# Patient Record
Sex: FEMALE | Race: OTHER | HISPANIC OR LATINO | Employment: OTHER | URBAN - METROPOLITAN AREA
[De-identification: names, ages, dates, MRNs, and addresses within clinical notes are randomized per-mention and may not be internally consistent; named-entity substitution may affect disease eponyms.]

---

## 2017-12-03 ENCOUNTER — APPOINTMENT (EMERGENCY)
Dept: RADIOLOGY | Facility: HOSPITAL | Age: 42
End: 2017-12-03
Payer: COMMERCIAL

## 2017-12-03 ENCOUNTER — HOSPITAL ENCOUNTER (EMERGENCY)
Facility: HOSPITAL | Age: 42
Discharge: HOME | End: 2017-12-04
Attending: EMERGENCY MEDICINE | Admitting: EMERGENCY MEDICINE
Payer: COMMERCIAL

## 2017-12-03 DIAGNOSIS — F10.929 ALCOHOL INTOXICATION (HCC): ICD-10-CM

## 2017-12-03 DIAGNOSIS — R52 BODY ACHES: Primary | ICD-10-CM

## 2017-12-03 LAB
BASOPHILS # BLD AUTO: 0.06 THOUSANDS/ΜL (ref 0–0.1)
BASOPHILS NFR BLD AUTO: 1 % (ref 0–1)
EOSINOPHIL # BLD AUTO: 0.15 THOUSAND/ΜL (ref 0–0.61)
EOSINOPHIL NFR BLD AUTO: 3 % (ref 0–6)
ERYTHROCYTE [DISTWIDTH] IN BLOOD BY AUTOMATED COUNT: 18.4 % (ref 11.6–15.1)
HCT VFR BLD AUTO: 32.3 % (ref 34.8–46.1)
HGB BLD-MCNC: 10.6 G/DL (ref 11.5–15.4)
LYMPHOCYTES # BLD AUTO: 2.43 THOUSANDS/ΜL (ref 0.6–4.47)
LYMPHOCYTES NFR BLD AUTO: 47 % (ref 14–44)
MCH RBC QN AUTO: 28.6 PG (ref 26.8–34.3)
MCHC RBC AUTO-ENTMCNC: 32.8 G/DL (ref 31.4–37.4)
MCV RBC AUTO: 87 FL (ref 82–98)
MONOCYTES # BLD AUTO: 0.72 THOUSAND/ΜL (ref 0.17–1.22)
MONOCYTES NFR BLD AUTO: 14 % (ref 4–12)
NEUTROPHILS # BLD AUTO: 1.8 THOUSANDS/ΜL (ref 1.85–7.62)
NEUTS SEG NFR BLD AUTO: 35 % (ref 43–75)
NRBC BLD AUTO-RTO: 0 /100 WBCS
PLATELET # BLD AUTO: 213 THOUSANDS/UL (ref 149–390)
PMV BLD AUTO: 11.2 FL (ref 8.9–12.7)
RBC # BLD AUTO: 3.7 MILLION/UL (ref 3.81–5.12)
SPECIMEN SOURCE: NORMAL
TROPONIN I BLD-MCNC: 0 NG/ML (ref 0–0.08)
WBC # BLD AUTO: 5.16 THOUSAND/UL (ref 4.31–10.16)

## 2017-12-03 PROCEDURE — 81025 URINE PREGNANCY TEST: CPT | Performed by: EMERGENCY MEDICINE

## 2017-12-03 PROCEDURE — 84484 ASSAY OF TROPONIN QUANT: CPT

## 2017-12-03 PROCEDURE — 85025 COMPLETE CBC W/AUTO DIFF WBC: CPT | Performed by: EMERGENCY MEDICINE

## 2017-12-03 PROCEDURE — 36415 COLL VENOUS BLD VENIPUNCTURE: CPT | Performed by: EMERGENCY MEDICINE

## 2017-12-03 PROCEDURE — 93005 ELECTROCARDIOGRAM TRACING: CPT | Performed by: EMERGENCY MEDICINE

## 2017-12-03 PROCEDURE — 81002 URINALYSIS NONAUTO W/O SCOPE: CPT | Performed by: EMERGENCY MEDICINE

## 2017-12-03 PROCEDURE — 80053 COMPREHEN METABOLIC PANEL: CPT | Performed by: EMERGENCY MEDICINE

## 2017-12-03 PROCEDURE — 71020 HB CHEST X-RAY 2VW FRONTAL&LATL: CPT

## 2017-12-03 RX ORDER — DIAZEPAM 5 MG/1
5 TABLET ORAL ONCE
Status: COMPLETED | OUTPATIENT
Start: 2017-12-03 | End: 2017-12-03

## 2017-12-03 RX ADMIN — DIAZEPAM 5 MG: 5 TABLET ORAL at 23:30

## 2017-12-04 ENCOUNTER — APPOINTMENT (EMERGENCY)
Dept: CT IMAGING | Facility: HOSPITAL | Age: 42
End: 2017-12-04
Payer: COMMERCIAL

## 2017-12-04 VITALS
RESPIRATION RATE: 18 BRPM | HEART RATE: 101 BPM | SYSTOLIC BLOOD PRESSURE: 121 MMHG | WEIGHT: 172 LBS | DIASTOLIC BLOOD PRESSURE: 80 MMHG | TEMPERATURE: 97.7 F | OXYGEN SATURATION: 100 %

## 2017-12-04 LAB
ALBUMIN SERPL BCP-MCNC: 2.3 G/DL (ref 3.5–5)
ALP SERPL-CCNC: 145 U/L (ref 46–116)
ALT SERPL W P-5'-P-CCNC: 84 U/L (ref 12–78)
ANION GAP SERPL CALCULATED.3IONS-SCNC: 8 MMOL/L (ref 4–13)
APAP SERPL-MCNC: <2 UG/ML (ref 10–30)
AST SERPL W P-5'-P-CCNC: 150 U/L (ref 5–45)
BILIRUB SERPL-MCNC: 0.29 MG/DL (ref 0.2–1)
BILIRUB UR QL STRIP: NEGATIVE
BUN SERPL-MCNC: 5 MG/DL (ref 5–25)
CALCIUM SERPL-MCNC: 8 MG/DL (ref 8.3–10.1)
CHLORIDE SERPL-SCNC: 107 MMOL/L (ref 100–108)
CLARITY UR: CLEAR
CO2 SERPL-SCNC: 25 MMOL/L (ref 21–32)
COLOR UR: YELLOW
COLOR, POC: YELLOW
CREAT SERPL-MCNC: 0.55 MG/DL (ref 0.6–1.3)
ETHANOL SERPL-MCNC: 172 MG/DL (ref 0–3)
EXT PREG TEST URINE: NEGATIVE
GFR SERPL CREATININE-BSD FRML MDRD: 114 ML/MIN/1.73SQ M
GLUCOSE SERPL-MCNC: 93 MG/DL (ref 65–140)
GLUCOSE UR STRIP-MCNC: NEGATIVE MG/DL
HGB UR QL STRIP.AUTO: NEGATIVE
KETONES UR STRIP-MCNC: NEGATIVE MG/DL
LEUKOCYTE ESTERASE UR QL STRIP: NEGATIVE
NITRITE UR QL STRIP: NEGATIVE
PH UR STRIP.AUTO: 6 [PH] (ref 4.5–8)
POTASSIUM SERPL-SCNC: 3.9 MMOL/L (ref 3.5–5.3)
PROT SERPL-MCNC: 6.9 G/DL (ref 6.4–8.2)
PROT UR STRIP-MCNC: NEGATIVE MG/DL
SALICYLATES SERPL-MCNC: <3 MG/DL (ref 3–20)
SODIUM SERPL-SCNC: 140 MMOL/L (ref 136–145)
SP GR UR STRIP.AUTO: 1.01 (ref 1–1.03)
UROBILINOGEN UR QL STRIP.AUTO: 0.2 E.U./DL

## 2017-12-04 PROCEDURE — 99284 EMERGENCY DEPT VISIT MOD MDM: CPT

## 2017-12-04 PROCEDURE — 80329 ANALGESICS NON-OPIOID 1 OR 2: CPT | Performed by: EMERGENCY MEDICINE

## 2017-12-04 PROCEDURE — 81003 URINALYSIS AUTO W/O SCOPE: CPT

## 2017-12-04 PROCEDURE — 80320 DRUG SCREEN QUANTALCOHOLS: CPT | Performed by: EMERGENCY MEDICINE

## 2017-12-04 PROCEDURE — 71275 CT ANGIOGRAPHY CHEST: CPT

## 2017-12-04 PROCEDURE — 74177 CT ABD & PELVIS W/CONTRAST: CPT

## 2017-12-04 PROCEDURE — 36415 COLL VENOUS BLD VENIPUNCTURE: CPT | Performed by: EMERGENCY MEDICINE

## 2017-12-04 RX ADMIN — IOHEXOL 100 ML: 350 INJECTION, SOLUTION INTRAVENOUS at 00:39

## 2017-12-04 NOTE — ED ATTENDING ATTESTATION
Manuel Craig DO, saw and evaluated the patient  I have discussed the patient with the resident/non-physician practitioner and agree with the resident's/non-physician practitioner's findings, Plan of Care, and MDM as documented in the resident's/non-physician practitioner's note, except where noted  All available labs and Radiology studies were reviewed  At this point I agree with the current assessment done in the Emergency Department  I have conducted an independent evaluation of this patient a history and physical is as follows:       Patient is a 52-year-old female that presents for left-sided chest and flank pain  She states that she just arrived here from [facility]   She is seeking a medical evaluation that was advised that she obtained by [employer]  Patient states that she has a history of a pulmonary embolus that was complicated by a GI bleed on Xarelto  This required her to undergo an IVC filter  Patient states that she has been in [facility] for the last year  She used to live here in does have a family doctor here  She states that she will be following up with them  Patient states that last month she had gastric ulcers but there is no intervention that needed to be done during her EGD  She states that further the past month or 2 she has been having intermittent pains from the right to left side of her body  This is similar to the pain that she is presenting with today but it is only on the left side of her body today  Patient is a chronic alcoholic and states that she drank last night  Patient appears in no acute distress on exam   She is tachycardic  Her vital signs are normal except for a mild tachycardia  She does have point tenderness to the left lateral ribs that extends into the abdomen  Her lungs are clear  Heart sounds are otherwise normal     Plan is to obtain labs and a CT scan to evaluate for PE since she is off Xarelto and only has an IVC filter      Labs show an elevated AST and ALT  She does have cirrhosis and this would be compliant with that  Alcohol level is elevated  Patient tells me her last drink was last night which was about 8 hours ago  CT scan is negative for acute pathology  Advised follow up with her family doctor  No other intervention at this time    Critical Care Time  CritCare Time

## 2017-12-04 NOTE — ED PROVIDER NOTES
History  Chief Complaint   Patient presents with    Abdominal Pain     abdominal pain, vomiting, denies diarrhea  chills    Rib Pain     left sided rib pain, known PE, states "I was on xarelto but I just moved here", SOB  HPI  This is a 15-year-old female with history of recent PE diagnosis, the gastric ulcers, alcoholic liver disease, seizures presenting for evaluation for medical evaluation given that she recently moved here from [facility]   Patient was living in [facility] for the past year, was sent here by female a due to recent GI bleed  Patient underwent multiple blood transfusions in the past few weeks because of this  Patient had a scope done last month which showed gastric ulcers, however unknown if there is any intervention done during the EGD  Patient states that over the past month, she has been feeling fatigued with pain alternated to the right side left side of her body since the issue with GI bleed  Patient currently presents with left-sided body pain which includes left shoulder, left chest, left flank, and left leg  This is similar to the way she was presenting over the past month for GI bleeds  Patient does complain of shortness of breath as well  Patient was recently diagnosed with bilateral, peripheral PEs on CT scan  Patient was initially on Xarelto, however episodes GI bleeds, she was taken off the anticoagulation  She currently has an IVC filter that was placed within the past 2 months  Patient states that during her most recent hospitalization, her hemoglobin went down 5, she was transfused blood into her hemoglobin to 10  [place] once her hemoglobin was stable  Patient denies any other recent illnesses, no cough, no urinary symptoms, no diarrhea  Patient denies any abdominal pain, no vomiting, she does admit to having intermittent episodes of nausea  In addition, patient has history of seizures    This is unclear whether not this is associated with her alcohol withdrawal   Patient is on antiepileptics  Patient denies smoking and drug use, however she still does drink alcohol  Patient drinks 4-5 days a week, she says she has a few glasses of wine each time  Patient denies extremity numbness and tingling, no headaches, no visual auditory disturbance  Patient denies any vaginal bleeding or discharge, hematuria, no bloody bowel movements  Surgical history of gastric bypass, cholecystectomy, appendectomy  Patient did have a previous PCP here [facility] prior to her move to [facility]         None       Past Medical History:   Diagnosis Date    Anemia     [facility]monary [facility] [address] [facility]     Seizures ([facility] [address] )        Past Surgical History:   Procedure Laterality Date    APPENDECTOMY      CHOLECYSTECTOMY      IVC FILTER INSERTION      TUBAL LIGATION         History reviewed  No pertinent family history  I have reviewed and agree with the history as documented  Social History   Substance Use Topics    Smoking status: Never Smoker    Smokeless tobacco: Never Used    Alcohol use Yes      Comment: history of alcoholism        Review of Systems     Constitutional: Negative for appetite change, chills and fever  HENT: Negative for congestion, rhinorrhea and sore throat  Eyes: Negative for photophobia, pain and visual disturbance  Respiratory: Negative for cough, chest tightness  Positive for shortness of breath  Cardiovascular:  Positive for chest pain, negative for palpitations and leg swelling  Gastrointestinal:  Positive for abdominal pain, negative for diarrhea, nausea and vomiting  Genitourinary: Negative for dysuria, flank pain and hematuria  Musculoskeletal: Negative for back pain, neck pain and neck stiffness  Skin: Negative for color change, rash and wound  Neurological: Negative for dizziness, numbness and headaches  All other systems reviewed and are negative        Physical Exam  ED Triage Vitals [12/03/17 2230]   Temperature Pulse Respirations Blood Pressure SpO2   97 7 °F (36 5 °C) (!) 110 18 117/69 98 %      Temp Source Heart Rate Source Patient Position - Orthostatic VS BP Location FiO2 (%)   Temporal Monitor Sitting Right arm --      Pain Score       7           Orthostatic Vital Signs  Vitals:    12/03/17 2230 12/04/17 0035   BP: 117/69 121/80   Pulse: (!) 110 101   Patient Position - Orthostatic VS: Sitting        Physical Exam  /80   Pulse 101   Temp 97 7 °F (36 5 °C) (Temporal)   Resp 18   Wt 78 kg (172 lb)   LMP 11/06/2017   SpO2 100%     General Appearance:  Alert, cooperative, no distress, appears stated age   Head:  Normocephalic, without obvious abnormality, atraumatic   Eyes:  PERRL, conjunctiva/corneas clear, EOM's intact       Nose: Nares normal, septum midline,mucosa normal, no drainage or sinus tenderness   Throat: Lips, mucosa, and tongue normal; teeth and gums normal   Neck: Supple, symmetrical, trachea midline, no adenopathy   Back:   Symmetric, no curvature, ROM normal, no CVA tenderness   Lungs:   Clear to auscultation bilaterally, respirations unlabored  Left posterior chest wall with some tenderness, there is no signs of trauma, there is no swelling  Heart:  Regular rate and rhythm, S1 and S2 normal, no murmur, rub, or gallop   Abdomen:   Soft, the mild tenderness of left flank, bowel sounds active all four quadrants   Pelvic: Deferred   Extremities: Extremities normal, atraumatic, no cyanosis or edema  Left thigh is soft, there is no tenderness on palpation  Pulses: 2+ and symmetric   Skin: Skin color, texture, turgor normal, no rashes or lesions   Neurologic:      Psychiatric: Moves all extremities, sensation and strength in tact in all extremities    5/5 strength in all extremities    Normal mood and affect         ED Medications  Medications   diazepam (VALIUM) tablet 5 mg (5 mg Oral Given 12/3/17 2330)   iohexol (OMNIPAQUE) 350 MG/ML injection (MULTI-DOSE) 100 mL (100 mL Intravenous Given 12/4/17 0039)       Diagnostic Studies  Results Reviewed     Procedure Component Value Units Date/Time    Acetaminophen level [[id number]]  (Abnormal) Collected:  12/04/17 0002    Lab Status:  Final result Specimen:  Blood from Arm, Right Updated:  12/04/17 0048     Acetaminophen Level <2 (L) ug/mL     Salicylate level [[id number]]  (Abnormal) Collected:  12/04/17 0002    Lab Status:  Final result Specimen:  Blood from Arm, Right Updated:  02/10/98 8983     Salicylate Lvl <3 (L) mg/dL     Ethanol [[id number]]  (Abnormal) Collected:  12/04/17 0002    Lab Status:  Final result Specimen:  Blood from Arm, Right Updated:  12/04/17 0023     Ethanol Lvl 172 (H) mg/dL     Comprehensive metabolic panel [[id number]]  (Abnormal) Collected:  12/03/17 2329    Lab Status:  Final result Specimen:  Blood from Arm, Right Updated:  12/04/17 0013     Sodium 140 mmol/L      Potassium 3 9 mmol/L      Chloride 107 mmol/L      CO2 25 mmol/L      Anion Gap 8 mmol/L      BUN 5 mg/dL      Creatinine 0 55 (L) mg/dL      Glucose 93 mg/dL      Calcium 8 0 (L) mg/dL       (H) U/L      ALT 84 (H) U/L      Alkaline Phosphatase 145 (H) U/L      Total Protein 6 9 g/dL      Albumin 2 3 (L) g/dL      Total Bilirubin 0 29 mg/dL      eGFR 114 ml/min/1 73sq m     Narrative:         National Kidney Disease Education Program recommendations are as follows:  GFR calculation is accurate only with a steady state creatinine  Chronic Kidney disease less than 60 ml/min/1 73 sq  meters  Kidney failure less than 15 ml/min/1 73 sq  meters      POCT urinalysis dipstick [[id number]]  (Normal) Resulted:  12/04/17 0002    Lab Status:  Final result Specimen:  Urine Updated:  12/04/17 0002     Color, UA yellow    POCT pregnancy, urine [[id number]]  (Normal) Resulted:  12/04/17 0002    Lab Status:  Final result Updated:  12/04/17 0002     EXT PREG TEST UR (Ref: Negative) negative    ED Urine Macroscopic [[id number]] (Normal) Collected:  12/04/17 0018    Lab Status:  Final result Specimen:  Urine Updated:  12/04/17 0002     Color, UA Yellow     Clarity, UA Clear     pH, UA 6 0     Leukocytes, UA Negative     Nitrite, UA Negative     Protein, UA Negative mg/dl      Glucose, UA Negative mg/dl      Ketones, UA Negative mg/dl      Urobilinogen, UA 0 2 E U /dl      Bilirubin, UA Negative     Blood, UA Negative     Specific Gravity, UA 1 010    Narrative:       CLINITEK RESULT    CBC and differential [[id number]]  (Abnormal) Collected:  12/03/17 2329    Lab Status:  Final result Specimen:  Blood from Arm, Right Updated:  12/03/17 2345     WBC 5 16 Thousand/uL      RBC 3 70 (L) Million/uL      Hemoglobin 10 6 (L) g/dL      Hematocrit 32 3 (L) %      MCV 87 fL      MCH 28 6 pg      MCHC 32 8 g/dL      RDW 18 4 (H) %      MPV 11 2 fL      Platelets 661 Thousands/uL      nRBC 0 /100 WBCs      Neutrophils Relative 35 (L) %      Lymphocytes Relative 47 (H) %      Monocytes Relative 14 (H) %      Eosinophils Relative 3 %      Basophils Relative 1 %      Neutrophils Absolute 1 80 (L) Thousands/µL      Lymphocytes Absolute 2 43 Thousands/µL      Monocytes Absolute 0 72 Thousand/µL      Eosinophils Absolute 0 15 Thousand/µL      Basophils Absolute 0 06 Thousands/µL     POCT troponin [[id number]]  (Normal) Collected:  12/03/17 2331    Lab Status:  Final result Updated:  12/03/17 2344     POC Troponin I 0 00 ng/ml      Specimen Type VENOUS    Narrative:         [employer] i-Stat handheld analyzer 99% cutoff is > 0 08ng/mL in [facility] Emergency Departments    o cTnI 99% cutoff is useful only when applied to patients in the clinical setting of myocardial ischemia  o cTnI 99% cutoff should be interpreted in the context of clinical history, ECG findings and possibly cardiac imaging to establish correct diagnosis  o cTnI 99% cutoff may be suggestive but clearly not indicative of a coronary event without the clinical setting of myocardial ischemia  XR chest 2 views    (Results Pending)   CT pe study w abdomen plevis w contrast    (Results Pending)         Procedures  Procedures      Phone Consults  ED Phone Contact    ED Course  ED Course as of Dec 04 0319   Mon Dec 04, 2017   0133 Vrads: CT Chest: no PE  CT Abd/Pelvis: 1  Severe Fatty Liver  2  There appear to be cysts in the ovaries larger on the right and appearing to be partially septated  measuring 7 x 2 6 cm on image 86  Pelvic sonogram as clinically indicated  3  Postoperative changes        MDM   Patient has multiple complaints, however her main concern is her possible bleeding and hemoglobin  We will check CBC, CMP, chest x-ray for the left-sided chest wall pain, troponin, EKG  We will check a urine pregnancy and UA  Patient is tachycardic, however she is 98% on room air  She is not anticoagulated, will check CT CAP with contrast       CritCare Time    Disposition  Final diagnoses: Body aches   Alcohol intoxication ([facility] [address] )     Time reflects when diagnosis was documented in both MDM as applicable and the Disposition within this note     Time User Action Codes Description Comment    12/4/2017  1:33 AM Mame Guardado Add [R52] Body aches     12/4/2017  1:34 AM Mame Guardado Add [F10 929] Alcohol intoxication [facility])       ED Disposition     ED Disposition Condition Comment    Discharge  [address] discharge to home/self care  Condition at discharge: Stable        Follow-up Information     Follow up With Specialties Details Why Contact Info    Your prior PCP  Schedule an appointment as soon as possible for a visit in 1 day          There are no discharge medications for this patient  No discharge procedures on file  ED Provider  Attending physically available and evaluated [address]  I managed the patient along with the ED Attending      Electronically Signed by         Rachelle Springer MD  Resident  12/04/17 4851

## 2017-12-04 NOTE — DISCHARGE INSTRUCTIONS
At-Risk Alcohol Use   WHAT YOU NEED TO KNOW:   What is at-risk alcohol use? At-risk alcohol use occurs when the amount of alcohol you drink increases your risk of health problems  You may be drinking alcohol regularly or all at once (binge drinking)  In men, at-risk alcohol use is drinking more than 14 drinks per week, or more than 4 drinks at one time  For women, it is more than 7 drinks per week, or more than 3 drinks at one time  A drink of alcohol is 12 ounces of beer, 5 ounces of wine, or 1½ ounces of liquor  What increases my risk of at-risk alcohol use? You may be at increased risk if you drink alcohol to relieve stress, anxiety, depression, or loneliness  The following are other conditions that may also increase your risk:  · Age: Your risk increases if you started drinking at an early age  · Family history of alcoholism: You may have a higher risk of at-risk alcohol use if you have a close family member with alcoholism  · Gender:  Men are more likely to become at-risk alcohol users than women  · Mental health problems: You may have an increased risk if you have a mental disorder, such as depression  · Other substance abuse: Your risk is higher if you are a heavy cigarette smoker or you abuse illegal drugs  What are the signs and symptoms of at-risk alcohol use? · Depression or anxiety    · Frequent injuries or accidents    · Pain in the abdomen     · Trouble thinking clearly, understanding, or remembering things  What problems can at-risk alcohol use cause? · Accidents at home or work, or while driving     · Health problems, such as liver or brain damage, cancer, or pancreatitis     · Health problems in newborns whose mothers drank alcohol during pregnancy    · Relationship problems  How is at-risk alcohol use diagnosed? Your healthcare provider will ask you how much and how often you drink   He may also want to know if alcoholism or other substance abuse disorders run in your family  He may ask questions about how you are doing in school or at work  You may need other tests or rating scales to help your healthcare provider learn more about your drinking habits  A rating scale tells your healthcare provider how much you drink in a day or week  Blood, urine, or breath tests may also be done to check the amount of alcohol in your body  How is at-risk alcohol use treated? · Brief intervention therapy:  A healthcare provider meets with you to discuss ways to control your risky behaviors, such as drinking and driving  This therapy also helps you set goals to decrease the amount of alcohol you drink  · Glucose: This medicine may be given to increase the amount of sugar in your blood  · Vitamin supplement:  Alcohol can make it hard for your body to absorb enough vitamin B1  You may be given vitamin B1 if you have low levels  It is also given to prevent alcohol related brain damage  You may also need other vitamin supplements  What are the risks of at-risk alcohol use? At-risk alcohol use may lead to alcohol abuse or dependence if left untreated  You may have high blood pressure or liver and heart disease  You may also have problems with your mood and with relationships  You also increase your risk of injuries from accidents or harming others  Where can I find support and more information? · Alcoholics Anonymous  Web Address: http://www mora info/  When should I contact my healthcare provider? Contact your healthcare provider if:  · You need help to stop drinking alcohol  · You have new symptoms since your last visit  · You have questions or concerns about your condition or care  When should I seek immediate care? Seek care immediately or call 911 if:  · You feel like hurting yourself or someone else  · You have trouble breathing, chest pain, or a fast heartbeat  · You have a seizure  CARE AGREEMENT:   You have the right to help plan your care   Learn about your health condition and how it may be treated  Discuss treatment options with your caregivers to decide what care you want to receive  You always have the right to refuse treatment  The above information is an  only  It is not intended as medical advice for individual conditions or treatments  Talk to your doctor, nurse or pharmacist before following any medical regimen to see if it is safe and effective for you  © 2017 [address] [employer] Information is for End User's use only and may not be sold, redistributed or otherwise used for commercial purposes  All illustrations and images included in CareNotes® are the copyrighted property of [employer]  or Raad Paula  Alcohol Intoxication   WHAT YOU NEED TO KNOW:   What is alcohol intoxication? Alcohol intoxication is a harmful physical condition caused when you drink more alcohol than your body can handle  It is also called ethanol poisoning, or being drunk  What are the physical signs and symptoms of alcohol intoxication? · Breath that smells like alcohol     · Blackouts or seizures    · Enlarged pupils    · Eye movements that are faster than normal for you    · Fast heartbeats and slow breaths     · Loss of balance, or no ability to walk straight or stand still    · Nausea and vomiting     · Slurred or loud speech  What behaviors are common with alcohol intoxication? · Quick mood changes: You feel happy and quickly become angry, or you easily become sad  You may act out violently  · Risky sexual behavior:  You have sex that is not protected, or you have sex with many people  · Work or school trouble: You have many absences or do not finish your work  How is alcohol intoxication diagnosed? Your healthcare provider will examine you  He will ask about your signs and symptoms and use of alcohol  These questions may include how much, how often, and what kind of alcohol you drink   He may ask you questions to test your memory and judgment  He may also send blood or urine samples to a lab  The samples are tested for alcohol and for signs of liver, kidney, or heart damage caused by alcohol  You may need to have these tests more than once  How is alcohol intoxication treated? · Medicines:      ¨ Sedative: This medicine is given to help you stay calm and relaxed  ¨ Antinausea medicine: This medicine may be given to calm your stomach and prevent vomiting  ¨ Glucose: This medicine may be given to increase the amount of sugar in your blood  ¨ Vitamin B1:  This is also called Thiamine  You may be given vitamin B1 if your levels are low from excess alcohol  · Brief intervention therapy:  A healthcare provider meets with you to discuss ways to control your risky behaviors, such as drinking and driving  This therapy also helps you set goals to decrease the amount of alcohol you drink  · Breathing support: You may need the following if you are so intoxicated that you cannot breathe well on your own:     Checo Rodrigues You may need extra oxygen  if your blood oxygen level is lower than it should be  You may get oxygen through a mask placed over your nose and mouth or through small tubes placed in your nostrils  Ask your healthcare provider before you take off the mask or oxygen tubing  ¨ A ventilator  is a machine that gives you oxygen and breathes for you when you cannot breathe well on your own  An endotracheal (ET) tube is put into your mouth or nose and attached to the ventilator  You may need a trach if an ET tube cannot be placed  A trach is a tube put through an incision and into your windpipe  What are the risks of alcohol intoxication? Alcohol intoxication puts you at risk for disease and injury  Alcohol can damage your brain, liver, heart, kidneys, and lungs  You may be more likely to act violently when you are intoxicated  You may break the law, or harm yourself and others   Risky sexual behavior could lead to sexually transmitted diseases (STDs)  Alcohol intoxication and poisoning can put you into a coma (sleep that you cannot wake up from) and may be life-threatening  Where can I find more information? · Alcoholics Anonymous  Web Address: http://www mora info/  When should I contact my healthcare provider? Contact your healthcare provider if:  · You need help to stop drinking alcohol  · You have trouble with work or school because you drink too much alcohol  · You have physical or verbal fights because of alcohol  · You have questions or concerns about your condition or care  When should I seek immediate care? Seek care immediately or call 911 if:  · You have sudden trouble breathing or chest pain  · You have a seizure  · You feel sad enough to harm yourself or others  · You have hallucinations (you see or hear things that are not real)  · You cannot stop vomiting  · You were in an accident because of alcohol  CARE AGREEMENT:   You have the right to help plan your care  Learn about your health condition and how it may be treated  Discuss treatment options with your caregivers to decide what care you want to receive  You always have the right to refuse treatment  The above information is an  only  It is not intended as medical advice for individual conditions or treatments  Talk to your doctor, nurse or pharmacist before following any medical regimen to see if it is safe and effective for you  © 2017 [employer] Information is for End User's use only and may not be sold, redistributed or otherwise used for commercial purposes  All illustrations and images included in CareNotes® are the copyrighted property of [employer]  or Raad Mitchell

## 2017-12-05 LAB
ATRIAL RATE: 78 BPM
P AXIS: 66 DEGREES
PR INTERVAL: 152 MS
QRS AXIS: 39 DEGREES
QRSD INTERVAL: 88 MS
QT INTERVAL: 434 MS
QTC INTERVAL: 494 MS
T WAVE AXIS: 22 DEGREES
VENTRICULAR RATE: 78 BPM

## 2017-12-11 ENCOUNTER — HOSPITAL ENCOUNTER (EMERGENCY)
Facility: HOSPITAL | Age: 42
End: 2017-12-12
Attending: EMERGENCY MEDICINE | Admitting: EMERGENCY MEDICINE
Payer: COMMERCIAL

## 2017-12-11 DIAGNOSIS — T50.902A INTENTIONAL OVERDOSE OF DRUG IN TABLET FORM (HCC): ICD-10-CM

## 2017-12-11 DIAGNOSIS — F10.929 ALCOHOL INTOXICATION (HCC): ICD-10-CM

## 2017-12-11 DIAGNOSIS — R45.851 SUICIDAL THOUGHTS: Primary | ICD-10-CM

## 2017-12-11 LAB
ALBUMIN SERPL BCP-MCNC: 2.7 G/DL (ref 3.5–5)
ALP SERPL-CCNC: 133 U/L (ref 46–116)
ALT SERPL W P-5'-P-CCNC: 99 U/L (ref 12–78)
AMPHETAMINES SERPL QL SCN: NEGATIVE
ANION GAP SERPL CALCULATED.3IONS-SCNC: 9 MMOL/L (ref 4–13)
APAP SERPL-MCNC: <2 UG/ML (ref 10–30)
AST SERPL W P-5'-P-CCNC: 180 U/L (ref 5–45)
BARBITURATES UR QL: POSITIVE
BASOPHILS # BLD AUTO: 0.06 THOUSANDS/ΜL (ref 0–0.1)
BASOPHILS NFR BLD AUTO: 1 % (ref 0–1)
BENZODIAZ UR QL: NEGATIVE
BILIRUB SERPL-MCNC: 0.28 MG/DL (ref 0.2–1)
BUN SERPL-MCNC: 4 MG/DL (ref 5–25)
CALCIUM SERPL-MCNC: 8.1 MG/DL (ref 8.3–10.1)
CHLORIDE SERPL-SCNC: 103 MMOL/L (ref 100–108)
CO2 SERPL-SCNC: 23 MMOL/L (ref 21–32)
COCAINE UR QL: NEGATIVE
CREAT SERPL-MCNC: 0.6 MG/DL (ref 0.6–1.3)
EOSINOPHIL # BLD AUTO: 0.07 THOUSAND/ΜL (ref 0–0.61)
EOSINOPHIL NFR BLD AUTO: 2 % (ref 0–6)
ERYTHROCYTE [DISTWIDTH] IN BLOOD BY AUTOMATED COUNT: 17.4 % (ref 11.6–15.1)
ETHANOL EXG-MCNC: 0.07 MG/DL
ETHANOL EXG-MCNC: 0.21 MG/DL
ETHANOL EXG-MCNC: 128 MG/DL
ETHANOL SERPL-MCNC: 208 MG/DL (ref 0–3)
GFR SERPL CREATININE-BSD FRML MDRD: 111 ML/MIN/1.73SQ M
GLUCOSE SERPL-MCNC: 111 MG/DL (ref 65–140)
HCT VFR BLD AUTO: 34.5 % (ref 34.8–46.1)
HGB BLD-MCNC: 11.2 G/DL (ref 11.5–15.4)
LYMPHOCYTES # BLD AUTO: 1.86 THOUSANDS/ΜL (ref 0.6–4.47)
LYMPHOCYTES NFR BLD AUTO: 42 % (ref 14–44)
MCH RBC QN AUTO: 28.4 PG (ref 26.8–34.3)
MCHC RBC AUTO-ENTMCNC: 32.5 G/DL (ref 31.4–37.4)
MCV RBC AUTO: 88 FL (ref 82–98)
METHADONE UR QL: NEGATIVE
MONOCYTES # BLD AUTO: 0.56 THOUSAND/ΜL (ref 0.17–1.22)
MONOCYTES NFR BLD AUTO: 13 % (ref 4–12)
NEUTROPHILS # BLD AUTO: 1.93 THOUSANDS/ΜL (ref 1.85–7.62)
NEUTS SEG NFR BLD AUTO: 42 % (ref 43–75)
OPIATES UR QL SCN: NEGATIVE
PCP UR QL: NEGATIVE
PLATELET # BLD AUTO: 207 THOUSANDS/UL (ref 149–390)
PMV BLD AUTO: 11.1 FL (ref 8.9–12.7)
POTASSIUM SERPL-SCNC: 4 MMOL/L (ref 3.5–5.3)
PROT SERPL-MCNC: 7.5 G/DL (ref 6.4–8.2)
RBC # BLD AUTO: 3.94 MILLION/UL (ref 3.81–5.12)
SALICYLATES SERPL-MCNC: <3 MG/DL (ref 3–20)
SODIUM SERPL-SCNC: 135 MMOL/L (ref 136–145)
THC UR QL: NEGATIVE
WBC # BLD AUTO: 4.48 THOUSAND/UL (ref 4.31–10.16)

## 2017-12-11 PROCEDURE — 80329 ANALGESICS NON-OPIOID 1 OR 2: CPT | Performed by: EMERGENCY MEDICINE

## 2017-12-11 PROCEDURE — 96375 TX/PRO/DX INJ NEW DRUG ADDON: CPT

## 2017-12-11 PROCEDURE — 82075 ASSAY OF BREATH ETHANOL: CPT | Performed by: EMERGENCY MEDICINE

## 2017-12-11 PROCEDURE — 93005 ELECTROCARDIOGRAM TRACING: CPT | Performed by: EMERGENCY MEDICINE

## 2017-12-11 PROCEDURE — 80307 DRUG TEST PRSMV CHEM ANLYZR: CPT | Performed by: EMERGENCY MEDICINE

## 2017-12-11 PROCEDURE — 80053 COMPREHEN METABOLIC PANEL: CPT | Performed by: EMERGENCY MEDICINE

## 2017-12-11 PROCEDURE — 96374 THER/PROPH/DIAG INJ IV PUSH: CPT

## 2017-12-11 PROCEDURE — 80320 DRUG SCREEN QUANTALCOHOLS: CPT | Performed by: EMERGENCY MEDICINE

## 2017-12-11 PROCEDURE — 36415 COLL VENOUS BLD VENIPUNCTURE: CPT | Performed by: EMERGENCY MEDICINE

## 2017-12-11 PROCEDURE — 85025 COMPLETE CBC W/AUTO DIFF WBC: CPT | Performed by: EMERGENCY MEDICINE

## 2017-12-11 RX ORDER — MULTIVITAMIN WITH IRON
100 TABLET ORAL DAILY
COMMUNITY

## 2017-12-11 RX ORDER — CHLORDIAZEPOXIDE HYDROCHLORIDE 25 MG/1
25 CAPSULE, GELATIN COATED ORAL 3 TIMES DAILY PRN
COMMUNITY
End: 2021-05-01

## 2017-12-11 RX ORDER — FERROUS SULFATE 325(65) MG
325 TABLET ORAL
COMMUNITY

## 2017-12-11 RX ORDER — RANITIDINE 300 MG/1
300 CAPSULE ORAL EVERY EVENING
Status: ON HOLD | COMMUNITY
End: 2020-08-04 | Stop reason: DRUGHIGH

## 2017-12-11 RX ORDER — LORAZEPAM 1 MG/1
1 TABLET ORAL ONCE
Status: COMPLETED | OUTPATIENT
Start: 2017-12-11 | End: 2017-12-11

## 2017-12-11 RX ORDER — CLONAZEPAM 0.5 MG/1
0.5 TABLET ORAL 2 TIMES DAILY
Status: ON HOLD | COMMUNITY
End: 2020-08-04 | Stop reason: DRUGHIGH

## 2017-12-11 RX ORDER — PANTOPRAZOLE SODIUM 40 MG/1
40 TABLET, DELAYED RELEASE ORAL DAILY
Status: ON HOLD | COMMUNITY
End: 2020-08-04 | Stop reason: DRUGHIGH

## 2017-12-11 RX ORDER — TOPIRAMATE 100 MG/1
100 TABLET, FILM COATED ORAL 2 TIMES DAILY
COMMUNITY

## 2017-12-11 RX ORDER — ONDANSETRON 2 MG/ML
4 INJECTION INTRAMUSCULAR; INTRAVENOUS ONCE
Status: COMPLETED | OUTPATIENT
Start: 2017-12-11 | End: 2017-12-11

## 2017-12-11 RX ORDER — QUETIAPINE FUMARATE 200 MG/1
25 TABLET, FILM COATED ORAL
Status: ON HOLD | COMMUNITY
End: 2020-08-04

## 2017-12-11 RX ORDER — ACAMPROSATE CALCIUM 333 MG/1
333 TABLET, DELAYED RELEASE ORAL 3 TIMES DAILY
Status: ON HOLD | COMMUNITY
End: 2020-08-04 | Stop reason: ALTCHOICE

## 2017-12-11 RX ORDER — SUMATRIPTAN 100 MG/1
100 TABLET, FILM COATED ORAL AS NEEDED
COMMUNITY

## 2017-12-11 RX ORDER — BUTALBITAL, ACETAMINOPHEN AND CAFFEINE 50; 325; 40 MG/1; MG/1; MG/1
1 TABLET ORAL EVERY 4 HOURS PRN
Status: ON HOLD | COMMUNITY
End: 2020-08-04 | Stop reason: ALTCHOICE

## 2017-12-11 RX ORDER — TRAMADOL HYDROCHLORIDE 50 MG/1
50 TABLET ORAL EVERY 6 HOURS PRN
Status: ON HOLD | COMMUNITY
End: 2020-08-04 | Stop reason: DRUGHIGH

## 2017-12-11 RX ORDER — HYDROXYZINE 50 MG/1
50 TABLET, FILM COATED ORAL 3 TIMES DAILY PRN
Status: ON HOLD | COMMUNITY
End: 2020-08-04 | Stop reason: DRUGHIGH

## 2017-12-11 RX ORDER — FOLIC ACID 1 MG/1
1 TABLET ORAL DAILY
COMMUNITY

## 2017-12-11 RX ORDER — LORAZEPAM 2 MG/ML
1 INJECTION INTRAMUSCULAR ONCE
Status: COMPLETED | OUTPATIENT
Start: 2017-12-11 | End: 2017-12-11

## 2017-12-11 RX ORDER — FUROSEMIDE 20 MG/1
20 TABLET ORAL 2 TIMES DAILY
COMMUNITY
End: 2020-08-09 | Stop reason: HOSPADM

## 2017-12-11 RX ORDER — GABAPENTIN 400 MG/1
400 CAPSULE ORAL 3 TIMES DAILY
COMMUNITY
End: 2021-05-01

## 2017-12-11 RX ADMIN — LORAZEPAM 1 MG: 1 TABLET ORAL at 14:56

## 2017-12-11 RX ADMIN — ONDANSETRON 4 MG: 2 INJECTION INTRAMUSCULAR; INTRAVENOUS at 19:06

## 2017-12-11 RX ADMIN — LORAZEPAM 1 MG: 2 INJECTION INTRAMUSCULAR; INTRAVENOUS at 19:07

## 2017-12-11 NOTE — ED NOTES
Authorization completed with Hanna from [facility] [phone] for 3 days of inpatient treatment   Facility to call upon arrival

## 2017-12-11 NOTE — LETTER
Section I - General Information    Name of Patient: Rivera Rocha                 : 1975    Medicare #:____________________  Transport Date: 17 (PCS is valid for round trips on this date and for all repetitive trips in the 60-day range as noted below )  Origin: [place] [id number]                                                         Destination:_[facility]  Is the pt's stay covered under Medicare Part A (PPS/DRG)     (_) YES  (X_) NO  Closest appropriate facility?  (_) YES  (X_) NO  If no, why is transport to more distant facility required? [facility]  If hosp-hosp transfer, describe services needed at 2nd facility not available at 1st facility? _________________________________  If hospice pt, is this transport related to pt's terminal illness? (_) YES (_) NO Describe____________________________________    Section II - Medical Necessity Questionnaire  Ambulance transportation is medically necessary only if other means of transport are contraindicated or would be potentially harmful to the patient  To meet this requirement, the patient must either be "bed confined" or suffer from a condition such that transport by means other than ambulance is contraindicated by the patient's condition  The following questions must be answered by the medical professional signing below for this form to be valid:    1)  Describe the MEDICAL CONDITION (physical and/or mental) of this patient AT [address] [place] [address] that requires the patient to be transported in an ambulance and why transport by other means is contraindicated by the patient's condition:Behavioral Health Transfer    2) Is the patient "bed confined" as defined below?     (_) YES  (X_) NO  To be "be confined" the patient must satisfy all three of the following conditions: (1) unable to get up from bed without Assistance; AND (2) unable to ambulate; AND (3) unable to sit in a chair or wheelchair      3) Can this patient safely be transported by car or wheelchair van (i e , seated during transport without a medical attendant or monitoring)?   (_) YES  (X_) NO    4) In addition to completing questions 1-3 above, please check any of the following conditions that apply*:  *Note: supporting documentation for any boxes checked must be maintained in the patient's medical records  (_)Contractures   (_)Non-Healed Fractures  (_)Patient is confused (_)Patient is comatose (_)Moderate/severe pain on movement (X_)Danger to self/others  (_)IV meds/fluids required (_)Patient is combative(_)Need or possible need for restraints (_)DVT requires elevation of lower extremity  (_)Medical attendant required (_)Requires oxygen-unable to self administer (_)Special handling/isolation/infection control precautions required (_)Unable to tolerate seated position for time needed to transport (_)Hemodynamic monitoring required en route (_)Unable to sit in a chair or wheelchair due to decubitus ulcers or other wounds (_)Cardiac monitoring required en route (_)Morbid obesity requires additional personnel/equipment to safely handle patient (_)Orthopedic device (backboard, halo, pins, traction, brace, wedge, etc,) requiring special handling during transport (_)Other(specify)_______________________________________________    Section III - Signature of Physician or Healthcare Professional  I certify that the above information is true and correct based on my evaluation of this patient, and represent that the patient requires transport by ambulance and that other forms of transport are contraindicated  I understand that this information will be used by the Centers for Medicare and Medicaid Services (CMS) to support the determination of medical necessity for ambulance services, and I represent that I have personal knowledge of the patient's condition at time of transport    (_) If this box is checked, I also certify that the patient is physically or mentally incapable of signing the ambulance service's claim and that the institution with which I am affiliated has furnished care, services, or assistance to the patient  My signature below is made on behalf of the patient pursuant to 42 CFR §424 36(b)(4)  In accordance with 42 CFR §424 37, the specific reason(s) that the patient is physically or mentally incapable of signing the claim form is as follows: _________________________________________________________________________________________________________      Signature of Physician* or Healthcare Professional______________________________________________________________  Signature Date 12/11/17 (For scheduled repetitive transports, this form is not valid for transports performed more than 60 days after this date)    Printed Name & Credentials of Physician or Healthcare Professional (MD, DO, RN, etc )________________________________  *Form must be signed by patient's attending physician for scheduled, repetitive transports   For non-repetitive, unscheduled ambulance transports, if unable to obtain the signature of the attending physician, any of the following may sign (choose appropriate option below)  (_) Physician Assistant (_)  Clinical Nurse Specialist (_)  Registered Nurse  (_)  Nurse Practitioner  (_) Discharge Planner

## 2017-12-11 NOTE — ED PROVIDER NOTES
History  Chief Complaint   Patient presents with    Psychiatric Evaluation     Pt states that she is having suicidal ideations  Pt states that she plans to overdose on the medications that she brought with her today  Pt states she is also an alcoholic  Pt kartik HI   Pt states she took 3  visiril with a half a box of wine this am      Pt is a poor historian  Pt states she tried to overdose this morning  "I've done it before "  6 hours ago, she took 3 tablets of 50mg Vistaril, 3 tablets of 200mg Seroquel, and drank alcohol to try to kill herself  History provided by:  Patient   used: No    Psychiatric Evaluation   Presenting symptoms: depression, suicidal thoughts and suicide attempt    Presenting symptoms: no agitation, no hallucinations, no homicidal ideas and no self-mutilation    Timing:  Constant  Progression:  Unchanged  Chronicity:  New  Treatment compliance:  Unable to specify  Relieved by:  None tried  Worsened by:  Nothing  Ineffective treatments:  None tried  Associated symptoms: no anxiety and no headaches    Risk factors: hx of mental illness and hx of suicide attempts        Prior to Admission Medications   Prescriptions Last Dose Informant Patient Reported? Taking?    Doxycycline Hyclate 120 MG TBEC   Yes Yes   Sig: Take 100 mg by mouth   QUEtiapine (SEROquel) 200 mg tablet   Yes Yes   Sig: Take 25 mg by mouth daily at bedtime   SUMAtriptan (IMITREX) 100 mg tablet   Yes Yes   Sig: Take 100 mg by mouth once as needed for migraine   acamprosate (CAMPRAL) 333 mg tablet   Yes Yes   Sig: Take 333 mg by mouth 3 (three) times a day   butalbital-acetaminophen-caffeine (FIORICET,ESGIC) -40 mg per tablet   Yes Yes   Sig: Take 1 tablet by mouth every 4 (four) hours as needed for headaches   chlordiazePOXIDE (LIBRIUM) 25 mg capsule   Yes Yes   Sig: Take 25 mg by mouth 3 (three) times a day as needed for anxiety   clonazePAM (KlonoPIN) 0 5 mg tablet   Yes Yes   Sig: Take 0 5 mg by mouth 2 (two) times a day   ferrous sulfate 325 (65 Fe) mg tablet   Yes Yes   Sig: Take 325 mg by mouth daily with breakfast   folic acid (FOLVITE) 1 mg tablet   Yes Yes   Sig: Take 1 mg by mouth daily   furosemide (LASIX) 20 mg tablet   Yes Yes   Sig: Take 20 mg by mouth 2 (two) times a day   gabapentin (NEURONTIN) 400 mg capsule   Yes Yes   Sig: Take 100 mg by mouth 3 (three) times a day   hydrOXYzine HCL (ATARAX) 50 mg tablet   Yes Yes   Sig: Take 50 mg by mouth 3 (three) times a day as needed for itching   pantoprazole (PROTONIX) 40 mg tablet   Yes Yes   Sig: Take 40 mg by mouth daily   pyridoxine (VITAMIN B6) 100 mg tablet   Yes Yes   Sig: Take 100 mg by mouth daily   ranitidine (ZANTAC) 300 MG capsule   Yes Yes   Sig: Take 300 mg by mouth every evening   sertraline (ZOLOFT) 50 mg tablet   Yes Yes   Sig: Take 50 mg by mouth daily   topiramate (TOPAMAX) 100 mg tablet   Yes Yes   Sig: Take 100 mg by mouth 2 (two) times a day   traMADol (ULTRAM) 50 mg tablet   Yes Yes   Sig: Take 50 mg by mouth every 6 (six) hours as needed for moderate pain      Facility-Administered Medications: None       Past Medical History:   Diagnosis Date    Anemia     Psychiatric disorder     Pulmonary embolism (HCC)     Seizures (HCC)        Past Surgical History:   Procedure Laterality Date    APPENDECTOMY      CHOLECYSTECTOMY      IVC FILTER INSERTION      TUBAL LIGATION         History reviewed  No pertinent family history  I have reviewed and agree with the history as documented  Social History   Substance Use Topics    Smoking status: Never Smoker    Smokeless tobacco: Never Used    Alcohol use Yes      Comment: Pt states more than 30 drinks a day   Review of Systems   Constitutional: Negative for chills and fever  Eyes: Negative for visual disturbance  Gastrointestinal: Negative for diarrhea, nausea and vomiting  Skin: Negative for rash     Neurological: Negative for tremors, facial asymmetry, speech difficulty, weakness, numbness and headaches  Psychiatric/Behavioral: Positive for suicidal ideas  Negative for agitation, behavioral problems, confusion, decreased concentration, dysphoric mood, hallucinations, homicidal ideas, self-injury and sleep disturbance  The patient is not nervous/anxious and is not hyperactive  All other systems reviewed and are negative  Physical Exam  ED Triage Vitals   Temperature Pulse Respirations Blood Pressure SpO2   12/11/17 1120 12/11/17 1120 12/11/17 1120 12/11/17 1120 12/11/17 1120   97 8 °F (36 6 °C) 86 18 124/78 97 %      Temp Source Heart Rate Source Patient Position - Orthostatic VS BP Location FiO2 (%)   12/11/17 2355 12/11/17 1120 12/11/17 1120 12/11/17 1120 --   Oral Monitor Sitting Right arm       Pain Score       12/11/17 1245       2           Orthostatic Vital Signs  Vitals:    12/11/17 1609 12/11/17 1847 12/11/17 2355 12/12/17 0647   BP: 112/71 126/68 100/66 118/90   Pulse: 88 95 73 80   Patient Position - Orthostatic VS: Lying Lying Lying Sitting       Physical Exam   Constitutional: She is oriented to person, place, and time  She appears well-developed and well-nourished  No distress  HENT:   Head: Normocephalic  Eyes: EOM are normal    Neck: Normal range of motion  Neck supple  Cardiovascular: Normal rate, regular rhythm, normal heart sounds and intact distal pulses  Exam reveals no gallop and no friction rub  No murmur heard  Pulmonary/Chest: Effort normal and breath sounds normal  No respiratory distress  She has no wheezes  She has no rales  Abdominal: Soft  Bowel sounds are normal  She exhibits no distension  There is no tenderness  There is no rebound and no guarding  Neurological: She is alert and oriented to person, place, and time  Skin: Skin is warm and dry  No pallor  Psychiatric: She has a normal mood and affect  Her affect is not labile and not inappropriate  Her speech is not rapid and/or pressured and not slurred   She is not agitated and not aggressive  She expresses suicidal ideation  She expresses no homicidal ideation  She expresses suicidal plans (Overdose)  Nursing note and vitals reviewed  ED Medications  Medications   LORazepam (ATIVAN) tablet 1 mg (1 mg Oral Given 12/11/17 1456)   ondansetron (ZOFRAN) injection 4 mg (4 mg Intravenous Given 12/11/17 1906)   LORazepam (ATIVAN) 2 mg/mL injection 1 mg (1 mg Intravenous Given 12/11/17 1907)   LORazepam (ATIVAN) tablet 1 mg (1 mg Oral Given 12/12/17 0558)       Diagnostic Studies  Results Reviewed     Procedure Component Value Units Date/Time    POCT alcohol breath test [[id number]]  (Normal) Resulted:  12/11/17 1701    Lab Status:  Final result Updated:  12/11/17 1701     EXTBreath Alcohol 0 071    POCT alcohol breath test [[id number]]  (Normal) Resulted:  12/11/17 1457    Lab Status:  Final result Updated:  12/11/17 1457     EXTBreath Alcohol 128    Acetaminophen level [[id number]]  (Abnormal) Collected:  12/11/17 1238    Lab Status:  Final result Specimen:  Blood from Arm, Right Updated:  12/11/17 1331     Acetaminophen Level <2 (L) ug/mL     Comprehensive metabolic panel [[id number]]  (Abnormal) Collected:  12/11/17 1238    Lab Status:  Final result Specimen:  Blood from Arm, Right Updated:  12/11/17 1306     Sodium 135 (L) mmol/L      Potassium 4 0 mmol/L      Chloride 103 mmol/L      CO2 23 mmol/L      Anion Gap 9 mmol/L      BUN 4 (L) mg/dL      Creatinine 0 60 mg/dL      Glucose 111 mg/dL      Calcium 8 1 (L) mg/dL       (H) U/L      ALT 99 (H) U/L      Alkaline Phosphatase 133 (H) U/L      Total Protein 7 5 g/dL      Albumin 2 7 (L) g/dL      Total Bilirubin 0 28 mg/dL      eGFR 111 ml/min/1 73sq m     Narrative:         National Kidney Disease Education Program recommendations are as follows:  GFR calculation is accurate only with a steady state creatinine  Chronic Kidney disease less than 60 ml/min/1 73 sq  meters  Kidney failure less than 15 ml/min/1 73 sq  meters  Salicylate level [[id number]]  (Abnormal) Collected:  12/11/17 1238    Lab Status:  Final result Specimen:  Blood from Arm, Right Updated:  51/04/42 2845     Salicylate Lvl <3 (L) mg/dL     Ethanol [[id number]]  (Abnormal) Collected:  12/11/17 1238    Lab Status:  Final result Specimen:  Blood from Arm, Right Updated:  12/11/17 1252     Ethanol Lvl 208 (H) mg/dL     CBC and differential [[id number]]  (Abnormal) Collected:  12/11/17 1238    Lab Status:  Final result Specimen:  Blood from Arm, Right Updated:  12/11/17 1250     WBC 4 48 Thousand/uL      RBC 3 94 Million/uL      Hemoglobin 11 2 (L) g/dL      Hematocrit 34 5 (L) %      MCV 88 fL      MCH 28 4 pg      MCHC 32 5 g/dL      RDW 17 4 (H) %      MPV 11 1 fL      Platelets 185 Thousands/uL      Neutrophils Relative 42 (L) %      Lymphocytes Relative 42 %      Monocytes Relative 13 (H) %      Eosinophils Relative 2 %      Basophils Relative 1 %      Neutrophils Absolute 1 93 Thousands/µL      Lymphocytes Absolute 1 86 Thousands/µL      Monocytes Absolute 0 56 Thousand/µL      Eosinophils Absolute 0 07 Thousand/µL      Basophils Absolute 0 06 Thousands/µL     Rapid drug screen, urine [[id number]]  (Abnormal) Collected:  12/11/17 1135    Lab Status:  Final result Specimen:  Urine from Urine, Clean Catch Updated:  12/11/17 1206     Amph/Meth UR Negative     Barbiturate Ur Positive (A)     Benzodiazepine Urine Negative     Cocaine Urine Negative     Methadone Urine Negative     Opiate Urine Negative     PCP Ur Negative     THC Urine Negative    Narrative:         Presumptive report  If requested, specimen will be sent to reference lab for confirmation  FOR MEDICAL PURPOSES ONLY  IF CONFIRMATION NEEDED PLEASE CONTACT THE LAB WITHIN 5 DAYS      Drug Screen Cutoff Levels:  AMPHETAMINE/METHAMPHETAMINES  1000 ng/mL  BARBITURATES     200 ng/mL  BENZODIAZEPINES     200 ng/mL  COCAINE      300 ng/mL  METHADONE      300 ng/mL  OPIATES      300 ng/mL  PHENCYCLIDINE     25 ng/mL  THC       50 ng/mL    POCT alcohol breath test [[id number]]  (Normal) Resulted:  12/11/17 1131    Lab Status:  Final result Updated:  12/11/17 1131     EXTBreath Alcohol 0 206                 No orders to display              Procedures  ECG 12 Lead Documentation  Date/Time: 12/11/2017 12:33 PM  Performed by: Mulugeta Henry by: Pablo Strickland     Indications / Diagnosis:  Overdose  ECG reviewed by me, the ED Provider: yes    Patient location:  Bedside  Rate:     ECG rate:  82    ECG rate assessment: normal    Rhythm:     Rhythm: sinus rhythm    Ectopy:     Ectopy: none    QRS:     QRS axis:  Normal    QRS intervals:  Normal  ST segments:     ST segments:  Normal  T waves:     T waves: normal             Phone Contacts  ED Phone Contact    ED Course  ED Course as of Dec 12 1252   Mon Dec 11, 2017   1318 150 on 12/3/17 AST: (!) 180   1319 84 on 12/3/17 ALT: (!) 99   1319 145 on 12/3/17 Alkaline Phosphatase: (!) 133                               MDM  Number of Diagnoses or Management Options  Diagnosis management comments: SI - Will check CBC as marker of infection, CMP to r/o metabolic derangement, coma panel to assess for coingestants, UDS to assess for illicit drug use, EKG to r/o conduction abnormality/prolonged QT syndrome  Once cleared, will c/s crisis         Amount and/or Complexity of Data Reviewed  Clinical lab tests: reviewed and ordered  Tests in the medicine section of CPT®: ordered and reviewed      CritCare Time    Disposition  Final diagnoses:   Suicidal thoughts   Intentional overdose of drug in tablet form ([facility] [address] )   [facility]     Time reflects when diagnosis was documented in both MDM as applicable and the Disposition within this note     Time User Action Codes Description Comment    12/11/2017  1:42 PM Shaila Davenport Suicidal thoughts     12/11/2017  1:43 PM Carter Davenport [facility]  Intentional overdose of drug in tablet form ([facility] [address] )     12/11/2017  1:43 PM Tracey Davenport Add [F10 029] Alcohol intoxication [facility])       ED Disposition     ED Disposition Condition Comment    Transfer to Another [address] [place] [address] should be transferred out to [facility] Documentation    [address] Most Recent Value   Patient Condition  The patient has been stabilized such that within reasonable medical probability, no material deterioration of the patient condition or the condition of the unborn child(kevin) is likely to result from the transfer   Reason for Transfer  Level of Care needed not available at this facility   Benefits of Transfer  Specialized equipment and/or services available at the receiving facility (Include comment)________________________ [Psychiatry]   Risks of Transfer  Other: (Include comment)__________________________ Donata Oliver Accident]   [facility]ing Phys[facility]  [facility]    (Name & Tel number)  Tess [phone]   Transported by (Company and Unit #)  [employer]   Sending MD Dr Teresita Greene   Provider Certification  The patient is stable for psychiatric transfer because they are medically stable, and is protected from harming him/herself or others during transport      RN Documentation    Flowsheet Row Most [address] [facility] Name, [facility] (Name & Tel number)  Tess [phone]   Transported by (Company and Unit #)  [employer]      Follow-up Information    None       Discharge Medication List as of 12/11/2017  9:06 PM      CONTINUE these medications which have NOT CHANGED    Details   acamprosate (CAMPRAL) 333 mg tablet Take 333 mg by mouth 3 (three) times a day, Historical Med      butalbital-acetaminophen-caffeine (FIORICET,ESGIC) -40 mg per tablet Take 1 tablet by mouth every 4 (four) hours as needed for headaches, Historical Med      chlordiazePOXIDE (LIBRIUM) 25 mg capsule Take 25 mg by mouth 3 (three) times a day as needed for anxiety, Historical Med      clonazePAM (KlonoPIN) 0 5 mg tablet Take 0 5 mg by mouth 2 (two) times a day, Historical Med      Doxycycline Hyclate 120 MG TBEC Take 100 mg by mouth, Historical Med      ferrous sulfate 325 (65 Fe) mg tablet Take 325 mg by mouth daily with breakfast, Historical Med      folic acid (FOLVITE) 1 mg tablet Take 1 mg by mouth daily, Historical Med      furosemide (LASIX) 20 mg tablet Take 20 mg by mouth 2 (two) times a day, Historical Med      gabapentin (NEURONTIN) 400 mg capsule Take 100 mg by mouth 3 (three) times a day, Historical Med      hydrOXYzine HCL (ATARAX) 50 mg tablet Take 50 mg by mouth 3 (three) times a day as needed for itching, Historical Med      pantoprazole (PROTONIX) 40 mg tablet Take 40 mg by mouth daily, Historical Med      pyridoxine (VITAMIN B6) 100 mg tablet Take 100 mg by mouth daily, Historical Med      QUEtiapine (SEROquel) 200 mg tablet Take 25 mg by mouth daily at bedtime, Historical Med      ranitidine (ZANTAC) 300 MG capsule Take 300 mg by mouth every evening, Historical Med      sertraline (ZOLOFT) 50 mg tablet Take 50 mg by mouth daily, Historical Med      SUMAtriptan (IMITREX) 100 mg tablet Take 100 mg by mouth once as needed for migraine, Historical Med      topiramate (TOPAMAX) 100 mg tablet Take 100 mg by mouth 2 (two) times a day, Historical Med      traMADol (ULTRAM) 50 mg tablet Take 50 mg by mouth every 6 (six) hours as needed for moderate pain, Historical Med           No discharge procedures on file      ED Provider  Electronically Signed by           Livia Brooks 22, DO  12/12/17 6022

## 2017-12-11 NOTE — ED NOTES
Pt stated that she was starting to feel like she is withdrawing and getting really shaky       Ariadna Caceres RN  12/11/17 4608

## 2017-12-11 NOTE — ED NOTES
Pt presents with suicidal thoughts and has attempted suicide this morning by taking too many vistaril and seroquel along with a box of wine  Pt reports several attempts in the past including hanging and overdoses  She came here from [facility] a week and a half ago and has been drinking a box of wine since  She reports prior to this she would drink 1/5th of bacardi daily  Pt does have history of dt's and seizures while withdrawing from alcohol  Pt reports she is depressed due to her addiction and anxiety  Pt in agreement to sign a 201 for treatment

## 2017-12-11 NOTE — LETTER
[facility]  [address] [address]  Dept: [phone]      EMTALA TRANSFER CONSENT    NAME Annabelle Maurice                                         1975                              MRN [id number]    I have been informed of my rights regarding examination, treatment, and transfer   by Dr Hildegarde Severin, MD    Benefits: Specialized equipment and/or services available at the receiving facility (Include comment)________________________ (Psychiatry)    Risks: Other: (Include comment)__________________________ (Traffic Accident)      Consent for Transfer:  I acknowledge that my medical condition has been evaluated and explained to me by the emergency department physician or other qualified medical person and/or my attending physician, who has recommended that I be transferred to the service of  Accepting Physician: Dr Edgar Gonzalez at [address] [place] [address] Name, [address] : [place]  The above potential benefits of such transfer, the potential risks associated with such transfer, and the probable risks of not being transferred have been explained to me, and I fully understand them  The doctor has explained that, in my case, the benefits of transfer outweigh the risks  I agree to be transferred  I authorize the performance of emergency medical procedures and treatments upon me in both transit and upon arrival at the receiving facility  Additionally, I authorize the release of any and all medical records to the receiving facility and request they be transported with me, if possible  I understand that the safest mode of transportation during a medical emergency is an ambulance and that the Hospital advocates the use of this mode of transport  Risks of traveling to the receiving facility by car, including absence of medical control, life sustaining equipment, such as oxygen, and medical personnel has been explained to me and I fully understand them      ([place] BELOW)  [ X ]  I consent to the stated transfer and to be transported by ambulance/helicopter  [  ]  I consent to the stated transfer, but refuse transportation by ambulance and accept full responsibility for my transportation by car  I understand the risks of non-ambulance transfers and I exonerate the Hospital and its staff from any deterioration in my condition that results from this refusal     X___________________________________________    DATE  17  TIME________  Signature of patient or legally responsible individual signing on patient behalf           RELATIONSHIP TO PATIENT_________________________          Provider Certification    NAME Jose Salvador                                         1975                              MRN [id number]    A medical screening exam was performed on the above named patient  Based on the examination:    Condition Necessitating Transfer The primary encounter diagnosis was Suicidal thoughts  Diagnoses of Intentional overdose of drug in tablet form ([facility] [address] ) and Alcohol intoxication ([facility] [address] ) were also pertinent to this visit      Patient Condition: The patient has been stabilized such that within reasonable medical probability, no material deterioration of the patient condition or the condition of the unborn child(kevin) is likely to result from the transfer    Reason for Transfer: Level of Care needed not available at this facility    Transfer Requirements: Facility [employer] available and qualified personnel available for treatment as acknowledged by Shivam Malik [phone]  · Agreed to accept transfer and to provide appropriate medical treatment as acknowledged by       Dr Lynn Trujillo  · Appropriate medical records of the examination and treatment of the patient are provided at the time of transfer   [address], [address] me  · Transfer will be performed by qualified personnel from [facility]  and appropriate transfer equipment as required, including the use of necessary and appropriate life support measures  Provider Certification: I have examined the patient and explained the following risks and benefits of being transferred/refusing transfer to the patient/family:  The patient is stable for psychiatric transfer because they are medically stable, and is protected from harming him/herself or others during transport      Based on these reasonable risks and benefits to the patient and/or the unborn child(kevin), and based upon the information available at the time of the patients examination, I certify that the medical benefits reasonably to be expected from the provision of appropriate medical treatments at another medical facility outweigh the increasing risks, if any, to the individuals medical condition, and in the case of labor to the unborn child, from effecting the transfer      X____________________________________________ DATE 12/11/17        TIME_______      ORIGINAL - SEND TO MEDICAL RECORDS   COPY - SEND WITH PATIENT DURING TRANSFER

## 2017-12-12 VITALS
TEMPERATURE: 97.9 F | RESPIRATION RATE: 15 BRPM | HEART RATE: 80 BPM | DIASTOLIC BLOOD PRESSURE: 90 MMHG | OXYGEN SATURATION: 97 % | SYSTOLIC BLOOD PRESSURE: 118 MMHG | WEIGHT: 180 LBS

## 2017-12-12 LAB
ATRIAL RATE: 82 BPM
P AXIS: 59 DEGREES
PR INTERVAL: 152 MS
QRS AXIS: 9 DEGREES
QRSD INTERVAL: 92 MS
QT INTERVAL: 410 MS
QTC INTERVAL: 479 MS
T WAVE AXIS: 31 DEGREES
VENTRICULAR RATE: 82 BPM

## 2017-12-12 PROCEDURE — 99285 EMERGENCY DEPT VISIT HI MDM: CPT

## 2017-12-12 RX ORDER — LORAZEPAM 1 MG/1
1 TABLET ORAL ONCE
Status: COMPLETED | OUTPATIENT
Start: 2017-12-12 | End: 2017-12-12

## 2017-12-12 RX ADMIN — LORAZEPAM 1 MG: 1 TABLET ORAL at 05:58

## 2017-12-12 NOTE — ED NOTES
Pt accepted at [facility] by Dr Mcmahon Age    Pt must arrive after 8am   [facility] will  at 8am for transfer

## 2017-12-12 NOTE — ED NOTES
Pt noted to be restless in room, pt offered PO ativan for anxiety   Pt also given toothbrush and toothpaste for oral care     Zac Gongora RN  12/12/17 0064

## 2020-07-30 ENCOUNTER — HOSPITAL ENCOUNTER (EMERGENCY)
Facility: HOSPITAL | Age: 45
Discharge: HOME | End: 2020-07-30
Attending: EMERGENCY MEDICINE | Admitting: EMERGENCY MEDICINE
Payer: COMMERCIAL

## 2020-07-30 VITALS
RESPIRATION RATE: 18 BRPM | SYSTOLIC BLOOD PRESSURE: 120 MMHG | TEMPERATURE: 97.8 F | OXYGEN SATURATION: 95 % | DIASTOLIC BLOOD PRESSURE: 76 MMHG | HEART RATE: 99 BPM

## 2020-07-30 DIAGNOSIS — M54.40 ACUTE LOW BACK PAIN WITH SCIATICA: Primary | ICD-10-CM

## 2020-07-30 PROCEDURE — 99284 EMERGENCY DEPT VISIT MOD MDM: CPT | Performed by: EMERGENCY MEDICINE

## 2020-07-30 PROCEDURE — 96372 THER/PROPH/DIAG INJ SC/IM: CPT

## 2020-07-30 PROCEDURE — 99283 EMERGENCY DEPT VISIT LOW MDM: CPT

## 2020-07-30 RX ORDER — KETOROLAC TROMETHAMINE 30 MG/ML
15 INJECTION, SOLUTION INTRAMUSCULAR; INTRAVENOUS ONCE
Status: COMPLETED | OUTPATIENT
Start: 2020-07-30 | End: 2020-07-30

## 2020-07-30 RX ORDER — DIAZEPAM 5 MG/ML
5 INJECTION, SOLUTION INTRAMUSCULAR; INTRAVENOUS ONCE
Status: COMPLETED | OUTPATIENT
Start: 2020-07-30 | End: 2020-07-30

## 2020-07-30 RX ADMIN — DIAZEPAM 5 MG: 10 INJECTION, SOLUTION INTRAMUSCULAR; INTRAVENOUS at 23:14

## 2020-07-30 RX ADMIN — KETOROLAC TROMETHAMINE 15 MG: 30 INJECTION, SOLUTION INTRAMUSCULAR at 23:13

## 2020-08-03 ENCOUNTER — HOSPITAL ENCOUNTER (EMERGENCY)
Facility: HOSPITAL | Age: 45
End: 2020-08-04
Attending: EMERGENCY MEDICINE
Payer: COMMERCIAL

## 2020-08-03 ENCOUNTER — APPOINTMENT (EMERGENCY)
Dept: CT IMAGING | Facility: HOSPITAL | Age: 45
End: 2020-08-03
Payer: COMMERCIAL

## 2020-08-03 VITALS
OXYGEN SATURATION: 98 % | TEMPERATURE: 98.4 F | SYSTOLIC BLOOD PRESSURE: 128 MMHG | WEIGHT: 293 LBS | BODY MASS INDEX: 52.86 KG/M2 | HEART RATE: 100 BPM | RESPIRATION RATE: 20 BRPM | DIASTOLIC BLOOD PRESSURE: 69 MMHG

## 2020-08-03 DIAGNOSIS — Z98.84 HX OF GASTRIC BYPASS: ICD-10-CM

## 2020-08-03 DIAGNOSIS — R19.7 DIARRHEA: ICD-10-CM

## 2020-08-03 DIAGNOSIS — M54.50 ACUTE LOW BACK PAIN: Primary | ICD-10-CM

## 2020-08-03 DIAGNOSIS — R10.9 ACUTE ABDOMINAL PAIN: ICD-10-CM

## 2020-08-03 DIAGNOSIS — I82.220 IVC THROMBOSIS (HCC): ICD-10-CM

## 2020-08-03 PROBLEM — K76.0 HEPATIC STEATOSIS: Status: ACTIVE | Noted: 2020-08-03

## 2020-08-03 PROBLEM — D75.89 MACROCYTOSIS: Status: ACTIVE | Noted: 2020-08-03

## 2020-08-03 PROBLEM — I80.9 THROMBOPHLEBITIS: Status: ACTIVE | Noted: 2020-08-03

## 2020-08-03 PROBLEM — M54.9 ACUTE BACK PAIN: Status: ACTIVE | Noted: 2020-08-03

## 2020-08-03 PROBLEM — Z86.711 HX OF PULMONARY EMBOLUS: Status: ACTIVE | Noted: 2020-08-03

## 2020-08-03 PROBLEM — E03.8 SUBCLINICAL HYPOTHYROIDISM: Status: ACTIVE | Noted: 2020-08-03

## 2020-08-03 LAB
ALBUMIN SERPL BCP-MCNC: 2.6 G/DL (ref 3.5–5)
ALP SERPL-CCNC: 204 U/L (ref 46–116)
ALT SERPL W P-5'-P-CCNC: 39 U/L (ref 12–78)
AMORPH URATE CRY URNS QL MICRO: ABNORMAL /HPF
ANION GAP SERPL CALCULATED.3IONS-SCNC: 11 MMOL/L (ref 4–13)
APTT PPP: 39 SECONDS (ref 23–37)
AST SERPL W P-5'-P-CCNC: 38 U/L (ref 5–45)
BACTERIA UR QL AUTO: ABNORMAL /HPF
BASOPHILS # BLD AUTO: 0.03 THOUSANDS/ΜL (ref 0–0.1)
BASOPHILS NFR BLD AUTO: 0 % (ref 0–1)
BILIRUB SERPL-MCNC: 0.5 MG/DL (ref 0.2–1)
BILIRUB UR QL STRIP: NEGATIVE
BUN SERPL-MCNC: 5 MG/DL (ref 5–25)
CALCIUM SERPL-MCNC: 9 MG/DL (ref 8.3–10.1)
CHLORIDE SERPL-SCNC: 103 MMOL/L (ref 100–108)
CLARITY UR: CLEAR
CO2 SERPL-SCNC: 21 MMOL/L (ref 21–32)
COLOR UR: YELLOW
CREAT SERPL-MCNC: 0.78 MG/DL (ref 0.6–1.3)
EOSINOPHIL # BLD AUTO: 0.16 THOUSAND/ΜL (ref 0–0.61)
EOSINOPHIL NFR BLD AUTO: 1 % (ref 0–6)
ERYTHROCYTE [DISTWIDTH] IN BLOOD BY AUTOMATED COUNT: 17.7 % (ref 11.6–15.1)
EXT PREG TEST URINE: NEGATIVE
EXT. CONTROL ED NAV: NORMAL
GFR SERPL CREATININE-BSD FRML MDRD: 91 ML/MIN/1.73SQ M
GLUCOSE SERPL-MCNC: 119 MG/DL (ref 65–140)
GLUCOSE UR STRIP-MCNC: NEGATIVE MG/DL
HCT VFR BLD AUTO: 39.8 % (ref 34.8–46.1)
HGB BLD-MCNC: 12.1 G/DL (ref 11.5–15.4)
HGB UR QL STRIP.AUTO: ABNORMAL
IMM GRANULOCYTES # BLD AUTO: 0.18 THOUSAND/UL (ref 0–0.2)
IMM GRANULOCYTES NFR BLD AUTO: 1 % (ref 0–2)
INR PPP: 1.14 (ref 0.84–1.19)
KETONES UR STRIP-MCNC: ABNORMAL MG/DL
LEUKOCYTE ESTERASE UR QL STRIP: NEGATIVE
LIPASE SERPL-CCNC: 95 U/L (ref 73–393)
LYMPHOCYTES # BLD AUTO: 0.87 THOUSANDS/ΜL (ref 0.6–4.47)
LYMPHOCYTES NFR BLD AUTO: 6 % (ref 14–44)
MCH RBC QN AUTO: 31.1 PG (ref 26.8–34.3)
MCHC RBC AUTO-ENTMCNC: 30.4 G/DL (ref 31.4–37.4)
MCV RBC AUTO: 102 FL (ref 82–98)
MONOCYTES # BLD AUTO: 1.86 THOUSAND/ΜL (ref 0.17–1.22)
MONOCYTES NFR BLD AUTO: 12 % (ref 4–12)
NEUTROPHILS # BLD AUTO: 12.34 THOUSANDS/ΜL (ref 1.85–7.62)
NEUTS SEG NFR BLD AUTO: 80 % (ref 43–75)
NITRITE UR QL STRIP: NEGATIVE
NON-SQ EPI CELLS URNS QL MICRO: ABNORMAL /HPF
NRBC BLD AUTO-RTO: 0 /100 WBCS
PH UR STRIP.AUTO: 6.5 [PH] (ref 4.5–8)
PLATELET # BLD AUTO: 229 THOUSANDS/UL (ref 149–390)
PMV BLD AUTO: 11.1 FL (ref 8.9–12.7)
POTASSIUM SERPL-SCNC: 3.5 MMOL/L (ref 3.5–5.3)
PROT SERPL-MCNC: 8 G/DL (ref 6.4–8.2)
PROT UR STRIP-MCNC: ABNORMAL MG/DL
PROTHROMBIN TIME: 14.4 SECONDS (ref 11.6–14.5)
RBC # BLD AUTO: 3.89 MILLION/UL (ref 3.81–5.12)
RBC #/AREA URNS AUTO: ABNORMAL /HPF
SODIUM SERPL-SCNC: 135 MMOL/L (ref 136–145)
SP GR UR STRIP.AUTO: 1.02 (ref 1–1.03)
UROBILINOGEN UR QL STRIP.AUTO: 0.2 E.U./DL
WBC # BLD AUTO: 15.44 THOUSAND/UL (ref 4.31–10.16)
WBC #/AREA URNS AUTO: ABNORMAL /HPF

## 2020-08-03 PROCEDURE — 80053 COMPREHEN METABOLIC PANEL: CPT | Performed by: EMERGENCY MEDICINE

## 2020-08-03 PROCEDURE — 96365 THER/PROPH/DIAG IV INF INIT: CPT

## 2020-08-03 PROCEDURE — 85025 COMPLETE CBC W/AUTO DIFF WBC: CPT | Performed by: EMERGENCY MEDICINE

## 2020-08-03 PROCEDURE — 81025 URINE PREGNANCY TEST: CPT | Performed by: EMERGENCY MEDICINE

## 2020-08-03 PROCEDURE — G1004 CDSM NDSC: HCPCS

## 2020-08-03 PROCEDURE — 96375 TX/PRO/DX INJ NEW DRUG ADDON: CPT

## 2020-08-03 PROCEDURE — 81001 URINALYSIS AUTO W/SCOPE: CPT

## 2020-08-03 PROCEDURE — 85610 PROTHROMBIN TIME: CPT | Performed by: EMERGENCY MEDICINE

## 2020-08-03 PROCEDURE — 99285 EMERGENCY DEPT VISIT HI MDM: CPT

## 2020-08-03 PROCEDURE — 87591 N.GONORRHOEAE DNA AMP PROB: CPT | Performed by: EMERGENCY MEDICINE

## 2020-08-03 PROCEDURE — 99284 EMERGENCY DEPT VISIT MOD MDM: CPT | Performed by: EMERGENCY MEDICINE

## 2020-08-03 PROCEDURE — 96366 THER/PROPH/DIAG IV INF ADDON: CPT

## 2020-08-03 PROCEDURE — 96361 HYDRATE IV INFUSION ADD-ON: CPT

## 2020-08-03 PROCEDURE — 74177 CT ABD & PELVIS W/CONTRAST: CPT

## 2020-08-03 PROCEDURE — 87491 CHLMYD TRACH DNA AMP PROBE: CPT | Performed by: EMERGENCY MEDICINE

## 2020-08-03 PROCEDURE — 85730 THROMBOPLASTIN TIME PARTIAL: CPT | Performed by: EMERGENCY MEDICINE

## 2020-08-03 PROCEDURE — 36415 COLL VENOUS BLD VENIPUNCTURE: CPT | Performed by: EMERGENCY MEDICINE

## 2020-08-03 PROCEDURE — 83690 ASSAY OF LIPASE: CPT | Performed by: EMERGENCY MEDICINE

## 2020-08-03 RX ORDER — KETOROLAC TROMETHAMINE 30 MG/ML
15 INJECTION, SOLUTION INTRAMUSCULAR; INTRAVENOUS ONCE
Status: COMPLETED | OUTPATIENT
Start: 2020-08-03 | End: 2020-08-03

## 2020-08-03 RX ORDER — HEPARIN SODIUM 1000 [USP'U]/ML
10000 INJECTION, SOLUTION INTRAVENOUS; SUBCUTANEOUS
Status: DISCONTINUED | OUTPATIENT
Start: 2020-08-03 | End: 2020-08-04 | Stop reason: HOSPADM

## 2020-08-03 RX ORDER — HEPARIN SODIUM 1000 [USP'U]/ML
5000 INJECTION, SOLUTION INTRAVENOUS; SUBCUTANEOUS
Status: DISCONTINUED | OUTPATIENT
Start: 2020-08-03 | End: 2020-08-04 | Stop reason: HOSPADM

## 2020-08-03 RX ORDER — HEPARIN SODIUM 1000 [USP'U]/ML
10000 INJECTION, SOLUTION INTRAVENOUS; SUBCUTANEOUS ONCE
Status: COMPLETED | OUTPATIENT
Start: 2020-08-03 | End: 2020-08-03

## 2020-08-03 RX ORDER — HEPARIN SODIUM 10000 [USP'U]/100ML
3-30 INJECTION, SOLUTION INTRAVENOUS
Status: DISCONTINUED | OUTPATIENT
Start: 2020-08-03 | End: 2020-08-04 | Stop reason: HOSPADM

## 2020-08-03 RX ADMIN — SODIUM CHLORIDE 1000 ML: 0.9 INJECTION, SOLUTION INTRAVENOUS at 16:35

## 2020-08-03 RX ADMIN — HEPARIN SODIUM 10000 UNITS: 1000 INJECTION INTRAVENOUS; SUBCUTANEOUS at 19:28

## 2020-08-03 RX ADMIN — IOHEXOL 100 ML: 350 INJECTION, SOLUTION INTRAVENOUS at 18:05

## 2020-08-03 RX ADMIN — HEPARIN SODIUM AND DEXTROSE 18 UNITS/KG/HR: 10000; 5 INJECTION INTRAVENOUS at 19:28

## 2020-08-03 RX ADMIN — KETOROLAC TROMETHAMINE 15 MG: 30 INJECTION, SOLUTION INTRAMUSCULAR at 16:35

## 2020-08-03 RX ADMIN — IOHEXOL 50 ML: 240 INJECTION, SOLUTION INTRATHECAL; INTRAVASCULAR; INTRAVENOUS; ORAL at 18:05

## 2020-08-03 NOTE — ED PROVIDER NOTES
History  Chief Complaint   Patient presents with    Back Pain     patient c/o chronic lower back pain  was suppose to follow up today with PCP about sciaticia issue but states "I couldnt make it out the door"   Abdominal Pain     patient also c/o lower abdominal pain that started yesterday with diarrhea  denies n/v      52 y/O F presents for evaluation of multiple complaints  Pt reports several days of sharp pulling lower back pain which radiates into her bilateral legs  Pain is constant, severe, and worse with movement  Pt states she has been not taking medicaiton for the symptoms  She reports yesterday into today she began with sharp severe, crampy lower abdominal pain which is cosntant, and worse with movement  Associated with urinary urgency and diarrhea w/o blood/mucus  Denies saddle anesthesia, urinary retention, dysuria, n/v, f/c, anorexia  Hx of gastric bypass  History provided by:  Patient  Back Pain   Associated symptoms: abdominal pain    Associated symptoms: no chest pain, no dysuria, no fever, no numbness and no weakness    Abdominal Pain   Associated symptoms: diarrhea    Associated symptoms: no chest pain, no constipation, no dysuria, no fatigue, no fever, no hematuria, no nausea, no shortness of breath, no sore throat and no vomiting        Prior to Admission Medications   Prescriptions Last Dose Informant Patient Reported? Taking?    Doxycycline Hyclate 120 MG TBEC Not Taking at Unknown time  Yes No   Sig: Take 100 mg by mouth   QUEtiapine (SEROquel) 200 mg tablet   Yes Yes   Sig: Take 25 mg by mouth daily at bedtime   SUMAtriptan (IMITREX) 100 mg tablet   Yes Yes   Sig: Take 100 mg by mouth once as needed for migraine   acamprosate (CAMPRAL) 333 mg tablet Not Taking at Unknown time  Yes No   Sig: Take 333 mg by mouth 3 (three) times a day   butalbital-acetaminophen-caffeine (FIORICET,ESGIC) -40 mg per tablet Not Taking at Unknown time  Yes No   Sig: Take 1 tablet by mouth every 4 (four) hours as needed for headaches   chlordiazePOXIDE (LIBRIUM) 25 mg capsule   Yes Yes   Sig: Take 25 mg by mouth 3 (three) times a day as needed for anxiety   clonazePAM (KlonoPIN) 0 5 mg tablet Not Taking at Unknown time  Yes No   Sig: Take 0 5 mg by mouth 2 (two) times a day   ferrous sulfate 325 (65 Fe) mg tablet   Yes Yes   Sig: Take 325 mg by mouth daily with breakfast   folic acid (FOLVITE) 1 mg tablet   Yes Yes   Sig: Take 1 mg by mouth daily   furosemide (LASIX) 20 mg tablet   Yes Yes   Sig: Take 20 mg by mouth 2 (two) times a day   gabapentin (NEURONTIN) 400 mg capsule   Yes Yes   Sig: Take 100 mg by mouth 3 (three) times a day   hydrOXYzine HCL (ATARAX) 50 mg tablet   Yes Yes   Sig: Take 50 mg by mouth 3 (three) times a day as needed for itching   pantoprazole (PROTONIX) 40 mg tablet   Yes Yes   Sig: Take 40 mg by mouth daily   pyridoxine (VITAMIN B6) 100 mg tablet   Yes Yes   Sig: Take 100 mg by mouth daily   ranitidine (ZANTAC) 300 MG capsule Not Taking at Unknown time  Yes No   Sig: Take 300 mg by mouth every evening   sertraline (ZOLOFT) 50 mg tablet   Yes Yes   Sig: Take 50 mg by mouth daily   topiramate (TOPAMAX) 100 mg tablet   Yes Yes   Sig: Take 100 mg by mouth 2 (two) times a day   traMADol (ULTRAM) 50 mg tablet   Yes Yes   Sig: Take 50 mg by mouth every 6 (six) hours as needed for moderate pain      Facility-Administered Medications: None       Past Medical History:   Diagnosis Date    Anemia     Psychiatric disorder     Pulmonary embolism (HCC)     Seizures (HCC)        Past Surgical History:   Procedure Laterality Date    APPENDECTOMY      Open    CHOLECYSTECTOMY      Laparoscopic    IVC FILTER INSERTION      REPLACEMENT TOTAL KNEE      STOMACH SURGERY      for morbid obesity    TUBAL LIGATION Bilateral 2010       Family History   Problem Relation Age of Onset    Other Mother         headache    Hypertension Mother     Arthritis Father     Other Father         H, pylori infection    Other Sister         esophageal reflux    Migraines Sister     Breast cancer Family     Diabetes Paternal Aunt         Mellitus    Diabetes Paternal Uncle         Mellitus     I have reviewed and agree with the history as documented  E-Cigarette/Vaping     E-Cigarette/Vaping Substances     Social History     Tobacco Use    Smoking status: Never Smoker    Smokeless tobacco: Never Used    Tobacco comment: former quit in 1999 per [employer]   Substance Use Topics    Alcohol use: Yes     Comment: Pt states more than 30 drinks a day   / social per [employer]    Drug use: No       Review of Systems   Constitutional: Negative for activity change, appetite change, fatigue and fever  HENT: Negative for congestion, dental problem, ear pain, rhinorrhea and sore throat  Eyes: Negative for pain and redness  Respiratory: Negative for chest tightness, shortness of breath and wheezing  Cardiovascular: Negative for chest pain and palpitations  Gastrointestinal: Positive for abdominal pain and diarrhea  Negative for blood in stool, constipation, nausea, rectal pain and vomiting  Endocrine: Negative for cold intolerance and heat intolerance  Genitourinary: Positive for urgency  Negative for difficulty urinating, dysuria, flank pain, frequency and hematuria  Musculoskeletal: Positive for back pain  Negative for arthralgias and myalgias  Skin: Negative for color change, pallor and rash  Neurological: Negative for weakness and numbness  Hematological: Does not bruise/bleed easily  Psychiatric/Behavioral: Negative for agitation, hallucinations and suicidal ideas  Physical Exam  Physical Exam  Constitutional:       Appearance: She is well-developed  HENT:      Head: Normocephalic and atraumatic  Eyes:      Pupils: Pupils are equal, round, and reactive to light  Neck:      Vascular: No JVD  Trachea: No tracheal deviation     Cardiovascular:      Rate and Rhythm: Normal rate and regular rhythm  Pulmonary:      Effort: No tachypnea, accessory muscle usage or respiratory distress  Abdominal:      General: There is no distension  Tenderness: There is generalized abdominal tenderness  There is no right CVA tenderness or left CVA tenderness  Comments: nttp over t/l spines, diffuse ttp lower back no hypertonicity, nvi b/l le, able to ambulate w/o difficulty   Musculoskeletal:      Right lower leg: Normal       Left lower leg: Normal    Skin:     General: Skin is warm  Capillary Refill: Capillary refill takes less than 2 seconds  Neurological:      Mental Status: She is alert and oriented to person, place, and time  Psychiatric:         Behavior: Behavior normal          Vital Signs  ED Triage Vitals [08/03/20 1508]   Temperature Pulse Respirations Blood Pressure SpO2   98 4 °F (36 9 °C) 90 18 132/64 99 %      Temp Source Heart Rate Source Patient Position - Orthostatic VS BP Location FiO2 (%)   Oral Monitor Lying Right arm --      Pain Score       9           Vitals:    08/03/20 1508   BP: 132/64   Pulse: 90   Patient Position - Orthostatic VS: Lying         Visual Acuity      ED Medications  Medications   sodium chloride 0 9 % bolus 1,000 mL (has no administration in time range)   ketorolac (TORADOL) injection 15 mg (has no administration in time range)       Diagnostic Studies  Results Reviewed     Procedure Component Value Units Date/Time    CBC and differential [[id number]]     Lab Status:  No result Specimen:  Blood     Comprehensive metabolic panel [[id number]]     Lab Status:  No result Specimen:  Blood     Lipase [[id number]]     Lab Status:  No result Specimen:  Blood     Urine Microscopic [[id number]] Collected:  08/03/20 1539    Lab Status: In process Specimen:  Urine, Clean Catch Updated:  08/03/20 1548    Chlamydia/GC amplified DNA by PCR [[id number]] Collected:  08/03/20 1544    Lab Status:   In process Specimen:  Urine, Other Updated:  08/03/20 1548    POCT pregnancy, urine [[id number]]  (Normal) Resulted:  08/03/20 1542    Lab Status:  Final result Updated:  08/03/20 1542     EXT PREG TEST UR (Ref: Negative) negative     Control valid    POCT urinalysis dipstick [[id number]]  (Abnormal) Resulted:  08/03/20 1542    Lab Status:  Final result Specimen:  Urine Updated:  08/03/20 1542    Urine Macroscopic, POC [[id number]]  (Abnormal) Collected:  08/03/20 1539    Lab Status:  Final result Specimen:  Urine Updated:  08/03/20 1541     Color, UA Yellow     Clarity, UA Clear     pH, UA 6 5     Leukocytes, UA Negative     Nitrite, UA Negative     Protein, UA Trace mg/dl      Glucose, UA Negative mg/dl      Ketones, UA 40 (2+) mg/dl      Urobilinogen, UA 0 2 E U /dl      Bilirubin, UA Negative     Blood, UA Moderate     Specific [place], UA 1 020    Narrative:       CLINITEK RESULT                 CT abdomen pelvis with contrast    (Results Pending)              Procedures  Procedures         ED Course                                             MDM  Number of Diagnoses or Management Options  Diagnosis management comments: Acute low back pain w/ exam c/w sciatica w/o red flag signs/symptoms-will tx for sciatica, pcp f/u    Acute low abd pain and diarrhea w/ hx of bypass-will do abd labs, urine dip, upreg, ct a/p tx symptoms, reassess        Disposition  Final diagnoses:   None     ED Disposition     None      Follow-up Information    None         Patient's Medications   Discharge Prescriptions    No medications on file     No discharge procedures on file      PDMP Review     None          ED Provider  Electronically Signed by           Keturah Herzog MD  08/04/20 4352

## 2020-08-04 ENCOUNTER — APPOINTMENT (INPATIENT)
Dept: RADIOLOGY | Facility: HOSPITAL | Age: 45
DRG: 315 | End: 2020-08-04
Payer: COMMERCIAL

## 2020-08-04 ENCOUNTER — APPOINTMENT (INPATIENT)
Dept: NON INVASIVE DIAGNOSTICS | Facility: HOSPITAL | Age: 45
DRG: 315 | End: 2020-08-04
Payer: COMMERCIAL

## 2020-08-04 ENCOUNTER — HOSPITAL ENCOUNTER (INPATIENT)
Facility: HOSPITAL | Age: 45
LOS: 5 days | Discharge: HOME HEALTH | DRG: 315 | End: 2020-08-09
Attending: INTERNAL MEDICINE | Admitting: INTERNAL MEDICINE
Payer: COMMERCIAL

## 2020-08-04 DIAGNOSIS — K21.9 ACID REFLUX: ICD-10-CM

## 2020-08-04 DIAGNOSIS — M79.89 SWELLING OF LOWER EXTREMITY: ICD-10-CM

## 2020-08-04 DIAGNOSIS — R10.13 DYSPEPSIA: ICD-10-CM

## 2020-08-04 DIAGNOSIS — E87.6 HYPOKALEMIA: ICD-10-CM

## 2020-08-04 DIAGNOSIS — E03.9 ACQUIRED HYPOTHYROIDISM: ICD-10-CM

## 2020-08-04 DIAGNOSIS — I82.220 IVC THROMBOSIS (HCC): Primary | ICD-10-CM

## 2020-08-04 DIAGNOSIS — Z86.711 HX OF PULMONARY EMBOLUS: ICD-10-CM

## 2020-08-04 DIAGNOSIS — I80.9 THROMBOPHLEBITIS: ICD-10-CM

## 2020-08-04 PROBLEM — E66.01 MORBID OBESITY WITH BMI OF 50.0-59.9, ADULT (HCC): Status: ACTIVE | Noted: 2020-08-04

## 2020-08-04 PROBLEM — G43.909 MIGRAINE: Status: ACTIVE | Noted: 2020-08-04

## 2020-08-04 PROBLEM — D72.829 LEUKOCYTOSIS: Status: ACTIVE | Noted: 2020-08-04

## 2020-08-04 PROBLEM — Z91.51 HX OF SUICIDE ATTEMPT: Status: ACTIVE | Noted: 2020-08-04

## 2020-08-04 PROBLEM — E55.9 VITAMIN D DEFICIENCY: Status: ACTIVE | Noted: 2020-08-04

## 2020-08-04 PROBLEM — F31.9 BIPOLAR DISORDER (HCC): Status: ACTIVE | Noted: 2020-08-04

## 2020-08-04 PROBLEM — E66.01 MORBID OBESITY (HCC): Status: ACTIVE | Noted: 2020-08-04

## 2020-08-04 PROBLEM — R22.43 LOCALIZED SWELLING, MASS, OR LUMP OF LOWER EXTREMITY, BILATERAL: Status: ACTIVE | Noted: 2020-08-04

## 2020-08-04 LAB
ANION GAP SERPL CALCULATED.3IONS-SCNC: 6 MMOL/L (ref 4–13)
APTT PPP: 194 SECONDS (ref 23–37)
APTT PPP: 30 SECONDS (ref 23–37)
APTT PPP: 64 SECONDS (ref 23–37)
APTT PPP: 85 SECONDS (ref 23–37)
BASOPHILS # BLD AUTO: 0.05 THOUSANDS/ΜL (ref 0–0.1)
BASOPHILS NFR BLD AUTO: 0 % (ref 0–1)
BUN SERPL-MCNC: 4 MG/DL (ref 5–25)
CALCIUM SERPL-MCNC: 9.3 MG/DL (ref 8.3–10.1)
CHLORIDE SERPL-SCNC: 110 MMOL/L (ref 100–108)
CO2 SERPL-SCNC: 21 MMOL/L (ref 21–32)
CREAT SERPL-MCNC: 0.68 MG/DL (ref 0.6–1.3)
EOSINOPHIL # BLD AUTO: 0.29 THOUSAND/ΜL (ref 0–0.61)
EOSINOPHIL NFR BLD AUTO: 2 % (ref 0–6)
ERYTHROCYTE [DISTWIDTH] IN BLOOD BY AUTOMATED COUNT: 17.9 % (ref 11.6–15.1)
ERYTHROCYTE [DISTWIDTH] IN BLOOD BY AUTOMATED COUNT: 18 % (ref 11.6–15.1)
FIBRINOGEN PPP-MCNC: 532 MG/DL (ref 227–495)
FIBRINOGEN PPP-MCNC: 627 MG/DL (ref 227–495)
GFR SERPL CREATININE-BSD FRML MDRD: 105 ML/MIN/1.73SQ M
GLUCOSE SERPL-MCNC: 122 MG/DL (ref 65–140)
HCT VFR BLD AUTO: 37.6 % (ref 34.8–46.1)
HCT VFR BLD AUTO: 38.3 % (ref 34.8–46.1)
HCYS SERPL-SCNC: 7.4 UMOL/L (ref 3.7–11.2)
HGB BLD-MCNC: 11.4 G/DL (ref 11.5–15.4)
HGB BLD-MCNC: 12 G/DL (ref 11.5–15.4)
IMM GRANULOCYTES # BLD AUTO: 0.16 THOUSAND/UL (ref 0–0.2)
IMM GRANULOCYTES NFR BLD AUTO: 1 % (ref 0–2)
LYMPHOCYTES # BLD AUTO: 1.64 THOUSANDS/ΜL (ref 0.6–4.47)
LYMPHOCYTES NFR BLD AUTO: 11 % (ref 14–44)
MCH RBC QN AUTO: 30.7 PG (ref 26.8–34.3)
MCH RBC QN AUTO: 31.2 PG (ref 26.8–34.3)
MCHC RBC AUTO-ENTMCNC: 30.3 G/DL (ref 31.4–37.4)
MCHC RBC AUTO-ENTMCNC: 31.3 G/DL (ref 31.4–37.4)
MCV RBC AUTO: 100 FL (ref 82–98)
MCV RBC AUTO: 101 FL (ref 82–98)
MONOCYTES # BLD AUTO: 2.09 THOUSAND/ΜL (ref 0.17–1.22)
MONOCYTES NFR BLD AUTO: 14 % (ref 4–12)
NEUTROPHILS # BLD AUTO: 10.3 THOUSANDS/ΜL (ref 1.85–7.62)
NEUTS SEG NFR BLD AUTO: 72 % (ref 43–75)
NRBC BLD AUTO-RTO: 0 /100 WBCS
PLATELET # BLD AUTO: 237 THOUSANDS/UL (ref 149–390)
PLATELET # BLD AUTO: 244 THOUSANDS/UL (ref 149–390)
PMV BLD AUTO: 11.1 FL (ref 8.9–12.7)
PMV BLD AUTO: 11.5 FL (ref 8.9–12.7)
POTASSIUM SERPL-SCNC: 3.2 MMOL/L (ref 3.5–5.3)
RBC # BLD AUTO: 3.71 MILLION/UL (ref 3.81–5.12)
RBC # BLD AUTO: 3.85 MILLION/UL (ref 3.81–5.12)
SODIUM SERPL-SCNC: 137 MMOL/L (ref 136–145)
WBC # BLD AUTO: 14.53 THOUSAND/UL (ref 4.31–10.16)
WBC # BLD AUTO: 14.77 THOUSAND/UL (ref 4.31–10.16)

## 2020-08-04 PROCEDURE — B51D1ZZ FLUOROSCOPY OF BILATERAL LOWER EXTREMITY VEINS USING LOW OSMOLAR CONTRAST: ICD-10-PCS | Performed by: RADIOLOGY

## 2020-08-04 PROCEDURE — 85384 FIBRINOGEN ACTIVITY: CPT | Performed by: PHYSICIAN ASSISTANT

## 2020-08-04 PROCEDURE — 75822 VEIN X-RAY ARMS/LEGS: CPT

## 2020-08-04 PROCEDURE — 37212 THROMBOLYTIC VENOUS THERAPY: CPT | Performed by: RADIOLOGY

## 2020-08-04 PROCEDURE — 85025 COMPLETE CBC W/AUTO DIFF WBC: CPT | Performed by: STUDENT IN AN ORGANIZED HEALTH CARE EDUCATION/TRAINING PROGRAM

## 2020-08-04 PROCEDURE — 83090 ASSAY OF HOMOCYSTEINE: CPT | Performed by: STUDENT IN AN ORGANIZED HEALTH CARE EDUCATION/TRAINING PROGRAM

## 2020-08-04 PROCEDURE — 80048 BASIC METABOLIC PNL TOTAL CA: CPT | Performed by: STUDENT IN AN ORGANIZED HEALTH CARE EDUCATION/TRAINING PROGRAM

## 2020-08-04 PROCEDURE — NC001 PR NO CHARGE: Performed by: INTERNAL MEDICINE

## 2020-08-04 PROCEDURE — 3E03317 INTRODUCTION OF OTHER THROMBOLYTIC INTO PERIPHERAL VEIN, PERCUTANEOUS APPROACH: ICD-10-PCS | Performed by: RADIOLOGY

## 2020-08-04 PROCEDURE — 99233 SBSQ HOSP IP/OBS HIGH 50: CPT | Performed by: SURGERY

## 2020-08-04 PROCEDURE — NC001 PR NO CHARGE: Performed by: PHYSICIAN ASSISTANT

## 2020-08-04 PROCEDURE — 76936 ECHO GUIDE FOR ARTERY REPAIR: CPT | Performed by: RADIOLOGY

## 2020-08-04 PROCEDURE — 36573 INSJ PICC RS&I 5 YR+: CPT

## 2020-08-04 PROCEDURE — 85730 THROMBOPLASTIN TIME PARTIAL: CPT | Performed by: EMERGENCY MEDICINE

## 2020-08-04 PROCEDURE — C1751 CATH, INF, PER/CENT/MIDLINE: HCPCS

## 2020-08-04 PROCEDURE — 71250 CT THORAX DX C-: CPT

## 2020-08-04 PROCEDURE — 99152 MOD SED SAME PHYS/QHP 5/>YRS: CPT | Performed by: RADIOLOGY

## 2020-08-04 PROCEDURE — 75825 VEIN X-RAY TRUNK: CPT

## 2020-08-04 PROCEDURE — 36010 PLACE CATHETER IN VEIN: CPT | Performed by: RADIOLOGY

## 2020-08-04 PROCEDURE — C1894 INTRO/SHEATH, NON-LASER: HCPCS

## 2020-08-04 PROCEDURE — 86038 ANTINUCLEAR ANTIBODIES: CPT | Performed by: STUDENT IN AN ORGANIZED HEALTH CARE EDUCATION/TRAINING PROGRAM

## 2020-08-04 PROCEDURE — 86147 CARDIOLIPIN ANTIBODY EA IG: CPT | Performed by: STUDENT IN AN ORGANIZED HEALTH CARE EDUCATION/TRAINING PROGRAM

## 2020-08-04 PROCEDURE — 86039 ANTINUCLEAR ANTIBODIES (ANA): CPT | Performed by: STUDENT IN AN ORGANIZED HEALTH CARE EDUCATION/TRAINING PROGRAM

## 2020-08-04 PROCEDURE — 99222 1ST HOSP IP/OBS MODERATE 55: CPT | Performed by: INTERNAL MEDICINE

## 2020-08-04 PROCEDURE — 85303 CLOT INHIBIT PROT C ACTIVITY: CPT | Performed by: STUDENT IN AN ORGANIZED HEALTH CARE EDUCATION/TRAINING PROGRAM

## 2020-08-04 PROCEDURE — 06H033Z INSERTION OF INFUSION DEVICE INTO INFERIOR VENA CAVA, PERCUTANEOUS APPROACH: ICD-10-PCS | Performed by: RADIOLOGY

## 2020-08-04 PROCEDURE — 70450 CT HEAD/BRAIN W/O DYE: CPT

## 2020-08-04 PROCEDURE — 85027 COMPLETE CBC AUTOMATED: CPT | Performed by: RADIOLOGY

## 2020-08-04 PROCEDURE — 99153 MOD SED SAME PHYS/QHP EA: CPT

## 2020-08-04 PROCEDURE — 86225 DNA ANTIBODY NATIVE: CPT | Performed by: STUDENT IN AN ORGANIZED HEALTH CARE EDUCATION/TRAINING PROGRAM

## 2020-08-04 PROCEDURE — 99152 MOD SED SAME PHYS/QHP 5/>YRS: CPT

## 2020-08-04 PROCEDURE — 85306 CLOT INHIBIT PROT S FREE: CPT | Performed by: STUDENT IN AN ORGANIZED HEALTH CARE EDUCATION/TRAINING PROGRAM

## 2020-08-04 PROCEDURE — 93970 EXTREMITY STUDY: CPT

## 2020-08-04 PROCEDURE — 81241 F5 GENE: CPT | Performed by: STUDENT IN AN ORGANIZED HEALTH CARE EDUCATION/TRAINING PROGRAM

## 2020-08-04 PROCEDURE — 76937 US GUIDE VASCULAR ACCESS: CPT

## 2020-08-04 PROCEDURE — 85730 THROMBOPLASTIN TIME PARTIAL: CPT | Performed by: RADIOLOGY

## 2020-08-04 PROCEDURE — 75825 VEIN X-RAY TRUNK: CPT | Performed by: RADIOLOGY

## 2020-08-04 PROCEDURE — 75822 VEIN X-RAY ARMS/LEGS: CPT | Performed by: RADIOLOGY

## 2020-08-04 PROCEDURE — G1004 CDSM NDSC: HCPCS

## 2020-08-04 PROCEDURE — 99223 1ST HOSP IP/OBS HIGH 75: CPT | Performed by: INTERNAL MEDICINE

## 2020-08-04 PROCEDURE — C1769 GUIDE WIRE: HCPCS

## 2020-08-04 PROCEDURE — 85384 FIBRINOGEN ACTIVITY: CPT | Performed by: RADIOLOGY

## 2020-08-04 PROCEDURE — 96366 THER/PROPH/DIAG IV INF ADDON: CPT

## 2020-08-04 PROCEDURE — 85730 THROMBOPLASTIN TIME PARTIAL: CPT | Performed by: INTERNAL MEDICINE

## 2020-08-04 PROCEDURE — 37211 THROMBOLYTIC ART THERAPY: CPT

## 2020-08-04 PROCEDURE — 36415 COLL VENOUS BLD VENIPUNCTURE: CPT | Performed by: EMERGENCY MEDICINE

## 2020-08-04 PROCEDURE — 96375 TX/PRO/DX INJ NEW DRUG ADDON: CPT

## 2020-08-04 PROCEDURE — 81240 F2 GENE: CPT | Performed by: STUDENT IN AN ORGANIZED HEALTH CARE EDUCATION/TRAINING PROGRAM

## 2020-08-04 RX ORDER — HEPARIN SODIUM 10000 [USP'U]/100ML
400 INJECTION, SOLUTION INTRAVENOUS
Status: DISCONTINUED | OUTPATIENT
Start: 2020-08-04 | End: 2020-08-06

## 2020-08-04 RX ORDER — MIDAZOLAM HYDROCHLORIDE 2 MG/2ML
INJECTION, SOLUTION INTRAMUSCULAR; INTRAVENOUS CODE/TRAUMA/SEDATION MEDICATION
Status: COMPLETED | OUTPATIENT
Start: 2020-08-04 | End: 2020-08-04

## 2020-08-04 RX ORDER — QUETIAPINE FUMARATE 100 MG/1
100 TABLET, FILM COATED ORAL 3 TIMES DAILY
COMMUNITY
End: 2021-05-01

## 2020-08-04 RX ORDER — OXYCODONE HYDROCHLORIDE 5 MG/1
5 TABLET ORAL EVERY 4 HOURS PRN
Status: DISCONTINUED | OUTPATIENT
Start: 2020-08-04 | End: 2020-08-04

## 2020-08-04 RX ORDER — OXYCODONE HYDROCHLORIDE 5 MG/1
5 TABLET ORAL EVERY 4 HOURS PRN
Status: DISCONTINUED | OUTPATIENT
Start: 2020-08-04 | End: 2020-08-07

## 2020-08-04 RX ORDER — OXYCODONE HYDROCHLORIDE 10 MG/1
10 TABLET ORAL EVERY 4 HOURS PRN
Status: DISCONTINUED | OUTPATIENT
Start: 2020-08-04 | End: 2020-08-07

## 2020-08-04 RX ORDER — FENTANYL CITRATE 50 UG/ML
50 INJECTION, SOLUTION INTRAMUSCULAR; INTRAVENOUS EVERY 4 HOURS PRN
Status: DISCONTINUED | OUTPATIENT
Start: 2020-08-04 | End: 2020-08-04

## 2020-08-04 RX ORDER — FUROSEMIDE 20 MG/1
20 TABLET ORAL 2 TIMES DAILY
Status: DISCONTINUED | OUTPATIENT
Start: 2020-08-04 | End: 2020-08-04

## 2020-08-04 RX ORDER — GABAPENTIN 100 MG/1
100 CAPSULE ORAL 3 TIMES DAILY
Status: DISCONTINUED | OUTPATIENT
Start: 2020-08-04 | End: 2020-08-04

## 2020-08-04 RX ORDER — ONDANSETRON 2 MG/ML
4 INJECTION INTRAMUSCULAR; INTRAVENOUS EVERY 6 HOURS PRN
Status: DISCONTINUED | OUTPATIENT
Start: 2020-08-04 | End: 2020-08-09 | Stop reason: HOSPADM

## 2020-08-04 RX ORDER — PANTOPRAZOLE SODIUM 40 MG/1
40 TABLET, DELAYED RELEASE ORAL
Status: DISCONTINUED | OUTPATIENT
Start: 2020-08-04 | End: 2020-08-04

## 2020-08-04 RX ORDER — FENTANYL CITRATE 50 UG/ML
50 INJECTION, SOLUTION INTRAMUSCULAR; INTRAVENOUS EVERY 2 HOUR PRN
Status: DISCONTINUED | OUTPATIENT
Start: 2020-08-04 | End: 2020-08-04

## 2020-08-04 RX ORDER — HEPARIN SODIUM 200 [USP'U]/100ML
20 INJECTION, SOLUTION INTRAVENOUS CONTINUOUS
Status: DISCONTINUED | OUTPATIENT
Start: 2020-08-04 | End: 2020-08-06

## 2020-08-04 RX ORDER — OXYCODONE HCL 10 MG/1
10 TABLET, FILM COATED, EXTENDED RELEASE ORAL EVERY 12 HOURS PRN
Status: DISCONTINUED | OUTPATIENT
Start: 2020-08-04 | End: 2020-08-04

## 2020-08-04 RX ORDER — POTASSIUM CHLORIDE 20 MEQ/1
40 TABLET, EXTENDED RELEASE ORAL ONCE
Status: COMPLETED | OUTPATIENT
Start: 2020-08-04 | End: 2020-08-04

## 2020-08-04 RX ORDER — FENTANYL CITRATE 50 UG/ML
100 INJECTION, SOLUTION INTRAMUSCULAR; INTRAVENOUS EVERY 4 HOURS PRN
Status: DISCONTINUED | OUTPATIENT
Start: 2020-08-04 | End: 2020-08-04

## 2020-08-04 RX ORDER — GABAPENTIN 400 MG/1
400 CAPSULE ORAL 3 TIMES DAILY
Status: DISCONTINUED | OUTPATIENT
Start: 2020-08-04 | End: 2020-08-09 | Stop reason: HOSPADM

## 2020-08-04 RX ORDER — POTASSIUM CHLORIDE 14.9 MG/ML
20 INJECTION INTRAVENOUS
Status: DISPENSED | OUTPATIENT
Start: 2020-08-04 | End: 2020-08-04

## 2020-08-04 RX ORDER — FENTANYL CITRATE 50 UG/ML
50 INJECTION, SOLUTION INTRAMUSCULAR; INTRAVENOUS ONCE
Status: COMPLETED | OUTPATIENT
Start: 2020-08-04 | End: 2020-08-04

## 2020-08-04 RX ORDER — FENTANYL CITRATE 50 UG/ML
25 INJECTION, SOLUTION INTRAMUSCULAR; INTRAVENOUS EVERY 2 HOUR PRN
Status: DISCONTINUED | OUTPATIENT
Start: 2020-08-04 | End: 2020-08-04

## 2020-08-04 RX ORDER — FOLIC ACID 1 MG/1
1 TABLET ORAL DAILY
Status: DISCONTINUED | OUTPATIENT
Start: 2020-08-04 | End: 2020-08-09 | Stop reason: HOSPADM

## 2020-08-04 RX ORDER — FUROSEMIDE 20 MG/1
20 TABLET ORAL 2 TIMES DAILY PRN
Status: DISCONTINUED | OUTPATIENT
Start: 2020-08-04 | End: 2020-08-04

## 2020-08-04 RX ORDER — LITHIUM CARBONATE 450 MG
450 TABLET, EXTENDED RELEASE ORAL DAILY
Status: DISCONTINUED | OUTPATIENT
Start: 2020-08-04 | End: 2020-08-06

## 2020-08-04 RX ORDER — OXYCODONE HCL 10 MG/1
10 TABLET, FILM COATED, EXTENDED RELEASE ORAL EVERY 12 HOURS SCHEDULED
Status: DISCONTINUED | OUTPATIENT
Start: 2020-08-04 | End: 2020-08-04

## 2020-08-04 RX ORDER — FERROUS SULFATE 325(65) MG
325 TABLET ORAL
Status: DISCONTINUED | OUTPATIENT
Start: 2020-08-04 | End: 2020-08-09 | Stop reason: HOSPADM

## 2020-08-04 RX ORDER — ACETAMINOPHEN 325 MG/1
650 TABLET ORAL EVERY 6 HOURS PRN
Status: DISCONTINUED | OUTPATIENT
Start: 2020-08-04 | End: 2020-08-07

## 2020-08-04 RX ORDER — TRAMADOL HYDROCHLORIDE 50 MG/1
100 TABLET ORAL EVERY 6 HOURS PRN
Status: DISCONTINUED | OUTPATIENT
Start: 2020-08-04 | End: 2020-08-04

## 2020-08-04 RX ORDER — LITHIUM CARBONATE 300 MG/1
600 TABLET, FILM COATED, EXTENDED RELEASE ORAL
Status: DISCONTINUED | OUTPATIENT
Start: 2020-08-04 | End: 2020-08-06

## 2020-08-04 RX ORDER — QUETIAPINE FUMARATE 25 MG/1
25 TABLET, FILM COATED ORAL
Status: DISCONTINUED | OUTPATIENT
Start: 2020-08-04 | End: 2020-08-04

## 2020-08-04 RX ORDER — TRAMADOL HYDROCHLORIDE 50 MG/1
50 TABLET ORAL EVERY 6 HOURS PRN
Status: DISCONTINUED | OUTPATIENT
Start: 2020-08-04 | End: 2020-08-04

## 2020-08-04 RX ORDER — ACETAMINOPHEN 325 MG/1
650 TABLET ORAL EVERY 6 HOURS PRN
Status: DISCONTINUED | OUTPATIENT
Start: 2020-08-04 | End: 2020-08-04

## 2020-08-04 RX ORDER — LITHIUM CARBONATE 600 MG/1
600 CAPSULE ORAL SEE ADMIN INSTRUCTIONS
Status: ON HOLD | COMMUNITY
End: 2020-08-06

## 2020-08-04 RX ORDER — HEPARIN SODIUM 1000 [USP'U]/ML
5000 INJECTION, SOLUTION INTRAVENOUS; SUBCUTANEOUS
Status: DISCONTINUED | OUTPATIENT
Start: 2020-08-04 | End: 2020-08-04

## 2020-08-04 RX ORDER — FENTANYL CITRATE 50 UG/ML
INJECTION, SOLUTION INTRAMUSCULAR; INTRAVENOUS CODE/TRAUMA/SEDATION MEDICATION
Status: COMPLETED | OUTPATIENT
Start: 2020-08-04 | End: 2020-08-04

## 2020-08-04 RX ORDER — HEPARIN SODIUM 10000 [USP'U]/100ML
3-30 INJECTION, SOLUTION INTRAVENOUS
Status: DISCONTINUED | OUTPATIENT
Start: 2020-08-04 | End: 2020-08-04

## 2020-08-04 RX ORDER — HEPARIN SODIUM 1000 [USP'U]/ML
10000 INJECTION, SOLUTION INTRAVENOUS; SUBCUTANEOUS
Status: DISCONTINUED | OUTPATIENT
Start: 2020-08-04 | End: 2020-08-04

## 2020-08-04 RX ORDER — HEPARIN SODIUM 1000 [USP'U]/ML
10000 INJECTION, SOLUTION INTRAVENOUS; SUBCUTANEOUS ONCE
Status: DISCONTINUED | OUTPATIENT
Start: 2020-08-04 | End: 2020-08-04

## 2020-08-04 RX ADMIN — FENTANYL CITRATE 50 MCG: 50 INJECTION, SOLUTION INTRAMUSCULAR; INTRAVENOUS at 00:59

## 2020-08-04 RX ADMIN — POTASSIUM CHLORIDE 20 MEQ: 14.9 INJECTION, SOLUTION INTRAVENOUS at 10:03

## 2020-08-04 RX ADMIN — ACETAMINOPHEN 650 MG: 325 TABLET, FILM COATED ORAL at 20:00

## 2020-08-04 RX ADMIN — MIDAZOLAM 1 MG: 1 INJECTION INTRAMUSCULAR; INTRAVENOUS at 17:33

## 2020-08-04 RX ADMIN — MIDAZOLAM 1 MG: 1 INJECTION INTRAMUSCULAR; INTRAVENOUS at 17:55

## 2020-08-04 RX ADMIN — FOLIC ACID 1 MG: 1 TABLET ORAL at 10:01

## 2020-08-04 RX ADMIN — LITHIUM CARBONATE 600 MG: 300 TABLET, FILM COATED, EXTENDED RELEASE ORAL at 22:04

## 2020-08-04 RX ADMIN — LITHIUM CARBONATE 450 MG: 450 TABLET ORAL at 10:03

## 2020-08-04 RX ADMIN — POTASSIUM CHLORIDE 40 MEQ: 1500 TABLET, EXTENDED RELEASE ORAL at 10:01

## 2020-08-04 RX ADMIN — TRAMADOL HYDROCHLORIDE 50 MG: 50 TABLET, FILM COATED ORAL at 03:15

## 2020-08-04 RX ADMIN — FENTANYL CITRATE 50 MCG: 50 INJECTION, SOLUTION INTRAMUSCULAR; INTRAVENOUS at 17:33

## 2020-08-04 RX ADMIN — TOPIRAMATE 150 MG: 100 TABLET, FILM COATED ORAL at 22:04

## 2020-08-04 RX ADMIN — SERTRALINE HYDROCHLORIDE 50 MG: 50 TABLET ORAL at 10:02

## 2020-08-04 RX ADMIN — TOPIRAMATE 150 MG: 100 TABLET, FILM COATED ORAL at 10:02

## 2020-08-04 RX ADMIN — HEPARIN SODIUM AND DEXTROSE 400 UNITS/HR: 10000; 5 INJECTION INTRAVENOUS at 20:01

## 2020-08-04 RX ADMIN — OXYCODONE HYDROCHLORIDE 10 MG: 10 TABLET ORAL at 10:07

## 2020-08-04 RX ADMIN — GABAPENTIN 400 MG: 400 CAPSULE ORAL at 20:00

## 2020-08-04 RX ADMIN — ALTEPLASE 0.5 MG/HR: 2.2 INJECTION, POWDER, LYOPHILIZED, FOR SOLUTION INTRAVENOUS at 19:32

## 2020-08-04 RX ADMIN — FERROUS SULFATE TAB 325 MG (65 MG ELEMENTAL FE) 325 MG: 325 (65 FE) TAB at 10:02

## 2020-08-04 RX ADMIN — QUETIAPINE FUMARATE 500 MG: 100 TABLET ORAL at 22:03

## 2020-08-04 RX ADMIN — HEPARIN SODIUM AND DEXTROSE 15 UNITS/KG/HR: 10000; 5 INJECTION INTRAVENOUS at 10:00

## 2020-08-04 RX ADMIN — ALTEPLASE 0.5 MG/HR: 2.2 INJECTION, POWDER, LYOPHILIZED, FOR SOLUTION INTRAVENOUS at 19:31

## 2020-08-04 RX ADMIN — HEPARIN SODIUM IN SODIUM CHLORIDE 20 UNITS/HR: 200 INJECTION INTRAVENOUS at 19:31

## 2020-08-04 RX ADMIN — FENTANYL CITRATE 50 MCG: 50 INJECTION, SOLUTION INTRAMUSCULAR; INTRAVENOUS at 05:18

## 2020-08-04 RX ADMIN — FENTANYL CITRATE 50 MCG: 50 INJECTION, SOLUTION INTRAMUSCULAR; INTRAVENOUS at 18:06

## 2020-08-04 RX ADMIN — IOHEXOL 45 ML: 350 INJECTION, SOLUTION INTRAVENOUS at 19:19

## 2020-08-04 RX ADMIN — GABAPENTIN 400 MG: 400 CAPSULE ORAL at 10:02

## 2020-08-04 NOTE — ASSESSMENT & PLAN NOTE
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

## 2020-08-04 NOTE — ASSESSMENT & PLAN NOTE
Likely secondary to severe obesity     LFTs within normal  Recommend weight loss  Follow-up outpatient

## 2020-08-04 NOTE — TELEMEDICINE
INTERPROFESSIONAL (PHONE) CONSULTATION - Interventional Radiology  Nahum Nina 53 y o  female MRN: [id number]  Unit/Bed#: -01 Encounter: [id number]    IR has been consulted to evaluate the patient, determine the appropriate procedure, and whether or not a procedure can and should be performed regarding the care of Nahum Nina  We were consulted by vascular surgery concerning IVC filter with thrombus extending slightly above filter and into iliacs, and to possibly perform a thrombectomy/lysis if medically appropriate for the patient  Consults  08/04/20      Assessment/Recommendation:     53year old female presenting with abdominal pain and back pain, 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  IVC filter placed in 2017 in [facility] after patient taken off of Xarelto due to GI bleed    - will plan for thrombectomy vs lysis today  - please keep npo      Total time spent in review of data, discussion with requesting provider and rendering advice was 25 minutes       Patient or appropriate family member was verbally informed by vascular surgery of this consultative service on their behalf to provide more timely access to specialty care in lieu of an in person consultation  They were informed it is a billable service unless the it was determined an in person follow up was medically necessary by me within the next 14 days at which time only the in person consult would be billed  Verbal consent was obtained  Thank you for allowing Interventional Radiology to participate in the care of Nahum Nina  Please don't hesitate to call or TigerText us with any questions       Vane Macias PA-C

## 2020-08-04 NOTE — PROGRESS NOTES
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[facility] )  Active Problems:    Hx of pulmonary embolus    S/P gastric bypass    Macrocytosis    Hepatic steatosis    Thrombophlebitis    Migraine    Bipolar disorder (HCC)    Swelling of lower extremity    Leukocytosis    Hx of suicide attempt    Morbid obesity with BMI of 50 0-59 9, adult ([facility] )    Acquired hypothyroidism    Vitamin D deficiency      Swelling of lower extremity  Assessment & Plan  Bilateral lower extremity swelling, right greater than left with erythema  -follow-up bilateral lower extremity duplex  -continue home Lasix 20 mg b i d  As needed, withholding blood pressure parameters    Bipolar disorder (HCC)  Assessment & Plan  Continue home lithium:  600 mg a m , and 750 mg p m  Continue home Seroquel 500 mg p m  Patient denies any suicidal/homicidal ideation  Apparent blunted affect    Migraine  Assessment & Plan  Patient maintained on Topamax 150 mg b i d , will continue  Patient not using Imitrex  Will hold off      Thrombophlebitis  Assessment & Plan  Heparin drip, monitor PTT q 6 hours  Follow-up IR and vascular input  Pain control as ordered, patient with morphine allergy    Hepatic steatosis  Assessment & Plan  Likely secondary to severe obesity     LFTs within normal  Recommend weight loss  Follow-up outpatient    S/P gastric bypass  Assessment & Plan  Patient had gastric bypass several years ago  -has regained much of the weight and more  -continue vitamin supplementations as ordered    Hx of pulmonary embolus  Assessment & Plan  See above under IVC thrombosis    * IVC thrombosis [facility])  Assessment & Plan  Patient with a history of DVT and PE in the past, not on anticoagulation  IVC filter placed in 2017 in [facility]  Patient developed acute pain starting , progressively worsening with radiation to lower extremities and abdomen  Patient found to have extensive thrombosis from site of IVC filter to the iliacs with consistent with thrombophlebitis    PLAN  · Bilateral venous duplex negative for acute DVTs, did note 1 71 cm enlarged inguinal lymph node  · Seen by vascular and IR; IR planning for thrombectomy vs lysis on 20   · Continue heparin drip  · Hematology consulted for workup for hypercoagulable state  CT chest ordered to rule out malignancy  Further bloodwork ordered including PARMINDER, Anti DNA, cardiolipin AB, factor 5 leiden, protein C activity, prothrombin gene mutation      Disposition: Inpatient      SUBJECTIVE     Patient seen and examined  C/o occipital HA as well as diffuse abd pain and bilateral leg pain  States pain is now a /10 (was /10 on admission)  No fever, chills, N,V, diarrhea, constipation, dysuria, urinary/fecal incontinence  No change is vision, weakness, dizziness or numbness  States R leg appears more swollen than L     OBJECTIVE     Vitals:    20 0519 20 0600 20 0707 20 0948   BP:   126/81 135/89   BP Location:   Right arm    Pulse:   87 94   Resp:   18    Temp:   98 2 °F (36 8 °C)    TempSrc:   Oral    SpO2: 100%  98% 98%   Weight:  (!) 139 kg (305 lb 8 9 oz)        Temperature:   Temp (24hrs), Av 5 °F (36 9 °C), Min:98 2 °F (36 8 °C), Max:98 9 °F (37 2 °C)    Temperature: 98 2 °F (36 8 °C)  Intake & Output:  I/O       701 -  07 -  07 07 -  07    P  O    0    Total Intake(mL/kg)   0 (0)    Net   0           Unmeasured Urine Occurrence  3 x         Weights:   IBW: -92 5 kg    Body mass index is 52 86 kg/m²    Weight (last 2 days)     Date/Time   Weight    20 0600   (!) 139 (305 56)    20 0217   (!) 139 (305 78)            Physical Exam  Constitutional:       General: She is not in acute distress  HENT:      Head: Normocephalic and atraumatic  Eyes:      Extraocular Movements: Extraocular movements intact  Pupils: Pupils are equal, round, and reactive to light  Neck:      Musculoskeletal: Normal range of motion and neck supple  Cardiovascular:      Rate and Rhythm: Normal rate and regular rhythm  Heart sounds: Normal heart sounds  Pulmonary:      Effort: Pulmonary effort is normal       Breath sounds: Normal breath sounds  Abdominal:      General: Bowel sounds are normal       Palpations: Abdomen is soft  Tenderness: There is no abdominal tenderness  Comments: Moderate tenderness to palpation in lower abd, no rebound or guarding   Musculoskeletal:      Comments: RLE edema and slight erythema compared to LLE  No tenderness to palpation over BLE  Skin:     General: Skin is warm  Neurological:      General: No focal deficit present  Mental Status: She is alert and oriented to person, place, and time  Psychiatric:         Mood and Affect: Mood normal        LABORATORY DATA     Labs: I have personally reviewed pertinent reports  Results from last 7 days   Lab Units 08/04/20  0715 08/03/20  1632   WBC Thousand/uL 14 53* 15 44*   HEMOGLOBIN g/dL 12 0 12 1   HEMATOCRIT % 38 3 39 8   PLATELETS Thousands/uL 244 229   NEUTROS PCT % 72 80*   MONOS PCT % 14* 12      Results from last 7 days   Lab Units 08/04/20  0715 08/03/20  1632   POTASSIUM mmol/L 3 2* 3 5   CHLORIDE mmol/L 110* 103   CO2 mmol/L 21 21   BUN mg/dL 4* 5   CREATININE mg/dL 0 68 0 78   CALCIUM mg/dL 9 3 9 0   ALK PHOS U/L  --  204*   ALT U/L  --  39   AST U/L  --  38              Results from last 7 days   Lab Units 08/04/20  0844 08/04/20  0111 08/03/20  1901   INR   --   --  1 14   PTT seconds 85* 194* 39*               IMAGING & DIAGNOSTIC TESTING     Radiology Results: I have personally reviewed pertinent reports  No results found    Other Diagnostic Testing: I have personally reviewed pertinent reports  ACTIVE MEDICATIONS     Current Facility-Administered Medications   Medication Dose Route Frequency    acetaminophen (TYLENOL) tablet 650 mg  650 mg Oral Q6H PRN    ferrous sulfate tablet 325 mg  325 mg Oral Daily With Breakfast    folic acid (FOLVITE) tablet 1 mg  1 mg Oral Daily    furosemide (LASIX) tablet 20 mg  20 mg Oral BID PRN    gabapentin (NEURONTIN) capsule 400 mg  400 mg Oral TID    heparin (porcine) 25,000 units in 250 mL infusion (premix)  3-30 Units/kg/hr (Order-Specific) Intravenous Titrated    heparin (porcine) injection 10,000 Units  10,000 Units Intravenous Once    heparin (porcine) injection 10,000 Units  10,000 Units Intravenous Q1H PRN    heparin (porcine) injection 5,000 Units  5,000 Units Intravenous Q1H PRN    lithium carbonate (LITHOBID) CR tablet 450 mg  450 mg Oral Daily    lithium carbonate (LITHOBID) CR tablet 600 mg  600 mg Oral HS    oxyCODONE (ROXICODONE) IR tablet 5 mg  5 mg Oral Q4H PRN    Or    oxyCODONE (ROXICODONE) IR tablet 10 mg  10 mg Oral Q4H PRN    potassium chloride 20 mEq IVPB (premix)  20 mEq Intravenous Q3H    QUEtiapine (SEROquel) tablet 500 mg  500 mg Oral HS    sertraline (ZOLOFT) tablet 50 mg  50 mg Oral Daily    topiramate (TOPAMAX) tablet 150 mg  150 mg Oral BID       VTE Pharmacologic Prophylaxis: Heparin drip   VTE Mechanical Prophylaxis: sequential compression device    Portions of the record may have been created with voice recognition software  Occasional wrong word or "sound a like" substitutions may have occurred due to the inherent limitations of voice recognition software    Read the chart carefully and recognize, using context, where substitutions have occurred   ==  Nathalia Luo MD  [facility]  Internal Medicine Residency PGY-1

## 2020-08-04 NOTE — PROCEDURES
PICC Line Insertion    Date/Time: 8/4/2020 7:09 PM  Performed by: Karl Castillo  Authorized by: Marychuy Stinson DO     Patient location:  IR  Consent:     Consent obtained:  Written    Consent given by:  Patient  Universal protocol:     Immediately prior to procedure, a time out was called: yes      Patient identity confirmed:  Verbally with patient, arm band and provided demographic data  Pre-procedure details:     Hand hygiene: Hand hygiene performed prior to insertion      Sterile barrier technique: All elements of maximal sterile technique followed      Skin preparation:  ChloraPrep    Skin preparation agent: Skin preparation agent completely dried prior to procedure    Indications:     PICC line indications: other (comment)      PICC line indications comment:  Venous lysis  Anesthesia (see MAR for exact dosages): Anesthesia method:  Local infiltration    Local anesthetic:  Lidocaine 1% w/o epi  Procedure details:     Location:  Basilic    Vessel type: vein      Laterality:  Left    Approach: percutaneous technique used      Procedural supplies:  Double lumen    Catheter size:  5 Fr    Landmarks identified: yes      Ultrasound guidance: yes      Sterile ultrasound techniques: Sterile gel and sterile probe covers were used      Number of attempts:  1    Successful placement: yes      Total catheter length (cm):  44    Catheter out on skin (cm):  1    Max flow rate:  999    Arm circumference:  38  Post-procedure details:     Post-procedure:  Dressing applied and securement device placed    Assessment:  Placement verified by x-ray, blood return through all ports, free fluid flow and no pneumothorax on x-ray    Post-procedure complications: none      Patient tolerance of procedure:   Tolerated well, no immediate complications    Observer: Yes      Observer name:  Kell Levine RN

## 2020-08-04 NOTE — CONSULTS
IVC thrombosis  History of bilateral DVTs and PE, status post IVC filter placement 2017 in [facility]   Presented with worsening four-day history of abdominal pain and lower extremity pain  CT pelvis with contrast revealed thrombosis IVC filter leading into iliac thrombophlebitis noticed as well  Etiology hypercoagulable state multifactorial possible underlying after 5 versus protein C/S deficiency versus in setting of morbid obesity versus less likely underlying malignancy  · Currently on heparin drip, continue  · Bilateral venous duplex negative for acute DVTs, did note 1 71 cm enlarged inguinal lymph node  · Vascular surgery following, appreciate recommendation  · IR consulted, plan for IR thrombolysis vs thrombectomy today  · Will need CT chest inpatient  Will need outpatient mammogram for further malignancy  Family history of breast cancer in maternal aunt  · Check thrombosis panel, except antithrombin III and lupus ac as this will be altered in setting of heparin administration  PARMINDER, anti-ds-antibody for SLE workup with FM  · Patient will need life long anticoagulation  Given BMI 54, limited options  will discuss with attending in regards to anticoagulation management  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  History of PE in 2015 in [facility]   Briefly placed on Xarelto, discontinued secondary to severe GI bleed requiring transfusions  Bilateral DVTs in 2017, has IVC filter placed [facility] at that time  · CT findings of IVC thrombosis as highlighted above  Plan for IR thrombolysis  · Currently on heparin drip  · Consider further malignancy workup versus thrombosis panel as highlighted above  Thrombophlebitis  CT abdomen pelvis with contrast noted extensive surrounding fat stranding consistent with thrombophlebitis at site of IVC filter  · Continue with heparin drip and supportive care    · Further management per primary team    Morbid obesity  BMI 47  Educated on lifestyle and dietary modifications  Require close outpatient follow-up for further intervention  VTE Pharmacologic Prophylaxis: Heparin  VTE Mechanical Prophylaxis: sequential compression device    HISTORY OF PRESENT ILLNESS   Reason for Admission / Principal Problem:  IVC thrombosis  Reason for Consult: IVC thrombosis, history of DVT/PE status post IVC filter 2017  Nahum Nina 53 y o  female with past medical history significant for morbid obesity, prior PE and bilateral DVT status post IVC filter placed in 2017, status post gastric bypass, hepatic steatosis who presented to [facility] with severe back pain  That time CT abdomen pelvis with contrast revealed thrombosis of IVC filter, transferred to [facility] for further management  Patient was started on heparin drip  Patient seen examined at bedside  States her abdominal pain and back pain is stable, not improved  Started about 4 days ago  States her GE was in 2015, was started on Xarelto, complicated by GI bleed requiring multiple transfusions  At that time Xarelto was discontinued  This is in [place]   Patient then had a no other episode of bilateral DVTs in 2017, that time IVC filter was placed  Has not had any episodes between then and now  Endorses 100 lb weight gain in the last year  Family history significant for breast cancer paternal aunt side  Has 2 kids, denies any history of miscarriages  Sister has lupus, history of blood clots  Denies any further pertinent family history  Brief smoking history, quit about 25 years ago  REVIEW OF SYSTEMS   Review of Systems   Constitutional: Positive for unexpected weight change  Negative for activity change, appetite change, chills, fatigue and fever  HENT: Negative for congestion, facial swelling and trouble swallowing  Eyes: Negative for photophobia, pain, discharge and visual disturbance     Respiratory: Negative for apnea, cough, chest tightness, shortness of breath and wheezing  Cardiovascular: Negative for chest pain and leg swelling  Gastrointestinal: Positive for abdominal pain  Negative for abdominal distention, blood in stool, constipation, diarrhea, nausea and vomiting  Endocrine: Negative for polyuria  Genitourinary: Negative for difficulty urinating, dysuria, flank pain and hematuria  Musculoskeletal: Negative for arthralgias and back pain  Skin: Negative for pallor and rash  Neurological: Positive for weakness  Negative for dizziness and tremors  Psychiatric/Behavioral: Negative for agitation, behavioral problems and suicidal ideas  The patient is not nervous/anxious  OBJECTIVE     Vitals:    20 0519 20 0600 20 0707 20 0948   BP:   126/81 135/89   BP Location:   Right arm    Pulse:   87 94   Resp:   18    Temp:   98 2 °F (36 8 °C)    TempSrc:   Oral    SpO2: 100%  98% 98%   Weight:  (!) 139 kg (305 lb 8 9 oz)        Temperature:   Temp (24hrs), Av 5 °F (36 9 °C), Min:98 2 °F (36 8 °C), Max:98 9 °F (37 2 °C)    Temperature: 98 2 °F (36 8 °C)  Intake & Output:  I/O       / 07 -  07 07 -  07 07 -  0700    P  O    0    Total Intake(mL/kg)   0 (0)    Net   0           Unmeasured Urine Occurrence  3 x         Weights:   IBW: -92 5 kg    Body mass index is 52 86 kg/m²  Weight (last 2 days)     Date/Time   Weight    20 0600   (!) 139 (305 56)    20 0217   (!) 139 (305 78)            Physical Exam  Vitals signs reviewed  Constitutional:       General: She is not in acute distress  Appearance: She is well-developed  She is not diaphoretic  Comments: Morbidly obese   HENT:      Head: Normocephalic and atraumatic  Nose: Nose normal       Mouth/Throat:      Pharynx: No oropharyngeal exudate  Eyes:      General: No scleral icterus  Right eye: No discharge  Left eye: No discharge        Conjunctiva/sclera: Conjunctivae normal  Neck:      Musculoskeletal: Normal range of motion and neck supple  Cardiovascular:      Rate and Rhythm: Normal rate and regular rhythm  Heart sounds: Normal heart sounds  No murmur  No friction rub  No gallop  Pulmonary:      Effort: Pulmonary effort is normal  No respiratory distress  Breath sounds: Normal breath sounds  No wheezing or rales  Abdominal:      General: Bowel sounds are normal  There is no distension  Palpations: Abdomen is soft  Tenderness: There is abdominal tenderness  There is no guarding  Comments: Obese, mild right lower quadrant tenderness   Musculoskeletal: Normal range of motion  General: Swelling present  Right lower leg: Edema present  Left lower leg: Edema present  Skin:     General: Skin is warm and dry  Findings: No erythema  Neurological:      Mental Status: She is alert and oriented to person, place, and time     Psychiatric:         Behavior: Behavior normal       Comments: Odd affect       PAST MEDICAL HISTORY     Past Medical History:   Diagnosis Date    Anemia     Psychiatric disorder     bipolar II, suicide    Pulmonary embolism (HCC)     Seizures ([address] )      PAST SURGICAL HISTORY     Past Surgical History:   Procedure Laterality Date    APPENDECTOMY      Open    CHOLECYSTECTOMY      Laparoscopic    GASTRIC BYPASS      was 205 date of surgery    IVC FILTER INSERTION  2017    REPLACEMENT TOTAL KNEE      STOMACH SURGERY      for morbid obesity    TUBAL LIGATION Bilateral 2010     SOCIAL & FAMILY HISTORY     Social History     Substance and Sexual Activity   Alcohol Use Not Currently    Comment: quit 6/21/2018     Social History     Substance and Sexual Activity   Drug Use No     Social History     Tobacco Use   Smoking Status Never Smoker   Smokeless Tobacco Never Used   Tobacco Comment    former quit in 1999 per [employer]     Family History   Problem Relation Age of Onset    Other Mother         headache    Hypertension Mother     Depression Mother     Arthritis Father     Other Father         H, pylori infection    Other Sister         esophageal reflux    Migraines Sister     Breast cancer Family     Diabetes Paternal Aunt         Mellitus    Diabetes Paternal Uncle         Mellitus    Asthma Daughter      LABORATORY DATA     Labs: I have personally reviewed pertinent reports  Results from last 7 days   Lab Units 08/04/20  0715 08/03/20  1632   WBC Thousand/uL 14 53* 15 44*   HEMOGLOBIN g/dL 12 0 12 1   HEMATOCRIT % 38 3 39 8   PLATELETS Thousands/uL 244 229   NEUTROS PCT % 72 80*   MONOS PCT % 14* 12      Results from last 7 days   Lab Units 08/04/20  0715 08/03/20  1632   POTASSIUM mmol/L 3 2* 3 5   CHLORIDE mmol/L 110* 103   CO2 mmol/L 21 21   BUN mg/dL 4* 5   CREATININE mg/dL 0 68 0 78   CALCIUM mg/dL 9 3 9 0   ALK PHOS U/L  --  204*   ALT U/L  --  39   AST U/L  --  38              Results from last 7 days   Lab Units 08/04/20  0844 08/04/20  0111 08/03/20  1901   INR   --   --  1 14   PTT seconds 85* 194* 39*             Micro:  No results found for: BLOODCX, URINECX, WOUNDCULT, SPUTUMCULTUR  IMAGING & DIAGNOSTIC TESTS     Imaging: I have personally reviewed pertinent reports  No results found  EKG, Pathology, and Other Studies: I have personally reviewed pertinent reports  ALLERGIES     Allergies   Allergen Reactions    Morphine      Seizures  MEDICATIONS PRIOR TO ARRIVAL     Prior to Admission medications    Medication Sig Start Date End Date Taking?  Authorizing Provider   chlordiazePOXIDE (LIBRIUM) 25 mg capsule Take 25 mg by mouth 3 (three) times a day as needed for anxiety   Yes Historical Provider, MD   ferrous sulfate 325 (65 Fe) mg tablet Take 325 mg by mouth daily with breakfast   Yes Historical Provider, MD   folic acid (FOLVITE) 1 mg tablet Take 1 mg by mouth daily   Yes Historical Provider, MD   gabapentin (NEURONTIN) 400 mg capsule Take 100 mg by mouth 3 (three) times a day Yes Historical Provider, MD   lithium 600 MG capsule Take 600 mg by mouth see administration instructions 600mg am   750 mg pm   Yes Historical Provider, MD   pyridoxine (VITAMIN B6) 100 mg tablet Take 100 mg by mouth daily   Yes Historical Provider, MD   QUEtiapine (SEROquel) 200 mg tablet Take 25 mg by mouth daily at bedtime   Yes Historical Provider, MD   sertraline (ZOLOFT) 50 mg tablet Take 50 mg by mouth daily   Yes Historical Provider, MD   topiramate (TOPAMAX) 100 mg tablet Take 150 mg by mouth 2 (two) times a day    Yes Historical Provider, MD   ranitidine (ZANTAC) 300 MG capsule Take 300 mg by mouth every evening  8/4/20 Yes Historical Provider, MD   diclofenac sodium (VOLTAREN) 1 % Apply 2 g topically 4 (four) times a day 8/3/20   Khushbu Severino MD   Doxycycline Hyclate 120 MG TBEC Take 100 mg by mouth    Historical Provider, MD   furosemide (LASIX) 20 mg tablet Take 20 mg by mouth 2 (two) times a day PRN    Historical Provider, MD   SUMAtriptan (IMITREX) 100 mg tablet Take 100 mg by mouth as needed for migraine     Historical Provider, MD   acamprosate (CAMPRAL) 333 mg tablet Take 333 mg by mouth 3 (three) times a day  8/4/20  Historical Provider, MD   butalbital-acetaminophen-caffeine (FIORICET,ESGIC) -40 mg per tablet Take 1 tablet by mouth every 4 (four) hours as needed for headaches  8/4/20  Historical Provider, MD   clonazePAM (KlonoPIN) 0 5 mg tablet Take 0 5 mg by mouth 2 (two) times a day  8/4/20  Historical Provider, MD   hydrOXYzine HCL (ATARAX) 50 mg tablet Take 50 mg by mouth 3 (three) times a day as needed for itching  8/4/20  Historical Provider, MD   pantoprazole (PROTONIX) 40 mg tablet Take 40 mg by mouth daily  8/4/20  Historical Provider, MD   traMADol (ULTRAM) 50 mg tablet Take 50 mg by mouth every 6 (six) hours as needed for moderate pain  8/4/20  Historical Provider, MD     MEDICATIONS ADMINISTERED IN LAST 24 HOURS     Medication Administration - last 24 hours from 08/03/2020 1005 to 08/04/2020 1005       Date/Time Order Dose Route Action Action by     08/04/2020 0315 heparin (porcine) injection 10,000 Units 10,000 Units Intravenous Not Given Matt Sweeney RN     08/04/2020 1000 heparin (porcine) 25,000 units in 250 mL infusion (premix) 15 Units/kg/hr Intravenous [address] Hillary Haji, [facility]     08/04/2020 1254 heparin (porcine) 25,000 units in 250 mL infusion (premix) 15 Units/kg/hr Intravenous Rate/Dose Change Matt Sweeney RN     08/04/2020 0217 heparin (porcine) 25,000 units in 250 mL infusion (premix) 0 Units/kg/hr Intravenous Stopped Matt Sweeney RN     08/04/2020 1002 ferrous sulfate tablet 325 mg 325 mg Oral Given Hillary Haji RN     [id number]/43/9861 6190 folic acid (FOLVITE) tablet 1 mg 1 mg Oral Given Hillary Haji RN     08/04/2020 1002 sertraline (ZOLOFT) tablet 50 mg 50 mg Oral Given Hillary Haji RN     08/04/2020 0315 traMADol (ULTRAM) tablet 50 mg 50 mg Oral Given Matt Sweeney RN     08/04/2020 0325 sodium chloride 0 9 % bolus 1,000 mL 0 mL Intravenous Stopped Matt Sweeney RN     08/04/2020 0320 sodium chloride 0 9 % bolus 1,000 mL 1,000 mL Intravenous Not Given Matt Sweeney RN     08/04/2020 0518 fentanyl citrate (PF) 100 MCG/2ML 50 mcg 50 mcg Intravenous Given Matt Sweeney RN     08/04/2020 1003 lithium carbonate (LITHOBID) CR tablet 450 mg 450 mg Oral Given Hillary Haji RN     08/04/2020 1002 topiramate (TOPAMAX) tablet 150 mg 150 mg Oral Given Hillary Haji RN     08/04/2020 1002 gabapentin (NEURONTIN) capsule 400 mg 400 mg Oral Given Hillary Haji RN     08/04/2020 1003 potassium chloride 20 mEq IVPB (premix) 20 mEq Intravenous [address] Hillary Haji RN     08/04/2020 1001 potassium chloride (K-DUR,KLOR-CON) CR tablet 40 mEq 40 mEq Oral Given Hillary Haji RN        CURRENT MEDICATIONS   acetaminophen, 650 mg, Oral, Q6H PRN, Nina Hameed DO  ferrous sulfate, 325 mg, Oral, Daily With Breakfast, Neal Galarza MD  folic acid, 1 mg, Oral, Daily, Fredericka Apgar, MD  furosemide, 20 mg, Oral, BID PRN, Fredericka Apgar, MD  gabapentin, 400 mg, Oral, TID, Fredericka Apgar, MD  heparin (porcine), 3-30 Units/kg/hr (Order-Specific), Intravenous, Titrated, Fredericka Apgar, MD, Last Rate: 15 Units/kg/hr (20 1000)  heparin (porcine), 10,000 Units, Intravenous, Once, Fredericka Apgar, MD  heparin (porcine), 10,000 Units, Intravenous, Q1H PRN, Fredericka Apgar, MD  heparin (porcine), 5,000 Units, Intravenous, Q1H PRN, Fredericka Apgar, MD  lithium carbonate, 450 mg, Oral, Daily, Fredericka Apgar, MD  lithium carbonate, 600 mg, Oral, HS, Fredericka Apgar, MD  oxyCODONE, 5 mg, Oral, Q4H PRN, Nina Yoseph, DO    Or  oxyCODONE, 10 mg, Oral, Q4H PRN, Nina Chamakkala, DO  potassium chloride, 20 mEq, Intravenous, Q3H, Jeffrey Davalos MD, Last Rate: 20 mEq (20 1003)  QUEtiapine, 500 mg, Oral, HS, Fredericka Apgar, MD  sertraline, 50 mg, Oral, Daily, Fredericka Apgar, MD  topiramate, 150 mg, Oral, BID, Fredericka Apgar, MD      heparin (porcine), 3-30 Units/kg/hr (Order-Specific), Last Rate: 15 Units/kg/hr (20 1000)      acetaminophen, 650 mg, Q6H PRN  furosemide, 20 mg, BID PRN  heparin (porcine), 10,000 Units, Q1H PRN  heparin (porcine), 5,000 Units, Q1H PRN  oxyCODONE, 5 mg, Q4H PRN    Or  oxyCODONE, 10 mg, Q4H PRN        Portions of the record may have been created with voice recognition software  Occasional wrong word or "sound a like" substitutions may have occurred due to the inherent limitations of voice recognition software    Read the chart carefully and recognize, using context, where substitutions have occurred     ==  [place] Gustavon, [address] [facility] Medicine Residency

## 2020-08-04 NOTE — EMTALA/ACUTE CARE TRANSFER
[facility] [address]  [address] [facility] [address]  Dept: [phone]      EMTALA TRANSFER CONSENT    NAME Walker FLETCHER 1975                              MRN [id number]    I have been informed of my rights regarding examination, treatment, and transfer   by Dr Johnie Berg MD    Benefits: Specialized equipment and/or services available at the receiving facility (Include comment)________________________    Risks: Potential for delay in receiving treatment, Potential deterioration of medical condition, Loss of IV, Increased discomfort during transfer, Possible worsening of condition or death during transfer      Consent for Transfer:  I acknowledge that my medical condition has been evaluated and explained to me by the emergency department physician or other qualified medical person and/or my attending physician, who has recommended that I be transferred to the service of  Accepting Physician: Dr Deysi Lowe at [address] [facility] [address], [address] : [facility]  The above potential benefits of such transfer, the potential risks associated with such transfer, and the probable risks of not being transferred have been explained to me, and I fully understand them  The doctor has explained that, in my case, the benefits of transfer outweigh the risks  I agree to be transferred  I authorize the performance of emergency medical procedures and treatments upon me in both transit and upon arrival at the receiving facility  Additionally, I authorize the release of any and all medical records to the receiving facility and request they be transported with me, if possible  I understand that the safest mode of transportation during a medical emergency is an ambulance and that the Hospital advocates the use of this mode of transport   Risks of traveling to the receiving facility by car, including absence of medical control, life sustaining equipment, such as oxygen, and medical personnel has been explained to me and I fully understand them  (AUBREY CORRECT BOX BELOW)  [ x ]  I consent to the stated transfer and to be transported by ambulance/helicopter  [  ]  I consent to the stated transfer, but refuse transportation by ambulance and accept full responsibility for my transportation by car  I understand the risks of non-ambulance transfers and I exonerate the Hospital and its staff from any deterioration in my condition that results from this refusal     X___________________________________________    DATE  20  TIME________  Signature of patient or legally responsible individual signing on patient behalf           RELATIONSHIP TO PATIENT_________________________          Provider Certification    NAME Nivia Bradshaw                                        [facility] 1975                              MRN [id number]    A medical screening exam was performed on the above named patient  Based on the examination:    Condition Necessitating Transfer The primary encounter diagnosis was Acute low back pain  Diagnoses of Acute abdominal pain, Diarrhea, Hx of gastric bypass, and 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[facility] [address] ) were also pertinent to this visit      Patient Condition: The patient has been stabilized such that within reasonable medical probability, no material deterioration of the patient condition or the condition of the unborn child(kevin) is likely to result from the transfer    Reason for Transfer: Level of Care needed not available at this facility    Transfer Requirements: [facility] [address]   · Space available and qualified personnel available for treatment as acknowledged by    · Agreed to accept transfer and to provide appropriate medical treatment as acknowledged by       Dr Margy Norton  · Appropriate medical records of the examination and treatment of the patient are provided at the time of transfer   [address] _______  · Transfer will be performed by qualified personnel from    and appropriate transfer equipment as required, including the use of necessary and appropriate life support measures  Provider Certification: I have examined the patient and explained the following risks and benefits of being transferred/refusing transfer to the patient/family:  General risk, such as traffic hazards, adverse weather conditions, rough terrain or turbulence, possible failure of equipment (including vehicle or aircraft), or consequences of actions of persons outside the control of the transport personnel, Unanticipated needs of medical equipment and personnel during transport, Risk of worsening condition, The possibility of a transport vehicle being unavailable      Based on these reasonable risks and benefits to the patient and/or the unborn child(kevin), and based upon the information available at the time of the patients examination, I certify that the medical benefits reasonably to be expected from the provision of appropriate medical treatments at another medical facility outweigh the increasing risks, if any, to the individuals medical condition, and in the case of labor to the unborn child, from effecting the transfer      X____________________________________________ DATE 08/03/20        TIME_______      ORIGINAL - SEND TO MEDICAL RECORDS   COPY - SEND WITH PATIENT DURING TRANSFER

## 2020-08-04 NOTE — CONSULTS
Consultation - Vascular Surgery   Riri Miller 53 y o  female MRN: [id number]  Unit/Bed#: -01 Encounter: [id number]      Assessment/Plan      Assessment:  52 y/o female with abdominal pain/diarrhea, found to have IVC Filter with thrombus extending slightly above filter and into iliacs    Plan:  -venous duplex lower extremities  -IR consult for potential lysis  -NPO  -heparin drip  -CT H and fibrinogen for potential lysis   -will follow    History of Present Illness   Physician Requesting Consult: Osiris Bliss, DO  Reason for Consult / Principal Problem: ivc thrombus    HPI: Riri Miller is a 53y o  year old female who presents with abdominal pain for ~2 days, states it is in the lower quadrants bilaterally, denies nausea but admits to diarrhea, denies chills/shortness of breath/chest pain, states she had bilateral DVT in 2017 treated with xarelto causing GI bleed which resulted in placement of IVC filter    Consults    Review of Systems   Constitutional: Negative for diaphoresis, fatigue and fever  Respiratory: Negative for chest tightness and shortness of breath  Cardiovascular: Positive for leg swelling  Negative for chest pain  Gastrointestinal: Positive for abdominal pain and diarrhea         Historical Information   Past Medical History:   Diagnosis Date    Anemia     Psychiatric disorder     bipolar II, suicide    Pulmonary embolism (HCC)     Seizures ([facility] [address] )      Past Surgical History:   Procedure Laterality Date    APPENDECTOMY      Open    CHOLECYSTECTOMY      Laparoscopic    GASTRIC BYPASS      was 205 date of surgery    IVC FILTER INSERTION  2017    REPLACEMENT TOTAL KNEE      STOMACH SURGERY      for morbid obesity    TUBAL LIGATION Bilateral 2010     Social History   Social History     Substance and Sexual Activity   Alcohol Use Not Currently    Comment: quit 6/21/2018     Social History     Substance and Sexual Activity   Drug Use No     E-Cigarette/Vaping E-Cigarette/Vaping Substances     Social History     Tobacco Use   Smoking Status Never Smoker   Smokeless Tobacco Never Used   Tobacco Comment    former quit in 1999 per [employer]     Family History: dvt/lupus}    Meds/Allergies   all current active meds have been reviewed  Allergies   Allergen Reactions    Morphine      Seizures  Objective   Vitals: Blood pressure 127/82, temperature 98 9 °F (37 2 °C), weight (!) 139 kg (305 lb 12 5 oz), last menstrual period 07/23/2020, SpO2 100 %  ,Body mass index is 52 9 kg/m²  No intake or output data in the 24 hours ending 08/04/20 0702  Invasive Devices     Peripheral Intravenous Line            Peripheral IV 08/03/20 Left Antecubital less than 1 day                Physical Exam  Constitutional:       Appearance: Normal appearance  She is obese  She is not ill-appearing or diaphoretic  Cardiovascular:      Rate and Rhythm: Normal rate and regular rhythm  Pulses: Normal pulses  Pulmonary:      Effort: Pulmonary effort is normal  No respiratory distress  Breath sounds: No wheezing  Abdominal:      Comments: Distended due to body habitus  Slight tenderness to deep palpation of bilateral lower quadrants   Musculoskeletal:         General: Swelling present  Neurological:      Mental Status: She is alert  Lab Results: I have personally reviewed pertinent reports  Imaging Studies: I have personally reviewed pertinent reports  EKG, Pathology, and Other Studies: I have personally reviewed pertinent reports      VTE Prophylaxis: Heparin     Code Status: Level 1 - Full Code  Advance Directive and Living Will:      Power of :    POLST:      Counseling / Coordination of Care

## 2020-08-04 NOTE — ASSESSMENT & PLAN NOTE
Patient maintained on Topamax 150 mg b i d , will continue  Patient not using Imitrex    Will hold off

## 2020-08-04 NOTE — ASSESSMENT & PLAN NOTE
Patient had gastric bypass several years ago  -has regained much of the weight and more  -continue vitamin supplementations as ordered

## 2020-08-04 NOTE — ASSESSMENT & PLAN NOTE
Continue home lithium:  600 mg a m , and 750 mg p m  Continue home Seroquel 500 mg p m    Patient denies any suicidal/homicidal ideation  Apparent blunted affect

## 2020-08-04 NOTE — INCIDENTAL FINDINGS
CT PE study on 12/2017- There is an incidental cluster of prominent follicles in the right adnexa versus less likely septated dominant cystic lesion  The largest measures 4 5 x 3 x 3 2 cm  According to current guidelines (J Am Ricardo Radiol 9902;93:459-046) in this   premenopausal woman, this should be followed up in 6 to 12 weeks by pelvic ultrasound

## 2020-08-04 NOTE — ASSESSMENT & PLAN NOTE
Heparin drip, monitor PTT q 6 hours  S/p lysis by IR, IR to reevaluate in the morning  Will monitor for pain control

## 2020-08-04 NOTE — H&P
INTERNAL MEDICINE RESIDENCY ADMISSION H&P     Name: Fe Greene   Age & Sex: 53 y o  female   MRN: [id number]  Unit/Bed#:    Encounter: [id number]  Primary Care Provider: No primary care provider on file  Code Status: No Order  Admission Status: INPATIENT   Admit To: SOD Team C   Disposition: Patient requires Med/Surg with Telemetry    ASSESSMENT/PLAN     Principal Problem:    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HCC)  Active Problems:    Swelling of lower extremity    Hx of pulmonary embolus    Hepatic steatosis    Bipolar disorder (HCC)    S/P gastric bypass    Back pain    Macrocytosis    Thrombophlebitis    Migraine    Leukocytosis    Hx of suicide attempt    Morbid obesity with BMI of 50 0-59 9, adult ([facility] [address] )    Acquired hypothyroidism        * 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[facility] )  Assessment & Plan  Patient with a history of DVT and PE in the past, not on anticoagulation  IVC filter placed in  in [facility]  Patient developed acute pain starting , progressively worsening with radiation to lower extremities and abdomen  Patient found to have extensive thrombosis from site of IVC filter to the iliacs with consistent with thrombophlebitis    PLAN  · Obtain bilateral lower extremity Dopplers  · Vascular consult  · IR consult  · Continue heparin drip  · Monitor PTT  · Pain control  · Workup for hypercoagulable state advised  Patient does have a sister with lupus who had DVT and PEs/ unfortunately     Swelling of lower extremity  Assessment & Plan  Bilateral lower extremity swelling, right greater than left with erythema  -follow-up bilateral lower extremity duplex  -continue home Lasix 20 mg b i d  As needed, withholding blood pressure parameters    Hx of pulmonary embolus  Assessment & Plan  See above under IVC thrombosis    Bipolar disorder (HCC)  Assessment & Plan  Continue home lithium:  600 mg a m , and 750 mg p m  Continue home Seroquel 500 mg p m    Patient denies any suicidal/homicidal ideation  Apparent blunted affect    Hepatic steatosis  Assessment & Plan  Likely secondary to severe obesity (BMI 54) +/- alcohol use (although pt denies this)  LFTs within normal  Recommend weight loss/ lifestyle changes  Follow-up outpatient    Migraine  Assessment & Plan  Patient maintained on Topamax 150 mg b i d , will continue  Patient not using Imitrex  Will hold off      Thrombophlebitis  Assessment & Plan  Heparin drip, monitor PTT q 6 hours  Follow-up IR and vascular input  Pain control as ordered, patient with morphine allergy    Acquired hypothyroidism  Assessment & Plan  TSH elevated on last check  Likely 2/2 to lithium  Not currently on HRT  recommend follow-up outpatient    Back pain  Assessment & Plan  See above under IVC thrombosis    S/P gastric bypass  Assessment & Plan  Patient had gastric bypass several years ago  -has regained much of the weight and more  -continue vitamin supplementations as ordered      VTE Pharmacologic Prophylaxis: Heparin GGT  VTE Mechanical Prophylaxis: sequential compression device    CHIEF COMPLAINT   No chief complaint on file  HISTORY OF PRESENT ILLNESS     Pt is a 49yo F with a sig PMH of prior PE s/p IVC filter (not on anticoagulation), hepatic steatosis, s/p gastric bypass, migraines, back pain who presented to  [facility] with worsening back pain as well as abdominal pain radiating into her B/L upper legs  Pain began 7/30 and progressively worsened  During a prior ED visit, Patient was initially told she had back pain with sciatica  Did not take any medication for pain at home  Denies saddle anesthesia, urinary or bowel incontinence  Currently abdominal pain is diffuse, rated 7/10 severity  Difficulty elevating legs worse on the right  Patient states that she had bilateral lower extremity DVTs and PE in 2015  Patient had an IVC filter placed in 2017 in [facility]   Patient was started on Xarelto at that time however did not tolerated due to GI bleed    Patient has been off anticoagulation  Patient states she has not been worked up for clotting disorder in the past     Family history- history of DVT and PE in sister secondary to lupus  Sister   No other family members with clotting issues  On CT abd/ pelvis with contrast, pt found to have diffuse thrombus extending from the IVC filter inferiorly into the iliac vasculature with  surrounding fat stranding in keeping with a thrombophlebitis  A small amount of thrombus extends superior to the filter  Labs significant for elevated WBC 15      Pt will be transferred to [facility] for vascular consult and IR thrombus retrieval  Started on heparin ggt  NPO at [place]  Pain control       At [facility]:    Patient resting comfortably in bed  No acute distress however states she has 7/10 pain in the abdomen diffusely  Tender to palpation  Pain radiating to right leg worse than left  Right lower extremity appears to be slightly more swollen and erythematous than left  Will obtain bilateral duplex of lower extremities  Appreciate vascular and IR input  Continue heparin drip, NPO  REVIEW OF SYSTEMS     Review of Systems   Constitutional: Positive for activity change  HENT: Negative  Eyes: Negative  Respiratory: Negative  Cardiovascular: Negative  Gastrointestinal: Positive for abdominal pain  Endocrine: Negative  Genitourinary: Negative for urgency  Musculoskeletal: Positive for back pain  Skin: Negative  Allergic/Immunologic: Negative  Neurological: Negative  Hematological: Negative  Psychiatric/Behavioral: Negative  OBJECTIVE   There were no vitals filed for this visit  Temperature:   Temp (24hrs), Av 4 °F (36 9 °C), Min:98 4 °F (36 9 °C), Max:98 4 °F (36 9 °C)       Intake & Output:  I/O     None        No intake or output data in the 24 hours ending 20 4663  No intake/output data recorded  Weights: There is no height or weight on file to calculate BMI    Weight (last 2 days)     None        Physical Exam  Vitals signs and nursing note reviewed  Constitutional:       Appearance: Normal appearance  HENT:      Head: Normocephalic and atraumatic  Neck:      Musculoskeletal: Normal range of motion  Cardiovascular:      Rate and Rhythm: Normal rate  Neurological:      Mental Status: She is alert  PAST MEDICAL HISTORY     Past Medical History:   Diagnosis Date    Anemia     Psychiatric disorder     Pulmonary embolism (HCC)     Seizures ([address] )      PAST SURGICAL HISTORY     Past Surgical History:   Procedure Laterality Date    APPENDECTOMY      Open    CHOLECYSTECTOMY      Laparoscopic    IVC FILTER INSERTION      REPLACEMENT TOTAL KNEE      STOMACH SURGERY      for morbid obesity    TUBAL LIGATION Bilateral 2010     SOCIAL & FAMILY HISTORY     Social History     Substance and Sexual Activity   Alcohol Use Yes    Comment: Pt states more than 30 drinks a day   / social per [employer]     Social History     Substance and Sexual Activity   Drug Use No     Social History     Tobacco Use   Smoking Status Never Smoker   Smokeless Tobacco Never Used   Tobacco Comment    former quit in 1999 per [employer]     Family History   Problem Relation Age of Onset    Other Mother         headache    Hypertension Mother     Arthritis Father     Other Father         H, pylori infection    Other Sister         esophageal reflux    Migraines Sister     Breast cancer Family     Diabetes Paternal Aunt         Mellitus    Diabetes Paternal Uncle         Mellitus     LABORATORY DATA     Labs: I have personally reviewed pertinent reports        Results from last 7 days   Lab Units 08/03/20  1632   WBC Thousand/uL 15 44*   HEMOGLOBIN g/dL 12 1   HEMATOCRIT % 39 8   PLATELETS Thousands/uL 229   NEUTROS PCT % 80*   MONOS PCT % 12      Results from last 7 days   Lab Units 08/03/20  1632   POTASSIUM mmol/L 3 5   CHLORIDE mmol/L 103   CO2 mmol/L 21   BUN mg/dL 5   CREATININE mg/dL 0 78 CALCIUM mg/dL 9 0   ALK PHOS U/L 204*   ALT U/L 39   AST U/L 38     Serum creatinine: 0 78 mg/dL 08/03/20 1632  Estimated creatinine clearance: 125 3 mL/min            Results from last 7 days   Lab Units 08/03/20  1901   INR  1 14   PTT seconds 39*             Micro:  No results found for: BLOODCX, URINECX, WOUNDCULT, SPUTUMCULTUR  IMAGING & DIAGNOSTIC TESTS     Imaging: I have personally reviewed pertinent reports  Ct Abdomen Pelvis With Contrast    Result Date: 8/3/2020  Impression: An IVC filter is present  There is diffuse thrombus extending from the IVC filter inferiorly into the iliac vasculature  Extensive surrounding fat stranding in keeping with a thrombophlebitis  A small amount of thrombus extends superior to the filter  I personally discussed this study with Arvella Hodgkins  on 8/3/2020 at 6:45 PM  Workstation performed: [id number]     EKG, Pathology, and Other Studies: I have personally reviewed pertinent reports  ALLERGIES     Allergies   Allergen Reactions    Morphine      Seizures  MEDICATIONS PRIOR TO ARRIVAL     Prior to Admission medications    Medication Sig Start Date End Date Taking?  Authorizing Provider   acamprosate (CAMPRAL) 333 mg tablet Take 333 mg by mouth 3 (three) times a day    Historical Provider, MD   butalbital-acetaminophen-caffeine (FIORICET,ESGIC) -40 mg per tablet Take 1 tablet by mouth every 4 (four) hours as needed for headaches    Historical Provider, MD   chlordiazePOXIDE (LIBRIUM) 25 mg capsule Take 25 mg by mouth 3 (three) times a day as needed for anxiety    Historical Provider, MD   clonazePAM (KlonoPIN) 0 5 mg tablet Take 0 5 mg by mouth 2 (two) times a day    Historical Provider, MD   diclofenac sodium (VOLTAREN) 1 % Apply 2 g topically 4 (four) times a day 8/3/20   Cindi Forbes MD   Doxycycline Hyclate 120 MG TBEC Take 100 mg by mouth    Historical Provider, MD   ferrous sulfate 325 (65 Fe) mg tablet Take 325 mg by mouth daily with breakfast Historical Provider, MD   folic acid (FOLVITE) 1 mg tablet Take 1 mg by mouth daily    Historical Provider, MD   furosemide (LASIX) 20 mg tablet Take 20 mg by mouth 2 (two) times a day    Historical Provider, MD   gabapentin (NEURONTIN) 400 mg capsule Take 100 mg by mouth 3 (three) times a day    Historical Provider, MD   hydrOXYzine HCL (ATARAX) 50 mg tablet Take 50 mg by mouth 3 (three) times a day as needed for itching    Historical Provider, MD   pantoprazole (PROTONIX) 40 mg tablet Take 40 mg by mouth daily    Historical Provider, MD   pyridoxine (VITAMIN B6) 100 mg tablet Take 100 mg by mouth daily    Historical Provider, MD   QUEtiapine (SEROquel) 200 mg tablet Take 25 mg by mouth daily at bedtime    Historical Provider, MD   ranitidine (ZANTAC) 300 MG capsule Take 300 mg by mouth every evening    Historical Provider, MD   sertraline (ZOLOFT) 50 mg tablet Take 50 mg by mouth daily    Historical Provider, MD   SUMAtriptan (IMITREX) 100 mg tablet Take 100 mg by mouth once as needed for migraine    Historical Provider, MD   topiramate (TOPAMAX) 100 mg tablet Take 100 mg by mouth 2 (two) times a day    Historical Provider, MD   traMADol (ULTRAM) 50 mg tablet Take 50 mg by mouth every 6 (six) hours as needed for moderate pain    Historical Provider, MD     MEDICATIONS ADMINISTERED IN LAST 24 HOURS     CURRENT MEDICATIONS   heparin (porcine), 3-30 Units/kg/hr (Order-Specific), Intravenous, Titrated, Ariana Weber MD, Last Rate: 18 Units/kg/hr (08/03/20 1928)  heparin (porcine), 10,000 Units, Intravenous, Q1H PRN, Ariana Weber MD  heparin (porcine), 5,000 Units, Intravenous, Q1H PRN, Ariana Weber MD      No current facility-administered medications for this encounter  Admission Time  I spent 45 minutes admitting the patient    This involved direct patient contact where I performed a full history and physical, reviewing previous records, and reviewing laboratory and other diagnostic studies     ==  Melvina Acosta MD  Resident, PGY-2  [address] Internal Medicine Residency

## 2020-08-04 NOTE — BRIEF OP NOTE (RAD/CATH)
INTERVENTIONAL RADIOLOGY PROCEDURE NOTE    Date: 8/4/2020    Procedure: IR VENOUS LYSIS     Preoperative diagnosis:   1  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[address] )    2  Thrombophlebitis    3  Hx of pulmonary embolus         Postoperative diagnosis: Same  Surgeon: Conor Macario MD     Assistant: None  No qualified resident was available  Blood loss:  4 mL    Specimens:  None    Findings: Thrombosis of the inferior vena cava and common iliac arteries bilaterally  A tongue of clot is noted extending above the filter  Bilateral infusion catheters were placed with 40 cm of sideholes, and lysis was started at 1 mg of t-PA per hour total dose  Patient will return tomorrow for follow-up venogram and possible additional intervention  Complications: None immediate      Anesthesia: concious sedation

## 2020-08-04 NOTE — SOCIAL WORK
Met with patient at bedside to discuss CM role and dcp  Pt lives in 1st floor apartment with daughter, Tere Holloway  No steps to enter  Pervis Bakari is primary contac [phone]  Independent pta  Drives  No prior homecare or inpt rehab  No DME  No POA/LW  Pt has a h/o bipolar disorder and is actively being treated  Denies any needs at this time  Denies h/o drug treatment but has been treated for ETOH  Denies any needs/concerns at this time  Pharmacy is [facility] on [address] in [facility]  Interested in meds to bed  In anticipation of need for anticoagulants post d/c, discussed same with pt  She has prescription plan  Family will transport upon d/c  CM reviewed d/c planning process including the following: identifying help at home, patient preference for d/c planning needs, Discharge Lounge, [facility] Meds to Bed program, availability of treatment team to discuss questions or concerns patient and/or family may have regarding understanding medications and recognizing signs and symptoms once discharged  CM also encouraged patient to follow up with all recommended appointments after discharge  Patient advised of importance for patient and family to participate in managing patients medical well being

## 2020-08-04 NOTE — ASSESSMENT & PLAN NOTE
Bilateral lower extremity swelling, right greater than left with erythema  -follow-up bilateral lower extremity duplex negative for DVT  -continue home Lasix 20 mg b i d   As needed, withholding blood pressure parameters

## 2020-08-05 ENCOUNTER — APPOINTMENT (INPATIENT)
Dept: RADIOLOGY | Facility: HOSPITAL | Age: 45
DRG: 315 | End: 2020-08-05
Attending: RADIOLOGY
Payer: COMMERCIAL

## 2020-08-05 LAB
ANA HOMOGEN SER QL IF: NORMAL
ANA HOMOGEN TITR SER: NORMAL {TITER}
ANION GAP SERPL CALCULATED.3IONS-SCNC: 8 MMOL/L (ref 4–13)
APTT PPP: 42 SECONDS (ref 23–37)
APTT PPP: 57 SECONDS (ref 23–37)
APTT PPP: 79 SECONDS (ref 23–37)
APTT PPP: 91 SECONDS (ref 23–37)
BUN SERPL-MCNC: 4 MG/DL (ref 5–25)
C TRACH DNA SPEC QL NAA+PROBE: NEGATIVE
CALCIUM SERPL-MCNC: 8.3 MG/DL (ref 8.3–10.1)
CHLORIDE SERPL-SCNC: 109 MMOL/L (ref 100–108)
CO2 SERPL-SCNC: 19 MMOL/L (ref 21–32)
CREAT SERPL-MCNC: 0.69 MG/DL (ref 0.6–1.3)
DSDNA AB SER-ACNC: 5 IU/ML (ref 0–9)
ERYTHROCYTE [DISTWIDTH] IN BLOOD BY AUTOMATED COUNT: 17.8 % (ref 11.6–15.1)
ERYTHROCYTE [DISTWIDTH] IN BLOOD BY AUTOMATED COUNT: 17.9 % (ref 11.6–15.1)
FIBRINOGEN PPP-MCNC: 109 MG/DL (ref 227–495)
FIBRINOGEN PPP-MCNC: 65 MG/DL (ref 227–495)
FIBRINOGEN PPP-MCNC: 98 MG/DL (ref 227–495)
FIBRINOGEN PPP-MCNC: <60 MG/DL (ref 227–495)
GFR SERPL CREATININE-BSD FRML MDRD: 105 ML/MIN/1.73SQ M
GLUCOSE SERPL-MCNC: 107 MG/DL (ref 65–140)
HCT VFR BLD AUTO: 32.9 % (ref 34.8–46.1)
HCT VFR BLD AUTO: 36.2 % (ref 34.8–46.1)
HCT VFR BLD AUTO: 37 % (ref 34.8–46.1)
HCT VFR BLD AUTO: 38.1 % (ref 34.8–46.1)
HGB BLD-MCNC: 10.6 G/DL (ref 11.5–15.4)
HGB BLD-MCNC: 11 G/DL (ref 11.5–15.4)
HGB BLD-MCNC: 11.5 G/DL (ref 11.5–15.4)
HGB BLD-MCNC: 11.6 G/DL (ref 11.5–15.4)
INR PPP: 1.33 (ref 0.84–1.19)
MCH RBC QN AUTO: 30.3 PG (ref 26.8–34.3)
MCH RBC QN AUTO: 30.7 PG (ref 26.8–34.3)
MCH RBC QN AUTO: 31.2 PG (ref 26.8–34.3)
MCH RBC QN AUTO: 32.2 PG (ref 26.8–34.3)
MCHC RBC AUTO-ENTMCNC: 30.2 G/DL (ref 31.4–37.4)
MCHC RBC AUTO-ENTMCNC: 30.4 G/DL (ref 31.4–37.4)
MCHC RBC AUTO-ENTMCNC: 31.4 G/DL (ref 31.4–37.4)
MCHC RBC AUTO-ENTMCNC: 32.2 G/DL (ref 31.4–37.4)
MCV RBC AUTO: 100 FL (ref 82–98)
MCV RBC AUTO: 102 FL (ref 82–98)
N GONORRHOEA DNA SPEC QL NAA+PROBE: NEGATIVE
PLATELET # BLD AUTO: 146 THOUSANDS/UL (ref 149–390)
PLATELET # BLD AUTO: 158 THOUSANDS/UL (ref 149–390)
PLATELET # BLD AUTO: 174 THOUSANDS/UL (ref 149–390)
PLATELET # BLD AUTO: 197 THOUSANDS/UL (ref 149–390)
PMV BLD AUTO: 10.5 FL (ref 8.9–12.7)
PMV BLD AUTO: 11 FL (ref 8.9–12.7)
PMV BLD AUTO: 11.2 FL (ref 8.9–12.7)
PMV BLD AUTO: 11.7 FL (ref 8.9–12.7)
POTASSIUM SERPL-SCNC: 3.3 MMOL/L (ref 3.5–5.3)
PROTHROMBIN TIME: 16.5 SECONDS (ref 11.6–14.5)
RBC # BLD AUTO: 3.29 MILLION/UL (ref 3.81–5.12)
RBC # BLD AUTO: 3.63 MILLION/UL (ref 3.81–5.12)
RBC # BLD AUTO: 3.72 MILLION/UL (ref 3.81–5.12)
RBC # BLD AUTO: 3.75 MILLION/UL (ref 3.81–5.12)
RYE IGE QN: POSITIVE
SODIUM SERPL-SCNC: 136 MMOL/L (ref 136–145)
WBC # BLD AUTO: 12.49 THOUSAND/UL (ref 4.31–10.16)
WBC # BLD AUTO: 13.11 THOUSAND/UL (ref 4.31–10.16)
WBC # BLD AUTO: 13.34 THOUSAND/UL (ref 4.31–10.16)
WBC # BLD AUTO: 13.43 THOUSAND/UL (ref 4.31–10.16)

## 2020-08-05 PROCEDURE — C1725 CATH, TRANSLUMIN NON-LASER: HCPCS

## 2020-08-05 PROCEDURE — 99233 SBSQ HOSP IP/OBS HIGH 50: CPT | Performed by: INTERNAL MEDICINE

## 2020-08-05 PROCEDURE — NC001 PR NO CHARGE: Performed by: SURGERY

## 2020-08-05 PROCEDURE — 94760 N-INVAS EAR/PLS OXIMETRY 1: CPT

## 2020-08-05 PROCEDURE — 85027 COMPLETE CBC AUTOMATED: CPT | Performed by: RADIOLOGY

## 2020-08-05 PROCEDURE — 99152 MOD SED SAME PHYS/QHP 5/>YRS: CPT | Performed by: RADIOLOGY

## 2020-08-05 PROCEDURE — 37187 VENOUS MECH THROMBECTOMY: CPT | Performed by: RADIOLOGY

## 2020-08-05 PROCEDURE — C1757 CATH, THROMBECTOMY/EMBOLECT: HCPCS

## 2020-08-05 PROCEDURE — C1894 INTRO/SHEATH, NON-LASER: HCPCS

## 2020-08-05 PROCEDURE — 99291 CRITICAL CARE FIRST HOUR: CPT | Performed by: SURGERY

## 2020-08-05 PROCEDURE — 93970 EXTREMITY STUDY: CPT | Performed by: SURGERY

## 2020-08-05 PROCEDURE — 85730 THROMBOPLASTIN TIME PARTIAL: CPT | Performed by: SURGERY

## 2020-08-05 PROCEDURE — 85730 THROMBOPLASTIN TIME PARTIAL: CPT | Performed by: RADIOLOGY

## 2020-08-05 PROCEDURE — 37214 CESSJ THERAPY CATH REMOVAL: CPT | Performed by: RADIOLOGY

## 2020-08-05 PROCEDURE — 75822 VEIN X-RAY ARMS/LEGS: CPT

## 2020-08-05 PROCEDURE — C1769 GUIDE WIRE: HCPCS

## 2020-08-05 PROCEDURE — 99232 SBSQ HOSP IP/OBS MODERATE 35: CPT | Performed by: SURGERY

## 2020-08-05 PROCEDURE — B5191ZA FLUOROSCOPY OF INFERIOR VENA CAVA USING LOW OSMOLAR CONTRAST, GUIDANCE: ICD-10-PCS | Performed by: RADIOLOGY

## 2020-08-05 PROCEDURE — 37214 CESSJ THERAPY CATH REMOVAL: CPT

## 2020-08-05 PROCEDURE — 85384 FIBRINOGEN ACTIVITY: CPT | Performed by: RADIOLOGY

## 2020-08-05 PROCEDURE — 85610 PROTHROMBIN TIME: CPT | Performed by: PHYSICIAN ASSISTANT

## 2020-08-05 PROCEDURE — 80048 BASIC METABOLIC PNL TOTAL CA: CPT | Performed by: PHYSICIAN ASSISTANT

## 2020-08-05 RX ORDER — DIPHENHYDRAMINE HYDROCHLORIDE 50 MG/ML
INJECTION INTRAMUSCULAR; INTRAVENOUS CODE/TRAUMA/SEDATION MEDICATION
Status: COMPLETED | OUTPATIENT
Start: 2020-08-05 | End: 2020-08-05

## 2020-08-05 RX ORDER — LIDOCAINE WITH 8.4% SOD BICARB 0.9%(10ML)
SYRINGE (ML) INJECTION CODE/TRAUMA/SEDATION MEDICATION
Status: COMPLETED | OUTPATIENT
Start: 2020-08-05 | End: 2020-08-05

## 2020-08-05 RX ORDER — POTASSIUM CHLORIDE 20 MEQ/1
40 TABLET, EXTENDED RELEASE ORAL ONCE
Status: COMPLETED | OUTPATIENT
Start: 2020-08-05 | End: 2020-08-05

## 2020-08-05 RX ORDER — CALCIUM CARBONATE 200(500)MG
500 TABLET,CHEWABLE ORAL DAILY PRN
Status: DISCONTINUED | OUTPATIENT
Start: 2020-08-05 | End: 2020-08-09 | Stop reason: HOSPADM

## 2020-08-05 RX ORDER — HYDROMORPHONE HYDROCHLORIDE 4 MG/ML
INJECTION, SOLUTION INTRAMUSCULAR; INTRAVENOUS; SUBCUTANEOUS CODE/TRAUMA/SEDATION MEDICATION
Status: COMPLETED | OUTPATIENT
Start: 2020-08-05 | End: 2020-08-05

## 2020-08-05 RX ORDER — HEPARIN SODIUM 200 [USP'U]/100ML
20 INJECTION, SOLUTION INTRAVENOUS CONTINUOUS
Status: DISCONTINUED | OUTPATIENT
Start: 2020-08-05 | End: 2020-08-06

## 2020-08-05 RX ORDER — FENTANYL CITRATE 50 UG/ML
INJECTION, SOLUTION INTRAMUSCULAR; INTRAVENOUS CODE/TRAUMA/SEDATION MEDICATION
Status: COMPLETED | OUTPATIENT
Start: 2020-08-05 | End: 2020-08-05

## 2020-08-05 RX ORDER — HEPARIN SODIUM 1000 [USP'U]/ML
INJECTION, SOLUTION INTRAVENOUS; SUBCUTANEOUS CODE/TRAUMA/SEDATION MEDICATION
Status: COMPLETED | OUTPATIENT
Start: 2020-08-05 | End: 2020-08-05

## 2020-08-05 RX ADMIN — LITHIUM CARBONATE 600 MG: 300 TABLET, FILM COATED, EXTENDED RELEASE ORAL at 21:12

## 2020-08-05 RX ADMIN — HYDROMORPHONE HYDROCHLORIDE 0.2 MG: 4 INJECTION, SOLUTION INTRAMUSCULAR; INTRAVENOUS; SUBCUTANEOUS at 15:39

## 2020-08-05 RX ADMIN — HYDROMORPHONE HYDROCHLORIDE 0.4 MG: 4 INJECTION, SOLUTION INTRAMUSCULAR; INTRAVENOUS; SUBCUTANEOUS at 15:01

## 2020-08-05 RX ADMIN — OXYCODONE HYDROCHLORIDE 10 MG: 10 TABLET ORAL at 06:14

## 2020-08-05 RX ADMIN — HEPARIN SODIUM IN SODIUM CHLORIDE 20 UNITS/HR: 200 INJECTION INTRAVENOUS at 10:12

## 2020-08-05 RX ADMIN — FENTANYL CITRATE 25 MCG: 50 INJECTION, SOLUTION INTRAMUSCULAR; INTRAVENOUS at 14:48

## 2020-08-05 RX ADMIN — TOPIRAMATE 150 MG: 100 TABLET, FILM COATED ORAL at 12:33

## 2020-08-05 RX ADMIN — DIPHENHYDRAMINE HYDROCHLORIDE 25 MG: 50 INJECTION INTRAMUSCULAR; INTRAVENOUS at 14:54

## 2020-08-05 RX ADMIN — DIPHENHYDRAMINE HYDROCHLORIDE 25 MG: 50 INJECTION INTRAMUSCULAR; INTRAVENOUS at 14:29

## 2020-08-05 RX ADMIN — LITHIUM CARBONATE 450 MG: 450 TABLET ORAL at 17:03

## 2020-08-05 RX ADMIN — CALCIUM CARBONATE (ANTACID) CHEW TAB 500 MG 500 MG: 500 CHEW TAB at 10:04

## 2020-08-05 RX ADMIN — HEPARIN SODIUM 3000 UNITS: 1000 INJECTION INTRAVENOUS; SUBCUTANEOUS at 14:48

## 2020-08-05 RX ADMIN — FENTANYL CITRATE 25 MCG: 50 INJECTION, SOLUTION INTRAMUSCULAR; INTRAVENOUS at 15:36

## 2020-08-05 RX ADMIN — FERROUS SULFATE TAB 325 MG (65 MG ELEMENTAL FE) 325 MG: 325 (65 FE) TAB at 08:08

## 2020-08-05 RX ADMIN — GABAPENTIN 400 MG: 400 CAPSULE ORAL at 17:06

## 2020-08-05 RX ADMIN — SERTRALINE HYDROCHLORIDE 50 MG: 50 TABLET ORAL at 08:08

## 2020-08-05 RX ADMIN — TOPIRAMATE 150 MG: 100 TABLET, FILM COATED ORAL at 17:06

## 2020-08-05 RX ADMIN — ONDANSETRON 4 MG: 2 INJECTION INTRAMUSCULAR; INTRAVENOUS at 19:27

## 2020-08-05 RX ADMIN — POTASSIUM CHLORIDE 40 MEQ: 1500 TABLET, EXTENDED RELEASE ORAL at 05:56

## 2020-08-05 RX ADMIN — FENTANYL CITRATE 25 MCG: 50 INJECTION, SOLUTION INTRAMUSCULAR; INTRAVENOUS at 14:54

## 2020-08-05 RX ADMIN — FENTANYL CITRATE 50 MCG: 50 INJECTION, SOLUTION INTRAMUSCULAR; INTRAVENOUS at 14:26

## 2020-08-05 RX ADMIN — FENTANYL CITRATE 50 MCG: 50 INJECTION, SOLUTION INTRAMUSCULAR; INTRAVENOUS at 14:34

## 2020-08-05 RX ADMIN — IOHEXOL 80 ML: 350 INJECTION, SOLUTION INTRAVENOUS at 17:18

## 2020-08-05 RX ADMIN — OXYCODONE HYDROCHLORIDE 5 MG: 5 TABLET ORAL at 21:49

## 2020-08-05 RX ADMIN — HEPARIN SODIUM IN SODIUM CHLORIDE 20 UNITS/HR: 200 INJECTION INTRAVENOUS at 10:11

## 2020-08-05 RX ADMIN — HYDROMORPHONE HYDROCHLORIDE 0.2 MG: 4 INJECTION, SOLUTION INTRAMUSCULAR; INTRAVENOUS; SUBCUTANEOUS at 15:45

## 2020-08-05 RX ADMIN — GABAPENTIN 400 MG: 400 CAPSULE ORAL at 21:11

## 2020-08-05 RX ADMIN — GABAPENTIN 400 MG: 400 CAPSULE ORAL at 08:08

## 2020-08-05 RX ADMIN — FENTANYL CITRATE 25 MCG: 50 INJECTION, SOLUTION INTRAMUSCULAR; INTRAVENOUS at 15:26

## 2020-08-05 RX ADMIN — QUETIAPINE FUMARATE 500 MG: 100 TABLET ORAL at 21:11

## 2020-08-05 RX ADMIN — Medication 10 ML: at 14:50

## 2020-08-05 RX ADMIN — FOLIC ACID 1 MG: 1 TABLET ORAL at 08:08

## 2020-08-05 RX ADMIN — ONDANSETRON 4 MG: 2 INJECTION INTRAMUSCULAR; INTRAVENOUS at 09:05

## 2020-08-05 NOTE — PROGRESS NOTES
ASSESSMENT/PLAN     IVC thrombosis  History of bilateral DVTs and PE, status post IVC filter placement 2017 in [facility]   Presented with worsening four-day history of abdominal pain and lower extremity pain  CT pelvis with contrast revealed thrombosis IVC filter leading into iliac thrombophlebitis noticed as well  Etiology hypercoagulable state multifactorial possible underlying after 5 versus protein C/S deficiency versus in setting of morbid obesity versus less likely underlying malignancy  · Bilateral venous duplex negative for acute DVTs  · S/p IR venous lysis on 8/4/2020, on heparin ggt and alteplase  Vascular surgery following-plan for IR venogram today  · CT chest unremarkable, no evidence of lung cancer  · Will need outpatient mammogram for further malignancy  Family history of breast cancer in maternal aunt  · Follow-up on thrombosis panel  · Patient will need life long anticoagulation  Given BMI 54, limited options  will discuss with attending in regards to anticoagulation management       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  History of PE in 2015 in [facility]   Briefly placed on Xarelto, discontinued secondary to severe GI bleed requiring transfusions  Bilateral DVTs in 2017, has IVC filter placed [facility] at that time  · CT findings of IVC thrombosis and management as highlighted above  · Currently on heparin drip  · Will need lifelong anticoagulation  ·  malignancy workup and thrombosis panel pending     Thrombophlebitis  CT abdomen pelvis with contrast noted extensive surrounding fat stranding consistent with thrombophlebitis at site of IVC filter  · Continue with heparin drip and supportive care  · Further management per primary team     Morbid obesity  BMI 54  Educated on lifestyle and dietary modifications    Require close outpatient follow-up for further intervention          VTE Pharmacologic Prophylaxis: Sequential compression device (Venodyne)   VTE Mechanical Prophylaxis: sequential compression device    SUBJECTIVE   Mary Morrison 53 y o  female who states she is feeling well today, mild to moderate abdominal pain and back pain after lysis  Denies any nausea, vomiting, diarrhea, constipation, fevers or chills  All other review of systems negative at this time  REVIEW OF SYSTEMS   Review of Systems   Constitutional: Positive for activity change and fatigue  Negative for appetite change, chills and fever  HENT: Negative for congestion, facial swelling and trouble swallowing  Eyes: Negative for photophobia, pain, discharge and visual disturbance  Respiratory: Negative for apnea, cough, chest tightness, shortness of breath and wheezing  Cardiovascular: Negative for chest pain and leg swelling  Gastrointestinal: Positive for abdominal pain  Negative for abdominal distention, blood in stool, constipation, diarrhea, nausea and vomiting  Endocrine: Negative for polyuria  Genitourinary: Negative for difficulty urinating, dysuria, flank pain and hematuria  Musculoskeletal: Positive for back pain  Negative for arthralgias  Skin: Negative for pallor and rash  Neurological: Positive for weakness  Negative for dizziness and tremors  Psychiatric/Behavioral: Negative for agitation, behavioral problems and suicidal ideas  The patient is not nervous/anxious  OBJECTIVE     Vitals:    20 0500 20 0557 20 0600 20 0700   BP: 124/72  139/80 95/64   BP Location:       Pulse: 100  100 100   Resp: (!) 27  (!) 26 22   Temp:       TempSrc:       SpO2: 99%  98% 97%   Weight:  (!) 138 kg (303 lb 5 7 oz)        Temperature:   Temp (24hrs), Av 3 °F (36 8 °C), Min:98 2 °F (36 8 °C), Max:98 5 °F (36 9 °C)    Temperature: 98 2 °F (36 8 °C)  Intake & Output:  I/O        07 - / 0700  07 -  07 07 -  0700    P  O   0     I V  (mL/kg)  414 1 (3)     IV Piggyback  253 6     Total Intake(mL/kg)  667 7 (4 8)     Urine (mL/kg/hr)  550 (0 2)     Total Output  550     Net  +117 7            Unmeasured Urine Occurrence 3 x          Weights:   IBW: -92 5 kg    Body mass index is 52 48 kg/m²  Weight (last 2 days)     Date/Time   Weight    08/05/20 0557   (!) 138 (303 35)    08/04/20 0600   (!) 139 (305 56)    08/04/20 0217   (!) 139 (305 78)            Physical Exam  Vitals signs reviewed  Constitutional:       General: She is not in acute distress  Appearance: She is well-developed  She is not diaphoretic  Comments: Morbidly obese   HENT:      Head: Normocephalic and atraumatic  Nose: Nose normal       Mouth/Throat:      Pharynx: No oropharyngeal exudate  Eyes:      General: No scleral icterus  Right eye: No discharge  Left eye: No discharge  Conjunctiva/sclera: Conjunctivae normal    Neck:      Musculoskeletal: Normal range of motion and neck supple  Cardiovascular:      Rate and Rhythm: Normal rate and regular rhythm  Heart sounds: Normal heart sounds  No murmur  No friction rub  No gallop  Pulmonary:      Effort: Pulmonary effort is normal  No respiratory distress  Breath sounds: Normal breath sounds  No wheezing or rales  Abdominal:      General: Bowel sounds are normal  There is no distension  Palpations: Abdomen is soft  Tenderness: There is abdominal tenderness  There is no guarding  Musculoskeletal: Normal range of motion  General: Swelling present  Left lower leg: Edema present  Skin:     General: Skin is warm and dry  Findings: No erythema  Neurological:      Mental Status: She is alert and oriented to person, place, and time     Psychiatric:         Mood and Affect: Mood normal          Behavior: Behavior normal        PAST MEDICAL HISTORY     Past Medical History:   Diagnosis Date    Anemia     Psychiatric disorder     bipolar II, suicide    Pulmonary embolism (HCC)     Seizures (HCC)      PAST SURGICAL HISTORY Past Surgical History:   Procedure Laterality Date    APPENDECTOMY      Open    CHOLECYSTECTOMY      Laparoscopic    GASTRIC BYPASS      was 205 date of surgery    IVC FILTER INSERTION  2017    REPLACEMENT TOTAL KNEE      STOMACH SURGERY      for morbid obesity    TUBAL LIGATION Bilateral 2010     SOCIAL & FAMILY HISTORY     Social History     Substance and Sexual Activity   Alcohol Use Not Currently    Comment: quit 6/21/2018     Social History     Substance and Sexual Activity   Drug Use No     Social History     Tobacco Use   Smoking Status Never Smoker   Smokeless Tobacco Never Used   Tobacco Comment    former quit in 1999 per [employer]     Family History   Problem Relation Age of Onset    Other Mother         headache    Hypertension Mother     Depression Mother     Arthritis Father     Other Father         H, pylori infection    Other Sister         esophageal reflux    Migraines Sister     Breast cancer Family     Diabetes Paternal Aunt         Mellitus    Diabetes Paternal Uncle         Mellitus    Asthma Daughter      LABORATORY DATA     Labs: I have personally reviewed pertinent reports      Results from last 7 days   Lab Units 08/05/20  0747 08/05/20  0207 08/04/20 2020 08/04/20  0715 08/03/20  1632   WBC Thousand/uL 13 34* 12 49* 14 77* 14 53* 15 44*   HEMOGLOBIN g/dL 11 0* 11 5 11 4* 12 0 12 1   HEMATOCRIT % 36 2 38 1 37 6 38 3 39 8   PLATELETS Thousands/uL 158 197 237 244 229   NEUTROS PCT %  --   --   --  72 80*   MONOS PCT %  --   --   --  14* 12      Results from last 7 days   Lab Units 08/05/20  0441 08/04/20 0715 08/03/20  1632   POTASSIUM mmol/L 3 3* 3 2* 3 5   CHLORIDE mmol/L 109* 110* 103   CO2 mmol/L 19* 21 21   BUN mg/dL 4* 4* 5   CREATININE mg/dL 0 69 0 68 0 78   CALCIUM mg/dL 8 3 9 3 9 0   ALK PHOS U/L  --   --  204*   ALT U/L  --   --  39   AST U/L  --   --  38              Results from last 7 days   Lab Units 08/05/20  0207 08/04/20 2020 08/04/20  1557 08/03/20  1901   INR   --   --   --   --  1 14   PTT seconds 91* 30 64*   < > 39*    < > = values in this interval not displayed  Micro:  No results found for: YONY, Chente Esparza, 2025 [facility] TESTS     Imaging: I have personally reviewed pertinent reports  Ct Head Wo Contrast    Result Date: 8/4/2020  Impression: No acute intracranial abnormality  Findings are stable Workstation performed: [id number]     Ct Chest Wo Contrast    Result Date: 8/4/2020  Impression: Unremarkable CT of the chest   No evidence of lung cancer  Workstation performed: [id number]     EKG, Pathology, and Other Studies: I have personally reviewed pertinent reports  ALLERGIES     Allergies   Allergen Reactions    Morphine      Seizures  MEDICATIONS PRIOR TO ARRIVAL     Prior to Admission medications    Medication Sig Start Date End Date Taking?  Authorizing Provider   chlordiazePOXIDE (LIBRIUM) 25 mg capsule Take 25 mg by mouth 3 (three) times a day as needed for anxiety   Yes Historical Provider, MD   ferrous sulfate 325 (65 Fe) mg tablet Take 325 mg by mouth daily with breakfast   Yes Historical Provider, MD   folic acid (FOLVITE) 1 mg tablet Take 1 mg by mouth daily   Yes Historical Provider, MD   gabapentin (NEURONTIN) 400 mg capsule Take 100 mg by mouth 3 (three) times a day   Yes Historical Provider, MD   lithium 600 MG capsule Take 600 mg by mouth see administration instructions 600mg am   750 mg pm   Yes Historical Provider, MD   pyridoxine (VITAMIN B6) 100 mg tablet Take 100 mg by mouth daily   Yes Historical Provider, MD   QUEtiapine (SEROquel) 100 mg tablet Take 500 mg by mouth daily at bedtime   Yes Historical Provider, MD   sertraline (ZOLOFT) 50 mg tablet Take 50 mg by mouth daily   Yes Historical Provider, MD   topiramate (TOPAMAX) 100 mg tablet Take 150 mg by mouth 2 (two) times a day    Yes Historical Provider, MD   diclofenac sodium (VOLTAREN) 1 % Apply 2 g topically 4 (four) times a day 8/3/20   Cindi Forbes MD   Doxycycline Hyclate 120 MG TBEC Take 100 mg by mouth    Historical Provider, MD   furosemide (LASIX) 20 mg tablet Take 20 mg by mouth 2 (two) times a day PRN    Historical Provider, MD   SUMAtriptan (IMITREX) 100 mg tablet Take 100 mg by mouth as needed for migraine     Historical Provider, MD     MEDICATIONS ADMINISTERED IN LAST 24 HOURS     Medication Administration - last 24 hours from 08/04/2020 0811 to 08/05/2020 1503       Date/Time Order Dose Route Action Action by     08/05/2020 0738 heparin (porcine) 25,000 units in 250 mL infusion (premix) 0 Units/kg/hr Intravenous Stopped Denisa Wiseman RN     08/04/2020 1738 heparin (porcine) 25,000 units in 250 mL infusion (premix) 0 Units/kg/hr Intravenous Hold Megan Posadas RN     08/04/2020 1639 heparin (porcine) 25,000 units in 250 mL infusion (premix) 15 Units/kg/hr Intravenous Rate/Dose Change Nayana Sanabria RN     08/04/2020 1000 heparin (porcine) 25,000 units in 250 mL infusion (premix) 15 Units/kg/hr Intravenous [address] Nayana Sanabria RN     08/05/2020 4462 ferrous sulfate tablet 325 mg 325 mg Oral Given Joelle West, MEDHAT     08/04/2020 1002 ferrous sulfate tablet 325 mg 325 mg Oral Given Nayana Sanabria RN     16/62/5180 0099 folic acid (FOLVITE) tablet 1 mg 1 mg Oral Given Joelle West RN     39/93/0958 1076 folic acid (FOLVITE) tablet 1 mg 1 mg Oral Given Nayana Sanabria RN     08/05/2020 6918 sertraline (ZOLOFT) tablet 50 mg 50 mg Oral Given Joelle West RN     08/04/2020 1002 sertraline (ZOLOFT) tablet 50 mg 50 mg Oral Given Nayana Sanabria RN     08/04/2020 2203 QUEtiapine (SEROquel) tablet 500 mg 500 mg Oral Given Yue Isidro, RN     08/04/2020 2204 lithium carbonate (LITHOBID) CR tablet 600 mg 600 mg Oral Given Yue Isidro, RN     08/04/2020 1003 lithium carbonate (LITHOBID) CR tablet 450 mg 450 mg Oral Given Nayana Sanabria RN     08/04/2020 2204 topiramate (TOPAMAX) tablet 150 mg 150 mg Oral Given Dede Fam RN     08/04/2020 1002 topiramate (TOPAMAX) tablet 150 mg 150 mg Oral Given Torrey Thomas RN     08/05/2020 3390 gabapentin (NEURONTIN) capsule 400 mg 400 mg Oral Given Denis Mckeon RN     08/04/2020 2000 gabapentin (NEURONTIN) capsule 400 mg 400 mg Oral Given Dede Fam RN     08/04/2020 1600 gabapentin (NEURONTIN) capsule 400 mg 400 mg Oral Not Given Dede Fam RN     08/04/2020 1002 gabapentin (NEURONTIN) capsule 400 mg 400 mg Oral Given Torrey Thomas RN     08/04/2020 1003 potassium chloride 20 mEq IVPB (premix) 20 mEq Intravenous [address] Torrey Thomas RN     08/04/2020 1001 potassium chloride (K-DUR,KLOR-CON) CR tablet 40 mEq 40 mEq Oral Given Torrey Thomas RN     08/04/2020 2000 acetaminophen (TYLENOL) tablet 650 mg 650 mg Oral Given Dede Fam RN     08/05/2020 8486 oxyCODONE (ROXICODONE) IR tablet 5 mg   Oral See Alternative Dede Fam RN     08/04/2020 1007 oxyCODONE (ROXICODONE) IR tablet 5 mg   Oral See Alternative Torrey Thomas RN     08/05/2020 3352 oxyCODONE (ROXICODONE) IR tablet 10 mg 10 mg Oral Given Dede Fam RN     08/04/2020 1007 oxyCODONE (ROXICODONE) IR tablet 10 mg 10 mg Oral Given Torrey Thomas RN     08/05/2020 0520 alteplase (CATHFLO) 10 mg in sodium chloride 0 9 % 250 mL infusion 0 25 mg/hr Intracatheter Rate/Dose Change Dede Fam RN     08/04/2020 1931 alteplase (CATHFLO) 10 mg in sodium chloride 0 9 % 250 mL infusion 0 5 mg/hr Intracatheter [address] Alva Crodanyel, MEDHAT     08/05/2020 2523 heparin (porcine) 25,000 units in 250 mL infusion (premix) 100 Units/hr Intravenous Rate/Dose Change Dede Fam RN     08/05/2020 0248 heparin (porcine) 25,000 units in 250 mL infusion (premix) 200 Units/hr Intravenous Rate/Dose Change Dede Fam RN     08/04/2020 2001 heparin (porcine) 25,000 units in 250 mL infusion (premix) 400 Units/hr Intravenous [address] Dede Fam, [address] [place] [address]     08/05/2020 0520 alteplase (CATHFLO) 10 mg in sodium chloride 0 9 % 250 mL infusion 0 25 mg/hr Intracatheter Rate/Dose Change Aakash Morales RN     08/04/2020 1932 alteplase (CATHFLO) 10 mg in sodium chloride 0 9 % 250 mL infusion 0 5 mg/hr Intracatheter New Bag Clifton Sal RN     08/04/2020 1755 midazolam (VERSED) injection 1 mg Intravenous Given Clifton Sal RN     08/04/2020 1733 midazolam (VERSED) injection 1 mg Intravenous Given Clifton Sal RN     08/04/2020 1806 fentanyl citrate (PF) 100 MCG/2ML 50 mcg Intravenous Given Clifton Sal RN     08/04/2020 1733 fentanyl citrate (PF) 100 MCG/2ML 50 mcg Intravenous Given Clifton Sal RN     08/04/2020 1919 iohexol (OMNIPAQUE) 350 MG/ML injection (SINGLE-DOSE) 200 mL 45 mL Intravenous Given Heriberto Soler     08/04/2020 1931 heparin 1000 units in 500 mL infusion (premix) for sheath patency 20 Units/hr Intravenous [address] Clifton Sal RN     08/04/2020 1931 heparin 1000 units in 500 mL infusion (premix) for sheath patency 20 Units/hr Intravenous MEDHAT Campbell     08/05/2020 6449 potassium chloride (K-DUR,KLOR-CON) CR tablet 40 mEq 40 mEq Oral Given Aakash Morales RN        CURRENT MEDICATIONS   acetaminophen, 650 mg, Oral, Q6H PRN, Nina Hameed DO  alteplase IV (IR), 0 5 mg/hr, Intracatheter, Continuous, Courtney Burgos MD, Last Rate: 0 25 mg/hr (08/05/20 0520)  alteplase IV (IR), 0 5 mg/hr, Intracatheter, Continuous, Courtney Burgos MD, Last Rate: 0 25 mg/hr (08/05/20 0520)  ferrous sulfate, 325 mg, Oral, Daily With Breakfast, Sánchez Grady MD  folic acid, 1 mg, Oral, Daily, Sánchez Grady MD  gabapentin, 400 mg, Oral, TID, Sánchez Grady MD  heparin (porcine), 400 Units/hr, Intravenous, Titrated, Courtney Burgos MD, Last Rate: 100 Units/hr (08/05/20 0521)  heparin, 20 Units/hr, Intravenous, Continuous, Courtney Burgos MD, Last Rate: 20 Units/hr (08/04/20 1931)  heparin, 20 Units/hr, Intravenous, Continuous, Courtney Burgos MD, Last Rate: 20 Units/hr (08/04/20 1931)  lithium carbonate, 450 mg, Oral, Daily, Danielito Robbins MD  lithium carbonate, 600 mg, Oral, HS, Danielito Robbins MD  ondansetron, 4 mg, Intravenous, Q6H PRN, Cristian Upton MD  oxyCODONE, 5 mg, Oral, Q4H PRN, Nina Hameed DO    Or  oxyCODONE, 10 mg, Oral, Q4H PRN, Nina Hameed DO  QUEtiapine, 500 mg, Oral, HS, Danielito Robbins MD  sertraline, 50 mg, Oral, Daily, Danielito Robbins MD  topiramate, 150 mg, Oral, BID, Danielito Robbins MD      alteplase IV (IR), 0 5 mg/hr, Last Rate: 0 25 mg/hr (08/05/20 0520)  alteplase IV (IR), 0 5 mg/hr, Last Rate: 0 25 mg/hr (08/05/20 0520)  heparin (porcine), 400 Units/hr, Last Rate: 100 Units/hr (08/05/20 0521)  heparin, 20 Units/hr, Last Rate: 20 Units/hr (08/04/20 1931)  heparin, 20 Units/hr, Last Rate: 20 Units/hr (08/04/20 1931)      acetaminophen, 650 mg, Q6H PRN  ondansetron, 4 mg, Q6H PRN  oxyCODONE, 5 mg, Q4H PRN    Or  oxyCODONE, 10 mg, Q4H PRN        Portions of the record may have been created with voice recognition software  Occasional wrong word or "sound a like" substitutions may have occurred due to the inherent limitations of voice recognition software    Read the chart carefully and recognize, using context, where substitutions have occurred     ==  Nargis Bender, [address] [facility]  Internal Medicine Residency

## 2020-08-05 NOTE — CONSULTS
Consult - [address] 53 y o  female MRN: [id number]  Unit/Bed#: [facility] 414-01 Encounter: [id number]        -------------------------------------------------------------------------------------------------------------        History of Present Illness     Ivana Valverde is a 53 y o  female with history of obesity s/p gastric bypass, LE DVTs, PE s/p IVC filter placement 2017, seizures, bipolar disorder who presented 8/4 with pain in her lower extremities and abdomen  On evaluation she was found to have extensive thrombosis of her IVC filter extending into her iliacs       History of having a hypercoagulability work up in [facility] and was on Xarelto but could not tolerate secondary to GI bleeding       She underwent lysis with IR today  Patient admits to mild pain in her lower bilateral extremities and abdomen, improved since admission    -------------------------------------------------------------------------------------------------------------  Assessment and Plan:     Neuro:   · Continue home lithium, zoloft, topamax, seroquel, neurontin  · Pain medications PRN: oxy IR, tylenol         CV:   § Hemodynamically stable  § 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 No acute issues         GI:   ? Diet: Clears         :   ? Monitor urine output        F/E/N:   · Follow up labs, replace electrolytes as needed        Heme/Onc:   ? Diagnosis: Hx of DVT, PE s/p IVC filter 2017 presenting with thrombosis of IVC extending into bilateral iliacs s/p lysis by IR  § Continue heparin gtt  § Hypercoagable work up by oncology        Endo:   ? No acute issues        ID:   ? No antibiotics at this time        MSK/Skin:   ? Continue to monitor skin integrity and edema  ?  Bedrest, keep extremities straight     Code Status: Level 1 - Full Code  --------------------------------------------------------------------------------------------------------------  Review of Systems     A 12-point, complete review of systems was reviewed and negative except as stated above      Physical Exam  HENT:      Head: Normocephalic  Cardiovascular:      Rate and Rhythm: Normal rate and regular rhythm  Pulmonary:      Effort: Pulmonary effort is normal    Abdominal:      Palpations: Abdomen is soft  Comments: Mildly tender to lower quadrants bilaterally, no rebound or guarding     Musculoskeletal:      Right lower leg: Edema present  Left lower leg: Edema present  Comments: IR catheter in place to right popliteal fossa, dressing intact     Skin:     General: Skin is warm and dry     Neurological:      Mental Status: She is alert and oriented to person, place, and time        --------------------------------------------------------------------------------------------------------------  Vitals:   Vitals          Vitals:     08/04/20 1842 08/04/20 1857 08/04/20 1902 08/04/20 2000   BP: 139/71     142/85   BP Location:           Pulse: 92 84 83 84   Resp: 18     (!) 24   Temp:           TempSrc:           SpO2: 96% 96% 97% 98%   Weight:                Temp  Min: 98 2 °F (36 8 °C)  Max: 98 9 °F (37 2 °C)  IBW: -92 5 kg     Body mass index is 52 86 kg/m²         Laboratory and Diagnostics:        Results from last 7 days   Lab Units 08/04/20  0715 08/03/20  1632   WBC Thousand/uL 14 53* 15 44*   HEMOGLOBIN g/dL 12 0 12 1   HEMATOCRIT % 38 3 39 8   PLATELETS Thousands/uL 244 229   NEUTROS PCT % 72 80*   MONOS PCT % 14* 12           Results from last 7 days   Lab Units 08/04/20  0715 08/03/20  1632   SODIUM mmol/L 137 135*   POTASSIUM mmol/L 3 2* 3 5   CHLORIDE mmol/L 110* 103   CO2 mmol/L 21 21   ANION GAP mmol/L 6 11   BUN mg/dL 4* 5   CREATININE mg/dL 0 68 0 78   CALCIUM mg/dL 9 3 9 0   GLUCOSE RANDOM mg/dL 122 119   ALT U/L  --  39   AST U/L  --  38   ALK PHOS U/L  --  204*   ALBUMIN g/dL  --  2 6*   TOTAL BILIRUBIN mg/dL  --  0 50          Results from last 7 days   Lab Units 08/04/20  1557 08/04/20  0844 08/04/20  0111 08/03/20  1901   INR    --   --   --  1 14   PTT seconds 64* 85* 194* 39*                Historical Information     Medical History        Past Medical History:   Diagnosis Date    Anemia      Psychiatric disorder       bipolar II, suicide    Pulmonary embolism ([facility] )      Seizures ([facility] )          Surgical History         Past Surgical History:   Procedure Laterality Date    APPENDECTOMY         Open    CHOLECYSTECTOMY         Laparoscopic    GASTRIC BYPASS         was 205 date of surgery    IVC FILTER INSERTION   2017    REPLACEMENT TOTAL KNEE        STOMACH SURGERY         for morbid obesity    TUBAL LIGATION Bilateral 2010           Social History     Social History           Substance and Sexual Activity   Alcohol Use Not Currently     Comment: quit 6/21/2018     Social History          Substance and Sexual Activity   Drug Use No     Social History           Tobacco Use   Smoking Status Never Smoker   Smokeless Tobacco Never Used   Tobacco Comment     former quit in 1999 per [employer]        Family History:         Family History   Problem Relation Age of Onset    Other Mother           headache    Hypertension Mother      Depression Mother      Arthritis Father      Other Father           H, pylori infection    Other Sister           esophageal reflux    Migraines Sister      Breast cancer Family      Diabetes Paternal Aunt           Mellitus    Diabetes Paternal Uncle           Mellitus    Asthma Daughter                Medications:  Current Medications          Current Facility-Administered Medications   Medication Dose Route Frequency    acetaminophen (TYLENOL) tablet 650 mg  650 mg Oral Q6H PRN    alteplase (CATHFLO) 10 mg in sodium chloride 0 9 % 250 mL infusion  0 5 mg/hr Intracatheter Continuous    alteplase (CATHFLO) 10 mg in sodium chloride 0 9 % 250 mL infusion  0 5 mg/hr Intracatheter Continuous    ferrous sulfate tablet 325 mg  325 mg Oral Daily With Breakfast    folic acid (FOLVITE) tablet 1 mg  1 mg Oral Daily    furosemide (LASIX) tablet 20 mg  20 mg Oral BID PRN    gabapentin (NEURONTIN) capsule 400 mg  400 mg Oral TID    heparin (porcine) 25,000 units in 250 mL infusion (premix)  400 Units/hr Intravenous Titrated    heparin 1000 units in 500 mL infusion (premix) for sheath patency  20 Units/hr Intravenous Continuous    heparin 1000 units in 500 mL infusion (premix) for sheath patency  20 Units/hr Intravenous Continuous    lithium carbonate (LITHOBID) CR tablet 450 mg  450 mg Oral Daily    lithium carbonate (LITHOBID) CR tablet 600 mg  600 mg Oral HS    ondansetron (ZOFRAN) injection 4 mg  4 mg Intravenous Q6H PRN    oxyCODONE (ROXICODONE) IR tablet 5 mg  5 mg Oral Q4H PRN     Or    oxyCODONE (ROXICODONE) IR tablet 10 mg  10 mg Oral Q4H PRN    QUEtiapine (SEROquel) tablet 500 mg  500 mg Oral HS    sertraline (ZOLOFT) tablet 50 mg  50 mg Oral Daily    topiramate (TOPAMAX) tablet 150 mg  150 mg Oral BID        Home medications:  Prior to Admission Medications   Prescriptions Last Dose Informant Patient Reported? Taking?    Doxycycline Hyclate 120 MG TBEC Not Taking at Unknown time   Yes No   Sig: Take 100 mg by mouth   QUEtiapine (SEROquel) 100 mg tablet     Yes Yes   Sig: Take 500 mg by mouth daily at bedtime   SUMAtriptan (IMITREX) 100 mg tablet Unknown at Unknown time   Yes No   Sig: Take 100 mg by mouth as needed for migraine    chlordiazePOXIDE (LIBRIUM) 25 mg capsule 8/3/2020 at Unknown time   Yes Yes   Sig: Take 25 mg by mouth 3 (three) times a day as needed for anxiety   diclofenac sodium (VOLTAREN) 1 %     No No   Sig: Apply 2 g topically 4 (four) times a day   ferrous sulfate 325 (65 Fe) mg tablet 8/4/2020 at Unknown time   Yes Yes   Sig: Take 325 mg by mouth daily with breakfast   folic acid (FOLVITE) 1 mg tablet 8/4/2020 at Unknown time   Yes Yes   Sig: Take 1 mg by mouth daily   furosemide (LASIX) 20 mg tablet Unknown at Unknown time   Yes No   Sig: Take 20 mg by mouth 2 (two) times a day PRN   gabapentin (NEURONTIN) 400 mg capsule 8/4/2020 at Unknown time   Yes Yes   Sig: Take 100 mg by mouth 3 (three) times a day   lithium 600 MG capsule 8/4/2020 at Unknown time Self Yes Yes   Sig: Take 600 mg by mouth see administration instructions 600mg am   750 mg pm   pyridoxine (VITAMIN B6) 100 mg tablet 8/3/2020 at Unknown time   Yes Yes   Sig: Take 100 mg by mouth daily   sertraline (ZOLOFT) 50 mg tablet 8/3/2020 at Unknown time   Yes Yes   Sig: Take 50 mg by mouth daily   topiramate (TOPAMAX) 100 mg tablet 8/4/2020 at Unknown time   Yes Yes   Sig: Take 150 mg by mouth 2 (two) times a day       Facility-Administered Medications: None     Allergies: Allergies   Allergen Reactions    Morphine         Seizures  ------------------------------------------------------------------------------------------------------------  Advance Directive and Living Will:      Power of :    POLST:    ------------------------------------------------------------------------------------------------------------  Anticipated Length of Stay is > 2 midnights           Anjelica Nixon DO           Portions of the record may have been created with voice recognition software  Occasional wrong word or "sound a like" substitutions may have occurred due to the inherent limitations of voice recognition software    Read the chart carefully and recognize, using context, where substitutions have occurred

## 2020-08-05 NOTE — PROGRESS NOTES
Daily Progress Note - [address] 53 y o  female MRN: [id number]  Unit/Bed#: [facility] 414-01 Encounter: [id number]        ----------------------------------------------------------------------------------------  HPI/24hr events: Underwent lysis with IR for extensive clot burden  Denies acute pain   Tolerated clears, denies N/V      ---------------------------------------------------------------------------------------  SUBJECTIVE      Review of Systems  Review of systems was reviewed and negative unless stated above in HPI/24-hour events   ---------------------------------------------------------------------------------------  Assessment and Plan:    Neuro:    Acute pain s/p lysis  o Oxy IR PRN  o Home neurontin   Migraines  o Home topamax   Bipolar disorder  o Continue home lithium, zoloft, and seroquel      CV:   o Hemodynamically stable      Pulm:  o No acute issues on room air      GI:    Diet: clear liquids      :   o Monitor urine output      F/E/N:    Replace electrolytes as needed      Heme/Onc:   o Hx of DVT/PE s/p IVC filter presenting with extensive venous clot burden in IVC extending down iliacs  - PPD#1 s/p IR venous lysis, plans for re-evaluation today  - Heparin gtt as ordered      Endo:   o No acute issues      ID:   o No antibiotics at this time, afebrile      MSK/Skin:   o Continue to monitor skin for breakdown  o Frequent repositioning      Disposition: Continue Critical Care   Code Status: Level 1 - Full Code  ---------------------------------------------------------------------------------------  ICU CORE MEASURES    Prophylaxis   VTE Pharmacologic Prophylaxis: Heparin Drip  VTE Mechanical Prophylaxis: reason for no mechanical VTE prophylaxis bilateral lower extremity clot burden  Stress Ulcer Prophylaxis: not indicated      Invasive Devices Review  Invasive Devices     Peripherally Inserted Central Catheter Line            PICC Line 77/29/67 Left Basilic less than 1 day Peripheral Intravenous Line            Peripheral IV 20 Left Antecubital 1 day          Line            Venous Sheath 6 Fr  Left Other (Comment) less than 1 day    Venous Sheath 6 Fr  Right Other (Comment) less than 1 day              Can any invasive devices be discontinued today? Not applicable  ---------------------------------------------------------------------------------------  OBJECTIVE    Vitals   Vitals:    20 2115 20 2200 20 2300 20 0000   BP: 147/69 144/78 100/58    BP Location:       Pulse: 90 92 94    Resp: (!) 26 17 20    Temp:    98 5 °F (36 9 °C)   TempSrc:    Oral   SpO2: 96% 98% 93%    Weight:         Temp (24hrs), Av 5 °F (36 9 °C), Min:98 2 °F (36 8 °C), Max:98 9 °F (37 2 °C)  Current: Temperature: 98 5 °F (36 9 °C)  /68   Pulse 92   Temp 98 5 °F (36 9 °C) (Oral)   Resp (!) 25   Wt (!) 139 kg (305 lb 8 9 oz)   LMP 2020   SpO2 98%   BMI 52 86 kg/m²       Respiratory:  SpO2: SpO2: 98 %       Invasive/non-invasive ventilation settings   Respiratory    Lab Data (Last 4 hours)    None         O2/Vent Data (Last 4 hours)    None                Physical Exam  Constitutional:       Appearance: Normal appearance  HENT:      Head: Normocephalic  Cardiovascular:      Rate and Rhythm: Normal rate and regular rhythm  Pulmonary:      Effort: Pulmonary effort is normal    Abdominal:      Palpations: Abdomen is soft  Musculoskeletal:      Comments: Edema to bilateral lower extremities, IR catheter in place to right popliteal fossa c/d/i     Skin:     General: Skin is warm and dry  Neurological:      General: No focal deficit present  Mental Status: She is alert and oriented to person, place, and time           Laboratory and Diagnostics:  Results from last 7 days   Lab Units 20  0715 20  1632   WBC Thousand/uL 14 77* 14 53* 15 44*   HEMOGLOBIN g/dL 11 4* 12 0 12 1   HEMATOCRIT % 37 6 38 3 39 8   PLATELETS Thousands/uL 237 244 229   NEUTROS PCT %  --  72 80*   MONOS PCT %  --  14* 12     Results from last 7 days   Lab Units 08/04/20  0715 08/03/20  1632   SODIUM mmol/L 137 135*   POTASSIUM mmol/L 3 2* 3 5   CHLORIDE mmol/L 110* 103   CO2 mmol/L 21 21   ANION GAP mmol/L 6 11   BUN mg/dL 4* 5   CREATININE mg/dL 0 68 0 78   CALCIUM mg/dL 9 3 9 0   GLUCOSE RANDOM mg/dL 122 119   ALT U/L  --  39   AST U/L  --  38   ALK PHOS U/L  --  204*   ALBUMIN g/dL  --  2 6*   TOTAL BILIRUBIN mg/dL  --  0 50          Results from last 7 days   Lab Units 08/04/20  2020 08/04/20  1557 08/04/20  0844 08/04/20  0111 08/03/20  1901   INR   --   --   --   --  1 14   PTT seconds 30 64* 85* 194* 39*                Intake and Output  I/O       08/03 0701 - 08/04 0700 08/04 0701 - 08/05 0700    P  O   0    I V  (mL/kg)  281 (2)    IV Piggyback  11 9    Total Intake(mL/kg)  292 9 (2 1)    Urine (mL/kg/hr)  300 (0 1)    Total Output  300    Net  -7 1          Unmeasured Urine Occurrence 3 x             Height and Weights      IBW: -92 5 kg  Body mass index is 52 86 kg/m²  Weight (last 2 days)     Date/Time   Weight    08/04/20 0600   (!) 139 (305 56)    08/04/20 0217   (!) 139 (305 78)                Nutrition       Diet Orders   (From admission, onward)             Start     Ordered    08/04/20 1945  Diet Clear Liquid  Diet effective now     Question Answer Comment   Diet Type Clear Liquid    RD to adjust diet per protocol?  Yes        08/04/20 1944                  Active Medications  Scheduled Meds:acetaminophen, 650 mg, Oral, Q6H PRN, Nina Hameed DO  alteplase IV (IR), 0 5 mg/hr, Intracatheter, Continuous, Nidia Rivers MD, Last Rate: 0 5 mg/hr (08/04/20 1931)  alteplase IV (IR), 0 5 mg/hr, Intracatheter, Continuous, Nidia Rivers MD, Last Rate: 0 5 mg/hr (08/04/20 1932)  ferrous sulfate, 325 mg, Oral, Daily With Breakfast, Claudeen Southerly, MD  folic acid, 1 mg, Oral, Daily, Claudeen Southerly, MD  gabapentin, 400 mg, Oral, TID, Claudeen Southerly, MD  heparin (porcine), 400 Units/hr, Intravenous, Titrated, Iraj Teran MD, Last Rate: 400 Units/hr (08/04/20 2001)  heparin, 20 Units/hr, Intravenous, Continuous, Iraj Teran MD, Last Rate: 20 Units/hr (08/04/20 1931)  heparin, 20 Units/hr, Intravenous, Continuous, Iraj Teran MD, Last Rate: 20 Units/hr (08/04/20 1931)  lithium carbonate, 450 mg, Oral, Daily, Laverne Leyva MD  lithium carbonate, 600 mg, Oral, HS, Laverne Leyva MD  ondansetron, 4 mg, Intravenous, Q6H PRN, Iraj Teran MD  oxyCODONE, 5 mg, Oral, Q4H PRN, Nina Hameed DO    Or  oxyCODONE, 10 mg, Oral, Q4H PRN, Nina Hameed DO  QUEtiapine, 500 mg, Oral, HS, Laverne Leyva MD  sertraline, 50 mg, Oral, Daily, Lavrene Leyva MD  topiramate, 150 mg, Oral, BID, Laverne Leyva MD      Continuous Infusions:  alteplase IV (IR), 0 5 mg/hr, Last Rate: 0 5 mg/hr (08/04/20 1931)  alteplase IV (IR), 0 5 mg/hr, Last Rate: 0 5 mg/hr (08/04/20 1932)  heparin (porcine), 400 Units/hr, Last Rate: 400 Units/hr (08/04/20 2001)  heparin, 20 Units/hr, Last Rate: 20 Units/hr (08/04/20 1931)  heparin, 20 Units/hr, Last Rate: 20 Units/hr (08/04/20 1931)      PRN Meds:   acetaminophen, 650 mg, Q6H PRN  ondansetron, 4 mg, Q6H PRN  oxyCODONE, 5 mg, Q4H PRN    Or  oxyCODONE, 10 mg, Q4H PRN        Allergies   Allergies   Allergen Reactions    Morphine      Seizures  ---------------------------------------------------------------------------------------  Elongation---------------------------------------------------------------------------------------  Care Time Delivered:   Upon my evaluation, this patient had a high probability of imminent or life-threatening deterioration due to extensive venous clot burden s/p IR venous lysis, which required my direct attention, intervention, and personal management    I have personally provided 20 minutes ([phone] of critical care time, exclusive of procedures, teaching, family meetings, and any prior time recorded by providers other than myself  Bharath Gomez DO      Portions of the record may have been created with voice recognition software  Occasional wrong word or "sound a like" substitutions may have occurred due to the inherent limitations of voice recognition software    Read the chart carefully and recognize, using context, where substitutions have occurred

## 2020-08-05 NOTE — SEDATION DOCUMENTATION
Lysis recheck done by Dr Sg Huang  VSS  Report given at bedside in IR and patient taken back to room by critical care RN

## 2020-08-05 NOTE — UTILIZATION REVIEW
Initial Clinical Review    Admission: Date/Time/Statement:   Admission Orders (From admission, onward)     Ordered        08/04/20 0216  Inpatient Admission  Once                   Orders Placed This Encounter   Procedures    Inpatient Admission     Standing Status:   Standing     Number of Occurrences:   1     Order Specific Question:   Admitting Physician     Answer:   Yoav Parra [[id number]]     Order Specific Question:   Level of Care     Answer:   Med Surg [[id number]]     Order Specific Question:   Estimated length of stay     Answer:   More than 2 Midnights     Order Specific Question:   Certification     Answer:   I certify that inpatient services are medically necessary for this patient for a duration of greater than two midnights  See H&P and MD Progress Notes for additional information about the patient's course of treatment  Assessment/Plan: 53year old female, presented to the ED @ [facility], transferred to [address] [place] [address], via EMS  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  Presented to [facility] on 8/3/20 with worsening back pain as well as abdominal pain radiating into her B/L upper legs  Pain began 7/30 and progressively worsened  On CT abd/ pelvis with contrast, pt found to have diffuse thrombus extending from the IVC filter inferiorly into the iliac vasculature with  surrounding fat stranding in keeping with a thrombophlebitis  A small amount of thrombus extends superior to the filter  WBC 15  Transferred to [facility] for vascular consult and IR thrombus retrieval  Started on heparin gtt  NPO at MN  Pain control  08/04/2020  Consult Vascular Surgery:  Abdominal pain/diarrhea, found to have IVC Filter with thrombus extending slightly above filter and into iliacs  Venous duplex lower extremities: pending  Consult IR for potential lysis  NPO  IV heparin gtt  CT head and fibrinogen for potential lysis        08/04/2020 Consult Oncology:  Currently on heparin drip, continue  Bilateral venous duplex negative for acute DVTs, did note 1 71 cm enlarged inguinal lymph node  Vascular surgery following, appreciate recommendation  IR consulted, plan for IR thrombolysis vs thrombectomy today  Will need CT chest inpatient  Will need outpatient mammogram for further malignancy  Family history of breast cancer in maternal aunt  Check thrombosis panel, except antithrombin III and lupus ac as this will be altered in setting of heparin administration  PARMINDER, anti-ds-antibody for SLE workup with FM  Patient will need life long anticoagulation  Given BMI 54, limited options  will discuss with attending in regards to anticoagulation management  Date: 8/4/2020  Procedure: IR VENOUS LYSIS   Anesthesia: concious sedation  Findings: Thrombosis of the inferior vena cava and common iliac arteries bilaterally  A tongue of clot is noted extending above the filter  Bilateral infusion catheters were placed with 40 cm of sideholes, and lysis was started at 1 mg of t-PA per hour total dose  Patient will return tomorrow for follow-up venogram and possible additional intervention         Triage Vitals   Temperature Pulse Respirations Blood Pressure SpO2   08/04/20 0218 08/04/20 0707 08/04/20 0707 08/04/20 0218 08/04/20 0214   98 9 °F (37 2 °C) 87 18 (!) 88/61 100 %      Temp Source Heart Rate Source Patient Position - Orthostatic VS BP Location FiO2 (%)   08/04/20 0707 08/05/20 0000 08/04/20 0707 08/04/20 0707 --   Oral Monitor Lying Right arm       Pain Score       08/04/20 0220       8          Wt Readings from Last 1 Encounters:   08/05/20 (!) 138 kg (303 lb 5 7 oz)     Additional Vital Signs:   Date/Time   Temp   Pulse   Resp   BP   MAP (mmHg)   SpO2   O2 Device   Patient Position - Orthostatic VS    08/05/20 1200      88   20   125/72   97   99 %          08/05/20 1100      86   28Abnormal     124/80   97   96 %          08/05/20 1000      82   29Abnormal     119/72   87   96 %        08/05/20 0900      82   25Abnormal     120/81   93   97 %          08/05/20 0800   98 9 °F (37 2 °C)   94   24Abnormal     103/64   78   96 %   None (Room air)   Lying    08/05/20 0742                     None (Room air)       08/05/20 0700      100   22   95/64   74   97 %          08/05/20 0600      100   26Abnormal     139/80   103   98 %          08/05/20 0500      100   27Abnormal     124/72   85   99 %          08/05/20 0400   98 2 °F (36 8 °C)   96   28Abnormal     97/57   81   97 %   None (Room air)   Lying    08/05/20 0300      92   25Abnormal     123/68   85   98 %          08/05/20 0200      90   24Abnormal     117/70   93   99 %          08/05/20 0100      90   22   103/65   76   98 %          08/05/20 0000   98 5 °F (36 9 °C)   90   25Abnormal     90/56   65   96 %   None (Room air)   Lying    08/04/20 2300      94   20   100/58   69   93 %          08/04/20 2200      92   17   144/78   109   98 %          08/04/20 2115      90   26Abnormal     147/69   95   96 %          08/04/20 2100      88   28Abnormal     145/80   118   96 %          08/04/20 2030      84   27Abnormal     89/56Abnormal     72   96 %          08/04/20 2000      84   24Abnormal     142/85   107   98 %   None (Room air)       08/04/20 1902      83            97 %          08/04/20 1857      84            96 %          08/04/20 1842      92   18   139/71      96 %          08/04/20 1837      92   16   137/75      97 %          08/04/20 1832      91   20   137/72      97 %          08/04/20 1827      92   18   133/75      96 %          08/04/20 1822      91   20   124/68      98 %          08/04/20 1817      93   20   129/71      96 %          08/04/20 1812      94   20   131/77      96 %          08/04/20 1807      90   20   136/89      97 %          08/04/20 1802      89   16   138/86      98 %          08/04/20 1757      90   18 139/94      98 %          08/04/20 1752      90   18   138/92      99 %          08/04/20 1747      92   16   137/88      99 %          08/04/20 1742      92   16   138/91      98 %          08/04/20 1737      94   16   144/92      99 %          08/04/20 1732      89   18   147/96      100 %          08/04/20 1727      87   18   143/91      100 %          08/04/20 1723      86   16   135/92      100 %          08/04/20 14:57:16   98 2 °F (36 8 °C)   79   18   117/72   87   98 %      Lying    08/04/20 09:48:20      94      135/89   104   98 %          08/04/20 07:07:49   98 2 °F (36 8 °C)   87   18   126/81   96   98 %      Lying    08/04/20 0519                  100 %   None (Room air)       08/04/20 02:55:22            127/82   97               Date and Time  Eye Opening  Best Verbal Response  Best Motor Response  Juan M Coma Scale Score    08/05/20 1200  4  5  6  15    08/05/20 0800  4  5  6  15    08/05/20 0400  4  5  6  15    08/05/20 0000  4  5  6  15    08/04/20 2000  4  5  6  15      08/03/2020 @ 1837  CT abd/pel: An IVC filter is present  There is diffuse thrombus extending from the IVC filter inferiorly into the iliac vasculature  Extensive surrounding fat stranding in keeping with a thrombophlebitis  A small amount of thrombus extends superior to the filter  08/04/2020 @ 1522  CT chest:  Unremarkable CT of the chest   No evidence of lung cancer  08/04/2020 @ 1534  CT head:  No acute intracranial abnormality     Findings are stable     Pertinent Labs/Diagnostic Test Results:     Results from last 7 days   Lab Units 08/05/20  0747 08/05/20  0207 08/04/20 2020 08/04/20  0715 08/03/20  1632   WBC Thousand/uL 13 34* 12 49* 14 77* 14 53* 15 44*   HEMOGLOBIN g/dL 11 0* 11 5 11 4* 12 0 12 1   HEMATOCRIT % 36 2 38 1 37 6 38 3 39 8   PLATELETS Thousands/uL 158 197 237 244 229   NEUTROS ABS Thousands/µL  --   --   --  10 30* 12 34*     Results from last 7 days   Lab Units 08/05/20  0441 08/04/20  0715 08/03/20  1632   SODIUM mmol/L 136 137 135*   POTASSIUM mmol/L 3 3* 3 2* 3 5   CHLORIDE mmol/L 109* 110* 103   CO2 mmol/L 19* 21 21   ANION GAP mmol/L 8 6 11   BUN mg/dL 4* 4* 5   CREATININE mg/dL 0 69 0 68 0 78   EGFR ml/min/1 73sq m 105 105 91   CALCIUM mg/dL 8 3 9 3 9 0     Results from last 7 days   Lab Units 08/03/20  1632   AST U/L 38   ALT U/L 39   ALK PHOS U/L 204*   TOTAL PROTEIN g/dL 8 0   ALBUMIN g/dL 2 6*   TOTAL BILIRUBIN mg/dL 0 50     Results from last 7 days   Lab Units 08/05/20  0441 08/04/20  0715 08/03/20  1632   GLUCOSE RANDOM mg/dL 107 122 119     Results from last 7 days   Lab Units 08/05/20  0747 08/05/20  0207 08/04/20 2020 08/03/20  1901   PROTIME seconds  --   --   --   --  14 4   INR   --   --   --   --  1 14   PTT seconds 79* 91* 30   < > 39*    < > = values in this interval not displayed       Results from last 7 days   Lab Units 08/03/20  1632   LIPASE u/L 95     Results from last 7 days   Lab Units 08/03/20  1539   CLARITY UA  Clear   COLOR UA  Yellow   SPEC GRAV UA  1 020   PH UA  6 5   GLUCOSE UA mg/dl Negative   KETONES UA mg/dl 40 (2+)*   BLOOD UA  Moderate*   PROTEIN UA mg/dl Trace*   NITRITE UA  Negative   BILIRUBIN UA  Negative   UROBILINOGEN UA E U /dl 0 2   LEUKOCYTES UA  Negative   WBC UA /hpf None Seen   RBC UA /hpf 4-10*   BACTERIA UA /hpf Occasional   EPITHELIAL CELLS WET PREP /hpf Occasional     Past Medical History:   Diagnosis Date    Anemia     Psychiatric disorder     bipolar II, suicide    Pulmonary embolism (HCC)     Seizures ([facility] )      Present on Admission:   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[facility] )   Hx of pulmonary embolus   Macrocytosis   Hepatic steatosis   Thrombophlebitis   Migraine   Bipolar disorder (HCC)   Swelling of lower extremity   Leukocytosis   Acquired hypothyroidism   Vitamin D deficiency    Admitting Diagnosis: Acute low back pain [M54 5]  Age/Sex: 53 y o  female  Admission Orders:  Scheduled Medications:  ferrous sulfate, 325 mg, Oral, Daily With Breakfast  folic acid, 1 mg, Oral, Daily  gabapentin, 400 mg, Oral, TID  lithium carbonate, 450 mg, Oral, Daily  lithium carbonate, 600 mg, Oral, HS  QUEtiapine, 500 mg, Oral, HS  sertraline, 50 mg, Oral, Daily  topiramate, 150 mg, Oral, BID    Continuous IV Infusions:  heparin (porcine), 400 Units/hr, Intravenous, Titrated  heparin, 20 Units/hr, Intravenous, Continuous  heparin, 20 Units/hr, Intravenous, Continuous  heparin, 20 Units/hr, Intravenous, Continuous  heparin, 20 Units/hr, Intravenous, Continuous    PRN Meds:  acetaminophen, 650 mg, Oral, Q6H PRN  calcium carbonate, 500 mg, Oral, Daily PRN  ondansetron, 4 mg, Intravenous, Q6H PRN  oxyCODONE, 5 mg, Oral, Q4H PRN    Or  oxyCODONE, 10 mg, Oral, Q4H PRN    Clear liq diet  Ambulate TID now post procedure Complete bed rest do not elevate HOB > 30 degree  Neuro check q4h  IR lysis check /  CBC q6h  / PTT q6h  Robby SCDs  IP CONSULT TO VASCULAR SURGERY  IP CONSULT TO CASE MANAGEMENT  IR PATIENT EVAL  IP CONSULT TO HEMATOLOGY    Network Utilization Review Department  [email or web address]  ATTENTION: Please call with any questions or concerns to [phone] and carefully listen to the prompts so that you are directed to the right person  All voicemails are confidential   Mabel Garcia all requests for admission clinical reviews, approved or denied determinations and any other requests to dedicated fax number below belonging to the campus where the patient is receiving treatment   List of dedicated fax numbers for the Facilities:  FACILITY NAME UR FAX NUMBER   ADMISSION DENIALS (Administrative/Medical Necessity) [phone]   PARENT CHILD HEALTH (Maternity/NICU/Pediatrics) [phone]   Tim Garcia [phone]   Marta Chavarria [phone]   Angelo Etna [address] [place] [address] [address] [facility] [phone]   [facility]  [phone]   [address]  [phone] [address] [facility] [address] [facility] [phone]

## 2020-08-05 NOTE — H&P
Consult - [address] 53 y o  female MRN: [id number]  Unit/Bed#: [facility] 414-01 Encounter: [id number]      -------------------------------------------------------------------------------------------------------------    History of Present Illness   Jesse Ta is a 53 y o  female with history of obesity s/p gastric bypass, LE DVTs, PE s/p IVC filter placement 2017, seizures, bipolar disorder who presented 8/4 with pain in her lower extremities and abdomen  On evaluation she was found to have extensive thrombosis of her IVC filter extending into her iliacs  History of having a hypercoagulability work up in [facility] and was on Xarelto but could not tolerate secondary to GI bleeding  She underwent lysis with IR today  Patient admits to mild pain in her lower bilateral extremities and abdomen, improved since admission    -------------------------------------------------------------------------------------------------------------  Assessment and Plan:    Neuro:    Continue home lithium, zoloft, topamax, seroquel, neurontin   Pain medications PRN: oxy IR, tylenol       CV:   - Hemodynamically stable  - Extensive thrombosis of IVC extending into bilateral iliacs s/p lysis   Continue heparin gtt with plans for IR re-evaluation in AM      Pulm:  o No acute issues       GI:   o Diet: Clears       :   o Monitor urine output      F/E/N:    Follow up labs, replace electrolytes as needed      Heme/Onc:   o Diagnosis: Hx of DVT, PE s/p IVC filter 2017 presenting with thrombosis of IVC extending into bilateral iliacs s/p lysis by IR  - Continue heparin gtt  - Hypercoagable work up by oncology      Endo:   o No acute issues      ID:   o No antibiotics at this time      MSK/Skin:   o Continue to monitor skin integrity and edema  o Bedrest, keep extremities straight    Code Status: Level 1 - Full Code  --------------------------------------------------------------------------------------------------------------  Review of Systems    A 12-point, complete review of systems was reviewed and negative except as stated above     Physical Exam  HENT:      Head: Normocephalic  Cardiovascular:      Rate and Rhythm: Normal rate and regular rhythm  Pulmonary:      Effort: Pulmonary effort is normal    Abdominal:      Palpations: Abdomen is soft  Comments: Mildly tender to lower quadrants bilaterally, no rebound or guarding     Musculoskeletal:      Right lower leg: Edema present  Left lower leg: Edema present  Comments: IR catheter in place to right popliteal fossa, dressing intact     Skin:     General: Skin is warm and dry  Neurological:      Mental Status: She is alert and oriented to person, place, and time  --------------------------------------------------------------------------------------------------------------  Vitals:   Vitals:    08/04/20 1842 08/04/20 1857 08/04/20 1902 08/04/20 2000   BP: 139/71   142/85   BP Location:       Pulse: 92 84 83 84   Resp: 18   (!) 24   Temp:       TempSrc:       SpO2: 96% 96% 97% 98%   Weight:         Temp  Min: 98 2 °F (36 8 °C)  Max: 98 9 °F (37 2 °C)  IBW: -92 5 kg     Body mass index is 52 86 kg/m²        Laboratory and Diagnostics:  Results from last 7 days   Lab Units 08/04/20  0715 08/03/20  1632   WBC Thousand/uL 14 53* 15 44*   HEMOGLOBIN g/dL 12 0 12 1   HEMATOCRIT % 38 3 39 8   PLATELETS Thousands/uL 244 229   NEUTROS PCT % 72 80*   MONOS PCT % 14* 12     Results from last 7 days   Lab Units 08/04/20  0715 08/03/20  1632   SODIUM mmol/L 137 135*   POTASSIUM mmol/L 3 2* 3 5   CHLORIDE mmol/L 110* 103   CO2 mmol/L 21 21   ANION GAP mmol/L 6 11   BUN mg/dL 4* 5   CREATININE mg/dL 0 68 0 78   CALCIUM mg/dL 9 3 9 0   GLUCOSE RANDOM mg/dL 122 119   ALT U/L  --  39   AST U/L  --  38   ALK PHOS U/L  --  204*   ALBUMIN g/dL  --  2 6*   TOTAL BILIRUBIN mg/dL  --  0 50          Results from last 7 days   Lab Units 08/04/20  1557 08/04/20  0844 08/04/20  0111 08/03/20  1901   INR   --   --   --  1 14   PTT seconds 64* 85* 194* 39*            Historical Information   Past Medical History:   Diagnosis Date    Anemia     Psychiatric disorder     bipolar II, suicide    [facility]ism [facility] [address] )     [facility]      Past Surgical History:   Procedure Laterality Date    APPENDECTOMY      Open    CHOLECYSTECTOMY      Laparoscopic    GASTRIC BYPASS      was 205 date of surgery    IVC FILTER INSERTION  2017    REPLACEMENT TOTAL KNEE      STOMACH SURGERY      for morbid obesity    TUBAL LIGATION Bilateral 2010     Social History   Social History     Substance and Sexual Activity   Alcohol Use Not Currently    Comment: quit 6/21/2018     Social History     Substance and Sexual Activity   Drug Use No     Social History     Tobacco Use   Smoking Status Never Smoker   Smokeless Tobacco Never Used   Tobacco Comment    former quit in 1999 per [employer]       Family History:   Family History   Problem Relation Age of Onset    Other Mother         headache    Hypertension Mother     Depression Mother     Arthritis Father     Other Father         H, pylori infection    Other Sister         esophageal reflux    Migraines Sister     Breast cancer Family     Diabetes Paternal Aunt         Mellitus    Diabetes Paternal Uncle         Mellitus    Asthma Daughter            Medications:  Current Facility-Administered Medications   Medication Dose Route Frequency    acetaminophen (TYLENOL) tablet 650 mg  650 mg Oral Q6H PRN    alteplase (CATHFLO) 10 mg in sodium chloride 0 9 % 250 mL infusion  0 5 mg/hr Intracatheter Continuous    alteplase (CATHFLO) 10 mg in sodium chloride 0 9 % 250 mL infusion  0 5 mg/hr Intracatheter Continuous    ferrous sulfate tablet 325 mg  325 mg Oral Daily With Breakfast    folic acid (FOLVITE) tablet 1 mg  1 mg Oral Daily    furosemide (LASIX) tablet 20 mg  20 mg Oral BID PRN    gabapentin (NEURONTIN) capsule 400 mg  400 mg Oral TID    heparin (porcine) 25,000 units in 250 mL infusion (premix)  400 Units/hr Intravenous Titrated    heparin 1000 units in 500 mL infusion (premix) for sheath patency  20 Units/hr Intravenous Continuous    heparin 1000 units in 500 mL infusion (premix) for sheath patency  20 Units/hr Intravenous Continuous    lithium carbonate (LITHOBID) CR tablet 450 mg  450 mg Oral Daily    lithium carbonate (LITHOBID) CR tablet 600 mg  600 mg Oral HS    ondansetron (ZOFRAN) injection 4 mg  4 mg Intravenous Q6H PRN    oxyCODONE (ROXICODONE) IR tablet 5 mg  5 mg Oral Q4H PRN    Or    oxyCODONE (ROXICODONE) IR tablet 10 mg  10 mg Oral Q4H PRN    QUEtiapine (SEROquel) tablet 500 mg  500 mg Oral HS    sertraline (ZOLOFT) tablet 50 mg  50 mg Oral Daily    topiramate (TOPAMAX) tablet 150 mg  150 mg Oral BID     Home medications:  Prior to Admission Medications   Prescriptions Last Dose Informant Patient Reported? Taking?    Doxycycline Hyclate 120 MG TBEC Not Taking at Unknown time  Yes No   Sig: Take 100 mg by mouth   QUEtiapine (SEROquel) 100 mg tablet   Yes Yes   Sig: Take 500 mg by mouth daily at bedtime   SUMAtriptan (IMITREX) 100 mg tablet Unknown at Unknown time  Yes No   Sig: Take 100 mg by mouth as needed for migraine    chlordiazePOXIDE (LIBRIUM) 25 mg capsule 8/3/2020 at Unknown time  Yes Yes   Sig: Take 25 mg by mouth 3 (three) times a day as needed for anxiety   diclofenac sodium (VOLTAREN) 1 %   No No   Sig: Apply 2 g topically 4 (four) times a day   ferrous sulfate 325 (65 Fe) mg tablet 8/4/2020 at Unknown time  Yes Yes   Sig: Take 325 mg by mouth daily with breakfast   folic acid (FOLVITE) 1 mg tablet 8/4/2020 at Unknown time  Yes Yes   Sig: Take 1 mg by mouth daily   furosemide (LASIX) 20 mg tablet Unknown at Unknown time  Yes No   Sig: Take 20 mg by mouth 2 (two) times a day PRN   gabapentin (NEURONTIN) 400 mg capsule 8/4/2020 at Unknown time  Yes Yes   Sig: Take 100 mg by mouth 3 (three) times a day   lithium 600 MG capsule 8/4/2020 at Unknown time Self Yes Yes   Sig: Take 600 mg by mouth see administration instructions 600mg am   750 mg pm   pyridoxine (VITAMIN B6) 100 mg tablet 8/3/2020 at Unknown time  Yes Yes   Sig: Take 100 mg by mouth daily   sertraline (ZOLOFT) 50 mg tablet 8/3/2020 at Unknown time  Yes Yes   Sig: Take 50 mg by mouth daily   topiramate (TOPAMAX) 100 mg tablet 8/4/2020 at Unknown time  Yes Yes   Sig: Take 150 mg by mouth 2 (two) times a day       Facility-Administered Medications: None     Allergies: Allergies   Allergen Reactions    Morphine      Seizures  ------------------------------------------------------------------------------------------------------------  Advance Directive and Living Will:      Power of :    POLST:    ------------------------------------------------------------------------------------------------------------  Anticipated Length of Stay is > 2 midnights        Gabbi Chi, DO        Portions of the record may have been created with voice recognition software  Occasional wrong word or "sound a like" substitutions may have occurred due to the inherent limitations of voice recognition software    Read the chart carefully and recognize, using context, where substitutions have occurred

## 2020-08-05 NOTE — PROGRESS NOTES
Louann Rowe met with patient provided anointing and a blessing       08/05/20 1200   Clinical Encounter Type   Visited With Patient   Routine Visit Introduction   Continue Visiting Yes   Sikh Encounters   Sikh Needs Prayer   Sacramental Encounters   Sacrament of Sick-Anointing Anointed

## 2020-08-05 NOTE — PROGRESS NOTES
Dr Lorin Meza with IR contacted about PTT of 91, Fibrinogen 98, and bleeding at PICC line site  Instructed to decrease TPA to 0 25, and NWB heparin to 100  Patient otherwise stable  Will continue to monitor

## 2020-08-05 NOTE — PROGRESS NOTES
Progress Note - Vascular Surgery   Ashanti Babcock 53 y o  female MRN: [id number]  Unit/Bed#: [facility] 414-01 Encounter: [id number]    Assessment:  54 y/o female with abdominal pain/diarrhea, found to have IVC Filter with thrombus extending slightly above filter and into iliacs  IR venous lysis on 8/4/20  On heparin gtt and alteplase  Plan:   IR for venogram today   Continue heparin gtt   Other management per primary    Subjective/Objective     Subjective:   Complains pain behind right knee, no other complaints provided    Objective:    Blood pressure 124/72, pulse 100, temperature 98 2 °F (36 8 °C), temperature source Oral, resp  rate (!) 27, weight (!) 138 kg (303 lb 5 7 oz), last menstrual period 07/23/2020, SpO2 99 %  ,Body mass index is 52 48 kg/m²  Intake/Output Summary (Last 24 hours) at 8/5/2020 0623  Last data filed at 8/5/2020 0404  Gross per 24 hour   Intake 602 58 ml   Output 550 ml   Net 52 58 ml       Invasive Devices     Peripherally Inserted Central Catheter Line            PICC Line 67/95/23 Left Basilic less than 1 day          Peripheral Intravenous Line            Peripheral IV 08/03/20 Left Antecubital 1 day          Line            Venous Sheath 6 Fr  Left Other (Comment) less than 1 day    Venous Sheath 6 Fr   Right Other (Comment) less than 1 day                Physical Exam:   Gen:  NAD  CV:  warm, well-perfused  Lungs: nl effort  Abd:  soft, NT/ND  Ext:  no CCE, erythema BLE, mild tenderness in upper part of right calf  Neuro: A&Ox3     Results from last 7 days   Lab Units 08/05/20  0207 08/04/20 2020 08/04/20  0715   WBC Thousand/uL 12 49* 14 77* 14 53*   HEMOGLOBIN g/dL 11 5 11 4* 12 0   HEMATOCRIT % 38 1 37 6 38 3   PLATELETS Thousands/uL 197 237 244     Results from last 7 days   Lab Units 08/05/20  0441 08/04/20  0715 08/03/20  1632   POTASSIUM mmol/L 3 3* 3 2* 3 5   CHLORIDE mmol/L 109* 110* 103   CO2 mmol/L 19* 21 21   BUN mg/dL 4* 4* 5   CREATININE mg/dL 0 69 0 68 0 78   CALCIUM mg/dL 8 3 9 3 9 0     Results from last 7 days   Lab Units 08/05/20  0207 08/04/20  2020 08/04/20  1557  08/03/20  1901   INR   --   --   --   --  1 14   PTT seconds 91* 30 64*   < > 39*    < > = values in this interval not displayed

## 2020-08-05 NOTE — BRIEF OP NOTE (RAD/CATH)
IR LYSIS RECHECK Procedure Note    PATIENT NAME: Bernabe Green  : 1975  MRN: [id number]    Pre-op Diagnosis:   1  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[facility] [address] )    2  Thrombophlebitis    3  Hx of pulmonary embolus      Post-op Diagnosis:   1  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[facility] [address] )    2  Thrombophlebitis    3  Hx of pulmonary embolus        Surgeon:   Catia Calvillo MD  Assistants:     No qualified resident was available, Resident is only observing    Estimated Blood Loss: Less than 250 ml     Findings:     Lysis recheck with multiple filling defects consistent with persistent thrombus  Angiojet, 10, 12 mm angioplasty and extensive venography  This greatly improved flow through bilateral iliacs and inferior vena cava       Specimens: none    Complications:  none    Anesthesia: Merrie Bloch, MD     Date: 2020  Time: 5:07 PM

## 2020-08-06 PROBLEM — D64.9 ANEMIA: Status: ACTIVE | Noted: 2020-08-03

## 2020-08-06 LAB
ANION GAP SERPL CALCULATED.3IONS-SCNC: 8 MMOL/L (ref 4–13)
APTT PPP: 144 SECONDS (ref 23–37)
APTT PPP: 32 SECONDS (ref 23–37)
APTT PPP: 33 SECONDS (ref 23–37)
BUN SERPL-MCNC: 6 MG/DL (ref 5–25)
CALCIUM SERPL-MCNC: 8.8 MG/DL (ref 8.3–10.1)
CARDIOLIPIN IGA SER IA-ACNC: <9 APL U/ML (ref 0–11)
CARDIOLIPIN IGG SER IA-ACNC: <9 GPL U/ML (ref 0–14)
CARDIOLIPIN IGM SER IA-ACNC: 18 MPL U/ML (ref 0–12)
CHLORIDE SERPL-SCNC: 110 MMOL/L (ref 100–108)
CO2 SERPL-SCNC: 20 MMOL/L (ref 21–32)
CREAT SERPL-MCNC: 0.77 MG/DL (ref 0.6–1.3)
ERYTHROCYTE [DISTWIDTH] IN BLOOD BY AUTOMATED COUNT: 17.7 % (ref 11.6–15.1)
ERYTHROCYTE [DISTWIDTH] IN BLOOD BY AUTOMATED COUNT: 17.7 % (ref 11.6–15.1)
FIBRINOGEN PPP-MCNC: 171 MG/DL (ref 227–495)
FIBRINOGEN PPP-MCNC: 257 MG/DL (ref 227–495)
GFR SERPL CREATININE-BSD FRML MDRD: 93 ML/MIN/1.73SQ M
GLUCOSE SERPL-MCNC: 99 MG/DL (ref 65–140)
HCT VFR BLD AUTO: 32.5 % (ref 34.8–46.1)
HCT VFR BLD AUTO: 34.1 % (ref 34.8–46.1)
HGB BLD-MCNC: 10 G/DL (ref 11.5–15.4)
HGB BLD-MCNC: 10.6 G/DL (ref 11.5–15.4)
INR PPP: 1.18 (ref 0.84–1.19)
MAGNESIUM SERPL-MCNC: 2.5 MG/DL (ref 1.6–2.6)
MCH RBC QN AUTO: 30.6 PG (ref 26.8–34.3)
MCH RBC QN AUTO: 30.7 PG (ref 26.8–34.3)
MCHC RBC AUTO-ENTMCNC: 30.8 G/DL (ref 31.4–37.4)
MCHC RBC AUTO-ENTMCNC: 31.1 G/DL (ref 31.4–37.4)
MCV RBC AUTO: 100 FL (ref 82–98)
MCV RBC AUTO: 99 FL (ref 82–98)
PHOSPHATE SERPL-MCNC: 3.4 MG/DL (ref 2.7–4.5)
PLATELET # BLD AUTO: 161 THOUSANDS/UL (ref 149–390)
PLATELET # BLD AUTO: 175 THOUSANDS/UL (ref 149–390)
PMV BLD AUTO: 12 FL (ref 8.9–12.7)
PMV BLD AUTO: 12.2 FL (ref 8.9–12.7)
POTASSIUM SERPL-SCNC: 3.5 MMOL/L (ref 3.5–5.3)
PROT C AG ACT/NOR PPP IA: 91 % OF NORMAL (ref 60–150)
PROTHROMBIN TIME: 15 SECONDS (ref 11.6–14.5)
RBC # BLD AUTO: 3.26 MILLION/UL (ref 3.81–5.12)
RBC # BLD AUTO: 3.46 MILLION/UL (ref 3.81–5.12)
SODIUM SERPL-SCNC: 138 MMOL/L (ref 136–145)
WBC # BLD AUTO: 12.44 THOUSAND/UL (ref 4.31–10.16)
WBC # BLD AUTO: 12.85 THOUSAND/UL (ref 4.31–10.16)

## 2020-08-06 PROCEDURE — 80048 BASIC METABOLIC PNL TOTAL CA: CPT | Performed by: PHYSICIAN ASSISTANT

## 2020-08-06 PROCEDURE — 99231 SBSQ HOSP IP/OBS SF/LOW 25: CPT | Performed by: SURGERY

## 2020-08-06 PROCEDURE — 85730 THROMBOPLASTIN TIME PARTIAL: CPT | Performed by: RADIOLOGY

## 2020-08-06 PROCEDURE — 99233 SBSQ HOSP IP/OBS HIGH 50: CPT | Performed by: INTERNAL MEDICINE

## 2020-08-06 PROCEDURE — 85730 THROMBOPLASTIN TIME PARTIAL: CPT | Performed by: STUDENT IN AN ORGANIZED HEALTH CARE EDUCATION/TRAINING PROGRAM

## 2020-08-06 PROCEDURE — 84100 ASSAY OF PHOSPHORUS: CPT | Performed by: PHYSICIAN ASSISTANT

## 2020-08-06 PROCEDURE — NC001 PR NO CHARGE: Performed by: INTERNAL MEDICINE

## 2020-08-06 PROCEDURE — 85027 COMPLETE CBC AUTOMATED: CPT | Performed by: RADIOLOGY

## 2020-08-06 PROCEDURE — 85384 FIBRINOGEN ACTIVITY: CPT | Performed by: RADIOLOGY

## 2020-08-06 PROCEDURE — 83735 ASSAY OF MAGNESIUM: CPT | Performed by: PHYSICIAN ASSISTANT

## 2020-08-06 PROCEDURE — 99233 SBSQ HOSP IP/OBS HIGH 50: CPT | Performed by: SURGERY

## 2020-08-06 PROCEDURE — NC001 PR NO CHARGE: Performed by: SURGERY

## 2020-08-06 PROCEDURE — 85610 PROTHROMBIN TIME: CPT | Performed by: STUDENT IN AN ORGANIZED HEALTH CARE EDUCATION/TRAINING PROGRAM

## 2020-08-06 PROCEDURE — NC001 PR NO CHARGE: Performed by: PHYSICIAN ASSISTANT

## 2020-08-06 RX ORDER — LITHIUM CARBONATE 300 MG/1
300 TABLET, FILM COATED, EXTENDED RELEASE ORAL 2 TIMES DAILY
COMMUNITY

## 2020-08-06 RX ORDER — LITHIUM CARBONATE 300 MG/1
600 CAPSULE ORAL SEE ADMIN INSTRUCTIONS
Status: DISCONTINUED | OUTPATIENT
Start: 2020-08-06 | End: 2020-08-06

## 2020-08-06 RX ORDER — AMOXICILLIN 250 MG
1 CAPSULE ORAL
Status: DISCONTINUED | OUTPATIENT
Start: 2020-08-06 | End: 2020-08-09 | Stop reason: HOSPADM

## 2020-08-06 RX ORDER — HEPARIN SODIUM 1000 [USP'U]/ML
5000 INJECTION, SOLUTION INTRAVENOUS; SUBCUTANEOUS
Status: DISCONTINUED | OUTPATIENT
Start: 2020-08-06 | End: 2020-08-07

## 2020-08-06 RX ORDER — LITHIUM CARBONATE 300 MG/1
600 TABLET, FILM COATED, EXTENDED RELEASE ORAL DAILY
Status: CANCELLED | OUTPATIENT
Start: 2020-08-07

## 2020-08-06 RX ORDER — AMOXICILLIN 250 MG
1 CAPSULE ORAL
Status: DISCONTINUED | OUTPATIENT
Start: 2020-08-06 | End: 2020-08-06

## 2020-08-06 RX ORDER — MULTIVITAMIN WITH IRON
100 TABLET ORAL DAILY
Status: DISCONTINUED | OUTPATIENT
Start: 2020-08-07 | End: 2020-08-09 | Stop reason: HOSPADM

## 2020-08-06 RX ORDER — HEPARIN SODIUM 1000 [USP'U]/ML
10000 INJECTION, SOLUTION INTRAVENOUS; SUBCUTANEOUS ONCE
Status: COMPLETED | OUTPATIENT
Start: 2020-08-06 | End: 2020-08-06

## 2020-08-06 RX ORDER — HEPARIN SODIUM 10000 [USP'U]/100ML
3-30 INJECTION, SOLUTION INTRAVENOUS
Status: DISCONTINUED | OUTPATIENT
Start: 2020-08-06 | End: 2020-08-07

## 2020-08-06 RX ORDER — LITHIUM CARBONATE 300 MG/1
600 TABLET, FILM COATED, EXTENDED RELEASE ORAL DAILY
Status: DISCONTINUED | OUTPATIENT
Start: 2020-08-07 | End: 2020-08-09 | Stop reason: HOSPADM

## 2020-08-06 RX ORDER — LITHIUM CARBONATE 300 MG/1
750 TABLET, FILM COATED, EXTENDED RELEASE ORAL EVERY EVENING
COMMUNITY
End: 2021-05-01

## 2020-08-06 RX ORDER — HEPARIN SODIUM 1000 [USP'U]/ML
10000 INJECTION, SOLUTION INTRAVENOUS; SUBCUTANEOUS
Status: DISCONTINUED | OUTPATIENT
Start: 2020-08-06 | End: 2020-08-07

## 2020-08-06 RX ADMIN — TOPIRAMATE 150 MG: 100 TABLET, FILM COATED ORAL at 08:29

## 2020-08-06 RX ADMIN — TOPIRAMATE 150 MG: 100 TABLET, FILM COATED ORAL at 18:53

## 2020-08-06 RX ADMIN — HEPARIN SODIUM 10000 UNITS: 1000 INJECTION INTRAVENOUS; SUBCUTANEOUS at 11:21

## 2020-08-06 RX ADMIN — DOCUSATE SODIUM AND SENNOSIDES 1 TABLET: 8.6; 5 TABLET ORAL at 21:15

## 2020-08-06 RX ADMIN — HEPARIN SODIUM AND DEXTROSE 18 UNITS/KG/HR: 10000; 5 INJECTION INTRAVENOUS at 20:35

## 2020-08-06 RX ADMIN — HEPARIN SODIUM 10000 UNITS: 1000 INJECTION INTRAVENOUS; SUBCUTANEOUS at 11:22

## 2020-08-06 RX ADMIN — LITHIUM CARBONATE 450 MG: 450 TABLET ORAL at 08:28

## 2020-08-06 RX ADMIN — FOLIC ACID 1 MG: 1 TABLET ORAL at 08:29

## 2020-08-06 RX ADMIN — OXYCODONE HYDROCHLORIDE 5 MG: 5 TABLET ORAL at 21:19

## 2020-08-06 RX ADMIN — OXYCODONE HYDROCHLORIDE 10 MG: 10 TABLET ORAL at 17:32

## 2020-08-06 RX ADMIN — GABAPENTIN 400 MG: 400 CAPSULE ORAL at 17:30

## 2020-08-06 RX ADMIN — GABAPENTIN 400 MG: 400 CAPSULE ORAL at 08:29

## 2020-08-06 RX ADMIN — OXYCODONE HYDROCHLORIDE 5 MG: 5 TABLET ORAL at 06:17

## 2020-08-06 RX ADMIN — CALCIUM CARBONATE (ANTACID) CHEW TAB 500 MG 500 MG: 500 CHEW TAB at 17:38

## 2020-08-06 RX ADMIN — SERTRALINE HYDROCHLORIDE 50 MG: 50 TABLET ORAL at 08:29

## 2020-08-06 RX ADMIN — FERROUS SULFATE TAB 325 MG (65 MG ELEMENTAL FE) 325 MG: 325 (65 FE) TAB at 08:29

## 2020-08-06 RX ADMIN — HEPARIN SODIUM AND DEXTROSE 18 UNITS/KG/HR: 10000; 5 INJECTION INTRAVENOUS at 10:48

## 2020-08-06 RX ADMIN — QUETIAPINE FUMARATE 500 MG: 100 TABLET ORAL at 21:16

## 2020-08-06 RX ADMIN — GABAPENTIN 400 MG: 400 CAPSULE ORAL at 21:14

## 2020-08-06 RX ADMIN — LITHIUM CARBONATE 750 MG: 450 TABLET ORAL at 21:16

## 2020-08-06 NOTE — ASSESSMENT & PLAN NOTE
Heparin drip, monitor PTT q 6 hours  S/p lysis by IR  On oxycodone for pain control  Will monitor for pain control

## 2020-08-06 NOTE — ASSESSMENT & PLAN NOTE
Patient had gastric bypass several years ago  -has regained much of the weight and more  -continue vitamin supplementations as ordered  Continue to monitor

## 2020-08-06 NOTE — PROGRESS NOTES
Post- OP Note - Vascular Surgery   Angelica Maldonado 53 y o  female MRN: [id number]  Unit/Bed#: [facility] 414-01 Encounter: [id number]    Assessment:  54 y/o female with abdominal pain/diarrhea, found to have IVC Filter with thrombus extending slightly above filter and into iliacs s/p IR venous lysis 8/4 and venogram 8/5  Patient doing well  Hypertensive Afebrile    Plan:  Transfer to [place] when out of unit  Lifelong anticoagulation    Subjective/Objective     Subjective:   Patient alert and oriented  Complains of nausea  Pain well controlled  No chest pains or shortness of breath  Objective:    Blood pressure 106/64, pulse 96, temperature 98 7 °F (37 1 °C), temperature source Oral, resp  rate 19, weight (!) 138 kg (303 lb 5 7 oz), last menstrual period 07/23/2020, SpO2 94 %  ,Body mass index is 52 48 kg/m²  Intake/Output Summary (Last 24 hours) at 8/6/2020 0331  Last data filed at 8/5/2020 2200  Gross per 24 hour   Intake 1438 54 ml   Output 792 ml   Net 646 54 ml       Invasive Devices     Peripherally Inserted Central Catheter Line            PICC Line 71/58/68 Left Basilic 1 day          Peripheral Intravenous Line            Peripheral IV 08/03/20 Left Antecubital 2 days                Physical Exam:   Gen:  Well-developed, well-nourished female in NAD  HEENT: normocephalic, atraumatic  neck supple, trachea midline  CV: RRR  Lungs: Normal respiratory effort on RA  Abd: soft, nontender, nondistended,   Extremities: mild tenderness in right calf  Skin: warm/ dry  Neuro:  AxO x3      Results from last 7 days   Lab Units 08/06/20  0212 08/05/20  1723 08/05/20  1240   WBC Thousand/uL 12 44* 13 11* 13 43*   HEMOGLOBIN g/dL 10 0* 10 6* 11 6   HEMATOCRIT % 32 5* 32 9* 37 0   PLATELETS Thousands/uL 161 146* 174     Results from last 7 days   Lab Units 08/05/20  0441 08/04/20  0715 08/03/20  1632   POTASSIUM mmol/L 3 3* 3 2* 3 5   CHLORIDE mmol/L 109* 110* 103   CO2 mmol/L 19* 21 21   BUN mg/dL 4* 4* 5   CREATININE mg/dL 0 69 0 68 0 78   CALCIUM mg/dL 8 3 9 3 9 0     Results from last 7 days   Lab Units 08/06/20  0212 08/05/20  2055 08/05/20  1240  08/03/20  1901   INR   --  1 33*  --   --  1 14   PTT seconds 32 42* 57*   < > 39*    < > = values in this interval not displayed

## 2020-08-06 NOTE — PROGRESS NOTES
Progress Note - Vascular Surgery   Geovani Vuong 53 y o  female MRN: [id number]  Unit/Bed#: [facility] 414-01 Encounter: [id number]    Assessment:  52 y/o female with abdominal pain/diarrhea, found to have IVC Filter with thrombus extending slightly above filter and into iliacs  IR venous lysis on 8/4/20  IR angioplasty and venogram on 8/5/20    Plan:    lifelong anticoagulation   Other management per primary    Subjective/Objective     Subjective:   Denies pain, no other complaints provided    Objective:    Blood pressure 165/92, pulse 90, temperature 99 °F (37 2 °C), temperature source Oral, resp  rate 22, weight (!) 138 kg (304 lb 14 3 oz), last menstrual period 07/23/2020, SpO2 99 %  ,Body mass index is 52 74 kg/m²        Intake/Output Summary (Last 24 hours) at 8/6/2020 0624  Last data filed at 8/6/2020 0600  Gross per 24 hour   Intake 1677 85 ml   Output 801 ml   Net 876 85 ml       Invasive Devices     Peripherally Inserted Central Catheter Line            PICC Line 54/80/17 Left Basilic 1 day          Peripheral Intravenous Line            Peripheral IV 08/03/20 Left Antecubital 2 days                Physical Exam:   Gen:  NAD  CV:  warm, well-perfused  Lungs: nl effort  Abd:  soft, NT/ND  Ext:  no CCE, no tenderness in LE  Neuro: A&Ox3     Results from last 7 days   Lab Units 08/06/20  0212 08/05/20  1723 08/05/20  1240   WBC Thousand/uL 12 44* 13 11* 13 43*   HEMOGLOBIN g/dL 10 0* 10 6* 11 6   HEMATOCRIT % 32 5* 32 9* 37 0   PLATELETS Thousands/uL 161 146* 174     Results from last 7 days   Lab Units 08/06/20  0454 08/05/20  0441 08/04/20  0715   POTASSIUM mmol/L 3 5 3 3* 3 2*   CHLORIDE mmol/L 110* 109* 110*   CO2 mmol/L 20* 19* 21   BUN mg/dL 6 4* 4*   CREATININE mg/dL 0 77 0 69 0 68   CALCIUM mg/dL 8 8 8 3 9 3     Results from last 7 days   Lab Units 08/06/20  0212 08/05/20  2055 08/05/20  1240  08/03/20  1901   INR   --  1 33*  --   --  1 14   PTT seconds 32 42* 57*   < > 39*    < > = values in this interval not displayed

## 2020-08-06 NOTE — ASSESSMENT & PLAN NOTE
Stable, patient denies any headaches  Patient maintained on Topamax 150 mg b i d , will continue  Patient not using Imitrex    Will hold off

## 2020-08-06 NOTE — PROGRESS NOTES
ICU Transfer Note - Barry Lyon 1975, 53 y o  female MRN: [id number]    Unit/Bed#: [facility] 414-01 Encounter: [id number]    Primary Care Provider: No primary care provider on file  Date and time admitted to hospital: 8/4/2020  1:55 AM        Swelling of lower extremity  Assessment & Plan  Bilateral lower extremity swelling, right greater than left with erythema  -follow-up bilateral lower extremity duplex negative for DVT  -continue home Lasix 20 mg b i d  As needed, withholding blood pressure parameters    Bipolar disorder (HCC)  Assessment & Plan  Continue home lithium:  600 mg a m , and 750 mg p m  Continue home Seroquel 500 mg p m  Patient denies any suicidal/homicidal ideation  Apparent blunted affect    Migraine  Assessment & Plan  Patient maintained on Topamax 150 mg b i d , will continue  Patient not using Imitrex  Will hold off      Thrombophlebitis  Assessment & Plan  Heparin drip, monitor PTT q 6 hours  S/p lysis by IR, IR to reevaluate in the morning  Will monitor for pain control    Hepatic steatosis  Assessment & Plan  Likely secondary to severe obesity     LFTs within normal  Recommend weight loss  Follow-up outpatient    S/P gastric bypass  Assessment & Plan  Patient had gastric bypass several years ago  -has regained much of the weight and more  -continue vitamin supplementations as ordered    Hx of pulmonary embolus  Assessment & Plan  Diagnosed in 2015  S/p IVC 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    * IVC thrombosis [facility])  Assessment & Plan  Patient with a history of DVT and PE in the past, not on anticoagulation  IVC filter placed in 2017 in [facility]  Patient developed acute pain starting 7/30, progressively worsening with radiation to lower extremities and abdomen  Patient found to have extensive thrombosis from site of IVC filter to the iliacs with consistent with thrombophlebitis    PLAN  IR venous lysis on 8/4/20  IR angioplasty and venogram on 8/5/20    · Continue heparin drip  · Hematology consulted for workup for hypercoagulable state  CT chest ordered was negative for malignancy  Further bloodwork ordered including PARMINDER, Anti DNA, cardiolipin AB, factor 5 leiden, protein C activity, prothrombin gene mutation pending  Code Status: Level 1 - Full Code  POA:    POLST:      Reason for ICU admission:   IVC thrombosis     Active problems:   Principal Problem:    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HCC)  Active Problems:    Hx of pulmonary embolus    S/P gastric bypass    Macrocytosis    Hepatic steatosis    Thrombophlebitis    Migraine    Bipolar disorder (HCC)    Swelling of lower extremity    Leukocytosis    Hx of suicide attempt    Morbid obesity with BMI of 50 0-59 9, adult ([facility] [address] )    Acquired hypothyroidism    Vitamin D deficiency  Resolved Problems:    * No resolved hospital problems  *      Consultants:   IR   Vascular surgery  Heme/onc    History of Present Illness:   44F with a PMH of DVT/PE in 2015 presented with abdominal pain and found to have thrombosed IVC    Summary of clinical course:   She had lysis procedure on 8/4   Sheath removed 8/5  Started on heparin gtt     Recent or scheduled procedures:   As above  No further scheduled procedures    Outstanding/pending diagnostics:   PTT  AM labs    Cultures:   NA       Mobilization Plan:   Ambulate ad german    Nutrition Plan:   Regular diet     Invasive Devices Review  Invasive Devices     Peripherally Inserted Central Catheter Line            PICC Line 81/88/12 Left Basilic 1 day          Peripheral Intravenous Line            Peripheral IV 08/03/20 Left Antecubital 2 days                Rationale for remaining devices: PICC for heparin gtt    VTE Pharmacologic Prophylaxis: Heparin Drip  VTE Mechanical Prophylaxis: sequential compression device    Discharge Plan:   Patient should be ready for discharge pending medical clearance     Initial Physical Therapy Recommendations: NA  Initial Occupational Therapy Recommendations: NA  Initial Case /Plan: BLANCA    Home medications that are not reordered and reason why:   BLANCA      Spoke with [facility]  regarding transfer  Please call [phone] with any questions or concerns  Portions of the record may have been created with voice recognition software  Occasional wrong word or "sound a like" substitutions may have occurred due to the inherent limitations of voice recognition software  Read the chart carefully and recognize, using context, where substitutions have occurred      Adair Sarah PA-C

## 2020-08-06 NOTE — ASSESSMENT & PLAN NOTE
Diagnosed in 2015  S/p IVC in 2015  Presented with acute abdominal pain found to have IVC thrombosis status post IR lysis  Management as described below  Patient will need lifelong anticoagulation

## 2020-08-06 NOTE — PROGRESS NOTES
INTERNAL MEDICINE RESIDENCY TRANSFER ACCEPTANCE NOTE     Name: Kwabena Dietrich   Age & Sex: 53 y o  female   MRN: [id number]  Unit/Bed#: [facility] 414-01   Encounter: [id number]  Hospital Stay Days: 2    Accepting team: SOD Team C   Code Status: Level 1 - Full Code  Disposition: Patient requires Med/Surg    ASSESSMENT/PLAN     Principal Problem:    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[place] [address] )  Active Problems:    Hx of pulmonary embolus    S/P gastric bypass    Anemia    Hepatic steatosis    Thrombophlebitis    Migraine    Bipolar disorder (HCC)    Swelling of lower extremity    Morbid obesity with BMI of 50 0-59 9, adult ([facility] )    Acquired hypothyroidism    Vitamin D deficiency      Acquired hypothyroidism  Assessment & Plan  Most recent TSH elevated at 6 16, 2 free T4 depressed 0 7 in May of 2020  Was not started on supplementation as an outpatient  Check TSH/T4  May be related to patient's lithium use  May need to consider starting and continuing levothyroxine with close follow up    Morbid obesity with BMI of 50 0-59 9, adult [facility])  Assessment & Plan  Status post gastric bypass  Will need diet and lifestyle modification  Follow-up as an outpatient    Swelling of lower extremity  Assessment & Plan  Bilateral lower extremity swelling, right greater than left with erythema  Appears chronic, no tenderness  -Dopplers negative for acute DVT bilaterally  -Management as described above for IVC filter  May need to consider compression stockings    Bipolar disorder (HCC)  Assessment & Plan  Stable, has flat affect at baseline  Home lithium:  600 mg a m , and 750 mg p m , currently on 450 mg am and 600 mg pm, unclear why, can resume home regimen  Continue home Seroquel 500 mg p m  Continue home Zoloft 50 mg daily  Patient uses Librium as needed at home for anxiety, not ordered while she is inpatient    Patient denies any suicidal/homicidal ideation  Continue to monitor    Migraine  Assessment & Plan  Stable, patient denies any headaches  Patient maintained on Topamax 150 mg b i d , will continue  Patient not using Imitrex  Will hold off      Thrombophlebitis  Assessment & Plan  Heparin drip, monitor PTT q 6 hours  S/p lysis by IR  On oxycodone/gabapentin for pain control  Continue to monitor    Hepatic steatosis  Assessment & Plan  Likely secondary to severe obesity  LFTs within normal  Recommend weight loss  Follow-up outpatient    Anemia  Assessment & Plan  Appears to be in a macrocytic in nature  No recent vitamin B12 or folate found on file, has evidence of hypothyroidism from thyroid studies from May of 2020  Likely absorption issues secondary to her gastric bypass history  Continue iron, folic acid  Check vitamin B12, folate, TSH  Continue to monitor hemoglobin      S/P gastric bypass  Assessment & Plan  Patient had gastric bypass several years ago  -has regained much of the weight and more  -continue vitamin supplementations as ordered  Continue to monitor    Hx of pulmonary embolus  Assessment & Plan  Diagnosed in 2015  S/p IVC in 2015  Presented with acute abdominal pain found to have IVC thrombosis status post IR lysis  Management as described below  Patient will need lifelong anticoagulation      * IVC thrombosis [facility])  Assessment & Plan  Patient with a history of DVT and PE in the past, not on anticoagulation  IVC filter placed in 2017 in [facility]  Patient developed acute pain starting 7/30, progressively worsening with radiation to lower extremities and abdomen  Patient found to have extensive thrombosis from site of IVC filter to the iliacs with consistent with thrombophlebitis  Status post IR lysis  Currently hemodynamically stable on room air  · Continue heparin drip  · Hematology consulted for workup for hypercoagulable state  · CT chest ordered was negative for malignancy  Further bloodwork ordered including PARMINDER, Anti DNA, cardiolipin AB, factor 5 leiden, protein C activity, prothrombin gene mutation pending     · With regards to anticoagulation, patient given her BMI, would benefit from warfarin versus Lovenox  Will need to follow-up with case management with regards to what is covered by her insurance  · Patient currently on pain regimen of oxycodone, will add Senokot to her bowel regimen  · Continue to monitor vital signs        VTE Pharmacologic Prophylaxis: Heparin  VTE Mechanical Prophylaxis: sequential compression device    HOSPITAL COURSE     68-year-old female with past medical history of bipolar disorder, migraines, morbid obesity status post gastric bypass, DVT/PE in 2015 status post IVC filter who presented [facility] for worsening abdominal pain for 2 days  She denies any nausea, vomiting, diarrhea, fever associated with abdominal pain  On CT abdomen pelvis she was noted to have a thrombus extending from her IVC filter, for which she underwent IR lysis  She was transferred over the ICU for further monitoring  Her pain had resolved, she remained hemodynamically stable on room air  Hematology was consulted for possible hypercoagulable state, she was started on a heparin drip  She is stable for transfer onto the medical floor  SUBJECTIVE     Patient seen examined lying in bed in no acute distress  She reports improvement in her symptoms, does complain of mild abdominal pain in the right upper and left upper quadrants  She denies any nausea, vomiting, last bowel movement was 2 days ago  Urinating well  Tolerating p o  Diet  She denies any chest pain, shortness of breath, chills  Rest of ROS was negative  OBJECTIVE     Vitals:    08/06/20 1000 08/06/20 1100 08/06/20 1200 08/06/20 1300   BP: 126/89 128/77 116/70 117/71   BP Location:       Pulse: (!) 110 (!) 106 (!) 110 (!) 106   Resp: (!) 28 (!) 26 (!) 26 (!) 30   Temp:       TempSrc:       SpO2: 98% 97% 98% 97%   Weight:         I/O last 24 hours:   In: 2127 9 [P O :2010; I V :81 1; IV Piggyback:36 8]  Out: 1676 [Urine:1676]    Physical Exam  Constitutional:       General: She is not in acute distress  Appearance: Normal appearance  She is obese  She is not ill-appearing  HENT:      Head: Normocephalic and atraumatic  Mouth/Throat:      Mouth: Mucous membranes are moist    Eyes:      Extraocular Movements: Extraocular movements intact  Conjunctiva/sclera: Conjunctivae normal       Pupils: Pupils are equal, round, and reactive to light  Cardiovascular:      Rate and Rhythm: Normal rate and regular rhythm  Pulses: Normal pulses  Heart sounds: Normal heart sounds  Pulmonary:      Effort: Pulmonary effort is normal       Comments: Breath sounds difficult to hear due to body habitus, no wheezing, rales, rhonchi noted  Abdominal:      General: Abdomen is flat  Bowel sounds are normal  There is distension  Palpations: Abdomen is soft  Tenderness: There is abdominal tenderness  Comments: Mild tenderness in the right upper left upper quadrant, nonradiating, negative Herrera sign   Musculoskeletal:      Comments: Trace edema bilaterally, legs appeared symmetric, mild erythema noted on right lower extremity, negative Homans sign   Skin:     General: Skin is warm and dry  Findings: Erythema present  Comments: Mild erythema of the right lower extremity  Neurological:      General: No focal deficit present  Mental Status: She is alert and oriented to person, place, and time  Psychiatric:      Comments: Flat affect        LABORATORY DATA     Labs: I have personally reviewed pertinent reports      Results from last 7 days   Lab Units 08/06/20  0907 08/06/20  0212 08/05/20  1723  08/04/20  0715 08/03/20  1632   WBC Thousand/uL 12 85* 12 44* 13 11*   < > 14 53* 15 44*   HEMOGLOBIN g/dL 10 6* 10 0* 10 6*   < > 12 0 12 1   HEMATOCRIT % 34 1* 32 5* 32 9*   < > 38 3 39 8   PLATELETS Thousands/uL 175 161 146*   < > 244 229   NEUTROS PCT %  --   --   --   --  72 80*   MONOS PCT %  --   --   --   --  14* 12 < > = values in this interval not displayed  Results from last 7 days   Lab Units 08/06/20  0454 08/05/20  0441 08/04/20  0715 08/03/20  1632   POTASSIUM mmol/L 3 5 3 3* 3 2* 3 5   CHLORIDE mmol/L 110* 109* 110* 103   CO2 mmol/L 20* 19* 21 21   BUN mg/dL 6 4* 4* 5   CREATININE mg/dL 0 77 0 69 0 68 0 78   CALCIUM mg/dL 8 8 8 3 9 3 9 0   ALK PHOS U/L  --   --   --  204*   ALT U/L  --   --   --  39   AST U/L  --   --   --  38     Results from last 7 days   Lab Units 08/06/20  0454   MAGNESIUM mg/dL 2 5     Results from last 7 days   Lab Units 08/06/20  0454   PHOSPHORUS mg/dL 3 4      Results from last 7 days   Lab Units 08/06/20  0907 08/06/20  0212 08/05/20 2055 08/03/20  1901   INR   --   --  1 33*  --  1 14   PTT seconds 33 32 42*   < > 39*    < > = values in this interval not displayed  Micro:  No results found for: [facility] TESTING     Imaging: I have personally reviewed pertinent reports  Ct Head Wo Contrast    Result Date: 8/4/2020  Impression: No acute intracranial abnormality  Findings are stable Workstation performed: [id number]     Ct Chest Wo Contrast    Result Date: 8/4/2020  Impression: Unremarkable CT of the chest   No evidence of lung cancer  Workstation performed: [id number]     EKG, Pathology, and Other Studies: I have personally reviewed pertinent reports  ALLERGIES     Allergies   Allergen Reactions    Morphine      Seizures        MEDICATIONS   acetaminophen, 650 mg, Oral, Q6H PRN, Otila Hopper, PA-C  calcium carbonate, 500 mg, Oral, Daily PRN, Otila Hopper, PA-C  ferrous sulfate, 325 mg, Oral, Daily With Breakfast, Otila Hopper, PA-C  folic acid, 1 mg, Oral, Daily, Otila Hopper, PA-C  gabapentin, 400 mg, Oral, TID, Otila Hopper, PA-C  heparin (porcine), 3-30 Units/kg/hr (Order-Specific), Intravenous, Titrated, Otila Hopper, PA-C, Last Rate: 18 Units/kg/hr (08/06/20 9450)  heparin (porcine), 10,000 Units, Intravenous, Q1H PRN, Joan Grime, PA-C  heparin (porcine), 5,000 Units, Intravenous, Q1H PRN, Joan Grime, PA-C  lithium carbonate, 450 mg, Oral, Daily, Joan Grime, PA-C  lithium carbonate, 600 mg, Oral, HS, Tate Zavilla, PA-C  lithium, 600 mg, Oral, See Admin Instructions, Joan Grime, PA-C  ondansetron, 4 mg, Intravenous, Q6H PRN, Joan Grime, PA-C  oxyCODONE, 5 mg, Oral, Q4H PRN, Joan Grime, PA-C    Or  oxyCODONE, 10 mg, Oral, Q4H PRN, Joan Grime, PA-C  QUEtiapine, 500 mg, Oral, HS, Tate Zavilla, PA-C  QUEtiapine, 500 mg, Oral, HS, Tate Zavilla, PA-C  sertraline, 50 mg, Oral, Daily, Tate Zavilla, PA-C  topiramate, 150 mg, Oral, BID, Tate Zavilla, PA-C      heparin (porcine), 3-30 Units/kg/hr (Order-Specific), Last Rate: 18 Units/kg/hr (08/06/20 1048)      acetaminophen, 650 mg, Q6H PRN  calcium carbonate, 500 mg, Daily PRN  heparin (porcine), 10,000 Units, Q1H PRN  heparin (porcine), 5,000 Units, Q1H PRN  ondansetron, 4 mg, Q6H PRN  oxyCODONE, 5 mg, Q4H PRN    Or  oxyCODONE, 10 mg, Q4H PRN        Portions of the record may have been created with voice recognition software  Occasional wrong word or "sound a like" substitutions may have occurred due to the inherent limitations of voice recognition software    Read the chart carefully and recognize, using context, where substitutions have occurred     ==  Taurus Mayer, [facility]  Internal Medicine Residency PGY-2

## 2020-08-06 NOTE — PROGRESS NOTES
Daily Progress Note - [address] Morrison 53 y o  female MRN: [id number]  Unit/Bed#: [facility] 414-01 Encounter: [id number]        ----------------------------------------------------------------------------------------  HPI/24hr events: 44F with abdominal pain/diarrhea, found to have IVC Filter with thrombus extending slightly above filter and into iliacs s/p IR venous lysis 8/4 and venogram 8/5      ---------------------------------------------------------------------------------------  SUBJECTIVE  Patient states that pain is well controlled    Review of Systems  Review of systems was reviewed and negative unless stated above in HPI/24-hour events   ---------------------------------------------------------------------------------------  Assessment and Plan:    Neuro:    Diagnosis: Bipolar   o Plan: Continue home lithium, seroquel, zoloft   Diagnosis: Acute post procedural pain   o Plan: Continue home neurontin   o Continue oxy prn    Diagnosis: Migraines   o Plan: Continue home topamax     CV:    Diagnosis:  Thrombosed IVC filter s/p lysis   o Plan: Vascular surgery following  o Plan for life long AC- Continue heparin gtt with plan to change to PO AC    Diagnosis: Hx of BL DVTs and PE in 2015  o Plan: S/p IVC filter at that time  o Hematology following for hypercoagulable and malignancy work up   o Will need [facility]      Pulm:   Diagnosis: No acute issuea      GI:    Diagnosis: No acute issues      :    Diagnosis: No acute issues      F/E/N:    Plan: Continue regular diet       Heme/Onc:    Diagnosis: Concern for hypercoagulable state  o Plan: Hematology following   o Will need AC    Endo:    Diagnosis: No acute issues      ID:    Diagnosis: No acute issues        MSK/Skin:    Diagnosis: No acute issues      Disposition: Transfer to Med-Surg   Code Status: Level 1 - Full Code  ---------------------------------------------------------------------------------------  ICU CORE MEASURES    Prophylaxis VTE Pharmacologic Prophylaxis: Heparin Drip  VTE Mechanical Prophylaxis: sequential compression device  Stress Ulcer Prophylaxis: Prophylaxis Not Indicated       Invasive Devices Review  Invasive Devices     Peripherally Inserted Central Catheter Line            PICC Line  Left Basilic 1 day          Peripheral Intravenous Line            Peripheral IV 20 Left Antecubital 2 days              Can any invasive devices be discontinued today? No  ---------------------------------------------------------------------------------------  OBJECTIVE    Vitals   Vitals:    20 0900 20 1000 20 1100 20 1200   BP: 102/66 126/89 128/77 116/70   BP Location:       Pulse: 100 (!) 110 (!) 106 (!) 110   Resp: 22 (!) 28 (!) 26 (!) 26   Temp:       TempSrc:       SpO2: 97% 98% 97% 98%   Weight:         Temp (24hrs), Av °F (37 2 °C), Min:98 7 °F (37 1 °C), Max:99 3 °F (37 4 °C)  Current: Temperature: 99 3 °F (37 4 °C)      Respiratory:  SpO2: SpO2: 98 %, SpO2 Activity: SpO2 Activity: At Rest, SpO2 Device: O2 Device: None (Room air)       Invasive/non-invasive ventilation settings   Respiratory    Lab Data (Last 4 hours)    None         O2/Vent Data (Last 4 hours)    None                Physical Exam  Vitals signs and nursing note reviewed  Constitutional:       General: She is not in acute distress  Appearance: She is obese  HENT:      Head: Normocephalic  Neck:      Musculoskeletal: Normal range of motion  Cardiovascular:      Rate and Rhythm: Normal rate  Pulmonary:      Effort: Pulmonary effort is normal    Abdominal:      Palpations: Abdomen is soft  Musculoskeletal: Normal range of motion  Skin:     General: Skin is warm and dry  Capillary Refill: Capillary refill takes less than 2 seconds  Comments: Groin sites clean and dry   Neurological:      General: No focal deficit present     Psychiatric:         Mood and Affect: Mood normal          Laboratory and Diagnostics:  Results from last 7 days   Lab Units 08/06/20  0907 08/06/20  0212 08/05/20  1723 08/05/20  1240 08/05/20  0747 08/05/20 0207 08/04/20 2020 08/04/20 0715 08/03/20  1632   WBC Thousand/uL 12 85* 12 44* 13 11* 13 43* 13 34* 12 49* 14 77* 14 53* 15 44*   HEMOGLOBIN g/dL 10 6* 10 0* 10 6* 11 6 11 0* 11 5 11 4* 12 0 12 1   HEMATOCRIT % 34 1* 32 5* 32 9* 37 0 36 2 38 1 37 6 38 3 39 8   PLATELETS Thousands/uL 175 161 146* 174 158 197 237 244 229   NEUTROS PCT %  --   --   --   --   --   --   --  72 80*   MONOS PCT %  --   --   --   --   --   --   --  14* 12     Results from last 7 days   Lab Units 08/06/20  0454 08/05/20 0441 08/04/20 0715 08/03/20  1632   SODIUM mmol/L 138 136 137 135*   POTASSIUM mmol/L 3 5 3 3* 3 2* 3 5   CHLORIDE mmol/L 110* 109* 110* 103   CO2 mmol/L 20* 19* 21 21   ANION GAP mmol/L 8 8 6 11   BUN mg/dL 6 4* 4* 5   CREATININE mg/dL 0 77 0 69 0 68 0 78   CALCIUM mg/dL 8 8 8 3 9 3 9 0   GLUCOSE RANDOM mg/dL 99 107 122 119   ALT U/L  --   --   --  39   AST U/L  --   --   --  38   ALK PHOS U/L  --   --   --  204*   ALBUMIN g/dL  --   --   --  2 6*   TOTAL BILIRUBIN mg/dL  --   --   --  0 50     Results from last 7 days   Lab Units 08/06/20  0454   MAGNESIUM mg/dL 2 5   PHOSPHORUS mg/dL 3 4      Results from last 7 days   Lab Units 08/06/20  0907 08/06/20 0212 08/05/20  2055 08/05/20  1240 08/05/20  0747 08/05/20  0207 08/04/20 2020 08/03/20  1901   INR   --   --  1 33*  --   --   --   --   --  1 14   PTT seconds 33 32 42* 57* 79* 91* 30   < > 39*    < > = values in this interval not displayed  ABG:    VBG:          Micro        EKG: SR  Imaging:  I have personally reviewed pertinent reports  and I have personally reviewed pertinent films in PACS    Intake and Output  I/O       08/04 0701 - 08/05 0700 08/05 0701 - 08/06 0700 08/06 0701 - 08/07 0700    P  O  0 1560 450    I V  (mL/kg) 414 1 (3) 81 1 (0 6)     IV Piggyback 253 6 36 8     Total Intake(mL/kg) 667 7 (4 8) 1677 9 (12 2) 450 (3 3)    Urine (mL/kg/hr) 550 (0 2) 801 (0 2) 625 (0 8)    Total Output 550 801 625    Net +117 7 +876 9 -175           Unmeasured Urine Occurrence  2 x           Height and Weights      IBW: -92 5 kg  Body mass index is 52 74 kg/m²  Weight (last 2 days)     Date/Time   Weight    08/06/20 0600   (!) 138 (304 9)    08/05/20 0557   (!) 138 (303 35)    08/04/20 0600   (!) 139 (305 56)    08/04/20 0217   (!) 139 (305 78)                Nutrition       Diet Orders   (From admission, onward)             Start     Ordered    08/04/20 1945  Diet Clear Liquid  Diet effective now     Question Answer Comment   Diet Type Clear Liquid    RD to adjust diet per protocol?  Yes        08/04/20 1944              Active Medications  Scheduled Meds:acetaminophen, 650 mg, Oral, Q6H PRN, Nina Hameed DO  calcium carbonate, 500 mg, Oral, Daily PRN, Amy Robert PA-C  ferrous sulfate, 325 mg, Oral, Daily With Breakfast, Katherine Rogel MD  folic acid, 1 mg, Oral, Daily, Katherine Rogel MD  gabapentin, 400 mg, Oral, TID, Katherine Rogel MD  heparin (porcine), 3-30 Units/kg/hr (Order-Specific), Intravenous, Titrated, Anaid Velasquez PA-C, Last Rate: 18 Units/kg/hr (08/06/20 1048)  heparin (porcine), 10,000 Units, Intravenous, Q1H PRN, Anaid Velasquez PA-C  heparin (porcine), 5,000 Units, Intravenous, Q1H PRN, Anaid Velasquez PA-C  lithium carbonate, 450 mg, Oral, Daily, Katherine Rogel MD  lithium carbonate, 600 mg, Oral, HS, Katherine Rogel MD  ondansetron, 4 mg, Intravenous, Q6H PRN, Evelin Peng MD  oxyCODONE, 5 mg, Oral, Q4H PRN, Nina Hameed DO    Or  oxyCODONE, 10 mg, Oral, Q4H PRN, Nina Yoseph, DO  QUEtiapine, 500 mg, Oral, HS, Katherine Rogel MD  sertraline, 50 mg, Oral, Daily, Katherine Rogel MD  topiramate, 150 mg, Oral, BID, Katherine Rogel MD      Continuous Infusions:  heparin (porcine), 3-30 Units/kg/hr (Order-Specific), Last Rate: 18 Units/kg/hr (08/06/20 1048)      PRN Meds:   acetaminophen, 650 mg, Q6H PRN  calcium carbonate, 500 mg, Daily PRN  heparin (porcine), 10,000 Units, Q1H PRN  heparin (porcine), 5,000 Units, Q1H PRN  ondansetron, 4 mg, Q6H PRN  oxyCODONE, 5 mg, Q4H PRN    Or  oxyCODONE, 10 mg, Q4H PRN        Allergies   Allergies   Allergen Reactions    Morphine      Seizures  ---------------------------------------------------------------------------------------  Advance Directive and Living Will:      Power of :    POLST:    ---------------------------------------------------------------------------------------  Care Time Delivered:   No Critical Care time spent     Yancy Duffy PA-C      Portions of the record may have been created with voice recognition software  Occasional wrong word or "sound a like" substitutions may have occurred due to the inherent limitations of voice recognition software    Read the chart carefully and recognize, using context, where substitutions have occurred

## 2020-08-06 NOTE — ASSESSMENT & PLAN NOTE
Stable, has flat affect at baseline  Continue home lithium:  600 mg a m , and 750 mg p m  Continue home Seroquel 500 mg p m    Continue home Zoloft 50 mg daily  Patient denies any suicidal/homicidal ideation  Continue to monitor

## 2020-08-06 NOTE — PROGRESS NOTES
ASSESSMENT/PLAN     IVC thrombosis  History of bilateral DVTs and PE, status post IVC filter placement 2017 in [facility]   Presented with worsening four-day history of abdominal pain and lower extremity pain   CT pelvis with contrast revealed thrombosis IVC filter leading into iliac thrombophlebitis noticed as well  Etiology hypercoagulable state multifactorial possible underlying after 5 versus protein C/S deficiency versus in setting of morbid obesity versus less likely underlying malignancy  · Bilateral venous duplex negative for acute DVTs  · S/p IR venous lysis on 8/4/2020, IR venogram and angioplasty on 08/05/2020  · CT chest unremarkable, no evidence of lung cancer  · Will need outpatient mammogram for further malignancy   Family history of breast cancer in maternal aunt  · PARMINDER positive, titer 1:80, anti-ds antibody equivocal range, will need to repeat  Homocystine negative, further thrombosis panel workup pending  · Currently on heparin drip, no acute signs of bleeding  · Patient will need life long anticoagulation  Given BMI 54, limited options  Consider lovenox vs warfarin  will discuss with attending in regards to anticoagulation management     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  History of PE in 2015 in [facility]  Briefly placed on Xarelto, discontinued secondary to severe GI bleed requiring transfusions  Bilateral DVTs in 2017, has IVC filter placed [facility] at that time  · CT findings of IVC thrombosis and management as highlighted above  · Currently on heparin drip  · Will need lifelong anticoagulation  ·  malignancy workup at thrombosis panel as highlighted above      Thrombophlebitis  CT abdomen pelvis with contrast noted extensive surrounding fat stranding consistent with thrombophlebitis at site of IVC filter  · Continue with heparin drip and supportive care    · Further management per primary team     Morbid obesity  BMI 54  Educated on lifestyle and dietary modifications  Require close outpatient follow-up for further intervention          VTE Pharmacologic Prophylaxis: Heparin  VTE Mechanical Prophylaxis: sequential compression device    SUBJECTIVE   Mary Morrison 53 y o  female who states she is feeling okay overall  Denies any acute signs of bleeding including hematuria, hemoptysis, hematemesis, hematochezia, melena  Still endorses some right lower quadrant abdominal pain  All other review of systems negative at this time  REVIEW OF SYSTEMS   Review of Systems   Constitutional: Positive for fatigue  Negative for activity change, appetite change, chills and fever  HENT: Negative for congestion, facial swelling and trouble swallowing  Eyes: Negative for photophobia, pain, discharge and visual disturbance  Respiratory: Negative for apnea, cough, chest tightness, shortness of breath and wheezing  Cardiovascular: Negative for chest pain and leg swelling  Gastrointestinal: Negative for abdominal distention, abdominal pain, blood in stool, constipation, diarrhea, nausea and vomiting  Endocrine: Negative for polyuria  Genitourinary: Negative for difficulty urinating, dysuria, flank pain and hematuria  Musculoskeletal: Negative for arthralgias and back pain  Skin: Negative for pallor and rash  Neurological: Positive for weakness  Negative for dizziness and tremors  Psychiatric/Behavioral: Negative for agitation, behavioral problems and suicidal ideas  The patient is not nervous/anxious        OBJECTIVE     Vitals:    20 0500 20 0600 20 0700 20 0800   BP: 125/79 165/92 119/72 129/81   BP Location:       Pulse: 94 90 94 102   Resp: (!) 25 22 21 (!) 29   Temp:    99 3 °F (37 4 °C)   TempSrc:    Oral   SpO2: 93% 99% 97% 98%   Weight:  (!) 138 kg (304 lb 14 3 oz)        Temperature:   Temp (24hrs), Av °F (37 2 °C), Min:98 7 °F (37 1 °C), Max:99 3 °F (37 4 °C)    Temperature: 99 3 °F (37 4 °C)  Intake & Output:  I/O 08/04 0701 - 08/05 0700 08/05 0701 - 08/06 0700 08/06 0701 - 08/07 0700    P  O  0 1560     I V  (mL/kg) 414 1 (3) 81 1 (0 6)     IV Piggyback 253 6 36 8     Total Intake(mL/kg) 667 7 (4 8) 1677 9 (12 2)     Urine (mL/kg/hr) 550 (0 2) 801 (0 2) 400 (1 7)    Total Output 550 801 400    Net +117 7 +876 9 -400           Unmeasured Urine Occurrence  2 x         Weights:   IBW: -92 5 kg    Body mass index is 52 74 kg/m²  Weight (last 2 days)     Date/Time   Weight    08/06/20 0600   (!) 138 (304 9)    08/05/20 0557   (!) 138 (303 35)    08/04/20 0600   (!) 139 (305 56)    08/04/20 0217   (!) 139 (305 78)            Physical Exam  Vitals signs reviewed  Constitutional:       General: She is not in acute distress  Appearance: She is well-developed  She is not diaphoretic  Comments: Morbidly obese   HENT:      Head: Normocephalic and atraumatic  Nose: Nose normal       Mouth/Throat:      Pharynx: No oropharyngeal exudate  Eyes:      General: No scleral icterus  Right eye: No discharge  Left eye: No discharge  Conjunctiva/sclera: Conjunctivae normal    Neck:      Musculoskeletal: Normal range of motion and neck supple  Cardiovascular:      Rate and Rhythm: Normal rate and regular rhythm  Heart sounds: Normal heart sounds  No murmur  No friction rub  No gallop  Pulmonary:      Effort: Pulmonary effort is normal  No respiratory distress  Breath sounds: Normal breath sounds  No wheezing or rales  Abdominal:      General: Bowel sounds are normal  There is no distension  Palpations: Abdomen is soft  Tenderness: There is no abdominal tenderness  There is no guarding  Musculoskeletal: Normal range of motion  General: Swelling present  Left lower leg: Edema present  Skin:     General: Skin is warm and dry  Findings: No erythema  Neurological:      Mental Status: She is alert and oriented to person, place, and time     Psychiatric: Behavior: Behavior normal        PAST MEDICAL HISTORY     Past Medical History:   Diagnosis Date    Anemia     Psychiatric disorder     bipolar II, suicide    Pulmonary embolism (HCC)     Seizures ([address] )      PAST SURGICAL HISTORY     Past Surgical History:   Procedure Laterality Date    APPENDECTOMY      Open    CHOLECYSTECTOMY      Laparoscopic    GASTRIC BYPASS      was 205 date of surgery    IVC FILTER INSERTION  2017    REPLACEMENT TOTAL KNEE      STOMACH SURGERY      for morbid obesity    TUBAL LIGATION Bilateral 2010     SOCIAL & FAMILY HISTORY     Social History     Substance and Sexual Activity   Alcohol Use Not Currently    Comment: quit 6/21/2018     Social History     Substance and Sexual Activity   Drug Use No     Social History     Tobacco Use   Smoking Status Never Smoker   Smokeless Tobacco Never Used   Tobacco Comment    former quit in 1999 per [employer]     Family History   Problem Relation Age of Onset    Other Mother         headache    Hypertension Mother     Depression Mother     Arthritis Father     Other Father         H, pylori infection    Other Sister         esophageal reflux    Migraines Sister     Breast cancer Family     Diabetes Paternal Aunt         Mellitus    Diabetes Paternal Uncle         Mellitus    Asthma Daughter      LABORATORY DATA     Labs: I have personally reviewed pertinent reports  Results from last 7 days   Lab Units 08/06/20  0212 08/05/20  1723 08/05/20  1240  08/04/20  0715 08/03/20  1632   WBC Thousand/uL 12 44* 13 11* 13 43*   < > 14 53* 15 44*   HEMOGLOBIN g/dL 10 0* 10 6* 11 6   < > 12 0 12 1   HEMATOCRIT % 32 5* 32 9* 37 0   < > 38 3 39 8   PLATELETS Thousands/uL 161 146* 174   < > 244 229   NEUTROS PCT %  --   --   --   --  72 80*   MONOS PCT %  --   --   --   --  14* 12    < > = values in this interval not displayed        Results from last 7 days   Lab Units 08/06/20  0454 08/05/20  0441 08/04/20  0715 08/03/20  1632   POTASSIUM mmol/L 3 5 3 3* 3 2* 3 5   CHLORIDE mmol/L 110* 109* 110* 103   CO2 mmol/L 20* 19* 21 21   BUN mg/dL 6 4* 4* 5   CREATININE mg/dL 0 77 0 69 0 68 0 78   CALCIUM mg/dL 8 8 8 3 9 3 9 0   ALK PHOS U/L  --   --   --  204*   ALT U/L  --   --   --  39   AST U/L  --   --   --  38     Results from last 7 days   Lab Units 08/06/20  0454   MAGNESIUM mg/dL 2 5     Results from last 7 days   Lab Units 08/06/20  0454   PHOSPHORUS mg/dL 3 4      Results from last 7 days   Lab Units 08/06/20  0212 08/05/20  2055 08/05/20  1240  08/03/20  1901   INR   --  1 33*  --   --  1 14   PTT seconds 32 42* 57*   < > 39*    < > = values in this interval not displayed  Micro:  No results found for: BLOODCX, Delarosa Stephanie, [facility] TESTS     Imaging: I have personally reviewed pertinent reports  Ct Head Wo Contrast    Result Date: 8/4/2020  Impression: No acute intracranial abnormality  Findings are stable Workstation performed: [id number]     Ct Chest Wo Contrast    Result Date: 8/4/2020  Impression: Unremarkable CT of the chest   No evidence of lung cancer  Workstation performed: [id number]     EKG, Pathology, and Other Studies: I have personally reviewed pertinent reports  ALLERGIES     Allergies   Allergen Reactions    Morphine      Seizures  MEDICATIONS PRIOR TO ARRIVAL     Prior to Admission medications    Medication Sig Start Date End Date Taking?  Authorizing Provider   chlordiazePOXIDE (LIBRIUM) 25 mg capsule Take 25 mg by mouth 3 (three) times a day as needed for anxiety   Yes Historical Provider, MD   ferrous sulfate 325 (65 Fe) mg tablet Take 325 mg by mouth daily with breakfast   Yes Historical Provider, MD   folic acid (FOLVITE) 1 mg tablet Take 1 mg by mouth daily   Yes Historical Provider, MD   gabapentin (NEURONTIN) 400 mg capsule Take 100 mg by mouth 3 (three) times a day   Yes Historical Provider, MD   lithium 600 MG capsule Take 600 mg by mouth see administration instructions 600mg am   750 mg pm   Yes Historical Provider, MD   pyridoxine (VITAMIN B6) 100 mg tablet Take 100 mg by mouth daily   Yes Historical Provider, MD   QUEtiapine (SEROquel) 100 mg tablet Take 500 mg by mouth daily at bedtime   Yes Historical Provider, MD   sertraline (ZOLOFT) 50 mg tablet Take 50 mg by mouth daily   Yes Historical Provider, MD   topiramate (TOPAMAX) 100 mg tablet Take 150 mg by mouth 2 (two) times a day    Yes Historical Provider, MD   diclofenac sodium (VOLTAREN) 1 % Apply 2 g topically 4 (four) times a day 8/3/20   Soyla Opitz, MD   Doxycycline Hyclate 120 MG TBEC Take 100 mg by mouth    Historical Provider, MD   furosemide (LASIX) 20 mg tablet Take 20 mg by mouth 2 (two) times a day PRN    Historical Provider, MD   SUMAtriptan (IMITREX) 100 mg tablet Take 100 mg by mouth as needed for migraine     Historical Provider, MD     MEDICATIONS ADMINISTERED IN LAST 24 HOURS     Medication Administration - last 24 hours from 08/05/2020 0844 to 08/06/2020 0844       Date/Time Order Dose Route Action Action by     08/06/2020 0829 ferrous sulfate tablet 325 mg 325 mg Oral Given Bear Ivy, RN     84/32/0047 7110 folic acid (FOLVITE) tablet 1 mg 1 mg Oral Given Peter Haynes, RN     08/06/2020 0829 sertraline (ZOLOFT) tablet 50 mg 50 mg Oral Given Bear Ivy, RN     08/05/2020 2111 QUEtiapine (SEROquel) tablet 500 mg 500 mg Oral Given Anthony Reeves, RN     08/05/2020 2112 lithium carbonate (LITHOBID) CR tablet 600 mg 600 mg Oral Given Anthony Reeves, RN     08/06/2020 9521 lithium carbonate (LITHOBID) CR tablet 450 mg 450 mg Oral Given Bear Ivy, RN     08/05/2020 1703 lithium carbonate (LITHOBID) CR tablet 450 mg 450 mg Oral Given Liz Chase, RN     08/06/2020 0829 topiramate (TOPAMAX) tablet 150 mg 150 mg Oral Given Bear [place], RN     08/05/2020 1706 topiramate (TOPAMAX) tablet 150 mg 150 mg Oral Given Liz Chase, RN     08/05/2020 1233 topiramate (TOPAMAX) tablet 150 mg 150 mg Oral Given Opal Yesenia, RN     08/06/2020 0829 gabapentin (NEURONTIN) capsule 400 mg 400 mg Oral Given Yasmeen Spain, MEDHAT     08/05/2020 2111 gabapentin (NEURONTIN) capsule 400 mg 400 mg Oral Given Saurabh Fowler, RN     08/05/2020 1706 gabapentin (NEURONTIN) capsule 400 mg 400 mg Oral Given Opal Yesenia, RN     08/06/2020 0617 oxyCODONE (ROXICODONE) IR tablet 5 mg 5 mg Oral Given Saurabh Fowler, RN     08/05/2020 2149 oxyCODONE (ROXICODONE) IR tablet 5 mg 5 mg Oral Given Saurabh Fowler, RN     08/06/2020 6851 oxyCODONE (ROXICODONE) IR tablet 10 mg   Oral See Alternative Saurabh Fowler, RN     08/05/2020 2149 oxyCODONE (ROXICODONE) IR tablet 10 mg   Oral See Alternative Saurabh Fowler, RN     08/05/2020 0855 alteplase (CATHFLO) 10 mg in sodium chloride 0 9 % 250 mL infusion 0 mg/hr Intracatheter Stopped Yasmeen Spain RN     08/05/2020 1927 ondansetron (ZOFRAN) injection 4 mg 4 mg Intravenous Given Saurabh Fowler, RN     08/05/2020 0905 ondansetron (ZOFRAN) injection 4 mg 4 mg Intravenous Given Yasmeen Spain RN     08/05/2020 1600 heparin (porcine) 25,000 units in 250 mL infusion (premix) 0 Units/hr Intravenous Stopped Jeny Patterson RN     08/05/2020 0855 heparin (porcine) 25,000 units in 250 mL infusion (premix) 0 Units/hr Intravenous Hold Yasmeen Spain RN     08/05/2020 0855 alteplase (CATHFLO) 10 mg in sodium chloride 0 9 % 250 mL infusion 0 mg/hr Intracatheter Stopped Yasmeen Spain RN     08/05/2020 1600 heparin 1000 units in 500 mL infusion (premix) for sheath patency 0 Units/hr Intravenous Stopped Opal MEDHAT Patterson     08/05/2020 1600 heparin 1000 units in 500 mL infusion (premix) for sheath patency 0 Units/hr Intravenous Stopped Opalesley Patterson, MEDHAT     08/05/2020 1004 calcium carbonate (TUMS) chewable tablet 500 mg 500 mg Oral Given Opal [place]     08/05/2020 1600 heparin 1000 units in 500 mL infusion (premix) for sheath patency 0 Units/hr Intravenous Stopped Jeny Patterson RN 08/05/2020 1012 heparin 1000 units in 500 mL infusion (premix) for sheath patency 20 Units/hr Intravenous [address] Abby Downey RN     08/05/2020 1600 heparin 1000 units in 500 mL infusion (premix) for sheath patency 0 Units/hr Intravenous Stopped Abby Downey RN     08/05/2020 1011 heparin 1000 units in 500 mL infusion (premix) for sheath patency 20 Units/hr Intravenous [address] Abby Downey RN     08/05/2020 1536 fentanyl citrate (PF) 100 MCG/2ML 25 mcg Intravenous Given Karmen Yan RN     08/05/2020 1526 fentanyl citrate (PF) 100 MCG/2ML 25 mcg Intravenous Given Karmen Yan RN     08/05/2020 1454 fentanyl citrate (PF) 100 MCG/2ML 25 mcg Intravenous Given Karmen Yan RN     08/05/2020 1448 fentanyl citrate (PF) 100 MCG/2ML 25 mcg Intravenous Given Karmen Yan RN     08/05/2020 1434 fentanyl citrate (PF) 100 MCG/2ML 50 mcg Intravenous Given Karmen Yan RN     08/05/2020 1426 fentanyl citrate (PF) 100 MCG/2ML 50 mcg Intravenous Given Karmen Yan RN     08/05/2020 1454 diphenhydrAMINE (BENADRYL) injection 25 mg Intravenous Given Karmen Yan RN     08/05/2020 1429 diphenhydrAMINE (BENADRYL) injection 25 mg Intravenous Given Karmen Yan RN     08/05/2020 1448 heparin (porcine) injection 3,000 Units Intravenous Given Karmen Yan RN     08/05/2020 1450 lidocaine 1% buffered 10 mL Infiltration Given Edwar Caba MD     08/05/2020 1545 HYDROmorphone (PF) (DILAUDID) 4 mg/mL injection 0 2 mg Intravenous Given Dalia Dancer, MD     08/05/2020 1539 HYDROmorphone (PF) (DILAUDID) 4 mg/mL injection 0 2 mg Intravenous Given Dalia Dancer, MD     08/05/2020 1501 HYDROmorphone (PF) (DILAUDID) 4 mg/mL injection 0 4 mg Intravenous Given Dalia Dancer, MD     08/05/2020 1718 iohexol (OMNIPAQUE) 350 MG/ML injection (SINGLE-DOSE) 150 mL 80 mL Intravenous Given Kike Rubio        CURRENT MEDICATIONS   acetaminophen, 650 mg, Oral, Q6H PRN, Nina Hameed DO  calcium carbonate, 500 mg, Oral, Daily PRN, Lexi Dennis FEDERICO Robert  ferrous sulfate, 325 mg, Oral, Daily With Breakfast, Sher Romano MD  folic acid, 1 mg, Oral, Daily, Sher Romano MD  gabapentin, 400 mg, Oral, TID, Sher Romano MD  heparin, 20 Units/hr, Intravenous, Continuous, Jovanny Head MD, Last Rate: Stopped (08/05/20 1600)  heparin, 20 Units/hr, Intravenous, Continuous, Jovanny Head MD, Last Rate: Stopped (08/05/20 1600)  heparin, 20 Units/hr, Intravenous, Continuous, Katherine Guzmán PA-C, Last Rate: Stopped (08/05/20 1600)  heparin, 20 Units/hr, Intravenous, Continuous, Katherine Guzmán PA-C, Last Rate: Stopped (08/05/20 1600)  lithium carbonate, 450 mg, Oral, Daily, Sher Romano MD  lithium carbonate, 600 mg, Oral, HS, Sher Romano MD  ondansetron, 4 mg, Intravenous, Q6H PRN, Jovanny Head MD  oxyCODONE, 5 mg, Oral, Q4H PRN, iNna Hameed DO    Or  oxyCODONE, 10 mg, Oral, Q4H PRN, Nina Hameed DO  QUEtiapine, 500 mg, Oral, HS, Sher Romano MD  sertraline, 50 mg, Oral, Daily, Sher Romano MD  topiramate, 150 mg, Oral, BID, Sher Romano MD      heparin, 20 Units/hr, Last Rate: Stopped (08/05/20 1600)  heparin, 20 Units/hr, Last Rate: Stopped (08/05/20 1600)  heparin, 20 Units/hr, Last Rate: Stopped (08/05/20 1600)  heparin, 20 Units/hr, Last Rate: Stopped (08/05/20 1600)      acetaminophen, 650 mg, Q6H PRN  calcium carbonate, 500 mg, Daily PRN  ondansetron, 4 mg, Q6H PRN  oxyCODONE, 5 mg, Q4H PRN    Or  oxyCODONE, 10 mg, Q4H PRN        Portions of the record may have been created with voice recognition software  Occasional wrong word or "sound a like" substitutions may have occurred due to the inherent limitations of voice recognition software    Read the chart carefully and recognize, using context, where substitutions have occurred     ==  Amita Banegas, [address] [facility]  Internal Medicine Residency

## 2020-08-06 NOTE — ASSESSMENT & PLAN NOTE
Stable  Most recent TSH elevated at 6 16, 2 free T4 depressed 0 7  Was not started on supplementation as an outpatient  May need to consider starting and continuing levothyroxine with close follow up

## 2020-08-06 NOTE — ASSESSMENT & PLAN NOTE
Patient with a history of DVT and PE in the past, not on anticoagulation  IVC filter placed in 2017 in [facility]  Patient developed acute pain starting 7/30, progressively worsening with radiation to lower extremities and abdomen  Patient found to have extensive thrombosis from site of IVC filter to the iliacs with consistent with thrombophlebitis  Status post IR lysis  Currently hemodynamically stable on room air  · Continue heparin drip  · Hematology consulted for workup for hypercoagulable state  · CT chest ordered was negative for malignancy  Further bloodwork ordered including PARMINDER, Anti DNA, cardiolipin AB, factor 5 leiden, protein C activity, prothrombin gene mutation pending  · With regards to anticoagulation, patient given her BMI, would benefit from warfarin versus Lovenox  Will need to follow-up with case management with regards to what is covered by her insurance    · Patient currently on pain regimen of oxycodone, will add Senokot to her bowel regimen  · Continue to monitor vital signs

## 2020-08-07 PROBLEM — E87.6 HYPOKALEMIA: Status: ACTIVE | Noted: 2020-08-07

## 2020-08-07 LAB
ALBUMIN SERPL BCP-MCNC: 2.1 G/DL (ref 3.5–5)
ALP SERPL-CCNC: 210 U/L (ref 46–116)
ALT SERPL W P-5'-P-CCNC: 65 U/L (ref 12–78)
ANION GAP SERPL CALCULATED.3IONS-SCNC: 10 MMOL/L (ref 4–13)
APTT PPP: 85 SECONDS (ref 23–37)
APTT PPP: 99 SECONDS (ref 23–37)
AST SERPL W P-5'-P-CCNC: 112 U/L (ref 5–45)
BASOPHILS # BLD AUTO: 0.09 THOUSANDS/ΜL (ref 0–0.1)
BASOPHILS NFR BLD AUTO: 1 % (ref 0–1)
BILIRUB SERPL-MCNC: 0.53 MG/DL (ref 0.2–1)
BUN SERPL-MCNC: 6 MG/DL (ref 5–25)
CALCIUM SERPL-MCNC: 8.9 MG/DL (ref 8.3–10.1)
CHLORIDE SERPL-SCNC: 105 MMOL/L (ref 100–108)
CO2 SERPL-SCNC: 20 MMOL/L (ref 21–32)
CREAT SERPL-MCNC: 0.75 MG/DL (ref 0.6–1.3)
EOSINOPHIL # BLD AUTO: 0.58 THOUSAND/ΜL (ref 0–0.61)
EOSINOPHIL NFR BLD AUTO: 5 % (ref 0–6)
ERYTHROCYTE [DISTWIDTH] IN BLOOD BY AUTOMATED COUNT: 17.7 % (ref 11.6–15.1)
FOLATE SERPL-MCNC: 14.8 NG/ML (ref 3.1–17.5)
GFR SERPL CREATININE-BSD FRML MDRD: 96 ML/MIN/1.73SQ M
GLUCOSE SERPL-MCNC: 114 MG/DL (ref 65–140)
HCT VFR BLD AUTO: 31.6 % (ref 34.8–46.1)
HGB BLD-MCNC: 9.6 G/DL (ref 11.5–15.4)
IMM GRANULOCYTES # BLD AUTO: 0.28 THOUSAND/UL (ref 0–0.2)
IMM GRANULOCYTES NFR BLD AUTO: 2 % (ref 0–2)
LYMPHOCYTES # BLD AUTO: 1.83 THOUSANDS/ΜL (ref 0.6–4.47)
LYMPHOCYTES NFR BLD AUTO: 14 % (ref 14–44)
MCH RBC QN AUTO: 30.4 PG (ref 26.8–34.3)
MCHC RBC AUTO-ENTMCNC: 30.4 G/DL (ref 31.4–37.4)
MCV RBC AUTO: 100 FL (ref 82–98)
MONOCYTES # BLD AUTO: 1.42 THOUSAND/ΜL (ref 0.17–1.22)
MONOCYTES NFR BLD AUTO: 11 % (ref 4–12)
NEUTROPHILS # BLD AUTO: 8.49 THOUSANDS/ΜL (ref 1.85–7.62)
NEUTS SEG NFR BLD AUTO: 67 % (ref 43–75)
NRBC BLD AUTO-RTO: 0 /100 WBCS
PLATELET # BLD AUTO: 213 THOUSANDS/UL (ref 149–390)
PMV BLD AUTO: 11.8 FL (ref 8.9–12.7)
POTASSIUM SERPL-SCNC: 3 MMOL/L (ref 3.5–5.3)
PROT S ACT/NOR PPP: 111 % (ref 63–140)
PROT SERPL-MCNC: 6.8 G/DL (ref 6.4–8.2)
RBC # BLD AUTO: 3.16 MILLION/UL (ref 3.81–5.12)
SODIUM SERPL-SCNC: 135 MMOL/L (ref 136–145)
T4 FREE SERPL-MCNC: 0.78 NG/DL (ref 0.76–1.46)
TSH SERPL DL<=0.05 MIU/L-ACNC: 5.44 UIU/ML (ref 0.36–3.74)
VIT B12 SERPL-MCNC: 346 PG/ML (ref 100–900)
WBC # BLD AUTO: 12.69 THOUSAND/UL (ref 4.31–10.16)

## 2020-08-07 PROCEDURE — 84443 ASSAY THYROID STIM HORMONE: CPT | Performed by: INTERNAL MEDICINE

## 2020-08-07 PROCEDURE — 84439 ASSAY OF FREE THYROXINE: CPT | Performed by: INTERNAL MEDICINE

## 2020-08-07 PROCEDURE — 85025 COMPLETE CBC W/AUTO DIFF WBC: CPT | Performed by: INTERNAL MEDICINE

## 2020-08-07 PROCEDURE — 82607 VITAMIN B-12: CPT | Performed by: INTERNAL MEDICINE

## 2020-08-07 PROCEDURE — 99232 SBSQ HOSP IP/OBS MODERATE 35: CPT | Performed by: INTERNAL MEDICINE

## 2020-08-07 PROCEDURE — 99233 SBSQ HOSP IP/OBS HIGH 50: CPT | Performed by: INTERNAL MEDICINE

## 2020-08-07 PROCEDURE — 80053 COMPREHEN METABOLIC PANEL: CPT | Performed by: INTERNAL MEDICINE

## 2020-08-07 PROCEDURE — 85730 THROMBOPLASTIN TIME PARTIAL: CPT | Performed by: INTERNAL MEDICINE

## 2020-08-07 PROCEDURE — 82746 ASSAY OF FOLIC ACID SERUM: CPT | Performed by: INTERNAL MEDICINE

## 2020-08-07 RX ORDER — POTASSIUM CHLORIDE 14.9 MG/ML
20 INJECTION INTRAVENOUS ONCE
Status: COMPLETED | OUTPATIENT
Start: 2020-08-07 | End: 2020-08-07

## 2020-08-07 RX ORDER — POTASSIUM CHLORIDE 20 MEQ/1
40 TABLET, EXTENDED RELEASE ORAL
Status: COMPLETED | OUTPATIENT
Start: 2020-08-07 | End: 2020-08-07

## 2020-08-07 RX ORDER — PANTOPRAZOLE SODIUM 40 MG/1
40 TABLET, DELAYED RELEASE ORAL
Status: DISCONTINUED | OUTPATIENT
Start: 2020-08-07 | End: 2020-08-09 | Stop reason: HOSPADM

## 2020-08-07 RX ORDER — OXYCODONE HYDROCHLORIDE 5 MG/1
5 TABLET ORAL EVERY 4 HOURS PRN
Status: DISCONTINUED | OUTPATIENT
Start: 2020-08-07 | End: 2020-08-09 | Stop reason: HOSPADM

## 2020-08-07 RX ORDER — POLYETHYLENE GLYCOL 3350 17 G/17G
17 POWDER, FOR SOLUTION ORAL DAILY
Status: DISCONTINUED | OUTPATIENT
Start: 2020-08-07 | End: 2020-08-09 | Stop reason: HOSPADM

## 2020-08-07 RX ORDER — ACETAMINOPHEN 325 MG/1
975 TABLET ORAL EVERY 6 HOURS PRN
Status: DISCONTINUED | OUTPATIENT
Start: 2020-08-07 | End: 2020-08-09 | Stop reason: HOSPADM

## 2020-08-07 RX ORDER — MAGNESIUM HYDROXIDE/ALUMINUM HYDROXICE/SIMETHICONE 120; 1200; 1200 MG/30ML; MG/30ML; MG/30ML
15 SUSPENSION ORAL EVERY 4 HOURS PRN
Status: DISCONTINUED | OUTPATIENT
Start: 2020-08-07 | End: 2020-08-09 | Stop reason: HOSPADM

## 2020-08-07 RX ORDER — ACETAMINOPHEN 325 MG/1
650 TABLET ORAL EVERY 6 HOURS PRN
Status: DISCONTINUED | OUTPATIENT
Start: 2020-08-07 | End: 2020-08-09 | Stop reason: HOSPADM

## 2020-08-07 RX ADMIN — QUETIAPINE FUMARATE 500 MG: 100 TABLET ORAL at 22:02

## 2020-08-07 RX ADMIN — POTASSIUM CHLORIDE 40 MEQ: 1500 TABLET, EXTENDED RELEASE ORAL at 13:08

## 2020-08-07 RX ADMIN — ACETAMINOPHEN 975 MG: 325 TABLET, FILM COATED ORAL at 20:38

## 2020-08-07 RX ADMIN — TOPIRAMATE 150 MG: 100 TABLET, FILM COATED ORAL at 18:17

## 2020-08-07 RX ADMIN — DOCUSATE SODIUM AND SENNOSIDES 1 TABLET: 8.6; 5 TABLET ORAL at 22:06

## 2020-08-07 RX ADMIN — OXYCODONE HYDROCHLORIDE 5 MG: 5 TABLET ORAL at 08:26

## 2020-08-07 RX ADMIN — ENOXAPARIN SODIUM 100 MG: 100 INJECTION SUBCUTANEOUS at 14:25

## 2020-08-07 RX ADMIN — SERTRALINE HYDROCHLORIDE 50 MG: 50 TABLET ORAL at 08:26

## 2020-08-07 RX ADMIN — POTASSIUM CHLORIDE 20 MEQ: 14.9 INJECTION, SOLUTION INTRAVENOUS at 12:21

## 2020-08-07 RX ADMIN — TOPIRAMATE 150 MG: 100 TABLET, FILM COATED ORAL at 08:25

## 2020-08-07 RX ADMIN — ENOXAPARIN SODIUM 100 MG: 100 INJECTION SUBCUTANEOUS at 20:40

## 2020-08-07 RX ADMIN — GABAPENTIN 400 MG: 400 CAPSULE ORAL at 20:38

## 2020-08-07 RX ADMIN — GABAPENTIN 400 MG: 400 CAPSULE ORAL at 18:17

## 2020-08-07 RX ADMIN — HEPARIN SODIUM AND DEXTROSE 15 UNITS/KG/HR: 10000; 5 INJECTION INTRAVENOUS at 08:26

## 2020-08-07 RX ADMIN — POLYETHYLENE GLYCOL 3350 17 G: 17 POWDER, FOR SOLUTION ORAL at 09:59

## 2020-08-07 RX ADMIN — LITHIUM CARBONATE 750 MG: 450 TABLET ORAL at 22:03

## 2020-08-07 RX ADMIN — GABAPENTIN 400 MG: 400 CAPSULE ORAL at 08:26

## 2020-08-07 RX ADMIN — POTASSIUM CHLORIDE 40 MEQ: 1500 TABLET, EXTENDED RELEASE ORAL at 14:27

## 2020-08-07 RX ADMIN — FERROUS SULFATE TAB 325 MG (65 MG ELEMENTAL FE) 325 MG: 325 (65 FE) TAB at 08:26

## 2020-08-07 RX ADMIN — OXYCODONE HYDROCHLORIDE 5 MG: 5 TABLET ORAL at 22:04

## 2020-08-07 RX ADMIN — FOLIC ACID 1 MG: 1 TABLET ORAL at 08:26

## 2020-08-07 RX ADMIN — Medication 100 MG: at 09:59

## 2020-08-07 RX ADMIN — PANTOPRAZOLE SODIUM 40 MG: 40 TABLET, DELAYED RELEASE ORAL at 13:09

## 2020-08-07 RX ADMIN — LITHIUM CARBONATE 600 MG: 300 TABLET, FILM COATED, EXTENDED RELEASE ORAL at 09:59

## 2020-08-07 RX ADMIN — CALCIUM CARBONATE (ANTACID) CHEW TAB 500 MG 500 MG: 500 CHEW TAB at 22:09

## 2020-08-07 NOTE — ASSESSMENT & PLAN NOTE
Diagnosed in 2015  S/p IVC in 2015  Presented with acute abdominal pain found to have IVC thrombosis status post IR lysis  Management as described below  Patient will need lifelong anticoagulation -> Lovenox

## 2020-08-07 NOTE — ASSESSMENT & PLAN NOTE
Bilateral lower extremity swelling, right greater than left with erythema  Appears chronic, no tenderness  -Dopplers negative for acute DVT bilaterally  -Management as described above for IVC filter  May need to consider compression stockings

## 2020-08-07 NOTE — ASSESSMENT & PLAN NOTE
Heparin drip switched to Lovenox   S/p lysis by IR (8/4)  On tylenol oxycodone PRN for pain control  Will monitor for pain control

## 2020-08-07 NOTE — PROGRESS NOTES
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[place] [address] )  Active Problems:    Hx of pulmonary embolus    S/P gastric bypass    Anemia    Hepatic steatosis    Thrombophlebitis    Migraine    Bipolar disorder (HCC)    Swelling of lower extremity    Morbid obesity with BMI of 50 0-59 9, adult ([facility] )    Acquired hypothyroidism    Vitamin D deficiency    Hypokalemia    Hypokalemia  Assessment & Plan  Today K was 3 0  IV Potassium 20 given  PO Potassium 40 x 2 ordered  Follow up BMP in morning    Acquired hypothyroidism  Assessment & Plan  Stable  Most recent TSH elevated at 6 16, 2 free T4 depressed 0 7  Was not started on supplementation as an outpatient  May need to consider starting and continuing levothyroxine with close follow up    Morbid obesity with BMI of 50 0-59 9, adult ([place] [address] )  Assessment & Plan  Status post gastric bypass  Will need diet and lifestyle modification  Follow-up as an outpatient    Swelling of lower extremity  Assessment & Plan  Bilateral lower extremity swelling, right greater than left with erythema  Appears chronic, no tenderness  -Dopplers negative for acute DVT bilaterally  -Management as described above for IVC filter  May need to consider compression stockings    Bipolar disorder (HCC)  Assessment & Plan  Stable, has flat affect at baseline  Continue home lithium:  600 mg a m , and 750 mg p m  Continue home Seroquel 500 mg p m  Continue home Zoloft 50 mg daily  Patient denies any suicidal/homicidal ideation  Continue to monitor    Migraine  Assessment & Plan  Stable, patient denies any headaches  Patient maintained on Topamax 150 mg b i d , will continue  Patient not using Imitrex    Will hold off      Thrombophlebitis  Assessment & Plan  Heparin drip switched to Lovenox today  S/p lysis by IR (8/4)  On tylenol oxycodone PRN for pain control  Will monitor for pain control    Hepatic steatosis  Assessment & Plan  Likely secondary to severe obesity  LFTs within normal limits  Recommend weight loss  Follow-up outpatient    Anemia  Assessment & Plan  Appears to be in a macrocytic in nature  No recent vitamin B12 or folate found on file, has evidence of hypothyroidism from thyroid studies from May of 2020  Likely absorption issues secondary to her gastric bypass history  Continue iron, folic acid  Check vitamin B12 and folate within normal limits  Continue to monitor hemoglobin      S/P gastric bypass  Assessment & Plan  Patient had gastric bypass several years ago  -has regained much of the weight and more  -continue vitamin supplementations as ordered  Continue to monitor    Hx of pulmonary embolus  Assessment & Plan  Diagnosed in 2015  S/p IVC in 2015  Presented with acute abdominal pain found to have IVC thrombosis status post IR lysis  Management as described below  Patient will need lifelong anticoagulation -> Lovenox       * IVC thrombosis [facility])  Assessment & Plan  Patient with a history of DVT and PE in the past, not on anticoagulation  IVC filter placed in 2017 in [facility]  Patient developed acute pain starting 7/30, progressively worsening with radiation to lower extremities and abdomen  Patient found to have extensive thrombosis from site of IVC filter to the iliacs with consistent with thrombophlebitis  Status post IR lysis  Currently hemodynamically stable on room air  · Hematology consulted for workup for hypercoagulable state  · CT chest ordered was negative for malignancy  Further bloodwork ordered including PARMINDER, Anti DNA, cardiolipin AB, factor 5 leiden, protein C activity, prothrombin gene mutation pending     · Patient currently on pain regimen of oxycodone, will add Senokot to her bowel regimen  · Heparin gtt stopped today and switched to Lovenox 0 75 mg/kg   · If stable tomorrow -> possible DC on Lovenox at home    · PT/OT Consulted  · Continue to monitor vital signs      Disposition: Inpatient     SUBJECTIVE     Patient seen and examined  No acute events overnight  Today she complains of upper and lower crampy abdominal pain, which is not new  She also complains of left lower extremity pain behind her knee that radiates down, along with left lower extremity swelling  She also complains of heartburn  She denies any new onset fevers, chills, chest pain or shortness of breath  OBJECTIVE     Vitals:    20 2314 20 0051 20 0600 20 0722   BP: 126/81   132/76   BP Location: Right arm   Right arm   Pulse: (!) 107   (!) 111   Resp: 22   20   Temp: 98 5 °F (36 9 °C)   98 3 °F (36 8 °C)   TempSrc: Oral   Oral   SpO2: 98%   94%   Weight:   (!) 137 kg (302 lb 14 6 oz) (!) 136 kg (300 lb 0 7 oz)   Height:  5' 3" (1 6 m)        Temperature:   Temp (24hrs), Av 4 °F (36 9 °C), Min:98 3 °F (36 8 °C), Max:98 5 °F (36 9 °C)    Temperature: 98 3 °F (36 8 °C)  Intake & Output:  I/O       / 0701 -  0700 08/ 07 -  0700 08/ 0701 -  0700    P  O  1560 450     I V  (mL/kg) 81 1 (0 6)      IV Piggyback 36 8      Total Intake(mL/kg) 1677 9 (12 2) 450 (3 3)     Urine (mL/kg/hr) 801 (0 2) 2375 (0 7)     Total Output 801 2375     Net +876 9 -1925            Unmeasured Urine Occurrence 2 x            Intake/Output Summary (Last 24 hours) at 2020 1443  Last data filed at 2020 1300  Gross per 24 hour   Intake 60 ml   Output 1150 ml   Net -1090 ml     I/O this shift:  In: 60 [P O :60]  Out: -   Weights:   IBW: 52 4 kg    Body mass index is 53 15 kg/m²  Weight (last 2 days)     Date/Time   Weight    20 0722   (!) 136 (300 05)    20 0600   (!) 137 (302 91)    20 0600   (!) 138 (304 9)    20 0557   (!) 138 (303 35)            Physical Exam  Constitutional:       Appearance: She is well-developed  HENT:      Head: Normocephalic and atraumatic  Nose: Nose normal    Eyes:      General:         Right eye: No discharge  Left eye: No discharge  Conjunctiva/sclera: Conjunctivae normal       Pupils: Pupils are equal, round, and reactive to light  Neck:      Vascular: No JVD  Cardiovascular:      Rate and Rhythm: Normal rate and regular rhythm  Heart sounds: Normal heart sounds  No murmur  No friction rub  No gallop  Pulmonary:      Effort: Pulmonary effort is normal  No respiratory distress  Breath sounds: No stridor  Rales present  No wheezing  Chest:      Chest wall: No tenderness  Abdominal:      General: Bowel sounds are normal  There is distension  Palpations: Abdomen is soft  There is no mass  Tenderness: There is abdominal tenderness  There is no guarding or rebound  Hernia: No hernia is present  Musculoskeletal:         General: No deformity  Right lower leg: Edema present  Left lower leg: Edema present  Skin:     General: Skin is warm and dry  Findings: Erythema (Right lower extremity) present  Neurological:      Mental Status: She is alert and oriented to person, place, and time  Psychiatric:         Mood and Affect: Mood normal          Behavior: Behavior normal          Thought Content: Thought content normal          Judgment: Judgment normal         LABORATORY DATA     Labs: I have personally reviewed pertinent reports  Results from last 7 days   Lab Units 08/07/20  0343 08/06/20  0907 08/06/20  0212  08/04/20  0715 08/03/20  1632   WBC Thousand/uL 12 69* 12 85* 12 44*   < > 14 53* 15 44*   HEMOGLOBIN g/dL 9 6* 10 6* 10 0*   < > 12 0 12 1   HEMATOCRIT % 31 6* 34 1* 32 5*   < > 38 3 39 8   PLATELETS Thousands/uL 213 175 161   < > 244 229   NEUTROS PCT % 67  --   --   --  72 80*   MONOS PCT % 11  --   --   --  14* 12    < > = values in this interval not displayed        Results from last 7 days   Lab Units 08/07/20  0343 08/06/20  0454 08/05/20  0441  08/03/20  1632   POTASSIUM mmol/L 3 0* 3 5 3 3*   < > 3 5   CHLORIDE mmol/L 105 110* 109*   < > 103   CO2 mmol/L 20* 20* 19*   < > 21   BUN mg/dL 6 6 4*   < > 5   CREATININE mg/dL 0 75 0 77 0 69   < > 0 78   CALCIUM mg/dL 8 9 8 8 8 3   < > 9 0   ALK PHOS U/L 210*  --   --   --  204*   ALT U/L 65  --   --   --  39   AST U/L 112*  --   --   --  38    < > = values in this interval not displayed  Serum creatinine: 0 75 mg/dL 08/07/20 0343  Estimated creatinine clearance: 127 mL/min   Results from last 7 days   Lab Units 08/06/20  0454   MAGNESIUM mg/dL 2 5     Results from last 7 days   Lab Units 08/06/20  0454   PHOSPHORUS mg/dL 3 4      Results from last 7 days   Lab Units 08/07/20  0953 08/07/20  0343 08/06/20  1724  08/05/20  2055  08/03/20  1901   INR   --   --  1 18  --  1 33*  --  1 14   PTT seconds 99* 85* 144*   < > 42*   < > 39*    < > = values in this interval not displayed  IMAGING & DIAGNOSTIC TESTING     Radiology Results: I have personally reviewed pertinent reports  Ct Head Wo Contrast    Result Date: 8/4/2020  Impression: No acute intracranial abnormality  Findings are stable Workstation performed: [id number]     Ct Chest Wo Contrast    Result Date: 8/4/2020  Impression: Unremarkable CT of the chest   No evidence of lung cancer  Workstation performed: [id number]     Ir Venous Lysis    Result Date: 8/6/2020  Impression: Impression: 1  Central DVT involving the infrarenal inferior vena cava down to the bilateral common iliac veins  There is some clot noted extending above the filter  2  Successful placement of bilateral infusion catheters  Workstation performed: [id number]     Other Diagnostic Testing: I have personally reviewed pertinent reports        ACTIVE MEDICATIONS     Current Facility-Administered Medications   Medication Dose Route Frequency    acetaminophen (TYLENOL) tablet 650 mg  650 mg Oral Q6H PRN    Or    acetaminophen (TYLENOL) tablet 975 mg  975 mg Oral Q6H PRN    aluminum-magnesium hydroxide-simethicone (MYLANTA) 200-200-20 mg/5 mL oral suspension 15 mL  15 mL Oral Q4H PRN    calcium carbonate (TUMS) chewable tablet 500 mg  500 mg Oral Daily PRN    enoxaparin (LOVENOX) subcutaneous injection 100 mg  0 75 mg/kg Subcutaneous Q12H [address]    ferrous sulfate tablet 325 mg  325 mg Oral Daily With Breakfast    folic acid (FOLVITE) tablet 1 mg  1 mg Oral Daily    gabapentin (NEURONTIN) capsule 400 mg  400 mg Oral TID    lithium carbonate (LITHOBID) CR tablet 600 mg  600 mg Oral Daily    lithium carbonate (LITHOBID) CR tablet 750 mg  750 mg Oral HS    ondansetron (ZOFRAN) injection 4 mg  4 mg Intravenous Q6H PRN    oxyCODONE (ROXICODONE) IR tablet 5 mg  5 mg Oral Q4H PRN    pantoprazole (PROTONIX) EC tablet 40 mg  40 mg Oral Early Morning    polyethylene glycol (MIRALAX) packet 17 g  17 g Oral Daily    pyridoxine (VITAMIN B6) tablet 100 mg  100 mg Oral Daily    QUEtiapine (SEROquel) tablet 500 mg  500 mg Oral HS    senna-docusate sodium (SENOKOT S) 8 6-50 mg per tablet 1 tablet  1 tablet Oral HS    sertraline (ZOLOFT) tablet 50 mg  50 mg Oral Daily    topiramate (TOPAMAX) tablet 150 mg  150 mg Oral BID     VTE Pharmacologic Prophylaxis: Heparin  VTE Mechanical Prophylaxis: sequential compression device    ==  Antelmo Bennett MD  IM Resident, PGY-1  [facility] Internal Medicine Residency

## 2020-08-07 NOTE — PROGRESS NOTES
Cristina Hale provided a blessing and Sacrament of the Sick       08/07/20 1200   Clinical Encounter Type   Visited With Patient   Routine Visit Follow-up   Continue Visiting Yes   [facility] Encounters   [facility] Needs Prayer   Sacramental Encounters   Sacrament of Sick-Anointing Anointed

## 2020-08-07 NOTE — PROGRESS NOTES
Oncology Progress Note  Lexi Carr 53 y o  female MRN: [id number]  Unit/Bed#: [facility] 429-01 Encounter: [id number]      /76 (BP Location: Right arm)   Pulse (!) 111   Temp 98 3 °F (36 8 °C) (Oral)   Resp 20   Ht 5' 3" (1 6 m)   Wt (!) 136 kg (300 lb 0 7 oz)   LMP 07/23/2020   SpO2 94%   BMI 53 15 kg/m²     Subjective: The patient has flat affect    Objective:  Status post thrombolysis of the iliac veins and inferior IVC with restoration of circulation  General Appearance:    Alert, oriented        Eyes:    PERRL   Ears:    Normal external ear canals, both ears   Nose:   Nares normal, septum midline   Throat:   Mucosa moist  Pharynx without injection  Neck:   Supple       Lungs:     Clear to auscultation bilaterally   Chest Wall:    No tenderness or deformity    Heart:    Regular rate and rhythm       Abdomen:     Soft, non-tender, bowel sounds +, no organomegaly, very obese           Extremities:          Skin:   no rash or icterus multiple tattoos      Lymph nodes:   Cervical, supraclavicular, and axillary nodes normal   Neurologic:   CNII-XII intact, normal strength, sensation and reflexes     throughout        Recent Results (from the past 48 hour(s))   APTT    Collection Time: 08/05/20 12:40 PM   Result Value Ref Range    PTT 57 (H) 23 - 37 seconds   CBC and Platelet    Collection Time: 08/05/20 12:40 PM   Result Value Ref Range    WBC 13 43 (H) 4 31 - 10 16 Thousand/uL    RBC 3 72 (L) 3 81 - 5 12 Million/uL    Hemoglobin 11 6 11 5 - 15 4 g/dL    Hematocrit 37 0 34 8 - 46 1 %     (H) 82 - 98 fL    MCH 31 2 26 8 - 34 3 pg    MCHC 31 4 31 4 - 37 4 g/dL    RDW 17 8 (H) 11 6 - 15 1 %    Platelets 596 680 - 778 Thousands/uL    MPV 11 2 8 9 - 12 7 fL   Fibrinogen, Quantitative    Collection Time: 08/05/20 12:40 PM   Result Value Ref Range    Fibrinogen 65 (L) 227 - 495 mg/dL   CBC and Platelet    Collection Time: 08/05/20  5:23 PM   Result Value Ref Range    WBC 13 11 (H) 4 31 - 10 16 Thousand/uL    RBC 3 29 (L) 3 81 - 5 12 Million/uL    Hemoglobin 10 6 (L) 11 5 - 15 4 g/dL    Hematocrit 32 9 (L) 34 8 - 46 1 %     (H) 82 - 98 fL    MCH 32 2 26 8 - 34 3 pg    MCHC 32 2 31 4 - 37 4 g/dL    RDW 17 8 (H) 11 6 - 15 1 %    Platelets 337 (L) 459 - 390 Thousands/uL    MPV 11 7 8 9 - 12 7 fL   Fibrinogen, Quantitative    Collection Time: 08/05/20  8:55 PM   Result Value Ref Range    Fibrinogen 109 (L) 227 - 495 mg/dL   APTT    Collection Time: 08/05/20  8:55 PM   Result Value Ref Range    PTT 42 (H) 23 - 37 seconds   Protime-INR    Collection Time: 08/05/20  8:55 PM   Result Value Ref Range    Protime 16 5 (H) 11 6 - 14 5 seconds    INR 1 33 (H) 0 84 - 1 19   CBC and Platelet    Collection Time: 08/06/20  2:12 AM   Result Value Ref Range    WBC 12 44 (H) 4 31 - 10 16 Thousand/uL    RBC 3 26 (L) 3 81 - 5 12 Million/uL    Hemoglobin 10 0 (L) 11 5 - 15 4 g/dL    Hematocrit 32 5 (L) 34 8 - 46 1 %     (H) 82 - 98 fL    MCH 30 7 26 8 - 34 3 pg    MCHC 30 8 (L) 31 4 - 37 4 g/dL    RDW 17 7 (H) 11 6 - 15 1 %    Platelets 371 791 - 130 Thousands/uL    MPV 12 2 8 9 - 12 7 fL   Fibrinogen, Quantitative    Collection Time: 08/06/20  2:12 AM   Result Value Ref Range    Fibrinogen 171 (L) 227 - 495 mg/dL   APTT    Collection Time: 08/06/20  2:12 AM   Result Value Ref Range    PTT 32 23 - 37 seconds   Basic metabolic panel    Collection Time: 08/06/20  4:54 AM   Result Value Ref Range    Sodium 138 136 - 145 mmol/L    Potassium 3 5 3 5 - 5 3 mmol/L    Chloride 110 (H) 100 - 108 mmol/L    CO2 20 (L) 21 - 32 mmol/L    ANION GAP 8 4 - 13 mmol/L    BUN 6 5 - 25 mg/dL    Creatinine 0 77 0 60 - 1 30 mg/dL    Glucose 99 65 - 140 mg/dL    Calcium 8 8 8 3 - 10 1 mg/dL    eGFR 93 ml/min/1 73sq m   Magnesium    Collection Time: 08/06/20  4:54 AM   Result Value Ref Range    Magnesium 2 5 1 6 - 2 6 mg/dL   Phosphorus    Collection Time: 08/06/20  4:54 AM   Result Value Ref Range    Phosphorus 3 4 2 7 - 4 5 mg/dL   CBC and Platelet    Collection Time: 08/06/20  9:07 AM   Result Value Ref Range    WBC 12 85 (H) 4 31 - 10 16 Thousand/uL    RBC 3 46 (L) 3 81 - 5 12 Million/uL    Hemoglobin 10 6 (L) 11 5 - 15 4 g/dL    Hematocrit 34 1 (L) 34 8 - 46 1 %    MCV 99 (H) 82 - 98 fL    MCH 30 6 26 8 - 34 3 pg    MCHC 31 1 (L) 31 4 - 37 4 g/dL    RDW 17 7 (H) 11 6 - 15 1 %    Platelets 746 056 - 011 Thousands/uL    MPV 12 0 8 9 - 12 7 fL   Fibrinogen, Quantitative    Collection Time: 08/06/20  9:07 AM   Result Value Ref Range    Fibrinogen 257 227 - 495 mg/dL   APTT    Collection Time: 08/06/20  9:07 AM   Result Value Ref Range    PTT 33 23 - 37 seconds   Protime-INR    Collection Time: 08/06/20  5:24 PM   Result Value Ref Range    Protime 15 0 (H) 11 6 - 14 5 seconds    INR 1 18 0 84 - 1 19   APTT    Collection Time: 08/06/20  5:24 PM   Result Value Ref Range     (HH) 23 - 37 seconds   APTT    Collection Time: 08/07/20  3:43 AM   Result Value Ref Range    PTT 85 (H) 23 - 37 seconds   CBC and differential    Collection Time: 08/07/20  3:43 AM   Result Value Ref Range    WBC 12 69 (H) 4 31 - 10 16 Thousand/uL    RBC 3 16 (L) 3 81 - 5 12 Million/uL    Hemoglobin 9 6 (L) 11 5 - 15 4 g/dL    Hematocrit 31 6 (L) 34 8 - 46 1 %     (H) 82 - 98 fL    MCH 30 4 26 8 - 34 3 pg    MCHC 30 4 (L) 31 4 - 37 4 g/dL    RDW 17 7 (H) 11 6 - 15 1 %    MPV 11 8 8 9 - 12 7 fL    Platelets 011 715 - 698 Thousands/uL    nRBC 0 /100 WBCs    Neutrophils Relative 67 43 - 75 %    Immat GRANS % 2 0 - 2 %    Lymphocytes Relative 14 14 - 44 %    Monocytes Relative 11 4 - 12 %    Eosinophils Relative 5 0 - 6 %    Basophils Relative 1 0 - 1 %    Neutrophils Absolute 8 49 (H) 1 85 - 7 62 Thousands/µL    Immature Grans Absolute 0 28 (H) 0 00 - 0 20 Thousand/uL    Lymphocytes Absolute 1 83 0 60 - 4 47 Thousands/µL    Monocytes Absolute 1 42 (H) 0 17 - 1 22 Thousand/µL    Eosinophils Absolute 0 58 0 00 - 0 61 Thousand/µL    Basophils Absolute 0 09 0 00 - 0 10 Thousands/µL   Comprehensive metabolic panel    Collection Time: 08/07/20  3:43 AM   Result Value Ref Range    Sodium 135 (L) 136 - 145 mmol/L    Potassium 3 0 (L) 3 5 - 5 3 mmol/L    Chloride 105 100 - 108 mmol/L    CO2 20 (L) 21 - 32 mmol/L    ANION GAP 10 4 - 13 mmol/L    BUN 6 5 - 25 mg/dL    Creatinine 0 75 0 60 - 1 30 mg/dL    Glucose 114 65 - 140 mg/dL    Calcium 8 9 8 3 - 10 1 mg/dL     (H) 5 - 45 U/L    ALT 65 12 - 78 U/L    Alkaline Phosphatase 210 (H) 46 - 116 U/L    Total Protein 6 8 6 4 - 8 2 g/dL    Albumin 2 1 (L) 3 5 - 5 0 g/dL    Total Bilirubin 0 53 0 20 - 1 00 mg/dL    eGFR 96 ml/min/1 73sq m   Folate    Collection Time: 08/07/20  3:43 AM   Result Value Ref Range    Folate 14 8 3 1 - 17 5 ng/mL   TSH, 3rd generation with Free T4 reflex    Collection Time: 08/07/20  3:43 AM   Result Value Ref Range    TSH 3RD GENERATON 5 440 (H) 0 358 - 3 740 uIU/mL   Vitamin B12    Collection Time: 08/07/20  3:43 AM   Result Value Ref Range    Vitamin B-12 346 100 - 900 pg/mL   T4, free    Collection Time: 08/07/20  3:43 AM   Result Value Ref Range    Free T4 0 78 0 76 - 1 46 ng/dL         Ct Head Wo Contrast    Result Date: 8/4/2020  Narrative: CT BRAIN - WITHOUT CONTRAST INDICATION:   pre-lysis  COMPARISON:  4/9/2009 MRI TECHNIQUE:  CT examination of the brain was performed  In addition to axial images, coronal 2D reformatted images were created and submitted for interpretation  Radiation dose length product (DLP) for this visit:  859 28 mGy-cm   This examination, like all CT scans performed in the [facility], was performed utilizing techniques to minimize radiation dose exposure, including the use of iterative  reconstruction and automated exposure control  IMAGE QUALITY:  Diagnostic  FINDINGS: PARENCHYMA:  No intracranial mass, mass effect or midline shift  No CT signs of acute infarction  No acute parenchymal hemorrhage   VENTRICLES AND EXTRA-AXIAL SPACES:  Normal for the patient's age  Hulon Arnold SINUSES:  Unremarkable  CALVARIUM AND EXTRACRANIAL SOFT TISSUES:  Normal      Impression: No acute intracranial abnormality  Findings are stable Workstation performed: [id number]     Ct Chest Wo Contrast    Result Date: 8/4/2020  Narrative: CT CHEST WITHOUT IV CONTRAST INDICATION:   evaluation for malignancy in the setting of hypercoaguable disorder  COMPARISON:  CT of the abdomen and pelvis from August 3, 2020  TECHNIQUE: CT examination of the chest was performed without intravenous contrast   Axial, sagittal, and coronal 2D reformatted images were created from the source data and submitted for interpretation  Radiation dose length product (DLP) for this visit:  774 75 mGy-cm   This examination, like all CT scans performed in the [facility], was performed utilizing techniques to minimize radiation dose exposure, including the use of iterative  reconstruction and automated exposure control  FINDINGS: LUNGS:  Mild subsegmental atelectasis in both lower lobes  Lungs otherwise clear  PLEURA:  Unremarkable  HEART/GREAT VESSELS:  Unremarkable  Thoracic aorta and central pulmonary arteries normal in caliber  MEDIASTINUM AND MICKEY: No lymphadenopathy or mass  Esophagus unremarkable  Trachea and main stem bronchi normal  CHEST WALL AND LOWER NECK:   Unremarkable  VISUALIZED STRUCTURES IN THE UPPER ABDOMEN:  Hepatic steatosis  Status post gastric bypass surgery  Status post cholecystectomy  IVC filter present  OSSEOUS STRUCTURES:  No acute fracture or destructive osseous lesion  Impression: Unremarkable CT of the chest   No evidence of lung cancer  Workstation performed: [id number]     Vas Lower Limb Venous Duplex Study, Complete Bilateral    Result Date: 8/5/2020  Narrative:  [facility] REPORT CLINICAL: Indications: PT admitted to hospital with acute low back and abdominal pain  She C/O IVC thrombus  She is currently receiving IV Heparin   Physician wants to rule out B/L LE DVT prior to lysis procedure  Operative History: Gastric bypass IVC filter in [facility] Risk Factors The patient has history of morbid obesity, PE, psychiatric disorder, and seizures  The patient's current BMI is 54 02, Weight is 305 lb and height is 63 in  She is a non-smoker  FINDINGS:  Segment  Right            Left              Impression       Impression       CFV      Normal (Patent)  Normal (Patent)     CONCLUSION:  Impression:  RIGHT LOWER LIMB: No evidence of acute deep vein thrombosis  No evidence of acute superficial thrombophlebitis noted  Doppler evaluation shows a normal response to augmentation maneuvers  Popliteal, posterior tibial and anterior tibial arterial Doppler waveforms are triphasic  Tech Note: There is an echogenic structure located in the inguinal region  This structure measures approximately 1 71 cm and is consistent with enlarged lymph node and channels  LEFT LOWER LIMB: No evidence of acute deep vein thrombosis  No evidence of acute superficial thrombophlebitis noted  Doppler evaluation shows a normal response to augmentation maneuvers  Popliteal, posterior tibial and anterior tibial arterial Doppler waveforms are triphasic  Technically difficult study secondary to bedside limitations and morbid obesity  Technical findings were faxed to chart and given to Dr Liberty Longo bedside  SIGNATURE: Electronically Signed by: Pascale Goldman MD, RPVI on 2020-08-05 11:04:02 PM    Ct Abdomen Pelvis With Contrast    Result Date: 8/3/2020  Narrative: CT ABDOMEN AND PELVIS WITH IV CONTRAST INDICATION:   lower abd pain, diarrhea, hx of gastric bypass  COMPARISON:  12/4/2017 TECHNIQUE:  CT examination of the abdomen and pelvis was performed  Axial, sagittal, and coronal 2D reformatted images were created from the source data and submitted for interpretation  Radiation dose length product (DLP) for this visit:  1385 mGy-cm     This examination, like all CT scans performed in the [facility], was performed utilizing techniques to minimize radiation dose exposure, including the use of iterative reconstruction and automated exposure control  IV Contrast: 100 mL of iohexol (OMNIPAQUE) Enteric Contrast:  Enteric contrast was administered  FINDINGS: ABDOMEN LOWER CHEST:  No clinically significant abnormality identified in the visualized lower chest  LIVER/BILIARY TREE:  Hepatomegaly and severe fatty infiltration unchanged from the prior study GALLBLADDER:  Gallbladder is surgically absent  SPLEEN:  Unremarkable  PANCREAS:  Unremarkable  ADRENAL GLANDS:  Unremarkable  KIDNEYS/URETERS:  Unremarkable  No hydronephrosis  STOMACH AND BOWEL:  Status post gastric bypass  No apparent complication  APPENDIX:  No findings to suggest appendicitis  ABDOMINOPELVIC CAVITY:  No ascites  No pneumoperitoneum  No lymphadenopathy  VESSELS:  An IVC filter is present  There is diffuse thrombus extending from the IVC filter inferiorly into the iliac vasculature  Extensive surrounding fat stranding in keeping with a thrombophlebitis  A small amount of thrombus extends superior to the filter  PELVIS REPRODUCTIVE ORGANS:  Unremarkable for patient's age  URINARY BLADDER:  Unremarkable  ABDOMINAL WALL/INGUINAL REGIONS:  Unremarkable  OSSEOUS STRUCTURES:  No acute fracture or destructive osseous lesion  Impression: An IVC filter is present  There is diffuse thrombus extending from the IVC filter inferiorly into the iliac vasculature  Extensive surrounding fat stranding in keeping with a thrombophlebitis  A small amount of thrombus extends superior to the filter    I personally discussed this study with Galina Tovar  on 8/3/2020 at 6:45 PM  Workstation performed: [id number]     Ir Venous Lysis    Result Date: 8/6/2020  Narrative:  Bilateral lower extremity venography with placement of infusion catheters for thrombolysis History: Patient with IVC filter in place presenting with central DVT, and bilateral lower extremity swelling   Contrast: 45 mL of iohexol (OMNIPAQUE) Fluoro time: 13 2 minutes Number of Images: 59 Radiation dose: 154 mGy Concious sedation time: 1 hour 30 minutes : MREE Oliveira  Asst : MERE Newton  Technique: The patient was brought to the interventional radiology suite and identified verbally and by wrist band  The patient was placed prone on the table and the bilateral popliteal fossae were prepped and draped in the usual sterile fashion  The right popliteal vein was evaluated with ultrasound  The vessel was found to be thrombosed, is seen by duplex, and noncompressible  Lidocaine was administered to the skin and a small skin incision was made  Under ultrasound guidance, the right popliteal vein was accessed using single wall Seldinger technique  Static images of real time needle entry into the vessel were obtained  A 0 018 wire was advanced into the popliteal vein over which a 5 Italian coaxial dilator was inserted  The inner dilator and wire were removed, and a gentle contrast injection was performed  A Bentson wire was advanced, and the dilator was removed  A A-TEXenstein catheter was then advanced over the Bentson wire, and used to negotiate the lower extremity venous system  As the catheter was advanced towards the inferior vena cava, gentle contrast injections were made at multiple stations, and images were obtained  The left popliteal vein was evaluated with ultrasound  The vessel was found to be thrombosed, is seen by duplex, and noncompressible  Lidocaine was administered to the skin and a small skin incision was made  Under ultrasound guidance, the left popliteal vein was accessed using single wall Seldinger technique  Static images of real time needle entry into the vessel were obtained  A 0 018 wire was advanced into the popliteal vein over which a 5 Italian coaxial dilator was inserted   The inner dilator and wire were removed, and a gentle contrast injection was performed  A Bentson wire was advanced, and the dilator was removed  A Berenstein catheter was then advanced over the Bentson wire, and used to negotiate the lower extremity venous system  As the catheter was advanced towards the inferior vena cava, gentle contrast injections were made at multiple stations, and images were obtained  Findings: The lower extremity veins are patent up to the level of the bilateral common iliac veins, where there is complete obstruction of the common iliac veins and infrarenal IVC  Intervention: Both catheters were advanced up to the level of the filter, and contrast injection demonstrated a small amount of clot extending above the filter is well  The catheters were each removed over an exchange wire, and bilateral 5 [employer] UniFuse  catheters with 40 cm of sideholes were placed  The patient was started on a TPA infusion of 1 mg per hour divided between the 2 catheters  The patient will return tomorrow for follow-up imaging  Impression: Impression: 1  Central DVT involving the infrarenal inferior vena cava down to the bilateral common iliac veins  There is some clot noted extending above the filter  2  Successful placement of bilateral infusion catheters  Workstation performed: [id number]         Assessment and plan, previous history of DVT, strong family history of DVT, the patient presented with thrombosis above and below the vena cava filter and iliac vein thrombosis bilaterally status post thrombolysis yesterday, with restoration of the circulation    She is status post gastric bypass surgery    She is morbidly obese    Regarding the anticoagulation in a patient with gastric bypass surgery the literature is very scant about the oral anticoagulation including warfarin with possible erratic absorption    I believe Lovenox 0 75 milligrams/kilogram subcu b i d   Is the best option at this time for such patient    Await for thrombophilia profile    Follow-up in about 1 month with Hematology office

## 2020-08-07 NOTE — ASSESSMENT & PLAN NOTE
Likely secondary to severe obesity     LFTs within normal limits  Recommend weight loss  Follow-up outpatient

## 2020-08-07 NOTE — ASSESSMENT & PLAN NOTE
Appears to be in a macrocytic in nature  No recent vitamin B12 or folate found on file, has evidence of hypothyroidism from thyroid studies from May of 2020  Likely absorption issues secondary to her gastric bypass history  Continue iron, folic acid  Vitamin B36 and folate within normal limits  Monitor CBCs

## 2020-08-07 NOTE — ASSESSMENT & PLAN NOTE
Patient with a history of DVT and PE in the past, not on anticoagulation  IVC filter placed in 2017 in [facility]  Patient developed acute pain starting 7/30, progressively worsening with radiation to lower extremities and abdomen  Patient found to have extensive thrombosis from site of IVC filter to the iliacs with consistent with thrombophlebitis  Status post IR lysis  Currently hemodynamically stable on room air  · Hematology consulted for workup for hypercoagulable state  · CT chest ordered was negative for malignancy  Further bloodwork ordered including PARMINDER, Anti DNA, cardiolipin AB, factor 5 leiden, protein C activity, prothrombin gene mutation pending     · Patient currently on pain regimen of oxycodone, will add Senokot to her bowel regimen  · Heparin gtt stopped and switched to Lovenox 0 75 mg/kg   · Continue to monitor vital signs  · PT/OT Consulted  · If stable tomorrow following PT/OT eval -> Okay to DC on Lovenox at home

## 2020-08-07 NOTE — PROGRESS NOTES
Attempted to visit pt is not available  Pt was sleeping in the chair       08/07/20 1100   Clinical Encounter Type   Visited With Patient;Patient not available

## 2020-08-08 LAB
ANION GAP SERPL CALCULATED.3IONS-SCNC: 10 MMOL/L (ref 4–13)
BUN SERPL-MCNC: 6 MG/DL (ref 5–25)
CALCIUM SERPL-MCNC: 8.9 MG/DL (ref 8.3–10.1)
CHLORIDE SERPL-SCNC: 104 MMOL/L (ref 100–108)
CO2 SERPL-SCNC: 21 MMOL/L (ref 21–32)
CREAT SERPL-MCNC: 0.81 MG/DL (ref 0.6–1.3)
ERYTHROCYTE [DISTWIDTH] IN BLOOD BY AUTOMATED COUNT: 17.8 % (ref 11.6–15.1)
GFR SERPL CREATININE-BSD FRML MDRD: 87 ML/MIN/1.73SQ M
GLUCOSE SERPL-MCNC: 104 MG/DL (ref 65–140)
HCT VFR BLD AUTO: 32.8 % (ref 34.8–46.1)
HGB BLD-MCNC: 9.9 G/DL (ref 11.5–15.4)
MCH RBC QN AUTO: 30.4 PG (ref 26.8–34.3)
MCHC RBC AUTO-ENTMCNC: 30.2 G/DL (ref 31.4–37.4)
MCV RBC AUTO: 101 FL (ref 82–98)
PLATELET # BLD AUTO: 294 THOUSANDS/UL (ref 149–390)
PMV BLD AUTO: 12.6 FL (ref 8.9–12.7)
POTASSIUM SERPL-SCNC: 3.1 MMOL/L (ref 3.5–5.3)
RBC # BLD AUTO: 3.26 MILLION/UL (ref 3.81–5.12)
SODIUM SERPL-SCNC: 135 MMOL/L (ref 136–145)
WBC # BLD AUTO: 14.04 THOUSAND/UL (ref 4.31–10.16)

## 2020-08-08 PROCEDURE — 99232 SBSQ HOSP IP/OBS MODERATE 35: CPT | Performed by: INTERNAL MEDICINE

## 2020-08-08 PROCEDURE — 80048 BASIC METABOLIC PNL TOTAL CA: CPT | Performed by: STUDENT IN AN ORGANIZED HEALTH CARE EDUCATION/TRAINING PROGRAM

## 2020-08-08 PROCEDURE — 85027 COMPLETE CBC AUTOMATED: CPT | Performed by: STUDENT IN AN ORGANIZED HEALTH CARE EDUCATION/TRAINING PROGRAM

## 2020-08-08 RX ORDER — POTASSIUM CHLORIDE 20 MEQ/1
40 TABLET, EXTENDED RELEASE ORAL
Status: COMPLETED | OUTPATIENT
Start: 2020-08-08 | End: 2020-08-08

## 2020-08-08 RX ORDER — POTASSIUM CHLORIDE 14.9 MG/ML
20 INJECTION INTRAVENOUS ONCE
Status: COMPLETED | OUTPATIENT
Start: 2020-08-08 | End: 2020-08-08

## 2020-08-08 RX ADMIN — GABAPENTIN 400 MG: 400 CAPSULE ORAL at 17:42

## 2020-08-08 RX ADMIN — TOPIRAMATE 150 MG: 100 TABLET, FILM COATED ORAL at 08:39

## 2020-08-08 RX ADMIN — OXYCODONE HYDROCHLORIDE 5 MG: 5 TABLET ORAL at 17:42

## 2020-08-08 RX ADMIN — GABAPENTIN 400 MG: 400 CAPSULE ORAL at 21:44

## 2020-08-08 RX ADMIN — POTASSIUM CHLORIDE 20 MEQ: 14.9 INJECTION, SOLUTION INTRAVENOUS at 08:40

## 2020-08-08 RX ADMIN — LITHIUM CARBONATE 600 MG: 300 TABLET, FILM COATED, EXTENDED RELEASE ORAL at 08:39

## 2020-08-08 RX ADMIN — POTASSIUM CHLORIDE 40 MEQ: 1500 TABLET, EXTENDED RELEASE ORAL at 11:44

## 2020-08-08 RX ADMIN — POLYETHYLENE GLYCOL 3350 17 G: 17 POWDER, FOR SOLUTION ORAL at 08:40

## 2020-08-08 RX ADMIN — Medication 100 MG: at 08:39

## 2020-08-08 RX ADMIN — FERROUS SULFATE TAB 325 MG (65 MG ELEMENTAL FE) 325 MG: 325 (65 FE) TAB at 08:39

## 2020-08-08 RX ADMIN — CALCIUM CARBONATE (ANTACID) CHEW TAB 500 MG 500 MG: 500 CHEW TAB at 13:02

## 2020-08-08 RX ADMIN — POTASSIUM CHLORIDE 40 MEQ: 1500 TABLET, EXTENDED RELEASE ORAL at 09:58

## 2020-08-08 RX ADMIN — DOCUSATE SODIUM AND SENNOSIDES 1 TABLET: 8.6; 5 TABLET ORAL at 21:40

## 2020-08-08 RX ADMIN — ALUMINUM HYDROXIDE, MAGNESIUM HYDROXIDE, AND SIMETHICONE 15 ML: 200; 200; 20 SUSPENSION ORAL at 13:02

## 2020-08-08 RX ADMIN — GABAPENTIN 400 MG: 400 CAPSULE ORAL at 08:39

## 2020-08-08 RX ADMIN — ENOXAPARIN SODIUM 100 MG: 100 INJECTION SUBCUTANEOUS at 21:44

## 2020-08-08 RX ADMIN — OXYCODONE HYDROCHLORIDE 5 MG: 5 TABLET ORAL at 06:21

## 2020-08-08 RX ADMIN — TOPIRAMATE 150 MG: 100 TABLET, FILM COATED ORAL at 17:42

## 2020-08-08 RX ADMIN — QUETIAPINE FUMARATE 500 MG: 100 TABLET ORAL at 21:40

## 2020-08-08 RX ADMIN — PANTOPRAZOLE SODIUM 40 MG: 40 TABLET, DELAYED RELEASE ORAL at 06:21

## 2020-08-08 RX ADMIN — ENOXAPARIN SODIUM 100 MG: 100 INJECTION SUBCUTANEOUS at 08:39

## 2020-08-08 RX ADMIN — ACETAMINOPHEN 975 MG: 325 TABLET, FILM COATED ORAL at 21:47

## 2020-08-08 RX ADMIN — SERTRALINE HYDROCHLORIDE 50 MG: 50 TABLET ORAL at 08:39

## 2020-08-08 RX ADMIN — FOLIC ACID 1 MG: 1 TABLET ORAL at 08:39

## 2020-08-08 RX ADMIN — LITHIUM CARBONATE 750 MG: 450 TABLET ORAL at 21:44

## 2020-08-08 NOTE — PROGRESS NOTES
INTERNAL MEDICINE RESIDENCY PROGRESS NOTE     PATIENT INFORMATION     Name: Arminda Granado   Age & Sex: 53 y o  female   MRN: [id number]  Hospital Stay Days: 4  Unit/Bed#: [facility] 429-01   Encounter: [id number]  Team: SOD Team C     ASSESSMENT/PLAN     Principal Problem:    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[place] [address] )  Active Problems:    Hx of pulmonary embolus    S/P gastric bypass    Anemia    Hepatic steatosis    Thrombophlebitis    Migraine    Bipolar disorder (HCC)    Swelling of lower extremity    Morbid obesity with BMI of 50 0-59 9, adult ([facility] )    Acquired hypothyroidism    Vitamin D deficiency    Hypokalemia    Hypokalemia  Assessment & Plan  Today K was 3 1  IV Potassium 20 given  PO Potassium 40 x 2 ordered  Follow up BMP in morning    Acquired hypothyroidism  Assessment & Plan  Stable  Most recent TSH elevated at 6 16, 2 free T4 depressed 0 7  Was not started on supplementation as an outpatient  May need to consider starting and continuing levothyroxine with close follow up    Morbid obesity with BMI of 50 0-59 9, adult ([place] [address] )  Assessment & Plan  Status post gastric bypass  Will need diet and lifestyle modification  Follow-up as an outpatient    Swelling of lower extremity  Assessment & Plan  Bilateral lower extremity swelling, right greater than left with erythema  Appears chronic, no tenderness  -Dopplers negative for acute DVT bilaterally  -Management as described above for IVC filter  May need to consider compression stockings    Bipolar disorder (HCC)  Assessment & Plan  Stable, has flat affect at baseline  Continue home lithium:  600 mg a m , and 750 mg p m  Continue home Seroquel 500 mg p m  Continue home Zoloft 50 mg daily  Patient denies any suicidal/homicidal ideation  Continue to monitor    Migraine  Assessment & Plan  Stable, patient denies any headaches  Patient maintained on Topamax 150 mg b i d , will continue  Patient not using Imitrex    Will hold off      Thrombophlebitis  Assessment & Plan  Heparin drip switched to Lovenox   S/p lysis by IR (8/4)  On tylenol oxycodone PRN for pain control  Will monitor for pain control    Hepatic steatosis  Assessment & Plan  Likely secondary to severe obesity  LFTs within normal limits  Recommend weight loss  Follow-up outpatient    Anemia  Assessment & Plan  Appears to be in a macrocytic in nature  No recent vitamin B12 or folate found on file, has evidence of hypothyroidism from thyroid studies from May of 2020  Likely absorption issues secondary to her gastric bypass history  Continue iron, folic acid  Vitamin W03 and folate within normal limits  Monitor CBCs        S/P gastric bypass  Assessment & Plan  Patient had gastric bypass several years ago  -has regained much of the weight and more  -continue vitamin supplementations as ordered  Continue to monitor    Hx of pulmonary embolus  Assessment & Plan  Diagnosed in 2015  S/p IVC in 2015  Presented with acute abdominal pain found to have IVC thrombosis status post IR lysis  Management as described below  Patient will need lifelong anticoagulation -> Lovenox       * IVC thrombosis [facility])  Assessment & Plan  Patient with a history of DVT and PE in the past, not on anticoagulation  IVC filter placed in 2017 in [facility]  Patient developed acute pain starting 7/30, progressively worsening with radiation to lower extremities and abdomen  Patient found to have extensive thrombosis from site of IVC filter to the iliacs with consistent with thrombophlebitis  Status post IR lysis  Currently hemodynamically stable on room air  · Hematology consulted for workup for hypercoagulable state  · CT chest ordered was negative for malignancy  Further bloodwork ordered including PARMINDER, Anti DNA, cardiolipin AB, factor 5 leiden, protein C activity, prothrombin gene mutation pending     · Patient currently on pain regimen of oxycodone, will add Senokot to her bowel regimen  · Heparin gtt stopped and switched to Lovenox 0 75 mg/kg   · Continue to monitor vital signs  · PT/OT Consulted  · If stable tomorrow following PT/OT eval -> Okay to DC on Lovenox at home          Disposition: Inpatient     SUBJECTIVE     Patient seen and examined  No acute events overnight  Today she states that she feels well  Her abdominal pain has improved since yesterday  Now she is only experiencing minor cramps  Her left lower extremity pain has improved as well  She denies any recent fevers, chills, shortness of breath or chest pain  OBJECTIVE     Vitals:    20 2341 20 0600 20 0736 20 1529   BP: 118/65  130/80 118/72   BP Location: Right arm  Right arm Left arm   Pulse: 98  101 92   Resp: 18  16 18   Temp: 98 4 °F (36 9 °C)  98 2 °F (36 8 °C) 98 2 °F (36 8 °C)   TempSrc: Oral  Oral Oral   SpO2: 94%  95% 93%   Weight:  (!) 139 kg (306 lb 7 oz)     Height:          Temperature:   Temp (24hrs), Av 3 °F (36 8 °C), Min:98 2 °F (36 8 °C), Max:98 4 °F (36 9 °C)    Temperature: 98 2 °F (36 8 °C)  Intake & Output:  I/O       701 -  07 -  07 -  07    P  O  450 540 480    I V  (mL/kg)       IV Piggyback  100     Total Intake(mL/kg) 450 (3 3) 640 (4 6) 480 (3 5)    Urine (mL/kg/hr) 2375 (0 7) 1239 (0 4)     Stool  1     Total Output 2375 1240     Net -1925 -600 +480           Unmeasured Urine Occurrence  3 x 1 x    Unmeasured Stool Occurrence  1 x           Intake/Output Summary (Last 24 hours) at 2020 1548  Last data filed at 2020 1200  Gross per 24 hour   Intake 480 ml   Output 889 ml   Net -409 ml     I/O this shift: In: 480 [P O :480]  Out: -   Weights:   IBW: 52 4 kg    Body mass index is 54 28 kg/m²  Weight (last 2 days)     Date/Time   Weight    20 0600   (!) 139 (306 44)    20 0722   (!) 136 (300 05)    20 0600   (!) 137 (302 91)    20 0600   (!) 138 (304 9)            Physical Exam  Constitutional:       Appearance: She is well-developed  She is obese     HENT: Head: Normocephalic and atraumatic  Nose: Nose normal    Eyes:      General:         Right eye: No discharge  Left eye: No discharge  Conjunctiva/sclera: Conjunctivae normal       Pupils: Pupils are equal, round, and reactive to light  Neck:      Vascular: No JVD  Cardiovascular:      Rate and Rhythm: Normal rate and regular rhythm  Heart sounds: Normal heart sounds  No murmur  No friction rub  No gallop  Pulmonary:      Effort: Pulmonary effort is normal  No respiratory distress  Breath sounds: No stridor  Rales present  No wheezing  Chest:      Chest wall: No tenderness  Abdominal:      General: Bowel sounds are normal  There is distension  Palpations: Abdomen is soft  There is no mass  Tenderness: There is abdominal tenderness  There is no guarding or rebound  Hernia: No hernia is present  Musculoskeletal:         General: No deformity  Right lower leg: Edema present  Left lower leg: Edema present  Skin:     General: Skin is warm and dry  Findings: Erythema (RLE) present  Neurological:      Mental Status: She is alert and oriented to person, place, and time  Psychiatric:         Behavior: Behavior normal          Thought Content: Thought content normal          Judgment: Judgment normal         LABORATORY DATA     Labs: I have personally reviewed pertinent reports  Results from last 7 days   Lab Units 08/08/20  0612 08/07/20  0343 08/06/20  0907  08/04/20  0715 08/03/20  1632   WBC Thousand/uL 14 04* 12 69* 12 85*   < > 14 53* 15 44*   HEMOGLOBIN g/dL 9 9* 9 6* 10 6*   < > 12 0 12 1   HEMATOCRIT % 32 8* 31 6* 34 1*   < > 38 3 39 8   PLATELETS Thousands/uL 294 213 175   < > 244 229   NEUTROS PCT %  --  67  --   --  72 80*   MONOS PCT %  --  11  --   --  14* 12    < > = values in this interval not displayed        Results from last 7 days   Lab Units 08/08/20  0612 08/07/20  0343 08/06/20  0454  08/03/20  1632   POTASSIUM mmol/L 3 1* 3  0* 3 5   < > 3 5   CHLORIDE mmol/L 104 105 110*   < > 103   CO2 mmol/L 21 20* 20*   < > 21   BUN mg/dL 6 6 6   < > 5   CREATININE mg/dL 0 81 0 75 0 77   < > 0 78   CALCIUM mg/dL 8 9 8 9 8 8   < > 9 0   ALK PHOS U/L  --  210*  --   --  204*   ALT U/L  --  65  --   --  39   AST U/L  --  112*  --   --  38    < > = values in this interval not displayed  Serum creatinine: 0 81 mg/dL 08/08/20 0612  Estimated creatinine clearance: 119 2 mL/min   Results from last 7 days   Lab Units 08/06/20  0454   MAGNESIUM mg/dL 2 5     Results from last 7 days   Lab Units 08/06/20  0454   PHOSPHORUS mg/dL 3 4      Results from last 7 days   Lab Units 08/07/20  0953 08/07/20  0343 08/06/20  1724  08/05/20  2055  08/03/20  1901   INR   --   --  1 18  --  1 33*  --  1 14   PTT seconds 99* 85* 144*   < > 42*   < > 39*    < > = values in this interval not displayed  IMAGING & DIAGNOSTIC TESTING     Radiology Results: I have personally reviewed pertinent reports  Ct Head Wo Contrast    Result Date: 8/4/2020  Impression: No acute intracranial abnormality  Findings are stable Workstation performed: [id number]     Ct Chest Wo Contrast    Result Date: 8/4/2020  Impression: Unremarkable CT of the chest   No evidence of lung cancer  Workstation performed: [id number]     Ir Venous Lysis    Result Date: 8/6/2020  Impression: Impression: 1  Central DVT involving the infrarenal inferior vena cava down to the bilateral common iliac veins  There is some clot noted extending above the filter  2  Successful placement of bilateral infusion catheters  Workstation performed: [id number]     Other Diagnostic Testing: I have personally reviewed pertinent reports        ACTIVE MEDICATIONS     Current Facility-Administered Medications   Medication Dose Route Frequency    acetaminophen (TYLENOL) tablet 650 mg  650 mg Oral Q6H PRN    Or    acetaminophen (TYLENOL) tablet 975 mg  975 mg Oral Q6H PRN    aluminum-magnesium hydroxide-simethicone (MYLANTA) 200-200-20 mg/5 mL oral suspension 15 mL  15 mL Oral Q4H PRN    calcium carbonate (TUMS) chewable tablet 500 mg  500 mg Oral Daily PRN    enoxaparin (LOVENOX) subcutaneous injection 100 mg  0 75 mg/kg Subcutaneous Q12H [address]    ferrous sulfate tablet 325 mg  325 mg Oral Daily With Breakfast    folic acid (FOLVITE) tablet 1 mg  1 mg Oral Daily    gabapentin (NEURONTIN) capsule 400 mg  400 mg Oral TID    lithium carbonate (LITHOBID) CR tablet 600 mg  600 mg Oral Daily    lithium carbonate (LITHOBID) CR tablet 750 mg  750 mg Oral HS    ondansetron (ZOFRAN) injection 4 mg  4 mg Intravenous Q6H PRN    oxyCODONE (ROXICODONE) IR tablet 5 mg  5 mg Oral Q4H PRN    pantoprazole (PROTONIX) EC tablet 40 mg  40 mg Oral Early Morning    polyethylene glycol (MIRALAX) packet 17 g  17 g Oral Daily    pyridoxine (VITAMIN B6) tablet 100 mg  100 mg Oral Daily    QUEtiapine (SEROquel) tablet 500 mg  500 mg Oral HS    senna-docusate sodium (SENOKOT S) 8 6-50 mg per tablet 1 tablet  1 tablet Oral HS    sertraline (ZOLOFT) tablet 50 mg  50 mg Oral Daily    topiramate (TOPAMAX) tablet 150 mg  150 mg Oral BID     VTE Pharmacologic Prophylaxis: Enoxaparin (Lovenox)  VTE Mechanical Prophylaxis: sequential compression device    ==  Cesar Funes MD  IM Resident, PGY-1  [facility]

## 2020-08-08 NOTE — DISCHARGE SUMMARY
INTERNAL MEDICINE RESIDENCY DISCHARGE SUMMARY     Martina Robins   53 y o  female  MRN: [id number]  Room/Bed: [facility] 429/[facility] 429-01     [address] [place] [address] MED SURG/SD   Encounter: [id number]    DISCHARGE INFORMATION       Admitting Provider: Omar Rm DO  Admission Date: 8/4/2020    Discharge Provider: Omar Rm DO  Discharge Date: 8/9/2020    Discharge Disposition: [address]  Discharge Condition: good  Discharge with Lines: no    Discharge Diet: regular diet  Activity Restrictions: none  Test Results Pending at Discharge: Thrombophilia Panel    Discharge Diagnoses:  Principal Problem:    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HCC)  Active Problems:    Hx of pulmonary embolus    S/P gastric bypass    Anemia    Hepatic steatosis    Thrombophlebitis    Migraine    Bipolar disorder (HCC)    Swelling of lower extremity    Morbid obesity with BMI of 50 0-59 9, adult ([facility] )    Acquired hypothyroidism    Vitamin D deficiency    Hypokalemia  Resolved Problems:    Hypokalemia    Hypokalemia  Assessment & Plan  Today K was 3 1  PO Potassium 40  She will continue supplementation as an outpatient until evaluated by her PCP  Follow up BMP in 1 week    Sick Euthyroid Syndrome  Assessment & Plan  Stable  Most recent TSH elevated at 5 44, normal T4  Was not started on supplementation as an outpatient  Recheck TSH in 1 week and follow-up PCP        Morbid obesity with BMI of 50 0-59 9, adult [facility])  Assessment & Plan  Status post gastric bypass  Will need diet and lifestyle modification  Follow-up as an outpatient    Swelling of lower extremity  Assessment & Plan  Bilateral lower extremity swelling, right greater than left with erythema  Appears chronic, no tenderness, likely venous insufficiency  -Dopplers negative for acute DVT bilaterally  -Management as described above for IVC filter  Compression  Stockings ordered     Bipolar disorder ([facility] )  Assessment & Plan  Stable, has flat affect at baseline  Continue home lithium:  600 mg a m , and 750 mg p m  Continue home Seroquel 500 mg p m  Continue home Zoloft 50 mg daily  Patient denies any suicidal/homicidal ideation  Continue home meds on discharge    Migraine  Assessment & Plan  Stable, patient denies any headaches  Patient maintained on Topamax 150 mg b i d , will continue  Patient not using Imitrex  Will continue his outpatient      Thrombophlebitis  Assessment & Plan  Heparin drip switched to Lovenox   S/p lysis by IR (8/4)  Pain currently controlled off pain medication  Use Tylenol as needed as an outpatient    Hepatic steatosis  Assessment & Plan  Likely secondary to severe obesity  LFTs within normal limits  Recommend weight loss  Follow-up outpatient    Anemia  Assessment & Plan  Appears to be in a macrocytic in nature, no obvious signs of bleeding  Likely absorption issues secondary to her gastric bypass history  Continue iron, folic acid  Follow with PCP      S/P gastric bypass  Assessment & Plan  Patient had gastric bypass several years ago  -has regained much of the weight and more  -continue vitamin supplementations as ordered  Continue to monitor    Hx of pulmonary embolus  Assessment & Plan  Diagnosed in 2015  S/p IVC in 2015  Presented with acute abdominal pain found to have IVC thrombosis status post IR lysis  Management as described below  Patient will need lifelong anticoagulation -> Lovenox       * IVC thrombosis [facility])  Assessment & Plan  Patient with a history of DVT and PE in the past, not on anticoagulation  IVC filter placed in 2017 in [facility]  Patient developed acute pain starting 7/30, progressively worsening with radiation to lower extremities and abdomen  Patient found to have extensive thrombosis from site of IVC filter to the iliacs with consistent with thrombophlebitis  Status post IR lysis  Currently hemodynamically stable on room air  · Hematology consulted for workup for hypercoagulable state  Follow-up in 1 month in office  · CT chest ordered was negative for malignancy  Further bloodwork ordered including PARMINDER, Anti DNA, cardiolipin AB, factor 5 leiden, protein C activity, prothrombin gene mutation pending  · Heparin gtt stopped and switched to Lovenox 0 75 mg/kg which she will continue as an outpatient  · Will have home PT services with bariatric walker  · Stable for discharge  Follow-up with PCP in 1-2 weeks      [address] [place] [address] as per Dr Yaritza Trevizo:  "Pt is a 49yo F with a sig PMH of prior PE s/p IVC filter (not on anticoagulation), hepatic steatosis, s/p gastric bypass, migraines, back pain who presented to  [facility] on 8/[facility]/20 with worsening back pain as well as abdominal pain radiating into her B/L upper legs   Pain began  and progressively worsened   During a prior ED visit, Patient was initially told she had back pain with sciatica   Did not take any medication for pain at home  [facility] saddle anesthesia, urinary or bowel incontinence   Currently abdominal pain is diffuse, rated 7/10 severity   Difficulty elevating legs worse on the right    Patient states that she had bilateral lower extremity DVTs and PE in   Patient had an IVC filter placed in [address] was started on Xarelto at that time however did not tolerated due to GI bleed   Patient has been off anticoagulation   Patient states she has not been worked up for clotting disorder in the past   Family history- history of DVT and PE in sister secondary to lupus   Sister    No other family members with clotting issues     On CT abd/ pelvis with contrast, pt found to have diffuse thrombus extending from the IVC filter inferiorly into the iliac vasculature with  surrounding fat stranding in keeping with a thrombophlebitis  A small amount of thrombus extends superior to the filter    Labs significant for elevated WBC 15    Pt will be transferred to [facility] for vascular consult and IR thrombus retrieval  Started on heparin ggt  NPO at [facility]  Pain control "     At \[facility]\"" the patient underwent IR Venous Lysis on 8/4 and an IR Venous Lysis Recheck on 8/5  She was transferred from the ICU to the medical floors on 8/6  A lower extremity duplex was negative for DVT  CT chest was negative for any signs of malignancy  Hematology/Oncology was following and ordered a full thrombophilia panel given patients of PE and family history of PE/DVT  The patient was on Heparin gtt until 8/7 when she was switched to Lovenox  She will require lifelong anticoagulation with Lovenox  She was instructed to follow up with Hematology in 1 month following discharge to review the results of her pending thrombophilia panel  during admission she was hypokalemic, she was supplemented with IV and oral potassium, likely has absorption issues  Was discharged with oral potassium supplementation follow-up BMP in 1 week  She also had elevated TSH with normal T4, suspect sick euthyroid syndrome, will recheck TSH in 6 weeks  She was ordered outpatient compression stockings for her chronic lower extremity edema which is likely secondary to chronic venous insufficiency  Her macrocytic anemia was stable throughout her admission, she will continue her oral iron and folate supplementation  She is a candidate for home PT and, was provided with bariatric walker DME  She will follow-up with her PCP in 1-2 weeks  Diagnostic & Therapeutic Procedures Performed:  Ct Head Wo Contrast    Result Date: 8/4/2020  Impression: No acute intracranial abnormality  Findings are stable Workstation performed: [id number]     Ct Chest Wo Contrast    Result Date: 8/4/2020  Impression: Unremarkable CT of the chest   No evidence of lung cancer  Workstation performed: [id number]     Ir Venous Lysis    Result Date: 8/6/2020  Impression: Impression: 1  Central DVT involving the infrarenal inferior vena cava down to the bilateral common iliac veins   There is some clot noted extending above the filter  2  Successful placement of bilateral infusion catheters  Workstation performed: [id number]       Code Status: Level 1 - Full Code  Advance Directive & Living Will: <no information>  Power of :    POLST:      Medications:   Your Medications     STOP taking these medications     STOP taking these medications    diclofenac sodium 1 %   Commonly known as: VOLTAREN     Doxycycline Hyclate 120 MG Tbec     furosemide 20 mg tablet   Commonly known as: LASIX    START taking these medications     START taking these medications     Morning  Afternoon  Evening  Bedtime  As Needed     enoxaparin 100 mg/mL   Commonly known as: LOVENOX   Inject 1 mL (100 mg total) under the skin every 12 (twelve) hours   Refills: 5   Last time this was given: 100 mg on August 9, 2020  9:02 AM     8/10 9pm      NEEDLE (DISP) 27 G 27G X 1/2" Misc   by Does not apply route every 12 (twelve) hours   Refills: 5          pantoprazole 40 mg tablet   Commonly known as: PROTONIX   Take 1 tablet (40 mg total) by mouth daily in the early morning   Refills: 1   Last time this was given: 40 mg on August 9, 2020  5:13 AM  8/10 6am         potassium chloride 20 mEq tablet   Commonly known as: K-DUR,KLOR-CON   Take 1 tablet (20 mEq total) by mouth 2 (two) times a day   Refills: 1   Last time this was given: 40 mEq on August 9, 2020 12:22 PM         CONTINUE taking these medications     CONTINUE taking these medications     Morning  Afternoon  Evening  Bedtime  As Needed     chlordiazePOXIDE 25 mg capsule   Commonly known as: LIBRIUM   Take 25 mg by mouth 3 (three) times a day as needed for anxiety   Refills: 0      XXX    ferrous sulfate 325 (65 Fe) mg tablet   Take 325 mg by mouth daily with breakfast   Refills: 0   Last time this was given: 325 mg on August 9, 2020  9:01 AM  6/59 9am         folic acid 1 mg tablet   Commonly known as: FOLVITE   Take 1 mg by mouth daily   Refills: 0   Last time this was given: 1 mg on August 9, 2020  9:00 AM  8/10 9am         gabapentin 400 mg capsule   Commonly known as: NEURONTIN   Take 100 mg by mouth 3 (three) times a day   Refills: 0   Last time this was given: 400 mg on August 9, 2020  9:01 AM    8/9 5pm       * lithium carbonate 300 mg CR tablet   Commonly known as: LITHOBID   Take 600 mg by mouth every morning   Refills: 0   Last time this was given: Ask your nurse or doctor  8/10 9am         * lithium carbonate 300 mg CR tablet   Commonly known as: LITHOBID   Take 750 mg by mouth every evening   Refills: 0   Last time this was given: Ask your nurse or doctor    8/9 9pm       pyridoxine 100 mg tablet   Commonly known as: VITAMIN B6   Take 100 mg by mouth daily   Refills: 0   Last time this was given: 100 mg on August 9, 2020  9:02 AM  8/10 9am         QUEtiapine 100 mg tablet   Commonly known as: SEROquel   Take 500 mg by mouth daily at bedtime   Refills: 0   Last time this was given: 500 mg on August 8, 2020  9:40 PM     8/9 9pm      sertraline 50 mg tablet   Commonly known as: ZOLOFT   Take 50 mg by mouth daily   Refills: 0   Last time this was given: 50 mg on August 9, 2020  9:00 AM  8/10 9am         SUMAtriptan 100 mg tablet   Commonly known as: IMITREX   Take 100 mg by mouth as needed for migraine   Refills: 0          topiramate 100 mg tablet   Commonly known as: TOPAMAX   Take 150 mg by mouth 2 (two) times a day   Refills: 0   Last time this was given: 150 mg on August 9, 2020  9:03 AM             Allergies: Allergies   Allergen Reactions    Morphine      Seizures        FOLLOW-UP     Consulting Providers Follow-up:  yes      Physician name: Dr Benny Alva  Specialty: Hematology and Oncology  Office phone number: [phone]  Follow up within next: 1 month     Follow-up with PCP in 1-2 weeks      Active Issues Requiring Follow-up:   Thrombophilia Profile     Vitals:    08/09/20 0803   BP: 122/74   Pulse: 101   Resp: 16   Temp: 98 1 °F (36 7 °C)   SpO2: 98%     Physical Exam  Constitutional:       Appearance: Normal appearance  She is obese  HENT:      Head: Normocephalic and atraumatic  Mouth/Throat:      Mouth: Mucous membranes are moist    Eyes:      Extraocular Movements: Extraocular movements intact  Conjunctiva/sclera: Conjunctivae normal       Pupils: Pupils are equal, round, and reactive to light  Cardiovascular:      Rate and Rhythm: Normal rate and regular rhythm  Pulses: Normal pulses  Heart sounds: Normal heart sounds  No murmur  No friction rub  No gallop  Pulmonary:      Effort: Pulmonary effort is normal  No respiratory distress  Breath sounds: Normal breath sounds  No wheezing  Abdominal:      General: Abdomen is flat  Bowel sounds are normal  There is distension  Palpations: Abdomen is soft  Musculoskeletal:      Comments: Plus two pitting edema bilateral lower extremities   Skin:     General: Skin is warm and dry  Neurological:      General: No focal deficit present  Mental Status: She is alert and oriented to person, place, and time  Psychiatric:      Comments: Flat affect  Discharge Statement:   I spent 30 minutes minutes discharging the patient  This time was spent on the day of discharge  I had direct contact with the patient on the day of discharge   Additional documentation is required if more than 30 minutes were spent on discharge     ==    Marci Pratt DO  [facility] Internal Medicine Residency

## 2020-08-08 NOTE — PLAN OF CARE
Problem: PAIN - ADULT  Goal: Verbalizes/displays adequate comfort level or baseline comfort level  Description: Interventions:  - Encourage patient to monitor pain and request assistance  - Assess pain using appropriate pain scale  - Administer analgesics based on type and severity of pain and evaluate response  - Implement non-pharmacological measures as appropriate and evaluate response  - Consider cultural and social influences on pain and pain management  - Notify physician/advanced practitioner if interventions unsuccessful or patient reports new pain  8/8/2020 1557 by Hernandez Gleason RN  Outcome: Progressing  8/8/2020 1557 by Hernandez Gleason RN  Outcome: Progressing     Problem: INFECTION - ADULT  Goal: Absence or prevention of progression during hospitalization  Description: INTERVENTIONS:  - Assess and monitor for signs and symptoms of infection  - Monitor lab/diagnostic results  - Monitor all insertion sites, i e  indwelling lines, tubes, and drains  - Monitor endotracheal if appropriate and nasal secretions for changes in amount and color  - [place] appropriate cooling/warming therapies per order  - Administer medications as ordered  - Instruct and encourage patient and family to use good hand hygiene technique  - Identify and instruct in appropriate isolation precautions for identified infection/condition  8/8/2020 1557 by Hernandez Gleason RN  Outcome: Progressing  8/8/2020 1557 by Hernandez Gleason RN  Outcome: Progressing  Goal: Absence of fever/infection during neutropenic period  Description: INTERVENTIONS:  - Monitor WBC    8/8/2020 1557 by Hernandez Gleason RN  Outcome: Progressing  8/8/2020 1557 by Hernandez Gleason RN  Outcome: Progressing     Problem: SAFETY ADULT  Goal: Patient will remain free of falls  Description: INTERVENTIONS:  - Assess patient frequently for physical needs  -  Identify cognitive and physical deficits and behaviors that affect risk of falls    -  [place] fall precautions as indicated by assessment   - Educate patient/family on patient safety including physical limitations  - Instruct patient to call for assistance with activity based on assessment  - Modify environment to reduce risk of injury  - Consider OT/PT consult to assist with strengthening/mobility  8/8/2020 1557 by Suze Ahumada RN  Outcome: Progressing  8/8/2020 1557 by Suze Ahumada RN  Outcome: Progressing  Goal: Maintain or return to baseline ADL function  Description: INTERVENTIONS:  -  Assess patient's ability to carry out ADLs; assess patient's baseline for ADL function and identify physical deficits which impact ability to perform ADLs (bathing, care of mouth/teeth, toileting, grooming, dressing, etc )  - Assess/evaluate cause of self-care deficits   - Assess range of motion  - Assess patient's mobility; develop plan if impaired  - Assess patient's need for assistive devices and provide as appropriate  - Encourage maximum independence but intervene and supervise when necessary  - Involve family in performance of ADLs  - Assess for home care needs following discharge   - Consider OT consult to assist with ADL evaluation and planning for discharge  - Provide patient education as appropriate  8/8/2020 1557 by Suze Ahumada RN  Outcome: Progressing  8/8/2020 1557 by Suze Ahumada RN  Outcome: Progressing  Goal: Maintain or return mobility status to optimal level  Description: INTERVENTIONS:  - Assess patient's baseline mobility status (ambulation, transfers, stairs, etc )    - Identify cognitive and physical deficits and behaviors that affect mobility  - Identify mobility aids required to assist with transfers and/or ambulation (gait belt, sit-to-stand, lift, walker, cane, etc )  - [place] fall precautions as indicated by assessment  - Record patient progress and toleration of activity level on Mobility SBAR; progress patient to next Phase/Stage  - Instruct patient to call for assistance with activity based on assessment  - Consider rehabilitation consult to assist with strengthening/weightbearing, etc   8/8/2020 1557 by Daryl Ayon RN  Outcome: Progressing  8/8/2020 1557 by Daryl Ayon RN  Outcome: Progressing     Problem: DISCHARGE PLANNING  Goal: Discharge to home or other facility with appropriate resources  Description: INTERVENTIONS:  - Identify barriers to discharge w/patient and caregiver  - Arrange for needed discharge resources and transportation as appropriate  - Identify discharge learning needs (meds, wound care, etc )  - Arrange for interpretive services to assist at discharge as needed  - Refer to Case Management Department for coordinating discharge planning if the patient needs post-hospital services based on physician/advanced practitioner order or complex needs related to functional status, cognitive ability, or social support system  8/8/2020 1557 by Daryl Ayon RN  Outcome: Progressing  8/8/2020 1557 by Daryl Ayon RN  Outcome: Progressing     Problem: Knowledge Deficit  Goal: Patient/family/caregiver demonstrates understanding of disease process, treatment plan, medications, and discharge instructions  Description: Complete learning assessment and assess knowledge base  Interventions:  - Provide teaching at level of understanding  - Provide teaching via preferred learning methods  8/8/2020 1557 by Daryl Ayon RN  Outcome: Progressing  8/8/2020 1557 by Daryl Ayon RN  Outcome: Progressing     Problem: Potential for Falls  Goal: Patient will remain free of falls  Description: INTERVENTIONS:  - Assess patient frequently for physical needs  -  Identify cognitive and physical deficits and behaviors that affect risk of falls    -  [place] fall precautions as indicated by assessment   - Educate patient/family on patient safety including physical limitations  - Instruct patient to call for assistance with activity based on assessment  - Modify environment to reduce risk of injury  - Consider OT/PT consult to assist with strengthening/mobility  8/8/2020 1557 by Haile Rush RN  Outcome: Progressing  8/8/2020 1557 by Haile Rush RN  Outcome: Progressing     Problem: Prexisting or High Potential for Compromised Skin Integrity  Goal: Skin integrity is maintained or improved  Description: INTERVENTIONS:  - Identify patients at risk for skin breakdown  - Assess and monitor skin integrity  - Assess and monitor nutrition and hydration status  - Monitor labs   - Assess for incontinence   - Turn and reposition patient  - Assist with mobility/ambulation  - Relieve pressure over bony prominences  - Avoid friction and shearing  - Provide appropriate hygiene as needed including keeping skin clean and dry  - Evaluate need for skin moisturizer/barrier cream  - Collaborate with interdisciplinary team   - Patient/family teaching  - Consider wound care consult   8/8/2020 1557 by Haile Rush RN  Outcome: Progressing  8/8/2020 1557 by Haile Rush RN  Outcome: Progressing

## 2020-08-08 NOTE — PLAN OF CARE
Problem: PAIN - ADULT  Goal: Verbalizes/displays adequate comfort level or baseline comfort level  Description: Interventions:  - Encourage patient to monitor pain and request assistance  - Assess pain using appropriate pain scale  - Administer analgesics based on type and severity of pain and evaluate response  - Implement non-pharmacological measures as appropriate and evaluate response  - Consider cultural and social influences on pain and pain management  - Notify physician/advanced practitioner if interventions unsuccessful or patient reports new pain  Outcome: Progressing     Problem: INFECTION - ADULT  Goal: Absence or prevention of progression during hospitalization  Description: INTERVENTIONS:  - Assess and monitor for signs and symptoms of infection  - Monitor lab/diagnostic results  - Monitor all insertion sites, i e  indwelling lines, tubes, and drains  - Monitor endotracheal if appropriate and nasal secretions for changes in amount and color  - [place] appropriate cooling/warming therapies per order  - Administer medications as ordered  - Instruct and encourage patient and family to use good hand hygiene technique  - Identify and instruct in appropriate isolation precautions for identified infection/condition  Outcome: Progressing  Goal: Absence of fever/infection during neutropenic period  Description: INTERVENTIONS:  - Monitor WBC    Outcome: Progressing     Problem: SAFETY ADULT  Goal: Patient will remain free of falls  Description: INTERVENTIONS:  - Assess patient frequently for physical needs  -  Identify cognitive and physical deficits and behaviors that affect risk of falls    -  [place] fall precautions as indicated by assessment   - Educate patient/family on patient safety including physical limitations  - Instruct patient to call for assistance with activity based on assessment  - Modify environment to reduce risk of injury  - Consider OT/PT consult to assist with strengthening/mobility  Outcome: Progressing  Goal: Maintain or return to baseline ADL function  Description: INTERVENTIONS:  -  Assess patient's ability to carry out ADLs; assess patient's baseline for ADL function and identify physical deficits which impact ability to perform ADLs (bathing, care of mouth/teeth, toileting, grooming, dressing, etc )  - Assess/evaluate cause of self-care deficits   - Assess range of motion  - Assess patient's mobility; develop plan if impaired  - Assess patient's need for assistive devices and provide as appropriate  - Encourage maximum independence but intervene and supervise when necessary  - Involve family in performance of ADLs  - Assess for home care needs following discharge   - Consider OT consult to assist with ADL evaluation and planning for discharge  - Provide patient education as appropriate  Outcome: Progressing  Goal: Maintain or return mobility status to optimal level  Description: INTERVENTIONS:  - Assess patient's baseline mobility status (ambulation, transfers, stairs, etc )    - Identify cognitive and physical deficits and behaviors that affect mobility  - Identify mobility aids required to assist with transfers and/or ambulation (gait belt, sit-to-stand, lift, walker, cane, etc )  - [place] fall precautions as indicated by assessment  - Record patient progress and toleration of activity level on Mobility SBAR; progress patient to next Phase/Stage  - Instruct patient to call for assistance with activity based on assessment  - Consider rehabilitation consult to assist with strengthening/weightbearing, etc   Outcome: Progressing     Problem: DISCHARGE PLANNING  Goal: Discharge to home or other facility with appropriate resources  Description: INTERVENTIONS:  - Identify barriers to discharge w/patient and caregiver  - Arrange for needed discharge resources and transportation as appropriate  - Identify discharge learning needs (meds, wound care, etc )  - Arrange for interpretive services to assist at discharge as needed  - Refer to Case Management Department for coordinating discharge planning if the patient needs post-hospital services based on physician/advanced practitioner order or complex needs related to functional status, cognitive ability, or social support system  Outcome: Progressing     Problem: Knowledge Deficit  Goal: Patient/family/caregiver demonstrates understanding of disease process, treatment plan, medications, and discharge instructions  Description: Complete learning assessment and assess knowledge base  Interventions:  - Provide teaching at level of understanding  - Provide teaching via preferred learning methods  Outcome: Progressing     Problem: Potential for Falls  Goal: Patient will remain free of falls  Description: INTERVENTIONS:  - Assess patient frequently for physical needs  -  Identify cognitive and physical deficits and behaviors that affect risk of falls    -  [place] fall precautions as indicated by assessment   - Educate patient/family on patient safety including physical limitations  - Instruct patient to call for assistance with activity based on assessment  - Modify environment to reduce risk of injury  - Consider OT/PT consult to assist with strengthening/mobility  Outcome: Progressing     Problem: Prexisting or High Potential for Compromised Skin Integrity  Goal: Skin integrity is maintained or improved  Description: INTERVENTIONS:  - Identify patients at risk for skin breakdown  - Assess and monitor skin integrity  - Assess and monitor nutrition and hydration status  - Monitor labs   - Assess for incontinence   - Turn and reposition patient  - Assist with mobility/ambulation  - Relieve pressure over bony prominences  - Avoid friction and shearing  - Provide appropriate hygiene as needed including keeping skin clean and dry  - Evaluate need for skin moisturizer/barrier cream  - Collaborate with interdisciplinary team   - Patient/family teaching  - Consider wound care consult   Outcome: Progressing

## 2020-08-09 VITALS
RESPIRATION RATE: 16 BRPM | TEMPERATURE: 98.1 F | HEIGHT: 63 IN | SYSTOLIC BLOOD PRESSURE: 122 MMHG | DIASTOLIC BLOOD PRESSURE: 74 MMHG | WEIGHT: 293 LBS | HEART RATE: 101 BPM | BODY MASS INDEX: 51.91 KG/M2 | OXYGEN SATURATION: 98 %

## 2020-08-09 PROBLEM — E87.6 HYPOKALEMIA: Status: ACTIVE | Noted: 2020-08-09

## 2020-08-09 PROBLEM — E87.6 HYPOKALEMIA: Status: RESOLVED | Noted: 2020-08-07 | Resolved: 2020-08-09

## 2020-08-09 LAB
ANION GAP SERPL CALCULATED.3IONS-SCNC: 10 MMOL/L (ref 4–13)
BUN SERPL-MCNC: 6 MG/DL (ref 5–25)
CALCIUM SERPL-MCNC: 8.8 MG/DL (ref 8.3–10.1)
CHLORIDE SERPL-SCNC: 104 MMOL/L (ref 100–108)
CO2 SERPL-SCNC: 21 MMOL/L (ref 21–32)
CREAT SERPL-MCNC: 0.78 MG/DL (ref 0.6–1.3)
ERYTHROCYTE [DISTWIDTH] IN BLOOD BY AUTOMATED COUNT: 17.7 % (ref 11.6–15.1)
GFR SERPL CREATININE-BSD FRML MDRD: 91 ML/MIN/1.73SQ M
GLUCOSE SERPL-MCNC: 93 MG/DL (ref 65–140)
HCT VFR BLD AUTO: 32.3 % (ref 34.8–46.1)
HGB BLD-MCNC: 9.8 G/DL (ref 11.5–15.4)
MAGNESIUM SERPL-MCNC: 2.6 MG/DL (ref 1.6–2.6)
MCH RBC QN AUTO: 30.3 PG (ref 26.8–34.3)
MCHC RBC AUTO-ENTMCNC: 30.3 G/DL (ref 31.4–37.4)
MCV RBC AUTO: 100 FL (ref 82–98)
PLATELET # BLD AUTO: 309 THOUSANDS/UL (ref 149–390)
PMV BLD AUTO: 11.7 FL (ref 8.9–12.7)
POTASSIUM SERPL-SCNC: 3 MMOL/L (ref 3.5–5.3)
RBC # BLD AUTO: 3.23 MILLION/UL (ref 3.81–5.12)
SODIUM SERPL-SCNC: 135 MMOL/L (ref 136–145)
WBC # BLD AUTO: 15.76 THOUSAND/UL (ref 4.31–10.16)

## 2020-08-09 PROCEDURE — 97166 OT EVAL MOD COMPLEX 45 MIN: CPT

## 2020-08-09 PROCEDURE — 85027 COMPLETE CBC AUTOMATED: CPT | Performed by: STUDENT IN AN ORGANIZED HEALTH CARE EDUCATION/TRAINING PROGRAM

## 2020-08-09 PROCEDURE — 80048 BASIC METABOLIC PNL TOTAL CA: CPT | Performed by: STUDENT IN AN ORGANIZED HEALTH CARE EDUCATION/TRAINING PROGRAM

## 2020-08-09 PROCEDURE — 83735 ASSAY OF MAGNESIUM: CPT | Performed by: INTERNAL MEDICINE

## 2020-08-09 PROCEDURE — 99238 HOSP IP/OBS DSCHRG MGMT 30/<: CPT | Performed by: INTERNAL MEDICINE

## 2020-08-09 PROCEDURE — 97163 PT EVAL HIGH COMPLEX 45 MIN: CPT

## 2020-08-09 RX ORDER — PANTOPRAZOLE SODIUM 40 MG/1
40 TABLET, DELAYED RELEASE ORAL
Qty: 30 TABLET | Refills: 1 | Status: ON HOLD | OUTPATIENT
Start: 2020-08-09 | End: 2021-05-05 | Stop reason: SDUPTHER

## 2020-08-09 RX ORDER — POTASSIUM CHLORIDE 20 MEQ/1
40 TABLET, EXTENDED RELEASE ORAL ONCE
Status: COMPLETED | OUTPATIENT
Start: 2020-08-09 | End: 2020-08-09

## 2020-08-09 RX ORDER — POTASSIUM CHLORIDE 20 MEQ/1
20 TABLET, EXTENDED RELEASE ORAL 2 TIMES DAILY
Qty: 30 TABLET | Refills: 1 | Status: SHIPPED | OUTPATIENT
Start: 2020-08-09 | End: 2021-06-23 | Stop reason: HOSPADM

## 2020-08-09 RX ADMIN — CALCIUM CARBONATE (ANTACID) CHEW TAB 500 MG 500 MG: 500 CHEW TAB at 09:13

## 2020-08-09 RX ADMIN — OXYCODONE HYDROCHLORIDE 5 MG: 5 TABLET ORAL at 05:13

## 2020-08-09 RX ADMIN — TOPIRAMATE 150 MG: 100 TABLET, FILM COATED ORAL at 09:03

## 2020-08-09 RX ADMIN — FOLIC ACID 1 MG: 1 TABLET ORAL at 09:00

## 2020-08-09 RX ADMIN — ENOXAPARIN SODIUM 100 MG: 100 INJECTION SUBCUTANEOUS at 09:02

## 2020-08-09 RX ADMIN — GABAPENTIN 400 MG: 400 CAPSULE ORAL at 09:01

## 2020-08-09 RX ADMIN — POTASSIUM CHLORIDE 40 MEQ: 1500 TABLET, EXTENDED RELEASE ORAL at 12:22

## 2020-08-09 RX ADMIN — Medication 100 MG: at 09:02

## 2020-08-09 RX ADMIN — ALUMINUM HYDROXIDE, MAGNESIUM HYDROXIDE, AND SIMETHICONE 15 ML: 200; 200; 20 SUSPENSION ORAL at 11:44

## 2020-08-09 RX ADMIN — LITHIUM CARBONATE 600 MG: 300 TABLET, FILM COATED, EXTENDED RELEASE ORAL at 09:02

## 2020-08-09 RX ADMIN — ACETAMINOPHEN 975 MG: 325 TABLET, FILM COATED ORAL at 04:02

## 2020-08-09 RX ADMIN — PANTOPRAZOLE SODIUM 40 MG: 40 TABLET, DELAYED RELEASE ORAL at 05:13

## 2020-08-09 RX ADMIN — SERTRALINE HYDROCHLORIDE 50 MG: 50 TABLET ORAL at 09:00

## 2020-08-09 RX ADMIN — FERROUS SULFATE TAB 325 MG (65 MG ELEMENTAL FE) 325 MG: 325 (65 FE) TAB at 09:01

## 2020-08-09 NOTE — PLAN OF CARE
Problem: PHYSICAL THERAPY ADULT  Goal: Performs mobility at highest level of function for planned discharge setting  See evaluation for individualized goals  Description: Treatment/Interventions: LE strengthening/ROM, Therapeutic exercise, Endurance training, Equipment eval/education, Bed mobility, Gait training, Spoke to nursing, OT(communicated w/ MD)  Equipment Recommended: Walker(bariatric)       See flowsheet documentation for full assessment, interventions and recommendations  Note: Prognosis: Good  Problem List: Decreased strength, Decreased endurance, Impaired balance, Decreased mobility, Obesity, Pain  Assessment: Pt is 53 y o  female initially admitted to [facility] with hx of worsening back pain as well as abdominal pain radiating into her B/L upper legs and Dx of IVC thrombosis  Pt was transferred to [facility] and underwent IR lysis on 8/4/2020 and recheck on 8/5/2020  Pt 's comorbidities affecting POC include: PE s/p IVC filter (not on anticoagulation), bipolar d/o, s/p gastric bypass, migraines, back pain, anemia and seizures and personal factors of: use of SPC for amb at home  Pt's clinical presentation is currently unstable/unpredictable which is evident in abn lab values, ongoing LE discomfort and tolerance to only 50 feet of ambulation prior to requiring seated rest period  Pt presents w/ generalized weakness, incl decreased LE strength (? in light of relative immobility), decreased functional endurance, and inconsistent amb balance and gait patterns (likely close to baseline; bariatric rw was introduced which seemed to assure gait stability)  Will cont to follow pt in PT for progressive mobilization to max functional endurance and to progress to less restrictive assistive device [facility])  Otherwise, anticipate pt will return home w/ available family support upon D/C when medically cleared; home PT follow up is recommended (med team and nsg aware); will follow          PT Discharge Recommendation: Return to previous environment with social support, Home with skilled therapy(home PT)          See flowsheet documentation for full assessment

## 2020-08-09 NOTE — OCCUPATIONAL THERAPY NOTE
Occupational Therapy Evaluation     Patient Name: Warren CASEY Date: 8/9/2020  Problem List  Principal Problem:    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[facility] )  Active Problems:    Hx of pulmonary embolus    S/P gastric bypass    Anemia    Hepatic steatosis    Thrombophlebitis    Migraine    Bipolar disorder (HCC)    Swelling of lower extremity    Morbid obesity with BMI of 50 0-59 9, adult ([facility] )    Acquired hypothyroidism    Vitamin D deficiency    Hypokalemia    Past Medical History  Past Medical History:   Diagnosis Date    Anemia     Psychiatric disorder     bipolar II, suicide    Pulmonary embolism ([facility] )     Seizures ([facility] )      Past Surgical History  Past Surgical History:   Procedure Laterality Date    APPENDECTOMY      Open    CHOLECYSTECTOMY      Laparoscopic    GASTRIC BYPASS      was 205 date of surgery    IR VENOUS LYSIS  8/4/2020    IVC FILTER INSERTION  2017    REPLACEMENT TOTAL KNEE      STOMACH SURGERY      for morbid obesity    TUBAL LIGATION Bilateral 2010 08/09/20 0834   Note Type   Note type Eval only   Restrictions/Precautions   Weight Bearing Precautions Per Order No   Braces or Orthoses   (denies)   Other Precautions Multiple lines   Home Living   Type of Home Apartment   Home Layout One level;Elevator   Bathroom Shower/Tub Tub/shower unit   Bathroom Toilet Standard   Bathroom Equipment   (denies)   [address]   Additional Comments Pt reports living in a 1 story apartment with no SUE and elevator access  Prior Function   Level of [place] Independent with ADLs and functional mobility   Lives With Daughter   Receives Help From Family   ADL Assistance Independent   IADLs Independent   Falls in the last 6 months 0   Lifestyle   Autonomy Pt reports being I with ADLS, IADLS and mobility with SPC PTA  (+)     Reciprocal Relationships Pt lives with her daughter who she reports will be home and is able to assist as needed upon d/c  Service to Others Pt reports she does not work   Intrinsic Gratification Sedentary, reports she does not have any hobbies    Subjective   Subjective Pt reports that she has no concerns about returning home  ADL   Where Assessed Chair   Eating Assistance 7  Independent   Grooming Assistance 5  Supervision/Setup   UB Bathing Assistance 5  Supervision/Setup   LB Bathing Assistance 4  Minimal Assistance   UB Dressing Assistance 5  Supervision/Setup   LB [place]  5  Supervision/Setup   Bed Mobility   Additional Comments Unable to assess, pt seated OOB in chair upon OT arrival  After OT session pt in chair with all needs within reach  Transfers   Sit to Stand 6  Modified independent   Additional items Increased time required   Stand to Sit 6  Modified independent   Additional items Increased time required   Functional Mobility   Functional Mobility 5  Supervision   Additional Comments Pt demonstrated household mobility with 1 seated rest break  Additional items Rolling walker   Balance   Static Sitting Good   Dynamic Sitting Fair +   Static Standing Fair +   Dynamic Standing Fair   Ambulatory Fair   Activity Tolerance   Activity Tolerance Patient limited by fatigue   Medical Staff Made Aware PT Ariel   Nurse Made Aware RN confirmed okay to see pt   RUE Assessment   RUE Assessment WFL   LUE Assessment   LUE Assessment WFL   Cognition   Overall Cognitive Status WFL   Arousal/Participation Alert; Cooperative   Attention Within functional limits   Orientation Level Oriented X4   Memory Within functional limits   Following Commands Follows one step commands without difficulty   Comments Pt is pleasant and cooperative to work with therapy  Assessment   Limitation Decreased ADL status; Decreased endurance;Decreased high-level ADLs   Prognosis Good   Assessment Pt is a 53 y o  female admitted to [facility] on 8/4/2020 w/ IVC thrombosis   Comorbidities include a h/o Anemia, Psychiatric disorder, Pulmonary embolism ([facility] ), and Seizures ([facility] )  Pt with active OT orders and ambulate  orders  Pt resides in a 1 story apartment with 0 SUE and elevator access  Pt lives with her daughter who she reports is able to assist upon d/c  Pt was I w/  ADLS and IADLS, (+) drove, & required use of SPC PTA  Currently pt is mod I for functional transfers, supervision for functional mobility, supervision for UB ADLS and min A for LB ADLS  Based on the aforementioned OT evaluation, functional performance deficits, and assessments, pt has been identified as a moderate complexity evaluation  From OT standpoint, anticipate d/c home with family support  Recommend continued participation in [facility] and functional mobility with staff  No further acute OT needs, d/c OT      Goals   Patient Goals To return home   Recommendation   OT Discharge Recommendation Return to previous environment with social support   OT - OK to Discharge Yes  (When medically appropriate)   Modified [place] Scale   Modified [place] Scale 3     Carol Treadwell, MOT, OTR/L

## 2020-08-09 NOTE — PHYSICAL THERAPY NOTE
Physical Therapy Evaluation     Patient's Name: Lindsay Eldridge    Admitting Diagnosis  Acute low back pain [M54 5]    Problem List  Patient Active Problem List   Diagnosis    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HCC)    Hx of pulmonary embolus    S/P gastric bypass    Anemia    Hepatic steatosis    Thrombophlebitis    Migraine    Bipolar disorder (HCC)    Swelling of lower extremity    Leukocytosis    Hx of suicide attempt    Morbid obesity with BMI of 50 0-59 9, adult ([facility] )    Acquired hypothyroidism    Vitamin D deficiency       Past Medical History  Past Medical History:   Diagnosis Date    Anemia     Psychiatric disorder     bipolar II, suicide    Pulmonary embolism ([facility] )     [facility] )        Past Surgical History  Past Surgical History:   Procedure Laterality Date    APPENDECTOMY      Open    CHOLECYSTECTOMY      Laparoscopic    GASTRIC BYPASS      was 205 date of surgery    IR VENOUS LYSIS  8/4/2020    IVC FILTER INSERTION  2017    REPLACEMENT TOTAL KNEE      STOMACH SURGERY      for morbid obesity    TUBAL LIGATION Bilateral 2010 08/09/20 0833   Note Type   Note type Eval only   Pain Assessment   Pain Assessment Tool FLACC   Pain Location/Orientation Location: Leg;Orientation: Bilateral   Pain Onset/Description Onset: Ongoing;Frequency: Intermittent; Descriptor: Aching;Descriptor: Discomfort   Effect of Pain on Daily Activities guarding   Patient's Stated Pain Goal No pain   Hospital Pain Intervention(s) Repositioned; Ambulation/increased activity; Emotional support   Home Living   Type of [address] One level;Elevator  (denies any SUE)   Home Equipment Cane   Prior Function   Level of [place] Independent with ADLs and functional mobility  (amb w/ SPC)   Lives With Daughter   Receives Help From Family   Restrictions/Precautions   Braces or Orthoses   (denies)   Other Precautions Multiple lines;Pain   General   Additional Pertinent History cleared for assessment (spoke to nsg)   Cognition   Overall Cognitive Status [facility]   Arousal/Participation Cooperative   Attention Attends with cues to redirect   Orientation Level Oriented to person;Oriented to place;Oriented to situation   Memory Within functional limits   Following Commands Follows one step commands without difficulty   Comments Pt is in the chair; somewhat flat affect; reports she did not walk much; agreeable to demonsrtate mobility skills;   RUE Assessment   RUE Assessment WFL  (AROM)   LUE Assessment   LUE Assessment WFL  (AROM)   RLE Assessment   RLE Assessment WFL  (AROM)   Strength RLE   RLE Overall Strength   (fair +/ good - (grossly))   LLE Assessment   LLE Assessment WFL  (AROM)   Strength LLE   LLE Overall Strength   (fair +/ good - (grossly))   Transfers   Sit to Stand 6  Modified independent  (2 trials;)   Additional items Verbal cues; Increased time required  (reviewed hands placement)   Stand to Sit 6  Modified independent  (2 trials)   Additional items Verbal cues  (reviewed hands placement)   Ambulation/Elevation   Gait pattern Excessively slow; Short stride; Inconsistent kemal; Wide ILYA  (No overt uncorrected LOB or gross knee buckling,)   Gait Assistance 5  Supervision   Additional items Assist x 1;Verbal cues  (chair follow)   Assistive Device Bariatric Rolling walker  (will need one for home; RN and MD resident informed)   Distance  2 x 50 ft w/ seated rest period in between; O2 sat at 99 % post amb (97 % at rest)   Balance   Static Sitting Fair +   Static Standing [address], & 5 -   Activity Tolerance   Activity Tolerance Patient limited by fatigue   Medical Staff Made Aware communicated w/ Dr Arthur Mackenzie via Juvent[facility] Regulo test   Nurse Made Aware spoke to \[facility]\""   Assessment   Prognosis Good   Problem List Decreased strength;Decreased endurance; Impaired balance;Decreased mobility;Obesity;Pain   Assessment Pt is 53 y o  female initially admitted to [facility] with hx of worsening back pain as well as abdominal pain radiating into her B/L upper legs and Dx of IVC thrombosis  Pt was transferred to [facility] and underwent IR lysis on 8/4/2020 and recheck on 8/5/2020  Pt 's comorbidities affecting POC include: PE s/p IVC filter (not on anticoagulation), bipolar d/o, s/p gastric bypass, migraines, back pain, anemia and seizures and personal factors of: use of SPC for amb at home  Pt's clinical presentation is currently unstable/unpredictable which is evident in abn lab values, ongoing LE discomfort and tolerance to only 50 feet of ambulation prior to requiring seated rest period  Pt presents w/ generalized weakness, incl decreased LE strength (? in light of relative immobility), decreased functional endurance, and inconsistent amb balance and gait patterns (likely close to baseline; bariatric rw was introduced which seemed to assure gait stability)  Will cont to follow pt in PT for progressive mobilization to max functional endurance and to progress to less restrictive assistive device [facility])  Otherwise, anticipate pt will return home w/ available family support upon D/C when medically cleared; home PT follow up is recommended (med team and nsg aware); will follow  Goals   Patient Goals to go home   STG Expiration Date 08/16/20   Short Term Goal #1 5-7 days  Pt will amb 300 ft w/ bariatric rw <--> SPC, mod (I) in order to facilitate safe return to community amb status  Pt will achieve (I) level w/ bed mob in order to facilitate safety with OOB and back to bed transitions in own living environment  Pt will participate in LE therex and balance activities to max progression w/ mobility skills  PT Treatment Day 0   Plan   Treatment/Interventions LE strengthening/ROM; Therapeutic exercise; Endurance training;Equipment eval/education; Bed mobility;Gait training;Spoke to nursing;OT  (communicated w/ MD)   PT Frequency Other (Comment)  (3-5x/wk)   Recommendation   PT Discharge Recommendation Return to previous environment with social support;Home with skilled therapy  (home PT)   Equipment Recommended Walker  (bariatric)   Modified [place] Scale   Modified [place] Scale 3         Ariel Otero, PT

## 2020-08-09 NOTE — DISCHARGE INSTRUCTIONS
Please follow up with your primary care provider in about 1 week to review your hospital course  [facility] info link provided  A message was sent to [facility] as well to establish care  Please follow up with Hematology Office (Dr Darby Ruiz) in 1 month to discuss the results of thrombophilia panel  Continue taking Lovenox injections as described  Continue with all your other medications  Get a follow up TSH in 6 weeks and a BMP in 1 week for your potassium  Take potassium Supplement  Wear compression stockings on both legs for at least 3 hours a day    DISCHARGE INSTRUCTIONS  ARTERIOGRAM/ANGIOPLASTY/STENT  VENOGRAM/ VENOLYSIS    Following discharge from the hospital, you may have some questions about your procedure, your activities or your general condition  These instructions may answer some of your questions and help you adjust during the first few weeks following your operation  ACTIVITY:    *Leg elevation at rest     The evening following the procedure you should be sure someone remains with you until the next morning  Rest as much as possible, sitting, lying or reclining  Use the stairs as little as possible  The following day, limit your activity to walking  Avoid stooping or heavy lifting (no more than 30 lbs) for the first three days  Walking up steps and normal activities may be resumed as you feel ready  You should not drive a car for at least two days following discharge from the hospital  You may ride in a car  If you have any questions regarding a particular activity, please discuss with your doctor or nurse before you are discharged  DIET:  Resume your normal diet  Try to eat low fat and low cholesterol foods  Drink more liquids than usual for the next 24 hours  INCISION: Your doctor may have chosen to use a type of adhesive glue, to close your incision  The glue is used to cover the incision, assist in closure, and prevent contamination   This adhesive will darken and peel away on its own within one to two weeks  You may shower after the procedure, but do not scrub the incision  Sitting in a tub is not recommended for the two days following the procedure or if you have any open wounds  It is normal to have some bruising, swelling or mild discoloration around the incision  IF increasing redness or pain develops, call our office immediately  If present, you may remove the band-aid or steri-strips over your wound after two days  If you notice any active bleeding at the site, apply pressure to the site and call our office ([phone])  FOLLOW UP STUDIES:  Your doctor will discuss whether further treatments or follow-up studies are necessary at your first post procedure visit  Appt w/ Dr Méndez Cos: 8/27/2020 at [facility] office  [address] [place] [address], [facility] THE OFFICE IF YOU HAVE ANY QUESTIONS  [phone] [facility] [phone]  [address] [place] [address] , [address], [facility] [place], [facility]  [address] [facility] 20, [address] [facility], 210 [facility]  [address], [place], [address]  611 [facility], One [facility],[address], [place], [address]    [address], [address]  [address] [facility] 97   1201 [facility], 8614 [facility], 960 [facility] [place], 194 [facility] 6          Angiogram   WHAT YOU NEED TO KNOW:   What do I need to know about an angiogram?  An angiogram is used to examine blood flow through your arteries  Arteries carry blood from your heart to your body  How do I prepare for the procedure? Your healthcare provider will tell you how to prepare for your procedure  He may tell you not to eat or drink anything after midnight on the day of your procedure  He will tell you what medicines to take or not take on the day of your procedure   Contrast liquid will be used during the procedure to help your arteries show up better in the pictures  Tell your healthcare provider if you have ever had an allergic reaction to contrast liquid  Arrange to have someone drive you home after your procedure  What will happen during the procedure? · You may be given medicine to help you relax or make you drowsy  You may get local anesthesia to numb the area where the angiogram catheter will go in  Hair may be removed from the procedure site  · A catheter will be put into an artery in your leg near your groin, or in your arm  The catheter travels through the artery to the area being studied  Contrast liquid is put through the catheter to help your blood vessels and organs show up better in the x-ray pictures  You may feel warm as the liquid is put into the catheter  You may get a headache or feel nauseated  These feelings should go away quickly  · Your healthcare providers will remove the catheter and apply pressure to the wound for several minutes  They may place a pressure bandage or other pressure device over the wound to help stop any bleeding  What will happen after the procedure? You will be on a heart monitor until you are fully awake  A heart monitor continuously records the electrical activity of your heart  Healthcare providers will frequently monitor your vital signs and pulses  They will also frequently check your wound for bleeding  Keep your leg straight  Do not  get out of bed until your healthcare provider says it is okay  After you are monitored for several hours, you may be able to go home  What are the risks of the procedure? · You may bleed heavily after your catheter is removed  The catheter may damage your artery, and you may need surgery to fix the damage  You could have kidney problems from the contrast liquid  You could have an allergic reaction to the contrast liquid or numbing medicine  · You may develop a blood clot that causes pain and swelling, and stops blood from flowing   A blood clot in your leg can break loose and travel to your lungs and become life-threatening  CARE AGREEMENT:   You have the right to help plan your care  Learn about your health condition and how it may be treated  Discuss treatment options with your caregivers to decide what care you want to receive  You always have the right to refuse treatment  The above information is an  only  It is not intended as medical advice for individual conditions or treatments  Talk to your doctor, nurse or pharmacist before following any medical regimen to see if it is safe and effective for you  © 2017 [facility] Information is for End User's use only and may not be sold, redistributed or otherwise used for commercial purposes  All illustrations and images included in CareNotes® are the copyrighted property of [employer]  or Raad Mitchell  Pulmonary Embolism   WHAT YOU NEED TO KNOW:   A pulmonary embolism (PE) is the sudden blockage of a blood vessel in the lungs by an embolus  An embolus is a small piece of blood clot, fat, air, or tumor cells  The embolus cuts off the blood supply to your lungs  A pulmonary embolism can become life-threatening  DISCHARGE INSTRUCTIONS:   Call 911 for any of the following: You feel lightheaded, short of breath, and have chest pain  You cough up blood  You have a seizure  You have slurred speech, increased sleepiness, or problems seeing, talking, or thinking  You have weakness or cannot move your arm or leg on one side of your body  Seek care immediately if:   You feel faint  You have a severe headache  Your heart is beating faster than normal   Contact your healthcare provider if:   The skin on any part of your legs or hips turns purple  Your gums or nose bleed  You see blood in your urine or bowel movements  Your bowel movements are black or darker than normal     You have questions or concerns about your condition or care    Medicines: Blood thinners  help treat the PE and prevent new clots from forming  Examples of blood thinners include heparin, rivaroxaban, apixiban, and warfarin  The following are general safety guidelines to follow while you are taking a blood thinner:     Watch for bleeding and bruising  Watch for bleeding from your gums or nose  Watch for blood in your urine and bowel movements  Use a soft washcloth on your skin, and a soft toothbrush to brush your teeth  This can keep your skin and gums from bleeding  If you shave, use an electric shaver  Do not play contact sports  Tell your dentist and other healthcare providers that you take a blood thinner  Wear a bracelet or necklace that says you take this medicine  Do not start or stop any medicines unless your healthcare provider tells you to  Many medicines cannot be used with blood thinners  Tell your healthcare provider right away if you forget to take the blood thinner , or if you take too much  Warfarin  is a blood thinner that you may need to take  The following are additional things you should be aware of if you take warfarin:    Foods and medicines can affect the amount of warfarin in your blood  Do not make major changes to your diet  Warfarin works best when you eat about the same amount of vitamin K every day  Vitamin K is found in green leafy vegetables and certain other foods  Ask for more information about what to eat or not to eat  You will need to see your healthcare provider for follow-up visits  You will need regular blood tests to decide how much warfarin you need  Take your medicine as directed  Contact your healthcare provider if you think your medicine is not helping or if you have side effects  Tell him or her if you are allergic to any medicine  Keep a list of the medicines, vitamins, and herbs you take  Include the amounts, and when and why you take them  Bring the list or the pill bottles to follow-up visits   Carry your medicine list with you in case of an emergency  Prevent another PE:   Wear pressure stockings  The stockings are tight and put pressure on your legs  This improves blood flow and helps prevent clots  Wear the stockings during the day  Do not wear them when you sleep  Exercise regularly  Ask about the best exercise plan for you  When you travel by car or work at a desk, take breaks to stand up and move around as much as possible  Rotate your feet in circles often if you sit for a long period of time  Maintain a healthy weight  Ask your healthcare provider how much you should weigh  Ask him to help you create a weight loss plan if you are overweight  Do not smoke  Nicotine and other chemicals in cigarettes and cigars can damage blood vessels and increase your risk for another PE  Ask your healthcare provider for information if you currently smoke and need help to quit  E-cigarettes or smokeless tobacco still contain nicotine  Talk to your healthcare provider before you use these products  Follow up with your healthcare provider as directed:  Write down your questions so you remember to ask them during your visits  © 2017 2600 [facility] Information is for End User's use only and may not be sold, redistributed or otherwise used for commercial purposes  All illustrations and images included in CareNotes® are the copyrighted property of A [employer] A M , Inc  or Raad Mitchell  The above information is an  only  It is not intended as medical advice for individual conditions or treatments  Talk to your doctor, nurse or pharmacist before following any medical regimen to see if it is safe and effective for you

## 2020-08-09 NOTE — SOCIAL WORK
Pt recommended for home d/c with VNA and a [place] walker Obie Castleman provided to patient  Accepting VNA is [facility] with a [facility] of 8/12  No other needs at this time

## 2020-08-10 ENCOUNTER — TELEPHONE (OUTPATIENT)
Dept: INTERNAL MEDICINE CLINIC | Facility: CLINIC | Age: 45
End: 2020-08-10

## 2020-08-10 LAB
F2 GENE MUT ANL BLD/T: NORMAL
F5 GENE MUT ANL BLD/T: NORMAL

## 2020-08-10 NOTE — TELEPHONE ENCOUNTER
----- Message from April Vázquez DO sent at 8/9/2020 11:45 AM EDT -----  Please schedule this patient for TCM/Follow up visit

## 2020-08-10 NOTE — UTILIZATION REVIEW
Notification of Discharge  This is a Notification of Discharge from our facility [facility]  Please be advised that this patient has been discharge from our facility  Below you will find the admission and discharge date and time including the patients disposition  PRESENTATION DATE: 8/4/2020  1:55 AM  OBS ADMISSION DATE:   IP ADMISSION DATE: 8/4/20 0155   DISCHARGE DATE: 8/9/2020  1:45 PM  [facility]   Admission Orders listed below:  Admission Orders (From admission, onward)     Ordered        08/04/20 0216  Inpatient Admission  Once                   Please contact the UR Department if additional information is required to close this patient's authorization/case  [address] Utilization Review Department  Main: [phone] x carefully listen to the prompts  All voicemails are confidential   [email or web address]  Send all requests for admission clinical reviews, approved or denied determinations and any other requests to dedicated fax number below belonging to the campus where the patient is receiving treatment   List of dedicated fax numbers:  [address] DENIALS (Administrative/Medical Necessity) [phone]   [address] (Maternity/NICU/Pediatrics) [phone]   [facility] [phone]     [address]  [phone]   Se Zhang [phone]   [address] [phone]   [facility]  [phone]   [address] [facility], [address]  2401 [facility] 1000 [address] [facility] [phone]

## 2020-08-10 NOTE — TELEPHONE ENCOUNTER
I called and spoke to patient  She is follow up with Dr Willie Rocha with [facility]  Patient has an appointment for 8/11/20

## 2020-08-12 NOTE — ED PROVIDER NOTES
History  Chief Complaint   Patient presents with    Back Pain     Lower back pain for over a week and getting worse  Patient states cramping and numbness in upper legs bilaterally  Motrin taken with no relief  Denies urinary symptoms  53year old female PMH PE, gastric bypass presents today complaining of low back pain that started about a week ago  Pt denies any preceding trauma or injury  She admits to radiation down bilateral lower extremities, worse on the right  She describes the pain as a pulling sensation in her low back  She has a history of chronic back pain but her symptoms have been persistent during this episode  She has been taking motrin with little improvement  Denies numbness, weakness or tingling of the legs  Denies saddle anesthesia or bowel/bladder incontinence  Pt denies fevers, chills, headache, URI symptoms, CP, SOB, abd pain, nausea, vomiting, diarrhea, urinary symptoms or flank pain  She has been ambulating without difficulty, symptoms are worse with activity  Prior to Admission Medications   Prescriptions Last Dose Informant Patient Reported? Taking?    SUMAtriptan (IMITREX) 100 mg tablet   Yes No   Sig: Take 100 mg by mouth as needed for migraine    chlordiazePOXIDE (LIBRIUM) 25 mg capsule   Yes No   Sig: Take 25 mg by mouth 3 (three) times a day as needed for anxiety   ferrous sulfate 325 (65 Fe) mg tablet   Yes No   Sig: Take 325 mg by mouth daily with breakfast   folic acid (FOLVITE) 1 mg tablet   Yes No   Sig: Take 1 mg by mouth daily   gabapentin (NEURONTIN) 400 mg capsule   Yes No   Sig: Take 100 mg by mouth 3 (three) times a day   pyridoxine (VITAMIN B6) 100 mg tablet   Yes No   Sig: Take 100 mg by mouth daily   sertraline (ZOLOFT) 50 mg tablet   Yes No   Sig: Take 50 mg by mouth daily   topiramate (TOPAMAX) 100 mg tablet   Yes No   Sig: Take 150 mg by mouth 2 (two) times a day       Facility-Administered Medications: None       Past Medical History:   Diagnosis Date    Anemia     Psychiatric disorder     bipolar II, suicide    [facility]monary embolism ([facility] [address]     Seizures ([facility] [address] )        Past Surgical History:   Procedure Laterality Date    APPENDECTOMY      Open    CHOLECYSTECTOMY      Laparoscopic    GASTRIC BYPASS      was 205 date of surgery    IR VENOUS LYSIS  8/4/2020    IVC FILTER INSERTION  2017    REPLACEMENT TOTAL KNEE      STOMACH SURGERY      for morbid obesity    TUBAL LIGATION Bilateral 2010       Family History   Problem Relation Age of Onset    Other Mother         headache    Hypertension Mother     Depression Mother     Arthritis Father     Other Father         H, pylori infection    Other Sister         esophageal reflux    Migraines Sister     Breast cancer Family     Diabetes Paternal Aunt         Mellitus    Diabetes Paternal Uncle         Mellitus    Asthma Daughter      I have reviewed and agree with the history as documented  E-Cigarette/Vaping     E-Cigarette/Vaping Substances     Social History     Tobacco Use    Smoking status: Never Smoker    Smokeless tobacco: Never Used    Tobacco comment: former quit in 1999 per [employer]   Substance Use Topics    Alcohol use: Not Currently     Comment: quit 6/21/2018    Drug use: No       Review of Systems   Musculoskeletal: Positive for back pain  All other systems reviewed and are negative  Physical Exam  Physical Exam  Constitutional:       General: She is not in acute distress  Appearance: She is not ill-appearing, toxic-appearing or diaphoretic  HENT:      Head: Normocephalic and atraumatic  Mouth/Throat:      Mouth: Mucous membranes are moist    Eyes:      Conjunctiva/sclera: Conjunctivae normal       Pupils: Pupils are equal, round, and reactive to light  Neck:      Musculoskeletal: Normal range of motion and neck supple  Cardiovascular:      Rate and Rhythm: Normal rate and regular rhythm  Pulses: Normal pulses     Pulmonary:      Effort: Pulmonary effort is normal  No respiratory distress  Breath sounds: Normal breath sounds  No stridor  No wheezing, rhonchi or rales  Chest:      Chest wall: No tenderness  Abdominal:      General: Bowel sounds are normal  There is no distension  Palpations: Abdomen is soft  There is no mass  Tenderness: There is no abdominal tenderness  There is no right CVA tenderness, left CVA tenderness, guarding or rebound  Hernia: No hernia is present  Musculoskeletal:      Lumbar back: She exhibits tenderness (mild, paraspinal)  She exhibits normal range of motion, no bony tenderness, no swelling, no laceration, no spasm and normal pulse  Back:    Skin:     General: Skin is warm and dry  Capillary Refill: Capillary refill takes less than 2 seconds  Neurological:      General: No focal deficit present  Mental Status: She is alert  Comments: 5/5 strength bilateral lower extremities     Psychiatric:         Mood and Affect: Mood normal          Behavior: Behavior normal          Vital Signs  ED Triage Vitals   Temperature Pulse Respirations Blood Pressure SpO2   07/30/20 2247 07/30/20 2247 07/30/20 2247 07/30/20 2247 07/30/20 2247   97 8 °F (36 6 °C) 99 18 120/76 95 %      Temp Source Heart Rate Source Patient Position - Orthostatic VS BP Location FiO2 (%)   07/30/20 2247 07/30/20 2247 07/30/20 2247 07/30/20 2247 --   Temporal Monitor Sitting Left arm       Pain Score       07/30/20 2313       7           Vitals:    07/30/20 2247   BP: 120/76   Pulse: 99   Patient Position - Orthostatic VS: Sitting         Visual Acuity      ED Medications  Medications   ketorolac (TORADOL) injection 15 mg (15 mg Intramuscular Given 7/30/20 2313)   diazepam (VALIUM) injection 5 mg (5 mg Intramuscular Given 7/30/20 2314)       Diagnostic Studies  Results Reviewed     None                 No orders to display              Procedures  Procedures         ED Course       US AUDIT      Most Recent Value   Initial Alcohol Screen: US AUDIT-C    1  How often do you have a drink containing alcohol?  0 Filed at: 07/30/2020 2247   2  How many drinks containing alcohol do you have on a typical day you are drinking? 0 Filed at: 07/30/2020 2247   3b  FEMALE Any Age, or MALE 65+: How often do you have 4 or more drinks on one occassion? 0 Filed at: 07/30/2020 2247   Audit-C Score  0 Filed at: 07/30/2020 2247                  JOSIAH/DAST-10      Most Recent Value   How many times in the past year have you    Used an illegal drug or used a prescription medication for non-medical reasons? Never Filed at: 07/30/2020 2247                                [facility]  Number of Diagnoses or Management Options  Acute low back pain with sciatica:   Diagnosis management comments: Discussed with patient symptoms likely related to sciatica  Pt with history of chronic back pain, usually has relief with OTC meds  Will give IM valium and toradol in the ED  Pt has a ride home  Will have her call her PCP in the AM for follow-up  Red flag symptoms that would necessitate return to the ED discussed with patient including but not limited to development of fevers/chills, SOB, CP or abdominal pain, bowel or bladder incontinence or inability to ambulate, or worsening of pain  Pt ambulated out of the department independently with steady gait  Disposition  Final diagnoses:   Acute low back pain with sciatica     Time reflects when diagnosis was documented in both MDM as applicable and the Disposition within this note     Time User Action Codes Description Comment    7/30/2020 11:22 PM Gonzalo Marcano [M54 [id number]] Acute low back pain with sciatica       ED Disposition     ED Disposition Condition Date/Time Comment    Discharge Stable u Jul 30, 2020 11:17 PM Angelica Maldonado discharge to home/self care              Follow-up Information     Follow up With Specialties Details Why Contact Info    Jessica Marcum PA-C Family Medicine, Physician Assistant Schedule an appointment as soon as possible for a visit   [place]  [id number] [phone]            Discharge Medication List as of 7/30/2020 11:23 PM      CONTINUE these medications which have NOT CHANGED    Details   chlordiazePOXIDE (LIBRIUM) 25 mg capsule Take 25 mg by mouth 3 (three) times a day as needed for anxiety, Historical Med      ferrous sulfate 325 (65 Fe) mg tablet Take 325 mg by mouth daily with breakfast, Historical Med      folic acid (FOLVITE) 1 mg tablet Take 1 mg by mouth daily, Historical Med      gabapentin (NEURONTIN) 400 mg capsule Take 100 mg by mouth 3 (three) times a day, Historical Med      pyridoxine (VITAMIN B6) 100 mg tablet Take 100 mg by mouth daily, Historical Med      sertraline (ZOLOFT) 50 mg tablet Take 50 mg by mouth daily, Historical Med      SUMAtriptan (IMITREX) 100 mg tablet Take 100 mg by mouth once as needed for migraine, Historical Med      topiramate (TOPAMAX) 100 mg tablet Take 100 mg by mouth 2 (two) times a day, Historical Med      acamprosate (CAMPRAL) 333 mg tablet Take 333 mg by mouth 3 (three) times a day, Historical Med      butalbital-acetaminophen-caffeine (FIORICET,ESGIC) -40 mg per tablet Take 1 tablet by mouth every 4 (four) hours as needed for headaches, Historical Med      clonazePAM (KlonoPIN) 0 5 mg tablet Take 0 5 mg by mouth 2 (two) times a day, Historical Med      Doxycycline Hyclate 120 MG TBEC Take 100 mg by mouth, Historical Med      furosemide (LASIX) 20 mg tablet Take 20 mg by mouth 2 (two) times a day, Historical Med      hydrOXYzine HCL (ATARAX) 50 mg tablet Take 50 mg by mouth 3 (three) times a day as needed for itching, Historical Med      pantoprazole (PROTONIX) 40 mg tablet Take 40 mg by mouth daily, Historical Med      QUEtiapine (SEROquel) 200 mg tablet Take 25 mg by mouth daily at bedtime, Historical Med      ranitidine (ZANTAC) 300 MG capsule Take 300 mg by mouth every evening, Historical Med      traMADol (ULTRAM) 50 mg tablet Take 50 mg by mouth every 6 (six) hours as needed for moderate pain, Historical Med           No discharge procedures on file      PDMP Review     None          ED Provider  Electronically Signed by           Margarita Okeefe PA-C  08/12/20 0013

## 2020-08-14 ENCOUNTER — APPOINTMENT (EMERGENCY)
Dept: NON INVASIVE DIAGNOSTICS | Facility: HOSPITAL | Age: 45
End: 2020-08-14
Payer: COMMERCIAL

## 2020-08-14 ENCOUNTER — TRANSCRIBE ORDERS (OUTPATIENT)
Dept: VASCULAR SURGERY | Facility: CLINIC | Age: 45
End: 2020-08-14

## 2020-08-14 ENCOUNTER — APPOINTMENT (EMERGENCY)
Dept: RADIOLOGY | Facility: HOSPITAL | Age: 45
End: 2020-08-14
Payer: COMMERCIAL

## 2020-08-14 ENCOUNTER — HOSPITAL ENCOUNTER (OUTPATIENT)
Facility: HOSPITAL | Age: 45
Setting detail: OBSERVATION
Discharge: HOME | End: 2020-08-17
Attending: EMERGENCY MEDICINE | Admitting: INTERNAL MEDICINE
Payer: COMMERCIAL

## 2020-08-14 DIAGNOSIS — R60.9 SWELLING: ICD-10-CM

## 2020-08-14 DIAGNOSIS — L03.90 CELLULITIS: Primary | ICD-10-CM

## 2020-08-14 DIAGNOSIS — E66.01 MORBID OBESITY WITH BMI OF 50.0-59.9, ADULT (HCC): ICD-10-CM

## 2020-08-14 DIAGNOSIS — R10.13 EPIGASTRIC PAIN: ICD-10-CM

## 2020-08-14 DIAGNOSIS — M79.89 SWELLING OF LOWER EXTREMITY: ICD-10-CM

## 2020-08-14 DIAGNOSIS — E03.9 ACQUIRED HYPOTHYROIDISM: ICD-10-CM

## 2020-08-14 DIAGNOSIS — I82.220 IVC THROMBOSIS (HCC): Primary | ICD-10-CM

## 2020-08-14 DIAGNOSIS — N83.201 CYST OF RIGHT OVARY: ICD-10-CM

## 2020-08-14 DIAGNOSIS — R52 PAIN: ICD-10-CM

## 2020-08-14 DIAGNOSIS — R53.1 WEAKNESS: ICD-10-CM

## 2020-08-14 DIAGNOSIS — I82.220 IVC THROMBOSIS (HCC): ICD-10-CM

## 2020-08-14 PROBLEM — R11.0 NAUSEA: Status: ACTIVE | Noted: 2020-08-14

## 2020-08-14 PROBLEM — D72.829 LEUKOCYTOSIS: Status: RESOLVED | Noted: 2020-08-04 | Resolved: 2020-08-14

## 2020-08-14 LAB
ALBUMIN SERPL BCP-MCNC: 2.7 G/DL (ref 3.5–5)
ALP SERPL-CCNC: 178 U/L (ref 46–116)
ALT SERPL W P-5'-P-CCNC: 48 U/L (ref 12–78)
ANION GAP SERPL CALCULATED.3IONS-SCNC: 6 MMOL/L (ref 4–13)
AST SERPL W P-5'-P-CCNC: 49 U/L (ref 5–45)
ATRIAL RATE: 68 BPM
BACTERIA UR QL AUTO: NORMAL /HPF
BASOPHILS # BLD AUTO: 0.06 THOUSANDS/ΜL (ref 0–0.1)
BASOPHILS NFR BLD AUTO: 1 % (ref 0–1)
BILIRUB SERPL-MCNC: 0.45 MG/DL (ref 0.2–1)
BILIRUB UR QL STRIP: NEGATIVE
BUN SERPL-MCNC: 3 MG/DL (ref 5–25)
CALCIUM SERPL-MCNC: 8.3 MG/DL (ref 8.3–10.1)
CHLORIDE SERPL-SCNC: 112 MMOL/L (ref 100–108)
CLARITY UR: CLEAR
CO2 SERPL-SCNC: 22 MMOL/L (ref 21–32)
COLOR UR: YELLOW
CREAT SERPL-MCNC: 0.79 MG/DL (ref 0.6–1.3)
EOSINOPHIL # BLD AUTO: 0.64 THOUSAND/ΜL (ref 0–0.61)
EOSINOPHIL NFR BLD AUTO: 7 % (ref 0–6)
ERYTHROCYTE [DISTWIDTH] IN BLOOD BY AUTOMATED COUNT: 18.5 % (ref 11.6–15.1)
GFR SERPL CREATININE-BSD FRML MDRD: 90 ML/MIN/1.73SQ M
GLUCOSE SERPL-MCNC: 111 MG/DL (ref 65–140)
GLUCOSE UR STRIP-MCNC: NEGATIVE MG/DL
HCG SERPL QL: NEGATIVE
HCT VFR BLD AUTO: 33.7 % (ref 34.8–46.1)
HGB BLD-MCNC: 10.1 G/DL (ref 11.5–15.4)
HGB UR QL STRIP.AUTO: ABNORMAL
IMM GRANULOCYTES # BLD AUTO: 0.12 THOUSAND/UL (ref 0–0.2)
IMM GRANULOCYTES NFR BLD AUTO: 1 % (ref 0–2)
KETONES UR STRIP-MCNC: NEGATIVE MG/DL
LEUKOCYTE ESTERASE UR QL STRIP: NEGATIVE
LIPASE SERPL-CCNC: 247 U/L (ref 73–393)
LITHIUM SERPL-SCNC: 0.6 MMOL/L (ref 0.5–1)
LYMPHOCYTES # BLD AUTO: 1.55 THOUSANDS/ΜL (ref 0.6–4.47)
LYMPHOCYTES NFR BLD AUTO: 18 % (ref 14–44)
MCH RBC QN AUTO: 30.2 PG (ref 26.8–34.3)
MCHC RBC AUTO-ENTMCNC: 30 G/DL (ref 31.4–37.4)
MCV RBC AUTO: 101 FL (ref 82–98)
MONOCYTES # BLD AUTO: 0.96 THOUSAND/ΜL (ref 0.17–1.22)
MONOCYTES NFR BLD AUTO: 11 % (ref 4–12)
NEUTROPHILS # BLD AUTO: 5.49 THOUSANDS/ΜL (ref 1.85–7.62)
NEUTS SEG NFR BLD AUTO: 62 % (ref 43–75)
NITRITE UR QL STRIP: NEGATIVE
NON-SQ EPI CELLS URNS QL MICRO: NORMAL /HPF
NRBC BLD AUTO-RTO: 0 /100 WBCS
P AXIS: 68 DEGREES
PH UR STRIP.AUTO: 6.5 [PH] (ref 4.5–8)
PLATELET # BLD AUTO: 266 THOUSANDS/UL (ref 149–390)
PMV BLD AUTO: 11.1 FL (ref 8.9–12.7)
POTASSIUM SERPL-SCNC: 3.6 MMOL/L (ref 3.5–5.3)
PR INTERVAL: 170 MS
PROT SERPL-MCNC: 7.2 G/DL (ref 6.4–8.2)
PROT UR STRIP-MCNC: NEGATIVE MG/DL
QRS AXIS: -19 DEGREES
QRSD INTERVAL: 100 MS
QT INTERVAL: 482 MS
QTC INTERVAL: 512 MS
RBC # BLD AUTO: 3.34 MILLION/UL (ref 3.81–5.12)
RBC #/AREA URNS AUTO: NORMAL /HPF
SODIUM SERPL-SCNC: 140 MMOL/L (ref 136–145)
SP GR UR STRIP.AUTO: 1.01 (ref 1–1.03)
T WAVE AXIS: -10 DEGREES
UROBILINOGEN UR QL STRIP.AUTO: 0.2 E.U./DL
VENTRICULAR RATE: 68 BPM
WBC # BLD AUTO: 8.82 THOUSAND/UL (ref 4.31–10.16)
WBC #/AREA URNS AUTO: NORMAL /HPF

## 2020-08-14 PROCEDURE — 85025 COMPLETE CBC W/AUTO DIFF WBC: CPT | Performed by: EMERGENCY MEDICINE

## 2020-08-14 PROCEDURE — 81001 URINALYSIS AUTO W/SCOPE: CPT

## 2020-08-14 PROCEDURE — 99285 EMERGENCY DEPT VISIT HI MDM: CPT

## 2020-08-14 PROCEDURE — G1004 CDSM NDSC: HCPCS

## 2020-08-14 PROCEDURE — NC001 PR NO CHARGE: Performed by: INTERNAL MEDICINE

## 2020-08-14 PROCEDURE — 96361 HYDRATE IV INFUSION ADD-ON: CPT

## 2020-08-14 PROCEDURE — 80053 COMPREHEN METABOLIC PANEL: CPT | Performed by: EMERGENCY MEDICINE

## 2020-08-14 PROCEDURE — 80178 ASSAY OF LITHIUM: CPT | Performed by: STUDENT IN AN ORGANIZED HEALTH CARE EDUCATION/TRAINING PROGRAM

## 2020-08-14 PROCEDURE — 93971 EXTREMITY STUDY: CPT | Performed by: SURGERY

## 2020-08-14 PROCEDURE — 93010 ELECTROCARDIOGRAM REPORT: CPT | Performed by: INTERNAL MEDICINE

## 2020-08-14 PROCEDURE — 96367 TX/PROPH/DG ADDL SEQ IV INF: CPT

## 2020-08-14 PROCEDURE — 84703 CHORIONIC GONADOTROPIN ASSAY: CPT | Performed by: EMERGENCY MEDICINE

## 2020-08-14 PROCEDURE — 36415 COLL VENOUS BLD VENIPUNCTURE: CPT

## 2020-08-14 PROCEDURE — 96366 THER/PROPH/DIAG IV INF ADDON: CPT

## 2020-08-14 PROCEDURE — 96365 THER/PROPH/DIAG IV INF INIT: CPT

## 2020-08-14 PROCEDURE — 83690 ASSAY OF LIPASE: CPT | Performed by: EMERGENCY MEDICINE

## 2020-08-14 PROCEDURE — 93005 ELECTROCARDIOGRAM TRACING: CPT

## 2020-08-14 PROCEDURE — 99285 EMERGENCY DEPT VISIT HI MDM: CPT | Performed by: EMERGENCY MEDICINE

## 2020-08-14 PROCEDURE — 93971 EXTREMITY STUDY: CPT

## 2020-08-14 PROCEDURE — 74177 CT ABD & PELVIS W/CONTRAST: CPT

## 2020-08-14 RX ORDER — CHLORDIAZEPOXIDE HYDROCHLORIDE 25 MG/1
25 CAPSULE, GELATIN COATED ORAL 3 TIMES DAILY PRN
Status: CANCELLED | OUTPATIENT
Start: 2020-08-14

## 2020-08-14 RX ORDER — CHLORDIAZEPOXIDE HYDROCHLORIDE 25 MG/1
25 CAPSULE, GELATIN COATED ORAL 3 TIMES DAILY PRN
Status: DISCONTINUED | OUTPATIENT
Start: 2020-08-14 | End: 2020-08-17 | Stop reason: HOSPADM

## 2020-08-14 RX ORDER — SUMATRIPTAN 50 MG/1
100 TABLET, FILM COATED ORAL ONCE AS NEEDED
Status: DISCONTINUED | OUTPATIENT
Start: 2020-08-14 | End: 2020-08-17 | Stop reason: HOSPADM

## 2020-08-14 RX ORDER — KETOROLAC TROMETHAMINE 30 MG/ML
15 INJECTION, SOLUTION INTRAMUSCULAR; INTRAVENOUS EVERY 6 HOURS PRN
Status: DISCONTINUED | OUTPATIENT
Start: 2020-08-14 | End: 2020-08-14

## 2020-08-14 RX ORDER — PANTOPRAZOLE SODIUM 40 MG/1
40 TABLET, DELAYED RELEASE ORAL
Status: DISCONTINUED | OUTPATIENT
Start: 2020-08-15 | End: 2020-08-17 | Stop reason: HOSPADM

## 2020-08-14 RX ORDER — FOLIC ACID 1 MG/1
1 TABLET ORAL DAILY
Status: DISCONTINUED | OUTPATIENT
Start: 2020-08-15 | End: 2020-08-17 | Stop reason: HOSPADM

## 2020-08-14 RX ORDER — CEFAZOLIN SODIUM 1 G/50ML
1000 SOLUTION INTRAVENOUS EVERY 8 HOURS
Status: DISCONTINUED | OUTPATIENT
Start: 2020-08-15 | End: 2020-08-17 | Stop reason: HOSPADM

## 2020-08-14 RX ORDER — MULTIVITAMIN WITH IRON
100 TABLET ORAL DAILY
Status: DISCONTINUED | OUTPATIENT
Start: 2020-08-15 | End: 2020-08-17 | Stop reason: HOSPADM

## 2020-08-14 RX ORDER — GABAPENTIN 100 MG/1
100 CAPSULE ORAL 3 TIMES DAILY
Status: CANCELLED | OUTPATIENT
Start: 2020-08-14

## 2020-08-14 RX ORDER — ONDANSETRON 2 MG/ML
4 INJECTION INTRAMUSCULAR; INTRAVENOUS EVERY 6 HOURS PRN
Status: DISCONTINUED | OUTPATIENT
Start: 2020-08-14 | End: 2020-08-17 | Stop reason: HOSPADM

## 2020-08-14 RX ORDER — FERROUS SULFATE 325(65) MG
325 TABLET ORAL
Status: DISCONTINUED | OUTPATIENT
Start: 2020-08-15 | End: 2020-08-17 | Stop reason: HOSPADM

## 2020-08-14 RX ORDER — FOLIC ACID 1 MG/1
1 TABLET ORAL DAILY
Status: CANCELLED | OUTPATIENT
Start: 2020-08-15

## 2020-08-14 RX ORDER — LITHIUM CARBONATE 300 MG/1
600 TABLET, FILM COATED, EXTENDED RELEASE ORAL EVERY MORNING
Status: DISCONTINUED | OUTPATIENT
Start: 2020-08-15 | End: 2020-08-17 | Stop reason: HOSPADM

## 2020-08-14 RX ORDER — LEVOTHYROXINE SODIUM 0.05 MG/1
100 TABLET ORAL
Status: DISCONTINUED | OUTPATIENT
Start: 2020-08-15 | End: 2020-08-17 | Stop reason: HOSPADM

## 2020-08-14 RX ORDER — POTASSIUM CHLORIDE 20 MEQ/1
20 TABLET, EXTENDED RELEASE ORAL 2 TIMES DAILY
Status: DISCONTINUED | OUTPATIENT
Start: 2020-08-14 | End: 2020-08-17 | Stop reason: HOSPADM

## 2020-08-14 RX ORDER — ACETAMINOPHEN 325 MG/1
650 TABLET ORAL EVERY 6 HOURS PRN
Status: DISCONTINUED | OUTPATIENT
Start: 2020-08-14 | End: 2020-08-17 | Stop reason: HOSPADM

## 2020-08-14 RX ORDER — GABAPENTIN 100 MG/1
100 CAPSULE ORAL 3 TIMES DAILY
Status: DISCONTINUED | OUTPATIENT
Start: 2020-08-14 | End: 2020-08-17 | Stop reason: HOSPADM

## 2020-08-14 RX ORDER — CEFAZOLIN SODIUM 2 G/50ML
2000 SOLUTION INTRAVENOUS ONCE
Status: COMPLETED | OUTPATIENT
Start: 2020-08-14 | End: 2020-08-14

## 2020-08-14 RX ORDER — FERROUS SULFATE 325(65) MG
325 TABLET ORAL
Status: CANCELLED | OUTPATIENT
Start: 2020-08-15

## 2020-08-14 RX ADMIN — SODIUM CHLORIDE 1000 ML: 0.9 INJECTION, SOLUTION INTRAVENOUS at 12:48

## 2020-08-14 RX ADMIN — CEFAZOLIN SODIUM 2000 MG: 2 SOLUTION INTRAVENOUS at 16:05

## 2020-08-14 RX ADMIN — LITHIUM CARBONATE 750 MG: 450 TABLET ORAL at 21:34

## 2020-08-14 RX ADMIN — ENOXAPARIN SODIUM 100 MG: 100 INJECTION SUBCUTANEOUS at 21:35

## 2020-08-14 RX ADMIN — IOHEXOL 100 ML: 350 INJECTION, SOLUTION INTRAVENOUS at 16:33

## 2020-08-14 RX ADMIN — ONDANSETRON 8 MG: 2 SOLUTION INTRAMUSCULAR; INTRAVENOUS at 13:42

## 2020-08-14 RX ADMIN — QUETIAPINE FUMARATE 500 MG: 100 TABLET ORAL at 21:34

## 2020-08-14 RX ADMIN — GABAPENTIN 100 MG: 100 CAPSULE ORAL at 21:35

## 2020-08-14 RX ADMIN — IOHEXOL 50 ML: 240 INJECTION, SOLUTION INTRATHECAL; INTRAVASCULAR; INTRAVENOUS; ORAL at 14:38

## 2020-08-14 RX ADMIN — TOPIRAMATE 150 MG: 100 TABLET, FILM COATED ORAL at 21:35

## 2020-08-14 RX ADMIN — POTASSIUM CHLORIDE 20 MEQ: 1500 TABLET, EXTENDED RELEASE ORAL at 21:35

## 2020-08-14 NOTE — ED PROVIDER NOTES
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 and received IR intervention and was discharged  She states that the vomiting started 2 days ago and states that she had numerous episodes of emesis  No hematemesis  She started to feel weak afterwards and also started to experience epigastric pain  Pain does not radiate  She had one bowel movement yesterday but with very little stool and hasn't had a bowel movement nor has she been passing gas  Additional complaint she have is the right lower extremity pain that is accompanied by swelling and erythema with clearly demarcated borders  Patient denies headache, chest pain, difficulty breathing, vaginal discharge, urinary symptoms, lower extremity weakness, numbness or tingling  Prior to Admission Medications   Prescriptions Last Dose Informant Patient Reported? Taking?    NEEDLE, DISP, 27 G 27G X 1/2" MISC   No No   Sig: by Does not apply route every 12 (twelve) hours   QUEtiapine (SEROquel) 100 mg tablet   Yes No   Sig: Take 500 mg by mouth daily at bedtime   SUMAtriptan (IMITREX) 100 mg tablet   Yes No   Sig: Take 100 mg by mouth as needed for migraine    chlordiazePOXIDE (LIBRIUM) 25 mg capsule   Yes No   Sig: Take 25 mg by mouth 3 (three) times a day as needed for anxiety   enoxaparin (LOVENOX) 100 mg/mL   No No   Sig: Inject 1 mL (100 mg total) under the skin every 12 (twelve) hours   ferrous sulfate 325 (65 Fe) mg tablet   Yes No   Sig: Take 325 mg by mouth daily with breakfast   folic acid (FOLVITE) 1 mg tablet   Yes No   Sig: Take 1 mg by mouth daily   gabapentin (NEURONTIN) 400 mg capsule   Yes No   Sig: Take 100 mg by mouth 3 (three) times a day   lithium carbonate (LITHOBID) 300 mg CR tablet   Yes No   Sig: Take 600 mg by mouth every morning   lithium carbonate (LITHOBID) 300 mg CR tablet   Yes No   Sig: Take 750 mg by mouth every evening   pantoprazole (PROTONIX) 40 mg tablet   No No   Sig: Take 1 tablet (40 mg total) by mouth daily in the early morning   potassium chloride (K-DUR,KLOR-CON) 20 mEq tablet   No No   Sig: Take 1 tablet (20 mEq total) by mouth 2 (two) times a day   pyridoxine (VITAMIN B6) 100 mg tablet   Yes No   Sig: Take 100 mg by mouth daily   sertraline (ZOLOFT) 50 mg tablet   Yes No   Sig: Take 50 mg by mouth daily   topiramate (TOPAMAX) 100 mg tablet   Yes No   Sig: Take 150 mg by mouth 2 (two) times a day       Facility-Administered Medications: None       Past Medical History:   Diagnosis Date    Anemia     Psychiatric disorder     bipolar II, suicide    Pulmonary embolism (HCC)     Seizures (HCC)        Past Surgical History:   Procedure Laterality Date    APPENDECTOMY      Open    CHOLECYSTECTOMY      Laparoscopic    GASTRIC BYPASS      was 205 date of surgery    IR LYSIS RECHECK  8/5/2020    IR VENOUS LYSIS  8/4/2020    IVC FILTER INSERTION  2017    REPLACEMENT TOTAL KNEE      STOMACH SURGERY      for morbid obesity    TUBAL LIGATION Bilateral 2010       Family History   Problem Relation Age of Onset    Other Mother         headache    Hypertension Mother     Depression Mother     Arthritis Father     Other Father         H, pylori infection    Other Sister         esophageal reflux    Migraines Sister     Breast cancer Family     Diabetes Paternal Aunt         Mellitus    Diabetes Paternal Uncle         Mellitus    Asthma Daughter      I have reviewed and agree with the history as documented      E-Cigarette/Vaping     E-Cigarette/Vaping Substances     Social History     Tobacco Use    Smoking status: Never Smoker    Smokeless tobacco: Never Used    Tobacco comment: former quit in 1999 per [employer]   Substance Use Topics    Alcohol use: Not Currently     Comment: quit 6/21/2018    Drug use: No        Review of Systems   Constitutional: Negative for appetite change, chills, diaphoresis, fever and unexpected weight change  HENT: Negative for ear pain, sore throat and trouble swallowing  Eyes: Negative  Respiratory: Negative for cough, chest tightness and shortness of breath  Cardiovascular: Positive for leg swelling (Right)  Negative for chest pain  Gastrointestinal: Positive for abdominal pain (Epigastric), nausea and vomiting  Negative for abdominal distention, constipation and diarrhea  Endocrine: Negative  Genitourinary: Negative for difficulty urinating and dysuria  Musculoskeletal: Negative  Skin: Positive for color change (Redness in right lower leg)  Allergic/Immunologic: Negative  Neurological: Negative for weakness, numbness and headaches  Hematological: Negative  Psychiatric/Behavioral: Negative  Physical Exam  ED Triage Vitals   Temperature Pulse Respirations Blood Pressure SpO2   08/14/20 1139 08/14/20 1139 08/14/20 1139 08/14/20 1139 08/14/20 1139   98 °F (36 7 °C) 69 18 123/59 98 %      Temp Source Heart Rate Source Patient Position - Orthostatic VS BP Location FiO2 (%)   08/14/20 1139 08/14/20 1139 08/14/20 1524 08/14/20 1524 --   Tympanic Monitor Lying Right arm       Pain Score       08/14/20 2034       No Pain             Orthostatic Vital Signs  Vitals:    08/14/20 1858 08/14/20 2037 08/14/20 2305 08/15/20 0638   BP: 135/63 134/85 125/69 123/68   Pulse: 72 78 86 86   Patient Position - Orthostatic VS: Sitting          Physical Exam  Vitals signs reviewed  Constitutional:       General: She is not in acute distress  Appearance: Normal appearance  She is obese  She is not ill-appearing  HENT:      Head: Normocephalic and atraumatic        Right Ear: External ear normal       Left Ear: External ear normal       Nose: Nose normal       Mouth/Throat: Mouth: Mucous membranes are moist       Pharynx: Oropharynx is clear  Eyes:      General: No scleral icterus  Right eye: No discharge  Left eye: No discharge  Extraocular Movements: Extraocular movements intact  Conjunctiva/sclera: Conjunctivae normal       Pupils: Pupils are equal, round, and reactive to light  Neck:      Musculoskeletal: Normal range of motion  No neck rigidity or muscular tenderness  Cardiovascular:      Rate and Rhythm: Normal rate and regular rhythm  Pulses: Normal pulses  Heart sounds: Normal heart sounds  Pulmonary:      Effort: Pulmonary effort is normal       Breath sounds: Normal breath sounds  Abdominal:      General: Abdomen is flat  Bowel sounds are normal  There is no distension  Palpations: Abdomen is soft  There is no mass  Tenderness: There is abdominal tenderness (Epigastric)  There is no guarding or rebound  Musculoskeletal: Normal range of motion  General: Tenderness (Right lower leg pain) present  Right lower leg: Edema present  Skin:     General: Skin is warm  Capillary Refill: Capillary refill takes less than 2 seconds  Findings: Erythema (Right lower leg) present  Neurological:      General: No focal deficit present  Mental Status: She is alert and oriented to person, place, and time  Mental status is at baseline  Cranial Nerves: No cranial nerve deficit  Sensory: No sensory deficit  Motor: No weakness  Coordination: Coordination normal       Gait: Gait normal       Deep Tendon Reflexes: Reflexes normal    Psychiatric:         Mood and Affect: Mood normal          Behavior: Behavior normal          Thought Content:  Thought content normal          Judgment: Judgment normal          ED Medications  Medications   chlordiazePOXIDE (LIBRIUM) capsule 25 mg (has no administration in time range)   enoxaparin (LOVENOX) subcutaneous injection 100 mg (100 mg Subcutaneous Given 8/14/20 2135)   ferrous sulfate tablet 325 mg (has no administration in time range)   folic acid (FOLVITE) tablet 1 mg (has no administration in time range)   gabapentin (NEURONTIN) capsule 100 mg (100 mg Oral Given 8/14/20 2135)   lithium carbonate (LITHOBID) CR tablet 600 mg (has no administration in time range)   lithium carbonate (LITHOBID) CR tablet 750 mg (750 mg Oral Given 8/14/20 2134)   pantoprazole (PROTONIX) EC tablet 40 mg (40 mg Oral Given 8/15/20 0622)   potassium chloride (K-DUR,KLOR-CON) CR tablet 20 mEq (20 mEq Oral Given 8/14/20 2135)   pyridoxine (VITAMIN B6) tablet 100 mg (has no administration in time range)   QUEtiapine (SEROquel) tablet 500 mg (500 mg Oral Given 8/14/20 2134)   sertraline (ZOLOFT) tablet 50 mg (has no administration in time range)   SUMAtriptan (IMITREX) tablet 100 mg (has no administration in time range)   topiramate (TOPAMAX) tablet 150 mg (150 mg Oral Given 8/14/20 2135)   levothyroxine tablet 100 mcg (100 mcg Oral Given 8/15/20 0622)   ondansetron (ZOFRAN) injection 4 mg (has no administration in time range)   ceFAZolin (ANCEF) IVPB (premix) 1,000 mg 50 mL (1,000 mg Intravenous New Bag 8/15/20 0015)   acetaminophen (TYLENOL) tablet 650 mg (has no administration in time range)   sodium chloride 0 9 % bolus 1,000 mL (1,000 mL Intravenous New Bag 8/14/20 1248)   ondansetron (ZOFRAN) 8 mg in sodium chloride 0 9 % 50 mL IVPB (0 mg Intravenous Stopped 8/14/20 1605)   iohexol (OMNIPAQUE) 240 MG/ML solution 50 mL (50 mL Oral Given 8/14/20 1438)   ceFAZolin (ANCEF) IVPB (premix) 2,000 mg 50 mL (2,000 mg Intravenous New Bag 8/14/20 1605)   iohexol (OMNIPAQUE) 350 MG/ML injection (MULTI-DOSE) 100 mL (100 mL Intravenous Given 8/14/20 1633)       Diagnostic Studies  Results Reviewed     Procedure Component Value Units Date/Time    Urine Microscopic [[id number]]  (Normal) Collected:  08/14/20 1258    Lab Status:  Final result Specimen:  Urine, Clean Catch Updated:  08/14/20 1345     RBC, UA None Seen /hpf      WBC, UA None Seen /hpf      Epithelial Cells Occasional /hpf      Bacteria, UA None Seen /hpf     hCG, qualitative pregnancy [[id number]]  (Normal) Collected:  08/14/20 1142    Lab Status:  Final result Specimen:  Blood from Arm, Left Updated:  08/14/20 1329     Preg, Serum Negative    Urine Macroscopic, POC [[id number]]  (Abnormal) Collected:  08/14/20 1258    Lab Status:  Final result Specimen:  Urine Updated:  08/14/20 1256     Color, UA Yellow     Clarity, UA Clear     pH, UA 6 5     Leukocytes, UA Negative     Nitrite, UA Negative     Protein, UA Negative mg/dl      Glucose, UA Negative mg/dl      Ketones, UA Negative mg/dl      Urobilinogen, UA 0 2 E U /dl      Bilirubin, UA Negative     Blood, UA Trace     Specific [place], UA 1 010    Narrative:       CLINITEK RESULT    Comprehensive metabolic panel [[id number]]  (Abnormal) Collected:  08/14/20 1142    Lab Status:  Final result Specimen:  Blood from Arm, Left Updated:  08/14/20 1211     Sodium 140 mmol/L      Potassium 3 6 mmol/L      Chloride 112 mmol/L      CO2 22 mmol/L      ANION GAP 6 mmol/L      BUN 3 mg/dL      Creatinine 0 79 mg/dL      Glucose 111 mg/dL      Calcium 8 3 mg/dL      AST 49 U/L      ALT 48 U/L      Alkaline Phosphatase 178 U/L      Total Protein 7 2 g/dL      Albumin 2 7 g/dL      Total Bilirubin 0 45 mg/dL      eGFR 90 ml/min/1 73sq m     Narrative:       [place] guidelines for Chronic Kidney Disease (CKD):     Stage 1 with normal or high GFR (GFR > 90 mL/min/1 73 square meters)    Stage 2 Mild CKD (GFR = 60-89 mL/min/1 73 square meters)    Stage 3A Moderate CKD (GFR = 45-59 mL/min/1 73 square meters)    Stage 3B Moderate CKD (GFR = 30-44 mL/min/1 73 square meters)    Stage 4 Severe CKD (GFR = 15-29 mL/min/1 73 square meters)    Stage 5 End Stage CKD (GFR <15 mL/min/1 73 square meters)  Note: GFR calculation is accurate only with a steady state creatinine    Lipase [[id number]] (Normal) Collected:  08/14/20 1142    Lab Status:  Final result Specimen:  Blood from Arm, Left Updated:  08/14/20 1208     Lipase 247 u/L     CBC and differential [[id number]]  (Abnormal) Collected:  08/14/20 1142    Lab Status:  Final result Specimen:  Blood from Arm, Left Updated:  08/14/20 1156     WBC 8 82 Thousand/uL      RBC 3 34 Million/uL      Hemoglobin 10 1 g/dL      Hematocrit 33 7 %       fL      MCH 30 2 pg      MCHC 30 0 g/dL      RDW 18 5 %      MPV 11 1 fL      Platelets 511 Thousands/uL      nRBC 0 /100 WBCs      Neutrophils Relative 62 %      Immat GRANS % 1 %      Lymphocytes Relative 18 %      Monocytes Relative 11 %      Eosinophils Relative 7 %      Basophils Relative 1 %      Neutrophils Absolute 5 49 Thousands/µL      Immature Grans Absolute 0 12 Thousand/uL      Lymphocytes Absolute 1 55 Thousands/µL      Monocytes Absolute 0 96 Thousand/µL      Eosinophils Absolute 0 64 Thousand/µL      Basophils Absolute 0 06 Thousands/µL                  CT abdomen pelvis with contrast   Final Result by Shen Cain MD (08/14 3255)      In this patient with IVC filter, and 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 seen on 8/3/2020, there is markedly reduced clot burden status post interventional radiology therapy  Currently, there is still small amounts of    nonocclusive thrombi in the IVC inferior to the filter, and in both common iliac veins (series 601 image 103 through 142 )      There is a 7 2 x 5 4 x 5 2 cm cystic lesion in the right ovary (series 2 image 68 ) This was in retrospect present on 8/3/2020  If there is clinical concern for ovarian torsion, emergent pelvic ultrasound should be performed  Otherwise, this can be    evaluated by pelvic ultrasound on a nonemergent basis  Again seen is fatty liver        Workstation performed: [id number]         VAS lower limb venous duplex study, unilateral/limited   Final Result by Bettina Landeros MD (08/14 8838) Procedures  Procedures      ED Course                       PERC Rule for PE      Most Recent Value   PERC Rule for PE   Age >=50  0 Filed at: 08/14/2020 1208   HR >=100  0 Filed at: 08/14/2020 1208   O2 Sat on room air < 95%  0 Filed at: 08/14/2020 1208   History of PE or DVT  1 Filed at: 08/14/2020 1208   Recent trauma or surgery  1 Filed at: 08/14/2020 1208   Hemoptysis  0 Filed at: 08/14/2020 1208   Exogenous estrogen  0 Filed at: 08/14/2020 1208   Unilateral leg swelling  1 Filed at: 08/14/2020 1208   PERC Rule for PE Results  3 Filed at: 08/14/2020 1208                Mateo Singer Criteria for PE      Most Recent Value   Wells' Criteria for PE   Clinical signs and symptoms of DVT  3 Filed at: 08/14/2020 1208   PE is primary diagnosis or equally likely  0 Filed at: 08/14/2020 1208   HR >100  0 Filed at: 08/14/2020 1208   Immobilization at least 3 days or Surgery in the previous 4 weeks  1 5 Filed at: 08/14/2020 1208   Previous, objectively diagnosed PE or DVT  1 5 Filed at: 08/14/2020 1208   Hemoptysis  0 Filed at: 08/14/2020 1208   Malignancy with treatment within 6 months or palliative  0 Filed at: 08/14/2020 1208   Wells' Criteria Total  6 Filed at: 08/14/2020 1208                MDM  Number of Diagnoses or Management Options  Diagnosis management comments:    Patient is 44F presenting with generalized weakness, abdominal pain, and right lower extremity pain  Hx notable for PE, DVT, IVC filter, and recent admission due to IVC filter thrombosis that required IR thrombolysis     Differential for her symptoms include:  - Repeat thrombosis of the filter, Pancreatitis, Gastritis, Right lower leg cellulitis  Workup:  - Urinalysis, CMP, CBC, hCG, CT abdomen and pelvis, Venous duplex study of right leg, EKG   Above workup unremarkable  Patient was provided with IV fluids along with zofran and showed improvements of her symptoms except for the right lower leg pain around the area where the cellulitic changes were present  Concern for cellulitis  Started her on ancef and spoke with SOD resident Dr Mina Antonio and agreed to admit to SOD under Dr Timo Garcia      Disposition  Final diagnoses:   Weakness   Cellulitis   Epigastric pain     Time reflects when diagnosis was documented in both MDM as applicable and the Disposition within this note     Time User Action Codes Description Comment    8/14/2020 12:53 PM Bruno Corner M Add [R60 9] Swelling     8/14/2020 12:53 PM Bruno Corner M Add [R52] Pain     8/14/2020  5:31 PM Leata Sj Add [R53 1] Weakness     8/14/2020  5:32 PM Evelin Tadeo [P87 83] Cellulitis     8/14/2020  5:32 PM Jose Armando María [R53 1] Weakness     8/14/2020  5:32 PM Kimmie[facility] [U62 51] Cellulitis     8/14/2020  5:32 PM Leata Sj Add [R10 13] Epigastric pain       ED Disposition     ED Disposition Condition Date/Time Comment    Admit Stable Fri Aug 14, 2020  5:31 PM Case was discussed with Dr Mina Antonio and the patient's admission status was agreed to be Admission Status: observation status to the service of Dr Timo Garcia           Follow-up Information    None         Current Discharge Medication List      CONTINUE these medications which have NOT CHANGED    Details   chlordiazePOXIDE (LIBRIUM) 25 mg capsule Take 25 mg by mouth 3 (three) times a day as needed for anxiety      enoxaparin (LOVENOX) 100 mg/mL Inject 1 mL (100 mg total) under the skin every 12 (twelve) hours  Qty: 100 Syringe, Refills: 5    Associated Diagnoses: 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HCC)      ferrous sulfate 325 (65 Fe) mg tablet Take 325 mg by mouth daily with breakfast      folic acid (FOLVITE) 1 mg tablet Take 1 mg by mouth daily      gabapentin (NEURONTIN) 400 mg capsule Take 100 mg by mouth 3 (three) times a day      !! lithium carbonate (LITHOBID) 300 mg CR tablet Take 600 mg by mouth every morning      !! lithium carbonate (LITHOBID) 300 mg CR tablet Take 750 mg by mouth every evening      NEEDLE, DISP, 27 G 27G X 1/2" MISC by Does not apply route every 12 (twelve) hours  Qty: 100 each, Refills: 5    Associated Diagnoses: 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HCC)      pantoprazole (PROTONIX) 40 mg tablet Take 1 tablet (40 mg total) by mouth daily in the early morning  Qty: 30 tablet, Refills: 1    Associated Diagnoses: Acid reflux      potassium chloride (K-DUR,KLOR-CON) 20 mEq tablet Take 1 tablet (20 mEq total) by mouth 2 (two) times a day  Qty: 30 tablet, Refills: 1    Associated Diagnoses: Hypokalemia      pyridoxine (VITAMIN B6) 100 mg tablet Take 100 mg by mouth daily      QUEtiapine (SEROquel) 100 mg tablet Take 500 mg by mouth daily at bedtime      sertraline (ZOLOFT) 50 mg tablet Take 50 mg by mouth daily      SUMAtriptan (IMITREX) 100 mg tablet Take 100 mg by mouth as needed for migraine       topiramate (TOPAMAX) 100 mg tablet Take 150 mg by mouth 2 (two) times a day        !! - Potential duplicate medications found  Please discuss with provider  No discharge procedures on file  PDMP Review     None           ED Provider  Attending physically available and evaluated [facility]  I managed the patient along with the ED Attending      Electronically Signed by         Devonte Weiss MD  08/15/20 5359

## 2020-08-14 NOTE — ASSESSMENT & PLAN NOTE
History of bipolar depression and anxiety  Home medications include: lithium, wellbutrin, atomoxetine, seroquel, and Effexor      Patient has not taken medication in two days due to vomiting    Plan:  -Lithium 25 mg TID  -Lithium level pending  -Sertraline 500 mg QD  -Seroquel   -Librium 25 mg TID

## 2020-08-14 NOTE — ASSESSMENT & PLAN NOTE
S/p IR IVC filter placement in 2017  S/p IR venous lysis on 8/4/2020    Plan:  -Lovenox   -CT abdomen in ER negative for occluding thrombus  Shows residual clots from recent lysis

## 2020-08-14 NOTE — ASSESSMENT & PLAN NOTE
History of migraines treated with Topamax prophylaxis and either Imitrex or Maxalt PRN  Patient denies current headache      Plan:  -Migraine prophylaxis with Topamax QD  -Imitrex PRN migraine

## 2020-08-14 NOTE — H&P
INTERNAL MEDICINE RESIDENCY ADMISSION H&P     Name: Lorne Lopez   Age & Sex: 53 y o  female   MRN: [id number]  Unit/Bed#: CW2 213-01   Encounter: [id number]  Primary Care Provider: Lieutenant Krystyna PA-C    Code Status: Level 1 - Full Code  Admission Status: OBSERVATION  Disposition: Patient requires Med/Surg    Admit to team: SOD Team C     ASSESSMENT/PLAN     Principal Problem:    Cellulitis of right lower extremity   Active Problems:    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HCC)    Hx of pulmonary embolus    S/P gastric bypass    Anemia    Thrombophlebitis    Migraine    Bipolar disorder (HCC)    Swelling of lower extremity    Morbid obesity with BMI of 50 0-59 9, adult ([address] )    Acquired hypothyroidism    Vitamin D deficiency    Hypokalemia    Nausea      Nausea  Assessment & Plan  History of nausea and vomiting for two days  Plan:  -Zofran PRN  -Clear liquid diet for now    Hypokalemia  Assessment & Plan  Likely 2/2 daily Topamax     Plan:  -BMP in am  -K-DUR 20 mEQ BID        Acquired hypothyroidism  Assessment & Plan  Acquired hypothyroidism    Plan:  -home dose (per pharmacy) Synthroid 100 mg in am    Morbid obesity with BMI of 50 0-59 9, adult [facility])  Assessment & Plan  S/p bariatric surgery  Plan:  -continue with vitamin B12 and thiamine, and iron    Hx of suicide attempt  Assessment & Plan  History of bipolar depression and anxiety  Home medications include: lithium, seroquel, effexor, gabapentin, atomoxetine, and wellbutrin  Denies current SI/HI     Plan:   Lithium 25 mg TID  Seroquel 500 mg QHS  Sertarline 500 mg QD  Librium 25 mg TID    Swelling of lower extremity  Assessment & Plan  Chronic venous stasis with dermatitis changes  S/p b/l common iliac vein clot lysis  Current right leg edema and erythema 2/2 cellulitis vs stasis dermatitis  Received ANCEF in ER for suspected cellulitis       Plan:  -LE Duplex US in ED negative for DVT   -Lovenox for DVT prophylaxis  -Continue ANCEF   -CBC in am    Bipolar disorder [facility])  Assessment & Plan  History of bipolar depression and anxiety  Home medications include: lithium, wellbutrin, atomoxetine, seroquel, and Effexor  Patient has not taken medication in two days due to vomiting    Plan:  -Lithium 25 mg TID  -Lithium level pending  -Sertraline 500 mg QD  -Seroquel   -Librium 25 mg TID      Migraine  Assessment & Plan  History of migraines treated with Topamax prophylaxis and either Imitrex or Maxalt PRN  Patient denies current headache  Plan:  -Migraine prophylaxis with Topamax QD  -Imitrex PRN migraine    Thrombophlebitis  Assessment & Plan  History of DVT and PE s/p multiple IR procedures for clot lysis including recent common iliac vein thrombolysis  Venous stasis and dermatitis  Plan:  -Lovenox  -Gabapentin 100 mg TID for pain    Anemia  Assessment & Plan  Hb 10 1 on admission    Plan:  -Ferrous sulfate 325 mg QD  -Thiamine  -Folic acid    S/P gastric bypass  Assessment & Plan  On non-mineral multivitamin and vitamin D replacement at home    Plan:  -continue with thiamine, iron, and B12 replacement   -Protonix 40 mg BID    Hx of pulmonary embolus  Assessment & Plan  Denies current SOB or pleuritic chest pain    Plan:  -Lovenox    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[facility] [address] )  Assessment & Plan  S/p IR IVC filter placement in 2017  S/p IR venous lysis on 8/4/2020    Plan:  -Lovenox   -CT abdomen in ER negative for occluding thrombus  Shows residual clots from recent lysis  * Cellulitis of right lower extremity   Assessment & Plan  Onset 3 days ago in right lower extremity with increased pain, tautness, and area of erythema when compared to the left leg  Patient has baseline LE edema since her vascular intervention and due to her history of PVD       Plan:  -Duplex LE U/S negative for DVT in bilateral LE  -No leukocytosis  -Afebrile  -Lovenox for DVT prophylaxis   -Gabapentin for pain  -continuing ANCEF from ER  -CBC, BMP in am      VTE Pharmacologic Prophylaxis: Enoxaparin (Lovenox)  VTE Mechanical Prophylaxis: foot pump applied to left leg     CHIEF COMPLAINT     Chief Complaint   Patient presents with    Weakness - Generalized     pt c/o weakness and abd pain       HISTORY OF PRESENT ILLNESS     53year old female with extensive past medical history including pulmonary embolism, IR IVC filter placement (2017), IR venous lysis (8/4/2020), multiple abdominal surgeries (appendectomy, cholecystectomy, tubal ligations, gastric bypass), bipolar depression, anxiety, anemia and morbid obesity presented to [facility] ED on 8/14 with abdominal pain  3 days ago she started experiencing right leg pain, followed the next day by nausea and vomiting multiple times a day  She went to her PCP who prescribed her Percocet and Zofran for the pain and nausea, respectively  Today she presents to the ED with unchanged pain in her leg  Both legs have been swollen, erythematous, and painful at baseline, since her vascular procedure  For the swelling she says she sleeps with her legs elevated, and when she wakes up in the morning the swelling is decreased and the color is only slightly red  By the end of the day her legs are more swollen, with increased erythema and pain  At time of examination she is not wearing compression stockings  However three days ago, the area of redness of her right leg increased as did the pain  She also stated that in the morning the redness of the right leg had not subsided  She denies any other symptoms including: fever, chills, shortness of breath, loss of sensation in foot, weakness in leg  She does have a small laceration above her right knee, which she says she got from a dog scratch a few days prior  Up until her nausea started two days ago, she had been taking all of her medications as prescribed  I contacted her pharmacy on file to review her current medication list  She gets her medications in a bubble pack  They include:     Psych:   Wellbutrin  mg QD    Lithium 150 QHS    Lithium ER 30 BID     Atomoxetine 60 mg in am    Seroquel 500 mg QHS    Effexor  QD    Gabapentin 400 mg TID    Migraines:  Topamax 100 mg BID (prophylaxis)    Imitrex 100 mg PRN migraine    Maxalt MLT 10 mg PRN migraine    Potassium-Chloride 20 mEq QD    DVT Prophylaxis: Lovenox 100 mg/mL q12 hr    Other:  Synthroid 100 mg Qd    Protonix 40 mg QD    Thiamine 043 mg QD    Folic Acid 582 mcg QD    Non-mineral multi-vitamin     Cefuroxine (prescribed by dermatologist for acne, x3 months    thus far)      NOTE: The patient was recently discharged from [facility], so this admission we restarted her on the medications she had been discharged on from [facility], NOT the medications she picks up at the pharmacy  There are a number of discrepancies  The only medication we added from her home medication list (per pharmacy) is the Synthroid 100 mg QD  REVIEW OF SYSTEMS     Review of Systems   Constitutional: Positive for appetite change  Negative for activity change, chills, fatigue and fever  Respiratory: Negative for cough, chest tightness and shortness of breath  Cardiovascular: Positive for leg swelling (patient has leg swelling at baseline, but pain and swelling in right leg has increased the last three days)  Negative for chest pain and palpitations  Gastrointestinal: Positive for abdominal pain (epigastric pain after vomiting multiple times the past two days), diarrhea (started today), nausea (x2 days, but resolves with Zofran) and vomiting (x2 days  has had poor oral intake )  Negative for abdominal distention and constipation  Genitourinary: Negative for difficulty urinating, dysuria, frequency and vaginal discharge  Musculoskeletal: Negative for arthralgias  Skin: Positive for color change (right leg with worsening erythema  both legs with stasis dermatitis changes since IR procedure 8/2020) and wound (small abrasion above right knee)     Allergic/Immunologic: Negative for environmental allergies and food allergies  Neurological: Negative for weakness, numbness and headaches  Psychiatric/Behavioral: Negative for agitation, confusion, self-injury and suicidal ideas  The patient is nervous/anxious  OBJECTIVE     Vitals:    20 1524 20 1630 20 1858 20 2037   BP: 142/76 137/63 135/63 134/85   BP Location: Right arm Right arm Right arm    Pulse: 90 86 72 78   Resp: 18 18 18    Temp:    98 4 °F (36 9 °C)   TempSrc:       SpO2: 100% 100% 99% 99%   Weight:          Temperature:   Temp (24hrs), Av 2 °F (36 8 °C), Min:98 °F (36 7 °C), Max:98 4 °F (36 9 °C)    Temperature: 98 4 °F (36 9 °C)  Intake & Output:  I/O        07 -  0700  07 -  0700  07 - 08/15 0700    IV Piggyback   1000    Total Intake(mL/kg)   1000 (7 4)    Net   +1000               Weights:   IBW: -92 5 kg    Body mass index is 53 14 kg/m²  Weight (last 2 days)     Date/Time   Weight    20 1139   136 (300)            Physical Exam  Constitutional:       General: She is not in acute distress  Appearance: Normal appearance  She is obese  She is not ill-appearing, toxic-appearing or diaphoretic  Cardiovascular:      Rate and Rhythm: Normal rate and regular rhythm  Pulses: Normal pulses  Heart sounds: No murmur  Pulmonary:      Effort: Pulmonary effort is normal  No respiratory distress  Breath sounds: Normal breath sounds  No wheezing  Chest:      Chest wall: No tenderness  Abdominal:      General: Bowel sounds are normal  There is no distension  Palpations: Abdomen is soft  There is no mass  Tenderness: There is no abdominal tenderness  There is no guarding or rebound  Musculoskeletal:         General: Swelling (b/l LE edema, Right slightly more taut than left) and tenderness (b/l  R>L) present  Right lower leg: Edema present  Left lower leg: Edema present  Skin:     General: Skin is warm and dry     Neurological:      General: No focal deficit present  Mental Status: She is alert and oriented to person, place, and time  Sensory: No sensory deficit  Motor: No weakness  Psychiatric:         Mood and Affect: Mood normal          Behavior: Behavior normal          Thought Content: Thought content normal          Judgment: Judgment normal        PAST MEDICAL HISTORY     Past Medical History:   Diagnosis Date    Anemia     Psychiatric disorder     bipolar II, suicide    Pulmonary embolism (HCC)     Seizures ([address] )      PAST SURGICAL HISTORY     Past Surgical History:   Procedure Laterality Date    APPENDECTOMY      Open    CHOLECYSTECTOMY      Laparoscopic    GASTRIC BYPASS      was 205 date of surgery    IR LYSIS RECHECK  8/5/2020    IR VENOUS LYSIS  8/4/2020    IVC FILTER INSERTION  2017    REPLACEMENT TOTAL KNEE      STOMACH SURGERY      for morbid obesity    TUBAL LIGATION Bilateral 2010     SOCIAL & FAMILY HISTORY     Social History     Substance and Sexual Activity   Alcohol Use Not Currently    Comment: quit 6/21/2018     Substance and Sexual Activity   Alcohol Use Not Currently    Comment: quit 6/21/2018        Substance and Sexual Activity   Drug Use No     Social History     Tobacco Use   Smoking Status Never Smoker   Smokeless Tobacco Never Used   Tobacco Comment    former quit in 1999 per [employer]     Family History   Problem Relation Age of Onset    Other Mother         headache    Hypertension Mother     Depression Mother     Arthritis Father     Other Father         H, pylori infection    Other Sister         esophageal reflux    Migraines Sister     Breast cancer Family     Diabetes Paternal Aunt         Mellitus    Diabetes Paternal Uncle         Mellitus    Asthma Daughter      LABORATORY DATA     Labs: I have personally reviewed pertinent reports      Results from last 7 days   Lab Units 08/14/20  1142 08/09/20  1025 08/08/20  0612   WBC Thousand/uL 8 82 15 76* 14 04*   HEMOGLOBIN g/dL 10 1* 9  8* 9 9*   HEMATOCRIT % 33 7* 32 3* 32 8*   PLATELETS Thousands/uL 266 309 294   NEUTROS PCT % 62  --   --    MONOS PCT % 11  --   --       Results from last 7 days   Lab Units 08/14/20  1142 08/09/20  0531 08/08/20  0612   POTASSIUM mmol/L 3 6 3 0* 3 1*   CHLORIDE mmol/L 112* 104 104   CO2 mmol/L 22 21 21   BUN mg/dL 3* 6 6   CREATININE mg/dL 0 79 0 78 0 81   CALCIUM mg/dL 8 3 8 8 8 9   ALK PHOS U/L 178*  --   --    ALT U/L 48  --   --    AST U/L 49*  --   --      Results from last 7 days   Lab Units 08/09/20  0531   MAGNESIUM mg/dL 2 6                      Micro:  No results found for: Becka Rein, WOUNDCULT, SPUTUMCULTUR  IMAGING & DIAGNOSTIC TESTS     Imaging: I have personally reviewed pertinent reports  Ct Abdomen Pelvis With Contrast    Result Date: 8/14/2020  Impression: In this patient with IVC filter, and 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 seen on 8/3/2020, there is markedly reduced clot burden status post interventional radiology therapy  Currently, there is still small amounts of nonocclusive thrombi in the IVC inferior to the filter, and in both common iliac veins (series 601 image 103 through 142 ) There is a 7 2 x 5 4 x 5 2 cm cystic lesion in the right ovary (series 2 image 68 ) This was in retrospect present on 8/3/2020  If there is clinical concern for ovarian torsion, emergent pelvic ultrasound should be performed  Otherwise, this can be evaluated by pelvic ultrasound on a nonemergent basis  Again seen is fatty liver  Workstation performed: [id number]     EKG, Pathology, and Other Studies: I have personally reviewed pertinent reports  ALLERGIES     Allergies   Allergen Reactions    Morphine      Seizures  MEDICATIONS PRIOR TO ARRIVAL     Prior to Admission medications    Medication Sig Start Date End Date Taking?  Authorizing Provider   chlordiazePOXIDE (LIBRIUM) 25 mg capsule Take 25 mg by mouth 3 (three) times a day as needed for anxiety Historical Provider, MD   enoxaparin (LOVENOX) 100 mg/mL Inject 1 mL (100 mg total) under the skin every 12 (twelve) hours 8/9/20   Navneet Dean,    ferrous sulfate 325 (65 Fe) mg tablet Take 325 mg by mouth daily with breakfast    Historical Provider, MD   folic acid (FOLVITE) 1 mg tablet Take 1 mg by mouth daily    Historical Provider, MD   gabapentin (NEURONTIN) 400 mg capsule Take 100 mg by mouth 3 (three) times a day    Historical Provider, MD   lithium carbonate (LITHOBID) 300 mg CR tablet Take 600 mg by mouth every morning    Historical Provider, MD   lithium carbonate (LITHOBID) 300 mg CR tablet Take 750 mg by mouth every evening    Historical Provider, MD   NEEDLE, DISP, 27 G 27G X 1/2" MISC by Does not apply route every 12 (twelve) hours 8/9/20   Navneet Dean DO   pantoprazole (PROTONIX) 40 mg tablet Take 1 tablet (40 mg total) by mouth daily in the early morning 8/9/20   Navneet Dean DO   potassium chloride (K-DUR,KLOR-CON) 20 mEq tablet Take 1 tablet (20 mEq total) by mouth 2 (two) times a day 8/9/20   Navneet Dean,    pyridoxine (VITAMIN B6) 100 mg tablet Take 100 mg by mouth daily    Historical Provider, MD   QUEtiapine (SEROquel) 100 mg tablet Take 500 mg by mouth daily at bedtime    Historical Provider, MD   sertraline (ZOLOFT) 50 mg tablet Take 50 mg by mouth daily    Historical Provider, MD   SUMAtriptan (IMITREX) 100 mg tablet Take 100 mg by mouth as needed for migraine     Historical Provider, MD   topiramate (TOPAMAX) 100 mg tablet Take 150 mg by mouth 2 (two) times a day     Historical Provider, MD     MEDICATIONS ADMINISTERED IN LAST 24 HOURS     Medication Administration - last 24 hours from 08/13/2020 2041 to 08/14/2020 2041       Date/Time Order Dose Route Action Action by     08/14/2020 1248 sodium chloride 0 9 % bolus 1,000 mL 1,000 mL Intravenous New Laverne Winter RN     08/14/2020 3911 ondansetron (ZOFRAN) 8 mg in sodium chloride 0 9 % 50 mL IVPB 0 mg Intravenous Stopped Luis Miller RN     08/14/2020 1342 ondansetron (ZOFRAN) 8 mg in sodium chloride 0 9 % 50 mL IVPB 8 mg Intravenous New Bag Luis Miller RN     08/14/2020 1438 iohexol (OMNIPAQUE) 240 MG/ML solution 50 mL 50 mL Oral Given Luis Miller RN     08/14/2020 1605 ceFAZolin (ANCEF) IVPB (premix) 2,000 mg 50 mL 2,000 mg Intravenous New Bag Robin Winter RN     08/14/2020 1633 iohexol (OMNIPAQUE) 350 MG/ML injection (MULTI-DOSE) 100 mL 100 mL Intravenous Given [address]            Admission Time  I spent 40 minutes admitting the patient  This involved direct patient contact where I performed a full history and physical, reviewing previous records, and reviewing laboratory and other diagnostic studies  Portions of the record may have been created with voice recognition software  Occasional wrong word or "sound a like" substitutions may have occurred due to the inherent limitations of voice recognition software    Read the chart carefully and recognize, using context, where substitutions have occurred     ==  Gaby Johnson, [address] [facility]  Internal Medicine Residency PGY-1

## 2020-08-14 NOTE — ED ATTENDING ATTESTATION
8/14/2020  Darin HERNÁNDEZ MD, saw and evaluated the patient  I have discussed the patient with the resident/non-physician practitioner and agree with the resident's/non-physician practitioner's findings, Plan of Care, and MDM as documented in the resident's/non-physician practitioner's note, except where noted  All available labs and Radiology studies were reviewed  I was present for key portions of any procedure(s) performed by the resident/non-physician practitioner and I was immediately available to provide assistance  At this point I agree with the current assessment done in the Emergency Department  I have conducted an independent evaluation of this patient a history and physical is as follows:    ED Course     70-year-old female presenting to the emergency department for evaluation of multiple complaints  Patient had a recent hospitalization for abdominal and back pain, was ultimately found to have a thrombosed IVC filter, and underwent catheter directed thrombolysis  Patient is presenting to the emergency department for evaluation of multiple complaints  Patient has couple days of nausea and nonbloody vomiting  Patient has epigastric abdominal discomfort described as sharp  Patient has increasing right lower extremity edema and erythema  Patient feels generally malaised and weak  Patient denies fever, chest pain, cough  No shortness of breath  No back pain  Ten systems reviewed negative except as noted in the history of present illness  The patient is resting comfortably on a stretcher in no acute respiratory distress  The patient appears nontoxic  HEENT reveals moist mucous membranes  Head is normocephalic and atraumatic  Conjunctiva and sclera are normal  Neck is nontender and supple with full range of motion to flexion, extension, lateral rotation  No meningismus appreciated  No masses are appreciated   Lungs are clear to auscultation bilaterally without any wheezes, rales or rhonchi  Heart is regular rate and rhythm without any murmurs, rubs or gallops  Abdomen is soft and nontender without any rebound or guarding  Patient has bilateral lower extremity pitting edema  The right lower extremity has erythema that originates at the right foot and extends to the right knee  Patient is awake, alert, and oriented x3  The patient has normal interaction  Motor is 5 out of 5  Concern for recurrent DVT  Concern for cellulitis  Concern for the brief thrombosis of the IVC filter  Concern for complications associated with gastric bypass  Labs Reviewed   CBC AND DIFFERENTIAL - Abnormal       Result Value Ref Range Status    WBC 8 82  4 31 - 10 16 Thousand/uL Final    RBC 3 34 (*) 3 81 - 5 12 Million/uL Final    Hemoglobin 10 1 (*) 11 5 - 15 4 g/dL Final    Hematocrit 33 7 (*) 34 8 - 46 1 % Final     (*) 82 - 98 fL Final    MCH 30 2  26 8 - 34 3 pg Final    MCHC 30 0 (*) 31 4 - 37 4 g/dL Final    RDW 18 5 (*) 11 6 - 15 1 % Final    MPV 11 1  8 9 - 12 7 fL Final    Platelets 030  734 - 390 Thousands/uL Final    nRBC 0  /100 WBCs Final    Neutrophils Relative 62  43 - 75 % Final    Immat GRANS % 1  0 - 2 % Final    Lymphocytes Relative 18  14 - 44 % Final    Monocytes Relative 11  4 - 12 % Final    Eosinophils Relative 7 (*) 0 - 6 % Final    Basophils Relative 1  0 - 1 % Final    Neutrophils Absolute 5 49  1 85 - 7 62 Thousands/µL Final    Immature Grans Absolute 0 12  0 00 - 0 20 Thousand/uL Final    Lymphocytes Absolute 1 55  0 60 - 4 47 Thousands/µL Final    Monocytes Absolute 0 96  0 17 - 1 22 Thousand/µL Final    Eosinophils Absolute 0 64 (*) 0 00 - 0 61 Thousand/µL Final    Basophils Absolute 0 06  0 00 - 0 10 Thousands/µL Final   COMPREHENSIVE METABOLIC PANEL - Abnormal    Sodium 140  136 - 145 mmol/L Final    Potassium 3 6  3 5 - 5 3 mmol/L Final    Comment: Slightly Hemolyzed;  Results May be Affected    Chloride 112 (*) 100 - 108 mmol/L Final    CO2 22  21 - 32 mmol/L Final ANION GAP 6  4 - 13 mmol/L Final    BUN 3 (*) 5 - 25 mg/dL Final    Creatinine 0 79  0 60 - 1 30 mg/dL Final    Comment: Standardized to IDMS reference method    Glucose 111  65 - 140 mg/dL Final    Comment: If the patient is fasting, the ADA then defines impaired fasting glucose as > 100 mg/dL and diabetes as > or equal to 123 mg/dL  Specimen collection should occur prior to Sulfasalazine administration due to the potential for falsely depressed results  Specimen collection should occur prior to Sulfapyridine administration due to the potential for falsely elevated results  Calcium 8 3  8 3 - 10 1 mg/dL Final    AST 49 (*) 5 - 45 U/L Final    Comment: Slightly Hemolyzed; Results May be Affected  Specimen collection should occur prior to Sulfasalazine administration due to the potential for falsely depressed results  ALT 48  12 - 78 U/L Final    Comment: Specimen collection should occur prior to Sulfasalazine and/or Sulfapyridine administration due to the potential for falsely depressed results  Alkaline Phosphatase 178 (*) 46 - 116 U/L Final    Total Protein 7 2  6 4 - 8 2 g/dL Final    Albumin 2 7 (*) 3 5 - 5 0 g/dL Final    Total Bilirubin 0 45  0 20 - 1 00 mg/dL Final    Comment: Use of this assay is not recommended for patients undergoing treatment with eltrombopag due to the potential for falsely elevated results      eGFR 90  ml/min/1 73sq m Final    Narrative:     [place] guidelines for Chronic Kidney Disease (CKD):     Stage 1 with normal or high GFR (GFR > 90 mL/min/1 73 square meters)    Stage 2 Mild CKD (GFR = 60-89 mL/min/1 73 square meters)    Stage 3A Moderate CKD (GFR = 45-59 mL/min/1 73 square meters)    Stage 3B Moderate CKD (GFR = 30-44 mL/min/1 73 square meters)    Stage 4 Severe CKD (GFR = 15-29 mL/min/1 73 square meters)    Stage 5 End Stage CKD (GFR <15 mL/min/1 73 square meters)  Note: GFR calculation is accurate only with a steady state creatinine   URINE MACROSCOPIC, POC - Abnormal    Color, UA Yellow   Final    Clarity, UA Clear   Final    pH, UA 6 5  4 5 - 8 0 Final    Leukocytes, UA Negative  Negative Final    Nitrite, UA Negative  Negative Final    Protein, UA Negative  Negative mg/dl Final    Glucose, UA Negative  Negative mg/dl Final    Ketones, UA Negative  Negative mg/dl Final    Urobilinogen, UA 0 2  0 2, 1 0 E U /dl E U /dl Final    Bilirubin, UA Negative  Negative Final    Blood, UA Trace (*) Negative Final    Specific [place], UA 1 010  1 003 - 1 030 Final    Narrative:     CLINITEK RESULT   LIPASE - Normal    Lipase 247  73 - 393 u/L Final   URINE MICROSCOPIC - Normal    RBC, UA None Seen  None Seen, 0-5 /hpf Final    WBC, UA None Seen  None Seen, 0-5, 5-55, 5-65 /hpf Final    Epithelial Cells Occasional  None Seen, Occasional /hpf Final    Bacteria, UA None Seen  None Seen, Occasional /hpf Final   PREGNANCY TEST (HCG QUALITATIVE) - Normal    Preg, Serum Negative  Negative Final       CT abdomen pelvis with contrast   Final Result      In this patient with IVC filter, and 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 seen on 8/3/2020, there is markedly reduced clot burden status post interventional radiology therapy  Currently, there is still small amounts of    nonocclusive thrombi in the IVC inferior to the filter, and in both common iliac veins (series 601 image 103 through 142 )      There is a 7 2 x 5 4 x 5 2 cm cystic lesion in the right ovary (series 2 image 68 ) This was in retrospect present on 8/3/2020  If there is clinical concern for ovarian torsion, emergent pelvic ultrasound should be performed  Otherwise, this can be    evaluated by pelvic ultrasound on a nonemergent basis  Again seen is fatty liver        Workstation performed: [id number]         VAS lower limb venous duplex study, unilateral/limited   Final Result      Vascular study was significant for Baker cyst       Critical Care Time  Procedures

## 2020-08-15 LAB
ANION GAP SERPL CALCULATED.3IONS-SCNC: 7 MMOL/L (ref 4–13)
BASOPHILS # BLD AUTO: 0.09 THOUSANDS/ΜL (ref 0–0.1)
BASOPHILS NFR BLD AUTO: 1 % (ref 0–1)
BUN SERPL-MCNC: 4 MG/DL (ref 5–25)
CALCIUM SERPL-MCNC: 8.4 MG/DL (ref 8.3–10.1)
CHLORIDE SERPL-SCNC: 112 MMOL/L (ref 100–108)
CO2 SERPL-SCNC: 22 MMOL/L (ref 21–32)
CREAT SERPL-MCNC: 0.81 MG/DL (ref 0.6–1.3)
EOSINOPHIL # BLD AUTO: 0.7 THOUSAND/ΜL (ref 0–0.61)
EOSINOPHIL NFR BLD AUTO: 9 % (ref 0–6)
ERYTHROCYTE [DISTWIDTH] IN BLOOD BY AUTOMATED COUNT: 18.6 % (ref 11.6–15.1)
GFR SERPL CREATININE-BSD FRML MDRD: 87 ML/MIN/1.73SQ M
GLUCOSE SERPL-MCNC: 99 MG/DL (ref 65–140)
HCT VFR BLD AUTO: 34.3 % (ref 34.8–46.1)
HGB BLD-MCNC: 10.3 G/DL (ref 11.5–15.4)
IMM GRANULOCYTES # BLD AUTO: 0.07 THOUSAND/UL (ref 0–0.2)
IMM GRANULOCYTES NFR BLD AUTO: 1 % (ref 0–2)
LYMPHOCYTES # BLD AUTO: 1.84 THOUSANDS/ΜL (ref 0.6–4.47)
LYMPHOCYTES NFR BLD AUTO: 23 % (ref 14–44)
MCH RBC QN AUTO: 30.6 PG (ref 26.8–34.3)
MCHC RBC AUTO-ENTMCNC: 30 G/DL (ref 31.4–37.4)
MCV RBC AUTO: 102 FL (ref 82–98)
MONOCYTES # BLD AUTO: 0.96 THOUSAND/ΜL (ref 0.17–1.22)
MONOCYTES NFR BLD AUTO: 12 % (ref 4–12)
NEUTROPHILS # BLD AUTO: 4.31 THOUSANDS/ΜL (ref 1.85–7.62)
NEUTS SEG NFR BLD AUTO: 54 % (ref 43–75)
NRBC BLD AUTO-RTO: 0 /100 WBCS
PLATELET # BLD AUTO: 271 THOUSANDS/UL (ref 149–390)
PMV BLD AUTO: 11.2 FL (ref 8.9–12.7)
POTASSIUM SERPL-SCNC: 3.4 MMOL/L (ref 3.5–5.3)
RBC # BLD AUTO: 3.37 MILLION/UL (ref 3.81–5.12)
SODIUM SERPL-SCNC: 141 MMOL/L (ref 136–145)
WBC # BLD AUTO: 7.97 THOUSAND/UL (ref 4.31–10.16)

## 2020-08-15 PROCEDURE — 85025 COMPLETE CBC W/AUTO DIFF WBC: CPT | Performed by: STUDENT IN AN ORGANIZED HEALTH CARE EDUCATION/TRAINING PROGRAM

## 2020-08-15 PROCEDURE — 99220 PR INITIAL OBSERVATION CARE/DAY 70 MINUTES: CPT | Performed by: INTERNAL MEDICINE

## 2020-08-15 PROCEDURE — NC001 PR NO CHARGE: Performed by: INTERNAL MEDICINE

## 2020-08-15 PROCEDURE — 80048 BASIC METABOLIC PNL TOTAL CA: CPT | Performed by: STUDENT IN AN ORGANIZED HEALTH CARE EDUCATION/TRAINING PROGRAM

## 2020-08-15 RX ORDER — IBUPROFEN 600 MG/1
600 TABLET ORAL EVERY 6 HOURS PRN
Status: DISCONTINUED | OUTPATIENT
Start: 2020-08-15 | End: 2020-08-17 | Stop reason: HOSPADM

## 2020-08-15 RX ORDER — POTASSIUM CHLORIDE 20 MEQ/1
40 TABLET, EXTENDED RELEASE ORAL ONCE
Status: COMPLETED | OUTPATIENT
Start: 2020-08-15 | End: 2020-08-15

## 2020-08-15 RX ADMIN — GABAPENTIN 100 MG: 100 CAPSULE ORAL at 21:32

## 2020-08-15 RX ADMIN — QUETIAPINE FUMARATE 500 MG: 100 TABLET ORAL at 21:34

## 2020-08-15 RX ADMIN — POTASSIUM CHLORIDE 20 MEQ: 1500 TABLET, EXTENDED RELEASE ORAL at 17:26

## 2020-08-15 RX ADMIN — IBUPROFEN 600 MG: 600 TABLET ORAL at 10:55

## 2020-08-15 RX ADMIN — CEFAZOLIN SODIUM 1000 MG: 1 SOLUTION INTRAVENOUS at 17:27

## 2020-08-15 RX ADMIN — LEVOTHYROXINE SODIUM 100 MCG: 100 TABLET ORAL at 06:22

## 2020-08-15 RX ADMIN — TOPIRAMATE 150 MG: 100 TABLET, FILM COATED ORAL at 08:18

## 2020-08-15 RX ADMIN — CEFAZOLIN SODIUM 1000 MG: 1 SOLUTION INTRAVENOUS at 00:15

## 2020-08-15 RX ADMIN — FERROUS SULFATE TAB 325 MG (65 MG ELEMENTAL FE) 325 MG: 325 (65 FE) TAB at 08:16

## 2020-08-15 RX ADMIN — SERTRALINE HYDROCHLORIDE 50 MG: 50 TABLET ORAL at 08:17

## 2020-08-15 RX ADMIN — POTASSIUM CHLORIDE 40 MEQ: 1500 TABLET, EXTENDED RELEASE ORAL at 08:56

## 2020-08-15 RX ADMIN — POTASSIUM CHLORIDE 20 MEQ: 1500 TABLET, EXTENDED RELEASE ORAL at 08:17

## 2020-08-15 RX ADMIN — TOPIRAMATE 150 MG: 100 TABLET, FILM COATED ORAL at 21:35

## 2020-08-15 RX ADMIN — FOLIC ACID 1 MG: 1 TABLET ORAL at 08:16

## 2020-08-15 RX ADMIN — LITHIUM CARBONATE 750 MG: 450 TABLET ORAL at 21:33

## 2020-08-15 RX ADMIN — ACETAMINOPHEN 650 MG: 325 TABLET, FILM COATED ORAL at 08:16

## 2020-08-15 RX ADMIN — LITHIUM CARBONATE 600 MG: 300 TABLET, FILM COATED, EXTENDED RELEASE ORAL at 08:18

## 2020-08-15 RX ADMIN — GABAPENTIN 100 MG: 100 CAPSULE ORAL at 17:26

## 2020-08-15 RX ADMIN — PANTOPRAZOLE SODIUM 40 MG: 40 TABLET, DELAYED RELEASE ORAL at 06:22

## 2020-08-15 RX ADMIN — CEFAZOLIN SODIUM 1000 MG: 1 SOLUTION INTRAVENOUS at 08:10

## 2020-08-15 RX ADMIN — GABAPENTIN 100 MG: 100 CAPSULE ORAL at 08:16

## 2020-08-15 RX ADMIN — ENOXAPARIN SODIUM 100 MG: 100 INJECTION SUBCUTANEOUS at 21:36

## 2020-08-15 RX ADMIN — Medication 100 MG: at 08:17

## 2020-08-15 RX ADMIN — ONDANSETRON 4 MG: 2 INJECTION INTRAMUSCULAR; INTRAVENOUS at 17:21

## 2020-08-15 RX ADMIN — ENOXAPARIN SODIUM 100 MG: 100 INJECTION SUBCUTANEOUS at 08:15

## 2020-08-15 NOTE — ASSESSMENT & PLAN NOTE
History of bipolar depression and anxiety  Home medications include: lithium, seroquel, effexor, gabapentin, atomoxetine, and wellbutrin    Denies current SI/HI     Plan:   Lithium 25 mg TID  Seroquel 500 mg QHS  Sertarline 500 mg QD  Librium 25 mg TID

## 2020-08-15 NOTE — UTILIZATION REVIEW
Initial Clinical Review    Admission: Date/Time/Statement:   Admission Orders (From admission, onward)     Ordered        08/14/20 1733  Place in Observation  Once                   Orders Placed This Encounter   Procedures    Place in Observation     Standing Status:   Standing     Number of Occurrences:   1     Order Specific Question:   Admitting Physician     Answer:   Sin Pascual     Order Specific Question:   Level of Care     Answer:   Med Surg [16]     ED Arrival Information     Expected Arrival Acuity Means of Arrival Escorted By Service Admission Type    - 8/14/2020 11:32 [facility] [address]  Emergency    Arrival Complaint    abdominal pain         Chief Complaint   Patient presents with    Weakness - Generalized     pt c/o weakness and abd pain      Assessment/Plan: this is a 53year old female from home to ED via ems admitted to observation due to cellulitis of RLE/nausea/hypokalemia   Hx of PE, DVT, IVC placement and currently on lovenox, Gastric bypass surgery, Morbid obesity  Recent IR venous lysis 8/4/2020 for IVC thrombosis non compliant with outpatient venous stasis treatment as outpatient  Presented due to uncontrollable vomiting, nausea for last 2 days with weakness and epigastric pain  Has RLE pain with swelling and erythema  On exam obese, epigastric tenderness, tenderness Right lower leg with erythema  Wbc 8 82   H&H 10 1/33 7  Ct abdomen showed reduced clot burden  there is still small amounts of nonocclusive thrombi in the IVC inferior to the filter, and in both common iliac veins  Venous doppler with no DVT  In the ED given IVF and IV Zofran, started on Ancef  Plan includes:  Antiemetics as needed, clears, replete K, follow BMP and continued ancef  Home medication resumed     8/15/2020 redness persists on RLE with pain and tingling; pain extends to inner thigh  Motrin added for pain control   On exam bilateral LE edema, +3 right, + 1 left, erythema on right calf and extends to distal thigh  Tolerating clears and diet increased  Continues on IV ancef  ED Triage Vitals   Temperature Pulse Respirations Blood Pressure SpO2   08/14/20 1139 08/14/20 1139 08/14/20 1139 08/14/20 1139 08/14/20 1139   98 °F (36 7 °C) 69 18 123/59 98 %      Temp Source Heart Rate Source Patient Position - Orthostatic VS BP Location FiO2 (%)   08/14/20 1139 08/14/20 1139 08/14/20 1524 08/14/20 1524 --   Tympanic Monitor Lying Right arm       Pain Score       08/14/20 2034       No Pain          Wt Readings from Last 1 Encounters:   08/14/20 136 kg (300 lb)     Additional Vital Signs:   08/15/20 08:08:23   97 4 °F (36 3 °C)Abnormal     89   --   131/81   98   100 %   --   --    08/15/20 06:38:33   97 8 °F (36 6 °C)   86   18   123/68   86   100 %   --   --    08/14/20 23:05:16   98 °F (36 7 °C)   86   19   125/69   88   96 %   --   --    08/14/20 20:37:25   98 4 °F (36 9 °C)   78   --   134/85   101   99 %   --   --    08/14/20 1858   --   72   18   135/63   --   99 %   None (Room air)   Sitting    08/14/20 1630   --   86   18   137/63   90   100 %   None (Room air)   Lying    08/14/20 1524   --   90   18   142/76   --   100 %   None (Room air        08/13 0701   08/14 0700  08/14 0701   08/15 0700  08/15 0701   08/16 0700    P  O     900    IV Piggyback   1000     Total Intake(mL/kg)   1000 (7 4)  900 (6 6)    Urine (mL/kg/hr)    1800 (1 7)    Total Output    1800    Net   +1000  -900          Pertinent Labs/Diagnostic Test Results:   8/14/2020 CT abdomen this patient with IVC filter, and 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 seen on 8/3/2020, there is markedly reduced clot burden status post interventional radiology therapy   Currently, there is still small amounts of nonocclusive thrombi in the IVC inferior to the filter, and in both common iliac veins (series 601 image 103 through 142 )   There is a 7 2 x 5 4 x 5 2 cm cystic lesion in the right ovary (series 2 image 68 ) This was in retrospect present on 8/3/2020   If there is clinical concern for ovarian torsion, emergent pelvic ultrasound should be performed   Otherwise, this can be evaluated by pelvic ultrasound on a nonemergent basis  Again seen is fatty liver  8/14/2020 Venous duplex RIGHT LOWER LIMB  No evidence of acute deep vein thrombosis   No evidence of acute superficial thrombophlebitis noted  Doppler evaluation shows a normal response to augmentation maneuvers  Popliteal, posterior tibial, and anterior tibial arterial Doppler waveforms are  triphasic/hyperemic  Tech Note: There is an echogenic structure located in the inguinal region  This  structure measures approximately 3 03 cm and is consistent with enlarged lymph  nodes  There is a well defined hypoechoic non-vascularized cystic-type structure  noted in the popliteal fossa  LEFT LOWER LIMB LIMITED  Evaluation shows no evidence of thrombus in the common femoral vein  Doppler evaluation shows a normal response to augmentation maneuvers      8/14/2020 EKG Normal sinus rhythm  Low voltage QRS  Nonspecific T wave abnormality  Prolonged QT  Abnormal ECG  When compared with ECG of 11-DEC-2017 12:29,  Inverted T waves have replaced nonspecific T wave abnormality in Inferior leads  T wave inversion now evident in Anterior leads    Results from last 7 days   Lab Units 08/15/20  0611 08/14/20  1142 08/09/20  1025   WBC Thousand/uL 7 97 8 82 15 76*   HEMOGLOBIN g/dL 10 3* 10 1* 9 8*   HEMATOCRIT % 34 3* 33 7* 32 3*   PLATELETS Thousands/uL 271 266 309   NEUTROS ABS Thousands/µL 4 31 5 49  --      Results from last 7 days   Lab Units 08/15/20  0611 08/14/20  1142 08/09/20  0531   SODIUM mmol/L 141 140 135*   POTASSIUM mmol/L 3 4* 3 6 3 0*   CHLORIDE mmol/L 112* 112* 104   CO2 mmol/L 22 22 21   ANION GAP mmol/L 7 6 10   BUN mg/dL 4* 3* 6   CREATININE mg/dL 0 81 0 79 0 78   EGFR ml/min/1 73sq m 87 90 91   CALCIUM mg/dL 8 4 8 3 8 8 MAGNESIUM mg/dL  --   --  2 6     Results from last 7 days   Lab Units 08/14/20  1142   AST U/L 49*   ALT U/L 48   ALK PHOS U/L 178*   TOTAL PROTEIN g/dL 7 2   ALBUMIN g/dL 2 7*   TOTAL BILIRUBIN mg/dL 0 45     Results from last 7 days   Lab Units 08/15/20  0611 08/14/20  1142 08/09/20  0531   GLUCOSE RANDOM mg/dL 99 111 93     Results from last 7 days   Lab Units 08/14/20  1142   LIPASE u/L 247     Results from last 7 days   Lab Units 08/14/20  1258   CLARITY UA  Clear   COLOR UA  Yellow   SPEC GRAV UA  1 010   PH UA  6 5   GLUCOSE UA mg/dl Negative   KETONES UA mg/dl Negative   BLOOD UA  Trace*   PROTEIN UA mg/dl Negative   NITRITE UA  Negative   BILIRUBIN UA  Negative   UROBILINOGEN UA E U /dl 0 2   LEUKOCYTES UA  Negative   WBC UA /hpf None Seen   RBC UA /hpf None Seen   BACTERIA UA /hpf None Seen   EPITHELIAL CELLS WET PREP /hpf Occasional     ED Treatment:   Medication Administration from 08/14/2020 1132 to 08/14/2020 2023       Date/Time Order Dose Route Action Comments     08/14/2020 1248 sodium chloride 0 9 % bolus 1,000 mL 1,000 mL Intravenous New Bag      08/14/2020 1605 ondansetron (ZOFRAN) 8 mg in sodium chloride 0 9 % 50 mL IVPB 0 mg Intravenous Stopped      08/14/2020 1342 ondansetron (ZOFRAN) 8 mg in sodium chloride 0 9 % 50 mL IVPB 8 mg Intravenous New Bag      08/14/2020 1605 ceFAZolin (ANCEF) IVPB (premix) 2,000 mg 50 mL 2,000 mg Intravenous New Bag         Past Medical History:   Diagnosis Date    Anemia     Psychiatric disorder     bipolar II, suicide    Pulmonary embolism (HCC)     Seizures ([address] )      Present on Admission:   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[facility] [address] )   Hx of pulmonary embolus   Anemia   Thrombophlebitis   Migraine   Bipolar disorder (HCC)   Swelling of lower extremity   Acquired hypothyroidism   Vitamin D deficiency   Hypokalemia      Admitting Diagnosis: Swelling [R60 9]  Cellulitis [L03 90]  Epigastric pain [R10 13]  Weakness [R53 1]  Abdominal pain [R10 9]  Pain [R52]  Age/Sex: 53 y o  female  Admission Orders: 8/14/2020 1733 Observation   Scheduled Medications:  cefazolin, 1,000 mg, Intravenous, Q8H  enoxaparin, 100 mg, Subcutaneous, Q12H STACEY  ferrous sulfate, 325 mg, Oral, Daily With Breakfast  folic acid, 1 mg, Oral, Daily  gabapentin, 100 mg, Oral, TID  levothyroxine, 100 mcg, Oral, Early Morning  lithium carbonate, 600 mg, Oral, QAM  lithium carbonate, 750 mg, Oral, QPM  pantoprazole, 40 mg, Oral, Early Morning  potassium chloride, 20 mEq, Oral, BID  potassium chloride, 40 mEq, Oral, Once- 8/15/2020 at 0845  pyridoxine, 100 mg, Oral, Daily  QUEtiapine, 500 mg, Oral, HS  sertraline, 50 mg, Oral, Daily  topiramate, 150 mg, Oral, BID    Continuous IV Infusions: none     PRN Meds:  acetaminophen, 650 mg, Oral, Q6H PRN- used x 1   chlordiazePOXIDE, 25 mg, Oral, TID PRN  ondansetron, 4 mg, Intravenous, Q6H PRN  SUMAtriptan, 100 mg, Oral, Once PRN    ibuprofen (MOTRIN) tablet 600 mg  - used x 1  Dose: 600 mg  Freq: Every 6 hours PRN Route: PO  PRN Reason: moderate pain  Start: 08/15/20 0912     [facility] Utilization Review Department  [email or web address]  ATTENTION: Please call with any questions or concerns to [phone] and carefully listen to the prompts so that you are directed to the right person  All voicemails are confidential   Sudie Crigler all requests for admission clinical reviews, approved or denied determinations and any other requests to dedicated fax number below belonging to the campus where the patient is receiving treatment   List of dedicated fax numbers for the Facilities:  FACILITY NAME UR FAX NUMBER   ADMISSION DENIALS (Administrative/Medical Necessity) [phone]   [address]  (Maternity/NICU/Pediatrics) [phone]   Kashif Min [phone]   Evelyne Sheets [phone]   Karina Healy[address] [phone] Elisabeth Garcia [phone]   [facility]  [phone] [address] [facility], [facility]  [phone] [address] [facility] [address] [facility] [phone]

## 2020-08-15 NOTE — ASSESSMENT & PLAN NOTE
Onset 3 days ago in right lower extremity with increased pain, tautness, and area of erythema when compared to the left leg  Patient has baseline LE edema since her vascular intervention and due to her history of PVD       Plan:  -Duplex LE U/S negative for DVT in bilateral LE  -No leukocytosis, AVSS  -Lovenox for DVT prophylaxis   -Gabapentin for pain  -IV ancef  -vascular w/u o/p

## 2020-08-15 NOTE — ASSESSMENT & PLAN NOTE
History of DVT and PE s/p multiple IR procedures for clot lysis including recent common iliac vein thrombolysis  Venous stasis and dermatitis      Plan:  -Lovenox  -Gabapentin 100 mg TID for pain

## 2020-08-15 NOTE — ASSESSMENT & PLAN NOTE
On non-mineral multivitamin and vitamin D replacement at home    Plan:  -continue with thiamine, iron, and B12 replacement   -Protonix 40 mg BID

## 2020-08-15 NOTE — PROGRESS NOTES
INTERNAL MEDICINE RESIDENCY PROGRESS NOTE     Name: Pooja Bee   Age & Sex: 53 y o  female   MRN: [id number]  Unit/Bed#: CW2 213-01   Encounter: [id number]  Team: SOD Team C     PATIENT INFORMATION     Name: Pooja Bee   Age & Sex: 53 y o  female   MRN: [id number]  Hospital Stay Days: 0    ASSESSMENT/PLAN     Principal Problem:    Cellulitis of right lower extremity   Active Problems:    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HCC)    Hx of pulmonary embolus    S/P gastric bypass    Anemia    Thrombophlebitis    Migraine    Bipolar disorder (HCC)    Swelling of lower extremity    Morbid obesity with BMI of 50 0-59 9, adult ([facility] [address] )    Acquired hypothyroidism    Vitamin D deficiency    Hypokalemia    Nausea      Nausea  Assessment & Plan  History of nausea and vomiting for two days  Plan:  -Zofran PRN  -Clear liquid diet for now    Hypokalemia  Assessment & Plan  3 4 this AM  Likely 2/2 daily Topamax     Plan:  -repeat BMP in am  -Patient given 1x dose of 40 mgEQ K-DUR; continue on K-DUR 20 mEQ BID        Acquired hypothyroidism  Assessment & Plan  Acquired hypothyroidism    Plan:  -home dose (per pharmacy) Synthroid 100 mg in am    Morbid obesity with BMI of 50 0-59 9, adult [facility])  Assessment & Plan  S/p bariatric surgery  Plan:  -continue with vitamin B12 and thiamine, and iron    Hx of suicide attempt  Assessment & Plan  History of bipolar depression and anxiety  Home medications include: lithium, seroquel, effexor, gabapentin, atomoxetine, and wellbutrin  Denies current SI/HI     Plan:   Lithium 25 mg TID  Seroquel 500 mg QHS  Sertarline 500 mg QD  Librium 25 mg TID    Swelling of lower extremity  Assessment & Plan  Chronic venous stasis with dermatitis changes  S/p b/l common iliac vein clot lysis  Current right leg edema and erythema 2/2 cellulitis vs stasis dermatitis  Received ANCEF in ER for suspected cellulitis       Plan:  -LE Duplex US in ED negative for DVT   -Lovenox for DVT prophylaxis  -Continue ANCEF and revaluate in AM for possible d/c    Bipolar disorder [facility])  Assessment & Plan  History of bipolar depression and anxiety  Home medications include: lithium, wellbutrin, atomoxetine, seroquel, and Effexor  Patient has not taken medication in two days due to vomiting    Plan:  -Lithium 25 mg TID  -Lithium level pending  -Sertraline 500 mg QD  -Seroquel   -Librium 25 mg TID      Migraine  Assessment & Plan  History of migraines treated with Topamax prophylaxis and either Imitrex or Maxalt PRN  Patient denies current headache  Plan:  -Migraine prophylaxis with Topamax QD  -Imitrex PRN migraine    Thrombophlebitis  Assessment & Plan  History of DVT and PE s/p multiple IR procedures for clot lysis including recent common iliac vein thrombolysis  Venous stasis and dermatitis  Plan:  -Lovenox  -Gabapentin 100 mg TID for pain    Anemia  Assessment & Plan  Hb 10 1 on admission    Plan:  -Ferrous sulfate 325 mg QD  -Thiamine  -Folic acid    S/P gastric bypass  Assessment & Plan  On non-mineral multivitamin and vitamin D replacement at home    Plan:  -continue with thiamine, iron, and B12 replacement   -Protonix 40 mg BID    Hx of pulmonary embolus  Assessment & Plan  Denies current SOB or pleuritic chest pain    Plan:  -Lovenox    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[facility] [address] )  Assessment & Plan  S/p IR IVC filter placement in 2017  S/p IR venous lysis on 8/4/2020    Plan:  -Lovenox   -CT abdomen in ER negative for occluding thrombus  Shows residual clots from recent lysis  * Cellulitis of right lower extremity   Assessment & Plan  Onset 3 days ago in right lower extremity with increased pain, tautness, and area of erythema when compared to the left leg  Patient has baseline LE edema since her vascular intervention and due to her history of PVD       Plan:  -Duplex LE U/S negative for DVT in bilateral LE  -No leukocytosis  -Afebrile  -Lovenox for DVT prophylaxis   -Gabapentin for pain  -continuing ANCEF from ER  -CBC, BMP in am      Disposition:     SUBJECTIVE     Patient seen and examined at bedside  No acute events overnight  Patient remains afebrile with no fevers overnight  Patient lying in bed comfortably in no acute distress  Patient continuing to complain of increasing pain in her right left no extending up to the inner portion of her right medial thigh  Patient further denies fever/chills, headaches, lightheadedness, N/V, CP/palpitations, SOB, weakness, or paresthesias  OBJECTIVE     Vitals:    20 2305 08/15/20 0638 08/15/20 0808 08/15/20 1502   BP: 125/69 123/68 131/81 110/67   BP Location:       Pulse: 86 86 89 83   Resp: 19 18 18 18   Temp: 98 °F (36 7 °C) 97 8 °F (36 6 °C) (!) 97 4 °F (36 3 °C) 98 2 °F (36 8 °C)   TempSrc: Oral      SpO2: 96% 100% 100% 100%   Weight:          Temperature:   Temp (24hrs), Av °F (36 7 °C), Min:97 4 °F (36 3 °C), Max:98 4 °F (36 9 °C)    Temperature: 98 2 °F (36 8 °C)  Intake & Output:  I/O        07 -  0700  07 - 08/15 0700 08/15 07 -  0700    IV Piggyback  1000     Total Intake(mL/kg)  1000 (7 4)     Net  +1000                Weights:   IBW: -92 5 kg    Body mass index is 53 14 kg/m²  Weight (last 2 days)     Date/Time   Weight    20 1139   136 (300)            Physical Exam  Constitutional:       General: She is not in acute distress  Appearance: Normal appearance  She is obese  She is not ill-appearing  HENT:      Head: Normocephalic  Nose: Nose normal  No congestion  Mouth/Throat:      Mouth: Mucous membranes are moist       Pharynx: Oropharynx is clear  No oropharyngeal exudate  Eyes:      General: No scleral icterus  Conjunctiva/sclera: Conjunctivae normal    Neck:      Musculoskeletal: Normal range of motion  Cardiovascular:      Rate and Rhythm: Normal rate and regular rhythm  Pulses: Normal pulses  Heart sounds: Normal heart sounds  No murmur     Pulmonary:      Effort: Pulmonary effort is normal  Breath sounds: Normal breath sounds  No wheezing  Abdominal:      General: Abdomen is flat  Bowel sounds are normal  There is no distension  Palpations: Abdomen is soft  Tenderness: There is no abdominal tenderness  Comments: Bruising on abdomen at sites of Lovenox injections   Musculoskeletal: Normal range of motion  General: Swelling (right left slightly more taut in comparison to left) and tenderness (mild tenderness to deep palpation of the b/l LE with R>L) present  Right lower leg: Edema (+3 pitting edema) present  Left lower leg: Edema (+1 pitting edema) present  Skin:     General: Skin is warm  Capillary Refill: Capillary refill takes less than 2 seconds  Findings: Erythema (mild erythema on surrounding the right calf and extending up to distal thigh) present  Bruising: bruising on the posterior aspect of RLE  Comments: Heat radiating from b/l LE   Neurological:      General: No focal deficit present  Mental Status: She is alert and oriented to person, place, and time  Motor: No weakness  Psychiatric:         Mood and Affect: Mood normal          Behavior: Behavior normal        LABORATORY DATA     Labs: I have personally reviewed pertinent reports    Results from last 7 days   Lab Units 08/15/20  0611 08/14/20  1142 08/09/20  1025   WBC Thousand/uL 7 97 8 82 15 76*   HEMOGLOBIN g/dL 10 3* 10 1* 9 8*   HEMATOCRIT % 34 3* 33 7* 32 3*   PLATELETS Thousands/uL 271 266 309   NEUTROS PCT % 54 62  --    MONOS PCT % 12 11  --       Results from last 7 days   Lab Units 08/15/20  0611 08/14/20  1142 08/09/20  0531   POTASSIUM mmol/L 3 4* 3 6 3 0*   CHLORIDE mmol/L 112* 112* 104   CO2 mmol/L 22 22 21   BUN mg/dL 4* 3* 6   CREATININE mg/dL 0 81 0 79 0 78   CALCIUM mg/dL 8 4 8 3 8 8   ALK PHOS U/L  --  178*  --    ALT U/L  --  48  --    AST U/L  --  49*  --      Results from last 7 days   Lab Units 08/09/20  0531   MAGNESIUM mg/dL 2 6 IMAGING & DIAGNOSTIC TESTING     Radiology Results: I have personally reviewed pertinent reports  Ct Abdomen Pelvis With Contrast    Result Date: 8/14/2020  Impression: In this patient with IVC filter, and 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 seen on 8/3/2020, there is markedly reduced clot burden status post interventional radiology therapy  Currently, there is still small amounts of nonocclusive thrombi in the IVC inferior to the filter, and in both common iliac veins (series 601 image 103 through 142 ) There is a 7 2 x 5 4 x 5 2 cm cystic lesion in the right ovary (series 2 image 68 ) This was in retrospect present on 8/3/2020  If there is clinical concern for ovarian torsion, emergent pelvic ultrasound should be performed  Otherwise, this can be evaluated by pelvic ultrasound on a nonemergent basis  Again seen is fatty liver  Workstation performed: [id number]     Other Diagnostic Testing: I have personally reviewed pertinent reports      ACTIVE MEDICATIONS     Current Facility-Administered Medications   Medication Dose Route Frequency    acetaminophen (TYLENOL) tablet 650 mg  650 mg Oral Q6H PRN    ceFAZolin (ANCEF) IVPB (premix) 1,000 mg 50 mL  1,000 mg Intravenous Q8H    chlordiazePOXIDE (LIBRIUM) capsule 25 mg  25 mg Oral TID PRN    enoxaparin (LOVENOX) subcutaneous injection 100 mg  100 mg Subcutaneous Q12H [address]    ferrous sulfate tablet 325 mg  325 mg Oral Daily With Breakfast    folic acid (FOLVITE) tablet 1 mg  1 mg Oral Daily    gabapentin (NEURONTIN) capsule 100 mg  100 mg Oral TID    ibuprofen (MOTRIN) tablet 600 mg  600 mg Oral Q6H PRN    levothyroxine tablet 100 mcg  100 mcg Oral Early Morning    lithium carbonate (LITHOBID) CR tablet 600 mg  600 mg Oral QAM    lithium carbonate (LITHOBID) CR tablet 750 mg  750 mg Oral QPM    ondansetron (ZOFRAN) injection 4 mg  4 mg Intravenous Q6H PRN    pantoprazole (PROTONIX) EC tablet 40 mg  40 mg Oral Early Morning  potassium chloride (K-DUR,KLOR-CON) CR tablet 20 mEq  20 mEq Oral BID    pyridoxine (VITAMIN B6) tablet 100 mg  100 mg Oral Daily    QUEtiapine (SEROquel) tablet 500 mg  500 mg Oral HS    sertraline (ZOLOFT) tablet 50 mg  50 mg Oral Daily    SUMAtriptan (IMITREX) tablet 100 mg  100 mg Oral Once PRN    topiramate (TOPAMAX) tablet 150 mg  150 mg Oral BID       VTE Pharmacologic Prophylaxis: Sequential compression device (Venodyne)  and Enoxaparin (Lovenox)  VTE Mechanical Prophylaxis: sequential compression device    Portions of the record may have been created with voice recognition software  Occasional wrong word or "sound a like" substitutions may have occurred due to the inherent limitations of voice recognition software    Read the chart carefully and recognize, using context, where substitutions have occurred   ==  Phoebe Lovett, [facility]  Internal Medicine Residency PGY-1

## 2020-08-15 NOTE — PROGRESS NOTES
[facility] Senior Admission Note   Unit/Bed # @DBLINK (MYESHA,28353)@ Encounter: [id number]  SOD Team C           Malissa Lesches 53 y o  female [id number]       Patient seen and examined  Reviewed H&P per Dr Damion Dunaway  Agree with the assessment and plan except NA  Assessment/Plan: Principal Problem:    Cellulitis of right lower extremity   Active Problems:    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HCC)    Hx of pulmonary embolus    S/P gastric bypass    Anemia    Thrombophlebitis    Migraine    Bipolar disorder (HCC)    Swelling of lower extremity    Morbid obesity with BMI of 50 0-59 9, adult (HCC)    Acquired hypothyroidism    Vitamin D deficiency    Hypokalemia    Nausea     Nausea  -PRN zofran  -Liquid diet, advance as tolerated  RLE swelling and pain: with known chronic venous stasis and concern for Cellulitis  Patient with erythema on RLE > LLE without warmth and with tenderness to touch bilaterally  Non complaint with prescribed venous stasis treatment outpatient    -Imaging negative for new/worsening clot  -Continue ancef at this time  -Trend WBC/fever curve  -Tylenol and Gabapentin for pain           Disposition:  OBSERVATION    Expected LOS: <2 Demarcus Soto MD

## 2020-08-15 NOTE — PLAN OF CARE
Problem: PAIN - ADULT  Goal: Verbalizes/displays adequate comfort level or baseline comfort level  Description: Interventions:  - Encourage patient to monitor pain and request assistance  - Assess pain using appropriate pain scale  - Administer analgesics based on type and severity of pain and evaluate response  - Implement non-pharmacological measures as appropriate and evaluate response  - Consider cultural and social influences on pain and pain management  - Notify physician/advanced practitioner if interventions unsuccessful or patient reports new pain  Outcome: Progressing     Problem: INFECTION - ADULT  Goal: Absence or prevention of progression during hospitalization  Description: INTERVENTIONS:  - Assess and monitor for signs and symptoms of infection  - Monitor lab/diagnostic results  - Monitor all insertion sites, i e  indwelling lines, tubes, and drains  - Monitor endotracheal if appropriate and nasal secretions for changes in amount and color  - [place] appropriate cooling/warming therapies per order  - Administer medications as ordered  - Instruct and encourage patient and family to use good hand hygiene technique  - Identify and instruct in appropriate isolation precautions for identified infection/condition  Outcome: Progressing  Goal: Absence of fever/infection during neutropenic period  Description: INTERVENTIONS:  - Monitor WBC    Outcome: Progressing     Problem: SAFETY ADULT  Goal: Patient will remain free of falls  Description: INTERVENTIONS:  - Assess patient frequently for physical needs  -  Identify cognitive and physical deficits and behaviors that affect risk of falls    -  [place] fall precautions as indicated by assessment   - Educate patient/family on patient safety including physical limitations  - Instruct patient to call for assistance with activity based on assessment  - Modify environment to reduce risk of injury  - Consider OT/PT consult to assist with strengthening/mobility  Outcome: Progressing  Goal: Maintain or return to baseline ADL function  Description: INTERVENTIONS:  -  Assess patient's ability to carry out ADLs; assess patient's baseline for ADL function and identify physical deficits which impact ability to perform ADLs (bathing, care of mouth/teeth, toileting, grooming, dressing, etc )  - Assess/evaluate cause of self-care deficits   - Assess range of motion  - Assess patient's mobility; develop plan if impaired  - Assess patient's need for assistive devices and provide as appropriate  - Encourage maximum independence but intervene and supervise when necessary  - Involve family in performance of ADLs  - Assess for home care needs following discharge   - Consider OT consult to assist with ADL evaluation and planning for discharge  - Provide patient education as appropriate  Outcome: Progressing  Goal: Maintain or return mobility status to optimal level  Description: INTERVENTIONS:  - Assess patient's baseline mobility status (ambulation, transfers, stairs, etc )    - Identify cognitive and physical deficits and behaviors that affect mobility  - Identify mobility aids required to assist with transfers and/or ambulation (gait belt, sit-to-stand, lift, walker, cane, etc )  - [place] fall precautions as indicated by assessment  - Record patient progress and toleration of activity level on Mobility SBAR; progress patient to next Phase/Stage  - Instruct patient to call for assistance with activity based on assessment  - Consider rehabilitation consult to assist with strengthening/weightbearing, etc   Outcome: Progressing     Problem: Knowledge Deficit  Goal: Patient/family/caregiver demonstrates understanding of disease process, treatment plan, medications, and discharge instructions  Description: Complete learning assessment and assess knowledge base    Interventions:  - Provide teaching at level of understanding  - Provide teaching via preferred learning methods  Outcome: Progressing

## 2020-08-15 NOTE — ASSESSMENT & PLAN NOTE
Chronic venous stasis with dermatitis changes  S/p b/l common iliac vein clot lysis  Current right leg edema and erythema 2/2 cellulitis vs stasis dermatitis  Started on IV ancef in ER for suspected cellulitis       Plan:  -LE Duplex US in ED negative for DVT   -CTAP s/p CD-lysis, residual non-occlusive clot in IVC filter and bilateral common iliac veins, markedly improved status post lysis  -Lovenox for DVT prophylaxis  -possible cellulitis  -f/u echo to r/o cardiac etiology

## 2020-08-16 PROBLEM — I80.9 THROMBOPHLEBITIS: Status: RESOLVED | Noted: 2020-08-03 | Resolved: 2020-08-16

## 2020-08-16 LAB
ANION GAP SERPL CALCULATED.3IONS-SCNC: 7 MMOL/L (ref 4–13)
BASOPHILS # BLD AUTO: 0.06 THOUSANDS/ΜL (ref 0–0.1)
BASOPHILS NFR BLD AUTO: 1 % (ref 0–1)
BUN SERPL-MCNC: 2 MG/DL (ref 5–25)
CALCIUM SERPL-MCNC: 8.3 MG/DL (ref 8.3–10.1)
CHLORIDE SERPL-SCNC: 113 MMOL/L (ref 100–108)
CO2 SERPL-SCNC: 22 MMOL/L (ref 21–32)
CREAT SERPL-MCNC: 0.74 MG/DL (ref 0.6–1.3)
EOSINOPHIL # BLD AUTO: 0.67 THOUSAND/ΜL (ref 0–0.61)
EOSINOPHIL NFR BLD AUTO: 10 % (ref 0–6)
ERYTHROCYTE [DISTWIDTH] IN BLOOD BY AUTOMATED COUNT: 18.3 % (ref 11.6–15.1)
GFR SERPL CREATININE-BSD FRML MDRD: 97 ML/MIN/1.73SQ M
GLUCOSE P FAST SERPL-MCNC: 99 MG/DL (ref 65–99)
GLUCOSE SERPL-MCNC: 99 MG/DL (ref 65–140)
HCT VFR BLD AUTO: 32.9 % (ref 34.8–46.1)
HGB BLD-MCNC: 9.7 G/DL (ref 11.5–15.4)
IMM GRANULOCYTES # BLD AUTO: 0.05 THOUSAND/UL (ref 0–0.2)
IMM GRANULOCYTES NFR BLD AUTO: 1 % (ref 0–2)
LYMPHOCYTES # BLD AUTO: 1.54 THOUSANDS/ΜL (ref 0.6–4.47)
LYMPHOCYTES NFR BLD AUTO: 23 % (ref 14–44)
MCH RBC QN AUTO: 30 PG (ref 26.8–34.3)
MCHC RBC AUTO-ENTMCNC: 29.5 G/DL (ref 31.4–37.4)
MCV RBC AUTO: 102 FL (ref 82–98)
MONOCYTES # BLD AUTO: 0.81 THOUSAND/ΜL (ref 0.17–1.22)
MONOCYTES NFR BLD AUTO: 12 % (ref 4–12)
NEUTROPHILS # BLD AUTO: 3.6 THOUSANDS/ΜL (ref 1.85–7.62)
NEUTS SEG NFR BLD AUTO: 53 % (ref 43–75)
NRBC BLD AUTO-RTO: 0 /100 WBCS
PLATELET # BLD AUTO: 238 THOUSANDS/UL (ref 149–390)
PMV BLD AUTO: 11.6 FL (ref 8.9–12.7)
POTASSIUM SERPL-SCNC: 3.3 MMOL/L (ref 3.5–5.3)
RBC # BLD AUTO: 3.23 MILLION/UL (ref 3.81–5.12)
SODIUM SERPL-SCNC: 142 MMOL/L (ref 136–145)
WBC # BLD AUTO: 6.73 THOUSAND/UL (ref 4.31–10.16)

## 2020-08-16 PROCEDURE — 99226 PR SBSQ OBSERVATION CARE/DAY 35 MINUTES: CPT | Performed by: INTERNAL MEDICINE

## 2020-08-16 PROCEDURE — 80048 BASIC METABOLIC PNL TOTAL CA: CPT | Performed by: STUDENT IN AN ORGANIZED HEALTH CARE EDUCATION/TRAINING PROGRAM

## 2020-08-16 PROCEDURE — 85025 COMPLETE CBC W/AUTO DIFF WBC: CPT | Performed by: STUDENT IN AN ORGANIZED HEALTH CARE EDUCATION/TRAINING PROGRAM

## 2020-08-16 RX ADMIN — POTASSIUM CHLORIDE 20 MEQ: 1500 TABLET, EXTENDED RELEASE ORAL at 17:00

## 2020-08-16 RX ADMIN — SERTRALINE HYDROCHLORIDE 50 MG: 50 TABLET ORAL at 08:08

## 2020-08-16 RX ADMIN — POTASSIUM CHLORIDE 20 MEQ: 1500 TABLET, EXTENDED RELEASE ORAL at 08:08

## 2020-08-16 RX ADMIN — ENOXAPARIN SODIUM 100 MG: 100 INJECTION SUBCUTANEOUS at 21:35

## 2020-08-16 RX ADMIN — TOPIRAMATE 150 MG: 100 TABLET, FILM COATED ORAL at 08:09

## 2020-08-16 RX ADMIN — FERROUS SULFATE TAB 325 MG (65 MG ELEMENTAL FE) 325 MG: 325 (65 FE) TAB at 08:07

## 2020-08-16 RX ADMIN — LITHIUM CARBONATE 600 MG: 300 TABLET, FILM COATED, EXTENDED RELEASE ORAL at 08:08

## 2020-08-16 RX ADMIN — CEFAZOLIN SODIUM 1000 MG: 1 SOLUTION INTRAVENOUS at 01:56

## 2020-08-16 RX ADMIN — LEVOTHYROXINE SODIUM 100 MCG: 100 TABLET ORAL at 05:57

## 2020-08-16 RX ADMIN — GABAPENTIN 100 MG: 100 CAPSULE ORAL at 16:59

## 2020-08-16 RX ADMIN — CEFAZOLIN SODIUM 1000 MG: 1 SOLUTION INTRAVENOUS at 09:45

## 2020-08-16 RX ADMIN — LITHIUM CARBONATE 750 MG: 450 TABLET ORAL at 21:36

## 2020-08-16 RX ADMIN — FOLIC ACID 1 MG: 1 TABLET ORAL at 08:07

## 2020-08-16 RX ADMIN — GABAPENTIN 100 MG: 100 CAPSULE ORAL at 21:34

## 2020-08-16 RX ADMIN — ENOXAPARIN SODIUM 100 MG: 100 INJECTION SUBCUTANEOUS at 08:06

## 2020-08-16 RX ADMIN — IBUPROFEN 600 MG: 600 TABLET ORAL at 21:34

## 2020-08-16 RX ADMIN — CEFAZOLIN SODIUM 1000 MG: 1 SOLUTION INTRAVENOUS at 17:00

## 2020-08-16 RX ADMIN — QUETIAPINE FUMARATE 500 MG: 100 TABLET ORAL at 21:35

## 2020-08-16 RX ADMIN — Medication 100 MG: at 08:08

## 2020-08-16 RX ADMIN — PANTOPRAZOLE SODIUM 40 MG: 40 TABLET, DELAYED RELEASE ORAL at 05:57

## 2020-08-16 RX ADMIN — TOPIRAMATE 150 MG: 100 TABLET, FILM COATED ORAL at 21:36

## 2020-08-16 RX ADMIN — ONDANSETRON 4 MG: 2 INJECTION INTRAMUSCULAR; INTRAVENOUS at 17:43

## 2020-08-16 RX ADMIN — GABAPENTIN 100 MG: 100 CAPSULE ORAL at 08:08

## 2020-08-16 NOTE — PLAN OF CARE
Problem: PAIN - ADULT  Goal: Verbalizes/displays adequate comfort level or baseline comfort level  Description: Interventions:  - Encourage patient to monitor pain and request assistance  - Assess pain using appropriate pain scale  - Administer analgesics based on type and severity of pain and evaluate response  - Implement non-pharmacological measures as appropriate and evaluate response  - Consider cultural and social influences on pain and pain management  - Notify physician/advanced practitioner if interventions unsuccessful or patient reports new pain  Outcome: Progressing     Problem: INFECTION - ADULT  Goal: Absence or prevention of progression during hospitalization  Description: INTERVENTIONS:  - Assess and monitor for signs and symptoms of infection  - Monitor lab/diagnostic results  - Monitor all insertion sites, i e  indwelling lines, tubes, and drains  - Monitor endotracheal if appropriate and nasal secretions for changes in amount and color  - [place] appropriate cooling/warming therapies per order  - Administer medications as ordered  - Instruct and encourage patient and family to use good hand hygiene technique  - Identify and instruct in appropriate isolation precautions for identified infection/condition  Outcome: Progressing  Goal: Absence of fever/infection during neutropenic period  Description: INTERVENTIONS:  - Monitor WBC    Outcome: Progressing     Problem: Knowledge Deficit  Goal: Patient/family/caregiver demonstrates understanding of disease process, treatment plan, medications, and discharge instructions  Description: Complete learning assessment and assess knowledge base    Interventions:  - Provide teaching at level of understanding  - Provide teaching via preferred learning methods  Outcome: Progressing

## 2020-08-16 NOTE — PROGRESS NOTES
INTERNAL MEDICINE RESIDENCY PROGRESS NOTE     Name: Lindsay Eldridge   Age & Sex: 53 y o  female   MRN: [id number]  Unit/Bed#: CW2 213-01   Encounter: [id number]  Team: SOD Team C     PATIENT INFORMATION     Name: Lindsya Eldridge   Age & Sex: 53 y o  female   MRN: [id number]  Hospital Stay Days: 0    ASSESSMENT/PLAN     Principal Problem:    Cellulitis of right lower extremity   Active Problems:    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HCC)    Hx of pulmonary embolus    S/P gastric bypass    Anemia    Migraine    Bipolar disorder (HCC)    Swelling of lower extremity    Morbid obesity with BMI of 50 0-59 9, adult ([facility] [address] )    Acquired hypothyroidism    Vitamin D deficiency    Hypokalemia    Nausea      Nausea  Assessment & Plan  History of nausea and vomiting for two days  Plan:  -Zofran PRN    Hypokalemia  Assessment & Plan  3 4 this AM  Likely 2/2 daily Topamax     Plan:  -repeat BMP in am  -standing K-DUR 20 mEQ BID        Acquired hypothyroidism  Assessment & Plan  Acquired hypothyroidism    Plan:  -home dose (per pharmacy) Synthroid 100 mg in am    Morbid obesity with BMI of 50 0-59 9, adult [facility])  Assessment & Plan  S/p bariatric surgery  Plan:  -continue with vitamin B12 and thiamine, and iron    Hx of suicide attempt  Assessment & Plan  History of bipolar depression and anxiety  Home medications include: lithium, seroquel, effexor, gabapentin, atomoxetine, and wellbutrin  Denies current SI/HI     Plan:   Lithium 25 mg TID  Seroquel 500 mg QHS  Sertarline 500 mg QD  Librium 25 mg TID    Swelling of lower extremity  Assessment & Plan  Chronic venous stasis with dermatitis changes  S/p b/l common iliac vein clot lysis  Current right leg edema and erythema 2/2 cellulitis vs stasis dermatitis  Started on IV ancef in ER for suspected cellulitis       Plan:  -LE Duplex US in ED negative for DVT   -CTAP s/p CD-lysis, residual non-occlusive clot in IVC filter and bilateral common iliac veins, markedly improved status post lysis  -Lovenox for DVT prophylaxis  -possible cellulitis  -f/u echo to r/o cardiac etiology    Bipolar disorder [facility])  Assessment & Plan  History of bipolar depression and anxiety  Home medications include: lithium, wellbutrin, atomoxetine, seroquel, and Effexor  Patient has not taken medication in two days due to vomiting    Plan:  -Lithium 25 mg TID  -Lithium level pending  -Sertraline 500 mg QD  -Seroquel   -Librium 25 mg TID      Migraine  Assessment & Plan  History of migraines treated with Topamax prophylaxis and either Imitrex or Maxalt PRN  Patient denies current headache  Plan:  -Migraine prophylaxis with Topamax QD  -Imitrex PRN migraine    Anemia  Assessment & Plan  Hb 10 1 on admission    Plan:  -Ferrous sulfate 325 mg QD  -Thiamine  -Folic acid    S/P gastric bypass  Assessment & Plan  On non-mineral multivitamin and vitamin D replacement at home    Plan:  -continue with thiamine, iron, and B12 replacement   -Protonix 40 mg BID    Hx of pulmonary embolus  Assessment & Plan  Denies current SOB or pleuritic chest pain    Plan:  -Lovenox    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[facility] [address] )  Assessment & Plan  S/p IR IVC filter placement in 2017  S/p IR venous lysis on 8/4/2020    Plan:  -Lovenox   -CT abdomen in ER negative for occluding thrombus  Shows residual clots from recent lysis  * Cellulitis of right lower extremity   Assessment & Plan  Onset 3 days ago in right lower extremity with increased pain, tautness, and area of erythema when compared to the left leg  Patient has baseline LE edema since her vascular intervention and due to her history of PVD  Plan:  -Duplex LE U/S negative for DVT in bilateral LE  -No leukocytosis, AVSS  -Lovenox for DVT prophylaxis   -Gabapentin for pain  -IV ancef  -vascular w/u o/p    Thrombophlebitis-resolved as of 8/16/2020  Assessment & Plan  History of DVT and PE s/p multiple IR procedures for clot lysis including recent common iliac vein thrombolysis    Venous stasis and dermatitis  Plan:  -Lovenox  -Gabapentin 100 mg TID for pain      Disposition: Pending echo, d/c on PO abx w/ outpatient vascular f/u     SUBJECTIVE     Patient seen and examined  No acute events overnight  Patient reports mild improvement in erythema and edema of right lower extremity  Still complaining of some dull aching pain and nausea/vomiting has resolved  Denies chest pain, shortness of breath, abdominal pain, diarrhea, fever/chills, worsening lower extremity edema, urinary symptoms  OBJECTIVE     Vitals:    08/15/20 2793 08/15/20 0808 08/15/20 1502 20 0729   BP: 123/68 131/81 110/67 120/70   Pulse: 86 89 83 97   Resp: 18 18 18 18   Temp: 97 8 °F (36 6 °C) (!) 97 4 °F (36 3 °C) 98 2 °F (36 8 °C) 98 2 °F (36 8 °C)   TempSrc:       SpO2: 100% 100% 100% 100%   Weight:          Temperature:   Temp (24hrs), Av 2 °F (36 8 °C), Min:98 2 °F (36 8 °C), Max:98 2 °F (36 8 °C)    Temperature: 98 2 °F (36 8 °C)  Intake & Output:  I/O        07 - 08/15 0700 08/15 07 -  0700  07 -  0700    P  O   900     IV Piggyback 1000      Total Intake(mL/kg) 1000 (7 4) 900 (6 6)     Urine (mL/kg/hr)  3500 (1 1) 800 (1 2)    Total Output  3500 800    Net +1000 -2600 -800               Weights:   IBW: -92 5 kg    Body mass index is 53 14 kg/m²  Weight (last 2 days)     Date/Time   Weight    20 1139   136 (300)            Physical Exam  Constitutional:       Appearance: She is obese  She is not ill-appearing, toxic-appearing or diaphoretic  HENT:      Head: Normocephalic and atraumatic  Nose: Nose normal       Mouth/Throat:      Mouth: Mucous membranes are moist       Pharynx: Oropharynx is clear  Eyes:      General: No scleral icterus  Extraocular Movements: Extraocular movements intact  Pupils: Pupils are equal, round, and reactive to light  Neck:      Musculoskeletal: Normal range of motion  Cardiovascular:      Rate and Rhythm: Normal rate and regular rhythm  Pulses: Normal pulses  Heart sounds: Normal heart sounds  Pulmonary:      Effort: Pulmonary effort is normal       Breath sounds: Normal breath sounds  Abdominal:      General: There is no distension  Tenderness: There is no abdominal tenderness  Comments: Obese   Musculoskeletal: Normal range of motion  General: Swelling present  Comments: RLE: erythematous to mid tibia, ecchymosis on posterior aspect of calf, 3+ pitting tibial edema, no fluctuance  Warm  LLE: 1+ pitting tibial edema, no fluctuance  B/l popliteal puncture sites w/o drainage/erythema from recent CD-lysis   Skin:     General: Skin is warm and dry  Neurological:      General: No focal deficit present  Mental Status: She is alert and oriented to person, place, and time  LABORATORY DATA     Labs: I have personally reviewed pertinent reports  Results from last 7 days   Lab Units 08/16/20  0555 08/15/20  0611 08/14/20  1142   WBC Thousand/uL 6 73 7 97 8 82   HEMOGLOBIN g/dL 9 7* 10 3* 10 1*   HEMATOCRIT % 32 9* 34 3* 33 7*   PLATELETS Thousands/uL 238 271 266   NEUTROS PCT % 53 54 62   MONOS PCT % 12 12 11      Results from last 7 days   Lab Units 08/16/20  0555 08/15/20  0611 08/14/20  1142   POTASSIUM mmol/L 3 3* 3 4* 3 6   CHLORIDE mmol/L 113* 112* 112*   CO2 mmol/L 22 22 22   BUN mg/dL 2* 4* 3*   CREATININE mg/dL 0 74 0 81 0 79   CALCIUM mg/dL 8 3 8 4 8 3   ALK PHOS U/L  --   --  178*   ALT U/L  --   --  48   AST U/L  --   --  49*                            IMAGING & DIAGNOSTIC TESTING     Radiology Results: I have personally reviewed pertinent reports  Ct Abdomen Pelvis With Contrast    Result Date: 8/14/2020  Impression: In this patient with IVC filter, and 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 seen on 8/3/2020, there is markedly reduced clot burden status post interventional radiology therapy    Currently, there is still small amounts of nonocclusive thrombi in the IVC inferior to the filter, and in both common iliac veins (series 601 image 103 through 142 ) There is a 7 2 x 5 4 x 5 2 cm cystic lesion in the right ovary (series 2 image 68 ) This was in retrospect present on 8/3/2020  If there is clinical concern for ovarian torsion, emergent pelvic ultrasound should be performed  Otherwise, this can be evaluated by pelvic ultrasound on a nonemergent basis  Again seen is fatty liver  Workstation performed: [id number]     Other Diagnostic Testing: I have personally reviewed pertinent reports      ACTIVE MEDICATIONS     Current Facility-Administered Medications   Medication Dose Route Frequency    acetaminophen (TYLENOL) tablet 650 mg  650 mg Oral Q6H PRN    ceFAZolin (ANCEF) IVPB (premix) 1,000 mg 50 mL  1,000 mg Intravenous Q8H    chlordiazePOXIDE (LIBRIUM) capsule 25 mg  25 mg Oral TID PRN    enoxaparin (LOVENOX) subcutaneous injection 100 mg  100 mg Subcutaneous Q12H [facility]    ferrous sulfate tablet 325 mg  325 mg Oral Daily With Breakfast    folic acid (FOLVITE) tablet 1 mg  1 mg Oral Daily    gabapentin (NEURONTIN) capsule 100 mg  100 mg Oral TID    ibuprofen (MOTRIN) tablet 600 mg  600 mg Oral Q6H PRN    levothyroxine tablet 100 mcg  100 mcg Oral Early Morning    lithium carbonate (LITHOBID) CR tablet 600 mg  600 mg Oral QAM    lithium carbonate (LITHOBID) CR tablet 750 mg  750 mg Oral QPM    ondansetron (ZOFRAN) injection 4 mg  4 mg Intravenous Q6H PRN    pantoprazole (PROTONIX) EC tablet 40 mg  40 mg Oral Early Morning    potassium chloride (K-DUR,KLOR-CON) CR tablet 20 mEq  20 mEq Oral BID    pyridoxine (VITAMIN B6) tablet 100 mg  100 mg Oral Daily    QUEtiapine (SEROquel) tablet 500 mg  500 mg Oral HS    sertraline (ZOLOFT) tablet 50 mg  50 mg Oral Daily    SUMAtriptan (IMITREX) tablet 100 mg  100 mg Oral Once PRN    topiramate (TOPAMAX) tablet 150 mg  150 mg Oral BID       VTE Pharmacologic Prophylaxis: Enoxaparin (Lovenox)  VTE Mechanical Prophylaxis: SCD on left, R deferred 2/2 pain    Portions of the record may have been created with voice recognition software  Occasional wrong word or "sound a like" substitutions may have occurred due to the inherent limitations of voice recognition software    Read the chart carefully and recognize, using context, where substitutions have occurred   ==  David Guajardo MD, PGY-I  [place] [facility]

## 2020-08-16 NOTE — PHYSICIAN ADVISOR
Chief Complaint   Patient presents with   • Surgical Followup     post op cysto bladder biopsies, prostate bx       HISTORY OF PRESENT ILLNESS:  The patient is here for follow-up, very pleasant 64-year-old gentleman who underwent cystoscopy with bladder biopsies and prostate biopsy for recent atypical/suspicious cytology. He of course has a history of noninvasive, high-grade bladder cancer and has undergone induction BCG therapy. He returned for pathology report.    ASSESSMENT AND PLAN:  His B[place] catheter was removed.    We discussed his pathology report which is very encouraging. It demonstrates no evidence of cancer either in the bladder biopsies or prostate.    All of this is very encouraging. We are planning that he will be receiving intravesical BCG maintenance doses ×3 starting in several weeks. I'm planning also for a follow-up cystoscopy in about 3 months.   Current patient class: Observation  The patient is currently on Hospital Day: 2 at [address] [place] [address]        The patient was admitted to the hospital  on N/A at N/A for the following diagnosis:  Swelling [R60 9]  Cellulitis [L03 90]  Epigastric pain [R10 13]  Weakness [R53 1]  Abdominal pain [R10 9]  Pain [R52]     After review of the relevant documentation, labs, vital signs and test results, the patient is most appropriate for OBSERVATION STATUS  Rationale is as follows: The patient is a 53 yrs   Female who presented to the ED at 8/14/2020 11:32 AM with a chief complaint of Weakness - Generalized (pt c/o weakness and abd pain )  Patient was noted to have a cellulitis  The patient is on IV Ancef and is continued on IV Ancef today  There is a note in the chart the patient could possibly be discharged tomorrow  Given that the patient is not septic and came with generalized weakness and found to have a cellulitis on IV antibiotics I would continue with observation status especially since the anticipated discharge is tomorrow as stated above      The patients vitals on arrival were   ED Triage Vitals   Temperature Pulse Respirations Blood Pressure SpO2   08/14/20 1139 08/14/20 1139 08/14/20 1139 08/14/20 1139 08/14/20 1139   98 °F (36 7 °C) 69 18 123/59 98 %      Temp Source Heart Rate Source Patient Position - Orthostatic VS BP Location FiO2 (%)   08/14/20 1139 08/14/20 1139 08/14/20 1524 08/14/20 1524 --   Tympanic Monitor Lying Right arm       Pain Score       08/14/20 2034       No Pain           Past Medical History:   Diagnosis Date    Anemia     Psychiatric disorder     bipolar II, suicide    Pulmonary embolism (HCC)     Seizures ([facility] [address] )      Past Surgical History:   Procedure Laterality Date    APPENDECTOMY      Open    CHOLECYSTECTOMY      Laparoscopic    GASTRIC BYPASS      was 205 date of surgery    IR LYSIS RECHECK  8/5/2020    IR VENOUS LYSIS  8/4/2020    IVC FILTER INSERTION  2017    REPLACEMENT TOTAL KNEE      STOMACH SURGERY      for morbid obesity    TUBAL LIGATION Bilateral 2010           Consults have been placed to:   IP CONSULT TO CASE MANAGEMENT    Vitals:    08/14/20 2305 08/15/20 0638 08/15/20 0808 08/15/20 1502   BP: 125/69 123/68 131/81 110/67   BP Location:       Pulse: 86 86 89 83   Resp: 19 18 18 18   Temp: 98 °F (36 7 °C) 97 8 °F (36 6 °C) (!) 97 4 °F (36 3 °C) 98 2 °F (36 8 °C)   TempSrc: Oral      SpO2: 96% 100% 100% 100%   Weight:           Most recent labs:    Recent Labs     08/14/20  1142 08/15/20  0611   WBC 8 82 7 97   HGB 10 1* 10 3*   HCT 33 7* 34 3*    271   K 3 6 3 4*   CALCIUM 8 3 8 4   BUN 3* 4*   CREATININE 0 79 0 81   LIPASE 247  --    AST 49*  --    ALT 48  --    ALKPHOS 178*  --        Scheduled Meds:  Current Facility-Administered Medications   Medication Dose Route Frequency Provider Last Rate    acetaminophen  650 mg Oral Q6H PRN Yumiko Tijerina MD      cefazolin  1,000 mg Intravenous Q8H Gisela Allen, DO 1,000 mg (08/15/20 1727)    chlordiazePOXIDE  25 mg Oral TID PRN Gaby Johnson, DO      enoxaparin  100 mg Subcutaneous Q12H [address] Caprichalima Allen, DO      ferrous sulfate  325 mg Oral Daily With Breakfast Gisela Brown, DO      folic acid  1 mg Oral Daily Gisela Allen, DO      gabapentin  100 mg Oral TID Gaby Johnson, DO      ibuprofen  600 mg Oral Q6H PRN Stiven Reed, DO      levothyroxine  100 mcg Oral Early Morning Capmargy Allen, DO      lithium carbonate  600 mg Oral QAM Gisela Allen, DO      lithium carbonate  750 mg Oral QPM Gisela Allen, DO      ondansetron  4 mg Intravenous Q6H PRN Gisela Allen, DO      pantoprazole  40 mg Oral Early Morning Gisela Allen, DO      potassium chloride  20 mEq Oral BID Gisela Allen, DO      pyridoxine  100 mg Oral Daily Gisela Allen, DO      QUEtiapine  500 mg Oral HS Gisela Allen, DO      sertraline  50 mg Oral Daily Gisela Dominguez DO      SUMAtriptan  100 mg Oral Once PRN Lv Tapia DO      topiramate  150 mg Oral BID Gisela Allen DO       Continuous Infusions:   PRN Meds:   acetaminophen    chlordiazePOXIDE    ibuprofen    ondansetron    SUMAtriptan

## 2020-08-17 ENCOUNTER — APPOINTMENT (OUTPATIENT)
Dept: NON INVASIVE DIAGNOSTICS | Facility: HOSPITAL | Age: 45
End: 2020-08-17
Payer: COMMERCIAL

## 2020-08-17 VITALS
HEART RATE: 80 BPM | TEMPERATURE: 98.2 F | WEIGHT: 293 LBS | DIASTOLIC BLOOD PRESSURE: 66 MMHG | RESPIRATION RATE: 20 BRPM | OXYGEN SATURATION: 98 % | BODY MASS INDEX: 53.14 KG/M2 | SYSTOLIC BLOOD PRESSURE: 101 MMHG

## 2020-08-17 PROBLEM — R11.0 NAUSEA: Status: RESOLVED | Noted: 2020-08-14 | Resolved: 2020-08-17

## 2020-08-17 PROBLEM — E87.6 HYPOKALEMIA: Status: RESOLVED | Noted: 2020-08-09 | Resolved: 2020-08-17

## 2020-08-17 PROBLEM — N83.209 OVARIAN CYST: Status: ACTIVE | Noted: 2020-08-17

## 2020-08-17 PROBLEM — I82.220 IVC THROMBOSIS (HCC): Status: RESOLVED | Noted: 2020-08-03 | Resolved: 2020-08-17

## 2020-08-17 LAB
ANION GAP SERPL CALCULATED.3IONS-SCNC: 7 MMOL/L (ref 4–13)
BUN SERPL-MCNC: 2 MG/DL (ref 5–25)
CALCIUM SERPL-MCNC: 8.9 MG/DL (ref 8.3–10.1)
CHLORIDE SERPL-SCNC: 110 MMOL/L (ref 100–108)
CO2 SERPL-SCNC: 22 MMOL/L (ref 21–32)
CREAT SERPL-MCNC: 0.87 MG/DL (ref 0.6–1.3)
ERYTHROCYTE [DISTWIDTH] IN BLOOD BY AUTOMATED COUNT: 18.2 % (ref 11.6–15.1)
GFR SERPL CREATININE-BSD FRML MDRD: 80 ML/MIN/1.73SQ M
GLUCOSE SERPL-MCNC: 96 MG/DL (ref 65–140)
HCT VFR BLD AUTO: 38.4 % (ref 34.8–46.1)
HGB BLD-MCNC: 11.1 G/DL (ref 11.5–15.4)
MCH RBC QN AUTO: 30 PG (ref 26.8–34.3)
MCHC RBC AUTO-ENTMCNC: 28.9 G/DL (ref 31.4–37.4)
MCV RBC AUTO: 104 FL (ref 82–98)
PLATELET # BLD AUTO: 181 THOUSANDS/UL (ref 149–390)
PMV BLD AUTO: 11.3 FL (ref 8.9–12.7)
POTASSIUM SERPL-SCNC: 3.7 MMOL/L (ref 3.5–5.3)
PROCALCITONIN SERPL-MCNC: <0.05 NG/ML
RBC # BLD AUTO: 3.7 MILLION/UL (ref 3.81–5.12)
SODIUM SERPL-SCNC: 139 MMOL/L (ref 136–145)
WBC # BLD AUTO: 7.43 THOUSAND/UL (ref 4.31–10.16)

## 2020-08-17 PROCEDURE — 93306 TTE W/DOPPLER COMPLETE: CPT | Performed by: INTERNAL MEDICINE

## 2020-08-17 PROCEDURE — 99217 PR OBSERVATION CARE DISCHARGE MANAGEMENT: CPT | Performed by: INTERNAL MEDICINE

## 2020-08-17 PROCEDURE — 84145 PROCALCITONIN (PCT): CPT | Performed by: INTERNAL MEDICINE

## 2020-08-17 PROCEDURE — C8929 TTE W OR WO FOL WCON,DOPPLER: HCPCS

## 2020-08-17 PROCEDURE — 85027 COMPLETE CBC AUTOMATED: CPT | Performed by: STUDENT IN AN ORGANIZED HEALTH CARE EDUCATION/TRAINING PROGRAM

## 2020-08-17 PROCEDURE — 80048 BASIC METABOLIC PNL TOTAL CA: CPT | Performed by: STUDENT IN AN ORGANIZED HEALTH CARE EDUCATION/TRAINING PROGRAM

## 2020-08-17 RX ORDER — LEVOTHYROXINE SODIUM 0.1 MG/1
100 TABLET ORAL
Qty: 30 TABLET | Refills: 0 | Status: SHIPPED | OUTPATIENT
Start: 2020-08-18

## 2020-08-17 RX ORDER — CEPHALEXIN 500 MG/1
500 CAPSULE ORAL EVERY 6 HOURS SCHEDULED
Qty: 16 CAPSULE | Refills: 0 | Status: CANCELLED | OUTPATIENT
Start: 2020-08-17 | End: 2020-08-21

## 2020-08-17 RX ADMIN — FERROUS SULFATE TAB 325 MG (65 MG ELEMENTAL FE) 325 MG: 325 (65 FE) TAB at 08:29

## 2020-08-17 RX ADMIN — CEFAZOLIN SODIUM 1000 MG: 1 SOLUTION INTRAVENOUS at 10:49

## 2020-08-17 RX ADMIN — GABAPENTIN 100 MG: 100 CAPSULE ORAL at 08:29

## 2020-08-17 RX ADMIN — ONDANSETRON 4 MG: 2 INJECTION INTRAMUSCULAR; INTRAVENOUS at 12:33

## 2020-08-17 RX ADMIN — FOLIC ACID 1 MG: 1 TABLET ORAL at 08:29

## 2020-08-17 RX ADMIN — ENOXAPARIN SODIUM 100 MG: 100 INJECTION SUBCUTANEOUS at 08:32

## 2020-08-17 RX ADMIN — POTASSIUM CHLORIDE 20 MEQ: 1500 TABLET, EXTENDED RELEASE ORAL at 08:29

## 2020-08-17 RX ADMIN — LITHIUM CARBONATE 600 MG: 300 TABLET, FILM COATED, EXTENDED RELEASE ORAL at 08:30

## 2020-08-17 RX ADMIN — PANTOPRAZOLE SODIUM 40 MG: 40 TABLET, DELAYED RELEASE ORAL at 05:22

## 2020-08-17 RX ADMIN — GABAPENTIN 100 MG: 100 CAPSULE ORAL at 15:58

## 2020-08-17 RX ADMIN — SERTRALINE HYDROCHLORIDE 50 MG: 50 TABLET ORAL at 08:29

## 2020-08-17 RX ADMIN — IBUPROFEN 600 MG: 600 TABLET ORAL at 08:43

## 2020-08-17 RX ADMIN — Medication 100 MG: at 08:31

## 2020-08-17 RX ADMIN — CEFAZOLIN SODIUM 1000 MG: 1 SOLUTION INTRAVENOUS at 02:08

## 2020-08-17 RX ADMIN — PERFLUTREN 0.6 ML/MIN: 6.52 INJECTION, SUSPENSION INTRAVENOUS at 08:00

## 2020-08-17 RX ADMIN — LEVOTHYROXINE SODIUM 100 MCG: 100 TABLET ORAL at 05:22

## 2020-08-17 RX ADMIN — TOPIRAMATE 150 MG: 100 TABLET, FILM COATED ORAL at 08:31

## 2020-08-17 NOTE — PLAN OF CARE
Problem: PAIN - ADULT  Goal: Verbalizes/displays adequate comfort level or baseline comfort level  Description: Interventions:  - Encourage patient to monitor pain and request assistance  - Assess pain using appropriate pain scale  - Administer analgesics based on type and severity of pain and evaluate response  - Implement non-pharmacological measures as appropriate and evaluate response  - Consider cultural and social influences on pain and pain management  - Notify physician/advanced practitioner if interventions unsuccessful or patient reports new pain  Outcome: Progressing     Problem: INFECTION - ADULT  Goal: Absence or prevention of progression during hospitalization  Description: INTERVENTIONS:  - Assess and monitor for signs and symptoms of infection  - Monitor lab/diagnostic results  - Monitor all insertion sites, i e  indwelling lines, tubes, and drains  - Monitor endotracheal if appropriate and nasal secretions for changes in amount and color  - [place] appropriate cooling/warming therapies per order  - Administer medications as ordered  - Instruct and encourage patient and family to use good hand hygiene technique  - Identify and instruct in appropriate isolation precautions for identified infection/condition  Outcome: Progressing  Goal: Absence of fever/infection during neutropenic period  Description: INTERVENTIONS:  - Monitor WBC    Outcome: Progressing     Problem: SAFETY ADULT  Goal: Patient will remain free of falls  Description: INTERVENTIONS:  - Assess patient frequently for physical needs  -  Identify cognitive and physical deficits and behaviors that affect risk of falls    -  [place] fall precautions as indicated by assessment   - Educate patient/family on patient safety including physical limitations  - Instruct patient to call for assistance with activity based on assessment  - Modify environment to reduce risk of injury  - Consider OT/PT consult to assist with strengthening/mobility  Outcome: Progressing  Goal: Maintain or return to baseline ADL function  Description: INTERVENTIONS:  -  Assess patient's ability to carry out ADLs; assess patient's baseline for ADL function and identify physical deficits which impact ability to perform ADLs (bathing, care of mouth/teeth, toileting, grooming, dressing, etc )  - Assess/evaluate cause of self-care deficits   - Assess range of motion  - Assess patient's mobility; develop plan if impaired  - Assess patient's need for assistive devices and provide as appropriate  - Encourage maximum independence but intervene and supervise when necessary  - Involve family in performance of ADLs  - Assess for home care needs following discharge   - Consider OT consult to assist with ADL evaluation and planning for discharge  - Provide patient education as appropriate  Outcome: Progressing  Goal: Maintain or return mobility status to optimal level  Description: INTERVENTIONS:  - Assess patient's baseline mobility status (ambulation, transfers, stairs, etc )    - Identify cognitive and physical deficits and behaviors that affect mobility  - Identify mobility aids required to assist with transfers and/or ambulation (gait belt, sit-to-stand, lift, walker, cane, etc )  - [place] fall precautions as indicated by assessment  - Record patient progress and toleration of activity level on Mobility SBAR; progress patient to next Phase/Stage  - Instruct patient to call for assistance with activity based on assessment  - Consider rehabilitation consult to assist with strengthening/weightbearing, etc   Outcome: Progressing     Problem: Knowledge Deficit  Goal: Patient/family/caregiver demonstrates understanding of disease process, treatment plan, medications, and discharge instructions  Description: Complete learning assessment and assess knowledge base    Interventions:  - Provide teaching at level of understanding  - Provide teaching via preferred learning methods  Outcome: Progressing

## 2020-08-17 NOTE — DISCHARGE INSTRUCTIONS
Please follow up with your PCP within 1 week of discharge from the hospital     Please call [phone] to establish care with a vascular surgeon within 1 week of discharge  Please call [phone] to schedule an appointment within 1 week of discharge to discuss your blood thinner medication with a hematologist     Please call [phone] to schedule follow up with an OB/GYN  An ovarian cyst was noted on your imaging while in the hospital  This can be followed on a non-urgent basis

## 2020-08-17 NOTE — QUICK NOTE
Discussed incidental finding on CT scan regarding  7 2 x 5 4 x 5 2 cm cystic lesion in the right ovary  She was currently asymptomatic and was given a referral for outpatient Ob/gyn follow-up on a nonemergent basis  Instructed to go to ED if she experienced severe abdominal/pelvic pain/fever/chills  She was also instructed to follow up with vascular surgery and has an appointment scheduled on 08/27/2020  Instructed on the use of compression stockings  Upon discharge, suspicion for cellulitis of RLE low, afebrile, without leukocytosis, normal procalcitonin  Finished 3 days of IV Ancef without transition to p o  Antibiotics  Ambulatory referral for Sleep Medicine for outpatient polysomnography for suspicion of SABRA given      Jose Hanna MD

## 2020-08-17 NOTE — DISCHARGE SUMMARY
INTERNAL MEDICINE RESIDENCY DISCHARGE SUMMARY     Martina Robins   53 y o  female  MRN: [id number]  Room/Bed: [facility] 213/[facility] 213-01     [address] [place] [address]   Encounter: [id number]    Principal Problem:    Cellulitis of right lower extremity   Active Problems:    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HCC)    Hx of pulmonary embolus    S/P gastric bypass    Anemia    Migraine    Bipolar disorder (HCC)    Swelling of lower extremity    Morbid obesity with BMI of 50 0-59 9, adult (HCC)    Acquired hypothyroidism    Vitamin D deficiency    Hypokalemia    Nausea    Cellulitis of right lower extremity  -VAS venous duplex negative for acute DVT/superficial thrombophlebitis b/l  -AVSS, w/o leukocytosis  -gabapentin 100 mg tid  -s/p IV ancef for 3 days, d/c on PO keflex for 4 days (7 days total)    IVC thrombosis  -s/p CD-lysis 8/4/20  -small residual clot burden on arrival, no acute/obstructive thrombus on CTAP  -continue lovenox 100 mg q12h    Anemia  -s/p bypass  -ferrous sulfate 325 mg qd    Migraine  -Ppx topamax   -imitrex   -to continue f/u with neurology outpatient     Bipolar disorder  -Lithium 25 mg TID, Level 0 6 on arrival  -Sertraline 500 mg qd  -seroqeul 500 mg qhs  -librium 25 mg TID    Swelling of lower extremity  -CTAP w/ no acute thrombus, residual clots s/p CD-lysis, no acute/obstructive thrombus in IVC filter or CIVs  -VAS venous duplex negative for acute DVT/superficial thrombophlebitis b/l  -Echo 8/17/20: EF 70% normal LV diastolic function, mild MR  -continue lovenox 100 mg q12h  -continue w/ compression stockings  -to f/u w/ vascular surgery outpatient  -will hold on lasix for edema 2/2 hypokalemia, instructed to f/u with PCP within 1 week of discharge     Morbid obesity with BMI of 50 0-59 9  -s/p gastric bypass, lifestyle/diet education    Acquired hypothyroidism  -continue home synthroid 100 mcg    Hypokalemia  -K 3 7 on d/c, standing K PO for 1 week, to f/u with PCP      [address] Kelly Johnson is a 53year old female with a past medical history of pulmonary embolism with possible portal hypertensive gastropathy and pulmonary embolism status post IVC filter placement (2017 [facility]) and catheter directed lysis (08/04/2020), gastric bypass (in [place]), bipolar depression, anxiety, anemia, and morbid obesity who presented to [facility] ED on 08/14/2020 with abdominal pain, nausea, vomiting, right lower extremity swelling, redness, and tenderness  Of note she recently was hospitalized and discharged on Lovenox on 08/4/2020 following catheter directed lysis of bilateral iliocaval thrombus  Prior to arrival she experienced 3 days of worsening right leg pain redness, swelling, nausea, vomiting and was instructed to go to the ED by her PCP  On arrival she was AVSS  and started on IV Ancef for RLE cellulitis in setting of chronic venous insufficiency  Venous duplex demonstrated no acute evidence of DVT/superficial thrombophlebitis b/l although was technically difficult 2/2 body habitus  CTAP demonstrated markedly reduced clot burden s/p lysis of IVC filter and b/l CIVs w/ small amounts of nonocclusive thrombi present  During her hospital course she received 3 days of IV ancef and was maintained on lovenox  Echo was performed to r/o cardiac contribution her worsening LE edema/erythema  She was discharged on 8/17/2020 in good condition  She was instructed to follow up with her PCP within 1 week of discharge and was also given referral to vascular surgery for management of her venous disease  We discussed the chronic nature of her venous disease and that swelling would likely not resolve with diuretics alone  She was discharged on PO keflex for RLE cellulitis and also provided an ambulatory referral to sleep medicine for outpatient sleep study for suspicion of underlying SABRA       DISCHARGE INFORMATION     PCP at Discharge: Chandana Vora PA-C    Admitting Provider: Kevin Alvarez MD  Admission Date: 8/14/2020    Discharge Provider: Kevin Alvarez MD  Discharge Date: 8/17/2020    Discharge Disposition: Home with [facility]  Discharge Condition: good  Discharge with Lines: no    Discharge Diet: low fat, low cholesterol diet  Activity Restrictions: none  Test Results Pending at Discharge: none    Discharge Diagnoses:  Principal Problem:    Cellulitis of right lower extremity   Active Problems:    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HCC)    Hx of pulmonary embolus    S/P gastric bypass    Anemia    Migraine    Bipolar disorder (HCC)    Swelling of lower extremity    Morbid obesity with BMI of 50 0-59 9, adult ([facility] [address] )    Acquired hypothyroidism    Vitamin D deficiency    Hypokalemia    Nausea  Resolved Problems: Thrombophlebitis      Consulting Providers:  -none    Diagnostic & Therapeutic Procedures Performed:  Ct Abdomen Pelvis With Contrast    Result Date: 8/14/2020  Impression: In this patient with IVC filter, and 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 seen on 8/3/2020, there is markedly reduced clot burden status post interventional radiology therapy  Currently, there is still small amounts of nonocclusive thrombi in the IVC inferior to the filter, and in both common iliac veins (series 601 image 103 through 142 ) There is a 7 2 x 5 4 x 5 2 cm cystic lesion in the right ovary (series 2 image 68 ) This was in retrospect present on 8/3/2020  If there is clinical concern for ovarian torsion, emergent pelvic ultrasound should be performed  Otherwise, this can be evaluated by pelvic ultrasound on a nonemergent basis  Again seen is fatty liver   Workstation performed: [id number]       Code Status: Level 1 - Full Code  Advance Directive & Living Will: <no information>  Power of :    POLST:      Medications:  OTHER medications on file     OTHER medications on file     Morning  Afternoon  Evening  Bedtime  As Needed     NEEDLE (DISP) 27 G 27G X 1/2" Misc   by Does not apply route every 12 (twelve) hours   Refills: 5         Ask your Primary Care Physician about these medications:     Ask your Primary Care Physician about these medications:     Morning  Afternoon  Evening  Bedtime  As Needed     chlordiazePOXIDE 25 mg capsule   Commonly known as: LIBRIUM   Take 25 mg by mouth 3 (three) times a day as needed for anxiety   Refills: 0          enoxaparin 100 mg/mL   Commonly known as: LOVENOX   Inject 1 mL (100 mg total) under the skin every 12 (twelve) hours   Refills: 5   Last time this was given: 100 mg on August 17, 2020  8:32 AM          ferrous sulfate 325 (65 Fe) mg tablet   Take 325 mg by mouth daily with breakfast   Refills: 0   Last time this was given: 325 mg on August 17, 2020  4:03 AM          folic acid 1 mg tablet   Commonly known as: FOLVITE   Take 1 mg by mouth daily   Refills: 0   Last time this was given: 1 mg on August 17, 2020  8:29 AM          gabapentin 400 mg capsule   Commonly known as: NEURONTIN   Take 100 mg by mouth 3 (three) times a day   Refills: 0   Last time this was given: 100 mg on August 17, 2020  8:29 AM          * lithium carbonate 300 mg CR tablet   Commonly known as: LITHOBID   Take 600 mg by mouth every morning   Refills: 0   Last time this was given: Ask your nurse or doctor          * lithium carbonate 300 mg CR tablet   Commonly known as: LITHOBID   Take 750 mg by mouth every evening   Refills: 0   Last time this was given: Ask your nurse or doctor          pantoprazole 40 mg tablet   Commonly known as: PROTONIX   Take 1 tablet (40 mg total) by mouth daily in the early morning   Refills: 1   Last time this was given: 40 mg on August 17, 2020  5:22 AM          potassium chloride 20 mEq tablet   Commonly known as: K-DUR,KLOR-CON   Take 1 tablet (20 mEq total) by mouth 2 (two) times a day   Refills: 1   Last time this was given: 20 mEq on August 17, 2020  8:29 AM          pyridoxine 100 mg tablet   Commonly known as: VITAMIN B6   Take 100 mg by mouth daily   Refills: 0   Last time this was given: 100 mg on August 17, 2020  8:31 AM          QUEtiapine 100 mg tablet   Commonly known as: SEROquel   Take 500 mg by mouth daily at bedtime   Refills: 0   Last time this was given: 500 mg on August 16, 2020  9:35 PM          sertraline 50 mg tablet   Commonly known as: ZOLOFT   Take 50 mg by mouth daily   Refills: 0   Last time this was given: 50 mg on August 17, 2020  8:29 AM          SUMAtriptan 100 mg tablet   Commonly known as: IMITREX   Take 100 mg by mouth as needed for migraine   Refills: 0          topiramate 100 mg tablet   Commonly known as: TOPAMAX   Take 150 mg by mouth 2 (two) times a day   Refills: 0   Last time this was given: 150 mg on August 17, 2020  8:31 AM             Allergies: Allergies   Allergen Reactions    Morphine      Seizures  Physical Exam  Vitals signs reviewed  Constitutional:       Appearance: She is obese  She is not ill-appearing or diaphoretic  HENT:      Head: Normocephalic and atraumatic  Nose: Nose normal       Mouth/Throat:      Mouth: Mucous membranes are moist       Pharynx: Oropharynx is clear  Eyes:      Extraocular Movements: Extraocular movements intact  Conjunctiva/sclera: Conjunctivae normal       Pupils: Pupils are equal, round, and reactive to light  Neck:      Musculoskeletal: Normal range of motion and neck supple  Cardiovascular:      Rate and Rhythm: Normal rate and regular rhythm  Pulses: Normal pulses  Heart sounds: Normal heart sounds  Pulmonary:      Effort: Pulmonary effort is normal       Breath sounds: Normal breath sounds  Abdominal:      General: Abdomen is flat  There is no distension  Palpations: Abdomen is soft  Tenderness: There is no abdominal tenderness  Musculoskeletal: Normal range of motion  Comments: RLE: 3+ pitting tibial edema, erythema (improved from 8/16/20)  LLE: 2+ pitting tibial edema, non-erythematous   No superficial thrombophlebitis  Negative homans b/l  Posterior vascular puncture sites (popliteal), non-draining, non-erythematous    Ecchymosis on posterior calf (right)   Skin:     Coloration: Skin is not pale  Findings: No rash  Neurological:      General: No focal deficit present  Mental Status: She is alert and oriented to person, place, and time  Sensory: No sensory deficit  FOLLOW-UP     PCP Outpatient Follow-up:  yes    Consulting Providers Follow-up:  yes, vascular surgery     Active Issues Requiring Follow-up:   none    Discharge Statement:   I spent 45 minutes minutes discharging the patient  This time was spent on the day of discharge  I had direct contact with the patient on the day of discharge  Additional documentation is required if more than 30 minutes were spent on discharge  Portions of the record may have been created with voice recognition software  Occasional wrong word or "sound a like" substitutions may have occurred due to the inherent limitations of voice recognition software    Read the chart carefully and recognize, using context, where substitutions have occurred     ==  Edwige Glass MD  [address]  Internal Medicine, PGY-I

## 2020-08-17 NOTE — UTILIZATION REVIEW
Continued Stay Review    Date: 08/17/2020                         Current Patient Class: Observation  Current Level of Care: Med/Surg    HPI:44 y o  female initially admitted on 08/14/2020   Cellulitis or right lower extremity  Assessment/Plan:DC order entered    VTE Pharmacologic Prophylaxis: Sequential compression device (Venodyne)  and Enoxaparin (Lovenox)  VTE Mechanical Prophylaxis: sequential compression device  Pertinent Labs/Diagnostic Results:     Results from last 7 days   Lab Units 08/17/20  0542 08/16/20  0555 08/15/20  0611 08/14/20  1142   WBC Thousand/uL 7 43 6 73 7 97 8 82   HEMOGLOBIN g/dL 11 1* 9 7* 10 3* 10 1*   HEMATOCRIT % 38 4 32 9* 34 3* 33 7*   PLATELETS Thousands/uL 181 238 271 266   NEUTROS ABS Thousands/µL  --  3 60 4 31 5 49     Results from last 7 days   Lab Units 08/17/20  0541 08/16/20  0555 08/15/20  0611 08/14/20  1142   SODIUM mmol/L 139 142 141 140   POTASSIUM mmol/L 3 7 3 3* 3 4* 3 6   CHLORIDE mmol/L 110* 113* 112* 112*   CO2 mmol/L 22 22 22 22   ANION GAP mmol/L 7 7 7 6   BUN mg/dL 2* 2* 4* 3*   CREATININE mg/dL 0 87 0 74 0 81 0 79   EGFR ml/min/1 73sq m 80 97 87 90   CALCIUM mg/dL 8 9 8 3 8 4 8 3     Results from last 7 days   Lab Units 08/14/20  1142   AST U/L 49*   ALT U/L 48   ALK PHOS U/L 178*   TOTAL PROTEIN g/dL 7 2   ALBUMIN g/dL 2 7*   TOTAL BILIRUBIN mg/dL 0 45     Results from last 7 days   Lab Units 08/17/20  0541 08/16/20  0555 08/15/20  0611 08/14/20  1142   GLUCOSE RANDOM mg/dL 96 99 99 111     Results from last 7 days   Lab Units 08/14/20  1142   LIPASE u/L 247     Results from last 7 days   Lab Units 08/14/20  1258   CLARITY UA  Clear   COLOR UA  Yellow   SPEC GRAV UA  1 010   PH UA  6 5   GLUCOSE UA mg/dl Negative   KETONES UA mg/dl Negative   BLOOD UA  Trace*   PROTEIN UA mg/dl Negative   NITRITE UA  Negative   BILIRUBIN UA  Negative   UROBILINOGEN UA E U /dl 0 2   LEUKOCYTES UA  Negative   WBC UA /hpf None Seen   RBC UA /hpf None Seen   BACTERIA UA /hpf None Seen   EPITHELIAL CELLS WET PREP /hpf Occasional     Vital Signs: /59   Pulse 90   Temp (!) 97 3 °F (36 3 °C)   Resp 18   Wt 136 kg (300 lb)   LMP 07/23/2020   SpO2 100%   BMI 53 14 kg/m²     Medications:   Scheduled Medications:  cefazolin, 1,000 mg, Intravenous, Q8H  enoxaparin, 100 mg, Subcutaneous, Q12H STACEY  ferrous sulfate, 325 mg, Oral, Daily With Breakfast  folic acid, 1 mg, Oral, Daily  gabapentin, 100 mg, Oral, TID  levothyroxine, 100 mcg, Oral, Early Morning  lithium carbonate, 600 mg, Oral, QAM  lithium carbonate, 750 mg, Oral, QPM  pantoprazole, 40 mg, Oral, Early Morning  potassium chloride, 20 mEq, Oral, BID  pyridoxine, 100 mg, Oral, Daily  QUEtiapine, 500 mg, Oral, HS  sertraline, 50 mg, Oral, Daily  topiramate, 150 mg, Oral, BID    Continuous IV Infusions:     PRN Meds:  acetaminophen, 650 mg, Oral, Q6H PRN  chlordiazePOXIDE, 25 mg, Oral, TID PRN  ibuprofen, 600 mg, Oral, Q6H PRN  ondansetron, 4 mg, Intravenous, Q6H PRN  SUMAtriptan, 100 mg, Oral, Once PRN    Discharge Plan: [facility]    Network Utilization Review Department  [email or web address]  ATTENTION: Please call with any questions or concerns to [phone] and carefully listen to the prompts so that you are directed to the right person  All voicemails are confidential   Alexandro Oconnor all requests for admission clinical reviews, approved or denied determinations and any other requests to dedicated fax number below belonging to the campus where the patient is receiving treatment   List of dedicated fax numbers for the Facilities:  FACILITY NAME UR FAX NUMBER   ADMISSION DENIALS (Administrative/Medical Necessity) [phone]   [address] (Maternity/NICU/Pediatrics) [phone]   Joaquin Maria [phone]   Cherylene Freud [phone]   Aden Higgins [phone]   [facility] [phone]   Sandra Ford Gustavo Weiss [phone]   Qu[facility] Le[facility] [phone]   [facility] [phone]   [address] [place] [address] [phone]

## 2020-08-20 ENCOUNTER — TELEPHONE (OUTPATIENT)
Dept: ADMINISTRATIVE | Facility: HOSPITAL | Age: 45
End: 2020-08-20

## 2020-08-20 ENCOUNTER — TELEPHONE (OUTPATIENT)
Dept: HEMATOLOGY ONCOLOGY | Facility: CLINIC | Age: 45
End: 2020-08-20

## 2020-08-20 NOTE — TELEPHONE ENCOUNTER
New Patient Encounter    New Patient Intake Form   Patient Details:  Arminda Ramirez  1975  [id number]    Background Information:  [address] starts by opening a telephone encounter and gathering the following information   Who is calling to schedule? If not self, relationship to patient? self   Referring Provider [place]   What is the diagnosis? H/O DVT PE, IVC thrombosis   Is this diagnosis confirmed? Yes   When was the diagnosis? Many years, most recently 8/2020   Is there a confirmed diagnosis from a biopsy/tissue reviewed by pathology? Were outside slides requested? Is patient aware of diagnosis? Yes   Is there a personal history and what kind? Is there a family history and what kind? Reason for visit? History Of   Have you had any imaging or labs done? If so: when, where? yes  [facility]   Are records in [employer]? yes   If patient has a prior history of breast cancer were old records obtained? Was the patient told to bring a disk? no   Does the patient smoke or Vape? no   If yes, how many packs or cartridges per day? Scheduling Information:   Preferred Greig:  Per[place]     Are there any dates/time the patient cannot be seen? no   Miscellaneous: Proleonardo saw as inpt   After completing the above information, please route to Financial Counselor and the appropriate Nurse Navigator for review

## 2020-08-21 ENCOUNTER — OFFICE VISIT (OUTPATIENT)
Dept: VASCULAR SURGERY | Facility: CLINIC | Age: 45
End: 2020-08-21
Payer: COMMERCIAL

## 2020-08-21 VITALS
TEMPERATURE: 99.8 F | HEART RATE: 88 BPM | DIASTOLIC BLOOD PRESSURE: 60 MMHG | WEIGHT: 293 LBS | SYSTOLIC BLOOD PRESSURE: 118 MMHG | HEIGHT: 63 IN | BODY MASS INDEX: 51.91 KG/M2

## 2020-08-21 DIAGNOSIS — L03.115 CELLULITIS OF RIGHT LOWER EXTREMITY: ICD-10-CM

## 2020-08-21 DIAGNOSIS — I82.220 IVC THROMBOSIS (HCC): Primary | ICD-10-CM

## 2020-08-21 DIAGNOSIS — Z98.84 S/P GASTRIC BYPASS: ICD-10-CM

## 2020-08-21 DIAGNOSIS — M79.89 SWELLING OF LOWER EXTREMITY: ICD-10-CM

## 2020-08-21 DIAGNOSIS — E66.01 MORBID OBESITY WITH BMI OF 50.0-59.9, ADULT (HCC): ICD-10-CM

## 2020-08-21 PROBLEM — Z95.828 PRESENCE OF IVC FILTER: Status: ACTIVE | Noted: 2020-08-21

## 2020-08-21 PROCEDURE — 99214 OFFICE O/P EST MOD 30 MIN: CPT | Performed by: SURGERY

## 2020-08-21 NOTE — ASSESSMENT & PLAN NOTE
Right lower extremity cellulitis and residual swelling/pain  Improved since treatment with antibiotics with some residual symptoms  Discussed that she is at risk for developing post-thrombotic syndrome and lymphedema in the future due to proximal deep venous thrombosis  Discussed lifestyle changes and overall management of these symptoms to reduce the overall incidence and impact on her in the future including continuing leg elevation, compression, exercise, and weight loss

## 2020-08-21 NOTE — PROGRESS NOTES
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 s/p thrombolysis and angiojet thrombectomy on 8/5 with restoration of IVC/iliac vein outflow though there was some residual disease  Presented again to the ED on 8/14 with right leg erythema/pain/swelling and treated for cellulitis  Repeat CTAP revealed some residual IVC/iliac vein DVT as expected but significantly improved and no new lower extremity DVT  She completed a course of antibiotics for the right leg swelling with overall significant improvement  She complains of some residual right thigh pain  She is now wearing knee high compression stockings which help with her symptoms     -We discussed the pathophysiology of venous disease, available treatment options and indications for treatment  -Recommend continued conservative measures and medical management  This includes the daily use of gradient compression socks, periodic leg elevation and regular exercise  -Continue therapeutic lovenox therapy  She has a follow-up appointment scheduled with heme/onc   -Return in 3 months with repeat IVC/iliac vein duplex and bilateral lower extremity DVT study  Will discuss possible IVC filter removal at that time  Swelling of lower extremity  Right lower extremity cellulitis and residual swelling/pain  Improved since treatment with antibiotics with some residual symptoms  Discussed that she is at risk for developing post-thrombotic syndrome and lymphedema in the future due to proximal deep venous thrombosis  Discussed lifestyle changes and overall management of these symptoms to reduce the overall incidence and impact on her in the future including continuing leg elevation, compression, exercise, and weight loss         Diagnoses and all orders for this visit:    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[facility] [address] )  -     Ambulatory referral to Vascular Surgery  -     VAS ivc/iliac veins duplex; complete study; Future  -     VAS lower limb venous duplex study, complete bilateral; Future    Swelling of lower extremity  -     Ambulatory referral to Vascular Surgery    S/P gastric bypass    Cellulitis of right lower extremity    Morbid obesity with BMI of 50 0-59 9, adult ([facility] [address] )        I have spent 25 minutes with Patient  today in which greater than 50% of this time was spent in counseling/coordination of care regarding Intructions for management, Importance of tx compliance and Impressions  Subjective:      Patient ID: Francia Tran is a 53 y o  female  Patient is new to us referred by Dr Joana Andrade  Patient went to the ER on 8/3/20 for acute lower back pain  Patient was then found to have Central DVT and BL LE swelling and was lysis on 8/4/20  Patient had original IVC filter placed in  [facility]  Patient developed acute pain starting 7/30 and it progressively worsening with radiation to abdomen and  lower extremities  Patient then was in the ER on 8/14/20 due to Abdominal pain, nausea, vomiting, RLE swelling, redness and tenderness  Today in the office the patient C/o of tightness, aching and burning that now has traveled up to her upper thighs that started on the 14th, which is worst with walking  R>L She continues to have BL LE swelling but has gone down since the patient started wearing compression on Tuesday  Her abdominal pain and nausea and vomiting has resolved since her last visit to the Er on the 14th  HPI  Ms Areli Kevin is a 51yo female with history of DVT/PE, IVC filter placement, morbid obesity s/p gastric bypass, recent IVC filter thrombosis with acute on chronic DVT s/p thrombolysis and mechanical thrombectomy, and right leg cellulitis who presents for follow-up  She is overall doing better though she does still complain of some right thigh pain and swelling   She is now wearing compression stockings and elevating her legs as able  She states that the compression stockings do help her symptoms  She is on therapeutic lovenox and has not experienced any bleeding complications  She had previously had significant bleeding due to xarelto and required IVC filter placement after DVT/PE for that reason  Her previous symptoms of abdominal pain with nausea/vomiting from the IVC filter thrombosis with thrombophlebitis have resolved  She denies any chest pain or shortness of breath  The following portions of the patient's history were reviewed and updated as appropriate: allergies, current medications, past family history, past medical history, past social history, past surgical history and problem list     I have reviewed and made appropriate changes to the review of systems input by the medical assistant      Vitals:    08/21/20 1301   BP: 118/60   BP Location: Right arm   Patient Position: Sitting   Cuff Size: Standard   Pulse: 88   Temp: 99 8 °F (37 7 °C)   TempSrc: Tympanic   Weight: (!) 138 kg (305 lb)   Height: 5' 3" (1 6 m)       Patient Active Problem List   Diagnosis    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HCC)    Hx of pulmonary embolus    S/P gastric bypass    Anemia    Hepatic steatosis    Migraine    Bipolar disorder (HCC)    Swelling of lower extremity    Hx of suicide attempt    Morbid obesity with BMI of 50 0-59 9, adult (HCC)    Acquired hypothyroidism    Vitamin D deficiency    Cellulitis of right lower extremity     Ovarian cyst    Presence of IVC filter       Past Surgical History:   Procedure Laterality Date    APPENDECTOMY      Open    CHOLECYSTECTOMY      Laparoscopic    GASTRIC BYPASS      was 205 date of surgery    IR LYSIS RECHECK  8/5/2020    IR VENOUS LYSIS  8/4/2020    IVC FILTER INSERTION  2017    REPLACEMENT TOTAL KNEE      STOMACH SURGERY      for morbid obesity    TUBAL LIGATION Bilateral 2010       Family History   Problem Relation Age of Onset    Other Mother         headache    Hypertension Mother    Maximino Perera Depression Mother     Arthritis Father     Other Father         H, pylori infection    Other Sister         esophageal reflux    Migraines Sister     Breast cancer Family     Diabetes Paternal Aunt         Mellitus    Diabetes Paternal Uncle         Mellitus    Asthma Daughter        Social History     Socioeconomic History    Marital status: Single     Spouse name: Not on file    Number of children: Not on file    Years of education: Not on file    Highest education level: Not on file   Occupational History    Occupation:      Comment: [employer] resource strain: Not on file    Food insecurity     Worry: Not on file     Inability: Not on file    Transportation needs     Medical: Not on file     Non-medical: Not on file   Tobacco Use    Smoking status: Never Smoker    Smokeless tobacco: Never Used    Tobacco comment: former quit in 1999 per [employer]   Substance and Sexual Activity    Alcohol use: Not Currently     Comment: quit 6/21/2018    Drug use: No    Sexual activity: Yes   Lifestyle    Physical activity     Days per week: Not on file     Minutes per session: Not on file    Stress: Not on file   Relationships    Social connections     Talks on phone: Not on file     Gets together: Not on file     Attends [employer] service: Not on file     Active member of club or organization: Not on file     Attends meetings of clubs or organizations: Not on file     Relationship status: Not on file    Intimate partner violence     Fear of current or ex partner: Not on file     Emotionally abused: Not on file     Physically abused: Not on file     Forced sexual activity: Not on file   Other Topics Concern    Not on file   Social History Narrative    Sleep 6 in 24 hours    Caffeine use; 2 cps coffee per day    Uses seatbelts               Allergies   Allergen Reactions    Morphine      Seizures            Current Outpatient Medications:    chlordiazePOXIDE (LIBRIUM) 25 mg capsule, Take 25 mg by mouth 3 (three) times a day as needed for anxiety, Disp: , Rfl:     enoxaparin (LOVENOX) 100 mg/mL, Inject 1 mL (100 mg total) under the skin every 12 (twelve) hours, Disp: 100 Syringe, Rfl: 5    ferrous sulfate 325 (65 Fe) mg tablet, Take 325 mg by mouth daily with breakfast, Disp: , Rfl:     folic acid (FOLVITE) 1 mg tablet, Take 1 mg by mouth daily, Disp: , Rfl:     gabapentin (NEURONTIN) 400 mg capsule, Take 100 mg by mouth 3 (three) times a day, Disp: , Rfl:     levothyroxine 100 mcg tablet, Take 1 tablet (100 mcg total) by mouth daily in the early morning, Disp: 30 tablet, Rfl: 0    lithium carbonate (LITHOBID) 300 mg CR tablet, Take 600 mg by mouth every morning, Disp: , Rfl:     lithium carbonate (LITHOBID) 300 mg CR tablet, Take 750 mg by mouth every evening, Disp: , Rfl:     NEEDLE, DISP, 27 G 27G X 1/2" MISC, by Does not apply route every 12 (twelve) hours, Disp: 100 each, Rfl: 5    pantoprazole (PROTONIX) 40 mg tablet, Take 1 tablet (40 mg total) by mouth daily in the early morning, Disp: 30 tablet, Rfl: 1    potassium chloride (K-DUR,KLOR-CON) 20 mEq tablet, Take 1 tablet (20 mEq total) by mouth 2 (two) times a day, Disp: 30 tablet, Rfl: 1    pyridoxine (VITAMIN B6) 100 mg tablet, Take 100 mg by mouth daily, Disp: , Rfl:     QUEtiapine (SEROquel) 100 mg tablet, Take 500 mg by mouth daily at bedtime, Disp: , Rfl:     sertraline (ZOLOFT) 50 mg tablet, Take 50 mg by mouth daily, Disp: , Rfl:     SUMAtriptan (IMITREX) 100 mg tablet, Take 100 mg by mouth as needed for migraine , Disp: , Rfl:     topiramate (TOPAMAX) 100 mg tablet, Take 150 mg by mouth 2 (two) times a day , Disp: , Rfl:     Review of Systems   Constitutional: Negative  Negative for chills and fever  HENT: Negative  Eyes: Negative  Respiratory: Negative  Negative for chest tightness and shortness of breath  Cardiovascular: Positive for leg swelling   Negative for chest pain  Gastrointestinal: Negative  Endocrine: Negative  Genitourinary: Negative  Musculoskeletal:        Tightness, aching in the BL thighs R>L   Skin: Negative  Negative for color change and wound  Allergic/Immunologic: Negative  Neurological:        Burning pain in the BL upper thighs R>L   Hematological: Negative  Psychiatric/Behavioral: Negative  I have personally reviewed the ROS entered by MA and agree as documented  Objective:      /60 (BP Location: Right arm, Patient Position: Sitting, Cuff Size: Standard)   Pulse 88   Temp 99 8 °F (37 7 °C) (Tympanic)   Ht 5' 3" (1 6 m)   Wt (!) 138 kg (305 lb)   LMP 07/23/2020   BMI 54 03 kg/m²          Physical Exam  Vitals signs and nursing note reviewed  Constitutional:       Appearance: She is well-developed  HENT:      Head: Normocephalic and atraumatic  Neck:      Musculoskeletal: Normal range of motion and neck supple  Cardiovascular:      Rate and Rhythm: Normal rate and regular rhythm  Pulses:           Radial pulses are 1+ on the right side and 1+ on the left side  Dorsalis pedis pulses are 1+ on the right side and 1+ on the left side  Pulmonary:      Effort: Pulmonary effort is normal    Abdominal:      Palpations: Abdomen is soft  Musculoskeletal: Normal range of motion  Right lower leg: Edema present  Left lower leg: Edema present  Skin:     General: Skin is warm and dry  Capillary Refill: Capillary refill takes less than 2 seconds  Comments: Mild erythema right leg   Neurological:      Mental Status: She is alert and oriented to person, place, and time  Psychiatric:         Behavior: Behavior normal          Thought Content:  Thought content normal          Judgment: Judgment normal

## 2020-08-21 NOTE — PATIENT INSTRUCTIONS
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[facility] [address] )  History of Theresa IVC filter placement in 2017 in [facility] for bilateral DVT with bleeding complication on xarelto  Recently presented with IVC thrombosis and iliac DVT s/p thrombolysis and angiojet thrombectomy on 8/5 with restoration of IVC/iliac vein outflow though there was some residual disease  Presented again to the ED on 8/14 with right leg erythema/pain/swelling and treated for cellulitis  Repeat CTAP revealed some residual IVC/iliac vein DVT as expected but significantly improved and no new lower extremity DVT  She completed a course of antibiotics for the right leg swelling with overall significant improvement  She complains of some residual right thigh pain  She is now wearing knee high compression stockings which help with her symptoms      -We discussed the pathophysiology of venous disease, available treatment options and indications for treatment  -Recommend continued conservative measures and medical management   This includes the daily use of gradient compression socks, periodic leg elevation and regular exercise  -Continue therapeutic lovenox therapy  She has a follow-up appointment scheduled with heme/onc   -Return in 3 months with repeat IVC/iliac vein duplex and bilateral lower extremity DVT study  Will discuss possible IVC filter removal at that time         Swelling of lower extremity  Right lower extremity cellulitis and residual swelling/pain  Improved since treatment with antibiotics with some residual symptoms  Discussed that she is at risk for developing post-thrombotic syndrome and lymphedema in the future due to proximal deep venous thrombosis  Discussed lifestyle changes and overall management of these symptoms to reduce the overall incidence and impact on her in the future including continuing leg elevation, compression, exercise, and weight loss

## 2020-08-21 NOTE — LETTER
August 21, 2020     Angelica Castro PA-C  [id number]  [facility] [address]    Patient: Harry Rodriguez   YOB: 1975   Date of Visit: 8/21/2020       Dear Dr Eric Bishop: Thank you for referring Harry Rodriguez to me for evaluation  Below are the relevant portions of my assessment and plan of care  Diagnoses and all orders for this visit:    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[address] )  History of Larue IVC filter placement in 2017 in [place] for bilateral DVT with bleeding complication on xarelto  Recently presented with IVC thrombosis and iliac DVT s/p thrombolysis and angiojet thrombectomy on 8/5 with restoration of IVC/iliac vein outflow though there was some residual disease  Presented again to the ED on 8/14 with right leg erythema/pain/swelling and treated for cellulitis  Repeat CTAP revealed some residual IVC/iliac vein DVT as expected but significantly improved and no new lower extremity DVT  She completed a course of antibiotics for the right leg swelling with overall significant improvement  She complains of some residual right thigh pain  She is now wearing knee high compression stockings which help with her symptoms      -We discussed the pathophysiology of venous disease, available treatment options and indications for treatment  -Recommend continued conservative measures and medical management   This includes the daily use of gradient compression socks, periodic leg elevation and regular exercise  -Continue therapeutic lovenox therapy  She has a follow-up appointment scheduled with heme/onc   -Return in 3 months with repeat IVC/iliac vein duplex and bilateral lower extremity DVT study  Will discuss possible IVC filter removal at that time         Swelling of lower extremity  Right lower extremity cellulitis and residual swelling/pain  Improved since treatment with antibiotics with some residual symptoms   Discussed that she is at risk for developing post-thrombotic syndrome and lymphedema in the future due to proximal deep venous thrombosis  Discussed lifestyle changes and overall management of these symptoms to reduce the overall incidence and impact on her in the future including continuing leg elevation, compression, exercise, and weight loss  If you have questions, please do not hesitate to call me  I look forward to following [facility] along with you           Sincerely,        Ant El MD        CC: Adolfo Snow, DO

## 2020-09-30 ENCOUNTER — OFFICE VISIT (OUTPATIENT)
Dept: HEMATOLOGY ONCOLOGY | Facility: CLINIC | Age: 45
End: 2020-09-30

## 2020-09-30 ENCOUNTER — TELEPHONE (OUTPATIENT)
Dept: HEMATOLOGY ONCOLOGY | Facility: CLINIC | Age: 45
End: 2020-09-30

## 2020-09-30 ENCOUNTER — TELEPHONE (OUTPATIENT)
Dept: HEMATOLOGY ONCOLOGY | Facility: MEDICAL CENTER | Age: 45
End: 2020-09-30

## 2020-09-30 VITALS
BODY MASS INDEX: 46.14 KG/M2 | SYSTOLIC BLOOD PRESSURE: 118 MMHG | HEIGHT: 63 IN | WEIGHT: 260.4 LBS | DIASTOLIC BLOOD PRESSURE: 82 MMHG | OXYGEN SATURATION: 100 % | HEART RATE: 82 BPM | TEMPERATURE: 96.5 F | RESPIRATION RATE: 16 BRPM

## 2020-09-30 DIAGNOSIS — F50.89 PICA: ICD-10-CM

## 2020-09-30 DIAGNOSIS — I82.220 IVC THROMBOSIS (HCC): ICD-10-CM

## 2020-09-30 DIAGNOSIS — Z12.39 SCREENING FOR BREAST CANCER: Primary | ICD-10-CM

## 2020-09-30 DIAGNOSIS — R63.4 WEIGHT LOSS: ICD-10-CM

## 2020-09-30 PROCEDURE — 99215 OFFICE O/P EST HI 40 MIN: CPT | Performed by: PHYSICIAN ASSISTANT

## 2020-09-30 NOTE — PROGRESS NOTES
Hematology/Oncology Outpatient Follow-up  Malissa Lesches 53 y o  female 1975 [id number]    Date:  9/30/2020      Assessment and Plan:  1  IVC thrombosis [facility])  57-year-old female presents for follow-up regarding history of IVC thrombus as below  She was hospitalized for this  She remains on Lovenox 0 75 milligram/kilogram b i d  She states that she is compliant with this  Refills have been sent  Hypercoagulable workup was completed as below  This has been negative thus far  Additional labs as below  Antithrombin 3 activity 1 not be assessed as this can be falsely low in patients on Lovenox  Reviewed regardless recommendation is lifelong anticoagulation  Patient already has scheduled Doppler study with vascular surgery and follow-up that with vascular surgery  - Beta-2 glycoprotein antibodies; Future  - Lupus anticoagulant; Future    2  Weight loss  Patient has had an approximately 45 lb weight loss over 1 months time  She states that she is not really eating  She is eating ice a significant amount of time  She has early satiety  On average she is eating 1 meal a day and often this is a salad  She states that she is also eating other foods but in small amounts as she gets full easily  She denies any trouble swallowing  She denies any changes in her stool habits or size of the stools  Consultation with Gastroenterology is placed  - Ambulatory referral to Gastroenterology; Future    3  Pica  Patient has PICA  She likely has iron deficiency secondary to malabsorption from previous bariatric surgery  Iron studies and repeat CBC to be performed prior to next visit  - CBC and differential; Future  - Iron Saturation %; Future  - Iron; Future  - Ferritin; Future    4  Screening for breast cancer  Patient is not up-to-date with screening mammogram   She is agreeable  Order placed  - Mammo screening bilateral w 3d & cad; Future    Follow-up in 4-6 weeks      HPI:  57-year-old female presents for hospital follow-up  Past medical history significant for hepatic steatosis, hypothyroidism, pulmonary embolism, IVC thrombosis, migraines, seizures, psychiatric disorder (bipolar disorder, SABINO), history of gastric bypass surgery ()  Patient presented to the emergency department on 2020 due to abdominal pain radiating into bilateral upper legs  She had a history of bilateral lower extremity DVT and PE in 2017 for which she had an IVC filter placed in [facility] in 2017  She was treated with Xarelto but developed a GI bleed in discontinued  Per patient this was unprovoked  Patient admitted to being off of anticoagulation during this admission and denied a previous workup for clotting disorder in the past     Family history significant for DVT and PE and her sister  Her sister  of PE  Of note her sister had lupus  Paternal uncle had DVT as well  A CT of the abdomen pelvis was completed on 2020  This showed known IVC filter, diffuse thrombus extending from the IVC filter inferiorly into the iliac vasculature, extensive surrounding fat stranding with thrombophlebitis, small amount of thrombus extends superior to the filter  A venous Doppler study of bilateral lower extremities were also completed on 2020 which was negative for DVT  She underwent lysis of thrombus with IR on   She returned to IR on 2020 for IR venogram     Patient was on heparin during hospitalization  Patient was seen by Dr Yanely Martinez on 2020 during hospitalization  Due to patient's history of gastric bypass surgery patient was recommended Lovenox 0 75 milligrams/kilogram subcu b i d      She had partial thrombosis workup in the hospital    Homocystine normal, 7 4   Protein S activity normal, 111   Protein C activity normal, 91 0   Factor 5 Leiden mutation negative   Prothrombin gene mutation negative   Anticardiolipin antibody IgA and IgG normal   IgM 18 which is a indeterminate results    Patient was discharged on Lovenox 0 75 milligram/kilogram   Discharge date was 08/09/2020  She presented back to the emergency department on 08/14/2020 due to abdominal pain and right leg pain  Patient was diagnosed with cellulitis of lower extremity, right, and received IV Ancef for 3 days in [place] on p o  Keflex  She had outpatient follow-up with vascular surgery on 08/21/2020  Recommendation for her venous disease included conservative measures and medical management  Patient was scheduled for follow-up in November 2020 with vascular surgery with a repeat IVC/iliac vein duplex and bilateral lower extremity DVT study  It was discussed that IVC filter removal may be discussed at the next office visit  Interval history:  Patient states that periods are regular and not heavy  She had PICA  Increasing swelling again since hospital and some redness of right leg; denies pain with this     ROS: Review of Systems   Constitutional: Positive for appetite change (x 2 months) and fatigue (x 2 months)  Negative for chills, fever and unexpected weight change  Not sleeping well    HENT: Negative for mouth sores and nosebleeds  Respiratory: Negative for cough and shortness of breath  Cardiovascular: Negative for chest pain, palpitations and leg swelling  Gastrointestinal: Negative for abdominal pain, blood in stool, constipation, diarrhea, nausea and vomiting  Genitourinary: Negative for difficulty urinating, dysuria and hematuria  Musculoskeletal: Negative for arthralgias  Skin: Negative  Neurological: Positive for headaches (hx mirgraines, stable on topamax)  Negative for dizziness, weakness, light-headedness and numbness  Chronic restless legs for whole life     Hematological: Negative  Psychiatric/Behavioral: The patient is nervous/anxious          Past Medical History:   Diagnosis Date    Anemia     Psychiatric disorder     bipolar II, suicide    Pulmonary embolism ([facility] )     Seizures ([facility] )        Past Surgical History:   Procedure Laterality Date    APPENDECTOMY      Open    CHOLECYSTECTOMY      Laparoscopic    GASTRIC BYPASS      was 205 date of surgery    IR DVT THROMBOLYSIS ILIAC/IVC WITH VENOGRAM  8/4/2020    IR TPA LYSIS CHECK  8/5/2020    IVC FILTER INSERTION  2017    REPLACEMENT TOTAL KNEE      STOMACH SURGERY      for morbid obesity    TUBAL LIGATION Bilateral 2010       Social History     Socioeconomic History    Marital status: Single     Spouse name: None    Number of children: None    Years of education: None    Highest education level: None   Occupational History    Occupation:      Comment: [employer] resource strain: None    Food insecurity     Worry: None     Inability: None    Transportation needs     Medical: None     Non-medical: None   Tobacco Use    Smoking status: Never Smoker    Smokeless tobacco: Never Used    Tobacco comment: former quit in 1999 per [employer]   Substance and Sexual Activity    Alcohol use: Not Currently     Comment: quit 6/21/2018    Drug use: No    Sexual activity: Yes   Lifestyle    Physical activity     Days per week: None     Minutes per session: None    Stress: None   Relationships    Social connections     Talks on phone: None     Gets together: None     Attends Islam service: None     Active member of club or organization: None     Attends meetings of clubs or organizations: None     Relationship status: None    Intimate partner violence     Fear of current or ex partner: None     Emotionally abused: None     Physically abused: None     Forced sexual activity: None   Other Topics Concern    None   Social History Narrative    Sleep 6 in 24 hours    Caffeine use; 2 cps coffee per day    Uses seatbelts               Family History   Problem Relation Age of Onset    Other Mother         headache    Hypertension Mother     Depression Mother    Conn Arthritis Father     Other Father         H, pylori infection    Other Sister         esophageal reflux    Migraines Sister     Breast cancer Family     Diabetes Paternal Aunt         Mellitus    Diabetes Paternal Uncle         Mellitus    Asthma Daughter        Allergies   Allergen Reactions    Morphine      Seizures            Current Outpatient Medications:     adapalene (DIFFERIN) 0 1 % cream, APPLY NIGHTLY TO FACE AS TOLERATED, Disp: , Rfl:     atoMOXetine (STRATTERA) 60 mg capsule, TAKE 1 CAPSULE (60 MG) BY ORAL ROUTE ONCE DAILY IN THE MORNING, Disp: , Rfl:     buPROPion (WELLBUTRIN SR) 100 mg 12 hr tablet, TAKE 1 TABLET (100 MG) BY ORAL ROUTE ONCE PER DAY, Disp: , Rfl:     cefuroxime (CEFTIN) 250 mg tablet, Take 250 mg by mouth 2 (two) times a day, Disp: , Rfl:     chlordiazePOXIDE (LIBRIUM) 25 mg capsule, Take 25 mg by mouth 3 (three) times a day as needed for anxiety, Disp: , Rfl:     Dapsone 5 % topical gel, APPLY TWICE DAILY TO FACE, Disp: , Rfl:     enoxaparin (LOVENOX) 100 mg/mL, Inject 1 mL (100 mg total) under the skin every 12 (twelve) hours, Disp: 100 Syringe, Rfl: 5    ferrous sulfate 325 (65 Fe) mg tablet, Take 325 mg by mouth daily with breakfast, Disp: , Rfl:     folic acid (FOLVITE) 1 mg tablet, Take 1 mg by mouth daily, Disp: , Rfl:     furosemide (LASIX) 20 mg tablet, Take 20 mg by mouth daily, Disp: , Rfl:     gabapentin (NEURONTIN) 400 mg capsule, Take 100 mg by mouth 3 (three) times a day, Disp: , Rfl:     hydrOXYzine pamoate (Vistaril) 25 mg capsule, 1 capsule Every 6 hours, Disp: , Rfl:     levothyroxine 100 mcg tablet, Take 1 tablet (100 mcg total) by mouth daily in the early morning, Disp: 30 tablet, Rfl: 0    lithium carbonate (LITHOBID) 300 mg CR tablet, Take 600 mg by mouth every morning, Disp: , Rfl:     lithium carbonate (LITHOBID) 300 mg CR tablet, Take 750 mg by mouth every evening, Disp: , Rfl:     Multiple Vitamin (MULTI-VITAMIN DAILY PO), Multi Vitamin DAILY, Disp: , Rfl:     NEEDLE, DISP, 27 G 27G X 1/2" MISC, by Does not apply route every 12 (twelve) hours, Disp: 100 each, Rfl: 5    pantoprazole (PROTONIX) 40 mg tablet, Take 1 tablet (40 mg total) by mouth daily in the early morning, Disp: 30 tablet, Rfl: 1    potassium chloride (K-DUR,KLOR-CON) 20 mEq tablet, Take 1 tablet (20 mEq total) by mouth 2 (two) times a day, Disp: 30 tablet, Rfl: 1    pyridoxine (VITAMIN B6) 100 mg tablet, Take 100 mg by mouth daily, Disp: , Rfl:     QUEtiapine (SEROquel) 100 mg tablet, Take 500 mg by mouth daily at bedtime, Disp: , Rfl:     sertraline (ZOLOFT) 50 mg tablet, Take 50 mg by mouth daily, Disp: , Rfl:     SUMAtriptan (IMITREX) 100 mg tablet, Take 100 mg by mouth as needed for migraine , Disp: , Rfl:     thiamine 100 MG tablet, Daily, Disp: , Rfl:     topiramate (TOPAMAX) 100 mg tablet, Take 150 mg by mouth 2 (two) times a day , Disp: , Rfl:     venlafaxine (EFFEXOR-XR) 150 mg 24 hr capsule, venlafaxine  mg capsule,extended release 24 hr, Disp: , Rfl:       Physical Exam:  /82 (BP Location: Right arm, Patient Position: Sitting, Cuff Size: Adult)   Pulse 82   Temp (!) 96 5 °F (35 8 °C)   Resp 16   Ht 5' 3" (1 6 m)   Wt 118 kg (260 lb 6 4 oz)   SpO2 100%   BMI 46 13 kg/m²     Physical Exam  Vitals signs reviewed  Constitutional:       General: She is not in acute distress  Appearance: She is well-developed  She is obese  HENT:      Head: Normocephalic and atraumatic  Eyes:      General: No scleral icterus  Conjunctiva/sclera: Conjunctivae normal    Neck:      Musculoskeletal: Normal range of motion and neck supple  Cardiovascular:      Rate and Rhythm: Normal rate and regular rhythm  Heart sounds: Normal heart sounds  No murmur  Pulmonary:      Effort: Pulmonary effort is normal  No respiratory distress  Breath sounds: Normal breath sounds  Abdominal:      Palpations: Abdomen is soft  Tenderness:  There is no abdominal tenderness  Musculoskeletal: Normal range of motion  General: No tenderness  Right lower leg: Edema (+1 ) present  Left lower leg: Edema (+1) present  Lymphadenopathy:      Cervical: No cervical adenopathy  Skin:     General: Skin is warm  Comments: Dry skin of lower extremities; no erythema or warmth of lower extremities    Neurological:      Mental Status: She is alert and oriented to person, place, and time  Cranial Nerves: No cranial nerve deficit  Psychiatric:         Mood and Affect: Mood normal          Behavior: Behavior normal        Labs:  Lab Results   Component Value Date    WBC 7 43 08/17/2020    HGB 11 1 (L) 08/17/2020    HCT 38 4 08/17/2020     (H) 08/17/2020     08/17/2020     Lab Results   Component Value Date    K 3 7 08/17/2020     (H) 08/17/2020    CO2 22 08/17/2020    BUN 2 (L) 08/17/2020    CREATININE 0 87 08/17/2020    GLUF 99 08/16/2020    CALCIUM 8 9 08/17/2020    AST 49 (H) 08/14/2020    ALT 48 08/14/2020    ALKPHOS 178 (H) 08/14/2020    EGFR 80 08/17/2020       Patient voiced understanding and agreement in the above discussion  Aware to contact our office with questions/symptoms in the interim  This note has been generated by voice recognition software system  Therefore, there may be spelling, grammar, and or syntax errors  Please contact if questions arise  I have spent 45 minutes with Patient  today in which greater than 50% of this time was spent in counseling/coordination of care regarding Diagnostic results, Intructions for management, Importance of tx compliance and Impressions

## 2020-09-30 NOTE — TELEPHONE ENCOUNTER
I LVM for patient to call back to reschedule her appointtment that she missed today with Dr Konstantin Hernandes

## 2020-09-30 NOTE — TELEPHONE ENCOUNTER
Patient called in as she was running 20 min behind for today appointment I reached oput to Butch Robert and advise

## 2020-10-07 ENCOUNTER — OFFICE VISIT (OUTPATIENT)
Dept: GASTROENTEROLOGY | Facility: MEDICAL CENTER | Age: 45
End: 2020-10-07
Payer: MEDICARE

## 2020-10-07 VITALS
HEIGHT: 63 IN | BODY MASS INDEX: 45.18 KG/M2 | WEIGHT: 255 LBS | DIASTOLIC BLOOD PRESSURE: 76 MMHG | TEMPERATURE: 98.6 F | HEART RATE: 73 BPM | SYSTOLIC BLOOD PRESSURE: 112 MMHG

## 2020-10-07 DIAGNOSIS — R63.4 WEIGHT LOSS: ICD-10-CM

## 2020-10-07 DIAGNOSIS — I82.220 IVC THROMBOSIS (HCC): ICD-10-CM

## 2020-10-07 PROCEDURE — 99204 OFFICE O/P NEW MOD 45 MIN: CPT | Performed by: INTERNAL MEDICINE

## 2020-10-10 LAB
B2 GLYCOPROT1 IGG SER-ACNC: <9 SGU
B2 GLYCOPROT1 IGM SER-ACNC: <9 SMU
BASOPHILS # BLD AUTO: 38 CELLS/UL (ref 0–200)
BASOPHILS NFR BLD AUTO: 0.6 %
CONFIRM APTT STACLOT: NEGATIVE
EOSINOPHIL # BLD AUTO: 302 CELLS/UL (ref 15–500)
EOSINOPHIL NFR BLD AUTO: 4.8 %
ERYTHROCYTE [DISTWIDTH] IN BLOOD BY AUTOMATED COUNT: 13.8 % (ref 11–15)
FERRITIN SERPL-MCNC: 38 NG/ML (ref 16–232)
HCT VFR BLD AUTO: 37.8 % (ref 35–45)
HGB BLD-MCNC: 11.7 G/DL (ref 11.7–15.5)
IRON SATN MFR SERPL: 15 % (CALC) (ref 16–45)
IRON SERPL-MCNC: 42 MCG/DL (ref 40–190)
LA PPP-IMP: ABNORMAL
LYMPHOCYTES # BLD AUTO: 1531 CELLS/UL (ref 850–3900)
LYMPHOCYTES NFR BLD AUTO: 24.3 %
MCH RBC QN AUTO: 27.1 PG (ref 27–33)
MCHC RBC AUTO-ENTMCNC: 31 G/DL (ref 32–36)
MCV RBC AUTO: 87.7 FL (ref 80–100)
MIXING STUDIES PPP-IMP: ABNORMAL
MONOCYTES # BLD AUTO: 504 CELLS/UL (ref 200–950)
MONOCYTES NFR BLD AUTO: 8 %
NEUTROPHILS # BLD AUTO: 3925 CELLS/UL (ref 1500–7800)
NEUTROPHILS NFR BLD AUTO: 62.3 %
PLATELET # BLD AUTO: 317 THOUSAND/UL (ref 140–400)
PMV BLD REES-ECKER: 13.3 FL (ref 7.5–12.5)
RBC # BLD AUTO: 4.31 MILLION/UL (ref 3.8–5.1)
SCREEN APTT: 64 SEC
SCREEN DRVVT: 42 SEC
TIBC SERPL-MCNC: 282 MCG/DL (CALC) (ref 250–450)
WBC # BLD AUTO: 6.3 THOUSAND/UL (ref 3.8–10.8)

## 2020-10-12 ENCOUNTER — TELEPHONE (OUTPATIENT)
Dept: GASTROENTEROLOGY | Facility: MEDICAL CENTER | Age: 45
End: 2020-10-12

## 2020-10-15 ENCOUNTER — TELEPHONE (OUTPATIENT)
Dept: GASTROENTEROLOGY | Facility: MEDICAL CENTER | Age: 45
End: 2020-10-15

## 2020-10-16 ENCOUNTER — DOCUMENTATION (OUTPATIENT)
Dept: OTHER | Facility: HOSPITAL | Age: 45
End: 2020-10-16

## 2020-11-09 ENCOUNTER — TELEPHONE (OUTPATIENT)
Dept: OTHER | Facility: OTHER | Age: 45
End: 2020-11-09

## 2020-11-09 ENCOUNTER — TELEPHONE (OUTPATIENT)
Dept: HEMATOLOGY ONCOLOGY | Facility: CLINIC | Age: 45
End: 2020-11-09

## 2020-11-24 ENCOUNTER — HOSPITAL ENCOUNTER (OUTPATIENT)
Dept: NON INVASIVE DIAGNOSTICS | Facility: CLINIC | Age: 45
Discharge: HOME | End: 2020-11-24
Payer: MEDICARE

## 2020-11-24 DIAGNOSIS — I82.220 IVC THROMBOSIS (HCC): ICD-10-CM

## 2020-11-24 PROCEDURE — 93978 VASCULAR STUDY: CPT

## 2020-11-24 PROCEDURE — 93970 EXTREMITY STUDY: CPT

## 2020-11-25 ENCOUNTER — TELEPHONE (OUTPATIENT)
Dept: OBGYN CLINIC | Facility: CLINIC | Age: 45
End: 2020-11-25

## 2020-11-25 PROCEDURE — 93970 EXTREMITY STUDY: CPT | Performed by: SURGERY

## 2020-11-25 PROCEDURE — 93978 VASCULAR STUDY: CPT | Performed by: SURGERY

## 2020-12-02 ENCOUNTER — ANESTHESIA EVENT (OUTPATIENT)
Dept: GASTROENTEROLOGY | Facility: HOSPITAL | Age: 45
End: 2020-12-02

## 2020-12-03 ENCOUNTER — ANESTHESIA (OUTPATIENT)
Dept: GASTROENTEROLOGY | Facility: HOSPITAL | Age: 45
End: 2020-12-03

## 2020-12-03 ENCOUNTER — HOSPITAL ENCOUNTER (OUTPATIENT)
Dept: GASTROENTEROLOGY | Facility: HOSPITAL | Age: 45
Setting detail: OUTPATIENT SURGERY
Discharge: HOME | End: 2020-12-03
Attending: INTERNAL MEDICINE | Admitting: INTERNAL MEDICINE
Payer: MEDICARE

## 2020-12-03 VITALS
SYSTOLIC BLOOD PRESSURE: 113 MMHG | HEART RATE: 80 BPM | DIASTOLIC BLOOD PRESSURE: 64 MMHG | TEMPERATURE: 97.7 F | HEIGHT: 63 IN | BODY MASS INDEX: 45.18 KG/M2 | WEIGHT: 255 LBS | OXYGEN SATURATION: 100 % | RESPIRATION RATE: 16 BRPM

## 2020-12-03 VITALS — HEART RATE: 86 BPM

## 2020-12-03 DIAGNOSIS — R63.4 WEIGHT LOSS: ICD-10-CM

## 2020-12-03 PROCEDURE — 88305 TISSUE EXAM BY PATHOLOGIST: CPT | Performed by: PATHOLOGY

## 2020-12-03 PROCEDURE — 45385 COLONOSCOPY W/LESION REMOVAL: CPT | Performed by: INTERNAL MEDICINE

## 2020-12-03 PROCEDURE — 43239 EGD BIOPSY SINGLE/MULTIPLE: CPT | Performed by: INTERNAL MEDICINE

## 2020-12-03 RX ORDER — LIDOCAINE HYDROCHLORIDE 20 MG/ML
INJECTION, SOLUTION EPIDURAL; INFILTRATION; INTRACAUDAL; PERINEURAL AS NEEDED
Status: DISCONTINUED | OUTPATIENT
Start: 2020-12-03 | End: 2020-12-03

## 2020-12-03 RX ORDER — SODIUM CHLORIDE 9 MG/ML
125 INJECTION, SOLUTION INTRAVENOUS CONTINUOUS
Status: DISCONTINUED | OUTPATIENT
Start: 2020-12-03 | End: 2020-12-07 | Stop reason: HOSPADM

## 2020-12-03 RX ORDER — PROPOFOL 10 MG/ML
INJECTION, EMULSION INTRAVENOUS AS NEEDED
Status: DISCONTINUED | OUTPATIENT
Start: 2020-12-03 | End: 2020-12-03

## 2020-12-03 RX ADMIN — PROPOFOL 100 MG: 10 INJECTION, EMULSION INTRAVENOUS at 12:06

## 2020-12-03 RX ADMIN — PROPOFOL 140 MG: 10 INJECTION, EMULSION INTRAVENOUS at 11:49

## 2020-12-03 RX ADMIN — LIDOCAINE HYDROCHLORIDE 100 MG: 20 INJECTION, SOLUTION EPIDURAL; INFILTRATION; INTRACAUDAL; PERINEURAL at 11:49

## 2020-12-03 RX ADMIN — PROPOFOL 100 MG: 10 INJECTION, EMULSION INTRAVENOUS at 11:58

## 2020-12-03 RX ADMIN — SODIUM CHLORIDE 125 ML/HR: 0.9 INJECTION, SOLUTION INTRAVENOUS at 11:23

## 2020-12-03 RX ADMIN — PROPOFOL 60 MG: 10 INJECTION, EMULSION INTRAVENOUS at 11:53

## 2020-12-03 RX ADMIN — PROPOFOL 100 MG: 10 INJECTION, EMULSION INTRAVENOUS at 12:13

## 2020-12-03 RX ADMIN — PROPOFOL 100 MG: 10 INJECTION, EMULSION INTRAVENOUS at 12:09

## 2020-12-03 RX ADMIN — PROPOFOL 100 MG: 10 INJECTION, EMULSION INTRAVENOUS at 12:02

## 2021-01-14 ENCOUNTER — TELEPHONE (OUTPATIENT)
Dept: HEMATOLOGY ONCOLOGY | Facility: CLINIC | Age: 46
End: 2021-01-14

## 2021-01-14 NOTE — TELEPHONE ENCOUNTER
Scheduling Appointment     Who Is Calling to Schedule Patient    Doctor Jeffery Nina    Location [facility]    Date and Time 2/10/20 11:30 am          Patient verbalized understanding   Yes

## 2021-01-27 ENCOUNTER — TELEPHONE (OUTPATIENT)
Dept: VASCULAR SURGERY | Facility: CLINIC | Age: 46
End: 2021-01-27

## 2021-01-28 ENCOUNTER — TELEMEDICINE (OUTPATIENT)
Dept: VASCULAR SURGERY | Facility: CLINIC | Age: 46
End: 2021-01-28
Payer: MEDICARE

## 2021-01-28 VITALS — BODY MASS INDEX: 44.3 KG/M2 | HEIGHT: 63 IN | WEIGHT: 250 LBS

## 2021-01-28 DIAGNOSIS — I82.593 CHRONIC DEEP VEIN THROMBOSIS (DVT) OF OTHER VEIN OF BOTH LOWER EXTREMITIES (HCC): Primary | ICD-10-CM

## 2021-01-28 DIAGNOSIS — I87.009 POST-THROMBOTIC SYNDROME: ICD-10-CM

## 2021-01-28 DIAGNOSIS — Z95.828 PRESENCE OF IVC FILTER: ICD-10-CM

## 2021-01-28 DIAGNOSIS — I82.220 IVC THROMBOSIS (HCC): ICD-10-CM

## 2021-01-28 PROCEDURE — 99213 OFFICE O/P EST LOW 20 MIN: CPT | Performed by: SURGERY

## 2021-01-28 NOTE — LETTER
2021     Urszula Aceves, [address]    Patient: Brenden Quinones   YOB: 1975   Date of Visit: 2021       Dear Dr Preeti Mcknight: Thank you for referring Brenden Quinones to me for evaluation  Below are the relevant portions of my assessment and plan of care  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[address] )        History of [place] IVC filter placement in 2017 in [place] for bilateral DVT with bleeding complication on xarelto  Presented with IVC thrombosis and iliac DVT s/p thrombolysis and angiojet thrombectomy on  with restoration of IVC/iliac vein outflow though there was residual disease  She has a family history of DVT/PE  Her sister  from lupus  She continues to have bilateral leg swelling and chronic pain  She has not been wearing compression stockings consistently due to challenges with putting them on  She had seen hematology but canceled her follow-up appointments  She is scheduled again next week      Repeat venous studies at end of Nov - IVC/iliac vein duplex demonstrated patent IVC and iliac veins  Lower extremity venous duplex suggested subacute posterior tibial vein DVT on the left that was not previously reported  Otherwise no other significant DVT     -We discussed the pathophysiology of venous disease, DVT and post-thrombotic syndrome as well as continued treatment/management  -Recommend close follow-up with oncology to review hypercoagulable testing work-up  Noted elevated lupus anticoagulant   -Recommended continuing conservative measures   This includes the daily use of gradient compression socks, periodic leg elevation and regular exercise  Counseled on applying compression stockings first thing in the morning after waking up when legs are not swollen instead of later in the day as she had been trying  Take off stockings in the evening and elevate the legs   -She had been wearing OTC compression socks   Rx 20-30 mmHg compression given               Post-thrombotic syndrome          Other     Presence of IVC filter       [place] IVC filter remains in place  She remains on therapeutic lovenox and has not missed any doses  She has bruising along injection sites as expected but no other bleeding complications  There may be residual thrombus within the filter noted during her lysis procedure  She is eager to discuss IVC filter removal  Will refer to IR to discuss filter removal             If you have questions, please do not hesitate to call me  I look forward to following Jericho Madden along with you           Sincerely,        Pamela Thakur MD        CC: No Recipients

## 2021-01-29 PROBLEM — I87.009 POST-THROMBOTIC SYNDROME: Status: ACTIVE | Noted: 2021-01-29

## 2021-01-29 NOTE — PROGRESS NOTES
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[address] )     History of [place] IVC filter placement in 2017 in [place] for bilateral DVT with bleeding complication on xarelto  Presented with IVC thrombosis and iliac DVT s/p thrombolysis and angiojet thrombectomy on  with restoration of IVC/iliac vein outflow though there was residual disease  She has a family history of DVT/PE  Her sister  from lupus  She continues to have bilateral leg swelling and chronic pain  She has not been wearing compression stockings consistently due to challenges with putting them on  She had seen hematology but canceled her follow-up appointments  She is scheduled again next week  Repeat venous studies at end of Nov - IVC/iliac vein duplex demonstrated patent IVC and iliac veins  Lower extremity venous duplex suggested subacute posterior tibial vein DVT on the left that was not previously reported  Otherwise no other significant DVT  -We discussed the pathophysiology of venous disease, DVT and post-thrombotic syndrome as well as continued treatment/management  -Recommend close follow-up with oncology to review hypercoagulable testing work-up  Noted elevated lupus anticoagulant   -Recommended continuing conservative measures  This includes the daily use of gradient compression socks, periodic leg elevation and regular exercise  Counseled on applying compression stockings first thing in the morning after waking up when legs are not swollen instead of later in the day as she had been trying  Take off stockings in the evening and elevate the legs   -She had been wearing OTC compression socks  Rx 20-30 mmHg compression given  Post-thrombotic syndrome       Other    Presence of IVC filter     [place] IVC filter remains in place  She remains on therapeutic lovenox and has not missed any doses   She has bruising along injection sites as expected but no other bleeding complications  There may be residual thrombus within the filter noted during her lysis procedure  She is eager to discuss IVC filter removal  Will refer to IR to discuss filter removal          Relevant Orders    IR IVC filter removal      Other Visit Diagnoses     Chronic deep vein thrombosis (DVT) of other vein of both lower extremities ([address] )    -  Primary    Relevant Orders    Compression Stocking               Reason for visit is   Chief Complaint   Patient presents with    Virtual Regular Visit        Encounter provider Wallace Rodriguez MD    Provider located at [address] [id number]      Recent Visits  Date Type Provider Dept   01/28/21 Telemedicine Wallace Rodriguez MD [id number] recent visits within past 7 days and meeting all other requirements     Future Appointments  No visits were found meeting these conditions  Showing future appointments within next 150 days and meeting all other requirements        The patient was identified by name and date of birth  Brenden Quinones was informed that this is a telemedicine visit and that the visit is being conducted through [address] [place] [address] and patient was informed that this is not a secure, HIPAA-compliant platform  She agrees to proceed     My office door was closed  No one else was in the room  She acknowledged consent and understanding of privacy and security of the video platform  The patient has agreed to participate and understands they can discontinue the visit at any time  Patient is aware this is a billable service  Subjective  Brenden Quinones is a 50 y o  female with history of DVT/PE, IVC filter placement, morbid obesity s/p gastric bypass, IVC filter thrombosis with acute on chronic DVT s/p thrombolysis and mechanical thrombectomy 8/5 who presents for follow-up  She continues to have bilateral lower extremity swelling and pain   She elevates her legs as much as she can  She has not been effectively wearing her compression stockings  She thinks her legs are too swollen to get the stockings on but she does not really try until later in the day when her legs are more swollen  She saw hematology last year but has not followed up since to review her studies  She had canceled several of her appointments but has an upcoming appointment next week  She reports a small wound which she states she likely sustained when walking into something  She does endorse a significant family history of DVT/PE  She had a sister with lupus who  of PE as well          Past Medical History:   Diagnosis Date    Anemia     DVT (deep venous thrombosis) (HCC)     Psychiatric disorder     bipolar II, suicide    Pulmonary embolism (HCC)     Seizures (HCC)        Past Surgical History:   Procedure Laterality Date    APPENDECTOMY      Open    CHOLECYSTECTOMY      Laparoscopic    GASTRIC BYPASS      was 89 date of surgery    IR DVT THROMBOLYSIS ILIAC/IVC WITH VENOGRAM  2020    IR TPA LYSIS CHECK  2020    IVC FILTER INSERTION      STOMACH SURGERY      for morbid obesity    TUBAL LIGATION Bilateral        Current Outpatient Medications   Medication Sig Dispense Refill    adapalene (DIFFERIN) 0 1 % cream APPLY NIGHTLY TO FACE AS TOLERATED      atoMOXetine (STRATTERA) 60 mg capsule TAKE 1 CAPSULE (60 MG) BY ORAL ROUTE ONCE DAILY IN THE MORNING      cefuroxime (CEFTIN) 250 mg tablet Take 250 mg by mouth 2 (two) times a day      chlordiazePOXIDE (LIBRIUM) 25 mg capsule Take 25 mg by mouth 3 (three) times a day as needed for anxiety      Dapsone 5 % topical gel APPLY TWICE DAILY TO FACE      enoxaparin (LOVENOX) 100 mg/mL Inject 1 mL (100 mg total) under the skin every 12 (twelve) hours 100 Syringe 2    ferrous sulfate 325 (65 Fe) mg tablet Take 325 mg by mouth daily with breakfast      folic acid (FOLVITE) 1 mg tablet Take 1 mg by mouth daily      gabapentin (NEURONTIN) 400 mg capsule Take 100 mg by mouth 3 (three) times a day      levothyroxine 100 mcg tablet Take 1 tablet (100 mcg total) by mouth daily in the early morning 30 tablet 0    lithium carbonate (LITHOBID) 300 mg CR tablet Take 300 mg by mouth daily at bedtime       Multiple Vitamin (MULTI-VITAMIN DAILY PO) Multi Vitamin   DAILY      NEEDLE, DISP, 27 G 27G X 1/2" MISC by Does not apply route every 12 (twelve) hours 100 each 5    pantoprazole (PROTONIX) 40 mg tablet Take 1 tablet (40 mg total) by mouth daily in the early morning 30 tablet 1    potassium chloride (K-DUR,KLOR-CON) 20 mEq tablet Take 1 tablet (20 mEq total) by mouth 2 (two) times a day 30 tablet 1    pyridoxine (VITAMIN B6) 100 mg tablet Take 100 mg by mouth daily      QUEtiapine (SEROquel) 100 mg tablet Take 300 mg by mouth 3 (three) times a day       sertraline (ZOLOFT) 50 mg tablet Take 50 mg by mouth daily      thiamine 100 MG tablet Daily      topiramate (TOPAMAX) 100 mg tablet Take 150 mg by mouth 2 (two) times a day       venlafaxine (EFFEXOR-XR) 150 mg 24 hr capsule venlafaxine  mg capsule,extended release 24 hr      buPROPion (WELLBUTRIN SR) 100 mg 12 hr tablet TAKE 1 TABLET (100 MG) BY ORAL ROUTE ONCE PER DAY      furosemide (LASIX) 20 mg tablet Take 20 mg by mouth daily Pt reports taking 80 mg in morning and 40 mg in afternoon   hydrOXYzine pamoate (Vistaril) 25 mg capsule 1 capsule Every 6 hours      lithium carbonate (LITHOBID) 300 mg CR tablet Take 750 mg by mouth every evening      SUMAtriptan (IMITREX) 100 mg tablet Take 100 mg by mouth as needed for migraine        No current facility-administered medications for this visit  Allergies   Allergen Reactions    Morphine      Seizures  Review of Systems   Constitutional: Positive for chills  HENT: Positive for sore throat  Eyes: Negative  Respiratory: Negative  Cardiovascular: Positive for leg swelling  Gastrointestinal: Positive for abdominal pain  Endocrine: Negative  Genitourinary: Positive for difficulty urinating  Urinary incontinence   Musculoskeletal:        Leg pain   Skin: Negative  Allergic/Immunologic: Negative  Neurological: Positive for headaches  Hematological: Bruises/bleeds easily  Psychiatric/Behavioral: The patient is nervous/anxious  Depression       Video Exam    Vitals:    01/28/21 1317   Weight: 113 kg (250 lb)   Height: 5' 3" (1 6 m)       Physical Exam  Constitutional:       Appearance: Normal appearance  She is obese  HENT:      Head: Normocephalic and atraumatic  Nose: Nose normal       Mouth/Throat:      Mouth: Mucous membranes are moist    Neck:      Musculoskeletal: Normal range of motion and neck supple  Pulmonary:      Effort: Pulmonary effort is normal    Musculoskeletal: Normal range of motion  Right lower leg: Edema present  Left lower leg: Edema present  Skin:     General: Skin is warm and dry  Neurological:      General: No focal deficit present  Mental Status: She is alert and oriented to person, place, and time  Psychiatric:         Mood and Affect: Mood normal          Behavior: Behavior normal          Thought Content: Thought content normal          Judgment: Judgment normal           I spent 15 minutes with patient today in which greater than 50% of the time was spent in counseling/coordination of care regarding DVT, post-thrombotic syndrome, hypercoagulable disorders      VIRTUAL VISIT DISCLAIMER    Giana Jacques acknowledges that she has consented to an online visit or consultation  She understands that the online visit is based solely on information provided by her, and that, in the absence of a face-to-face physical evaluation by the physician, the diagnosis she receives is both limited and provisional in terms of accuracy and completeness   This is not intended to replace a full medical face-to-face evaluation by the physician  Marci Menendez understands and accepts these terms

## 2021-01-29 NOTE — ASSESSMENT & PLAN NOTE
History of [place] IVC filter placement in 2017 in [facility] for bilateral DVT with bleeding complication on xarelto  Presented with IVC thrombosis and iliac DVT s/p thrombolysis and angiojet thrombectomy on  with restoration of IVC/iliac vein outflow though there was residual disease  She has a family history of DVT/PE  Her sister  from lupus  She continues to have bilateral leg swelling and chronic pain  She has not been wearing compression stockings consistently due to challenges with putting them on  She had seen hematology but canceled her follow-up appointments  She is scheduled again next week  Repeat venous studies at end of Nov - IVC/iliac vein duplex demonstrated patent IVC and iliac veins  Lower extremity venous duplex suggested subacute posterior tibial vein DVT on the left that was not previously reported  Otherwise no other significant DVT  -We discussed the pathophysiology of venous disease, DVT and post-thrombotic syndrome as well as continued treatment/management  -Recommend close follow-up with oncology to review hypercoagulable testing work-up  Noted elevated lupus anticoagulant   -Recommended continuing conservative measures  This includes the daily use of gradient compression socks, periodic leg elevation and regular exercise  Counseled on applying compression stockings first thing in the morning after waking up when legs are not swollen instead of later in the day as she had been trying  Take off stockings in the evening and elevate the legs   -She had been wearing OTC compression socks  Rx 20-30 mmHg compression given

## 2021-01-29 NOTE — ASSESSMENT & PLAN NOTE
[facility] IVC filter remains in place  She remains on therapeutic lovenox and has not missed any doses  She has bruising along injection sites as expected but no other bleeding complications  There may be residual thrombus within the filter noted during her lysis procedure   She is eager to discuss IVC filter removal  Will refer to IR to discuss filter removal

## 2021-01-29 NOTE — PATIENT INSTRUCTIONS
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[facility] [address] )        History of [place] IVC filter placement in 2017 in [facility] for bilateral DVT with bleeding complication on xarelto  Presented with IVC thrombosis and iliac DVT s/p thrombolysis and angiojet thrombectomy on  with restoration of IVC/iliac vein outflow though there was residual disease  She has a family history of DVT/PE  Her sister  from lupus  She continues to have bilateral leg swelling and chronic pain  She has not been wearing compression stockings consistently due to challenges with putting them on  She had seen hematology but canceled her follow-up appointments  She is scheduled again next week      Repeat venous studies at end of Nov - IVC/iliac vein duplex demonstrated patent IVC and iliac veins  Lower extremity venous duplex suggested subacute posterior tibial vein DVT on the left that was not previously reported  Otherwise no other significant DVT     -We discussed the pathophysiology of venous disease, DVT and post-thrombotic syndrome as well as continued treatment/management  -Recommend close follow-up with oncology to review hypercoagulable testing work-up  Noted elevated lupus anticoagulant   -Recommended continuing conservative measures   This includes the daily use of gradient compression socks, periodic leg elevation and regular exercise  Counseled on applying compression stockings first thing in the morning after waking up when legs are not swollen instead of later in the day as she had been trying  Take off stockings in the evening and elevate the legs   -She had been wearing OTC compression socks  Rx 20-30 mmHg compression given               Post-thrombotic syndrome          Other     Presence of IVC filter       [place] IVC filter remains in place  She remains on therapeutic lovenox and has not missed any doses  She has bruising along injection sites as expected but no other bleeding complications   There may be residual thrombus within the filter noted during her lysis procedure   She is eager to discuss IVC filter removal  Will refer to IR to discuss filter removal

## 2021-02-09 ENCOUNTER — LAB (OUTPATIENT)
Dept: LAB | Facility: HOSPITAL | Age: 46
End: 2021-02-09
Payer: MEDICARE

## 2021-02-09 ENCOUNTER — TELEPHONE (OUTPATIENT)
Dept: HEMATOLOGY ONCOLOGY | Facility: CLINIC | Age: 46
End: 2021-02-09

## 2021-02-09 DIAGNOSIS — I82.220 IVC THROMBOSIS (HCC): ICD-10-CM

## 2021-02-09 DIAGNOSIS — F50.89 PICA: ICD-10-CM

## 2021-02-09 LAB
BASOPHILS # BLD AUTO: 0.05 THOUSANDS/ΜL (ref 0–0.1)
BASOPHILS NFR BLD AUTO: 1 % (ref 0–1)
EOSINOPHIL # BLD AUTO: 0.26 THOUSAND/ΜL (ref 0–0.61)
EOSINOPHIL NFR BLD AUTO: 5 % (ref 0–6)
ERYTHROCYTE [DISTWIDTH] IN BLOOD BY AUTOMATED COUNT: 25.1 % (ref 11.6–15.1)
FERRITIN SERPL-MCNC: 102 NG/ML (ref 8–388)
HCT VFR BLD AUTO: 35.7 % (ref 34.8–46.1)
HGB BLD-MCNC: 10.5 G/DL (ref 11.5–15.4)
IMM GRANULOCYTES # BLD AUTO: 0.02 THOUSAND/UL (ref 0–0.2)
IMM GRANULOCYTES NFR BLD AUTO: 0 % (ref 0–2)
IRON SATN MFR SERPL: 12 %
IRON SERPL-MCNC: 36 UG/DL (ref 50–170)
LYMPHOCYTES # BLD AUTO: 1.03 THOUSANDS/ΜL (ref 0.6–4.47)
LYMPHOCYTES NFR BLD AUTO: 20 % (ref 14–44)
MCH RBC QN AUTO: 27.5 PG (ref 26.8–34.3)
MCHC RBC AUTO-ENTMCNC: 29.4 G/DL (ref 31.4–37.4)
MCV RBC AUTO: 94 FL (ref 82–98)
MONOCYTES # BLD AUTO: 0.73 THOUSAND/ΜL (ref 0.17–1.22)
MONOCYTES NFR BLD AUTO: 14 % (ref 4–12)
NEUTROPHILS # BLD AUTO: 3.09 THOUSANDS/ΜL (ref 1.85–7.62)
NEUTS SEG NFR BLD AUTO: 60 % (ref 43–75)
NRBC BLD AUTO-RTO: 0 /100 WBCS
PLATELET # BLD AUTO: 360 THOUSANDS/UL (ref 149–390)
PMV BLD AUTO: 11.9 FL (ref 8.9–12.7)
RBC # BLD AUTO: 3.82 MILLION/UL (ref 3.81–5.12)
TIBC SERPL-MCNC: 308 UG/DL (ref 250–450)
WBC # BLD AUTO: 5.18 THOUSAND/UL (ref 4.31–10.16)

## 2021-02-09 PROCEDURE — 85732 THROMBOPLASTIN TIME PARTIAL: CPT

## 2021-02-09 PROCEDURE — 85025 COMPLETE CBC W/AUTO DIFF WBC: CPT

## 2021-02-09 PROCEDURE — 83540 ASSAY OF IRON: CPT

## 2021-02-09 PROCEDURE — 36415 COLL VENOUS BLD VENIPUNCTURE: CPT

## 2021-02-09 PROCEDURE — 85613 RUSSELL VIPER VENOM DILUTED: CPT

## 2021-02-09 PROCEDURE — 82728 ASSAY OF FERRITIN: CPT

## 2021-02-09 PROCEDURE — 85705 THROMBOPLASTIN INHIBITION: CPT

## 2021-02-09 PROCEDURE — 86146 BETA-2 GLYCOPROTEIN ANTIBODY: CPT

## 2021-02-09 PROCEDURE — 83550 IRON BINDING TEST: CPT

## 2021-02-09 PROCEDURE — 85670 THROMBIN TIME PLASMA: CPT

## 2021-02-09 NOTE — TELEPHONE ENCOUNTER
Patient advised to have her labs drawn prior to her upcoming appointment tomorrow 2/10/2021  Patient stated that she will go to a [facility]'s lab today 2/9/2021

## 2021-02-09 NOTE — TELEPHONE ENCOUNTER
Confirmed with patient that all labs drawn at [facility] will be available in her chart for upcoming visit

## 2021-02-10 ENCOUNTER — OFFICE VISIT (OUTPATIENT)
Dept: HEMATOLOGY ONCOLOGY | Facility: CLINIC | Age: 46
End: 2021-02-10
Payer: MEDICARE

## 2021-02-10 VITALS
RESPIRATION RATE: 12 BRPM | DIASTOLIC BLOOD PRESSURE: 60 MMHG | WEIGHT: 246 LBS | BODY MASS INDEX: 43.59 KG/M2 | HEART RATE: 82 BPM | SYSTOLIC BLOOD PRESSURE: 102 MMHG | TEMPERATURE: 97.7 F | HEIGHT: 63 IN

## 2021-02-10 DIAGNOSIS — I82.220 IVC THROMBOSIS (HCC): ICD-10-CM

## 2021-02-10 DIAGNOSIS — Z78.9 NEED FOR FOLLOW-UP BY SOCIAL WORKER: ICD-10-CM

## 2021-02-10 DIAGNOSIS — K95.89 IRON DEFICIENCY ANEMIA FOLLOWING BARIATRIC SURGERY: Primary | ICD-10-CM

## 2021-02-10 DIAGNOSIS — D50.8 OTHER IRON DEFICIENCY ANEMIA: ICD-10-CM

## 2021-02-10 DIAGNOSIS — D50.8 IRON DEFICIENCY ANEMIA FOLLOWING BARIATRIC SURGERY: Primary | ICD-10-CM

## 2021-02-10 PROCEDURE — 99214 OFFICE O/P EST MOD 30 MIN: CPT | Performed by: PHYSICIAN ASSISTANT

## 2021-02-10 RX ORDER — CLONIDINE HYDROCHLORIDE 0.1 MG/1
0.1 TABLET ORAL
COMMUNITY
Start: 2021-02-08 | End: 2021-06-18 | Stop reason: CLARIF

## 2021-02-10 RX ORDER — QUETIAPINE FUMARATE 400 MG/1
400 TABLET, FILM COATED ORAL
COMMUNITY
End: 2021-05-01

## 2021-02-10 RX ORDER — RIZATRIPTAN BENZOATE 10 MG/1
10 TABLET, ORALLY DISINTEGRATING ORAL
Status: ON HOLD | COMMUNITY
Start: 2021-02-08 | End: 2021-05-05 | Stop reason: CLARIF

## 2021-02-10 RX ORDER — SODIUM CHLORIDE 9 MG/ML
20 INJECTION, SOLUTION INTRAVENOUS ONCE
Status: CANCELLED | OUTPATIENT
Start: 2021-02-16

## 2021-02-10 RX ORDER — AMMONIUM LACTATE 12 G/100G
CREAM TOPICAL 2 TIMES DAILY
COMMUNITY
Start: 2021-02-08 | End: 2021-05-01

## 2021-02-10 RX ORDER — ROPINIROLE 1 MG/1
2 TABLET, FILM COATED ORAL
COMMUNITY
Start: 2021-02-03

## 2021-02-10 RX ORDER — CICLOPIROX OLAMINE 7.7 MG/100ML
SUSPENSION TOPICAL
COMMUNITY
Start: 2021-02-05 | End: 2021-05-01

## 2021-02-10 RX ORDER — VENLAFAXINE HYDROCHLORIDE 75 MG/1
75 CAPSULE, EXTENDED RELEASE ORAL
COMMUNITY
End: 2021-05-01

## 2021-02-10 NOTE — PROGRESS NOTES
Hematology/Oncology Outpatient Follow-up  Mina Galvin 50 y o  female 1975 [id number]    Date:  2/10/2021      Assessment and Plan:  1  Iron deficiency anemia following bariatric surgery    Patient has had a history of iron deficiency  She had EGD and colonoscopy completed as below  This was negative for any bleeding source  Likely her iron deficiency is related to malabsorption related to bariatric surgery, Oc-en-Y  Patient had CBC on 02/09/2021  Hemoglobin was 10 5  Previously  In August was 11 1 as well as 9 7  Iron studies were completed secondary to previous bariatric surgery as well as patient complaining of PICA  Saturation is 12%, TIBC 308, iron 36, ferritin 102 this is consistent with iron deficiency  Recommend IV iron therapy  Venofer 200 mg IV weekly x3  She is agreeable to proceed  This has been scheduled today  Follow-up in 8 weeks with repeat labs as below  - CBC and differential; Future  - Iron; Future  - Iron Saturation %; Future  - Ferritin; Future  - D-dimer, quantitative; Future  - Comprehensive metabolic panel; Future    3n eed for follow-up by   - Ambulatory referral to social work care management program; Future    4  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[facility] [address] )    History of extensive thromboses as below  She remains on therapeutic Lovenox at this time  She was asked to have remainder of labs completed  Beta 2 glycoproteins inlet post anticoagulant are still pending as she just had them completed yesterday  No dose adjustment until these are completed and prior to next follow-up visit to discuss  She is on Lovenox secondary to bariatric surgery and the concern of malabsorption  She will remain on lifelong anticoagulation unless contraindicated due to recurrent thromboses  She has questions regarding getting tested for lupus  Reviewed that lupus anticoagulant does not test for they autoimmune disease lupus    This is a blood test to assess increased risk of developing blood clots  Test for lupus are completed by rheumatologist   Reviewed that patient does not have any symptoms concerning for lupus  Reviewed that if she were tested for lupus what which she do that information  She states she would want treatment  Reviewed treatment for lupus is only indicated when patients are symptomatic as it is a fluctuating disease process  This information was important for her to understand and she seemed to understand the connection of lupus and blood clotting     - enoxaparin (LOVENOX) 100 mg/mL; Inject 1 mL (100 mg total) under the skin every 12 (twelve) hours  Dispense: 100 Syringe; Refill: 2    HPI:  78-year-old female presents for follow-up      Past medical history significant for hepatic steatosis, hypothyroidism, pulmonary embolism, IVC thrombosis, migraines, seizures, psychiatric disorder (bipolar disorder, SABINO), history of gastric bypass surgery ()     Patient presented to the emergency department on 2020 due to abdominal pain radiating into bilateral upper legs      She had a history of bilateral lower extremity DVT and PE in 2017 for which she had an IVC filter placed in [facility] in 2017  She was treated with Xarelto but developed a GI bleed in discontinued  Per patient this was unprovoked  Patient admitted to being off of anticoagulation during this admission and denied a previous workup for clotting disorder in the past      Family history significant for DVT and PE and her sister  Her sister  of PE  Of note her sister had lupus  Paternal uncle had DVT as well       A CT of the abdomen pelvis was completed on 2020    This showed known IVC filter, diffuse thrombus extending from the IVC filter inferiorly into the iliac vasculature, extensive surrounding fat stranding with thrombophlebitis, small amount of thrombus extends superior to the filter        A venous Doppler study of bilateral lower extremities were also completed on 2020 which was negative for DVT      She underwent lysis of thrombus with IR on       She returned to IR on 2020 for IR venogram      Patient was on heparin during hospitalization      Patient was seen by Dr Cesar Manley on 2020 during hospitalization  Due to patient's history of gastric bypass surgery patient was recommended Lovenox 0 75 milligrams/kilogram subcu b i d        She had partial thrombosis workup in the hospital               Homocystine normal, 7 4              Protein S activity normal, 111              Protein C activity normal, 91 0              Factor 5 Leiden mutation negative              Prothrombin gene mutation negative              Anticardiolipin antibody IgA and IgG normal   IgM 18 which is a indeterminate results     Patient was discharged on Lovenox 0 75 milligram/kilogram   Discharge date was 2020      She presented back to the emergency department on 2020 due to abdominal pain and right leg pain      Patient was diagnosed with cellulitis of lower extremity, right, and received IV Ancef for 3 days in [place] on p o  Keflex      She had outpatient follow-up with vascular surgery on 2020  Recommendation for her venous disease included conservative measures and medical management  Patient was scheduled for follow-up in 2020 with vascular surgery with a repeat IVC/iliac vein duplex and bilateral lower extremity DVT study  A consultation patient was recommended to have EGD and colonoscopy secondary to weight loss  She had an EGD and colonoscopy on 2020  This showed  anastomotic ulcer on the jejunal side  Colonoscopy showed 1 polyp measuring 5-9 mm in the sigmoid colon  A clip was placed to prevent bleeding secondary to anticoagulation   There is no of inadequate bowel prep and patient was recommended to have repeat in 6 months (2021)    Interval history:  Paternal cousin with Lupus - living   Maternal aunt with Lupus -    Sister with lupus -  2/2 pulmonary embolism     ROS: Review of Systems   Constitutional: Negative for appetite change, chills, fatigue, fever and unexpected weight change  HENT: Negative for trouble swallowing  Respiratory: Negative for cough and shortness of breath  Cardiovascular: Negative for chest pain, palpitations and leg swelling  Gastrointestinal: Negative for abdominal pain, blood in stool, constipation, diarrhea, nausea and vomiting  Genitourinary: Negative for difficulty urinating, dysuria and hematuria  Musculoskeletal: Positive for back pain (low back; goes to PT and aqua therapy; both are helping) and myalgias (both legs)  Negative for arthralgias  Skin: Negative  Neurological: Positive for dizziness (sometimes ) and headaches (migraines; 2 times per month)  Negative for weakness, light-headedness and numbness  Hematological: Negative  Psychiatric/Behavioral: Negative          Past Medical History:   Diagnosis Date    Anemia     DVT (deep venous thrombosis) (HCC)     Psychiatric disorder     bipolar II, suicide    Pulmonary embolism (HCC)     Seizures (HCC)        Past Surgical History:   Procedure Laterality Date    APPENDECTOMY      Open    CHOLECYSTECTOMY      Laparoscopic    GASTRIC BYPASS      was 205 date of surgery    IR DVT THROMBOLYSIS ILIAC/IVC WITH VENOGRAM  2020    IR TPA LYSIS CHECK  2020    IVC FILTER INSERTION  2017    STOMACH SURGERY      for morbid obesity    TUBAL LIGATION Bilateral        Social History     Socioeconomic History    Marital status: Single     Spouse name: None    Number of children: None    Years of education: None    Highest education level: None   Occupational History    Occupation:      Comment: [address] Financial resource strain: None    Food insecurity     Worry: None     Inability: None    Transportation needs     Medical: None     Non-medical: None   Tobacco Use    Smoking status: Never Smoker    Smokeless tobacco: Never Used    Tobacco comment: former quit in 1999 per [employer]   Substance and Sexual Activity    Alcohol use: Not Currently     Comment: quit 6/21/2018    Drug use: No    Sexual activity: Yes   Lifestyle    Physical activity     Days per week: None     Minutes per session: None    Stress: None   Relationships    Social connections     Talks on phone: None     Gets together: None     Attends [employer] service: None     Active member of club or organization: None     Attends meetings of clubs or organizations: None     Relationship status: None    Intimate partner violence     Fear of current or ex partner: None     Emotionally abused: None     Physically abused: None     Forced sexual activity: None   Other Topics Concern    None   Social History Narrative    Sleep 6 in 24 hours    Caffeine use; 2 cps coffee per day    Uses seatbelts               Family History   Problem Relation Age of Onset    Other Mother         headache    Hypertension Mother     Depression Mother     Arthritis Father     Other Father         H, pylori infection    Other Sister         esophageal reflux    Migraines Sister     Breast cancer Family     Diabetes Paternal Aunt         Mellitus    Breast cancer Paternal Aunt     Diabetes Paternal Uncle         Mellitus    Liver cancer Paternal Uncle     Asthma Daughter        Allergies   Allergen Reactions    Morphine Seizures         Current Outpatient Medications:     adapalene (DIFFERIN) 0 1 % cream, APPLY NIGHTLY TO FACE AS TOLERATED, Disp: , Rfl:     atoMOXetine (STRATTERA) 60 mg capsule, TAKE 1 CAPSULE (60 MG) BY ORAL ROUTE ONCE DAILY IN THE MORNING, Disp: , Rfl:     buPROPion (WELLBUTRIN SR) 100 mg 12 hr tablet, TAKE 1 TABLET (100 MG) BY ORAL ROUTE ONCE PER DAY, Disp: , Rfl:     cefuroxime (CEFTIN) 250 mg tablet, Take 250 mg by mouth 2 (two) times a day, Disp: , Rfl:     chlordiazePOXIDE (LIBRIUM) 25 mg capsule, Take 25 mg by mouth 3 (three) times a day as needed for anxiety, Disp: , Rfl:     Dapsone 5 % topical gel, APPLY TWICE DAILY TO FACE, Disp: , Rfl:     enoxaparin (LOVENOX) 100 mg/mL, Inject 1 mL (100 mg total) under the skin every 12 (twelve) hours, Disp: 100 Syringe, Rfl: 2    ferrous sulfate 325 (65 Fe) mg tablet, Take 325 mg by mouth daily with breakfast, Disp: , Rfl:     folic acid (FOLVITE) 1 mg tablet, Take 1 mg by mouth daily, Disp: , Rfl:     furosemide (LASIX) 20 mg tablet, Take 20 mg by mouth daily Pt reports taking 80 mg in morning and 40 mg in afternoon  , Disp: , Rfl:     gabapentin (NEURONTIN) 400 mg capsule, Take 100 mg by mouth 3 (three) times a day, Disp: , Rfl:     hydrOXYzine pamoate (Vistaril) 25 mg capsule, 1 capsule Every 6 hours, Disp: , Rfl:     levothyroxine 100 mcg tablet, Take 1 tablet (100 mcg total) by mouth daily in the early morning, Disp: 30 tablet, Rfl: 0    lithium carbonate (LITHOBID) 300 mg CR tablet, Take 300 mg by mouth daily at bedtime , Disp: , Rfl:     lithium carbonate (LITHOBID) 300 mg CR tablet, Take 750 mg by mouth every evening, Disp: , Rfl:     Multiple Vitamin (MULTI-VITAMIN DAILY PO), Multi Vitamin  DAILY, Disp: , Rfl:     NEEDLE, DISP, 27 G 27G X 1/2" MISC, by Does not apply route every 12 (twelve) hours, Disp: 100 each, Rfl: 5    pantoprazole (PROTONIX) 40 mg tablet, Take 1 tablet (40 mg total) by mouth daily in the early morning, Disp: 30 tablet, Rfl: 1    potassium chloride (K-DUR,KLOR-CON) 20 mEq tablet, Take 1 tablet (20 mEq total) by mouth 2 (two) times a day, Disp: 30 tablet, Rfl: 1    pyridoxine (VITAMIN B6) 100 mg tablet, Take 100 mg by mouth daily, Disp: , Rfl:     QUEtiapine (SEROquel) 100 mg tablet, Take 300 mg by mouth 3 (three) times a day , Disp: , Rfl:     sertraline (ZOLOFT) 50 mg tablet, Take 50 mg by mouth daily, Disp: , Rfl:     SUMAtriptan (IMITREX) 100 mg tablet, Take 100 mg by mouth as needed for migraine , Disp: , Rfl:     thiamine 100 MG tablet, Daily, Disp: , Rfl:     topiramate (TOPAMAX) 100 mg tablet, Take 150 mg by mouth 2 (two) times a day , Disp: , Rfl:     venlafaxine (EFFEXOR-XR) 150 mg 24 hr capsule, venlafaxine  mg capsule,extended release 24 hr, Disp: , Rfl:       Physical Exam:  Ht 5' 3" (1 6 m)   Wt 112 kg (246 lb)   BMI 43 58 kg/m²     Physical Exam  Constitutional:       General: She is not in acute distress  Appearance: She is well-developed  She is obese  She is not ill-appearing  HENT:      Head: Normocephalic and atraumatic  Eyes:      General: No scleral icterus  Conjunctiva/sclera: Conjunctivae normal    Neck:      Musculoskeletal: Normal range of motion and neck supple  Cardiovascular:      Rate and Rhythm: Normal rate and regular rhythm  Heart sounds: Normal heart sounds  No murmur  Pulmonary:      Effort: Pulmonary effort is normal  No respiratory distress  Breath sounds: Normal breath sounds  Abdominal:      Palpations: Abdomen is soft  Tenderness: There is no abdominal tenderness  Musculoskeletal: Normal range of motion  General: No tenderness  Right lower leg: No edema  Left lower leg: No edema  Lymphadenopathy:      Cervical: No cervical adenopathy  Skin:     General: Skin is warm and dry  Neurological:      Mental Status: She is alert and oriented to person, place, and time  Cranial Nerves: No cranial nerve deficit     Psychiatric:         Mood and Affect: Mood normal          Behavior: Behavior normal        Labs:  Lab Results   Component Value Date    WBC 5 18 02/09/2021    HGB 10 5 (L) 02/09/2021    HCT 35 7 02/09/2021    MCV 94 02/09/2021     02/09/2021     Lab Results   Component Value Date    K 3 7 08/17/2020     (H) 08/17/2020    CO2 22 08/17/2020    BUN 2 (L) 08/17/2020    CREATININE 0 87 08/17/2020    GLUF 99 08/16/2020    CALCIUM 8 9 08/17/2020    AST 49 (H) 08/14/2020    ALT 48 08/14/2020    ALKPHOS 178 (H) 08/14/2020    EGFR 80 08/17/2020       Patient voiced understanding and agreement in the above discussion  Aware to contact our office with questions/symptoms in the interim  This note has been generated by voice recognition software system  Therefore, there may be spelling, grammar, and or syntax errors  Please contact if questions arise

## 2021-02-11 ENCOUNTER — TELEPHONE (OUTPATIENT)
Dept: HEMATOLOGY ONCOLOGY | Facility: CLINIC | Age: 46
End: 2021-02-11

## 2021-02-11 ENCOUNTER — PATIENT OUTREACH (OUTPATIENT)
Dept: CASE MANAGEMENT | Facility: HOSPITAL | Age: 46
End: 2021-02-11

## 2021-02-11 LAB
APTT SCREEN TO CONFIRM RATIO: 0.87 RATIO (ref 0–1.4)
CONFIRM APTT/NORMAL: 39.6 SEC (ref 0–55)
LA PPP-IMP: ABNORMAL
SCREEN APTT: 48.2 SEC (ref 0–51.9)
SCREEN DRVVT: 45.4 SEC (ref 0–47)
THROMBIN TIME: 23.2 SEC (ref 0–23)

## 2021-02-11 NOTE — PROGRESS NOTES
Oncology LSW received pt's completed distress thermometer from yesterday in which pt self scored a 7/10 and noted insurance/finances, dealing with children, depression, sadness, loss of interest in usual activities and 3/22 physical stressors  Pt was seen for hematology consult secondary to anemia and a h/o IVC thrombosis  Supportive outreach call was placed today  Introduced self and role  Reviewed DT results and offered support  Pt stated she was doing ok and denied immediate needs  LSW encouraged pt to reach out to her PCP or HemeOnc if she is struggling  Pt agreeable to same

## 2021-02-11 NOTE — TELEPHONE ENCOUNTER
Spoke with patient  Patient's chronic LE edema is managed by vascular surgeon  Patient will call vascular surgeon to discuss edema  Patient verbalizes understanding  Will update PA as she was seen yesterday

## 2021-02-11 NOTE — TELEPHONE ENCOUNTER
Patient states that both her legs are completely swollen with a pain level of a 5/10  Best call back [phone]

## 2021-02-12 LAB
B2 GLYCOPROT1 IGA SER-ACNC: <9 GPI IGA UNITS (ref 0–25)
B2 GLYCOPROT1 IGG SER-ACNC: <9 GPI IGG UNITS (ref 0–20)
B2 GLYCOPROT1 IGM SER-ACNC: <9 GPI IGM UNITS (ref 0–32)

## 2021-02-16 ENCOUNTER — HOSPITAL ENCOUNTER (OUTPATIENT)
Dept: INFUSION CENTER | Facility: CLINIC | Age: 46
Discharge: HOME | End: 2021-02-16
Payer: MEDICARE

## 2021-02-16 VITALS
SYSTOLIC BLOOD PRESSURE: 112 MMHG | HEART RATE: 93 BPM | DIASTOLIC BLOOD PRESSURE: 72 MMHG | RESPIRATION RATE: 18 BRPM | TEMPERATURE: 98.4 F

## 2021-02-16 DIAGNOSIS — D50.8 OTHER IRON DEFICIENCY ANEMIA: Primary | ICD-10-CM

## 2021-02-16 DIAGNOSIS — K95.89 IRON DEFICIENCY ANEMIA FOLLOWING BARIATRIC SURGERY: ICD-10-CM

## 2021-02-16 DIAGNOSIS — D50.8 IRON DEFICIENCY ANEMIA FOLLOWING BARIATRIC SURGERY: ICD-10-CM

## 2021-02-16 PROCEDURE — 96365 THER/PROPH/DIAG IV INF INIT: CPT

## 2021-02-16 RX ORDER — SODIUM CHLORIDE 9 MG/ML
20 INJECTION, SOLUTION INTRAVENOUS ONCE
Status: COMPLETED | OUTPATIENT
Start: 2021-02-16 | End: 2021-02-16

## 2021-02-16 RX ORDER — SODIUM CHLORIDE 9 MG/ML
20 INJECTION, SOLUTION INTRAVENOUS ONCE
Status: CANCELLED | OUTPATIENT
Start: 2021-02-23

## 2021-02-16 RX ADMIN — SODIUM CHLORIDE 200 MG: 9 INJECTION, SOLUTION INTRAVENOUS at 13:40

## 2021-02-16 RX ADMIN — SODIUM CHLORIDE 20 ML/HR: 9 INJECTION, SOLUTION INTRAVENOUS at 13:40

## 2021-02-16 NOTE — PROGRESS NOTES
Patient tolerated Venofer infusion well with no adverse events or complications  Pt is aware to return next week for next infusion  AVS provided

## 2021-02-16 NOTE — PLAN OF CARE
Problem: Potential for Falls  Goal: Patient will remain free of falls  Description: INTERVENTIONS:  - Assess patient frequently for physical needs  -  Identify cognitive and physical deficits and behaviors that affect risk of falls    -  [place] fall precautions as indicated by assessment   - Educate patient/family on patient safety including physical limitations  - Instruct patient to call for assistance with activity based on assessment  - Modify environment to reduce risk of injury  - Consider OT/PT consult to assist with strengthening/mobility  Outcome: Progressing

## 2021-02-18 ENCOUNTER — TELEPHONE (OUTPATIENT)
Dept: RADIOLOGY | Facility: HOSPITAL | Age: 46
End: 2021-02-18

## 2021-02-18 ENCOUNTER — TELEPHONE (OUTPATIENT)
Dept: INTERVENTIONAL RADIOLOGY/VASCULAR | Facility: CLINIC | Age: 46
End: 2021-02-18

## 2021-02-18 NOTE — TELEPHONE ENCOUNTER
Called pt to schedule virtual clinic appt 2/24 at 8am with Dr Eze Rai and had to leave a message asking the pt to return my call

## 2021-02-23 ENCOUNTER — HOSPITAL ENCOUNTER (OUTPATIENT)
Dept: INFUSION CENTER | Facility: CLINIC | Age: 46
Discharge: HOME | End: 2021-02-23
Payer: MEDICARE

## 2021-02-23 VITALS
SYSTOLIC BLOOD PRESSURE: 108 MMHG | HEART RATE: 88 BPM | RESPIRATION RATE: 18 BRPM | DIASTOLIC BLOOD PRESSURE: 75 MMHG | TEMPERATURE: 97.1 F

## 2021-02-23 DIAGNOSIS — K95.89 IRON DEFICIENCY ANEMIA FOLLOWING BARIATRIC SURGERY: ICD-10-CM

## 2021-02-23 DIAGNOSIS — D50.8 IRON DEFICIENCY ANEMIA FOLLOWING BARIATRIC SURGERY: ICD-10-CM

## 2021-02-23 DIAGNOSIS — D50.8 OTHER IRON DEFICIENCY ANEMIA: Primary | ICD-10-CM

## 2021-02-23 PROCEDURE — 96365 THER/PROPH/DIAG IV INF INIT: CPT

## 2021-02-23 RX ORDER — SODIUM CHLORIDE 9 MG/ML
20 INJECTION, SOLUTION INTRAVENOUS ONCE
Status: COMPLETED | OUTPATIENT
Start: 2021-02-23 | End: 2021-02-23

## 2021-02-23 RX ORDER — SODIUM CHLORIDE 9 MG/ML
20 INJECTION, SOLUTION INTRAVENOUS ONCE
Status: CANCELLED | OUTPATIENT
Start: 2021-03-02

## 2021-02-23 RX ADMIN — SODIUM CHLORIDE 200 MG: 9 INJECTION, SOLUTION INTRAVENOUS at 14:17

## 2021-02-23 RX ADMIN — SODIUM CHLORIDE 20 ML/HR: 0.9 INJECTION, SOLUTION INTRAVENOUS at 14:17

## 2021-02-23 NOTE — PLAN OF CARE
Problem: Potential for Falls  Goal: Patient will remain free of falls  Description: INTERVENTIONS:  - Assess patient frequently for physical needs  -  Identify cognitive and physical deficits and behaviors that affect risk of falls    -  [place] fall precautions as indicated by assessment   - Educate patient/family on patient safety including physical limitations  - Instruct patient to call for assistance with activity based on assessment  - Modify environment to reduce risk of injury  - Consider OT/PT consult to assist with strengthening/mobility  Outcome: Progressing

## 2021-02-24 ENCOUNTER — TELEMEDICINE (OUTPATIENT)
Dept: INTERVENTIONAL RADIOLOGY/VASCULAR | Facility: CLINIC | Age: 46
End: 2021-02-24
Payer: MEDICARE

## 2021-02-24 DIAGNOSIS — I26.99 PULMONARY EMBOLISM, UNSPECIFIED CHRONICITY, UNSPECIFIED PULMONARY EMBOLISM TYPE, UNSPECIFIED WHETHER ACUTE COR PULMONALE PRESENT (HCC): Primary | ICD-10-CM

## 2021-02-24 DIAGNOSIS — Z95.828 PRESENCE OF IVC FILTER: ICD-10-CM

## 2021-02-24 DIAGNOSIS — I82.220 IVC THROMBOSIS (HCC): ICD-10-CM

## 2021-02-24 PROCEDURE — 99214 OFFICE O/P EST MOD 30 MIN: CPT | Performed by: RADIOLOGY

## 2021-02-24 NOTE — LETTER
February 26, 2021     Devere Romberg, [address]    Patient: Kd Reyna   YOB: 1975   Date of Visit: 2/24/2021       Dear Dr Arely Chou: Thank you for referring Kd Reyna to me for evaluation  Below are my notes for this consultation  If you have questions, please do not hesitate to call me  I look forward to following your patient along with you  Sincerely,        Pablo Correa MD        CC: MD Kyle Webb MD Nivia Beth, PA-C Izola Solar, MD  2/26/2021  5:02 PM  Sign when Signing Visit  Virtual Regular Visit      Assessment/Plan:    45F referred by Dr Jacky Booth to assess her inferior vena cava filter  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  Management of her filter will be complex and simple retrieval is not possible  Thankfully she has been maintained on anticoagulation without difficulty from a thromboembolism perspective  Therefore filter retrieval if practical and safe is appropriate  She is having difficulty with the injections as below  This is a Theresa filter placed in 2017  The accompanying symptoms of waxing and waning leg swelling make me concerned for additional venous obstruction  There may also be residual clot that will make filter more complicated  Removing the filter itself may be complex and and may require caval reconstruction  For this we will need to obtain preoperative imaging for which I will order CT venogram of the abdomen, pelvis, and proximal lower extremities  Any such procedure would certainly require anesthesia support and will carry risks of bleeding  I do agree with lifelong anticoagulation  Given her gastric bypass she may not be a candidate for oral anticoagulation, this will need to be evaluated  I will reach out to the hematology team     She has a parallel issue of what may be an infection at her injection site of Lovenox    It is red and angry looking, I was able to visualize over video connection but obviously could not examine in person  I recommended she continue to manage this with her primary care physician but it may necessitate a change in plan    Recommendation  - I will order CT venogram abdomen/pelvis and lower extremities  - appreciate input from hematology regarding the status of her coagulopathy workup and if there are any other options for anticoagulation other than injectables    - patient will return to clinic after imaging    Problem List Items Addressed This Visit        Cardiovascular and Mediastinum    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[facility] )    Pulmonary embolism ([facility] ) - Primary       Other    Presence of IVC filter               Reason for visit is to discuss inferior vena cava filter     Encounter provider Presley Hawthorne MD    Provider located at [facility] [address]  [place] [address] [phone]      Recent Visits  Date Type Provider Dept   02/24/21 MD Joey Hassan recent visits within past 7 days and meeting all other requirements     Future Appointments  No visits were found meeting these conditions  Showing future appointments within next 150 days and meeting all other requirements        The patient was identified by name and date of birth  Cristina Allison was informed that this is a telemedicine visit and that the visit is being conducted through [facility] and patient was informed that this is a secure, HIPAA-compliant platform  She agrees to proceed     My office door was closed  No one else was in the room  She acknowledged consent and understanding of privacy and security of the video platform  The patient has agreed to participate and understands they can discontinue the visit at any time  Patient is aware this is a billable service  Subjective  Cristina Allison is a 50 y o  female with history of venous thromboembolism  This all began with unprovoked lower extremity DVT in 2017 and port recall  Subsequently there was a pulmonary embolism and she was placed on Xarelto  Subsequent to this she had episodes of bleeding and Xarelto was stopped  More recently she presented with 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, she underwent thrombolysis and balloon angioplasty  The filter was maintained  At this point she is maintained on Lovenox    Her main complaint is actually lower extremity swelling  She reports that this increases and decreases over time but has recently worsened    Additional medical history notable for gastric bypass in 2010         HPI     Past Medical History:   Diagnosis Date    Anemia     DVT (deep venous thrombosis) (HCC)     Psychiatric disorder     bipolar II, suicide    Pulmonary embolism (HCC)     Seizures (HCC)        Past Surgical History:   Procedure Laterality Date    APPENDECTOMY      Open    CHOLECYSTECTOMY      Laparoscopic    GASTRIC BYPASS      was 205 date of surgery    IR DVT THROMBOLYSIS ILIAC/IVC WITH VENOGRAM  8/4/2020    IR TPA LYSIS CHECK  8/5/2020    IVC FILTER INSERTION  2017    STOMACH SURGERY      for morbid obesity    TUBAL LIGATION Bilateral 2010       Current Outpatient Medications   Medication Sig Dispense Refill    adapalene (DIFFERIN) 0 1 % cream APPLY NIGHTLY TO FACE AS TOLERATED      ammonium lactate (LAC-HYDRIN) 12 % cream Apply topically 2 (two) times a day      atoMOXetine (STRATTERA) 60 mg capsule TAKE 1 CAPSULE (60 MG) BY ORAL ROUTE ONCE DAILY IN THE MORNING      buPROPion (WELLBUTRIN SR) 100 mg 12 hr tablet TAKE 1 TABLET (100 MG) BY ORAL ROUTE ONCE PER DAY      cefuroxime (CEFTIN) 250 mg tablet Take 250 mg by mouth 2 (two) times a day      chlordiazePOXIDE (LIBRIUM) 25 mg capsule Take 25 mg by mouth 3 (three) times a day as needed for anxiety      ciclopirox (LOPROX) 0 77 % SUSP APPLY ONCE A DAY TO RIGHT THUMBNAIL      cloNIDine (CATAPRES) 0 1 mg tablet TAKE 1 TABLET (0 1 MG) BY ORAL ROUTE 2 TIMES PER DAY      Dapsone 5 % topical gel APPLY TWICE DAILY TO FACE      enoxaparin (LOVENOX) 100 mg/mL Inject 1 mL (100 mg total) under the skin every 12 (twelve) hours 100 Syringe 2    ferrous sulfate 325 (65 Fe) mg tablet Take 325 mg by mouth daily with breakfast      folic acid (FOLVITE) 1 mg tablet Take 1 mg by mouth daily      furosemide (LASIX) 20 mg tablet Take 40 mg by mouth as needed Pt reports taking 80 mg in morning and 40 mg in afternoon        gabapentin (NEURONTIN) 400 mg capsule Take 400 mg by mouth 3 (three) times a day       hydrOXYzine pamoate (Vistaril) 25 mg capsule 1 capsule Every 6 hours      levothyroxine 100 mcg tablet Take 1 tablet (100 mcg total) by mouth daily in the early morning 30 tablet 0    lithium carbonate (LITHOBID) 300 mg CR tablet Take 300 mg by mouth 2 (two) times a day       lithium carbonate (LITHOBID) 300 mg CR tablet Take 750 mg by mouth every evening      Multiple Vitamin (MULTI-VITAMIN DAILY PO) Multi Vitamin   DAILY      NEEDLE, DISP, 27 G 27G X 1/2" MISC by Does not apply route every 12 (twelve) hours 100 each 5    pantoprazole (PROTONIX) 40 mg tablet Take 1 tablet (40 mg total) by mouth daily in the early morning 30 tablet 1    potassium chloride (K-DUR,KLOR-CON) 20 mEq tablet Take 1 tablet (20 mEq total) by mouth 2 (two) times a day 30 tablet 1    pyridoxine (VITAMIN B6) 100 mg tablet Take 100 mg by mouth daily      QUEtiapine (SEROquel) 100 mg tablet Take 100 mg by mouth 3 (three) times a day       QUEtiapine (SEROquel) 400 MG tablet Take 400 mg by mouth daily at bedtime      rizatriptan (MAXALT-MLT) 10 MG disintegrating tablet Take 10 mg by mouth      rOPINIRole (REQUIP) 1 mg tablet TAKE 1 TABLET (1 MG) BY ORAL ROUTE 1-3 HOURS BEFORE BEDTIME      sertraline (ZOLOFT) 50 mg tablet Take 50 mg by mouth daily      SUMAtriptan (IMITREX) 100 mg tablet Take 100 mg by mouth as needed for migraine       thiamine 100 MG tablet Daily      topiramate (TOPAMAX) 100 mg tablet Take 150 mg by mouth 2 (two) times a day       venlafaxine (EFFEXOR-XR) 150 mg 24 hr capsule venlafaxine  mg capsule,extended release 24 hr      venlafaxine (EFFEXOR-XR) 75 mg 24 hr capsule Take 75 mg by mouth daily at bedtime       No current facility-administered medications for this visit  Allergies   Allergen Reactions    Morphine Seizures       Review of Systems    Video Exam    There were no vitals filed for this visit  Physical Exam     I spent 35 minutes directly with the patient during this visit      VIRTUAL VISIT DISCLAIMER    Cristina Estefany acknowledges that she has consented to an online visit or consultation  She understands that the online visit is based solely on information provided by her, and that, in the absence of a face-to-face physical evaluation by the physician, the diagnosis she receives is both limited and provisional in terms of accuracy and completeness  This is not intended to replace a full medical face-to-face evaluation by the physician  Cristina Allison understands and accepts these terms

## 2021-02-26 NOTE — PROGRESS NOTES
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  Management of her filter will be complex and simple retrieval is not possible  Thankfully she has been maintained on anticoagulation without difficulty from a thromboembolism perspective  Therefore filter retrieval if practical and safe is appropriate  She is having difficulty with the injections as below  This is a Theresa filter placed in 2017  The accompanying symptoms of waxing and waning leg swelling make me concerned for additional venous obstruction  There may also be residual clot that will make filter more complicated  Removing the filter itself may be complex and and may require caval reconstruction  For this we will need to obtain preoperative imaging for which I will order CT venogram of the abdomen, pelvis, and proximal lower extremities  Any such procedure would certainly require anesthesia support and will carry risks of bleeding  I do agree with lifelong anticoagulation  Given her gastric bypass she may not be a candidate for oral anticoagulation, this will need to be evaluated  I will reach out to the hematology team     She has a parallel issue of what may be an infection at her injection site of Lovenox  It is red and angry looking, I was able to visualize over video connection but obviously could not examine in person    I recommended she continue to manage this with her primary care physician but it may necessitate a change in plan    Recommendation  - I will order CT venogram abdomen/pelvis and lower extremities  - appreciate input from hematology regarding the status of her coagulopathy workup and if there are any other options for anticoagulation other than injectables    - patient will return to clinic after imaging    Problem List Items Addressed This Visit Cardiovascular and Mediastinum    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[facility] [address] )    [facility] - Primary       Other    Presence of IVC filter               Reason for visit is to discuss inferior vena cava filter     Encounter provider Nini Velarde MD    Provider located at [facility] [address]  [place] [address]  [phone]      Recent Visits  Date Type Provider Dept   02/24/21 Sandra Mon MD [facility] recent visits within past 7 days and meeting all other requirements     Future Appointments  No visits were found meeting these conditions  Showing future appointments within next 150 days and meeting all other requirements        The patient was identified by name and date of birth  Lisa Bautista was informed that this is a telemedicine visit and that the visit is being conducted through [facility] and patient was informed that this is a secure, HIPAA-compliant platform  She agrees to proceed     My office door was closed  No one else was in the room  She acknowledged consent and understanding of privacy and security of the video platform  The patient has agreed to participate and understands they can discontinue the visit at any time  Patient is aware this is a billable service  Subjective  Lisa Bautista is a 50 y o  female with history of venous thromboembolism  This all began with unprovoked lower extremity DVT in 2017 and port recall  Subsequently there was a pulmonary embolism and she was placed on Xarelto  Subsequent to this she had episodes of bleeding and Xarelto was stopped  More recently she presented with 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, she underwent thrombolysis and balloon angioplasty  The filter was maintained  At this point she is maintained on Lovenox    Her main complaint is actually lower extremity swelling    She reports that this increases and decreases over time but has recently worsened    Additional medical history notable for gastric bypass in 2010  HPI     Past Medical History:   Diagnosis Date    Anemia     DVT (deep venous thrombosis) (HCC)     Psychiatric disorder     bipolar II, suicide    Pulmonary embolism (HCC)     Seizures (HCC)        Past Surgical History:   Procedure Laterality Date    APPENDECTOMY      Open    CHOLECYSTECTOMY      Laparoscopic    GASTRIC BYPASS      was 89 date of surgery    IR DVT THROMBOLYSIS ILIAC/IVC WITH VENOGRAM  8/4/2020    IR TPA LYSIS CHECK  8/5/2020    IVC FILTER INSERTION  2017    STOMACH SURGERY      for morbid obesity    TUBAL LIGATION Bilateral 2010       Current Outpatient Medications   Medication Sig Dispense Refill    adapalene (DIFFERIN) 0 1 % cream APPLY NIGHTLY TO FACE AS TOLERATED      ammonium lactate (LAC-HYDRIN) 12 % cream Apply topically 2 (two) times a day      atoMOXetine (STRATTERA) 60 mg capsule TAKE 1 CAPSULE (60 MG) BY ORAL ROUTE ONCE DAILY IN THE MORNING      buPROPion (WELLBUTRIN SR) 100 mg 12 hr tablet TAKE 1 TABLET (100 MG) BY ORAL ROUTE ONCE PER DAY      cefuroxime (CEFTIN) 250 mg tablet Take 250 mg by mouth 2 (two) times a day      chlordiazePOXIDE (LIBRIUM) 25 mg capsule Take 25 mg by mouth 3 (three) times a day as needed for anxiety      ciclopirox (LOPROX) 0 77 % SUSP APPLY ONCE A DAY TO RIGHT THUMBNAIL      cloNIDine (CATAPRES) 0 1 mg tablet TAKE 1 TABLET (0 1 MG) BY ORAL ROUTE 2 TIMES PER DAY      Dapsone 5 % topical gel APPLY TWICE DAILY TO FACE      enoxaparin (LOVENOX) 100 mg/mL Inject 1 mL (100 mg total) under the skin every 12 (twelve) hours 100 Syringe 2    ferrous sulfate 325 (65 Fe) mg tablet Take 325 mg by mouth daily with breakfast      folic acid (FOLVITE) 1 mg tablet Take 1 mg by mouth daily      furosemide (LASIX) 20 mg tablet Take 40 mg by mouth as needed Pt reports taking 80 mg in morning and 40 mg in afternoon        gabapentin (NEURONTIN) 400 mg capsule Take 400 mg by mouth 3 (three) times a day       hydrOXYzine pamoate (Vistaril) 25 mg capsule 1 capsule Every 6 hours      levothyroxine 100 mcg tablet Take 1 tablet (100 mcg total) by mouth daily in the early morning 30 tablet 0    lithium carbonate (LITHOBID) 300 mg CR tablet Take 300 mg by mouth 2 (two) times a day       lithium carbonate (LITHOBID) 300 mg CR tablet Take 750 mg by mouth every evening      Multiple Vitamin (MULTI-VITAMIN DAILY PO) Multi Vitamin   DAILY      NEEDLE, DISP, 27 G 27G X 1/2" MISC by Does not apply route every 12 (twelve) hours 100 each 5    pantoprazole (PROTONIX) 40 mg tablet Take 1 tablet (40 mg total) by mouth daily in the early morning 30 tablet 1    potassium chloride (K-DUR,KLOR-CON) 20 mEq tablet Take 1 tablet (20 mEq total) by mouth 2 (two) times a day 30 tablet 1    pyridoxine (VITAMIN B6) 100 mg tablet Take 100 mg by mouth daily      QUEtiapine (SEROquel) 100 mg tablet Take 100 mg by mouth 3 (three) times a day       QUEtiapine (SEROquel) 400 MG tablet Take 400 mg by mouth daily at bedtime      rizatriptan (MAXALT-MLT) 10 MG disintegrating tablet Take 10 mg by mouth      rOPINIRole (REQUIP) 1 mg tablet TAKE 1 TABLET (1 MG) BY ORAL ROUTE 1-3 HOURS BEFORE BEDTIME      sertraline (ZOLOFT) 50 mg tablet Take 50 mg by mouth daily      SUMAtriptan (IMITREX) 100 mg tablet Take 100 mg by mouth as needed for migraine       thiamine 100 MG tablet Daily      topiramate (TOPAMAX) 100 mg tablet Take 150 mg by mouth 2 (two) times a day       venlafaxine (EFFEXOR-XR) 150 mg 24 hr capsule venlafaxine  mg capsule,extended release 24 hr      venlafaxine (EFFEXOR-XR) 75 mg 24 hr capsule Take 75 mg by mouth daily at bedtime       No current facility-administered medications for this visit  Allergies   Allergen Reactions    Morphine Seizures       Review of Systems    Video Exam    There were no vitals filed for this visit      Physical Exam     I spent 35 minutes directly with the patient during this visit      VIRTUAL VISIT DISCLAIMER    Drea Johnson acknowledges that she has consented to an online visit or consultation  She understands that the online visit is based solely on information provided by her, and that, in the absence of a face-to-face physical evaluation by the physician, the diagnosis she receives is both limited and provisional in terms of accuracy and completeness  This is not intended to replace a full medical face-to-face evaluation by the physician  Drea Johnson understands and accepts these terms

## 2021-03-02 ENCOUNTER — HOSPITAL ENCOUNTER (OUTPATIENT)
Dept: INFUSION CENTER | Facility: CLINIC | Age: 46
Discharge: HOME | End: 2021-03-02
Payer: MEDICARE

## 2021-03-02 VITALS
TEMPERATURE: 97.2 F | RESPIRATION RATE: 18 BRPM | SYSTOLIC BLOOD PRESSURE: 106 MMHG | HEART RATE: 86 BPM | DIASTOLIC BLOOD PRESSURE: 70 MMHG

## 2021-03-02 DIAGNOSIS — D50.8 OTHER IRON DEFICIENCY ANEMIA: Primary | ICD-10-CM

## 2021-03-02 DIAGNOSIS — K95.89 IRON DEFICIENCY ANEMIA FOLLOWING BARIATRIC SURGERY: ICD-10-CM

## 2021-03-02 DIAGNOSIS — D50.8 IRON DEFICIENCY ANEMIA FOLLOWING BARIATRIC SURGERY: ICD-10-CM

## 2021-03-02 PROCEDURE — 96365 THER/PROPH/DIAG IV INF INIT: CPT

## 2021-03-02 RX ORDER — SODIUM CHLORIDE 9 MG/ML
20 INJECTION, SOLUTION INTRAVENOUS ONCE
Status: COMPLETED | OUTPATIENT
Start: 2021-03-02 | End: 2021-03-02

## 2021-03-02 RX ORDER — SODIUM CHLORIDE 9 MG/ML
20 INJECTION, SOLUTION INTRAVENOUS ONCE
Status: CANCELLED | OUTPATIENT
Start: 2021-03-02

## 2021-03-02 RX ADMIN — SODIUM CHLORIDE 20 ML/HR: 0.9 INJECTION, SOLUTION INTRAVENOUS at 09:30

## 2021-03-02 RX ADMIN — SODIUM CHLORIDE 200 MG: 9 INJECTION, SOLUTION INTRAVENOUS at 09:30

## 2021-03-02 NOTE — PLAN OF CARE
Problem: Potential for Falls  Goal: Patient will remain free of falls  Description: INTERVENTIONS:  - Assess patient frequently for physical needs  -  Identify cognitive and physical deficits and behaviors that affect risk of falls    -  [place] fall precautions as indicated by assessment   - Educate patient/family on patient safety including physical limitations  - Instruct patient to call for assistance with activity based on assessment  - Modify environment to reduce risk of injury  - Consider OT/PT consult to assist with strengthening/mobility  Outcome: Progressing

## 2021-03-08 ENCOUNTER — DOCUMENTATION (OUTPATIENT)
Dept: HEMATOLOGY ONCOLOGY | Facility: CLINIC | Age: 46
End: 2021-03-08

## 2021-03-08 NOTE — PROGRESS NOTES
3/8/2021  Received notification from cover my [address] [place] [address] started an Tres Magallon for the enoxaparin 100mg/ml subcutaneously q12h    Pt has [employer]  ID # [id number]    Submitted for auth through cover my meds    Received approval letter from [facility]  Per the approval letter Tres Magallon is valid from 3-8-2021 through 12/31/2021       [facility] discount pharmacy @ 2:15 ([phone]) per Dom Settler they received a pd claim and the medication will be shipped to the pt    Notified clinical

## 2021-03-13 ENCOUNTER — HOSPITAL ENCOUNTER (OUTPATIENT)
Dept: CT IMAGING | Facility: HOSPITAL | Age: 46
Discharge: HOME | End: 2021-03-13
Attending: RADIOLOGY

## 2021-03-13 DIAGNOSIS — Z95.828 PRESENCE OF IVC FILTER: ICD-10-CM

## 2021-03-13 DIAGNOSIS — I82.220 IVC THROMBOSIS (HCC): ICD-10-CM

## 2021-03-16 ENCOUNTER — HOSPITAL ENCOUNTER (OUTPATIENT)
Dept: CT IMAGING | Facility: HOSPITAL | Age: 46
Discharge: HOME | End: 2021-03-16
Attending: RADIOLOGY
Payer: MEDICARE

## 2021-03-16 PROCEDURE — 74174 CTA ABD&PLVS W/CONTRAST: CPT

## 2021-03-16 PROCEDURE — G1004 CDSM NDSC: HCPCS

## 2021-03-16 PROCEDURE — 73706 CT ANGIO LWR EXTR W/O&W/DYE: CPT

## 2021-03-16 RX ADMIN — IOHEXOL 120 ML: 350 INJECTION, SOLUTION INTRAVENOUS at 10:40

## 2021-03-18 ENCOUNTER — TELEMEDICINE (OUTPATIENT)
Dept: INTERVENTIONAL RADIOLOGY/VASCULAR | Facility: CLINIC | Age: 46
End: 2021-03-18
Payer: MEDICARE

## 2021-03-18 DIAGNOSIS — I87.009 POST-THROMBOTIC SYNDROME: ICD-10-CM

## 2021-03-18 DIAGNOSIS — Z95.828 PRESENCE OF IVC FILTER: Primary | ICD-10-CM

## 2021-03-18 DIAGNOSIS — Z86.711 HX OF PULMONARY EMBOLUS: ICD-10-CM

## 2021-03-18 DIAGNOSIS — L03.115 CELLULITIS OF RIGHT LOWER EXTREMITY: ICD-10-CM

## 2021-03-18 PROCEDURE — 99443 PR PHYS/QHP TELEPHONE EVALUATION 21-30 MIN: CPT | Performed by: RADIOLOGY

## 2021-03-18 NOTE — PROGRESS NOTES
Virtual Brief Visit    Assessment/Plan:    Return to virtual clinic for Ms Morrison to discuss the results of her CT venography study    Thankfully in the interim she has had some clinical improvement of her right abdominal wall hematoma related to her Lovenox injections  I have since poking with hematology team and her coagulopathy workup is complete with no further testing  She will remain on anticoagulation indefinitely  The CT venogram reveals improvement of her venous thrombosis state with no bulky residual thrombus in the iliac veins or inferior vena cava  She may have some small degree of webs or synechiae that cannot be visualized on CT  Given the imaging findings and her prior cava thrombosis I recommend inferior vena cava filter retrieval   We will perform this with anesthesia support given the dwell time period at the same time we could offer intravascular ultrasound of the left and right lower extremity pelvic veins to assess for any post thrombotic changes that may respond to balloon angioplasty or, if required, stenting, to assist with her lower extremity edema  It may be possible that we find totally patent veins or we may need to intervene  I believe we should only schedule this after her current infectious symptoms are resolved  She agrees with this plan  In the meantime she will stay on Lovenox as this is the only available medication given her gastric bypass  The CT was also notable for revisualization of a ovarian cyst which needs further investigation in this postmenopausal woman  We also identified her right abdominal wall hematoma with many small hematomas  This presumably infected right hematoma is somewhat large  I discussed that if it is coagulated blood it will not drain easily through a catheter but if there is failure to improve on intravenous antibiotics we may be able to offer aspiration      I discussed all the above with her primary care physician Dr Robbie Mason Sebastian Knight, who will be following up with the patient for her antibiotics and managing her ovarian cyst and referring appropriately      Problem List Items Addressed This Visit        Cardiovascular and Mediastinum    Post-thrombotic syndrome       Other    Hx of pulmonary embolus    Cellulitis of right lower extremity     Presence of IVC filter - Primary                Reason for visit is   Chief Complaint   Patient presents with    Virtual Brief Visit        Encounter provider Howard Bermeo MD    Provider located at [address] [address] [phone]    Recent Visits  No visits were found meeting these conditions  Showing recent visits within past 7 days and meeting all other requirements     Today's Visits  Date Type Provider Dept   03/18/21 Telemedicine Howard Bermeo MD [facility] [place]   Showing today's visits and meeting all other requirements     Future Appointments  No visits were found meeting these conditions  Showing future appointments within next 150 days and meeting all other requirements        After connecting through telephone, the patient was identified by name and date of birth  Dharmesh Herndon was informed that this is a telemedicine visit and that the visit is being conducted through telephone  My office door was closed  No one else was in the room  She acknowledged consent and understanding of privacy and security of the platform  The patient has agreed to participate and understands she can discontinue the visit at any time  Patient is aware this is a billable service  Subjective    Dharmesh Herndon is a 50 y o  female with prior venous thromboembolism, inferior vena cava filter placement, and IVC thrombectomy          HPI     Past Medical History:   Diagnosis Date    Anemia     DVT (deep venous thrombosis) (HCC)     Psychiatric disorder     bipolar II, suicide    Pulmonary embolism (HCC)     [facility] Past Surgical History:   Procedure Laterality Date    APPENDECTOMY      Open    CHOLECYSTECTOMY      Laparoscopic    GASTRIC BYPASS      was 89 date of surgery    IR DVT THROMBOLYSIS ILIAC/IVC WITH VENOGRAM  8/4/2020    IR TPA LYSIS CHECK  8/5/2020    IVC FILTER INSERTION  2017    STOMACH SURGERY      for morbid obesity    TUBAL LIGATION Bilateral 2010       Current Outpatient Medications   Medication Sig Dispense Refill    adapalene (DIFFERIN) 0 1 % cream APPLY NIGHTLY TO FACE AS TOLERATED      ammonium lactate (LAC-HYDRIN) 12 % cream Apply topically 2 (two) times a day      atoMOXetine (STRATTERA) 60 mg capsule TAKE 1 CAPSULE (60 MG) BY ORAL ROUTE ONCE DAILY IN THE MORNING      buPROPion (WELLBUTRIN SR) 100 mg 12 hr tablet TAKE 1 TABLET (100 MG) BY ORAL ROUTE ONCE PER DAY      cefuroxime (CEFTIN) 250 mg tablet Take 250 mg by mouth 2 (two) times a day      chlordiazePOXIDE (LIBRIUM) 25 mg capsule Take 25 mg by mouth 3 (three) times a day as needed for anxiety      ciclopirox (LOPROX) 0 77 % SUSP APPLY ONCE A DAY TO RIGHT THUMBNAIL      cloNIDine (CATAPRES) 0 1 mg tablet TAKE 1 TABLET (0 1 MG) BY ORAL ROUTE 2 TIMES PER DAY      Dapsone 5 % topical gel APPLY TWICE DAILY TO FACE      enoxaparin (LOVENOX) 100 mg/mL Inject 1 mL (100 mg total) under the skin every 12 (twelve) hours 100 Syringe 2    ferrous sulfate 325 (65 Fe) mg tablet Take 325 mg by mouth daily with breakfast      folic acid (FOLVITE) 1 mg tablet Take 1 mg by mouth daily      furosemide (LASIX) 20 mg tablet Take 40 mg by mouth as needed Pt reports taking 80 mg in morning and 40 mg in afternoon        gabapentin (NEURONTIN) 400 mg capsule Take 400 mg by mouth 3 (three) times a day       hydrOXYzine pamoate (Vistaril) 25 mg capsule 1 capsule Every 6 hours      levothyroxine 100 mcg tablet Take 1 tablet (100 mcg total) by mouth daily in the early morning 30 tablet 0    lithium carbonate (LITHOBID) 300 mg CR tablet Take 300 mg by mouth 2 (two) times a day       lithium carbonate (LITHOBID) 300 mg CR tablet Take 750 mg by mouth every evening      Multiple Vitamin (MULTI-VITAMIN DAILY PO) Multi Vitamin   DAILY      NEEDLE, DISP, 27 G 27G X 1/2" MISC by Does not apply route every 12 (twelve) hours 100 each 5    pantoprazole (PROTONIX) 40 mg tablet Take 1 tablet (40 mg total) by mouth daily in the early morning 30 tablet 1    potassium chloride (K-DUR,KLOR-CON) 20 mEq tablet Take 1 tablet (20 mEq total) by mouth 2 (two) times a day 30 tablet 1    pyridoxine (VITAMIN B6) 100 mg tablet Take 100 mg by mouth daily      QUEtiapine (SEROquel) 100 mg tablet Take 100 mg by mouth 3 (three) times a day       QUEtiapine (SEROquel) 400 MG tablet Take 400 mg by mouth daily at bedtime      rizatriptan (MAXALT-MLT) 10 MG disintegrating tablet Take 10 mg by mouth      rOPINIRole (REQUIP) 1 mg tablet TAKE 1 TABLET (1 MG) BY ORAL ROUTE 1-3 HOURS BEFORE BEDTIME      sertraline (ZOLOFT) 50 mg tablet Take 50 mg by mouth daily      SUMAtriptan (IMITREX) 100 mg tablet Take 100 mg by mouth as needed for migraine       thiamine 100 MG tablet Daily      topiramate (TOPAMAX) 100 mg tablet Take 150 mg by mouth 2 (two) times a day       venlafaxine (EFFEXOR-XR) 150 mg 24 hr capsule venlafaxine  mg capsule,extended release 24 hr      venlafaxine (EFFEXOR-XR) 75 mg 24 hr capsule Take 75 mg by mouth daily at bedtime       No current facility-administered medications for this visit  Allergies   Allergen Reactions    Morphine Seizures       Review of Systems    There were no vitals filed for this visit  I spent 26 minutes directly with the patient during this visit    VIRTUAL VISIT DISCLAIMER    Garettjackeline Hack acknowledges that she has consented to an online visit or consultation   She understands that the online visit is based solely on information provided by her, and that, in the absence of a face-to-face physical evaluation by the physician, the diagnosis she receives is both limited and provisional in terms of accuracy and completeness  This is not intended to replace a full medical face-to-face evaluation by the physician  Lisa Bautista understands and accepts these terms

## 2021-03-26 ENCOUNTER — HOSPITAL ENCOUNTER (EMERGENCY)
Facility: HOSPITAL | Age: 46
Discharge: HOME | End: 2021-03-26
Attending: EMERGENCY MEDICINE
Payer: MEDICARE

## 2021-03-26 VITALS
RESPIRATION RATE: 16 BRPM | SYSTOLIC BLOOD PRESSURE: 106 MMHG | HEART RATE: 82 BPM | TEMPERATURE: 97.8 F | BODY MASS INDEX: 43.74 KG/M2 | WEIGHT: 246.91 LBS | DIASTOLIC BLOOD PRESSURE: 63 MMHG | OXYGEN SATURATION: 93 %

## 2021-03-26 DIAGNOSIS — R10.84 GENERALIZED ABDOMINAL PAIN: Primary | ICD-10-CM

## 2021-03-26 DIAGNOSIS — F41.9 ANXIETY: ICD-10-CM

## 2021-03-26 PROCEDURE — 99283 EMERGENCY DEPT VISIT LOW MDM: CPT

## 2021-03-26 PROCEDURE — 99282 EMERGENCY DEPT VISIT SF MDM: CPT | Performed by: EMERGENCY MEDICINE

## 2021-03-26 NOTE — ED PROVIDER NOTES
History  Chief Complaint   Patient presents with    Abdominal Pain     Pts family member states "she called me and said she had pain in her stomach and was shaking really bad" generalized abd pain  nausea  diarrhea     Patient presents reporting an episode of severe abdominal pain causing shakiness and feelings of anxiety  Patient reports episode started while she was mopping the floor  Patient reports she sat down at did that pain and shakiness started and symptoms quickly resolved  Patient is currently following with PCP for infective panniculitis and reports she is on oral and IV antibiotics daily and has had hematomas drained  Patient reports abdominal pain is at baseline currently and she was concerned that the episode was caused by a blood clot  History provided by:  Patient   used: No    Abdominal Pain  Pain location:  Generalized  Pain quality: aching    Pain quality: not bloating, not burning, not cramping, not dull, no fullness, not gnawing, not heavy, no pressure, not sharp, not shooting, not squeezing, not stabbing, no stiffness, not tearing, not throbbing and not tugging    Pain radiates to:  Does not radiate  Pain severity:  Mild  Onset quality:  Sudden  Progression:  Resolved  Chronicity:  New  Context: not alcohol use, not awakening from sleep, not diet changes, not eating, not laxative use, not medication withdrawal, not previous surgeries, not recent illness, not recent sexual activity, not recent travel, not retching, not sick contacts, not suspicious food intake and not trauma    Relieved by: Rest   Worsened by:  Nothing  Ineffective treatments:  None tried  Associated symptoms: no chest pain, no chills, no cough, no dysuria, no fever, no hematuria, no shortness of breath, no sore throat and no vomiting        Prior to Admission Medications   Prescriptions Last Dose Informant Patient Reported? Taking?    Dapsone 5 % topical gel  Self Yes Yes   Sig: APPLY TWICE DAILY TO FACE   Multiple Vitamin (MULTI-VITAMIN DAILY PO)  Self Yes Yes   Sig: Multi Vitamin   DAILY   NEEDLE, DISP, 27 G 27G X 1/2" MISC  Self No Yes   Sig: by Does not apply route every 12 (twelve) hours   QUEtiapine (SEROquel) 100 mg tablet  Self Yes Yes   Sig: Take 100 mg by mouth 3 (three) times a day    QUEtiapine (SEROquel) 400 MG tablet   Yes Yes   Sig: Take 400 mg by mouth daily at bedtime   SUMAtriptan (IMITREX) 100 mg tablet  Self Yes Yes   Sig: Take 100 mg by mouth as needed for migraine    adapalene (DIFFERIN) 0 1 % cream  Self Yes Yes   Sig: APPLY NIGHTLY TO FACE AS TOLERATED   ammonium lactate (LAC-HYDRIN) 12 % cream   Yes Yes   Sig: Apply topically 2 (two) times a day   atoMOXetine (STRATTERA) 60 mg capsule  Self Yes Yes   Sig: TAKE 1 CAPSULE (60 MG) BY ORAL ROUTE ONCE DAILY IN THE MORNING   buPROPion (WELLBUTRIN SR) 100 mg 12 hr tablet  Self Yes Yes   Sig: TAKE 1 TABLET (100 MG) BY ORAL ROUTE ONCE PER DAY   cefuroxime (CEFTIN) 250 mg tablet  Self Yes Yes   Sig: Take 250 mg by mouth 2 (two) times a day   chlordiazePOXIDE (LIBRIUM) 25 mg capsule  Self Yes Yes   Sig: Take 25 mg by mouth 3 (three) times a day as needed for anxiety   ciclopirox (LOPROX) 0 77 % SUSP   Yes Yes   Sig: APPLY ONCE A DAY TO RIGHT THUMBNAIL   cloNIDine (CATAPRES) 0 1 mg tablet   Yes Yes   Sig: TAKE 1 TABLET (0 1 MG) BY ORAL ROUTE 2 TIMES PER DAY   enoxaparin (LOVENOX) 100 mg/mL   No Yes   Sig: Inject 1 mL (100 mg total) under the skin every 12 (twelve) hours   ferrous sulfate 325 (65 Fe) mg tablet  Self Yes Yes   Sig: Take 325 mg by mouth daily with breakfast   folic acid (FOLVITE) 1 mg tablet  Self Yes Yes   Sig: Take 1 mg by mouth daily   furosemide (LASIX) 20 mg tablet  Self Yes Yes   Sig: Take 40 mg by mouth as needed Pt reports taking 80 mg in morning and 40 mg in afternoon     gabapentin (NEURONTIN) 400 mg capsule  Self Yes Yes   Sig: Take 400 mg by mouth 3 (three) times a day    hydrOXYzine pamoate (Vistaril) 25 mg capsule  Self Yes Yes   Si capsule Every 6 hours   levothyroxine 100 mcg tablet  Self No Yes   Sig: Take 1 tablet (100 mcg total) by mouth daily in the early morning   lithium carbonate (LITHOBID) 300 mg CR tablet  Self Yes Yes   Sig: Take 300 mg by mouth 2 (two) times a day    lithium carbonate (LITHOBID) 300 mg CR tablet  Self Yes Yes   Sig: Take 750 mg by mouth every evening   pantoprazole (PROTONIX) 40 mg tablet  Self No Yes   Sig: Take 1 tablet (40 mg total) by mouth daily in the early morning   potassium chloride (K-DUR,KLOR-CON) 20 mEq tablet  Self No Yes   Sig: Take 1 tablet (20 mEq total) by mouth 2 (two) times a day   pyridoxine (VITAMIN B6) 100 mg tablet  Self Yes Yes   Sig: Take 100 mg by mouth daily   rOPINIRole (REQUIP) 1 mg tablet   Yes Yes   Sig: TAKE 1 TABLET (1 MG) BY ORAL ROUTE 1-3 HOURS BEFORE BEDTIME   rizatriptan (MAXALT-MLT) 10 MG disintegrating tablet   Yes Yes   Sig: Take 10 mg by mouth   sertraline (ZOLOFT) 50 mg tablet  Self Yes Yes   Sig: Take 50 mg by mouth daily   thiamine 100 MG tablet  Self Yes Yes   Sig: Daily   topiramate (TOPAMAX) 100 mg tablet  Self Yes Yes   Sig: Take 150 mg by mouth 2 (two) times a day    venlafaxine (EFFEXOR-XR) 150 mg 24 hr capsule  Self Yes Yes   Sig: venlafaxine  mg capsule,extended release 24 hr   venlafaxine (EFFEXOR-XR) 75 mg 24 hr capsule   Yes Yes   Sig: Take 75 mg by mouth daily at bedtime      Facility-Administered Medications: None       Past Medical History:   Diagnosis Date    Anemia     DVT (deep venous thrombosis) (HCC)     Psychiatric disorder     bipolar II, suicide    Pulmonary embolism (HCC)     Seizures (HCC)        Past Surgical History:   Procedure Laterality Date    APPENDECTOMY      Open    CHOLECYSTECTOMY      Laparoscopic    GASTRIC BYPASS      was 89 date of surgery    IR DVT THROMBOLYSIS ILIAC/IVC WITH VENOGRAM  2020    IR TPA LYSIS CHECK  2020    IVC FILTER INSERTION      STOMACH SURGERY      for morbid obesity  TUBAL LIGATION Bilateral 2010       Family History   Problem Relation Age of Onset    Other Mother         headache    Hypertension Mother     Depression Mother     Arthritis Father     Other Father         H, pylori infection    Other Sister         esophageal reflux    Migraines Sister     Breast cancer Family     Diabetes Paternal Aunt         Mellitus    Breast cancer Paternal Aunt     Diabetes Paternal Uncle         Mellitus    Liver cancer Paternal Uncle     Asthma Daughter      I have reviewed and agree with the history as documented  E-Cigarette/Vaping     E-Cigarette/Vaping Substances     Social History     Tobacco Use    Smoking status: Never Smoker    Smokeless tobacco: Never Used    Tobacco comment: [facility] quit in [facility]   Substance Use Topics    Alcohol use: Not Currently     Comment: quit 6/21/2018    Drug use: No       Review of Systems   Constitutional: Negative for chills and fever  HENT: Negative for ear pain and sore throat  Eyes: Negative for pain and visual disturbance  Respiratory: Negative for cough and shortness of breath  Cardiovascular: Negative for chest pain and palpitations  Gastrointestinal: Positive for abdominal pain (Diffuse dull superficial pain)  Negative for vomiting  Genitourinary: Negative for dysuria and hematuria  Musculoskeletal: Negative for arthralgias and back pain  Skin: Negative for color change and rash  Allergic/Immunologic: Negative for immunocompromised state  Neurological: Negative for seizures and syncope  Psychiatric/Behavioral: Negative for confusion  The patient is nervous/anxious  All other systems reviewed and are negative  Physical Exam  Physical Exam  Vitals signs and nursing note reviewed  Constitutional:       General: She is not in acute distress  Appearance: She is well-developed  She is obese  She is not ill-appearing, toxic-appearing or diaphoretic     HENT:      Head: Normocephalic and atraumatic  Eyes:      General: No scleral icterus  Conjunctiva/sclera: Conjunctivae normal       Pupils: Pupils are equal, round, and reactive to light  Neck:      Musculoskeletal: Normal range of motion and neck supple  Cardiovascular:      Rate and Rhythm: Normal rate and regular rhythm  Heart sounds: Normal heart sounds  No murmur  No friction rub  No gallop  Pulmonary:      Effort: Pulmonary effort is normal  No respiratory distress  Breath sounds: Normal breath sounds  No stridor  No wheezing, rhonchi or rales  Chest:      Chest wall: No tenderness  Abdominal:      General: Abdomen is protuberant  Bowel sounds are normal  There is no distension  Palpations: Abdomen is soft  Tenderness: There is no abdominal tenderness  There is no guarding or rebound  Musculoskeletal: Normal range of motion  General: No tenderness or deformity  Skin:     General: Skin is warm and dry  Capillary Refill: Capillary refill takes 2 to 3 seconds  Comments: Large area of ecchymosis noted covering left upper, quadrant left lower quadrant, and right lower quadrant  Patient states that this is unchanged  Neurological:      Mental Status: She is alert and oriented to person, place, and time  Motor: No weakness  Psychiatric:         Mood and Affect: Mood is anxious  Mood is not depressed           Behavior: Behavior normal          Vital Signs  ED Triage Vitals   Temperature Pulse Respirations Blood Pressure SpO2   03/26/21 0026 03/26/21 0024 03/26/21 0024 03/26/21 0024 03/26/21 0024   97 8 °F (36 6 °C) 89 18 122/71 96 %      Temp src Heart Rate Source Patient Position - Orthostatic VS BP Location FiO2 (%)   -- 03/26/21 0149 03/26/21 0149 03/26/21 0149 --    Monitor Lying Right arm       Pain Score       03/26/21 0024       8           Vitals:    03/26/21 0024 03/26/21 0149   BP: 122/71 106/63   Pulse: 89 82   Patient Position - Orthostatic VS:  Lying Visual Acuity      ED Medications  Medications - No data to display    Diagnostic Studies  Results Reviewed     None                 No orders to display              Procedures  Procedures         ED Course                                           MDM  Number of Diagnoses or Management Options  Anxiety: new and requires workup  Generalized abdominal pain: new and requires workup  Diagnosis management comments: 47-year-old female presents after an episode of increased abdominal pain and shakiness while she was mopping a floor  Patient reports she needed to stop and sit down and symptoms quickly resolved  Patient reports being worried that episode was caused by a blood clot due to a history of thrombosis  Patient appears anxious on exam   Large area of ecchymosis noted to left upper quadrant, left lower quadrant, and right lower quadrant  No warmth noted to area  Pt states this is not changed from baseline  Patient reports pain has returned to baseline  Patient reports abdomen is not distended  She reports area of ecchymosis is the same and has not expanded  Due to patient being back at baseline, episode likely due to over exertion  Patient to see PCP today, instructed to keep that appointment  Patient instructed to return with worsening of pain, worsening of ecchymotic area, or any other concerning symptoms  Patient understands and agreeable, will discharge to home         Amount and/or Complexity of Data Reviewed  Review and summarize past medical records: yes    Patient Progress  Patient progress: stable      Disposition  Final diagnoses:   Generalized abdominal pain   Anxiety     Time reflects when diagnosis was documented in both MDM as applicable and the Disposition within this note     Time User Action Codes Description Comment    3/26/2021  2:10 AM Joanna Rader Add [R10 84] Generalized abdominal pain     3/26/2021  2:11 AM Kell Wheeler Add [F41 9] Anxiety       ED Disposition ED Disposition Condition Date/Time Comment    Discharge Stable Fri Mar 26, 2021  1:47 AM Pooja Dec discharge to home/self care              Follow-up Information     Follow up With Specialties Details Why Contact Info    Komal Ford PA-C Family Medicine, Physician Assistant Call today To schedule an appointment as soon as you can [address]  [phone]            Discharge Medication List as of 3/26/2021  2:11 AM      CONTINUE these medications which have NOT CHANGED    Details   adapalene (DIFFERIN) 0 1 % cream APPLY NIGHTLY TO FACE AS TOLERATED, Historical Med      ammonium lactate (LAC-HYDRIN) 12 % cream Apply topically 2 (two) times a day, Starting Mon 2/8/2021, Until Tue 2/8/2022, Historical Med      atoMOXetine (STRATTERA) 60 mg capsule TAKE 1 CAPSULE (60 MG) BY ORAL ROUTE ONCE DAILY IN THE MORNING, Historical Med      buPROPion (WELLBUTRIN SR) 100 mg 12 hr tablet TAKE 1 TABLET (100 MG) BY ORAL ROUTE ONCE PER DAY, Historical Med      cefuroxime (CEFTIN) 250 mg tablet Take 250 mg by mouth 2 (two) times a day, Starting Tue 9/29/2020, Historical Med      chlordiazePOXIDE (LIBRIUM) 25 mg capsule Take 25 mg by mouth 3 (three) times a day as needed for anxiety, Historical Med      ciclopirox (LOPROX) 0 77 % SUSP APPLY ONCE A DAY TO RIGHT THUMBNAIL, Historical Med      cloNIDine (CATAPRES) 0 1 mg tablet TAKE 1 TABLET (0 1 MG) BY ORAL ROUTE 2 TIMES PER DAY, Historical Med      Dapsone 5 % topical gel APPLY TWICE DAILY TO FACE, Historical Med      enoxaparin (LOVENOX) 100 mg/mL Inject 1 mL (100 mg total) under the skin every 12 (twelve) hours, Starting Wed 2/10/2021, Normal      ferrous sulfate 325 (65 Fe) mg tablet Take 325 mg by mouth daily with breakfast, Historical Med      folic acid (FOLVITE) 1 mg tablet Take 1 mg by mouth daily, Historical Med      furosemide (LASIX) 20 mg tablet Take 40 mg by mouth as needed Pt reports taking 80 mg in morning and 40 mg in afternoon , Starting Fri 8/21/2020, Historical Med      gabapentin (NEURONTIN) 400 mg capsule Take 400 mg by mouth 3 (three) times a day , Historical Med      hydrOXYzine pamoate (Vistaril) 25 mg capsule 1 capsule Every 6 hours, Historical Med      levothyroxine 100 mcg tablet Take 1 tablet (100 mcg total) by mouth daily in the early morning, Starting Tue 8/18/2020, Normal      !! lithium carbonate (LITHOBID) 300 mg CR tablet Take 300 mg by mouth 2 (two) times a day , Historical Med      !! lithium carbonate (LITHOBID) 300 mg CR tablet Take 750 mg by mouth every evening, Historical Med      Multiple Vitamin (MULTI-VITAMIN DAILY PO) Multi Vitamin   DAILY, Historical Med      NEEDLE, DISP, 27 G 27G X 1/2" MISC by Does not apply route every 12 (twelve) hours, Starting Sun 8/9/2020, Normal      pantoprazole (PROTONIX) 40 mg tablet Take 1 tablet (40 mg total) by mouth daily in the early morning, Starting Sun 8/9/2020, Normal      potassium chloride (K-DUR,KLOR-CON) 20 mEq tablet Take 1 tablet (20 mEq total) by mouth 2 (two) times a day, Starting Sun 8/9/2020, Normal      pyridoxine (VITAMIN B6) 100 mg tablet Take 100 mg by mouth daily, Historical Med      !! QUEtiapine (SEROquel) 100 mg tablet Take 100 mg by mouth 3 (three) times a day , Historical Med      !! QUEtiapine (SEROquel) 400 MG tablet Take 400 mg by mouth daily at bedtime, Historical Med      rizatriptan (MAXALT-MLT) 10 MG disintegrating tablet Take 10 mg by mouth, Starting Mon 2/8/2021, Until Tue 2/8/2022, Historical Med      rOPINIRole (REQUIP) 1 mg tablet TAKE 1 TABLET (1 MG) BY ORAL ROUTE 1-3 HOURS BEFORE BEDTIME, Historical Med      sertraline (ZOLOFT) 50 mg tablet Take 50 mg by mouth daily, Historical Med      SUMAtriptan (IMITREX) 100 mg tablet Take 100 mg by mouth as needed for migraine , Historical Med      thiamine 100 MG tablet Daily, Historical Med      topiramate (TOPAMAX) 100 mg tablet Take 150 mg by mouth 2 (two) times a day , Historical Med      !! venlafaxine (EFFEXOR-XR) 150 mg 24 hr capsule venlafaxine  mg capsule,extended release 24 hr, Historical Med      !! venlafaxine (EFFEXOR-XR) 75 mg 24 hr capsule Take 75 mg by mouth daily at bedtime, Historical Med       !! - Potential duplicate medications found  Please discuss with provider  No discharge procedures on file      PDMP Review     None          ED Provider  Electronically Signed by           Caro Landin DO  03/26/21 6113

## 2021-03-29 ENCOUNTER — PREP FOR PROCEDURE (OUTPATIENT)
Dept: INTERVENTIONAL RADIOLOGY/VASCULAR | Facility: CLINIC | Age: 46
End: 2021-03-29

## 2021-03-29 DIAGNOSIS — Z95.828 PRESENCE OF IVC FILTER: Primary | ICD-10-CM

## 2021-03-30 ENCOUNTER — TELEPHONE (OUTPATIENT)
Dept: HEMATOLOGY ONCOLOGY | Facility: CLINIC | Age: 46
End: 2021-03-30

## 2021-03-30 ENCOUNTER — HOSPITAL ENCOUNTER (OUTPATIENT)
Dept: MAMMOGRAPHY | Facility: MEDICAL CENTER | Age: 46
Discharge: HOME | End: 2021-03-30
Payer: MEDICARE

## 2021-03-30 VITALS — HEIGHT: 63 IN | WEIGHT: 246 LBS | BODY MASS INDEX: 43.59 KG/M2

## 2021-03-30 DIAGNOSIS — Z12.31 ENCOUNTER FOR SCREENING MAMMOGRAM FOR BREAST CANCER: ICD-10-CM

## 2021-03-30 PROCEDURE — 77067 SCR MAMMO BI INCL CAD: CPT

## 2021-03-30 PROCEDURE — 77063 BREAST TOMOSYNTHESIS BI: CPT

## 2021-03-30 NOTE — TELEPHONE ENCOUNTER
LVM for patient stating we had to move her appointment time up on 4/14/21 from 11:20 to 8:20 AM with Dr Amrit Osorio  Instructed patient to call our office if this does not work for her

## 2021-04-01 ENCOUNTER — TELEPHONE (OUTPATIENT)
Dept: INTERVENTIONAL RADIOLOGY/VASCULAR | Facility: CLINIC | Age: 46
End: 2021-04-01

## 2021-04-01 NOTE — QUICK NOTE
Telephone note    Ms kohli called to inform me that her primary care doctor has cleared her from infection  Thankfully I have already received a call from the primary care office  She is okay to schedule for procedure  She had a number of concerns, I confirmed with her that she only has 1 inferior vena cava filter in her body    I also reviewed why we recommend removal given that she is otherwise young and healthy      We will removed the filter and assess her veins to see if any procedures need to be done to reduce her leg swelling

## 2021-05-01 ENCOUNTER — HOSPITAL ENCOUNTER (INPATIENT)
Facility: HOSPITAL | Age: 46
LOS: 4 days | Discharge: HOME | DRG: 378 | End: 2021-05-05
Attending: EMERGENCY MEDICINE | Admitting: INTERNAL MEDICINE
Payer: MEDICARE

## 2021-05-01 DIAGNOSIS — K92.0 VOMITING BLOOD: ICD-10-CM

## 2021-05-01 DIAGNOSIS — I95.9 HYPOTENSION: ICD-10-CM

## 2021-05-01 DIAGNOSIS — K21.9 ACID REFLUX: ICD-10-CM

## 2021-05-01 DIAGNOSIS — R79.89 LFT ELEVATION: ICD-10-CM

## 2021-05-01 DIAGNOSIS — D64.9 ANEMIA: ICD-10-CM

## 2021-05-01 DIAGNOSIS — R79.89 ELEVATED LACTIC ACID LEVEL: ICD-10-CM

## 2021-05-01 DIAGNOSIS — K92.2 ACUTE GI BLEEDING: ICD-10-CM

## 2021-05-01 DIAGNOSIS — E87.6 HYPOKALEMIA: ICD-10-CM

## 2021-05-01 DIAGNOSIS — K92.2 GI BLEED: Primary | ICD-10-CM

## 2021-05-01 PROBLEM — R74.01 TRANSAMINITIS: Status: ACTIVE | Noted: 2020-08-03

## 2021-05-01 PROBLEM — E87.20 LACTIC ACID ACIDOSIS: Status: ACTIVE | Noted: 2021-05-01

## 2021-05-01 PROBLEM — D62 ACUTE ON CHRONIC BLOOD LOSS ANEMIA: Status: ACTIVE | Noted: 2021-05-01

## 2021-05-01 PROBLEM — E87.2 LACTIC ACID ACIDOSIS: Status: ACTIVE | Noted: 2021-05-01

## 2021-05-01 LAB
ABO GROUP BLD: NORMAL
ABO GROUP BLD: NORMAL
ALBUMIN SERPL BCP-MCNC: 1.6 G/DL (ref 3.5–5)
ALP SERPL-CCNC: 199 U/L (ref 46–116)
ALT SERPL W P-5'-P-CCNC: 98 U/L (ref 12–78)
ANION GAP SERPL CALCULATED.3IONS-SCNC: 14 MMOL/L (ref 4–13)
APTT PPP: 35 SECONDS (ref 23–37)
AST SERPL W P-5'-P-CCNC: 331 U/L (ref 5–45)
ATRIAL RATE: 96 BPM
BASOPHILS # BLD AUTO: 0.04 THOUSANDS/ΜL (ref 0–0.1)
BASOPHILS NFR BLD AUTO: 1 % (ref 0–1)
BILIRUB SERPL-MCNC: 1.8 MG/DL (ref 0.2–1)
BLD GP AB SCN SERPL QL: NEGATIVE
BUN SERPL-MCNC: 5 MG/DL (ref 5–25)
CALCIUM ALBUM COR SERPL-MCNC: 9 MG/DL (ref 8.3–10.1)
CALCIUM SERPL-MCNC: 7.1 MG/DL (ref 8.3–10.1)
CHLORIDE SERPL-SCNC: 101 MMOL/L (ref 100–108)
CO2 SERPL-SCNC: 26 MMOL/L (ref 21–32)
CREAT SERPL-MCNC: 0.65 MG/DL (ref 0.6–1.3)
EOSINOPHIL # BLD AUTO: 0.02 THOUSAND/ΜL (ref 0–0.61)
EOSINOPHIL NFR BLD AUTO: 0 % (ref 0–6)
ERYTHROCYTE [DISTWIDTH] IN BLOOD BY AUTOMATED COUNT: 21.3 % (ref 11.6–15.1)
GFR SERPL CREATININE-BSD FRML MDRD: 106 ML/MIN/1.73SQ M
GLUCOSE SERPL-MCNC: 159 MG/DL (ref 65–140)
HCT VFR BLD AUTO: 20.6 % (ref 34.8–46.1)
HCT VFR BLD AUTO: 22.4 % (ref 34.8–46.1)
HGB BLD-MCNC: 7.2 G/DL (ref 11.5–15.4)
HGB BLD-MCNC: 7.7 G/DL (ref 11.5–15.4)
IMM GRANULOCYTES # BLD AUTO: 0.02 THOUSAND/UL (ref 0–0.2)
IMM GRANULOCYTES NFR BLD AUTO: 0 % (ref 0–2)
INR PPP: 1.25 (ref 0.84–1.19)
LACTATE SERPL-SCNC: 10 MMOL/L (ref 0.5–2)
LIPASE SERPL-CCNC: 95 U/L (ref 73–393)
LYMPHOCYTES # BLD AUTO: 1.64 THOUSANDS/ΜL (ref 0.6–4.47)
LYMPHOCYTES NFR BLD AUTO: 27 % (ref 14–44)
MCH RBC QN AUTO: 31.4 PG (ref 26.8–34.3)
MCHC RBC AUTO-ENTMCNC: 34.4 G/DL (ref 31.4–37.4)
MCV RBC AUTO: 91 FL (ref 82–98)
MONOCYTES # BLD AUTO: 0.58 THOUSAND/ΜL (ref 0.17–1.22)
MONOCYTES NFR BLD AUTO: 9 % (ref 4–12)
NEUTROPHILS # BLD AUTO: 3.87 THOUSANDS/ΜL (ref 1.85–7.62)
NEUTS SEG NFR BLD AUTO: 63 % (ref 43–75)
NRBC BLD AUTO-RTO: 0 /100 WBCS
NT-PROBNP SERPL-MCNC: 148 PG/ML
P AXIS: 44 DEGREES
PLATELET # BLD AUTO: 140 THOUSANDS/UL (ref 149–390)
PMV BLD AUTO: 12.9 FL (ref 8.9–12.7)
POTASSIUM SERPL-SCNC: 2.7 MMOL/L (ref 3.5–5.3)
PR INTERVAL: 140 MS
PROT SERPL-MCNC: 5 G/DL (ref 6.4–8.2)
PROTHROMBIN TIME: 15.4 SECONDS (ref 11.6–14.5)
QRS AXIS: -15 DEGREES
QRSD INTERVAL: 86 MS
QT INTERVAL: 450 MS
QTC INTERVAL: 568 MS
RBC # BLD AUTO: 2.45 MILLION/UL (ref 3.81–5.12)
RH BLD: POSITIVE
RH BLD: POSITIVE
SARS-COV-2 RNA RESP QL NAA+PROBE: NEGATIVE
SODIUM SERPL-SCNC: 141 MMOL/L (ref 136–145)
SPECIMEN EXPIRATION DATE: NORMAL
T WAVE AXIS: 3 DEGREES
TROPONIN I SERPL-MCNC: <0.02 NG/ML
VENTRICULAR RATE: 96 BPM
WBC # BLD AUTO: 6.17 THOUSAND/UL (ref 4.31–10.16)

## 2021-05-01 PROCEDURE — U0003 INFECTIOUS AGENT DETECTION BY NUCLEIC ACID (DNA OR RNA); SEVERE ACUTE RESPIRATORY SYNDROME CORONAVIRUS 2 (SARS-COV-2) (CORONAVIRUS DISEASE [COVID-19]), AMPLIFIED PROBE TECHNIQUE, MAKING USE OF HIGH THROUGHPUT TECHNOLOGIES AS DESCRIBED BY CMS-2020-01-R: HCPCS | Performed by: EMERGENCY MEDICINE

## 2021-05-01 PROCEDURE — 36430 TRANSFUSION BLD/BLD COMPNT: CPT

## 2021-05-01 PROCEDURE — 83605 ASSAY OF LACTIC ACID: CPT | Performed by: EMERGENCY MEDICINE

## 2021-05-01 PROCEDURE — 85730 THROMBOPLASTIN TIME PARTIAL: CPT | Performed by: EMERGENCY MEDICINE

## 2021-05-01 PROCEDURE — 82272 OCCULT BLD FECES 1-3 TESTS: CPT

## 2021-05-01 PROCEDURE — 86850 RBC ANTIBODY SCREEN: CPT | Performed by: EMERGENCY MEDICINE

## 2021-05-01 PROCEDURE — C9113 INJ PANTOPRAZOLE SODIUM, VIA: HCPCS | Performed by: EMERGENCY MEDICINE

## 2021-05-01 PROCEDURE — 85018 HEMOGLOBIN: CPT | Performed by: EMERGENCY MEDICINE

## 2021-05-01 PROCEDURE — 86920 COMPATIBILITY TEST SPIN: CPT

## 2021-05-01 PROCEDURE — 96361 HYDRATE IV INFUSION ADD-ON: CPT

## 2021-05-01 PROCEDURE — 86901 BLOOD TYPING SEROLOGIC RH(D): CPT | Performed by: EMERGENCY MEDICINE

## 2021-05-01 PROCEDURE — 85610 PROTHROMBIN TIME: CPT | Performed by: EMERGENCY MEDICINE

## 2021-05-01 PROCEDURE — 99291 CRITICAL CARE FIRST HOUR: CPT

## 2021-05-01 PROCEDURE — 84484 ASSAY OF TROPONIN QUANT: CPT | Performed by: EMERGENCY MEDICINE

## 2021-05-01 PROCEDURE — 85014 HEMATOCRIT: CPT | Performed by: EMERGENCY MEDICINE

## 2021-05-01 PROCEDURE — U0005 INFEC AGEN DETEC AMPLI PROBE: HCPCS | Performed by: EMERGENCY MEDICINE

## 2021-05-01 PROCEDURE — 99292 CRITICAL CARE ADDL 30 MIN: CPT | Performed by: EMERGENCY MEDICINE

## 2021-05-01 PROCEDURE — 99291 CRITICAL CARE FIRST HOUR: CPT | Performed by: EMERGENCY MEDICINE

## 2021-05-01 PROCEDURE — 93005 ELECTROCARDIOGRAM TRACING: CPT

## 2021-05-01 PROCEDURE — 93010 ELECTROCARDIOGRAM REPORT: CPT | Performed by: INTERNAL MEDICINE

## 2021-05-01 PROCEDURE — 83690 ASSAY OF LIPASE: CPT | Performed by: EMERGENCY MEDICINE

## 2021-05-01 PROCEDURE — 85025 COMPLETE CBC W/AUTO DIFF WBC: CPT | Performed by: EMERGENCY MEDICINE

## 2021-05-01 PROCEDURE — 36415 COLL VENOUS BLD VENIPUNCTURE: CPT | Performed by: EMERGENCY MEDICINE

## 2021-05-01 PROCEDURE — 80053 COMPREHEN METABOLIC PANEL: CPT | Performed by: EMERGENCY MEDICINE

## 2021-05-01 PROCEDURE — 86900 BLOOD TYPING SEROLOGIC ABO: CPT | Performed by: EMERGENCY MEDICINE

## 2021-05-01 PROCEDURE — P9016 RBC LEUKOCYTES REDUCED: HCPCS

## 2021-05-01 PROCEDURE — 83880 ASSAY OF NATRIURETIC PEPTIDE: CPT | Performed by: EMERGENCY MEDICINE

## 2021-05-01 PROCEDURE — 96375 TX/PRO/DX INJ NEW DRUG ADDON: CPT

## 2021-05-01 PROCEDURE — 96365 THER/PROPH/DIAG IV INF INIT: CPT

## 2021-05-01 PROCEDURE — NC001 PR NO CHARGE: Performed by: INTERNAL MEDICINE

## 2021-05-01 RX ORDER — ALBUTEROL SULFATE 2.5 MG/3ML
1 SOLUTION RESPIRATORY (INHALATION) ONCE
Status: COMPLETED | OUTPATIENT
Start: 2021-05-01 | End: 2021-05-01

## 2021-05-01 RX ORDER — POTASSIUM CHLORIDE 14.9 MG/ML
20 INJECTION INTRAVENOUS ONCE
Status: DISCONTINUED | OUTPATIENT
Start: 2021-05-02 | End: 2021-05-02

## 2021-05-01 RX ORDER — OCTREOTIDE ACETATE 50 UG/ML
50 INJECTION, SOLUTION INTRAVENOUS; SUBCUTANEOUS ONCE
Status: COMPLETED | OUTPATIENT
Start: 2021-05-01 | End: 2021-05-01

## 2021-05-01 RX ORDER — ONDANSETRON 2 MG/ML
4 INJECTION INTRAMUSCULAR; INTRAVENOUS ONCE
Status: COMPLETED | OUTPATIENT
Start: 2021-05-01 | End: 2021-05-01

## 2021-05-01 RX ORDER — POTASSIUM CHLORIDE 20 MEQ/1
40 TABLET, EXTENDED RELEASE ORAL ONCE
Status: COMPLETED | OUTPATIENT
Start: 2021-05-02 | End: 2021-05-02

## 2021-05-01 RX ORDER — POTASSIUM CHLORIDE 14.9 MG/ML
20 INJECTION INTRAVENOUS ONCE
Status: COMPLETED | OUTPATIENT
Start: 2021-05-02 | End: 2021-05-02

## 2021-05-01 RX ORDER — ONDANSETRON 2 MG/ML
4 INJECTION INTRAMUSCULAR; INTRAVENOUS EVERY 6 HOURS PRN
Status: DISCONTINUED | OUTPATIENT
Start: 2021-05-01 | End: 2021-05-05 | Stop reason: HOSPADM

## 2021-05-01 RX ADMIN — SODIUM CHLORIDE 1000 ML: 0.9 INJECTION, SOLUTION INTRAVENOUS at 22:18

## 2021-05-01 RX ADMIN — OCTREOTIDE ACETATE 50 MCG: 50 INJECTION, SOLUTION INTRAVENOUS; SUBCUTANEOUS at 23:59

## 2021-05-01 RX ADMIN — SODIUM CHLORIDE 80 MG: 9 INJECTION, SOLUTION INTRAVENOUS at 22:54

## 2021-05-01 RX ADMIN — SODIUM CHLORIDE 1000 ML: 0.9 INJECTION, SOLUTION INTRAVENOUS at 22:19

## 2021-05-01 RX ADMIN — ONDANSETRON 4 MG: 2 INJECTION INTRAMUSCULAR; INTRAVENOUS at 22:20

## 2021-05-02 ENCOUNTER — ANESTHESIA (INPATIENT)
Dept: GASTROENTEROLOGY | Facility: HOSPITAL | Age: 46
DRG: 378 | End: 2021-05-02
Payer: MEDICARE

## 2021-05-02 ENCOUNTER — APPOINTMENT (INPATIENT)
Dept: GASTROENTEROLOGY | Facility: HOSPITAL | Age: 46
DRG: 378 | End: 2021-05-02
Payer: MEDICARE

## 2021-05-02 ENCOUNTER — ANESTHESIA EVENT (INPATIENT)
Dept: GASTROENTEROLOGY | Facility: HOSPITAL | Age: 46
DRG: 378 | End: 2021-05-02
Payer: MEDICARE

## 2021-05-02 PROBLEM — D68.59 HYPERCOAGULATION SYNDROME (HCC): Status: ACTIVE | Noted: 2021-05-02

## 2021-05-02 PROBLEM — E66.01 MORBIDLY OBESE (HCC): Status: ACTIVE | Noted: 2021-05-02

## 2021-05-02 PROBLEM — E87.2 HIGH ANION GAP METABOLIC ACIDOSIS: Status: ACTIVE | Noted: 2021-05-02

## 2021-05-02 PROBLEM — I95.9 HYPOTENSION: Status: ACTIVE | Noted: 2021-05-02

## 2021-05-02 PROBLEM — E87.29 HIGH ANION GAP METABOLIC ACIDOSIS: Status: ACTIVE | Noted: 2021-05-02

## 2021-05-02 LAB
ABO GROUP BLD BPU: NORMAL
ABO GROUP BLD BPU: NORMAL
ALBUMIN SERPL BCP-MCNC: 1.8 G/DL (ref 3.5–5)
ALP SERPL-CCNC: 173 U/L (ref 46–116)
ALT SERPL W P-5'-P-CCNC: 84 U/L (ref 12–78)
AMMONIA PLAS-SCNC: 30 UMOL/L (ref 11–35)
ANION GAP SERPL CALCULATED.3IONS-SCNC: 14 MMOL/L (ref 4–13)
APTT PPP: 34 SECONDS (ref 23–37)
AST SERPL W P-5'-P-CCNC: 262 U/L (ref 5–45)
ATRIAL RATE: 91 BPM
BASOPHILS # BLD AUTO: 0.04 THOUSANDS/ΜL (ref 0–0.1)
BASOPHILS NFR BLD AUTO: 1 % (ref 0–1)
BILIRUB DIRECT SERPL-MCNC: 1.53 MG/DL (ref 0–0.2)
BILIRUB SERPL-MCNC: 2.07 MG/DL (ref 0.2–1)
BPU ID: NORMAL
BPU ID: NORMAL
BUN SERPL-MCNC: 5 MG/DL (ref 5–25)
CALCIUM ALBUM COR SERPL-MCNC: 8.5 MG/DL (ref 8.3–10.1)
CALCIUM SERPL-MCNC: 6.7 MG/DL (ref 8.3–10.1)
CHLORIDE SERPL-SCNC: 102 MMOL/L (ref 100–108)
CO2 SERPL-SCNC: 26 MMOL/L (ref 21–32)
CREAT SERPL-MCNC: 0.86 MG/DL (ref 0.6–1.3)
EOSINOPHIL # BLD AUTO: 0 THOUSAND/ΜL (ref 0–0.61)
EOSINOPHIL NFR BLD AUTO: 0 % (ref 0–6)
ERYTHROCYTE [DISTWIDTH] IN BLOOD BY AUTOMATED COUNT: 19.2 % (ref 11.6–15.1)
FERRITIN SERPL-MCNC: 175 NG/ML (ref 8–388)
GFR SERPL CREATININE-BSD FRML MDRD: 81 ML/MIN/1.73SQ M
GLUCOSE SERPL-MCNC: 198 MG/DL (ref 65–140)
HCT VFR BLD AUTO: 24.5 % (ref 34.8–46.1)
HGB BLD-MCNC: 7.5 G/DL (ref 11.5–15.4)
HGB BLD-MCNC: 8.5 G/DL (ref 11.5–15.4)
HGB BLD-MCNC: 8.7 G/DL (ref 11.5–15.4)
IMM GRANULOCYTES # BLD AUTO: 0.02 THOUSAND/UL (ref 0–0.2)
IMM GRANULOCYTES NFR BLD AUTO: 0 % (ref 0–2)
INR PPP: 1.18 (ref 0.84–1.19)
IRON SATN MFR SERPL: 35 %
IRON SERPL-MCNC: 47 UG/DL (ref 50–170)
LACTATE SERPL-SCNC: 4.8 MMOL/L (ref 0.5–2)
LACTATE SERPL-SCNC: 6 MMOL/L (ref 0.5–2)
LACTATE SERPL-SCNC: 6.2 MMOL/L (ref 0.5–2)
LACTATE SERPL-SCNC: 8.1 MMOL/L (ref 0.5–2)
LYMPHOCYTES # BLD AUTO: 0.81 THOUSANDS/ΜL (ref 0.6–4.47)
LYMPHOCYTES NFR BLD AUTO: 14 % (ref 14–44)
MAGNESIUM SERPL-MCNC: 1.1 MG/DL (ref 1.6–2.6)
MCH RBC QN AUTO: 31.5 PG (ref 26.8–34.3)
MCHC RBC AUTO-ENTMCNC: 34.7 G/DL (ref 31.4–37.4)
MCV RBC AUTO: 91 FL (ref 82–98)
MONOCYTES # BLD AUTO: 0.5 THOUSAND/ΜL (ref 0.17–1.22)
MONOCYTES NFR BLD AUTO: 9 % (ref 4–12)
NEUTROPHILS # BLD AUTO: 4.39 THOUSANDS/ΜL (ref 1.85–7.62)
NEUTS SEG NFR BLD AUTO: 76 % (ref 43–75)
NRBC BLD AUTO-RTO: 0 /100 WBCS
P AXIS: 84 DEGREES
PHOSPHATE SERPL-MCNC: 3.2 MG/DL (ref 2.7–4.5)
PLATELET # BLD AUTO: 108 THOUSANDS/UL (ref 149–390)
PMV BLD AUTO: 13 FL (ref 8.9–12.7)
POTASSIUM SERPL-SCNC: 3 MMOL/L (ref 3.5–5.3)
PR INTERVAL: 183 MS
PROT SERPL-MCNC: 5.1 G/DL (ref 6.4–8.2)
PROTHROMBIN TIME: 14.8 SECONDS (ref 11.6–14.5)
QRS AXIS: 17 DEGREES
QRSD INTERVAL: 96 MS
QT INTERVAL: 463 MS
QTC INTERVAL: 570 MS
RBC # BLD AUTO: 2.7 MILLION/UL (ref 3.81–5.12)
SODIUM SERPL-SCNC: 142 MMOL/L (ref 136–145)
T WAVE AXIS: 21 DEGREES
TIBC SERPL-MCNC: 134 UG/DL (ref 250–450)
UNIT DISPENSE STATUS: NORMAL
UNIT DISPENSE STATUS: NORMAL
UNIT PRODUCT CODE: NORMAL
UNIT PRODUCT CODE: NORMAL
UNIT RH: NORMAL
UNIT RH: NORMAL
VENTRICULAR RATE: 91 BPM
WBC # BLD AUTO: 5.76 THOUSAND/UL (ref 4.31–10.16)

## 2021-05-02 PROCEDURE — 83605 ASSAY OF LACTIC ACID: CPT | Performed by: PHYSICIAN ASSISTANT

## 2021-05-02 PROCEDURE — 85730 THROMBOPLASTIN TIME PARTIAL: CPT | Performed by: PHYSICIAN ASSISTANT

## 2021-05-02 PROCEDURE — 84100 ASSAY OF PHOSPHORUS: CPT | Performed by: PHYSICIAN ASSISTANT

## 2021-05-02 PROCEDURE — 82140 ASSAY OF AMMONIA: CPT | Performed by: PHYSICIAN ASSISTANT

## 2021-05-02 PROCEDURE — 85018 HEMOGLOBIN: CPT | Performed by: PHYSICIAN ASSISTANT

## 2021-05-02 PROCEDURE — 99222 1ST HOSP IP/OBS MODERATE 55: CPT | Performed by: INTERNAL MEDICINE

## 2021-05-02 PROCEDURE — 93005 ELECTROCARDIOGRAM TRACING: CPT

## 2021-05-02 PROCEDURE — 43255 EGD CONTROL BLEEDING ANY: CPT | Performed by: INTERNAL MEDICINE

## 2021-05-02 PROCEDURE — 82728 ASSAY OF FERRITIN: CPT | Performed by: PHYSICIAN ASSISTANT

## 2021-05-02 PROCEDURE — 82248 BILIRUBIN DIRECT: CPT | Performed by: NURSE PRACTITIONER

## 2021-05-02 PROCEDURE — 83540 ASSAY OF IRON: CPT | Performed by: PHYSICIAN ASSISTANT

## 2021-05-02 PROCEDURE — 99291 CRITICAL CARE FIRST HOUR: CPT | Performed by: PHYSICIAN ASSISTANT

## 2021-05-02 PROCEDURE — 93010 ELECTROCARDIOGRAM REPORT: CPT | Performed by: INTERNAL MEDICINE

## 2021-05-02 PROCEDURE — 85025 COMPLETE CBC W/AUTO DIFF WBC: CPT | Performed by: PHYSICIAN ASSISTANT

## 2021-05-02 PROCEDURE — P9016 RBC LEUKOCYTES REDUCED: HCPCS

## 2021-05-02 PROCEDURE — 83550 IRON BINDING TEST: CPT | Performed by: PHYSICIAN ASSISTANT

## 2021-05-02 PROCEDURE — 85610 PROTHROMBIN TIME: CPT | Performed by: PHYSICIAN ASSISTANT

## 2021-05-02 PROCEDURE — 85018 HEMOGLOBIN: CPT | Performed by: NURSE PRACTITIONER

## 2021-05-02 PROCEDURE — 80053 COMPREHEN METABOLIC PANEL: CPT | Performed by: PHYSICIAN ASSISTANT

## 2021-05-02 PROCEDURE — 83735 ASSAY OF MAGNESIUM: CPT | Performed by: PHYSICIAN ASSISTANT

## 2021-05-02 PROCEDURE — P9017 PLASMA 1 DONOR FRZ W/IN 8 HR: HCPCS

## 2021-05-02 PROCEDURE — C9113 INJ PANTOPRAZOLE SODIUM, VIA: HCPCS | Performed by: PHYSICIAN ASSISTANT

## 2021-05-02 PROCEDURE — 30233K1 TRANSFUSION OF NONAUTOLOGOUS FROZEN PLASMA INTO PERIPHERAL VEIN, PERCUTANEOUS APPROACH: ICD-10-PCS | Performed by: INTERNAL MEDICINE

## 2021-05-02 PROCEDURE — C9113 INJ PANTOPRAZOLE SODIUM, VIA: HCPCS | Performed by: EMERGENCY MEDICINE

## 2021-05-02 RX ORDER — LITHIUM CARBONATE 300 MG/1
300 TABLET, FILM COATED, EXTENDED RELEASE ORAL 2 TIMES DAILY
Status: DISCONTINUED | OUTPATIENT
Start: 2021-05-02 | End: 2021-05-05 | Stop reason: HOSPADM

## 2021-05-02 RX ORDER — POTASSIUM CHLORIDE 20 MEQ/1
20 TABLET, EXTENDED RELEASE ORAL 2 TIMES DAILY
Status: DISCONTINUED | OUTPATIENT
Start: 2021-05-02 | End: 2021-05-05 | Stop reason: HOSPADM

## 2021-05-02 RX ORDER — LANOLIN ALCOHOL/MO/W.PET/CERES
100 CREAM (GRAM) TOPICAL DAILY
Status: DISCONTINUED | OUTPATIENT
Start: 2021-05-02 | End: 2021-05-05 | Stop reason: HOSPADM

## 2021-05-02 RX ORDER — LORAZEPAM 2 MG/ML
0.5 INJECTION INTRAMUSCULAR EVERY 4 HOURS PRN
Status: DISCONTINUED | OUTPATIENT
Start: 2021-05-02 | End: 2021-05-05 | Stop reason: HOSPADM

## 2021-05-02 RX ORDER — POTASSIUM CHLORIDE 20 MEQ/1
40 TABLET, EXTENDED RELEASE ORAL ONCE
Status: COMPLETED | OUTPATIENT
Start: 2021-05-02 | End: 2021-05-02

## 2021-05-02 RX ORDER — SODIUM CHLORIDE 9 MG/ML
INJECTION, SOLUTION INTRAVENOUS CONTINUOUS PRN
Status: DISCONTINUED | OUTPATIENT
Start: 2021-05-02 | End: 2021-05-02

## 2021-05-02 RX ORDER — ALPRAZOLAM 0.5 MG/1
0.5 TABLET ORAL ONCE
Status: COMPLETED | OUTPATIENT
Start: 2021-05-02 | End: 2021-05-02

## 2021-05-02 RX ORDER — LEVOTHYROXINE SODIUM 0.1 MG/1
100 TABLET ORAL
Status: DISCONTINUED | OUTPATIENT
Start: 2021-05-03 | End: 2021-05-05 | Stop reason: HOSPADM

## 2021-05-02 RX ORDER — VENLAFAXINE HYDROCHLORIDE 150 MG/1
150 CAPSULE, EXTENDED RELEASE ORAL DAILY
Status: DISCONTINUED | OUTPATIENT
Start: 2021-05-02 | End: 2021-05-05 | Stop reason: HOSPADM

## 2021-05-02 RX ORDER — EPINEPHRINE 1 MG/ML
INJECTION, SOLUTION, CONCENTRATE INTRAVENOUS
Status: COMPLETED
Start: 2021-05-02 | End: 2021-05-02

## 2021-05-02 RX ORDER — POTASSIUM CHLORIDE 14.9 MG/ML
20 INJECTION INTRAVENOUS ONCE
Status: COMPLETED | OUTPATIENT
Start: 2021-05-02 | End: 2021-05-02

## 2021-05-02 RX ORDER — ATOMOXETINE 60 MG/1
60 CAPSULE ORAL DAILY
Status: DISCONTINUED | OUTPATIENT
Start: 2021-05-02 | End: 2021-05-05 | Stop reason: HOSPADM

## 2021-05-02 RX ORDER — PROPOFOL 10 MG/ML
INJECTION, EMULSION INTRAVENOUS AS NEEDED
Status: DISCONTINUED | OUTPATIENT
Start: 2021-05-02 | End: 2021-05-02

## 2021-05-02 RX ORDER — CLONIDINE HYDROCHLORIDE 0.1 MG/1
0.1 TABLET ORAL EVERY 8 HOURS PRN
Status: DISCONTINUED | OUTPATIENT
Start: 2021-05-02 | End: 2021-05-05 | Stop reason: HOSPADM

## 2021-05-02 RX ORDER — LORAZEPAM 2 MG/ML
0.5 INJECTION INTRAMUSCULAR ONCE
Status: COMPLETED | OUTPATIENT
Start: 2021-05-02 | End: 2021-05-02

## 2021-05-02 RX ORDER — MAGNESIUM SULFATE HEPTAHYDRATE 40 MG/ML
2 INJECTION, SOLUTION INTRAVENOUS ONCE
Status: COMPLETED | OUTPATIENT
Start: 2021-05-02 | End: 2021-05-02

## 2021-05-02 RX ORDER — ROPINIROLE 1 MG/1
1 TABLET, FILM COATED ORAL
Status: DISCONTINUED | OUTPATIENT
Start: 2021-05-02 | End: 2021-05-05 | Stop reason: HOSPADM

## 2021-05-02 RX ORDER — PYRIDOXINE HCL (VITAMIN B6) 50 MG
100 TABLET ORAL DAILY
Status: DISCONTINUED | OUTPATIENT
Start: 2021-05-02 | End: 2021-05-05 | Stop reason: HOSPADM

## 2021-05-02 RX ADMIN — LORAZEPAM 0.5 MG: 2 INJECTION INTRAMUSCULAR; INTRAVENOUS at 23:36

## 2021-05-02 RX ADMIN — THIAMINE HCL TAB 100 MG 100 MG: 100 TAB at 14:38

## 2021-05-02 RX ADMIN — EPINEPHRINE 0.5 MG: 1 INJECTION, SOLUTION, CONCENTRATE INTRAVENOUS at 13:23

## 2021-05-02 RX ADMIN — ROPINIROLE 1 MG: 1 TABLET, FILM COATED ORAL at 21:06

## 2021-05-02 RX ADMIN — POTASSIUM CHLORIDE 20 MEQ: 14.9 INJECTION, SOLUTION INTRAVENOUS at 00:47

## 2021-05-02 RX ADMIN — ALPRAZOLAM 0.5 MG: 0.5 TABLET ORAL at 05:53

## 2021-05-02 RX ADMIN — PROPOFOL 50 MG: 10 INJECTION, EMULSION INTRAVENOUS at 13:16

## 2021-05-02 RX ADMIN — LORAZEPAM 0.5 MG: 2 INJECTION INTRAMUSCULAR; INTRAVENOUS at 19:22

## 2021-05-02 RX ADMIN — SODIUM CHLORIDE 8 MG/HR: 9 INJECTION, SOLUTION INTRAVENOUS at 09:13

## 2021-05-02 RX ADMIN — PROPOFOL 50 MG: 10 INJECTION, EMULSION INTRAVENOUS at 13:19

## 2021-05-02 RX ADMIN — LORAZEPAM 0.5 MG: 2 INJECTION INTRAMUSCULAR; INTRAVENOUS at 10:01

## 2021-05-02 RX ADMIN — PROPOFOL 50 MG: 10 INJECTION, EMULSION INTRAVENOUS at 13:17

## 2021-05-02 RX ADMIN — SODIUM CHLORIDE 8 MG/HR: 9 INJECTION, SOLUTION INTRAVENOUS at 20:24

## 2021-05-02 RX ADMIN — OCTREOTIDE ACETATE 25 MCG/HR: 500 INJECTION, SOLUTION INTRAVENOUS; SUBCUTANEOUS at 00:22

## 2021-05-02 RX ADMIN — SODIUM CHLORIDE: 0.9 INJECTION, SOLUTION INTRAVENOUS at 12:55

## 2021-05-02 RX ADMIN — PROPOFOL 100 MG: 10 INJECTION, EMULSION INTRAVENOUS at 13:10

## 2021-05-02 RX ADMIN — SODIUM CHLORIDE 8 MG/HR: 9 INJECTION, SOLUTION INTRAVENOUS at 00:21

## 2021-05-02 RX ADMIN — PROPOFOL 100 MG: 10 INJECTION, EMULSION INTRAVENOUS at 13:15

## 2021-05-02 RX ADMIN — POTASSIUM CHLORIDE 20 MEQ: 1500 TABLET, EXTENDED RELEASE ORAL at 18:13

## 2021-05-02 RX ADMIN — POTASSIUM CHLORIDE 40 MEQ: 1500 TABLET, EXTENDED RELEASE ORAL at 06:18

## 2021-05-02 RX ADMIN — PROPOFOL 150 MG: 10 INJECTION, EMULSION INTRAVENOUS at 13:07

## 2021-05-02 RX ADMIN — PROPOFOL 50 MG: 10 INJECTION, EMULSION INTRAVENOUS at 13:22

## 2021-05-02 RX ADMIN — PROPOFOL 100 MG: 10 INJECTION, EMULSION INTRAVENOUS at 13:09

## 2021-05-02 RX ADMIN — PROPOFOL 50 MG: 10 INJECTION, EMULSION INTRAVENOUS at 13:14

## 2021-05-02 RX ADMIN — TOPIRAMATE 150 MG: 100 TABLET, FILM COATED ORAL at 18:13

## 2021-05-02 RX ADMIN — MAGNESIUM SULFATE HEPTAHYDRATE 2 G: 40 INJECTION, SOLUTION INTRAVENOUS at 09:11

## 2021-05-02 RX ADMIN — LITHIUM CARBONATE 300 MG: 300 TABLET, FILM COATED, EXTENDED RELEASE ORAL at 18:14

## 2021-05-02 RX ADMIN — POTASSIUM CHLORIDE 40 MEQ: 1500 TABLET, EXTENDED RELEASE ORAL at 00:46

## 2021-05-02 RX ADMIN — VENLAFAXINE HYDROCHLORIDE 150 MG: 150 CAPSULE, EXTENDED RELEASE ORAL at 14:38

## 2021-05-02 RX ADMIN — PROPOFOL 100 MG: 10 INJECTION, EMULSION INTRAVENOUS at 13:12

## 2021-05-02 RX ADMIN — POTASSIUM CHLORIDE 20 MEQ: 14.9 INJECTION, SOLUTION INTRAVENOUS at 06:18

## 2021-05-02 NOTE — PLAN OF CARE
Problem: Potential for Falls  Goal: Patient will remain free of falls  Description: INTERVENTIONS:  - Assess patient frequently for physical needs  -  Identify cognitive and physical deficits and behaviors that affect risk of falls    -  [place] fall precautions as indicated by assessment   - Educate patient/family on patient safety including physical limitations  - Instruct patient to call for assistance with activity based on assessment  - Modify environment to reduce risk of injury  - Consider OT/PT consult to assist with strengthening/mobility  Outcome: Progressing

## 2021-05-02 NOTE — PROGRESS NOTES
GI QUICK NOTE    I was paged by ED physician  Briefly, 51 yo with RYGB, anastomotic ulcer on EGD in 12/2020, DVT/PE currently on lovenox presented with coffee ground emesis and melena  Patient denies any alcohol intake  No history alcohol intake in the charts in past either  Initially SBP 83 which responded to fluid boluses and now 113/58  Hb 7 7 (baseline 10-11) at presentation which is stable on repeating after initial phase of resuscitation  BUN normal and at baseline  Patient to be admitted to ICU, continue resuscitation  Keep Hb > 7  On PPI gtt  Told ED physician to add octreotide just in case since patient has acutely deranged LFTs and slightly low plt  GI team to see patient in the morning and complete consult to follow that  Potentially plan for EGD tomorrow unless patient becomes hemodynamically unstable despite adequate resuscitation in which case EGD will be done emergently       Ayleen Gonzalez MD  Gastroenterology Fellow  [address]  Date: May 1, 2021

## 2021-05-02 NOTE — CONSULTS
Consultation - [facility]  Kervin Jac 50 y o  female MRN: [id number]  Unit/Bed#: ICU 14 Encounter: [id number]        Inpatient consult to gastroenterology  Consult performed by: Za Mota PA-C  Consult ordered by: Alexandra Harper PA-C        Reason for Consult / Principal Problem:     1  GI blood loss     ASSESSMENT AND PLAN:      78-year-old female with a past medical history of DVT/PE status post IVC filter on Lovenox, iron deficiency anemia, history Oc-en-Y, and anastomotic ulcer 12/2020 presenting with hematemesis and melena  1  Hematemesis  2  Melena   3  Epigastric pain  The patient presents with hematemesis and melena  She was found to be hypotensive with a BP of 68/41 and with a HGB of 7 2 which is down from 10 5 (2/89/21)  She received 2 units of PRBCs and 2 units FFP with a HGB now of 8 5  She remains hypotensive at 87/54  Also admits to epigastric pain  Has a known history of anastomotic ulcer seen on EGD 12/3/20  Colonoscopy at that time non concerning  - continue resuscitation   - monitor H&H and vitals  - continue PPI drip  - continue octreotide for now  - will proceed with urgent EGD    ______________________________________________________________________    HPI:  78-year-old female with a past medical history of DVT/PE status post IVC filter on Lovenox, iron deficiency anemia, history Oc-en-Y, and anastomotic ulcer 12/2020  She presented with hematemesis and melena associated with epigastric pain  She was found to be hypotensive with a BP of 68/41 and with a HGB of 7 2 which is down from 10 5 (2/89/21)  She received 2 units of PRBCs and 2 units FFP with a HGB now of 8 5  She remains hypotensive at 87/54  Her liver enzymes are also elevated  -262, ALT 98-84, alk phos 199-173, total bilirubin 1 80-2 07  Albumin 1 8, , INR 1  18  Last EGD and colonoscopy 12/3/20 performed secondary to weight loss   EGD with a stricture visualized at gastrojejunal anastomosis that was easily traversed and an anastomotic ulcer located on the jejunal side  Colonoscopy with one polyp measuring 5-9 mm in the sigmoid colon; completely removed by hot snare and 1 clip placed at polypectomy site  Prep was poor and repeat was recommended in 6 months  She denies an ibuprofen use  REVIEW OF SYSTEMS:    CONSTITUTIONAL: Denies any fever, chills, rigors, and weight loss  HEENT: No earache or tinnitus  Denies hearing loss or visual disturbances  CARDIOVASCULAR: No chest pain or palpitations  RESPIRATORY: Denies any cough, hemoptysis, shortness of breath or dyspnea on exertion  GASTROINTESTINAL: As noted in the History of Present Illness  GENITOURINARY: No problems with urination  Denies any hematuria or dysuria  NEUROLOGIC: No dizziness or vertigo, denies headaches  MUSCULOSKELETAL: Denies any muscle or joint pain  SKIN: Denies skin rashes or itching  ENDOCRINE: Denies excessive thirst  Denies intolerance to heat or cold  PSYCHOSOCIAL: Denies depression or anxiety  Denies any recent memory loss         Historical Information   Past Medical History:   Diagnosis Date    Anemia     DVT (deep venous thrombosis) (HCC)     Psychiatric disorder     bipolar II, suicide    Pulmonary embolism (HCC)     Seizures ([facility] [address] )      Past Surgical History:   Procedure Laterality Date    APPENDECTOMY      Open    CHOLECYSTECTOMY      Laparoscopic    GASTRIC BYPASS      was 205 date of surgery    IR DVT THROMBOLYSIS/THROMBECTOMY ILIAC/IVC WITH VENOGRAM  8/4/2020    IR TPA LYSIS CHECK  8/5/2020    IVC FILTER INSERTION  2017    STOMACH SURGERY      for morbid obesity    TUBAL LIGATION Bilateral 2010     Social History   Social History     Substance and Sexual Activity   Alcohol Use Not Currently    Comment: quit 6/21/2018     Social History     Substance and Sexual Activity   Drug Use No     Social History     Tobacco Use   Smoking Status Never Smoker   Smokeless Tobacco Never Used Tobacco Comment    former quit in 1999 per [employer]     Family History   Problem Relation Age of Onset    Other Mother         headache    Hypertension Mother     Depression Mother     Arthritis Father     Other Father         H, pylori infection    Other Sister         esophageal reflux    Migraines Sister     No Known Problems Paternal Grandfather     Diabetes Paternal Aunt         Mellitus    Breast cancer Paternal Aunt     Diabetes Paternal Uncle         Mellitus    Liver cancer Paternal Uncle     Asthma Daughter     No Known Problems Maternal Grandmother     No Known Problems Maternal Grandfather     No Known Problems Paternal Grandmother     No Known Problems Brother     No Known Problems Sister     No Known Problems Sister     Asthma Daughter     No Known Problems Maternal Aunt        Meds/Allergies     Medications Prior to Admission   Medication    atoMOXetine (STRATTERA) 60 mg capsule    cloNIDine (CATAPRES) 0 1 mg tablet    enoxaparin (LOVENOX) 100 mg/mL    ferrous sulfate 325 (65 Fe) mg tablet    folic acid (FOLVITE) 1 mg tablet    levothyroxine 100 mcg tablet    lithium carbonate (LITHOBID) 300 mg CR tablet    Multiple Vitamin (MULTI-VITAMIN DAILY PO)    pantoprazole (PROTONIX) 40 mg tablet    potassium chloride (K-DUR,KLOR-CON) 20 mEq tablet    pyridoxine (VITAMIN B6) 100 mg tablet    rizatriptan (MAXALT-MLT) 10 MG disintegrating tablet    rOPINIRole (REQUIP) 1 mg tablet    SUMAtriptan (IMITREX) 100 mg tablet    thiamine 100 MG tablet    topiramate (TOPAMAX) 100 mg tablet    venlafaxine (EFFEXOR-XR) 150 mg 24 hr capsule    NEEDLE, DISP, 27 G 27G X 1/2" MISC     Current Facility-Administered Medications   Medication Dose Route Frequency    magnesium sulfate 2 g/50 mL IVPB (premix) 2 g  2 g Intravenous Once    octreotide (SandoSTATIN) 500 mcg in sodium chloride 0 9 % 250 mL infusion  25 mcg/hr Intravenous Continuous    ondansetron (ZOFRAN) injection 4 mg  4 mg Intravenous Q6H PRN    pantoprazole (PROTONIX) 80 mg in sodium chloride 0 9 % 100 mL infusion  8 mg/hr Intravenous Continuous    potassium chloride 20 mEq IVPB (premix)  20 mEq Intravenous Once       Allergies   Allergen Reactions    Morphine Seizures           Objective     Blood pressure 105/57, pulse 88, temperature 98 7 °F (37 1 °C), temperature source Temporal, resp  rate (!) 31, height 5' 3" (1 6 m), weight 107 kg (236 lb 8 9 oz), last menstrual period 10/04/2020, SpO2 99 %  Body mass index is 41 9 kg/m²  Intake/Output Summary (Last 24 hours) at 5/2/2021 0815  Last data filed at 5/2/2021 0601  Gross per 24 hour   Intake 2577 29 ml   Output --   Net 2577 29 ml         PHYSICAL EXAM:      General Appearance:   Alert, cooperative, no distress   HEENT:   Normocephalic, atraumatic, anicteric      Neck:  Supple, symmetrical, trachea midline   Lungs:   Clear to auscultation bilaterally; no rales, rhonchi or wheezing; respirations unlabored    Heart[de-identified]   Regular rate and rhythm; no murmur, rub, or gallop     Abdomen:   Soft, non-tender, non-distended; normal bowel sounds; no masses, no organomegaly    Genitalia:   Deferred    Rectal:   Deferred    Extremities:  No cyanosis, clubbing or edema    Pulses:  2+ and symmetric all extremities    Skin:  No jaundice, rashes, or lesions    Lymph nodes:  No palpable cervical lymphadenopathy        Lab Results:   Admission on 05/01/2021   Component Date Value    WBC 05/01/2021 6 17     RBC 05/01/2021 2 45*    Hemoglobin 05/01/2021 7 7*    Hematocrit 05/01/2021 22 4*    MCV 05/01/2021 91     MCH 05/01/2021 31 4     MCHC 05/01/2021 34 4     RDW 05/01/2021 21 3*    MPV 05/01/2021 12 9*    Platelets 07/79/3157 140*    nRBC 05/01/2021 0     Neutrophils Relative 05/01/2021 63     Immat GRANS % 05/01/2021 0     Lymphocytes Relative 05/01/2021 27     Monocytes Relative 05/01/2021 9     Eosinophils Relative 05/01/2021 0     Basophils Relative 05/01/2021 1     Neutrophils Absolute 05/01/2021 3 87     Immature Grans Absolute 05/01/2021 0 02     Lymphocytes Absolute 05/01/2021 1 64     Monocytes Absolute 05/01/2021 0 58     Eosinophils Absolute 05/01/2021 0 02     Basophils Absolute 05/01/2021 0 04     Sodium 05/01/2021 141     Potassium 05/01/2021 2 7*    Chloride 05/01/2021 101     CO2 05/01/2021 26     ANION GAP 05/01/2021 14*    BUN 05/01/2021 5     Creatinine 05/01/2021 0 65     Glucose 05/01/2021 159*    Calcium 05/01/2021 7 1*    Corrected Calcium 05/01/2021 9 0     AST 05/01/2021 331*    ALT 05/01/2021 98*    Alkaline Phosphatase 05/01/2021 199*    Total Protein 05/01/2021 5 0*    Albumin 05/01/2021 1 6*    Total Bilirubin 05/01/2021 1 80*    eGFR 05/01/2021 106     Protime 05/01/2021 15 4*    INR 05/01/2021 1 25*    PTT 05/01/2021 35     ABO Grouping 05/01/2021 A     Rh Factor 05/01/2021 Positive     Antibody Screen 05/01/2021 Negative     Specimen Expiration Date 05/01/2021 [id number]     Lipase 05/01/2021 95     LACTIC ACID 05/01/2021 10 0*    NT-proBNP 05/01/2021 148*    Troponin I 05/01/2021 <0 02     SARS-CoV-2 05/01/2021 Negative     ABO Grouping 05/01/2021 A     Rh Factor 05/01/2021 Positive     Unit Product Code 05/02/2021 [id number]     Unit Number 05/02/2021 [id number]     Unit ABO 05/02/2021 A     Unit [facility] 05/02/2021 POS     Crossmatch 05/02/2021 Compatible     Unit Dispense Status 05/02/2021 Presumed Trans     Unit Product Code 05/02/2021 [id number]     Unit Number 05/02/2021 [id number]     Unit ABO 05/02/2021 A     Unit [facility] 05/02/2021 POS     Crossmatch 05/02/2021 Compatible     Unit Dispense Status 05/02/2021 Crossmatched     Unit Product Code 05/02/2021 [id number]     Unit Number 05/02/2021 [id number]     Unit ABO 05/02/2021 A     Unit [facility] 05/02/2021 POS     Crossmatch 05/02/2021 Compatible     Unit Dispense Status 05/02/2021 Crossmatched     Unit Product Code 05/02/2021 [id number]     Unit Number 05/02/2021 [id number]     Unit ABO 05/02/2021 A     Unit RH 05/02/2021 POS     Crossmatch 05/02/2021 Compatible     Unit Dispense Status 05/02/2021 Presumed Trans     Ventricular Rate 05/01/2021 96     Atrial Rate 05/01/2021 96     AR Interval 05/01/2021 140     QRSD Interval 05/01/2021 86     QT Interval 05/01/2021 450     QTC Interval 05/01/2021 568     P Axis 05/01/2021 44     QRS Axis 05/01/2021 -15     T Wave Axis 05/01/2021 3     Hemoglobin 05/01/2021 7 2*    Hematocrit 05/01/2021 20 6*    Hemoglobin 05/02/2021 8 7*    Iron Saturation 05/02/2021 35     TIBC 05/02/2021 134*    Iron 05/02/2021 47*    Ferritin 05/02/2021 175     LACTIC ACID 05/02/2021 8 1*    Unit Product Code 05/02/2021 [id number]     Unit Number 05/02/2021 [id number]     Unit ABO 05/02/2021 A     Unit [facility] 05/02/2021 POS     Unit Dispense Status 05/02/2021 Presumed Trans     Unit Product Code 05/02/2021 [id number]     Unit Number 05/02/2021 [id number]     Unit ABO 05/02/2021 A     Unit RH 05/02/2021 POS     Unit Dispense Status 05/02/2021 Presumed Trans     LACTIC ACID 05/02/2021 6 0*    Protime 05/02/2021 14 8*    INR 05/02/2021 1 18     PTT 05/02/2021 34     Sodium 05/02/2021 142     Potassium 05/02/2021 3 0*    Chloride 05/02/2021 102     CO2 05/02/2021 26     ANION GAP 05/02/2021 14*    BUN 05/02/2021 5     Creatinine 05/02/2021 0 86     Glucose 05/02/2021 198*    Calcium 05/02/2021 6 7*    Corrected Calcium 05/02/2021 8 5     AST 05/02/2021 262*    ALT 05/02/2021 84*    Alkaline Phosphatase 05/02/2021 173*    Total Protein 05/02/2021 5 1*    Albumin 05/02/2021 1 8*    Total Bilirubin 05/02/2021 2 07*    eGFR 05/02/2021 81     WBC 05/02/2021 5 76     RBC 05/02/2021 2 70*    Hemoglobin 05/02/2021 8 5*    Hematocrit 05/02/2021 24 5*    MCV 05/02/2021 91     MCH 05/02/2021 31 5     MCHC 05/02/2021 34 7     RDW 05/02/2021 19 2*    MPV 05/02/2021 13 0*    Platelets 05/02/2021 108*    nRBC 05/02/2021 0     Neutrophils Relative 05/02/2021 76*    Immat GRANS % 05/02/2021 0     Lymphocytes Relative 05/02/2021 14     Monocytes Relative 05/02/2021 9     Eosinophils Relative 05/02/2021 0     Basophils Relative 05/02/2021 1     Neutrophils Absolute 05/02/2021 4 39     Immature Grans Absolute 05/02/2021 0 02     Lymphocytes Absolute 05/02/2021 0 81     Monocytes Absolute 05/02/2021 0 50     Eosinophils Absolute 05/02/2021 0 00     Basophils Absolute 05/02/2021 0 04     Ammonia 05/02/2021 30     Ventricular Rate 05/02/2021 91     Atrial Rate 05/02/2021 91     AL Interval 05/02/2021 183     QRSD Interval 05/02/2021 96     QT Interval 05/02/2021 463     QTC Interval 05/02/2021 570     P Axis 05/02/2021 84     QRS Axis 05/02/2021 17     T Wave Axis 05/02/2021 21     Magnesium 05/02/2021 1 1*    Phosphorus 05/02/2021 3 2        Imaging Studies: I have personally reviewed pertinent imaging studies

## 2021-05-02 NOTE — H&P
[address] 1975, 50 y o  female MRN: [id number]  Unit/Bed#: ICU 14 Encounter: [id number]  Primary Care Provider: Hai Godoy PA-C   Date and time admitted to hospital: 5/1/2021 10:08 PM    * Acute GI bleeding  Assessment & Plan  · Patient presents with a 2 day history of melena and hematemesis  She has a PMH of PE/DVT/IVC thrombosis s/p IVC filter placement and anticoagulation with Lovenox (last dose 5/1 6am)  · Admit to critical care service  · Case discussed with GI-no emergent intervention at this time  Plan for EGD in AM if patient remains hemodynamically stable overnight  · 6 Units of PRBC ordered in the ED  Transfuse 2 units PRBC and 2 units FFP now  Goal SBP >100, HR<100  H/H q 6h  Goal Hgb >8  Consider Lovenox reversal with protamine if patient's condition declines  She currently has two 18G peripheral IV's-if status declines place central venous catheter/Cordis  · Start Octreotide and protonix gtt with bolus  · NPO  · In the setting of acute GI bleed hold pharmaceutical DVT prophylaxis  Place bilateral venodynes  Patient has previously placed IVC filter  Acute on chronic blood loss anemia  Assessment & Plan  · In the setting of acute GI bleed  Transfuse for Hgb<8/hypotension/tachycardia  H/H q6h  · Chronic anemia following Oc-en-Y procedure 2010  Patient follows with Hematology  Hypotension  Assessment & Plan  · Secondary to acute blood loss anemia  Patient received 2L IVF while awaiting blood products  Transfuse blood products as discussed above  If hypotension persists following adequate resuscitation patient may require urgent EGD  Lactic acid acidosis  Assessment & Plan  · Secondary to acute GI bleed  Trend lactic acid q 2h  Resuscitation as discussed above  High anion gap metabolic acidosis  Assessment & Plan  · Secondary to acute GI bleed  Monitor  Hypokalemia  Assessment & Plan  · Potassium replacement   Closely monitor BMP and telemetry  Acquired hypothyroidism  Assessment & Plan  · Continue home Synthroid  Morbid obesity with BMI of 50 0-59 9, adult ([facility] )  Assessment & Plan  · Currently NPO  Patient education/nutrition consult in the future  Bipolar disorder ([facility] )  Assessment & Plan  · Continue home medications  Hold tonight in the setting of acute GI bleed  Transaminitis  Assessment & Plan  · Patient has a history of transaminitis with hepatic steatosis  Monitor LFTs  · Avoid hepatotoxic medications  I discussed the plan with my attending, Dr Helena Gaming  -------------------------------------------------------------------------------------------------------------  Chief Complaint:  GI bleed    History of Present Illness     Malissa Lesches is a 50 y o  female who presents with melena and hematemesis X 2 days  She has a PMH of bilateral DVT/PE in 2017, 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, history of GI bleed in 2017 on Xarelto, iron deficiency anemia, hepatic steatosis, hypothyroidism, history of seizure, bipolar disorder, anxiety disorder  She has a past surgical history of IVC filter placement in 2017 and Oc-en-Y in 2010  She presents to the emergency department after 2 days of dark bloody stools and coffee-ground vomitus  As stated above she does have a history of multiple thromboembolic events  She developed an acute GI bleed in 2017 after being placed on anticoagulation  She was placed Lovenox in 2020 after she developed IVC thrombosis  She follows with Hematology as an outpatient and receives scheduled Venofer treatments for chronic anemia  The patient states that her last dose of Lovenox was this morning at 06:00am  She did have an EGD/colonoscopy performed on 12/3/2020 for her chronic anemia workup which revealed a jejunal anastomatic ulcer as well as a solitary polyp of the sigmoid colon (biopsy and clip placed)    She denies excessive alcohol use and states that she only participates in social alcohol use on occasional weekends  She denies lower extremity swelling or pain  Generalized abdominal pain  Negative urinary symptoms  Negative fever, positive chills currently  In the ED the patient was hypotensive SBP 60s which was responsive to IV fluids  Hemoglobin 7 7 prior to transfusion  K 2 7, lactic acid 10  History obtained from the patient and chart   -------------------------------------------------------------------------------------------------------------  Dispo: Admit to Critical Care     Code Status: Level 1 - Full Code  --------------------------------------------------------------------------------------------------------------  Review of Systems   Constitutional: Positive for activity change, appetite change, chills and fatigue  Negative for diaphoresis, fever and unexpected weight change  HENT: Negative  Negative for sore throat and trouble swallowing  Eyes: Negative  Negative for visual disturbance  Respiratory: Negative for apnea, cough, choking, chest tightness, shortness of breath, wheezing and stridor  Cardiovascular: Negative for chest pain, palpitations and leg swelling  Gastrointestinal: Positive for abdominal distention, abdominal pain, anal bleeding, blood in stool, nausea and vomiting  Negative for constipation, diarrhea and rectal pain  Genitourinary: Negative for difficulty urinating, dysuria and hematuria  Skin: Positive for pallor  Negative for color change, rash and wound  Neurological: Positive for dizziness and light-headedness  Negative for tremors, seizures, syncope, facial asymmetry, speech difficulty, weakness, numbness and headaches  Hematological: Negative for adenopathy  Bruises/bleeds easily  Psychiatric/Behavioral: Negative for agitation, behavioral problems, confusion, decreased concentration and hallucinations  The patient is nervous/anxious  The patient is not hyperactive      All other systems reviewed and are negative  Physical Exam  Vitals signs and nursing note reviewed  Constitutional:       General: She is not in acute distress  Appearance: Normal appearance  She is obese  She is ill-appearing  She is not toxic-appearing or diaphoretic  HENT:      Head: Normocephalic and atraumatic  Nose: Nose normal  No congestion or rhinorrhea  Mouth/Throat:      Mouth: Mucous membranes are moist    Eyes:      General: No scleral icterus  Right eye: No discharge  Left eye: No discharge  Extraocular Movements: Extraocular movements intact  Conjunctiva/sclera: Conjunctivae normal       Pupils: Pupils are equal, round, and reactive to light  Cardiovascular:      Rate and Rhythm: Normal rate and regular rhythm  Pulses: Normal pulses  Heart sounds: Normal heart sounds  No murmur  No friction rub  No gallop  Pulmonary:      Effort: Pulmonary effort is normal  No respiratory distress  Breath sounds: Normal breath sounds  No stridor  No wheezing, rhonchi or rales  Chest:      Chest wall: No tenderness  Abdominal:      General: Abdomen is flat  There is distension  Palpations: Abdomen is soft  Tenderness: There is abdominal tenderness (generalized tenderness)  There is no right CVA tenderness, left CVA tenderness, guarding or rebound  Hernia: No hernia is present  Comments: Obese  Multiple areas of ecchymosis of abdominal wall with small hematoma right lower quadrant  Musculoskeletal: Normal range of motion  General: No swelling, tenderness, deformity or signs of injury  Right lower leg: No edema  Left lower leg: No edema  Skin:     General: Skin is warm and dry  Capillary Refill: Capillary refill takes less than 2 seconds  Coloration: Skin is pale  Skin is not jaundiced  Findings: No bruising, erythema, lesion or rash  Comments: Numerous tattoos over entire body     Neurological:      General: No focal deficit present  Mental Status: She is alert  GCS: GCS eye subscore is 4  GCS verbal subscore is 5  GCS motor subscore is 6  Psychiatric:         Attention and Perception: Attention normal          Mood and Affect: Mood is anxious  Behavior: Behavior normal  Behavior is cooperative  Thought Content: Thought content normal          Cognition and Memory: Cognition and memory normal          Judgment: Judgment normal        --------------------------------------------------------------------------------------------------------------  Vitals:   Vitals:    05/02/21 0315 05/02/21 0330 05/02/21 0346 05/02/21 0400   BP: (!) 77/39 (!) 78/41 (!) 81/47 (!) 80/41   Pulse: 96 96 88 90   Resp: (!) 36 18 21 (!) 26   Temp:  99 °F (37 2 °C)     TempSrc:       SpO2: 99% 98% 98% 96%   Weight:    107 kg (236 lb 8 9 oz)   Height:         Temp  Min: 98 7 °F (37 1 °C)  Max: 99 4 °F (37 4 °C)  IBW (Ideal Body Weight): 52 4 kg  Height: 5' 3" (160 cm)  Body mass index is 41 9 kg/m²      Laboratory and Diagnostics:  Results from last 7 days   Lab Units 05/02/21  0432 05/02/21  0251 05/01/21  2326 05/01/21  2216   WBC Thousand/uL 5 76  --   --  6 17   HEMOGLOBIN g/dL 8 5* 8 7* 7 2* 7 7*   HEMATOCRIT % 24 5*  --  20 6* 22 4*   PLATELETS Thousands/uL 108*  --   --  140*   NEUTROS PCT % 76*  --   --  63   MONOS PCT % 9  --   --  9     Results from last 7 days   Lab Units 05/02/21  0432 05/01/21  2216   SODIUM mmol/L 142 141   POTASSIUM mmol/L 3 0* 2 7*   CHLORIDE mmol/L 102 101   CO2 mmol/L 26 26   ANION GAP mmol/L 14* 14*   BUN mg/dL 5 5   CREATININE mg/dL 0 86 0 65   CALCIUM mg/dL 6 7* 7 1*   GLUCOSE RANDOM mg/dL 198* 159*   ALT U/L 84* 98*   AST U/L 262* 331*   ALK PHOS U/L 173* 199*   ALBUMIN g/dL 1 8* 1 6*   TOTAL BILIRUBIN mg/dL 2 07* 1 80*          Results from last 7 days   Lab Units 05/02/21  0432 05/01/21 2216   INR  1 18 1 25*   PTT seconds 34 35      Results from last 7 days   Lab Units 05/01/21 2216 TROPONIN I ng/mL <0 02     Results from last 7 days   Lab Units 05/02/21  0432 05/02/21  0123 05/01/21  2216   LACTIC ACID mmol/L 6 0* 8 1* 10 0*     ABG:    VBG:          Micro:        EKG: reviewed  Imaging: I have personally reviewed pertinent reports     and I have personally reviewed pertinent films in PACS      Historical Information   Past Medical History:   Diagnosis Date    Anemia     DVT (deep venous thrombosis) [facility]     Psychiatric disorder     bipolar II, suicide    Pulmonary embolism (HCC)     [facility]      Past Surgical History:   Procedure Laterality Date    APPENDECTOMY      Open    CHOLECYSTECTOMY      Laparoscopic    GASTRIC BYPASS      was 205 date of surgery    IR DVT THROMBOLYSIS/THROMBECTOMY ILIAC/IVC WITH VENOGRAM  8/4/2020    IR TPA LYSIS CHECK  8/5/2020    IVC FILTER INSERTION  2017    STOMACH SURGERY      for morbid obesity    TUBAL LIGATION Bilateral 2010     Social History   Social History     Substance and Sexual Activity   Alcohol Use Not Currently    Comment: quit 6/21/2018     Social History     Substance and Sexual Activity   Drug Use No     Social History     Tobacco Use   Smoking Status Never Smoker   Smokeless Tobacco Never Used   Tobacco Comment    former quit in 1999 per [employer]     Family History:   Family History   Problem Relation Age of Onset    Other Mother         headache    Hypertension Mother     Depression Mother     Arthritis Father     Other Father         H, pylori infection    Other Sister         esophageal reflux    Migraines Sister     No Known Problems Paternal Grandfather     Diabetes Paternal Aunt         Mellitus    Breast cancer Paternal Aunt     Diabetes Paternal Uncle         Mellitus    Liver cancer Paternal Uncle     Asthma Daughter     No Known Problems Maternal Grandmother     No Known Problems Maternal Grandfather     No Known Problems Paternal Grandmother     No Known Problems Brother     No Known Problems Sister     No Known Problems Sister     Asthma Daughter     No Known Problems Maternal Aunt        Medications:  Current Facility-Administered Medications   Medication Dose Route Frequency    octreotide (SandoSTATIN) 500 mcg in sodium chloride 0 9 % 250 mL infusion  25 mcg/hr Intravenous Continuous    ondansetron (ZOFRAN) injection 4 mg  4 mg Intravenous Q6H PRN    pantoprazole (PROTONIX) 80 mg in sodium chloride 0 9 % 100 mL infusion  8 mg/hr Intravenous Continuous     Home medications:  Prior to Admission Medications   Prescriptions Last Dose Informant Patient Reported? Taking?    Multiple Vitamin (MULTI-VITAMIN DAILY PO)  Self Yes Yes   Sig: Multi Vitamin   DAILY   NEEDLE, DISP, 27 G 27G X 1/2" MISC  Self No No   Sig: by Does not apply route every 12 (twelve) hours   SUMAtriptan (IMITREX) 100 mg tablet  Self Yes Yes   Sig: Take 100 mg by mouth as needed for migraine    atoMOXetine (STRATTERA) 60 mg capsule  Self Yes Yes   Sig: TAKE 1 CAPSULE (60 MG) BY ORAL ROUTE ONCE DAILY IN THE MORNING   cloNIDine (CATAPRES) 0 1 mg tablet   Yes Yes   Si 1 mg PRN for anxiety   enoxaparin (LOVENOX) 100 mg/mL   No Yes   Sig: Inject 1 mL (100 mg total) under the skin every 12 (twelve) hours   ferrous sulfate 325 (65 Fe) mg tablet  Self Yes Yes   Sig: Take 325 mg by mouth daily with breakfast   folic acid (FOLVITE) 1 mg tablet  Self Yes Yes   Sig: Take 1 mg by mouth daily   levothyroxine 100 mcg tablet  Self No Yes   Sig: Take 1 tablet (100 mcg total) by mouth daily in the early morning   lithium carbonate (LITHOBID) 300 mg CR tablet  Self Yes Yes   Sig: Take 300 mg by mouth 2 (two) times a day    pantoprazole (PROTONIX) 40 mg tablet  Self No Yes   Sig: Take 1 tablet (40 mg total) by mouth daily in the early morning   potassium chloride (K-DUR,KLOR-CON) 20 mEq tablet  Self No Yes   Sig: Take 1 tablet (20 mEq total) by mouth 2 (two) times a day   pyridoxine (VITAMIN B6) 100 mg tablet  Self Yes Yes   Sig: Take 100 mg by mouth daily   rOPINIRole (REQUIP) 1 mg tablet   Yes Yes   Sig: TAKE 1 TABLET (1 MG) BY ORAL ROUTE 1-3 HOURS BEFORE BEDTIME   rizatriptan (MAXALT-MLT) 10 MG disintegrating tablet   Yes Yes   Sig: Take 10 mg by mouth   thiamine 100 MG tablet  Self Yes Yes   Sig: Daily   topiramate (TOPAMAX) 100 mg tablet  Self Yes Yes   Sig: Take 150 mg by mouth 2 (two) times a day    venlafaxine (EFFEXOR-XR) 150 mg 24 hr capsule  Self Yes Yes   Sig: venlafaxine  mg capsule,extended release 24 hr      Facility-Administered Medications: None     Allergies: Allergies   Allergen Reactions    Morphine Seizures       ------------------------------------------------------------------------------------------------------------  Advance Directive and Living Will:      Power of :    POLST:    ------------------------------------------------------------------------------------------------------------  Anticipated Length of Stay is > 2 midnights    Care Time Delivered:   Upon my evaluation, this patient had a high probability of imminent or life-threatening deterioration due to acute GI bleed/hypotension, which required my direct attention, intervention, and personal management  I have personally provided 60 minutes of critical care time, exclusive of procedures, teaching, family meetings, and any prior time recorded by providers other than myself  Chip Carcamo PA-C        Portions of the record may have been created with voice recognition software  Occasional wrong word or "sound a like" substitutions may have occurred due to the inherent limitations of voice recognition software    Read the chart carefully and recognize, using context, where substitutions have occurred

## 2021-05-02 NOTE — ANESTHESIA PREPROCEDURE EVALUATION
Procedure:  EGD    Relevant Problems   ENDO   (+) Acquired hypothyroidism      GI/HEPATIC   (+) Acute GI bleeding      HEMATOLOGY   (+) Acute on chronic blood loss anemia   (+) Anemia   (+) Hypercoagulation syndrome (HCC)   (+) Iron deficiency anemia following bariatric surgery      NEURO/PSYCH   (+) Hx of pulmonary embolus   (+) Hx of suicide attempt      Other   (+) Morbidly obese (HCC)        Physical Exam    Airway    Mallampati score: III  TM Distance: >3 FB  Neck ROM: full     Dental       Cardiovascular  Rhythm: regular, Rate: normal, Cardiovascular exam normal    Pulmonary  Pulmonary exam normal Breath sounds clear to auscultation,     Other Findings        Anesthesia Plan  ASA Score- 3 Emergent    Anesthesia Type- general with ASA Monitors  Additional Monitors:   Airway Plan:           Plan Factors-Exercise tolerance (METS): <4 METS  Chart reviewed  Existing labs reviewed  Patient is not a current smoker  Obstructive sleep apnea risk education given perioperatively  Induction- intravenous  Postoperative Plan-     Informed Consent- Anesthetic plan and risks discussed with patient

## 2021-05-02 NOTE — ASSESSMENT & PLAN NOTE
· Patient has a history of transaminitis with hepatic steatosis  Monitor LFTs  · Avoid hepatotoxic medications

## 2021-05-02 NOTE — ASSESSMENT & PLAN NOTE
· Patient presents with a 2 day history of melena and hematemesis  She has a PMH of PE/DVT/IVC thrombosis s/p IVC filter placement and anticoagulation with Lovenox (last dose 5/1 6am)  · Admit to critical care service  · Case discussed with GI-no emergent intervention at this time  Plan for EGD in AM if patient remains hemodynamically stable overnight  · 6 Units of PRBC ordered in the ED  Transfuse 2 units PRBC and 2 units FFP now  Goal SBP >100, HR<100  H/H q 6h  Goal Hgb >8  Consider Lovenox reversal with protamine if patient's condition declines  She currently has two 18G peripheral IV's-if status declines place central venous catheter/Cordis  · Start Octreotide and protonix gtt with bolus  · NPO  · In the setting of acute GI bleed hold pharmaceutical DVT prophylaxis  Place bilateral venodynes  Patient has previously placed IVC filter

## 2021-05-02 NOTE — ASSESSMENT & PLAN NOTE
· Secondary to acute blood loss anemia  Patient received 2L IVF while awaiting blood products  Transfuse blood products as discussed above  If hypotension persists following adequate resuscitation patient may require urgent EGD

## 2021-05-02 NOTE — PLAN OF CARE
Problem: Potential for Falls  Goal: Patient will remain free of falls  Description: INTERVENTIONS:  - Assess patient frequently for physical needs  -  Identify cognitive and physical deficits and behaviors that affect risk of falls    -  [place] fall precautions as indicated by assessment   - Educate patient/family on patient safety including physical limitations  - Instruct patient to call for assistance with activity based on assessment  - Modify environment to reduce risk of injury  - Consider OT/PT consult to assist with strengthening/mobility  Outcome: Progressing     Problem: Prexisting or High Potential for Compromised Skin Integrity  Goal: Skin integrity is maintained or improved  Description: INTERVENTIONS:  - Identify patients at risk for skin breakdown  - Assess and monitor skin integrity  - Assess and monitor nutrition and hydration status  - Monitor labs   - Assess for incontinence   - Turn and reposition patient  - Assist with mobility/ambulation  - Relieve pressure over bony prominences  - Avoid friction and shearing  - Provide appropriate hygiene as needed including keeping skin clean and dry  - Evaluate need for skin moisturizer/barrier cream  - Collaborate with interdisciplinary team   - Patient/family teaching  - Consider wound care consult   Outcome: Progressing     Problem: GASTROINTESTINAL - ADULT  Goal: Minimal or absence of nausea and/or vomiting  Description: INTERVENTIONS:  - Administer IV fluids if ordered to ensure adequate hydration  - Maintain NPO status until nausea and vomiting are resolved  - Nasogastric tube if ordered  - Administer ordered antiemetic medications as needed  - Provide nonpharmacologic comfort measures as appropriate  - Advance diet as tolerated, if ordered  - Consider nutrition services referral to assist patient with adequate nutrition and appropriate food choices  Outcome: Progressing  Goal: Maintains or returns to baseline bowel function  Description: INTERVENTIONS:  - Assess bowel function  - Encourage oral fluids to ensure adequate hydration  - Administer IV fluids if ordered to ensure adequate hydration  - Administer ordered medications as needed  - Encourage mobilization and activity  - Consider nutritional services referral to assist patient with adequate nutrition and appropriate food choices  Outcome: Progressing     Problem: METABOLIC, FLUID AND ELECTROLYTES - ADULT  Goal: Electrolytes maintained within normal limits  Description: INTERVENTIONS:  - Monitor labs and assess patient for signs and symptoms of electrolyte imbalances  - Administer electrolyte replacement as ordered  - Monitor response to electrolyte replacements, including repeat lab results as appropriate  - Instruct patient on fluid and nutrition as appropriate  Outcome: Progressing     Problem: HEMATOLOGIC - ADULT  Goal: Maintains hematologic stability  Description: INTERVENTIONS  - Assess for signs and symptoms of bleeding or hemorrhage  - Monitor labs  - Administer supportive blood products/factors as ordered and appropriate  Outcome: Progressing

## 2021-05-02 NOTE — PROGRESS NOTES
Pt has had several complaints of cramping in hands, feet and legs  Cramping only last one min then goes away

## 2021-05-02 NOTE — ED PROVIDER NOTES
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/PE  Currently takes lovenox 100mg BID for PE  Hx of iron deficiency is related to malabsorption related to bariatric surgery, Oc-en-Y  Has chronic anemia and she had EGD and colonoscopy completed in feb 2021 which was negative for any bleeding source  Pt has been having black stools since she woke up yesterday am, have persisted  Began vomiting initially brick red color emesis this am and it is now coffee ground colored  Pt very pale and tachycardic 100 - 110 during my eval        History provided by:  Patient  History limited by:  Acuity of condition   used: No    Vomiting  Severity:  Moderate  Duration:  1 day  Timing:  Constant  Quality:  Coffee grounds  Progression:  Worsening  Relieved by:  Nothing  Worsened by:  Nothing  Ineffective treatments:  None tried  Associated symptoms: abdominal pain and diarrhea    Associated symptoms: no chills, no fever, no headaches and no sore throat    GI Problem  Location:  Black stools for past 2 days, brick red BM noted in ER  Severity:  Severe  Onset quality:  Gradual  Duration:  2 days  Timing:  Constant  Progression:  Worsening  Associated symptoms: abdominal pain, diarrhea, fatigue, nausea and vomiting    Associated symptoms: no chest pain, no congestion, no fever, no headaches, no rash, no shortness of breath and no sore throat        Prior to Admission Medications   Prescriptions Last Dose Informant Patient Reported? Taking?    Multiple Vitamin (MULTI-VITAMIN DAILY PO)  Self Yes Yes   Sig: Multi Vitamin   DAILY   NEEDLE, DISP, 27 G 27G X 1/2" MISC  Self No No   Sig: by Does not apply route every 12 (twelve) hours   SUMAtriptan (IMITREX) 100 mg tablet  Self Yes Yes   Sig: Take 100 mg by mouth as needed for migraine    atoMOXetine (STRATTERA) 60 mg capsule  Self Yes Yes   Sig: TAKE 1 CAPSULE (60 MG) BY ORAL ROUTE ONCE DAILY IN THE MORNING   cloNIDine (CATAPRES) 0 1 mg tablet   Yes Yes   Si 1 mg PRN for anxiety   enoxaparin (LOVENOX) 100 mg/mL   No Yes   Sig: Inject 1 mL (100 mg total) under the skin every 12 (twelve) hours   ferrous sulfate 325 (65 Fe) mg tablet  Self Yes Yes   Sig: Take 325 mg by mouth daily with breakfast   folic acid (FOLVITE) 1 mg tablet  Self Yes Yes   Sig: Take 1 mg by mouth daily   levothyroxine 100 mcg tablet  Self No Yes   Sig: Take 1 tablet (100 mcg total) by mouth daily in the early morning   lithium carbonate (LITHOBID) 300 mg CR tablet  Self Yes Yes   Sig: Take 300 mg by mouth 2 (two) times a day    pantoprazole (PROTONIX) 40 mg tablet  Self No Yes   Sig: Take 1 tablet (40 mg total) by mouth daily in the early morning   potassium chloride (K-DUR,KLOR-CON) 20 mEq tablet  Self No Yes   Sig: Take 1 tablet (20 mEq total) by mouth 2 (two) times a day   pyridoxine (VITAMIN B6) 100 mg tablet  Self Yes Yes   Sig: Take 100 mg by mouth daily   rOPINIRole (REQUIP) 1 mg tablet   Yes Yes   Sig: TAKE 1 TABLET (1 MG) BY ORAL ROUTE 1-3 HOURS BEFORE BEDTIME   rizatriptan (MAXALT-MLT) 10 MG disintegrating tablet   Yes Yes   Sig: Take 10 mg by mouth   thiamine 100 MG tablet  Self Yes Yes   Sig: Daily   topiramate (TOPAMAX) 100 mg tablet  Self Yes Yes   Sig: Take 150 mg by mouth 2 (two) times a day    venlafaxine (EFFEXOR-XR) 150 mg 24 hr capsule  Self Yes Yes   Sig: venlafaxine  mg capsule,extended release 24 hr      Facility-Administered Medications: None       Past Medical History:   Diagnosis Date    Anemia     DVT (deep venous thrombosis) (HCC)     Psychiatric disorder     bipolar II, suicide    Pulmonary embolism (HCC)     Seizures (HCC)        Past Surgical History:   Procedure Laterality Date    APPENDECTOMY      Open    CHOLECYSTECTOMY      Laparoscopic  GASTRIC BYPASS      was 205 date of surgery    IR DVT THROMBOLYSIS/THROMBECTOMY ILIAC/IVC WITH VENOGRAM  8/4/2020    IR TPA LYSIS CHECK  8/5/2020    IVC FILTER INSERTION  2017    STOMACH SURGERY      for morbid obesity    TUBAL LIGATION Bilateral 2010       Family History   Problem Relation Age of Onset    Other Mother         headache    Hypertension Mother     Depression Mother     Arthritis Father     Other Father         H, pylori infection    Other Sister         esophageal reflux    Migraines Sister     No Known Problems Paternal Grandfather     Diabetes Paternal Aunt         Mellitus    Breast cancer Paternal Aunt     Diabetes Paternal Uncle         Mellitus    Liver cancer Paternal Uncle     Asthma Daughter     No Known Problems Maternal Grandmother     No Known Problems Maternal Grandfather     No Known Problems Paternal Grandmother     No Known Problems Brother     No Known Problems Sister     No Known Problems Sister     Asthma Daughter     No Known Problems Maternal Aunt      I have reviewed and agree with the history as documented  E-Cigarette/Vaping     E-Cigarette/Vaping Substances     Social History     Tobacco Use    Smoking status: Never Smoker    Smokeless tobacco: Never Used    Tobacco comment: former quit in 1999 per [facility]   Substance Use Topics    Alcohol use: Not Currently     Comment: quit 6/21/2018    Drug use: No       Review of Systems   Constitutional: Positive for fatigue  Negative for appetite change, chills and fever  HENT: Negative for congestion and sore throat  Eyes: Negative for visual disturbance  Respiratory: Negative for shortness of breath  Cardiovascular: Negative for chest pain  Gastrointestinal: Positive for abdominal pain, blood in stool, diarrhea, nausea and vomiting  Genitourinary: Negative for dysuria, frequency, vaginal bleeding and vaginal discharge     Musculoskeletal: Negative for back pain, neck pain and neck stiffness  Skin: Positive for pallor  Negative for rash  Allergic/Immunologic: Negative for immunocompromised state  Neurological: Negative for light-headedness and headaches  Psychiatric/Behavioral: Negative for confusion  All other systems reviewed and are negative  Physical Exam  Physical Exam  Vitals signs and nursing note reviewed  Constitutional:       General: She is in acute distress  Appearance: She is well-developed  She is ill-appearing  HENT:      Head: Normocephalic and atraumatic  Right Ear: External ear normal       Left Ear: External ear normal    Eyes:      Comments: Pale conjunctiva b/l   Neck:      Musculoskeletal: Neck supple  Cardiovascular:      Rate and Rhythm: Regular rhythm  Tachycardia present  Heart sounds: No murmur  Pulmonary:      Effort: Pulmonary effort is normal  No respiratory distress  Breath sounds: Normal breath sounds  Abdominal:      General: Bowel sounds are normal       Palpations: Abdomen is soft  Tenderness: There is abdominal tenderness (mild diffuse upper)  Genitourinary:     Comments: Large brick red BM heme +  Musculoskeletal: Normal range of motion  Skin:     General: Skin is warm  Coloration: Skin is pale  Findings: No rash  Neurological:      Mental Status: She is alert and oriented to person, place, and time     Psychiatric:         Behavior: Behavior normal          Vital Signs  ED Triage Vitals   Temperature Pulse Respirations Blood Pressure SpO2   05/01/21 2300 05/01/21 2209 05/01/21 2209 05/01/21 2209 05/01/21 2209   99 1 °F (37 3 °C) 94 20 (!) 68/41 91 %      Temp Source Heart Rate Source Patient Position - Orthostatic VS BP Location FiO2 (%)   05/01/21 2300 05/01/21 2209 05/01/21 2209 05/01/21 2209 --   Oral Monitor Sitting Right arm       Pain Score       05/02/21 0024       8           Vitals:    05/01/21 2340 05/01/21 2351 05/02/21 0006 05/02/21 0045   BP: 113/58 101/54 98/51 98/52   Pulse: 94 96 76 89   Patient Position - Orthostatic VS:             Visual Acuity      ED Medications  Medications   pantoprazole (PROTONIX) 80 mg in sodium chloride 0 9 % 100 mL infusion (8 mg/hr Intravenous New Bag 5/2/21 0021)   octreotide (SandoSTATIN) injection 50 mcg (50 mcg Intravenous Given 5/1/21 2359)     Followed by   octreotide (SandoSTATIN) 500 mcg in sodium chloride 0 9 % 250 mL infusion (25 mcg/hr Intravenous New Bag 5/2/21 0022)   ondansetron (ZOFRAN) injection 4 mg (has no administration in time range)   potassium chloride 20 mEq IVPB (premix) (20 mEq Intravenous Not Given 5/2/21 0047)   potassium chloride 20 mEq IVPB (premix) (20 mEq Intravenous New Bag 5/2/21 0047)   albuterol (FOR EMS ONLY) (2 5 mg/3 mL) 0 083 % inhalation solution 2 5 mg (0 mg Does not apply Given to EMS 5/1/21 2211)   sodium chloride 0 9 % bolus 1,000 mL (0 mL Intravenous Stopped 5/1/21 2350)   sodium chloride 0 9 % bolus 1,000 mL (0 mL Intravenous Stopped 5/1/21 2350)   ondansetron (ZOFRAN) injection 4 mg (4 mg Intravenous Given 5/1/21 2220)   pantoprazole (PROTONIX) 80 mg in sodium chloride 0 9 % 100 mL IVPB (0 mg Intravenous Stopped 5/1/21 2353)   potassium chloride (K-DUR,KLOR-CON) CR tablet 40 mEq (40 mEq Oral Given 5/2/21 0046)       Diagnostic Studies  Results Reviewed     Procedure Component Value Units Date/Time    Lactic acid 2 Hours [[id number]]     Lab Status: No result Specimen: Blood     Lactic acid 2 Hours [[id number]]     Lab Status: No result Specimen: Blood     Lactic acid 2 Hours [[id number]]     Lab Status: No result Specimen: Blood     Serial Hemoglobin Q6hrs [[id number]]     Lab Status: No result Specimen: Blood     Iron Saturation % [[id number]]     Lab Status: No result Specimen: Blood     Ferritin [[id number]]     Lab Status: No result Specimen: Blood     Hemoglobin and hematocrit, blood [[id number]]  (Abnormal) Collected: 05/01/21 2326    Lab Status: Final result Specimen: Blood from Line Updated: 05/01/21 0877 Hemoglobin 7 2 g/dL      Hematocrit 20 6 %     Novel Coronavirus (Covid-19),PCR [facility] - 2 Hour Stat [[id number]]  (Normal) Collected: 05/01/21 2216    Lab Status: Final result Specimen: Nares from Nasopharyngeal Swab Updated: 05/01/21 2318     SARS-CoV-2 Negative    Narrative: The specimen collection materials, transport medium, and/or testing methodology utilized in the production of these test results have been proven to be reliable in a limited validation with an abbreviated program under the Emergency Utilization Authorization provided by the FDA  Testing reported as "Presumptive positive" will be confirmed with secondary testing to ensure result accuracy  Clinical caution and judgement should be used with the interpretation of these results with consideration of the clinical impression and other laboratory testing  Testing reported as "Positive" or "Negative" has been proven to be accurate according to standard laboratory validation requirements  All testing is performed with control materials showing appropriate reactivity at standard intervals  Lactic acid [[id number]]  (Abnormal) Collected: 05/01/21 2216    Lab Status: Final result Specimen: Blood from Arm, Left Updated: 05/01/21 2250     LACTIC ACID 10 0 mmol/L     Narrative:      Result may be elevated if tourniquet was used during collection      Comprehensive metabolic panel [[id number]]  (Abnormal) Collected: 05/01/21 2216    Lab Status: Final result Specimen: Blood from Arm, Left Updated: 05/01/21 2249     Sodium 141 mmol/L      Potassium 2 7 mmol/L      Chloride 101 mmol/L      CO2 26 mmol/L      ANION GAP 14 mmol/L      BUN 5 mg/dL      Creatinine 0 65 mg/dL      Glucose 159 mg/dL      Calcium 7 1 mg/dL      Corrected Calcium 9 0 mg/dL       U/L      ALT 98 U/L      Alkaline Phosphatase 199 U/L      Total Protein 5 0 g/dL      Albumin 1 6 g/dL      Total Bilirubin 1 80 mg/dL      eGFR 106 ml/min/1 73sq m     Narrative:      [employer] guidelines for Chronic Kidney Disease (CKD):     Stage 1 with normal or high GFR (GFR > 90 mL/min/1 73 square meters)    Stage 2 Mild CKD (GFR = 60-89 mL/min/1 73 square meters)    Stage 3A Moderate CKD (GFR = 45-59 mL/min/1 73 square meters)    Stage 3B Moderate CKD (GFR = 30-44 mL/min/1 73 square meters)    Stage 4 Severe CKD (GFR = 15-29 mL/min/1 73 square meters)    Stage 5 End Stage CKD (GFR <15 mL/min/1 73 square meters)  Note: GFR calculation is accurate only with a steady state creatinine    Protime-INR [[id number]]  (Abnormal) Collected: 05/01/21 2216    Lab Status: Final result Specimen: Blood from Arm, Left Updated: 05/01/21 2249     Protime 15 4 seconds      INR 1 25    APTT [[id number]]  (Normal) Collected: 05/01/21 2216    Lab Status: Final result Specimen: Blood from Arm, Left Updated: 05/01/21 2249     PTT 35 seconds     Lipase [[id number]]  (Normal) Collected: 05/01/21 2216    Lab Status: Final result Specimen: Blood from Arm, Left Updated: 05/01/21 2247     Lipase 95 u/L     NT-BNP PRO [[id number]]  (Abnormal) Collected: 05/01/21 2216    Lab Status: Final result Specimen: Blood from Arm, Left Updated: 05/01/21 2247     NT-proBNP 148 pg/mL     Troponin I [[id number]]  (Normal) Collected: 05/01/21 2216    Lab Status: Final result Specimen: Blood from Arm, Left Updated: 05/01/21 2242     Troponin I <0 02 ng/mL     CBC and differential [[id number]]  (Abnormal) Collected: 05/01/21 2216    Lab Status: Final result Specimen: Blood from Arm, Left Updated: 05/01/21 2224     WBC 6 17 Thousand/uL      RBC 2 45 Million/uL      Hemoglobin 7 7 g/dL      Hematocrit 22 4 %      MCV 91 fL      MCH 31 4 pg      MCHC 34 4 g/dL      RDW 21 3 %      MPV 12 9 fL      Platelets 393 Thousands/uL      nRBC 0 /100 WBCs      Neutrophils Relative 63 %      Immat GRANS % 0 %      Lymphocytes Relative 27 %      Monocytes Relative 9 %      Eosinophils Relative 0 %      Basophils Relative 1 % Neutrophils Absolute 3 87 Thousands/µL      Immature Grans Absolute 0 02 Thousand/uL      Lymphocytes Absolute 1 64 Thousands/µL      Monocytes Absolute 0 58 Thousand/µL      Eosinophils Absolute 0 02 Thousand/µL      Basophils Absolute 0 04 Thousands/µL     POCT occult blood stool [[id number]]     Lab Status: No result Specimen: Stool                  No orders to display              Procedures  ECG 12 Lead Documentation Only    Date/Time: 5/1/2021 10:22 PM  Performed by: She Webster DO  Authorized by: She Webster DO     Indications / Diagnosis:  SOB  Patient location:  ED  Interpretation:     Interpretation: non-specific    Rate:     ECG rate:  96    ECG rate assessment: normal    Rhythm:     Rhythm: sinus rhythm    Ectopy:     Ectopy: none    QRS:     QRS axis:  Normal    QRS intervals:  Normal  Conduction:     Conduction: normal    ST segments:     ST segments:  Non-specific  T waves:     T waves: non-specific    CriticalCare Time  Performed by: She Webster DO  Authorized by:  She Webster DO     Critical care provider statement:     Critical care time (minutes):  90    Critical care time was exclusive of:  Separately billable procedures and treating other patients and teaching time    Critical care was necessary to treat or prevent imminent or life-threatening deterioration of the following conditions:  Shock    Critical care was time spent personally by me on the following activities:  Blood draw for specimens, obtaining history from patient or surrogate, development of treatment plan with patient or surrogate, discussions with consultants, evaluation of patient's response to treatment, examination of patient, interpretation of cardiac output measurements, ordering and performing treatments and interventions, ordering and review of laboratory studies, ordering and review of radiographic studies, re-evaluation of patient's condition and review of old charts    I assumed direction of critical care for this patient from another provider in my specialty: no               ED Course  ED Course as of May 02 0119   Sat May 01, 2021   2212 Pt seen ad examined  51 yo female with IVC filter after DVT/PE  Currently takes lovenox 100mg BID for PE  Hx of iron deficiency is related to malabsorption related to bariatric surgery, Oc-en-Y  Has chronic anemia and she had EGD and colonoscopy completed in feb 2021 which was negative for any bleeding source  Pt has been having black stools since she woke up yesterday am, have persisted  Began vomiting initially brick red color emesis this am and it is now coffee ground colored  Pt very pale and tachycardic 100 - 110 during my eval  Will place 2 large bore IV's and start 2 L IVF, check labs including lactate and T&S  Protonix bolus and gtt, zofran ordered  2230 Pt had large BM of brick red blood, heme +  Hgb 7 7 (baseline around 10-11)  SBP 83 with IVF  PRBC's ordered  2249 Discussed case with GI - aware of case, will continue to follow and if does not respond to resuscitation will come in to scope  Discussing reversal for lovenox BID - will contact hemeonc    K 2 7 - will replete, lactate 10        2257 Spoke with Dr Yossi Peng (heme onc) - would NOT reverse at this time as last noted clot above IVC filter - feels PRBC's most important and scope as needed  [address] with [facility] who accepts pt for Dr Ez Amaya to ICU  She will place orders for hypokalemia  GI aware pt responding nicely to IVF  Awaiting PRBC's        2322 78/41, stat Hgb ordered  GI aware, if blood products come before Hgb returns will start transfusion now that again hypotensive (original plan with GI was to keep her 7-8 Hgb since BP had normalized                                                MDM    Disposition  Final diagnoses:   GI bleed   Vomiting blood   Hypotension   Anemia   Elevated lactic acid level   Hypokalemia   LFT elevation     Time reflects when diagnosis was documented in both MDM as applicable and the Disposition within this note     Time User Action Codes Description Comment    5/1/2021 11:10 PM Krysten SEVERINO Add [K92 2] GI bleed     5/1/2021 11:10 PM Gume Del Rosario Add [K92 0] Vomiting blood     5/1/2021 11:10 PM Krysten SEVERINO Add [I95 9] Hypotension     5/1/2021 11:10 PM Gume Del Rosario Add [D64 9] Anemia     5/1/2021 11:11 PM Krysten SEVERINO Add [R79 89] Elevated lactic acid level     5/1/2021 11:11 PM Krysten SEVERINO Add [E87 6] Hypokalemia     5/1/2021 11:11 PM Gume Del Rosario Add [R79 89] LFT elevation       ED Disposition     ED Disposition Condition Date/Time Comment    Admit Stable Sat May 1, 2021 11:10 PM Case was discussed with Maikel and the patient's admission status was agreed to be Admission Status: inpatient status to the service of Dr Mena Failing           Follow-up Information    None         Current Discharge Medication List      CONTINUE these medications which have NOT CHANGED    Details   atoMOXetine (STRATTERA) 60 mg capsule TAKE 1 CAPSULE (60 MG) BY ORAL ROUTE ONCE DAILY IN THE MORNING      cloNIDine (CATAPRES) 0 1 mg tablet 0 1 mg PRN for anxiety      enoxaparin (LOVENOX) 100 mg/mL Inject 1 mL (100 mg total) under the skin every 12 (twelve) hours  Qty: 100 Syringe, Refills: 2    Associated Diagnoses: 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HCC)      ferrous sulfate 325 (65 Fe) mg tablet Take 325 mg by mouth daily with breakfast      folic acid (FOLVITE) 1 mg tablet Take 1 mg by mouth daily      levothyroxine 100 mcg tablet Take 1 tablet (100 mcg total) by mouth daily in the early morning  Qty: 30 tablet, Refills: 0    Associated Diagnoses: Acquired hypothyroidism      lithium carbonate (LITHOBID) 300 mg CR tablet Take 300 mg by mouth 2 (two) times a day       Multiple Vitamin (MULTI-VITAMIN DAILY PO) Multi Vitamin   DAILY      pantoprazole (PROTONIX) 40 mg tablet Take 1 tablet (40 mg total) by mouth daily in the early morning  Qty: 30 tablet, Refills: 1    Associated Diagnoses: Acid reflux      potassium chloride (K-DUR,KLOR-CON) 20 mEq tablet Take 1 tablet (20 mEq total) by mouth 2 (two) times a day  Qty: 30 tablet, Refills: 1    Associated Diagnoses: Hypokalemia      pyridoxine (VITAMIN B6) 100 mg tablet Take 100 mg by mouth daily      rizatriptan (MAXALT-MLT) 10 MG disintegrating tablet Take 10 mg by mouth      rOPINIRole (REQUIP) 1 mg tablet TAKE 1 TABLET (1 MG) BY ORAL ROUTE 1-3 HOURS BEFORE BEDTIME      SUMAtriptan (IMITREX) 100 mg tablet Take 100 mg by mouth as needed for migraine       thiamine 100 MG tablet Daily      topiramate (TOPAMAX) 100 mg tablet Take 150 mg by mouth 2 (two) times a day       venlafaxine (EFFEXOR-XR) 150 mg 24 hr capsule venlafaxine  mg capsule,extended release 24 hr      NEEDLE, DISP, 27 G 27G X 1/2" MISC by Does not apply route every 12 (twelve) hours  Qty: 100 each, Refills: 5    Associated Diagnoses: 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HCC)           No discharge procedures on file      PDMP Review     None          ED Provider  Electronically Signed by           Janel Mitchell DO  05/02/21 0119

## 2021-05-02 NOTE — ASSESSMENT & PLAN NOTE
· In the setting of acute GI bleed  Transfuse for Hgb<8/hypotension/tachycardia  H/H q6h  · Chronic anemia following Oc-en-Y procedure 2010  Patient follows with Hematology

## 2021-05-02 NOTE — PLAN OF CARE
Problem: Potential for Falls  Goal: Patient will remain free of falls  Description: INTERVENTIONS:  - Assess patient frequently for physical needs  -  Identify cognitive and physical deficits and behaviors that affect risk of falls    -  [place] fall precautions as indicated by assessment   - Educate patient/family on patient safety including physical limitations  - Instruct patient to call for assistance with activity based on assessment  - Modify environment to reduce risk of injury  - Consider OT/PT consult to assist with strengthening/mobility  Outcome: Progressing

## 2021-05-02 NOTE — ANESTHESIA POSTPROCEDURE EVALUATION
Post-Op Assessment Note    CV Status:  Stable  Pain Score: 0    Pain management: adequate     Mental Status:  Alert and awake   Hydration Status:  Euvolemic and stable   PONV Controlled:  Controlled   Airway Patency:  Patent      Post Op Vitals Reviewed: Yes      Staff: Anesthesiologist         No complications documented      BP      Temp      Pulse     Resp     SpO2

## 2021-05-03 PROBLEM — E87.29 HIGH ANION GAP METABOLIC ACIDOSIS: Status: RESOLVED | Noted: 2021-05-02 | Resolved: 2021-05-03

## 2021-05-03 PROBLEM — E87.2 HIGH ANION GAP METABOLIC ACIDOSIS: Status: RESOLVED | Noted: 2021-05-02 | Resolved: 2021-05-03

## 2021-05-03 LAB
ALBUMIN SERPL BCP-MCNC: 1.8 G/DL (ref 3.5–5)
ALP SERPL-CCNC: 184 U/L (ref 46–116)
ALT SERPL W P-5'-P-CCNC: 83 U/L (ref 12–78)
ANION GAP SERPL CALCULATED.3IONS-SCNC: 7 MMOL/L (ref 4–13)
AST SERPL W P-5'-P-CCNC: 245 U/L (ref 5–45)
BILIRUB SERPL-MCNC: 2.72 MG/DL (ref 0.2–1)
BUN SERPL-MCNC: 5 MG/DL (ref 5–25)
CALCIUM ALBUM COR SERPL-MCNC: 8.7 MG/DL (ref 8.3–10.1)
CALCIUM SERPL-MCNC: 6.9 MG/DL (ref 8.3–10.1)
CHLORIDE SERPL-SCNC: 103 MMOL/L (ref 100–108)
CO2 SERPL-SCNC: 27 MMOL/L (ref 21–32)
CREAT SERPL-MCNC: 0.56 MG/DL (ref 0.6–1.3)
ERYTHROCYTE [DISTWIDTH] IN BLOOD BY AUTOMATED COUNT: 20.6 % (ref 11.6–15.1)
GFR SERPL CREATININE-BSD FRML MDRD: 111 ML/MIN/1.73SQ M
GLUCOSE SERPL-MCNC: 137 MG/DL (ref 65–140)
HCT VFR BLD AUTO: 27.4 % (ref 34.8–46.1)
HGB BLD-MCNC: 9.1 G/DL (ref 11.5–15.4)
HGB BLD-MCNC: 9.9 G/DL (ref 11.5–15.4)
MCH RBC QN AUTO: 31.4 PG (ref 26.8–34.3)
MCHC RBC AUTO-ENTMCNC: 33.2 G/DL (ref 31.4–37.4)
MCV RBC AUTO: 95 FL (ref 82–98)
PLATELET # BLD AUTO: 123 THOUSANDS/UL (ref 149–390)
PMV BLD AUTO: 13.7 FL (ref 8.9–12.7)
POTASSIUM SERPL-SCNC: 3.9 MMOL/L (ref 3.5–5.3)
PROT SERPL-MCNC: 5.3 G/DL (ref 6.4–8.2)
RBC # BLD AUTO: 2.9 MILLION/UL (ref 3.81–5.12)
SODIUM SERPL-SCNC: 137 MMOL/L (ref 136–145)
WBC # BLD AUTO: 6.49 THOUSAND/UL (ref 4.31–10.16)

## 2021-05-03 PROCEDURE — 85027 COMPLETE CBC AUTOMATED: CPT | Performed by: INTERNAL MEDICINE

## 2021-05-03 PROCEDURE — C9113 INJ PANTOPRAZOLE SODIUM, VIA: HCPCS | Performed by: PHYSICIAN ASSISTANT

## 2021-05-03 PROCEDURE — 85018 HEMOGLOBIN: CPT | Performed by: NURSE PRACTITIONER

## 2021-05-03 PROCEDURE — 99231 SBSQ HOSP IP/OBS SF/LOW 25: CPT | Performed by: PHYSICIAN ASSISTANT

## 2021-05-03 PROCEDURE — 99232 SBSQ HOSP IP/OBS MODERATE 35: CPT | Performed by: INTERNAL MEDICINE

## 2021-05-03 PROCEDURE — 80053 COMPREHEN METABOLIC PANEL: CPT | Performed by: INTERNAL MEDICINE

## 2021-05-03 RX ORDER — SODIUM CHLORIDE, SODIUM GLUCONATE, SODIUM ACETATE, POTASSIUM CHLORIDE, MAGNESIUM CHLORIDE, SODIUM PHOSPHATE, DIBASIC, AND POTASSIUM PHOSPHATE .53; .5; .37; .037; .03; .012; .00082 G/100ML; G/100ML; G/100ML; G/100ML; G/100ML; G/100ML; G/100ML
1000 INJECTION, SOLUTION INTRAVENOUS ONCE
Status: COMPLETED | OUTPATIENT
Start: 2021-05-03 | End: 2021-05-03

## 2021-05-03 RX ADMIN — SODIUM CHLORIDE 8 MG/HR: 9 INJECTION, SOLUTION INTRAVENOUS at 08:47

## 2021-05-03 RX ADMIN — Medication 100 MG: at 09:00

## 2021-05-03 RX ADMIN — THIAMINE HCL TAB 100 MG 100 MG: 100 TAB at 09:00

## 2021-05-03 RX ADMIN — TOPIRAMATE 150 MG: 100 TABLET, FILM COATED ORAL at 17:18

## 2021-05-03 RX ADMIN — LITHIUM CARBONATE 300 MG: 300 TABLET, FILM COATED, EXTENDED RELEASE ORAL at 09:01

## 2021-05-03 RX ADMIN — ROPINIROLE 1 MG: 1 TABLET, FILM COATED ORAL at 21:11

## 2021-05-03 RX ADMIN — TOPIRAMATE 150 MG: 100 TABLET, FILM COATED ORAL at 09:01

## 2021-05-03 RX ADMIN — VENLAFAXINE HYDROCHLORIDE 150 MG: 150 CAPSULE, EXTENDED RELEASE ORAL at 09:00

## 2021-05-03 RX ADMIN — POTASSIUM CHLORIDE 20 MEQ: 1500 TABLET, EXTENDED RELEASE ORAL at 17:17

## 2021-05-03 RX ADMIN — ATOMOXETINE 60 MG: 60 CAPSULE ORAL at 09:00

## 2021-05-03 RX ADMIN — LEVOTHYROXINE SODIUM 100 MCG: 100 TABLET ORAL at 05:40

## 2021-05-03 RX ADMIN — LITHIUM CARBONATE 300 MG: 300 TABLET, FILM COATED, EXTENDED RELEASE ORAL at 17:18

## 2021-05-03 RX ADMIN — SODIUM CHLORIDE, SODIUM GLUCONATE, SODIUM ACETATE, POTASSIUM CHLORIDE, MAGNESIUM CHLORIDE, SODIUM PHOSPHATE, DIBASIC, AND POTASSIUM PHOSPHATE 1000 ML: .53; .5; .37; .037; .03; .012; .00082 INJECTION, SOLUTION INTRAVENOUS at 10:57

## 2021-05-03 RX ADMIN — SODIUM CHLORIDE 8 MG/HR: 9 INJECTION, SOLUTION INTRAVENOUS at 18:33

## 2021-05-03 RX ADMIN — POTASSIUM CHLORIDE 20 MEQ: 1500 TABLET, EXTENDED RELEASE ORAL at 09:00

## 2021-05-03 NOTE — ASSESSMENT & PLAN NOTE
· Patient has a history of transaminitis with hepatic steatosis  · Monitor LFTs  · Avoid hepatotoxic medications

## 2021-05-03 NOTE — PROGRESS NOTES
Progress Note - Geovani Vuong 50 y o  female MRN: [id number]    Unit/Bed#: ICU 14 Encounter: [id number]         Assessment/ Plan:  GI bleed    Pt has a history of Oc-en-Y who presented with hematemesis, melena and hemoglobin of 7 2, down from 10 5  She received 2 units  She underwent EGD yesterday showing a 5 mm crater to ulcer with adherent clot which was treated with APC and epi  She has not had a bowel movement yet today  She continues with mild abdominal discomfort  She is tolerating clear liquids  Hemoglobin currently 9 1  Would recommend continuing with clear liquids today  If no episodes of melena and hemoglobin remains stable can increase diet as tolerated  -follow H&H  -continue Protonix drip  -continue clear liquids      Subjective:   Pt with mild epigastric pain  No BM today  Tolerating clears  Objective:     Vitals: Blood pressure 93/66, pulse 86, temperature 98 1 °F (36 7 °C), temperature source Temporal, resp  rate (!) 26, height 5' 3" (1 6 m), weight 106 kg (233 lb 11 oz), last menstrual period 10/04/2020, SpO2 95 %  ,Body mass index is 41 4 kg/m²  Physical Exam: General appearance: alert and oriented, in no acute distress  Lungs: clear to auscultation bilaterally  Heart: regular rate and rhythm  Abdomen: + epigastric pain  Skin: Skin color, texture, turgor normal  No rashes or lesions     Invasive Devices     Peripheral Intravenous Line            Peripheral IV 05/01/21 Left Antecubital 1 day    Peripheral IV 05/01/21 Right Antecubital 1 day    Peripheral IV 05/02/21 Right;Ventral (anterior) Forearm 1 day                  Lab, Imaging and other studies: I have personally reviewed pertinent reports       CBC:   Lab Results   Component Value Date    WBC 6 49 05/03/2021    HGB 9 1 (L) 05/03/2021    HCT 27 4 (L) 05/03/2021    MCV 95 05/03/2021     (L) 05/03/2021    MCH 31 4 05/03/2021    MCHC 33 2 05/03/2021    RDW 20 6 (H) 05/03/2021    MPV 13 7 (H) 05/03/2021   ,   CMP:   Lab Results   Component Value Date    K 3 9 05/03/2021     05/03/2021    CO2 27 05/03/2021    BUN 5 05/03/2021    CREATININE 0 56 (L) 05/03/2021    CALCIUM 6 9 (L) 05/03/2021     (H) 05/03/2021    ALT 83 (H) 05/03/2021    ALKPHOS 184 (H) 05/03/2021    EGFR 111 05/03/2021   ,

## 2021-05-03 NOTE — PROGRESS NOTES
[address] [facility]  Progress Note - [address] 1975, 50 y o  female MRN: [id number]  Unit/Bed#: ICU 14 Encounter: [id number]  Primary Care Provider: Aman Yap PA-C   Date and time admitted to hospital: 5/1/2021 10:08 PM    Acute on chronic blood loss anemia  Assessment & Plan  · In the setting of acute GI bleed  · S/P EGD with injection of anastomotic ulcer  · Transfuse for Hgb<8/hypotension/tachycardia  ·  H/H q6h  · Chronic anemia following Oc-en-Y procedure 2010  · Patient follows with Hematology  Hypotension  Assessment & Plan  · Secondary to acute blood loss anemia  · Improving    Lactic acid acidosis  Assessment & Plan  · Secondary to acute GI bleed  · Resuscitation as discussed above  High anion gap metabolic acidosis  Assessment & Plan  · Secondary to acute GI bleed-Monitor  Hypokalemia  Assessment & Plan  · Potassium replacement  · Closely monitor BMP and telemetry  Acquired hypothyroidism  Assessment & Plan  · Continue home Synthroid  Morbid obesity with BMI of 50 0-59 9, adult ([facility] )  Assessment & Plan  · Currently NPO  · Patient education/nutrition consult in the future  Bipolar disorder ([facility] )  Assessment & Plan  · Continue home medications when able to take PO's    Transaminitis  Assessment & Plan  · Patient has a history of transaminitis with hepatic steatosis  · Monitor LFTs     · Avoid hepatotoxic medications      ----------------------------------------------------------------------------------------  HPI/24hr events: No events overnight, passing some maroon stool    Disposition: Continue Critical Care   Code Status: Level 1 - Full Code  ---------------------------------------------------------------------------------------  SUBJECTIVE  Denies pain    Review of Systems  Review of systems was reviewed and negative unless stated above in HPI/24-hour events ---------------------------------------------------------------------------------------  OBJECTIVE    Vitals   Vitals:    21 0400 21 0500 21 0538 21 0600   BP: 94/63   (!) 85/62   BP Location: Right arm   Right arm   Pulse: 88 98  82   Resp: (!) 31 (!) 36  (!) 26   Temp: 98 5 °F (36 9 °C)      TempSrc: Temporal      SpO2: 98%   91%   Weight:   106 kg (233 lb 11 oz)    Height:         Temp (24hrs), Av 6 °F (37 °C), Min:98 2 °F (36 8 °C), Max:99 1 °F (37 3 °C)  Current: Temperature: 98 5 °F (36 9 °C)          Respiratory:  SpO2 Activity: SpO2 Activity: At Rest, SpO2 Device: O2 Device: None (Room air)  Nasal Cannula O2 Flow Rate (L/min): 2 L/min    Invasive/non-invasive ventilation settings   Respiratory    Lab Data (Last 4 hours)    None         O2/Vent Data (Last 4 hours)    None                Physical Exam  Constitutional:       Appearance: She is obese  HENT:      Head: Normocephalic  Nose: Nose normal       Mouth/Throat:      Mouth: Mucous membranes are moist    Eyes:      Pupils: Pupils are equal, round, and reactive to light  Neck:      Musculoskeletal: Neck supple  Cardiovascular:      Rate and Rhythm: Normal rate and regular rhythm  Pulses: Normal pulses  Pulmonary:      Effort: Pulmonary effort is normal    Abdominal:      General: There is distension  Tenderness: There is no abdominal tenderness  Musculoskeletal:         General: No swelling  Lymphadenopathy:      Cervical: No cervical adenopathy  Skin:     General: Skin is warm and dry  Neurological:      General: No focal deficit present  Mental Status: She is alert and oriented to person, place, and time  Mental status is at baseline     Psychiatric:         Mood and Affect: Mood normal          Laboratory and Diagnostics:  Results from last 7 days   Lab Units 21  0236 21  1352 21  0432 21  0251 21  2326 21  2216   WBC Thousand/uL 6 49  --  5 76  --   -- 6  17   HEMOGLOBIN g/dL 9 1* 7 5* 8 5* 8 7* 7 2* 7 7*   HEMATOCRIT % 27 4*  --  24 5*  --  20 6* 22 4*   PLATELETS Thousands/uL 123*  --  108*  --   --  140*   NEUTROS PCT %  --   --  76*  --   --  63   MONOS PCT %  --   --  9  --   --  9     Results from last 7 days   Lab Units 05/03/21  0236 05/02/21  0432 05/01/21  2216   SODIUM mmol/L 137 142 141   POTASSIUM mmol/L 3 9 3 0* 2 7*   CHLORIDE mmol/L 103 102 101   CO2 mmol/L 27 26 26   ANION GAP mmol/L 7 14* 14*   BUN mg/dL 5 5 5   CREATININE mg/dL 0 56* 0 86 0 65   CALCIUM mg/dL 6 9* 6 7* 7 1*   GLUCOSE RANDOM mg/dL 137 198* 159*   ALT U/L 83* 84* 98*   AST U/L 245* 262* 331*   ALK PHOS U/L 184* 173* 199*   ALBUMIN g/dL 1 8* 1 8* 1 6*   TOTAL BILIRUBIN mg/dL 2 72* 2 07* 1 80*     Results from last 7 days   Lab Units 05/02/21  0432   MAGNESIUM mg/dL 1 1*   PHOSPHORUS mg/dL 3 2      Results from last 7 days   Lab Units 05/02/21  0432 05/01/21  2216   INR  1 18 1 25*   PTT seconds 34 35      Results from last 7 days   Lab Units 05/01/21  2216   TROPONIN I ng/mL <0 02     Results from last 7 days   Lab Units 05/02/21  1018 05/02/21  0815 05/02/21  0432 05/02/21  0123 05/01/21  2216   LACTIC ACID mmol/L 4 8* 6 2* 6 0* 8 1* 10 0*     ABG:    VBG:          Micro        EKG:   Imaging: I have personally reviewed pertinent reports  and I have personally reviewed pertinent films in PACS    Intake and Output  I/O       05/01 0701 - 05/02 0700 05/02 0701 - 05/03 0700    I V  (mL/kg) 127 3 (1 2) 959 2 (9)    Blood 350     IV Piggyback 2100 150    Total Intake(mL/kg) 2577 3 (24 1) 1109 2 (10 4)    Net +2577 3 +1109 2          Unmeasured Urine Occurrence 1 x 1 x    Unmeasured Stool Occurrence 3 x 4 x          Height and Weights   Height: 5' 3" (160 cm)  IBW (Ideal Body Weight): 52 4 kg  Body mass index is 41 4 kg/m²    Weight (last 2 days)     Date/Time   Weight    05/03/21 0538   106 (233 69)    05/02/21 0400   107 (236 55)    05/02/21 0024   107 (236 55)    05/01/21 2219   111 (596 72)                Nutrition       Diet Orders   (From admission, onward)             Start     Ordered    05/02/21 1341  Diet Clear Liquid  Diet effective now     Question Answer Comment   Diet Type Clear Liquid    RD to adjust diet per protocol?  No        05/02/21 1340                  Active Medications  Scheduled Meds:  Current Facility-Administered Medications   Medication Dose Route Frequency Provider Last Rate    atoMOXetine  60 mg Oral Daily Carmelo Macho, CRNP      cloNIDine  0 1 mg Oral Q8H PRN Carmelo Macho, CRNP      levothyroxine  100 mcg Oral Early Morning Carmelo Macho, CRNP      lithium carbonate  300 mg Oral BID Carmelo Macho, CRNP      LORazepam  0 5 mg Intravenous Q4H PRN Kamilla Furlong, CRNP      ondansetron  4 mg Intravenous Q6H PRN Adebayo Penn PA-C      pantoprozole (PROTONIX) infusion (Continuous)  8 mg/hr Intravenous Continuous Adebayo Penn PA-C 8 mg/hr (05/02/21 2024)    potassium chloride  20 mEq Oral BID Carmelo Macho, CRNP      pyridoxine  100 mg Oral Daily Carmelo Macho, CRNP      rOPINIRole  1 mg Oral HS Carmelo Macho, CRNP      thiamine  100 mg Oral Daily Carmelo Macho, CRNP      topiramate  150 mg Oral BID Carmelo Macho, CRNP      venlafaxine  150 mg Oral Daily Carmelo Macho, CRNP       Continuous Infusions:  pantoprozole (PROTONIX) infusion (Continuous), 8 mg/hr, Last Rate: 8 mg/hr (05/02/21 2024)      PRN Meds:   cloNIDine, 0 1 mg, Q8H PRN  LORazepam, 0 5 mg, Q4H PRN  ondansetron, 4 mg, Q6H PRN        Invasive Devices Review  Invasive Devices     Peripheral Intravenous Line            Peripheral IV 05/01/21 Left Antecubital 1 day    Peripheral IV 05/01/21 Right Antecubital 1 day    Peripheral IV 05/02/21 Right;Ventral (anterior) Forearm 1 day                Rationale for remaining devices:   ---------------------------------------------------------------------------------------  Advance Directive and Living Will:      Power of :    POLST: ---------------------------------------------------------------------------------------  Care Time Delivered:         MARGUERITE Keys      Portions of the record may have been created with voice recognition software  Occasional wrong word or "sound a like" substitutions may have occurred due to the inherent limitations of voice recognition software    Read the chart carefully and recognize, using context, where substitutions have occurred

## 2021-05-03 NOTE — ASSESSMENT & PLAN NOTE
· In the setting of acute GI bleed  · S/P EGD with injection of anastomotic ulcer  · Transfuse for Hgb<8/hypotension/tachycardia  ·  H/H q6h  · Chronic anemia following Oc-en-Y procedure 2010  · Patient follows with Hematology

## 2021-05-03 NOTE — PROGRESS NOTES
Pastoral Care Progress Note    5/3/2021  Patient: Pooja Bee : 1975  Admission Date & Time: 2021  MRN: [id number] [facility]: [id number]                     Chaplaincy Interventions Utilized:   Empowerment: Clarified, confirmed, or reviewed information from treatment team     Exploration: Explored emotional needs & resources and Explored spiritual needs & resources    Collaboration: Encouraged adherence to treatment plan    Relationship Building: Cultivated a relationship of care and support and Listened empathically    Chaplaincy Outcomes Achieved:  Expressed gratitude

## 2021-05-04 LAB
ALBUMIN SERPL BCP-MCNC: 1.8 G/DL (ref 3.5–5)
ALP SERPL-CCNC: 193 U/L (ref 46–116)
ALT SERPL W P-5'-P-CCNC: 79 U/L (ref 12–78)
ANION GAP SERPL CALCULATED.3IONS-SCNC: 7 MMOL/L (ref 4–13)
AST SERPL W P-5'-P-CCNC: 296 U/L (ref 5–45)
BILIRUB SERPL-MCNC: 2.55 MG/DL (ref 0.2–1)
BUN SERPL-MCNC: 4 MG/DL (ref 5–25)
CALCIUM ALBUM COR SERPL-MCNC: 9.5 MG/DL (ref 8.3–10.1)
CALCIUM SERPL-MCNC: 7.7 MG/DL (ref 8.3–10.1)
CHLORIDE SERPL-SCNC: 104 MMOL/L (ref 100–108)
CO2 SERPL-SCNC: 25 MMOL/L (ref 21–32)
CREAT SERPL-MCNC: 0.59 MG/DL (ref 0.6–1.3)
ERYTHROCYTE [DISTWIDTH] IN BLOOD BY AUTOMATED COUNT: 21.1 % (ref 11.6–15.1)
GFR SERPL CREATININE-BSD FRML MDRD: 109 ML/MIN/1.73SQ M
GLUCOSE SERPL-MCNC: 106 MG/DL (ref 65–140)
HCT VFR BLD AUTO: 27.8 % (ref 34.8–46.1)
HGB BLD-MCNC: 8.9 G/DL (ref 11.5–15.4)
MCH RBC QN AUTO: 31.4 PG (ref 26.8–34.3)
MCHC RBC AUTO-ENTMCNC: 32 G/DL (ref 31.4–37.4)
MCV RBC AUTO: 98 FL (ref 82–98)
PLATELET # BLD AUTO: 164 THOUSANDS/UL (ref 149–390)
PMV BLD AUTO: 13.2 FL (ref 8.9–12.7)
POTASSIUM SERPL-SCNC: 3.3 MMOL/L (ref 3.5–5.3)
PROT SERPL-MCNC: 5.3 G/DL (ref 6.4–8.2)
RBC # BLD AUTO: 2.83 MILLION/UL (ref 3.81–5.12)
SODIUM SERPL-SCNC: 136 MMOL/L (ref 136–145)
WBC # BLD AUTO: 5.48 THOUSAND/UL (ref 4.31–10.16)

## 2021-05-04 PROCEDURE — 99232 SBSQ HOSP IP/OBS MODERATE 35: CPT | Performed by: INTERNAL MEDICINE

## 2021-05-04 PROCEDURE — 99231 SBSQ HOSP IP/OBS SF/LOW 25: CPT | Performed by: PHYSICIAN ASSISTANT

## 2021-05-04 PROCEDURE — 80053 COMPREHEN METABOLIC PANEL: CPT | Performed by: NURSE PRACTITIONER

## 2021-05-04 PROCEDURE — 85027 COMPLETE CBC AUTOMATED: CPT | Performed by: NURSE PRACTITIONER

## 2021-05-04 PROCEDURE — C9113 INJ PANTOPRAZOLE SODIUM, VIA: HCPCS | Performed by: PHYSICIAN ASSISTANT

## 2021-05-04 RX ORDER — PANTOPRAZOLE SODIUM 40 MG/1
40 INJECTION, POWDER, FOR SOLUTION INTRAVENOUS EVERY 12 HOURS SCHEDULED
Status: DISCONTINUED | OUTPATIENT
Start: 2021-05-04 | End: 2021-05-05 | Stop reason: HOSPADM

## 2021-05-04 RX ADMIN — VENLAFAXINE HYDROCHLORIDE 150 MG: 150 CAPSULE, EXTENDED RELEASE ORAL at 08:38

## 2021-05-04 RX ADMIN — LEVOTHYROXINE SODIUM 100 MCG: 100 TABLET ORAL at 05:24

## 2021-05-04 RX ADMIN — ROPINIROLE 1 MG: 1 TABLET, FILM COATED ORAL at 21:05

## 2021-05-04 RX ADMIN — TOPIRAMATE 150 MG: 100 TABLET, FILM COATED ORAL at 08:39

## 2021-05-04 RX ADMIN — Medication 100 MG: at 08:38

## 2021-05-04 RX ADMIN — THIAMINE HCL TAB 100 MG 100 MG: 100 TAB at 08:38

## 2021-05-04 RX ADMIN — POTASSIUM CHLORIDE 20 MEQ: 1500 TABLET, EXTENDED RELEASE ORAL at 17:07

## 2021-05-04 RX ADMIN — ONDANSETRON 4 MG: 2 INJECTION INTRAMUSCULAR; INTRAVENOUS at 14:50

## 2021-05-04 RX ADMIN — POTASSIUM CHLORIDE 20 MEQ: 1500 TABLET, EXTENDED RELEASE ORAL at 08:38

## 2021-05-04 RX ADMIN — ENOXAPARIN SODIUM 100 MG: 100 INJECTION SUBCUTANEOUS at 21:05

## 2021-05-04 RX ADMIN — ATOMOXETINE 60 MG: 60 CAPSULE ORAL at 08:38

## 2021-05-04 RX ADMIN — LITHIUM CARBONATE 300 MG: 300 TABLET, FILM COATED, EXTENDED RELEASE ORAL at 08:39

## 2021-05-04 RX ADMIN — PANTOPRAZOLE SODIUM 40 MG: 40 INJECTION, POWDER, FOR SOLUTION INTRAVENOUS at 21:05

## 2021-05-04 RX ADMIN — SODIUM CHLORIDE 8 MG/HR: 9 INJECTION, SOLUTION INTRAVENOUS at 17:17

## 2021-05-04 RX ADMIN — TOPIRAMATE 150 MG: 100 TABLET, FILM COATED ORAL at 17:07

## 2021-05-04 RX ADMIN — LITHIUM CARBONATE 300 MG: 300 TABLET, FILM COATED, EXTENDED RELEASE ORAL at 17:07

## 2021-05-04 RX ADMIN — SODIUM CHLORIDE 8 MG/HR: 9 INJECTION, SOLUTION INTRAVENOUS at 05:02

## 2021-05-04 NOTE — ASSESSMENT & PLAN NOTE
· In the setting of acute GI bleed  · S/P EGD with injection of anastomotic ulcer  · Transfuse for Hgb<8/hypotension/tachycardia  · Chronic anemia following Oc-en-Y procedure 2010  · Patient follows with Hematology    · Further management per GI recommendations

## 2021-05-04 NOTE — SOCIAL WORK
CM met with the patient at bedside to do a general SW assessment    Pt is LOS day 2  She is not a bundle  She is not a re-admission  Her HRR score is 22 in the GREEN zone  The patient lives in a `1BR apartment, 2ste, it is all one floor  She lives with her brother  She has a cane and a RW at home, she mostly uses the RW  She is an assist with ADLs, she has HHA services for 30hrs a week, M-F 9-3  No VNA  No hx of STR  She was going to OPP[facility] at [facility] for PT and aqua therapy  She has her own vehicle, her brother or her HHA drive it to transport her places  Her PCP is ELIZA Kelley  She has no formal POA, she would like her daughter, Jose León [phone] to be her healthcare representative  She reports occasional ETOH use  She has a medical marijuana card  MH dx of BiPolar, PTSD, OCD, and Manic Depressive disorder - she follows with [facility] for services, she has a psychiatrist, a counselor and an ICM worker  She denies any IOP, partial hospitalization or BHU stays within the past 2yrs  CM explained role, will continue to follow for discharge needs

## 2021-05-04 NOTE — PLAN OF CARE
Problem: Potential for Falls  Goal: Patient will remain free of falls  Description: INTERVENTIONS:  - Assess patient frequently for physical needs  -  Identify cognitive and physical deficits and behaviors that affect risk of falls    -  [place] fall precautions as indicated by assessment   - Educate patient/family on patient safety including physical limitations  - Instruct patient to call for assistance with activity based on assessment  - Modify environment to reduce risk of injury  - Consider OT/PT consult to assist with strengthening/mobility  Outcome: Progressing     Problem: Prexisting or High Potential for Compromised Skin Integrity  Goal: Skin integrity is maintained or improved  Description: INTERVENTIONS:  - Identify patients at risk for skin breakdown  - Assess and monitor skin integrity  - Assess and monitor nutrition and hydration status  - Monitor labs   - Assess for incontinence   - Turn and reposition patient  - Assist with mobility/ambulation  - Relieve pressure over bony prominences  - Avoid friction and shearing  - Provide appropriate hygiene as needed including keeping skin clean and dry  - Evaluate need for skin moisturizer/barrier cream  - Collaborate with interdisciplinary team   - Patient/family teaching  - Consider wound care consult   Outcome: Progressing     Problem: GASTROINTESTINAL - ADULT  Goal: Minimal or absence of nausea and/or vomiting  Description: INTERVENTIONS:  - Administer IV fluids if ordered to ensure adequate hydration  - Maintain NPO status until nausea and vomiting are resolved  - Nasogastric tube if ordered  - Administer ordered antiemetic medications as needed  - Provide nonpharmacologic comfort measures as appropriate  - Advance diet as tolerated, if ordered  - Consider nutrition services referral to assist patient with adequate nutrition and appropriate food choices  Outcome: Progressing  Goal: Maintains or returns to baseline bowel function  Description: INTERVENTIONS:  - Assess bowel function  - Encourage oral fluids to ensure adequate hydration  - Administer IV fluids if ordered to ensure adequate hydration  - Administer ordered medications as needed  - Encourage mobilization and activity  - Consider nutritional services referral to assist patient with adequate nutrition and appropriate food choices  Outcome: Progressing     Problem: METABOLIC, FLUID AND ELECTROLYTES - ADULT  Goal: Electrolytes maintained within normal limits  Description: INTERVENTIONS:  - Monitor labs and assess patient for signs and symptoms of electrolyte imbalances  - Administer electrolyte replacement as ordered  - Monitor response to electrolyte replacements, including repeat lab results as appropriate  - Instruct patient on fluid and nutrition as appropriate  Outcome: Progressing     Problem: HEMATOLOGIC - ADULT  Goal: Maintains hematologic stability  Description: INTERVENTIONS  - Assess for signs and symptoms of bleeding or hemorrhage  - Monitor labs  - Administer supportive blood products/factors as ordered and appropriate  Outcome: Progressing

## 2021-05-04 NOTE — PROGRESS NOTES
[address] [facility]  Progress Note - [address] 1975, 50 y o  female MRN: [id number]  Unit/Bed#: ICU 14 Encounter: [id number]  Primary Care Provider: Laure Marion PA-C   Date and time admitted to hospital: 5/1/2021 10:08 PM    * Acute GI bleeding  Assessment & Plan  · Patient presents with a 2 day history of melena and hematemesis  She has a PMH of PE/DVT/IVC thrombosis s/p IVC filter placement and anticoagulation with Lovenox (last dose 5/1 6am)  · Admit to critical care service  · 6 Units of PRBC ordered in the ED  Transfused 2 units PRBC and 2 units FFP   ·  Goal SBP >100, HR<100  H/H q 6h  Goal Hgb >8     · S/P EGD with injection of anastomosis ulceration  · Will defer to GI for decision regarding repeat EGD  · NPO for now  · Patient has previously placed IVC filter  Acute on chronic blood loss anemia  Assessment & Plan  · In the setting of acute GI bleed  · S/P EGD with injection of anastomotic ulcer  · Transfuse for Hgb<8/hypotension/tachycardia  · Chronic anemia following Oc-en-Y procedure 2010  · Patient follows with Hematology  · Further management per GI recommendations    Hypotension  Assessment & Plan  · Secondary to acute blood loss anemia  · Improving    Lactic acid acidosis  Assessment & Plan  · Secondary to acute GI bleed  · Resuscitation as discussed above  Hypokalemia  Assessment & Plan  · Potassium replacement  · Closely monitor BMP and telemetry  Acquired hypothyroidism  Assessment & Plan  · Continue home Synthroid  Morbid obesity with BMI of 50 0-59 9, adult ([facility] [place] [address] )  Assessment & Plan  · Currently NPO  · Patient education/nutrition consult in the future  Bipolar disorder ([facility] [place] [address] )  Assessment & Plan  · Continue home medications when able to take PO's    Transaminitis  Assessment & Plan  · Patient has a history of transaminitis with hepatic steatosis  · Monitor LFTs  · Avoid hepatotoxic medications  ----------------------------------------------------------------------------------------  HPI/24hr events: No events overnight, no bloody stool    Disposition: Continue Stepdown Level 1 level of care   Code Status: Level 1 - Full Code  ---------------------------------------------------------------------------------------  SUBJECTIVE  Denies pain    Review of Systems  Review of systems was reviewed and negative unless stated above in HPI/24-hour events   ---------------------------------------------------------------------------------------  OBJECTIVE    Vitals   Vitals:    21 0400 21 0445 21 0500 21 0600   BP: 92/68 92/57  (!) 87/68   Pulse: 80 74 72 76   Resp: (!) 24 (!) 28 (!) 26 (!) 23   Temp:   98 1 °F (36 7 °C)    TempSrc:   Tympanic    SpO2:       Weight:   110 kg (241 lb 10 oz)    Height:         Temp (24hrs), Av 1 °F (36 7 °C), Min:97 9 °F (36 6 °C), Max:98 3 °F (36 8 °C)  Current: Temperature: 98 1 °F (36 7 °C)          Respiratory:  SpO2 Activity: SpO2 Activity: At Rest, SpO2 Device: O2 Device: None (Room air)  Nasal Cannula O2 Flow Rate (L/min): 2 L/min    Invasive/non-invasive ventilation settings   Respiratory    Lab Data (Last 4 hours)    None         O2/Vent Data (Last 4 hours)    None                Physical Exam  Constitutional:       Appearance: She is obese  HENT:      Head: Normocephalic  Mouth/Throat:      Mouth: Mucous membranes are moist    Eyes:      Pupils: Pupils are equal, round, and reactive to light  Neck:      Musculoskeletal: Neck supple  Cardiovascular:      Rate and Rhythm: Normal rate  Pulmonary:      Effort: Pulmonary effort is normal       Breath sounds: Normal breath sounds  Abdominal:      General: There is distension  Tenderness: There is no abdominal tenderness  Musculoskeletal:         General: No swelling  Lymphadenopathy:      Cervical: No cervical adenopathy  Skin:     General: Skin is warm and dry     Neurological: General: No focal deficit present  Mental Status: She is alert and oriented to person, place, and time  Mental status is at baseline  Laboratory and Diagnostics:  Results from last 7 days   Lab Units 05/04/21  0500 05/03/21  1546 05/03/21  0236 05/02/21  1352 05/02/21  0432 05/02/21  0251 05/01/21  2326 05/01/21  2216   WBC Thousand/uL 5 48  --  6 49  --  5 76  --   --  6 17   HEMOGLOBIN g/dL 8 9* 9 9* 9 1* 7 5* 8 5* 8 7* 7 2* 7 7*   HEMATOCRIT % 27 8*  --  27 4*  --  24 5*  --  20 6* 22 4*   PLATELETS Thousands/uL 164  --  123*  --  108*  --   --  140*   NEUTROS PCT %  --   --   --   --  76*  --   --  63   MONOS PCT %  --   --   --   --  9  --   --  9     Results from last 7 days   Lab Units 05/04/21  0500 05/03/21  0236 05/02/21  0432 05/01/21  2216   SODIUM mmol/L 136 137 142 141   POTASSIUM mmol/L 3 3* 3 9 3 0* 2 7*   CHLORIDE mmol/L 104 103 102 101   CO2 mmol/L 25 27 26 26   ANION GAP mmol/L 7 7 14* 14*   BUN mg/dL 4* 5 5 5   CREATININE mg/dL 0 59* 0 56* 0 86 0 65   CALCIUM mg/dL 7 7* 6 9* 6 7* 7 1*   GLUCOSE RANDOM mg/dL 106 137 198* 159*   ALT U/L 79* 83* 84* 98*   AST U/L 296* 245* 262* 331*   ALK PHOS U/L 193* 184* 173* 199*   ALBUMIN g/dL 1 8* 1 8* 1 8* 1 6*   TOTAL BILIRUBIN mg/dL 2 55* 2 72* 2 07* 1 80*     Results from last 7 days   Lab Units 05/02/21  0432   MAGNESIUM mg/dL 1 1*   PHOSPHORUS mg/dL 3 2      Results from last 7 days   Lab Units 05/02/21  0432 05/01/21  2216   INR  1 18 1 25*   PTT seconds 34 35      Results from last 7 days   Lab Units 05/01/21  2216   TROPONIN I ng/mL <0 02     Results from last 7 days   Lab Units 05/02/21  1018 05/02/21  0815 05/02/21  0432 05/02/21  0123 05/01/21  2216   LACTIC ACID mmol/L 4 8* 6 2* 6 0* 8 1* 10 0*     ABG:    VBG:          Micro        EKG:   Imaging: I have personally reviewed pertinent reports     and I have personally reviewed pertinent films in PACS    Intake and Output  I/O       05/02 0701 - 05/03 0700 05/03 0701 - 05/04 0700 I V  (mL/kg) 1019 2 (9 6) 1140 (10 8)    Blood      IV Piggyback 150     Total Intake(mL/kg) 1169 2 (11) 1140 (10 8)    Urine (mL/kg/hr) 350 (0 1) 556 (0 3)    Stool 0 0    Total Output 350 556    Net +819 2 +584          Unmeasured Urine Occurrence 2 x 2 x    Unmeasured Stool Occurrence 6 x 1 x          Height and Weights   Height: 5' 3" (160 cm)  IBW (Ideal Body Weight): 52 4 kg  Body mass index is 42 8 kg/m²  Weight (last 2 days)     Date/Time   Weight    05/04/21 0500   110 (241 62)    05/03/21 0538   106 (233 69)    05/02/21 0400   107 (236 55)    05/02/21 0024   107 (236 55)                Nutrition       Diet Orders   (From admission, onward)             Start     Ordered    05/02/21 1341  Diet Clear Liquid  Diet effective now     Question Answer Comment   Diet Type Clear Liquid    RD to adjust diet per protocol?  No        05/02/21 1340                  Active Medications  Scheduled Meds:  Current Facility-Administered Medications   Medication Dose Route Frequency Provider Last Rate    atoMOXetine  60 mg Oral Daily Carmelo Macho, CRNP      cloNIDine  0 1 mg Oral Q8H PRN Carmelo Macho, CRNP      levothyroxine  100 mcg Oral Early Morning Carmelo Macho, CRNP      lithium carbonate  300 mg Oral BID Carmelo Macho, CRNP      LORazepam  0 5 mg Intravenous Q4H PRN Kamilla Furlong, CRNP      ondansetron  4 mg Intravenous Q6H PRN Adebayo Penn PA-C      pantoprozole (PROTONIX) infusion (Continuous)  8 mg/hr Intravenous Continuous Adebayo Penn PA-C 8 mg/hr (05/04/21 0502)    potassium chloride  20 mEq Oral BID Carmelo Macho, CRNP      pyridoxine  100 mg Oral Daily Carmelo Macho, CRNP      rOPINIRole  1 mg Oral HS Carmelo Macho, CRNP      thiamine  100 mg Oral Daily Carmelo Macho, CRNP      topiramate  150 mg Oral BID Carmelo Macho, CRNP      venlafaxine  150 mg Oral Daily Carmelo Macho, CRNP       Continuous Infusions:  pantoprozole (PROTONIX) infusion (Continuous), 8 mg/hr, Last Rate: 8 mg/hr (05/04/21 8082)      PRN Meds:   cloNIDine, 0 1 mg, Q8H PRN  LORazepam, 0 5 mg, Q4H PRN  ondansetron, 4 mg, Q6H PRN        Invasive Devices Review  Invasive Devices     Peripheral Intravenous Line            Peripheral IV 05/02/21 Right;Ventral (anterior) Forearm 2 days                Rationale for remaining devices:   ---------------------------------------------------------------------------------------  Advance Directive and Living Will:      Power of :    POLST:    ---------------------------------------------------------------------------------------  Care Time Delivered:         MARGUERITE Nicholson      Portions of the record may have been created with voice recognition software  Occasional wrong word or "sound a like" substitutions may have occurred due to the inherent limitations of voice recognition software    Read the chart carefully and recognize, using context, where substitutions have occurred

## 2021-05-04 NOTE — PLAN OF CARE
Problem: Potential for Falls  Goal: Patient will remain free of falls  Description: INTERVENTIONS:  - Assess patient frequently for physical needs  -  Identify cognitive and physical deficits and behaviors that affect risk of falls    -  [place] fall precautions as indicated by assessment   - Educate patient/family on patient safety including physical limitations  - Instruct patient to call for assistance with activity based on assessment  - Modify environment to reduce risk of injury  - Consider OT/PT consult to assist with strengthening/mobility  Outcome: Progressing     Problem: Prexisting or High Potential for Compromised Skin Integrity  Goal: Skin integrity is maintained or improved  Description: INTERVENTIONS:  - Identify patients at risk for skin breakdown  - Assess and monitor skin integrity  - Assess and monitor nutrition and hydration status  - Monitor labs   - Assess for incontinence   - Turn and reposition patient  - Assist with mobility/ambulation  - Relieve pressure over bony prominences  - Avoid friction and shearing  - Provide appropriate hygiene as needed including keeping skin clean and dry  - Evaluate need for skin moisturizer/barrier cream  - Collaborate with interdisciplinary team   - Patient/family teaching  - Consider wound care consult   Outcome: Progressing     Problem: GASTROINTESTINAL - ADULT  Goal: Minimal or absence of nausea and/or vomiting  Description: INTERVENTIONS:  - Administer IV fluids if ordered to ensure adequate hydration  - Maintain NPO status until nausea and vomiting are resolved  - Nasogastric tube if ordered  - Administer ordered antiemetic medications as needed  - Provide nonpharmacologic comfort measures as appropriate  - Advance diet as tolerated, if ordered  - Consider nutrition services referral to assist patient with adequate nutrition and appropriate food choices  Outcome: Progressing  Goal: Maintains or returns to baseline bowel function  Description: INTERVENTIONS:  - Assess bowel function  - Encourage oral fluids to ensure adequate hydration  - Administer IV fluids if ordered to ensure adequate hydration  - Administer ordered medications as needed  - Encourage mobilization and activity  - Consider nutritional services referral to assist patient with adequate nutrition and appropriate food choices  Outcome: Progressing     Problem: METABOLIC, FLUID AND ELECTROLYTES - ADULT  Goal: Electrolytes maintained within normal limits  Description: INTERVENTIONS:  - Monitor labs and assess patient for signs and symptoms of electrolyte imbalances  - Administer electrolyte replacement as ordered  - Monitor response to electrolyte replacements, including repeat lab results as appropriate  - Instruct patient on fluid and nutrition as appropriate  Outcome: Progressing     Problem: HEMATOLOGIC - ADULT  Goal: Maintains hematologic stability  Description: INTERVENTIONS  - Assess for signs and symptoms of bleeding or hemorrhage  - Monitor labs  - Administer supportive blood products/factors as ordered and appropriate  Outcome: Progressing

## 2021-05-04 NOTE — PROGRESS NOTES
Progress Note - Lexi Carr 50 y o  female MRN: [id number]    Unit/Bed#: ICU 14 Encounter: [id number]         Assessment/ Plan:  GI bleed     Pt has a history of Oc-en-Y who presented with hematemesis, melena and hemoglobin of 7 2, down from 10 5  She received 2 units  She underwent EGD 5/2 showing a 5 mm crater to ulcer with adherent clot which was treated with APC and epi  She has not had a bowel movement  Abdominal pain has improved  She is tolerating clear liquids  Hemoglobin currently 8 9     -follow H&H  -switch Protonix drip to BID  -Advance diet  -ok to restart anticoagulation      Subjective:   Pt denies abdominal pain  No melena  Tolerating clears  Objective:     Vitals: Blood pressure 101/56, pulse 80, temperature 98 1 °F (36 7 °C), temperature source Temporal, resp  rate (!) 28, height 5' 3" (1 6 m), weight 110 kg (241 lb 10 oz), last menstrual period 10/04/2020, SpO2 95 %  ,Body mass index is 42 8 kg/m²  Physical Exam: General appearance: alert and oriented, in no acute distress  Lungs: clear to auscultation bilaterally  Heart: regular rate and rhythm  Abdomen: soft, non-tender; bowel sounds normal; no masses,  no organomegaly  Skin: Skin color, texture, turgor normal  No rashes or lesions     Invasive Devices     Peripheral Intravenous Line            Peripheral IV 05/02/21 Right;Ventral (anterior) Forearm 2 days                  Lab, Imaging and other studies: I have personally reviewed pertinent reports       CBC:   Lab Results   Component Value Date    WBC 5 48 05/04/2021    HGB 8 9 (L) 05/04/2021    HCT 27 8 (L) 05/04/2021    MCV 98 05/04/2021     05/04/2021    MCH 31 4 05/04/2021    MCHC 32 0 05/04/2021    RDW 21 1 (H) 05/04/2021    MPV 13 2 (H) 05/04/2021   ,   CMP:   Lab Results   Component Value Date    K 3 3 (L) 05/04/2021     05/04/2021    CO2 25 05/04/2021    BUN 4 (L) 05/04/2021    CREATININE 0 59 (L) 05/04/2021    CALCIUM 7 7 (L) 05/04/2021     (H) 05/04/2021    ALT 79 (H) 05/04/2021    ALKPHOS 193 (H) 05/04/2021    EGFR 109 05/04/2021   ,

## 2021-05-04 NOTE — ASSESSMENT & PLAN NOTE
· Patient presents with a 2 day history of melena and hematemesis  She has a PMH of PE/DVT/IVC thrombosis s/p IVC filter placement and anticoagulation with Lovenox (last dose 5/1 6am)  · Admit to critical care service  · 6 Units of PRBC ordered in the ED  Transfused 2 units PRBC and 2 units FFP   ·  Goal SBP >100, HR<100  H/H q 6h  Goal Hgb >8     · S/P EGD with injection of anastomosis ulceration  · Will defer to GI for decision regarding repeat EGD  · NPO for now  · Patient has previously placed IVC filter

## 2021-05-05 VITALS
BODY MASS INDEX: 42.89 KG/M2 | HEIGHT: 63 IN | WEIGHT: 242.06 LBS | TEMPERATURE: 97.9 F | OXYGEN SATURATION: 98 % | SYSTOLIC BLOOD PRESSURE: 111 MMHG | RESPIRATION RATE: 18 BRPM | DIASTOLIC BLOOD PRESSURE: 75 MMHG | HEART RATE: 82 BPM

## 2021-05-05 LAB
ABO GROUP BLD BPU: NORMAL
BPU ID: NORMAL
CROSSMATCH: NORMAL
HCT VFR BLD AUTO: 32 % (ref 34.8–46.1)
HGB BLD-MCNC: 10.1 G/DL (ref 11.5–15.4)
UNIT DISPENSE STATUS: NORMAL
UNIT PRODUCT CODE: NORMAL
UNIT RH: NORMAL

## 2021-05-05 PROCEDURE — 99231 SBSQ HOSP IP/OBS SF/LOW 25: CPT | Performed by: PHYSICIAN ASSISTANT

## 2021-05-05 PROCEDURE — 85014 HEMATOCRIT: CPT | Performed by: INTERNAL MEDICINE

## 2021-05-05 PROCEDURE — C9113 INJ PANTOPRAZOLE SODIUM, VIA: HCPCS | Performed by: PHYSICIAN ASSISTANT

## 2021-05-05 PROCEDURE — 99239 HOSP IP/OBS DSCHRG MGMT >30: CPT | Performed by: INTERNAL MEDICINE

## 2021-05-05 PROCEDURE — 30233N1 TRANSFUSION OF NONAUTOLOGOUS RED BLOOD CELLS INTO PERIPHERAL VEIN, PERCUTANEOUS APPROACH: ICD-10-PCS | Performed by: EMERGENCY MEDICINE

## 2021-05-05 PROCEDURE — 85018 HEMOGLOBIN: CPT | Performed by: INTERNAL MEDICINE

## 2021-05-05 PROCEDURE — 0W3P8ZZ CONTROL BLEEDING IN GASTROINTESTINAL TRACT, VIA NATURAL OR ARTIFICIAL OPENING ENDOSCOPIC: ICD-10-PCS | Performed by: INTERNAL MEDICINE

## 2021-05-05 RX ORDER — PANTOPRAZOLE SODIUM 40 MG/1
40 TABLET, DELAYED RELEASE ORAL
Qty: 60 TABLET | Refills: 2 | Status: SHIPPED | OUTPATIENT
Start: 2021-05-05

## 2021-05-05 RX ADMIN — Medication 100 MG: at 09:42

## 2021-05-05 RX ADMIN — LEVOTHYROXINE SODIUM 100 MCG: 100 TABLET ORAL at 05:29

## 2021-05-05 RX ADMIN — THIAMINE HCL TAB 100 MG 100 MG: 100 TAB at 09:42

## 2021-05-05 RX ADMIN — POTASSIUM CHLORIDE 20 MEQ: 1500 TABLET, EXTENDED RELEASE ORAL at 09:42

## 2021-05-05 RX ADMIN — LITHIUM CARBONATE 300 MG: 300 TABLET, FILM COATED, EXTENDED RELEASE ORAL at 09:43

## 2021-05-05 RX ADMIN — VENLAFAXINE HYDROCHLORIDE 150 MG: 150 CAPSULE, EXTENDED RELEASE ORAL at 09:42

## 2021-05-05 RX ADMIN — ATOMOXETINE 60 MG: 60 CAPSULE ORAL at 09:43

## 2021-05-05 RX ADMIN — TOPIRAMATE 150 MG: 100 TABLET, FILM COATED ORAL at 09:42

## 2021-05-05 RX ADMIN — PANTOPRAZOLE SODIUM 40 MG: 40 INJECTION, POWDER, FOR SOLUTION INTRAVENOUS at 09:42

## 2021-05-05 RX ADMIN — ENOXAPARIN SODIUM 100 MG: 100 INJECTION SUBCUTANEOUS at 09:41

## 2021-05-05 NOTE — CASE MANAGEMENT
IMM reviewed with patient including medicare rights - pt agreeable to DC  IMM signed and placed in scan bin  Pt states she has a ride home  No further questions or concerns

## 2021-05-05 NOTE — PROGRESS NOTES
Progress Note - Ivana Valverde 50 y o  female MRN: [id number]    Unit/Bed#: [place] [address] -01 Encounter: [id number]         Assessment/ Plan:  GI bleed     Pt has a history of Oc-en-Y who presented with hematemesis, melena and hemoglobin of 7 2, down from 10 5   She received 2 units   She underwent EGD 5/2 showing a 5 mm cratered ulcer with adherent clot which was treated with APC and epi   Abdominal pain has improved   She is tolerating a regular diet   Hemoglobin currently 10  1     -continue Protonix BID  -repeat EGD in 8 wks    Pt stable for discharge  Subjective:   Pt feels well  No abd pain  Tolerating diet  Objective:     Vitals: Blood pressure 111/75, pulse 82, temperature 97 9 °F (36 6 °C), resp  rate 18, height 5' 3" (1 6 m), weight 110 kg (242 lb 1 oz), last menstrual period 10/04/2020, SpO2 98 %  ,Body mass index is 42 88 kg/m²  Physical Exam: General appearance: alert and oriented, in no acute distress  Lungs: clear to auscultation bilaterally  Heart: regular rate and rhythm  Abdomen: soft, non-tender; bowel sounds normal; no masses,  no organomegaly  Skin: Skin color, texture, turgor normal  No rashes or lesions     Invasive Devices     Peripheral Intravenous Line            Peripheral IV 05/04/21 Left;Proximal;Ventral (anterior) Forearm less than 1 day                Lab, Imaging and other studies: I have personally reviewed pertinent reports       CBC:   Lab Results   Component Value Date    HGB 10 1 (L) 05/05/2021    HCT 32 0 (L) 05/05/2021   ,

## 2021-05-05 NOTE — DISCHARGE SUMMARY
[address] [place]  Discharge- [address] 1975, 50 y o  female MRN: [id number]  Unit/Bed#: [place] [facility] 2 [facility] 208-01 Encounter: [id number]  Primary Care Provider: Komal Ford PA-C   Date and time admitted to hospital: 5/1/2021 10:08 PM    Admitting Provider:  Birdie Khan DO  Discharge Provider:  Jordan Narayanan DO  Admission Date: 5/1/2021       Discharge Date: 05/05/21   LOS: 4  Primary Care Physician at Discharge: Komal Ford, [address] [place] [address] Dr:  [address] is a 50 y o  female with a history of anxiety/depression Oc-en-Y gastric bypass status and hypothyroidism who presented bloody stools and vomitus of blood  The patient was noted to be hypotensive and unstable requiring admission to critical care service  She was transfused 2 unit PRBC and 2 unit FFP  She underwent EGD with 5 mm crated ulcer  Her diet has been advanced and her anticoagulation has been restarted with stable hemoglobin  She is advised to abstain from ulcerogenic foods and to take pantoprazole b i d  Please see problem list listed below  DISCHARGE DIAGNOSES  * Acute GI bleeding  Assessment & Plan  · Acute GI bleeding with findings of cratered ulcer on EGD  · Acute blood loss anemia:  Has been transfused 2 unit PRBC and 2 unit FFP  · Will be discharged with pantoprazole b i d  Results from last 7 days   Lab Units 05/05/21  0928 05/04/21  0500 05/03/21  1546 05/03/21  0236 05/02/21  1352 05/02/21  0432 05/02/21  0251 05/01/21  2326 05/01/21  2216   HEMOGLOBIN g/dL 10 1* 8 9* 9 9* 9 1* 7 5* 8 5* 8 7* 7 2* 7 7*       Hypercoagulation syndrome (HCC)  Assessment & Plan  · Lovenox has been restarted after cleared by GI with stable hemoglobin    Morbid obesity with BMI of 50 0-59 9, adult (HCC)  Assessment & Plan  · Body mass index is 42 88 kg/m²      Bipolar disorder ([facility] [address] )  Assessment & Plan  · Mood stable continue venlafaxine atomoxetine and lithium    Transaminitis  Assessment & Plan  · Chronic in the setting of hepatic steatosis    CONSULTING PROVIDERS   IP CONSULT TO GASTROENTEROLOGY    PROCEDURES PERFORMED  EGD  Date:  5/2/2021  IMPRESSION:  Bleeding marginal ulcer at the gastrojejunostomy  Injected with 3 ml epinephrine (1:10,000), clot removed, and exposed vessel treated with bipolar cautery  RADIOLOGY RESULTS  No results found      LABS  Results from last 7 days   Lab Units 05/05/21  0928 05/04/21  0500 05/03/21  1546 05/03/21  0236 05/02/21  1352 05/02/21  0432 05/02/21  0251 05/01/21  2326 05/01/21  2216   WBC Thousand/uL  --  5 48  --  6 49  --  5 76  --   --  6 17   HEMOGLOBIN g/dL 10 1* 8 9* 9 9* 9 1* 7 5* 8 5* 8 7* 7 2* 7 7*   HEMATOCRIT % 32 0* 27 8*  --  27 4*  --  24 5*  --  20 6* 22 4*   MCV fL  --  98  --  95  --  91  --   --  91   PLATELETS Thousands/uL  --  164  --  123*  --  108*  --   --  140*   INR   --   --   --   --   --  1 18  --   --  1 25*     Results from last 7 days   Lab Units 05/04/21  0500 05/03/21  0236 05/02/21  0432 05/01/21  2216   SODIUM mmol/L 136 137 142 141   POTASSIUM mmol/L 3 3* 3 9 3 0* 2 7*   CHLORIDE mmol/L 104 103 102 101   CO2 mmol/L 25 27 26 26   BUN mg/dL 4* 5 5 5   CREATININE mg/dL 0 59* 0 56* 0 86 0 65   CALCIUM mg/dL 7 7* 6 9* 6 7* 7 1*   ALBUMIN g/dL 1 8* 1 8* 1 8* 1 6*   TOTAL BILIRUBIN mg/dL 2 55* 2 72* 2 07* 1 80*   ALK PHOS U/L 193* 184* 173* 199*   ALT U/L 79* 83* 84* 98*   AST U/L 296* 245* 262* 331*   EGFR ml/min/1 73sq m 109 111 81 106   GLUCOSE RANDOM mg/dL 106 137 198* 159*     Results from last 7 days   Lab Units 05/01/21  2216   TROPONIN I ng/mL <0 02     Results from last 7 days   Lab Units 05/01/21  2216   NT-PRO BNP pg/mL 148*              Results from last 7 days   Lab Units 05/02/21  1018 05/02/21  0815 05/02/21  0432 05/02/21  0123 05/01/21  2216   LACTIC ACID mmol/L 4 8* 6 2* 6 0* 8 1* 10 0*           Results from last 7 days   Lab Units 05/01/21  2216   SARS-COV-2  Negative             DISCHARGE DAY VISIT AND PHYSICAL EXAM:  Subjective:  Patient seen examined on morning rounds  No new complaints  No further vomitus any blood  Vitals:   Blood Pressure: 111/75 (05/04/21 2104)  Pulse: 82 (05/04/21 2104)  Temperature: 97 9 °F (36 6 °C) (05/04/21 2104)  Temp Source: Oral (05/04/21 1724)  Respirations: 18 (05/04/21 1724)  Height: 5' 3" (160 cm) (05/02/21 0024)  Weight - Scale: 110 kg (242 lb 1 oz) (05/05/21 0530)  SpO2: 98 % (05/04/21 2104)    Physical Exam  Vitals signs reviewed  Constitutional:       General: She is not in acute distress  Appearance: Normal appearance  Eyes:      General: No scleral icterus  Cardiovascular:      Rate and Rhythm: Regular rhythm  Heart sounds: Normal heart sounds  Pulmonary:      Breath sounds: Decreased breath sounds present  No wheezing  Abdominal:      General: Bowel sounds are normal       Palpations: Abdomen is soft  Tenderness: There is no guarding or rebound  Musculoskeletal:         General: No swelling  Skin:     General: Skin is warm  Neurological:      Mental Status: She is alert and oriented to person, place, and time  Mental status is at baseline  Psychiatric:         Mood and Affect: Mood normal        Planned Re-admission:  No  Discharge Disposition: Home/Self Care      Test Results Pending at Discharge:   Incidental findings:     Medications   · Discharge Medication List: See after visit summary for reconciled discharge medications  Diet restrictions:  Bariatric maintenance diet     Activity restrictions: No strenuous activity  Discharge Condition: stable    Outpatient Follow-Up and Discharge Instructions  See after visit summary section titled Discharge Instructions for information provided to patient and family  Code Status: Level 1 - Full Code  Discharge Statement   I spent 35 minutes discharging the patient  This time was spent on the day of discharge   Greater than 50% of total time was spent with the patient and / or family counseling and / or coordination of care  ** Please Note: This note has been constructed using a voice recognition system   **

## 2021-05-05 NOTE — PLAN OF CARE
Problem: Potential for Falls  Goal: Patient will remain free of falls  Description: INTERVENTIONS:  - Assess patient frequently for physical needs  -  Identify cognitive and physical deficits and behaviors that affect risk of falls    -  [place] fall precautions as indicated by assessment   - Educate patient/family on patient safety including physical limitations  - Instruct patient to call for assistance with activity based on assessment  - Modify environment to reduce risk of injury  - Consider OT/PT consult to assist with strengthening/mobility  Outcome: Progressing     Problem: Prexisting or High Potential for Compromised Skin Integrity  Goal: Skin integrity is maintained or improved  Description: INTERVENTIONS:  - Identify patients at risk for skin breakdown  - Assess and monitor skin integrity  - Assess and monitor nutrition and hydration status  - Monitor labs   - Assess for incontinence   - Turn and reposition patient  - Assist with mobility/ambulation  - Relieve pressure over bony prominences  - Avoid friction and shearing  - Provide appropriate hygiene as needed including keeping skin clean and dry  - Evaluate need for skin moisturizer/barrier cream  - Collaborate with interdisciplinary team   - Patient/family teaching  - Consider wound care consult   Outcome: Progressing     Problem: GASTROINTESTINAL - ADULT  Goal: Minimal or absence of nausea and/or vomiting  Description: INTERVENTIONS:  - Administer IV fluids if ordered to ensure adequate hydration  - Maintain NPO status until nausea and vomiting are resolved  - Nasogastric tube if ordered  - Administer ordered antiemetic medications as needed  - Provide nonpharmacologic comfort measures as appropriate  - Advance diet as tolerated, if ordered  - Consider nutrition services referral to assist patient with adequate nutrition and appropriate food choices  Outcome: Progressing  Goal: Maintains or returns to baseline bowel function  Description: INTERVENTIONS:  - Assess bowel function  - Encourage oral fluids to ensure adequate hydration  - Administer IV fluids if ordered to ensure adequate hydration  - Administer ordered medications as needed  - Encourage mobilization and activity  - Consider nutritional services referral to assist patient with adequate nutrition and appropriate food choices  Outcome: Progressing     Problem: METABOLIC, FLUID AND ELECTROLYTES - ADULT  Goal: Electrolytes maintained within normal limits  Description: INTERVENTIONS:  - Monitor labs and assess patient for signs and symptoms of electrolyte imbalances  - Administer electrolyte replacement as ordered  - Monitor response to electrolyte replacements, including repeat lab results as appropriate  - Instruct patient on fluid and nutrition as appropriate  Outcome: Progressing     Problem: HEMATOLOGIC - ADULT  Goal: Maintains hematologic stability  Description: INTERVENTIONS  - Assess for signs and symptoms of bleeding or hemorrhage  - Monitor labs  - Administer supportive blood products/factors as ordered and appropriate  Outcome: Progressing

## 2021-05-05 NOTE — ASSESSMENT & PLAN NOTE
· Acute GI bleeding with findings of cratered ulcer on EGD  · Acute blood loss anemia:  Has been transfused 2 unit PRBC and 2 unit FFP  · Will be discharged with pantoprazole b i d      Results from last 7 days   Lab Units 05/05/21  0928 05/04/21  0500 05/03/21  1546 05/03/21  0236 05/02/21  1352 05/02/21  0432 05/02/21  0251 05/01/21  2326 05/01/21  2216   HEMOGLOBIN g/dL 10 1* 8 9* 9 9* 9 1* 7 5* 8 5* 8 7* 7 2* 7 7*

## 2021-05-05 NOTE — PLAN OF CARE
Problem: Potential for Falls  Goal: Patient will remain free of falls  Description: INTERVENTIONS:  - Assess patient frequently for physical needs  -  Identify cognitive and physical deficits and behaviors that affect risk of falls    -  [place] fall precautions as indicated by assessment   - Educate patient/family on patient safety including physical limitations  - Instruct patient to call for assistance with activity based on assessment  - Modify environment to reduce risk of injury  - Consider OT/PT consult to assist with strengthening/mobility  Outcome: Progressing     Problem: Prexisting or High Potential for Compromised Skin Integrity  Goal: Skin integrity is maintained or improved  Description: INTERVENTIONS:  - Identify patients at risk for skin breakdown  - Assess and monitor skin integrity  - Assess and monitor nutrition and hydration status  - Monitor labs   - Assess for incontinence   - Turn and reposition patient  - Assist with mobility/ambulation  - Relieve pressure over bony prominences  - Avoid friction and shearing  - Provide appropriate hygiene as needed including keeping skin clean and dry  - Evaluate need for skin moisturizer/barrier cream  - Collaborate with interdisciplinary team   - Patient/family teaching  - Consider wound care consult   Outcome: Progressing     Problem: GASTROINTESTINAL - ADULT  Goal: Minimal or absence of nausea and/or vomiting  Description: INTERVENTIONS:  - Administer IV fluids if ordered to ensure adequate hydration  - Maintain NPO status until nausea and vomiting are resolved  - Nasogastric tube if ordered  - Administer ordered antiemetic medications as needed  - Provide nonpharmacologic comfort measures as appropriate  - Advance diet as tolerated, if ordered  - Consider nutrition services referral to assist patient with adequate nutrition and appropriate food choices  Outcome: Progressing  Goal: Maintains or returns to baseline bowel function  Description: INTERVENTIONS:  - Assess bowel function  - Encourage oral fluids to ensure adequate hydration  - Administer IV fluids if ordered to ensure adequate hydration  - Administer ordered medications as needed  - Encourage mobilization and activity  - Consider nutritional services referral to assist patient with adequate nutrition and appropriate food choices  Outcome: Progressing     Problem: METABOLIC, FLUID AND ELECTROLYTES - ADULT  Goal: Electrolytes maintained within normal limits  Description: INTERVENTIONS:  - Monitor labs and assess patient for signs and symptoms of electrolyte imbalances  - Administer electrolyte replacement as ordered  - Monitor response to electrolyte replacements, including repeat lab results as appropriate  - Instruct patient on fluid and nutrition as appropriate  Outcome: Progressing     Problem: HEMATOLOGIC - ADULT  Goal: Maintains hematologic stability  Description: INTERVENTIONS  - Assess for signs and symptoms of bleeding or hemorrhage  - Monitor labs  - Administer supportive blood products/factors as ordered and appropriate  Outcome: Progressing

## 2021-05-05 NOTE — NURSING NOTE
Patient's IV was removed  Patient verbalized understanding of discharge instructions and new medications  Patient verbalized understanding of follow up appointments  Patient left via wheelchair accompanied by her brother and an aide

## 2021-05-11 ENCOUNTER — TELEPHONE (OUTPATIENT)
Dept: GASTROENTEROLOGY | Facility: CLINIC | Age: 46
End: 2021-05-11

## 2021-05-11 ENCOUNTER — TELEPHONE (OUTPATIENT)
Dept: HEMATOLOGY ONCOLOGY | Facility: CLINIC | Age: 46
End: 2021-05-11

## 2021-05-11 NOTE — TELEPHONE ENCOUNTER
Reschedule Appointment     Who is calling in Patient    Doctor Appointment Scheduled with Angelica Rizzo date and time 05/11 at 8:20am   New date and time 05/12 at 10:30am   Location Mobile   Patient verbalized understanding    yes

## 2021-05-11 NOTE — TELEPHONE ENCOUNTER
----- Message from Miguel Siemens, PA-C sent at 5/5/2021 11:25 AM EDT -----  Pt needs repeat EGD in 8 weeks    6 [facility]

## 2021-05-12 ENCOUNTER — OFFICE VISIT (OUTPATIENT)
Dept: HEMATOLOGY ONCOLOGY | Facility: CLINIC | Age: 46
End: 2021-05-12
Payer: MEDICARE

## 2021-05-12 VITALS
SYSTOLIC BLOOD PRESSURE: 122 MMHG | TEMPERATURE: 97.8 F | BODY MASS INDEX: 40.47 KG/M2 | DIASTOLIC BLOOD PRESSURE: 80 MMHG | WEIGHT: 228.4 LBS | HEIGHT: 63 IN | HEART RATE: 87 BPM | OXYGEN SATURATION: 98 % | RESPIRATION RATE: 18 BRPM

## 2021-05-12 DIAGNOSIS — K95.89 IRON DEFICIENCY ANEMIA FOLLOWING BARIATRIC SURGERY: ICD-10-CM

## 2021-05-12 DIAGNOSIS — D50.8 IRON DEFICIENCY ANEMIA FOLLOWING BARIATRIC SURGERY: ICD-10-CM

## 2021-05-12 DIAGNOSIS — D50.8 OTHER IRON DEFICIENCY ANEMIA: ICD-10-CM

## 2021-05-12 DIAGNOSIS — D62 ACUTE BLOOD LOSS ANEMIA: Primary | ICD-10-CM

## 2021-05-12 PROCEDURE — 99214 OFFICE O/P EST MOD 30 MIN: CPT | Performed by: PHYSICIAN ASSISTANT

## 2021-05-12 RX ORDER — SODIUM CHLORIDE 9 MG/ML
20 INJECTION, SOLUTION INTRAVENOUS ONCE
Status: CANCELLED | OUTPATIENT
Start: 2021-05-14

## 2021-05-12 NOTE — PROGRESS NOTES
Hematology/Oncology Outpatient Follow-up  Fe Greene 50 y o  female [id number]    Date:  5/12/2021      Assessment and Plan:  1  Acute blood loss anemia, 2  Iron deficiency anemia following bariatric surgery   68-year-old female presents for follow-up regarding history of anemia, thrombosis  Patient has anemia secondary to malabsorption from bariatric surgery however she was recently admitted due to acute GI bleed  She required multiple blood transfusion and was in the ICU  EGD showed an ulcer  She states that she was taking NSAIDs as she knew she was not supposed to  She is aware that she should not continue to take these at time  She is advised to follow-up with Neurology due to better control for migraines as this is why she was taking NSAIDs  Recommend IV iron again due to her anemia  She has no bleeding symptoms at this time  She is agreeable  Repeat EGD on 7/21/21 due to ulcer     - CBC and differential; Future  - Iron Saturation %; Future  - Ferritin; Future  - Iron; Future    Follow up in 2 months  2  Recurrent thromboses   Patient also has history of IVC thrombus, pulmonary embolism  She presented to the hospital in August 2020 due to thrombosis  She required IR intervention and filter placement  She has been on anticoagulation, Lovenox, due to bariatric surgery  She had however per coagulable workup which was unrevealing  Regardless she will remain on lifelong anticoagulation due to recurrent thromboses and family history of thromboses  She is agreeable to this  She still is on therapeutic Lovenox  She had repeat imaging as she is post to have her filter removed and this was negative for any present of thrombus in the IVC  At this time no change in blood thinner  D-dimer had been requested and is still pending at time of visit  Would consider changing to preventative dose of anticoagulation this is normal due to her recent bleeding episodes   She does not need refills; have 50 doses at home at this time  HPI:  61-year-old female presents for follow-up      Past medical history significant for hepatic steatosis, hypothyroidism, pulmonary embolism, IVC thrombosis, migraines, seizures, psychiatric disorder (bipolar disorder, SABINO), history of gastric bypass surgery ()     Patient presented to the emergency department on 2020 due to abdominal pain radiating into bilateral upper legs      She had a history of bilateral lower extremity DVT and PE in 2017 for which she had an IVC filter placed in [facility] in 2017  She was treated with Xarelto but developed a GI bleed in discontinued   Per patient this was unprovoked  Patient admitted to being off of anticoagulation during this admission and denied a previous workup for clotting disorder in the past      Family history significant for DVT and PE and her sister  Her sister  of PE  Of note her sister had lupus  Paternal uncle had DVT as well       A CT of the abdomen pelvis was completed on 2020   This showed known IVC filter, diffuse thrombus extending from the IVC filter inferiorly into the iliac vasculature, extensive surrounding fat stranding with thrombophlebitis, small amount of thrombus extends superior to the filter        A venous Doppler study of bilateral lower extremities were also completed on 2020 which was negative for DVT      She underwent lysis of thrombus with IR on       She returned to IR on 2020 for IR venogram      Patient was on heparin during hospitalization      Patient was seen by Dr Jordan on 2020 during hospitalization   Due to patient's history of gastric bypass surgery patient was recommended Lovenox 0 75 milligrams/kilogram subcu b i d        She had partial thrombosis workup in the hospital               Homocystine normal, 7 4              Protein S activity normal, 111              Protein C activity normal, 91 0              Factor 5 Leiden mutation negative              Prothrombin gene mutation negative              Anticardiolipin antibody IgA and IgG normal   IgM 18 which is a indeterminate results   Lupus anticoagulant negative    Beta-2 glycoproteins negative      Patient was discharged on Lovenox 0 75 milligram/kilogram  [place] date was 08/09/2020      She presented back to the emergency department on 08/14/2020 due to abdominal pain and right leg pain      Patient was diagnosed with cellulitis of lower extremity, right, and received IV Ancef for 3 days in DC on p o  Keflex      She had outpatient follow-up with vascular surgery on 08/21/2020   Recommendation for her venous disease included conservative measures and medical management   Patient was scheduled for follow-up in November 2020 with vascular surgery with a repeat IVC/iliac vein duplex and bilateral lower extremity DVT study       At consultation patient was recommended to have EGD and colonoscopy secondary to weight loss  She had an EGD and colonoscopy on 12/03/2020  This showed  anastomotic ulcer on the jejunal side  Colonoscopy showed 1 polyp measuring 5-9 mm in the sigmoid colon  A clip was placed to prevent bleeding secondary to anticoagulation  There is no of inadequate bowel prep and patient was recommended to have repeat in 6 months (June 2021)  Interval history:     Since last visit patient was admitted to the hospital to the ICU due to acute GI bleed  She states that she was taking NSAIDs due to migraines even though she knew she was not supposed to do this  She had EGD on 05/02/2021 due to bleeding while inpatient and this showed single 5 mm crater drowned benign-appearing ulcer in the stomach  This was treated  Patient's Lovenox was held during parts of hospitalization then restarted on discharge  Patient is planned to have her IVC filter removed on 05/18/2021 however I note that this appears to have been canceled    She really had CT venogram in March 2021 which showed no evidence of thrombus  There was a incident right ovarian cyst which patient's PCP at [facility] ordered an ultrasound and patient was referred to a gyn at [facility] as well  ROS: Review of Systems   Constitutional: Positive for fatigue  Negative for appetite change, chills, fever and unexpected weight change  HENT: Negative for mouth sores and nosebleeds  Respiratory: Negative for cough and shortness of breath  Cardiovascular: Negative for chest pain, palpitations and leg swelling  Gastrointestinal: Positive for abdominal pain  Negative for blood in stool, constipation, diarrhea, nausea and vomiting  Genitourinary: Negative for difficulty urinating, dysuria and hematuria  Musculoskeletal: Negative for arthralgias  Skin: Negative  Neurological: Positive for headaches (has chronic migraines )  Negative for dizziness, weakness, light-headedness and numbness  Hematological: Negative  Psychiatric/Behavioral: Negative          Past Medical History:   Diagnosis Date    Anemia     DVT (deep venous thrombosis) (HCC)     Psychiatric disorder     bipolar II, suicide    Pulmonary embolism (HCC)     Seizures (HCC)        Past Surgical History:   Procedure Laterality Date    APPENDECTOMY      Open    CHOLECYSTECTOMY      Laparoscopic    GASTRIC BYPASS      was 89 date of surgery    IR DVT THROMBOLYSIS/THROMBECTOMY ILIAC/IVC WITH VENOGRAM  8/4/2020    IR TPA LYSIS CHECK  8/5/2020    IVC FILTER INSERTION  2017    STOMACH SURGERY      for morbid obesity    TUBAL LIGATION Bilateral 2010       Social History     Socioeconomic History    Marital status: Single     Spouse name: None    Number of children: None    Years of education: None    Highest education level: None   Occupational History    Occupation:      Comment: [employer] resource strain: None    Food insecurity     Worry: None Inability: None    Transportation needs     Medical: None     Non-medical: None   Tobacco Use    Smoking status: Never Smoker    Smokeless tobacco: Never Used    Tobacco comment: former quit in 1999 per [facility]   Substance and Sexual Activity    Alcohol use: Not Currently     Comment: quit 6/21/2018    Drug use: No    Sexual activity: Yes   Lifestyle    Physical activity     Days per week: None     Minutes per session: None    Stress: None   Relationships    Social connections     Talks on phone: None     Gets together: None     Attends [employer] service: None     Active member of club or organization: None     Attends meetings of clubs or organizations: None     Relationship status: None    Intimate partner violence     Fear of current or ex partner: None     Emotionally abused: None     Physically abused: None     Forced sexual activity: None   Other Topics Concern    None   Social History Narrative    Sleep 6 in 24 hours    Caffeine use; 2 cps coffee per day    Uses seatbelts               Family History   Problem Relation Age of Onset    Other Mother         headache    Hypertension Mother     Depression Mother     Arthritis Father     Other Father         H, pylori infection    Other Sister         esophageal reflux    Migraines Sister     No Known Problems Paternal Grandfather     Diabetes Paternal Aunt         Mellitus    Breast cancer Paternal Aunt     Diabetes Paternal Uncle         Mellitus    Liver cancer Paternal Uncle     Asthma Daughter     No Known Problems Maternal Grandmother     No Known Problems Maternal Grandfather     No Known Problems Paternal Grandmother     No Known Problems Brother     No Known Problems Sister     No Known Problems Sister     Asthma Daughter     No Known Problems Maternal Aunt        Allergies   Allergen Reactions    Morphine Seizures         Current Outpatient Medications:     atoMOXetine (STRATTERA) 60 mg capsule, TAKE 1 CAPSULE (60 MG) BY ORAL ROUTE ONCE DAILY IN THE MORNING, Disp: , Rfl:     cloNIDine (CATAPRES) 0 1 mg tablet, 0 1 mg PRN for anxiety, Disp: , Rfl:     enoxaparin (LOVENOX) 100 mg/mL, Inject 1 mL (100 mg total) under the skin every 12 (twelve) hours, Disp: 100 Syringe, Rfl: 2    ferrous sulfate 325 (65 Fe) mg tablet, Take 325 mg by mouth daily with breakfast, Disp: , Rfl:     folic acid (FOLVITE) 1 mg tablet, Take 1 mg by mouth daily, Disp: , Rfl:     levothyroxine 100 mcg tablet, Take 1 tablet (100 mcg total) by mouth daily in the early morning, Disp: 30 tablet, Rfl: 0    lithium carbonate (LITHOBID) 300 mg CR tablet, Take 300 mg by mouth 2 (two) times a day , Disp: , Rfl:     Multiple Vitamin (MULTI-VITAMIN DAILY PO), Multi Vitamin  DAILY, Disp: , Rfl:     NEEDLE, DISP, 27 G 27G X 1/2" MISC, by Does not apply route every 12 (twelve) hours, Disp: 100 each, Rfl: 5    pantoprazole (PROTONIX) 40 mg tablet, Take 1 tablet (40 mg total) by mouth 2 (two) times a day before meals, Disp: 60 tablet, Rfl: 2    potassium chloride (K-DUR,KLOR-CON) 20 mEq tablet, Take 1 tablet (20 mEq total) by mouth 2 (two) times a day, Disp: 30 tablet, Rfl: 1    pyridoxine (VITAMIN B6) 100 mg tablet, Take 100 mg by mouth daily, Disp: , Rfl:     rOPINIRole (REQUIP) 1 mg tablet, TAKE 1 TABLET (1 MG) BY ORAL ROUTE 1-3 HOURS BEFORE BEDTIME, Disp: , Rfl:     SUMAtriptan (IMITREX) 100 mg tablet, Take 100 mg by mouth as needed for migraine , Disp: , Rfl:     thiamine 100 MG tablet, Daily, Disp: , Rfl:     topiramate (TOPAMAX) 100 mg tablet, Take 150 mg by mouth 2 (two) times a day , Disp: , Rfl:     venlafaxine (EFFEXOR-XR) 150 mg 24 hr capsule, venlafaxine  mg capsule,extended release 24 hr, Disp: , Rfl:       Physical Exam:  /80 (BP Location: Left arm, Patient Position: Sitting, Cuff Size: Adult)   Pulse 87   Temp 97 8 °F (36 6 °C) (Tympanic)   Resp 18   Ht 5' 3" (1 6 m)   Wt 104 kg (228 lb 6 4 oz)   LMP 10/04/2020   SpO2 98% BMI 40 46 kg/m²     Physical Exam  Vitals signs reviewed  Constitutional:       General: She is not in acute distress  Appearance: She is well-developed  HENT:      Head: Normocephalic and atraumatic  Eyes:      General: No scleral icterus  Conjunctiva/sclera: Conjunctivae normal    Neck:      Musculoskeletal: Normal range of motion and neck supple  Cardiovascular:      Rate and Rhythm: Normal rate and regular rhythm  Heart sounds: Normal heart sounds  No murmur  Pulmonary:      Effort: Pulmonary effort is normal  No respiratory distress  Breath sounds: Normal breath sounds  Abdominal:      Palpations: Abdomen is soft  Tenderness: There is no abdominal tenderness  Musculoskeletal: Normal range of motion  General: No tenderness  Right lower leg: No edema  Left lower leg: No edema  Lymphadenopathy:      Cervical: No cervical adenopathy  Skin:     General: Skin is warm and dry  Neurological:      Mental Status: She is alert and oriented to person, place, and time  Cranial Nerves: No cranial nerve deficit  Psychiatric:         Mood and Affect: Mood normal          Behavior: Behavior normal        Labs:  Lab Results   Component Value Date    WBC 5 48 05/04/2021    HGB 10 1 (L) 05/05/2021    HCT 32 0 (L) 05/05/2021    MCV 98 05/04/2021     05/04/2021     Patient voiced understanding and agreement in the above discussion  Aware to contact our office with questions/symptoms in the interim  This note has been generated by voice recognition software system  Therefore, there may be spelling, grammar, and or syntax errors  Please contact if questions arise

## 2021-05-13 ENCOUNTER — TELEMEDICINE (OUTPATIENT)
Dept: VASCULAR SURGERY | Facility: CLINIC | Age: 46
End: 2021-05-13
Payer: MEDICARE

## 2021-05-13 DIAGNOSIS — Z95.828 PRESENCE OF IVC FILTER: ICD-10-CM

## 2021-05-13 DIAGNOSIS — K92.2 ACUTE GI BLEEDING: ICD-10-CM

## 2021-05-13 DIAGNOSIS — Z98.84 S/P GASTRIC BYPASS: ICD-10-CM

## 2021-05-13 DIAGNOSIS — I87.009 POST-THROMBOTIC SYNDROME: Primary | ICD-10-CM

## 2021-05-13 PROBLEM — K25.0 ACUTE GASTRIC ULCER WITH HEMORRHAGE: Status: ACTIVE | Noted: 2021-05-13

## 2021-05-13 PROBLEM — K25.4 CHRONIC GASTRIC ULCER WITH HEMORRHAGE: Status: ACTIVE | Noted: 2021-05-13

## 2021-05-13 LAB
ALBUMIN SERPL-MCNC: 2.7 G/DL (ref 3.6–5.1)
ALBUMIN/GLOB SERPL: 0.8 (CALC) (ref 1–2.5)
ALP SERPL-CCNC: 193 U/L (ref 31–125)
ALT SERPL-CCNC: 64 U/L (ref 6–29)
AST SERPL-CCNC: 138 U/L (ref 10–35)
BASOPHILS # BLD AUTO: 58 CELLS/UL (ref 0–200)
BASOPHILS NFR BLD AUTO: 0.8 %
BILIRUB SERPL-MCNC: 2.5 MG/DL (ref 0.2–1.2)
BUN SERPL-MCNC: 5 MG/DL (ref 7–25)
BUN/CREAT SERPL: 8 (CALC) (ref 6–22)
CALCIUM SERPL-MCNC: 8.5 MG/DL (ref 8.6–10.2)
CHLORIDE SERPL-SCNC: 106 MMOL/L (ref 98–110)
CO2 SERPL-SCNC: 23 MMOL/L (ref 20–32)
CREAT SERPL-MCNC: 0.63 MG/DL (ref 0.5–1.1)
D DIMER PPP FEU-MCNC: 0.54 MCG/ML FEU
EOSINOPHIL # BLD AUTO: 110 CELLS/UL (ref 15–500)
EOSINOPHIL NFR BLD AUTO: 1.5 %
ERYTHROCYTE [DISTWIDTH] IN BLOOD BY AUTOMATED COUNT: 16.8 % (ref 11–15)
FERRITIN SERPL-MCNC: 94 NG/ML (ref 16–232)
GLOBULIN SER CALC-MCNC: 3.4 G/DL (CALC) (ref 1.9–3.7)
GLUCOSE SERPL-MCNC: 115 MG/DL (ref 65–99)
HCT VFR BLD AUTO: 27.8 % (ref 35–45)
HGB BLD-MCNC: 8.8 G/DL (ref 11.7–15.5)
IRON SATN MFR SERPL: 9 % (CALC) (ref 16–45)
IRON SERPL-MCNC: 19 MCG/DL (ref 40–190)
LYMPHOCYTES # BLD AUTO: 1285 CELLS/UL (ref 850–3900)
LYMPHOCYTES NFR BLD AUTO: 17.6 %
MCH RBC QN AUTO: 30.2 PG (ref 27–33)
MCHC RBC AUTO-ENTMCNC: 31.7 G/DL (ref 32–36)
MCV RBC AUTO: 95.5 FL (ref 80–100)
MONOCYTES # BLD AUTO: 927 CELLS/UL (ref 200–950)
MONOCYTES NFR BLD AUTO: 12.7 %
NEUTROPHILS # BLD AUTO: 4920 CELLS/UL (ref 1500–7800)
NEUTROPHILS NFR BLD AUTO: 67.4 %
PLATELET # BLD AUTO: 450 THOUSAND/UL (ref 140–400)
PMV BLD REES-ECKER: 12.2 FL (ref 7.5–12.5)
POTASSIUM SERPL-SCNC: 3.4 MMOL/L (ref 3.5–5.3)
PROT SERPL-MCNC: 6.1 G/DL (ref 6.1–8.1)
RBC # BLD AUTO: 2.91 MILLION/UL (ref 3.8–5.1)
SL AMB EGFR AFRICAN AMERICAN: 124 ML/MIN/1.73M2
SL AMB EGFR NON AFRICAN AMERICAN: 107 ML/MIN/1.73M2
SODIUM SERPL-SCNC: 137 MMOL/L (ref 135–146)
TIBC SERPL-MCNC: 214 MCG/DL (CALC) (ref 250–450)
WBC # BLD AUTO: 7.3 THOUSAND/UL (ref 3.8–10.8)

## 2021-05-13 PROCEDURE — 99213 OFFICE O/P EST LOW 20 MIN: CPT | Performed by: SURGERY

## 2021-05-13 NOTE — H&P (VIEW-ONLY)
Virtual Regular Visit      Assessment/Plan:    Problem List Items Addressed This Visit        Digestive    RESOLVED: Acute GI bleeding       Cardiovascular and Mediastinum    Post-thrombotic syndrome - Primary     History of Theresa IVC filter placement in  in [place] for bilateral DVT with bleeding complication on xarelto  Presented with IVC thrombosis and iliac DVT s/p thrombolysis and angiojet thrombectomy on  with restoration of IVC/iliac vein outflow though there was residual disease  She has a family history of DVT/PE  Her sister  from lupus  She reports some improvement in her bilateral leg swelling and chronic pain but she has restless leg symptoms and burning/aching pain at night  She is on gabapentin and requip prescribed by her psychiatrist  She continues to wear compression socks and elevate her legs     -Discussed the pathophysiology of post-thrombotic syndrome in relation to extensive history of DVT/IVC thrombosis  Unfortunately there is no cure for her symptoms and treatment is medical management/supportive care  Advised follow-up with her psychiatrist to discuss increasing her gabapentin/requip dosing    -She is scheduled for IVC filter removal in   Currently tolerating therapeutic anticoagulation without further bleeding episodes  Continue monitoring blood counts per PCP/GI  -Continue supportive care/conservative measures with compression stockings, leg elevation and walking/exercise  Other    S/P gastric bypass    Presence of IVC filter     IVC filter present with history of IVC filter thrombosis  Currently tolerating anticoagulation though had recent gastric ulcer bleeding likely secondary to NSAIDS  Planned IVC filter removal in                       Reason for visit is   Chief Complaint   Patient presents with    Virtual Regular Visit        Encounter provider Honey Quinonez MD    Provider located at [place] [address]  [address] [place] [address] [facility] [id number]      Recent Visits  No visits were found meeting these conditions  Showing recent visits within past 7 days and meeting all other requirements     Today's Visits  Date Type Provider Dept   05/13/21 Telemedicine Carolyn Conroy MD [facility] today's visits and meeting all other requirements     Future Appointments  No visits were found meeting these conditions  Showing future appointments within next 150 days and meeting all other requirements        The patient was identified by name and date of birth  Warren Jaeger was informed that this is a telemedicine visit and that the visit is being conducted through [address] [place] [address] and patient was informed that this is not a secure, HIPAA-compliant platform  She agrees to proceed     My office door was closed  No one else was in the room  She acknowledged consent and understanding of privacy and security of the video platform  The patient has agreed to participate and understands they can discontinue the visit at any time  Patient is aware this is a billable service  Subjective  Warren Jaeger is a 50 y o  female with history of DVT/PE, IVC filter placement, morbid obesity s/p gastric bypass, IVC filter thrombosis with acute on chronic DVT s/p thrombolysis and mechanical thrombectomy 8/5, recent GI bleed with gastric ulcer on NSAIDS who presents for follow-up  She is overall doing better  Her abdominal pain is improving but she still has pain with eating and is mostly drinking ensure  She has had no further bleeding episodes since hospital discharge  She reports that her lower extremity pain/heaviness is improved but she still has bothersome burning and restless leg symptoms at night  She is wearing her compression stockings and elevating her legs  She continues to use therapeutic lovenox for anticoagulation  She is scheduled for IVC filter removal in June        Past Medical History:   Diagnosis Date    Anemia     [facility]     Psychiatric disorder     bipolar II, suicide    [facility]     Seizures ([facility] )        Past Surgical History:   Procedure Laterality Date    APPENDECTOMY      Open    CHOLECYSTECTOMY      Laparoscopic    GASTRIC BYPASS      was 205 date of surgery    IR DVT THROMBOLYSIS/THROMBECTOMY ILIAC/IVC WITH VENOGRAM  8/4/2020    IR TPA LYSIS CHECK  8/5/2020    IVC FILTER INSERTION  2017    STOMACH SURGERY      for morbid obesity    TUBAL LIGATION Bilateral 2010       Current Outpatient Medications   Medication Sig Dispense Refill    atoMOXetine (STRATTERA) 60 mg capsule TAKE 1 CAPSULE (60 MG) BY ORAL ROUTE ONCE DAILY IN THE MORNING      cloNIDine (CATAPRES) 0 1 mg tablet 0 1 mg PRN for anxiety      enoxaparin (LOVENOX) 100 mg/mL Inject 1 mL (100 mg total) under the skin every 12 (twelve) hours 100 Syringe 2    ferrous sulfate 325 (65 Fe) mg tablet Take 325 mg by mouth daily with breakfast      folic acid (FOLVITE) 1 mg tablet Take 1 mg by mouth daily      levothyroxine 100 mcg tablet Take 1 tablet (100 mcg total) by mouth daily in the early morning 30 tablet 0    lithium carbonate (LITHOBID) 300 mg CR tablet Take 300 mg by mouth 2 (two) times a day       Multiple Vitamin (MULTI-VITAMIN DAILY PO) Multi Vitamin   DAILY      NEEDLE, DISP, 27 G 27G X 1/2" MISC by Does not apply route every 12 (twelve) hours 100 each 5    pantoprazole (PROTONIX) 40 mg tablet Take 1 tablet (40 mg total) by mouth 2 (two) times a day before meals 60 tablet 2    potassium chloride (K-DUR,KLOR-CON) 20 mEq tablet Take 1 tablet (20 mEq total) by mouth 2 (two) times a day 30 tablet 1    pyridoxine (VITAMIN B6) 100 mg tablet Take 100 mg by mouth daily      rOPINIRole (REQUIP) 1 mg tablet TAKE 1 TABLET (1 MG) BY ORAL ROUTE 1-3 HOURS BEFORE BEDTIME      SUMAtriptan (IMITREX) 100 mg tablet Take 100 mg by mouth as needed for migraine       thiamine 100 MG tablet Daily      topiramate (TOPAMAX) 100 mg tablet Take 150 mg by mouth 2 (two) times a day       venlafaxine (EFFEXOR-XR) 150 mg 24 hr capsule venlafaxine  mg capsule,extended release 24 hr       No current facility-administered medications for this visit  Allergies   Allergen Reactions    Morphine Seizures       Review of Systems   Constitutional: Negative  HENT: Negative  Eyes: Negative  Respiratory: Negative  Cardiovascular: Positive for leg swelling  Gastrointestinal: Positive for abdominal pain  Gastric ulcer   Endocrine: Negative  Genitourinary: Negative  Musculoskeletal: Negative  Skin:        Reddish discoloration of the lower extremities   Allergic/Immunologic: Negative  Neurological: Positive for dizziness  Hematological: Bruises/bleeds easily  Psychiatric/Behavioral: The patient is nervous/anxious  Video Exam    There were no vitals filed for this visit  Physical Exam  Constitutional:       Appearance: Normal appearance  HENT:      Head: Normocephalic and atraumatic  Neck:      Musculoskeletal: Normal range of motion and neck supple  Pulmonary:      Effort: Pulmonary effort is normal    Abdominal:      General: There is no distension  Palpations: Abdomen is soft  Tenderness: There is abdominal tenderness  Musculoskeletal: Normal range of motion  Skin:     General: Skin is warm and dry  Capillary Refill: Capillary refill takes less than 2 seconds  Neurological:      General: No focal deficit present  Mental Status: She is alert and oriented to person, place, and time  Psychiatric:         Mood and Affect: Mood normal          Behavior: Behavior normal          Thought Content:  Thought content normal          Judgment: Judgment normal           I spent 15 minutes with patient today in which greater than 50% of the time was spent in counseling/coordination of care regarding IVC filter and anticoagulation, supportive care for postthrombotic syndrome      VIRTUAL VISIT DISCLAIMER    Laura Royal acknowledges that she has consented to an online visit or consultation  She understands that the online visit is based solely on information provided by her, and that, in the absence of a face-to-face physical evaluation by the physician, the diagnosis she receives is both limited and provisional in terms of accuracy and completeness  This is not intended to replace a full medical face-to-face evaluation by the physician  Laura Royal understands and accepts these terms

## 2021-05-13 NOTE — PATIENT INSTRUCTIONS
Cardiovascular and Mediastinum     Post-thrombotic syndrome - Primary       History of Theresa IVC filter placement in 2017 in [facility] for bilateral DVT with bleeding complication on xarelto  Presented with IVC thrombosis and iliac DVT s/p thrombolysis and angiojet thrombectomy on  with restoration of IVC/iliac vein outflow though there was residual disease  She has a family history of DVT/PE  Her sister  from lupus  She reports some improvement in her bilateral leg swelling and chronic pain but she has restless leg symptoms and burning/aching pain at night  She is on gabapentin and requip prescribed by her psychiatrist  She continues to wear compression socks and elevate her legs      -Discussed the pathophysiology of post-thrombotic syndrome in relation to extensive history of DVT/IVC thrombosis  Unfortunately there is no cure for her symptoms and treatment is medical management/supportive care  Advised follow-up with her psychiatrist to discuss increasing her gabapentin/requip dosing    -She is scheduled for IVC filter removal in   Currently tolerating therapeutic anticoagulation without further bleeding episodes  Continue monitoring blood counts per PCP/GI  -Continue supportive care/conservative measures with compression stockings, leg elevation and walking/exercise                 Other     S/P gastric bypass     Presence of IVC filter       IVC filter present with history of IVC filter thrombosis  Currently tolerating anticoagulation though had recent gastric ulcer bleeding likely secondary to NSAIDS   Planned IVC filter removal in

## 2021-05-13 NOTE — ASSESSMENT & PLAN NOTE
IVC filter present with history of IVC filter thrombosis  Currently tolerating anticoagulation though had recent gastric ulcer bleeding likely secondary to NSAIDS  Planned IVC filter removal in June

## 2021-05-13 NOTE — ASSESSMENT & PLAN NOTE
History of Theresa IVC filter placement in  in [facility] for bilateral DVT with bleeding complication on xarelto  Presented with IVC thrombosis and iliac DVT s/p thrombolysis and angiojet thrombectomy on  with restoration of IVC/iliac vein outflow though there was residual disease  She has a family history of DVT/PE  Her sister  from lupus  She reports some improvement in her bilateral leg swelling and chronic pain but she has restless leg symptoms and burning/aching pain at night  She is on gabapentin and requip prescribed by her psychiatrist  She continues to wear compression socks and elevate her legs     -Discussed the pathophysiology of post-thrombotic syndrome in relation to extensive history of DVT/IVC thrombosis  Unfortunately there is no cure for her symptoms and treatment is medical management/supportive care  Advised follow-up with her psychiatrist to discuss increasing her gabapentin/requip dosing    -She is scheduled for IVC filter removal in   Currently tolerating therapeutic anticoagulation without further bleeding episodes  Continue monitoring blood counts per PCP/GI  -Continue supportive care/conservative measures with compression stockings, leg elevation and walking/exercise

## 2021-05-13 NOTE — PROGRESS NOTES
Virtual Regular Visit      Assessment/Plan:    Problem List Items Addressed This Visit        Digestive    RESOLVED: Acute GI bleeding       Cardiovascular and Mediastinum    Post-thrombotic syndrome - Primary     History of Theresa IVC filter placement in  in [place] for bilateral DVT with bleeding complication on xarelto  Presented with IVC thrombosis and iliac DVT s/p thrombolysis and angiojet thrombectomy on  with restoration of IVC/iliac vein outflow though there was residual disease  She has a family history of DVT/PE  Her sister  from lupus  She reports some improvement in her bilateral leg swelling and chronic pain but she has restless leg symptoms and burning/aching pain at night  She is on gabapentin and requip prescribed by her psychiatrist  She continues to wear compression socks and elevate her legs     -Discussed the pathophysiology of post-thrombotic syndrome in relation to extensive history of DVT/IVC thrombosis  Unfortunately there is no cure for her symptoms and treatment is medical management/supportive care  Advised follow-up with her psychiatrist to discuss increasing her gabapentin/requip dosing    -She is scheduled for IVC filter removal in   Currently tolerating therapeutic anticoagulation without further bleeding episodes  Continue monitoring blood counts per PCP/GI  -Continue supportive care/conservative measures with compression stockings, leg elevation and walking/exercise  Other    S/P gastric bypass    Presence of IVC filter     IVC filter present with history of IVC filter thrombosis  Currently tolerating anticoagulation though had recent gastric ulcer bleeding likely secondary to NSAIDS  Planned IVC filter removal in                       Reason for visit is   Chief Complaint   Patient presents with    Virtual Regular Visit        Encounter provider Jake Tarango MD    Provider located at [place] [address]  [address] [facility] [address]      Recent Visits  No visits were found meeting these conditions  Showing recent visits within past 7 days and meeting all other requirements     Today's Visits  Date Type Provider Dept   05/13/21 Telemedicine Alexy Mcginnis MD [facility] today's visits and meeting all other requirements     Future Appointments  No visits were found meeting these conditions  Showing future appointments within next 150 days and meeting all other requirements        The patient was identified by name and date of birth  Kwabena Dietrich was informed that this is a telemedicine visit and that the visit is being conducted through [address] [place] [address] and patient was informed that this is not a secure, HIPAA-compliant platform  She agrees to proceed     My office door was closed  No one else was in the room  She acknowledged consent and understanding of privacy and security of the video platform  The patient has agreed to participate and understands they can discontinue the visit at any time  Patient is aware this is a billable service  Subjective  Kwabena Dietrich is a 50 y o  female with history of DVT/PE, IVC filter placement, morbid obesity s/p gastric bypass, IVC filter thrombosis with acute on chronic DVT s/p thrombolysis and mechanical thrombectomy 8/5, recent GI bleed with gastric ulcer on NSAIDS who presents for follow-up  She is overall doing better  Her abdominal pain is improving but she still has pain with eating and is mostly drinking ensure  She has had no further bleeding episodes since hospital discharge  She reports that her lower extremity pain/heaviness is improved but she still has bothersome burning and restless leg symptoms at night  She is wearing her compression stockings and elevating her legs  She continues to use therapeutic lovenox for anticoagulation  She is scheduled for IVC filter removal in June        Past Medical History:   Diagnosis Date    Anemia     [facility]     Psychiatric disorder     bipolar II, suicide    [facility]     Seizures ([facility] )        Past Surgical History:   Procedure Laterality Date    APPENDECTOMY      Open    CHOLECYSTECTOMY      Laparoscopic    GASTRIC BYPASS      was 89 date of surgery    IR DVT THROMBOLYSIS/THROMBECTOMY ILIAC/IVC WITH VENOGRAM  8/4/2020    IR TPA LYSIS CHECK  8/5/2020    IVC FILTER INSERTION  2017    STOMACH SURGERY      for morbid obesity    TUBAL LIGATION Bilateral 2010       Current Outpatient Medications   Medication Sig Dispense Refill    atoMOXetine (STRATTERA) 60 mg capsule TAKE 1 CAPSULE (60 MG) BY ORAL ROUTE ONCE DAILY IN THE MORNING      cloNIDine (CATAPRES) 0 1 mg tablet 0 1 mg PRN for anxiety      enoxaparin (LOVENOX) 100 mg/mL Inject 1 mL (100 mg total) under the skin every 12 (twelve) hours 100 Syringe 2    ferrous sulfate 325 (65 Fe) mg tablet Take 325 mg by mouth daily with breakfast      folic acid (FOLVITE) 1 mg tablet Take 1 mg by mouth daily      levothyroxine 100 mcg tablet Take 1 tablet (100 mcg total) by mouth daily in the early morning 30 tablet 0    lithium carbonate (LITHOBID) 300 mg CR tablet Take 300 mg by mouth 2 (two) times a day       Multiple Vitamin (MULTI-VITAMIN DAILY PO) Multi Vitamin   DAILY      NEEDLE, DISP, 27 G 27G X 1/2" MISC by Does not apply route every 12 (twelve) hours 100 each 5    pantoprazole (PROTONIX) 40 mg tablet Take 1 tablet (40 mg total) by mouth 2 (two) times a day before meals 60 tablet 2    potassium chloride (K-DUR,KLOR-CON) 20 mEq tablet Take 1 tablet (20 mEq total) by mouth 2 (two) times a day 30 tablet 1    pyridoxine (VITAMIN B6) 100 mg tablet Take 100 mg by mouth daily      rOPINIRole (REQUIP) 1 mg tablet TAKE 1 TABLET (1 MG) BY ORAL ROUTE 1-3 HOURS BEFORE BEDTIME      SUMAtriptan (IMITREX) 100 mg tablet Take 100 mg by mouth as needed for migraine       thiamine 100 MG tablet Daily      topiramate (TOPAMAX) 100 mg tablet Take 150 mg by mouth 2 (two) times a day       venlafaxine (EFFEXOR-XR) 150 mg 24 hr capsule venlafaxine  mg capsule,extended release 24 hr       No current facility-administered medications for this visit  Allergies   Allergen Reactions    Morphine Seizures       Review of Systems   Constitutional: Negative  HENT: Negative  Eyes: Negative  Respiratory: Negative  Cardiovascular: Positive for leg swelling  Gastrointestinal: Positive for abdominal pain  Gastric ulcer   Endocrine: Negative  Genitourinary: Negative  Musculoskeletal: Negative  Skin:        Reddish discoloration of the lower extremities   Allergic/Immunologic: Negative  Neurological: Positive for dizziness  Hematological: Bruises/bleeds easily  Psychiatric/Behavioral: The patient is nervous/anxious  Video Exam    There were no vitals filed for this visit  Physical Exam  Constitutional:       Appearance: Normal appearance  HENT:      Head: Normocephalic and atraumatic  Neck:      Musculoskeletal: Normal range of motion and neck supple  Pulmonary:      Effort: Pulmonary effort is normal    Abdominal:      General: There is no distension  Palpations: Abdomen is soft  Tenderness: There is abdominal tenderness  Musculoskeletal: Normal range of motion  Skin:     General: Skin is warm and dry  Capillary Refill: Capillary refill takes less than 2 seconds  Neurological:      General: No focal deficit present  Mental Status: She is alert and oriented to person, place, and time  Psychiatric:         Mood and Affect: Mood normal          Behavior: Behavior normal          Thought Content:  Thought content normal          Judgment: Judgment normal           I spent 15 minutes with patient today in which greater than 50% of the time was spent in counseling/coordination of care regarding IVC filter and anticoagulation, supportive care for postthrombotic syndrome      VIRTUAL VISIT DISCLAIMER    Sulema Moran acknowledges that she has consented to an online visit or consultation  She understands that the online visit is based solely on information provided by her, and that, in the absence of a face-to-face physical evaluation by the physician, the diagnosis she receives is both limited and provisional in terms of accuracy and completeness  This is not intended to replace a full medical face-to-face evaluation by the physician  Sulema Moran understands and accepts these terms

## 2021-05-13 NOTE — ASSESSMENT & PLAN NOTE
Recent admission for GI bleeding secondary to gastric ulcer in setting of previous gastric bypass on therapeutic lovenox  She was taking NSAIDS which was a likely exacerbating factor  No further bleeding  Continues to have abdominal pain when eating and is drinking ensure   She is on protonix BID and has plans for repeat EGD in the near future with GI

## 2021-05-19 ENCOUNTER — TELEPHONE (OUTPATIENT)
Dept: HEMATOLOGY ONCOLOGY | Facility: CLINIC | Age: 46
End: 2021-05-19

## 2021-05-19 NOTE — TELEPHONE ENCOUNTER
Spoke with patient  Reviewed labs  D dimer is good  CT scans reviewed; no clot  OK to decrease dose of anticoagulation; has been on therapeutic blood thinner for more than 6 month as well  She is on 100 mg BID  She recently bought multiple doses  She would like to finish what she has so decrease to 100 mg SQ daily  She verbalized this and repeated back  Then once supply out will send in Lovenox 40 mg  Call if this is before next appt and will send in  No bleeding symptoms at this time  She has been dizzy yesterday and feels out of it today  She thinks she fell off couch yesterday  Advised she should be seen in the ED today

## 2021-05-25 ENCOUNTER — HOSPITAL ENCOUNTER (OUTPATIENT)
Dept: INFUSION CENTER | Facility: CLINIC | Age: 46
Discharge: HOME | End: 2021-05-25
Payer: MEDICARE

## 2021-05-25 VITALS
HEART RATE: 78 BPM | SYSTOLIC BLOOD PRESSURE: 110 MMHG | RESPIRATION RATE: 18 BRPM | TEMPERATURE: 98.6 F | DIASTOLIC BLOOD PRESSURE: 72 MMHG

## 2021-05-25 DIAGNOSIS — D50.8 OTHER IRON DEFICIENCY ANEMIA: ICD-10-CM

## 2021-05-25 DIAGNOSIS — D50.8 IRON DEFICIENCY ANEMIA FOLLOWING BARIATRIC SURGERY: ICD-10-CM

## 2021-05-25 DIAGNOSIS — D62 ACUTE BLOOD LOSS ANEMIA: Primary | ICD-10-CM

## 2021-05-25 DIAGNOSIS — K95.89 IRON DEFICIENCY ANEMIA FOLLOWING BARIATRIC SURGERY: ICD-10-CM

## 2021-05-25 PROCEDURE — 96365 THER/PROPH/DIAG IV INF INIT: CPT

## 2021-05-25 RX ORDER — SODIUM CHLORIDE 9 MG/ML
20 INJECTION, SOLUTION INTRAVENOUS ONCE
Status: CANCELLED | OUTPATIENT
Start: 2021-06-03

## 2021-05-25 RX ORDER — SODIUM CHLORIDE 9 MG/ML
75 INJECTION, SOLUTION INTRAVENOUS CONTINUOUS
Status: CANCELLED | OUTPATIENT
Start: 2021-05-25

## 2021-05-25 RX ORDER — SODIUM CHLORIDE 9 MG/ML
20 INJECTION, SOLUTION INTRAVENOUS ONCE
Status: COMPLETED | OUTPATIENT
Start: 2021-05-25 | End: 2021-05-25

## 2021-05-25 RX ADMIN — SODIUM CHLORIDE 200 MG: 9 INJECTION, SOLUTION INTRAVENOUS at 11:28

## 2021-05-25 RX ADMIN — SODIUM CHLORIDE 20 ML/HR: 0.9 INJECTION, SOLUTION INTRAVENOUS at 11:27

## 2021-05-25 NOTE — PLAN OF CARE
Problem: Potential for Falls  Goal: Patient will remain free of falls  Description: INTERVENTIONS:  - Assess patient frequently for physical needs  -  Identify cognitive and physical deficits and behaviors that affect risk of falls    -  [place] fall precautions as indicated by assessment   - Educate patient/family on patient safety including physical limitations  - Instruct patient to call for assistance with activity based on assessment  - Modify environment to reduce risk of injury  - Consider OT/PT consult to assist with strengthening/mobility  Outcome: Progressing

## 2021-05-25 NOTE — PROGRESS NOTES
Pt arrived to unit without complaint other than intermittent mild dizziness  Pt states this symptom has improved since reported to Tish Bender on 5/19/21  Pt received Venofer as ordered  At end of infusion pt stated she felt a headache coming on  VSS  Pt admits to hx of migraine  Pt has prescriptions for Imitrex and Topamax  Pt will take these as soon as she gets home  Otherwise pt tolerated infusion well, declines AVS, aware of next appt  Pt left unit in stable condition

## 2021-06-02 ENCOUNTER — TELEPHONE (OUTPATIENT)
Dept: RADIOLOGY | Facility: HOSPITAL | Age: 46
End: 2021-06-02

## 2021-06-02 NOTE — PRE-PROCEDURE INSTRUCTIONS
Phone Consult completed:Pre procedure instructions for complex filter removal reviewed with verbal understanding  Allergies,meds,NPO, and ride  Approximate arrival time given,SDS phone call evening before procedure  Patient holding her lovenox after her AM dose 6/7/21  COVID vaccine completed 4/14/21

## 2021-06-03 ENCOUNTER — HOSPITAL ENCOUNTER (OUTPATIENT)
Dept: INFUSION CENTER | Facility: CLINIC | Age: 46
Discharge: HOME | End: 2021-06-03
Payer: MEDICARE

## 2021-06-03 VITALS
HEART RATE: 97 BPM | TEMPERATURE: 98.1 F | RESPIRATION RATE: 18 BRPM | DIASTOLIC BLOOD PRESSURE: 63 MMHG | SYSTOLIC BLOOD PRESSURE: 97 MMHG

## 2021-06-03 DIAGNOSIS — K95.89 IRON DEFICIENCY ANEMIA FOLLOWING BARIATRIC SURGERY: ICD-10-CM

## 2021-06-03 DIAGNOSIS — D62 ACUTE BLOOD LOSS ANEMIA: Primary | ICD-10-CM

## 2021-06-03 DIAGNOSIS — D50.8 IRON DEFICIENCY ANEMIA FOLLOWING BARIATRIC SURGERY: ICD-10-CM

## 2021-06-03 DIAGNOSIS — D50.8 OTHER IRON DEFICIENCY ANEMIA: ICD-10-CM

## 2021-06-03 PROCEDURE — 96365 THER/PROPH/DIAG IV INF INIT: CPT

## 2021-06-03 RX ORDER — SODIUM CHLORIDE 9 MG/ML
20 INJECTION, SOLUTION INTRAVENOUS ONCE
Status: COMPLETED | OUTPATIENT
Start: 2021-06-03 | End: 2021-06-03

## 2021-06-03 RX ORDER — SODIUM CHLORIDE 9 MG/ML
20 INJECTION, SOLUTION INTRAVENOUS ONCE
Status: CANCELLED | OUTPATIENT
Start: 2021-06-10

## 2021-06-03 RX ADMIN — SODIUM CHLORIDE 200 MG: 9 INJECTION, SOLUTION INTRAVENOUS at 11:24

## 2021-06-03 RX ADMIN — SODIUM CHLORIDE 20 ML/HR: 0.9 INJECTION, SOLUTION INTRAVENOUS at 11:24

## 2021-06-03 NOTE — PLAN OF CARE
Problem: Potential for Falls  Goal: Patient will remain free of falls  Description: INTERVENTIONS:  - Assess patient frequently for physical needs  -  Identify cognitive and physical deficits and behaviors that affect risk of falls    -  [place] fall precautions as indicated by assessment   - Educate patient/family on patient safety including physical limitations  - Instruct patient to call for assistance with activity based on assessment  - Modify environment to reduce risk of injury  - Consider OT/PT consult to assist with strengthening/mobility  Outcome: Progressing

## 2021-06-07 ENCOUNTER — TELEPHONE (OUTPATIENT)
Dept: SURGERY | Facility: HOSPITAL | Age: 46
End: 2021-06-07

## 2021-06-08 ENCOUNTER — ANESTHESIA (OUTPATIENT)
Dept: RADIOLOGY | Facility: HOSPITAL | Age: 46
End: 2021-06-08
Payer: MEDICARE

## 2021-06-08 ENCOUNTER — ANESTHESIA EVENT (OUTPATIENT)
Dept: RADIOLOGY | Facility: HOSPITAL | Age: 46
End: 2021-06-08
Payer: MEDICARE

## 2021-06-08 ENCOUNTER — HOSPITAL ENCOUNTER (OUTPATIENT)
Dept: RADIOLOGY | Facility: HOSPITAL | Age: 46
Discharge: HOME | End: 2021-06-08
Attending: RADIOLOGY | Admitting: RADIOLOGY
Payer: MEDICARE

## 2021-06-08 VITALS
HEIGHT: 63 IN | OXYGEN SATURATION: 98 % | TEMPERATURE: 98.6 F | RESPIRATION RATE: 16 BRPM | BODY MASS INDEX: 37.21 KG/M2 | DIASTOLIC BLOOD PRESSURE: 53 MMHG | WEIGHT: 210 LBS | SYSTOLIC BLOOD PRESSURE: 103 MMHG | HEART RATE: 90 BPM

## 2021-06-08 DIAGNOSIS — Z95.828 PRESENCE OF IVC FILTER: ICD-10-CM

## 2021-06-08 PROBLEM — K70.30 ALCOHOLIC CIRRHOSIS OF LIVER WITHOUT ASCITES (HCC): Status: ACTIVE | Noted: 2020-03-19

## 2021-06-08 PROBLEM — R56.9 SEIZURES (HCC): Status: ACTIVE | Noted: 2018-09-11

## 2021-06-08 LAB
HCT VFR BLD AUTO: 27.1 % (ref 34.8–46.1)
HGB BLD-MCNC: 8.4 G/DL (ref 11.5–15.4)

## 2021-06-08 PROCEDURE — C1769 GUIDE WIRE: HCPCS

## 2021-06-08 PROCEDURE — 37193 REM ENDOVAS VENA CAVA FILTER: CPT

## 2021-06-08 PROCEDURE — 88300 SURGICAL PATH GROSS: CPT | Performed by: PATHOLOGY

## 2021-06-08 PROCEDURE — 85014 HEMATOCRIT: CPT | Performed by: RADIOLOGY

## 2021-06-08 PROCEDURE — C1894 INTRO/SHEATH, NON-LASER: HCPCS

## 2021-06-08 PROCEDURE — 75820 VEIN X-RAY ARM/LEG: CPT | Performed by: RADIOLOGY

## 2021-06-08 PROCEDURE — 37252 INTRVASC US NONCORONARY 1ST: CPT | Performed by: RADIOLOGY

## 2021-06-08 PROCEDURE — C1773 RET DEV, INSERTABLE: HCPCS

## 2021-06-08 PROCEDURE — 85018 HEMOGLOBIN: CPT | Performed by: RADIOLOGY

## 2021-06-08 PROCEDURE — 37193 REM ENDOVAS VENA CAVA FILTER: CPT | Performed by: RADIOLOGY

## 2021-06-08 PROCEDURE — C1753 CATH, INTRAVAS ULTRASOUND: HCPCS

## 2021-06-08 PROCEDURE — 37253 INTRVASC US NONCORONARY ADDL: CPT | Performed by: RADIOLOGY

## 2021-06-08 RX ORDER — ONDANSETRON 2 MG/ML
INJECTION INTRAMUSCULAR; INTRAVENOUS AS NEEDED
Status: DISCONTINUED | OUTPATIENT
Start: 2021-06-08 | End: 2021-06-08

## 2021-06-08 RX ORDER — SODIUM CHLORIDE, SODIUM LACTATE, POTASSIUM CHLORIDE, CALCIUM CHLORIDE 600; 310; 30; 20 MG/100ML; MG/100ML; MG/100ML; MG/100ML
20 INJECTION, SOLUTION INTRAVENOUS CONTINUOUS
Status: CANCELLED | OUTPATIENT
Start: 2021-06-08

## 2021-06-08 RX ORDER — SODIUM CHLORIDE 9 MG/ML
75 INJECTION, SOLUTION INTRAVENOUS CONTINUOUS
Status: DISCONTINUED | OUTPATIENT
Start: 2021-06-08 | End: 2021-06-09 | Stop reason: HOSPADM

## 2021-06-08 RX ORDER — ONDANSETRON 2 MG/ML
4 INJECTION INTRAMUSCULAR; INTRAVENOUS ONCE AS NEEDED
Status: CANCELLED | OUTPATIENT
Start: 2021-06-08

## 2021-06-08 RX ORDER — FENTANYL CITRATE/PF 50 MCG/ML
25 SYRINGE (ML) INJECTION
Status: CANCELLED | OUTPATIENT
Start: 2021-06-08

## 2021-06-08 RX ORDER — HEPARIN SODIUM 1000 [USP'U]/ML
INJECTION, SOLUTION INTRAVENOUS; SUBCUTANEOUS AS NEEDED
Status: DISCONTINUED | OUTPATIENT
Start: 2021-06-08 | End: 2021-06-08

## 2021-06-08 RX ORDER — SODIUM CHLORIDE 9 MG/ML
INJECTION, SOLUTION INTRAVENOUS CONTINUOUS PRN
Status: DISCONTINUED | OUTPATIENT
Start: 2021-06-08 | End: 2021-06-08

## 2021-06-08 RX ORDER — FENTANYL CITRATE 50 UG/ML
INJECTION, SOLUTION INTRAMUSCULAR; INTRAVENOUS AS NEEDED
Status: DISCONTINUED | OUTPATIENT
Start: 2021-06-08 | End: 2021-06-08

## 2021-06-08 RX ORDER — MIDAZOLAM HYDROCHLORIDE 2 MG/2ML
INJECTION, SOLUTION INTRAMUSCULAR; INTRAVENOUS AS NEEDED
Status: DISCONTINUED | OUTPATIENT
Start: 2021-06-08 | End: 2021-06-08

## 2021-06-08 RX ADMIN — MIDAZOLAM 2 MG: 1 INJECTION INTRAMUSCULAR; INTRAVENOUS at 12:47

## 2021-06-08 RX ADMIN — FENTANYL CITRATE 25 MCG: 50 INJECTION INTRAMUSCULAR; INTRAVENOUS at 13:13

## 2021-06-08 RX ADMIN — FENTANYL CITRATE 50 MCG: 50 INJECTION INTRAMUSCULAR; INTRAVENOUS at 14:01

## 2021-06-08 RX ADMIN — FENTANYL CITRATE 25 MCG: 50 INJECTION INTRAMUSCULAR; INTRAVENOUS at 13:04

## 2021-06-08 RX ADMIN — IOHEXOL 50 ML: 350 INJECTION, SOLUTION INTRAVENOUS at 14:27

## 2021-06-08 RX ADMIN — SODIUM CHLORIDE 0.2 MCG/KG/HR: 9 INJECTION, SOLUTION INTRAVENOUS at 12:47

## 2021-06-08 RX ADMIN — SODIUM CHLORIDE 75 ML/HR: 0.9 INJECTION, SOLUTION INTRAVENOUS at 10:10

## 2021-06-08 RX ADMIN — HEPARIN SODIUM 3000 UNITS: 1000 INJECTION INTRAVENOUS; SUBCUTANEOUS at 13:53

## 2021-06-08 RX ADMIN — FENTANYL CITRATE 25 MCG: 50 INJECTION INTRAMUSCULAR; INTRAVENOUS at 13:09

## 2021-06-08 RX ADMIN — FENTANYL CITRATE 25 MCG: 50 INJECTION INTRAMUSCULAR; INTRAVENOUS at 12:47

## 2021-06-08 RX ADMIN — ONDANSETRON 4 MG: 2 INJECTION INTRAMUSCULAR; INTRAVENOUS at 13:59

## 2021-06-08 RX ADMIN — SODIUM CHLORIDE: 0.9 INJECTION, SOLUTION INTRAVENOUS at 12:42

## 2021-06-08 RX ADMIN — FENTANYL CITRATE 50 MCG: 50 INJECTION INTRAMUSCULAR; INTRAVENOUS at 13:47

## 2021-06-08 NOTE — DISCHARGE INSTRUCTIONS
Inferior Vena Cava Filter Removal     WHAT YOU NEED TO KNOW:   Inferior vena cava (IVC) filter removal is a procedure to remove your IVC filter  DISCHARGE INSTRUCTIONS:     Wound care: Keep your wound clean and dry  Bandaide may come off in 24 hours  Self-care:   · Limit activity for 24 hours  Slowly start to do more each day  Return to your daily activities as directed  · Resume your normal diet  Small sips of flat soda will help with nausea  Contact Interventional Radiology at [phone] [place] PATIENTS: Contact Interventional Radiology at [phone]) Simon Warren PATIENTS: Contact Interventional Radiology at [phone]) if:  · You have a fever  · Persistent nausea or vomiting  · You have chills, a cough, or feel weak and achy  · Your wound is red, swollen, or draining pus  · You have questions or concerns about your condition

## 2021-06-08 NOTE — ANESTHESIA PREPROCEDURE EVALUATION
Procedure:  IR IVC FILTER REMOVAL    Relevant Problems   CARDIO   (+) 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HCC)   (+) Migraine   (+) Mixed hyperlipidemia      ENDO   (+) Acquired hypothyroidism      GI/HEPATIC   (+) Alcoholic cirrhosis of liver without ascites (HCC)   (+) Gastroesophageal reflux disease      GYN   (-) Currently pregnant      HEMATOLOGY   (+) Anemia   (+) Hypercoagulation syndrome (HCC)   (+) Iron deficiency anemia following bariatric surgery      NEURO/PSYCH   (+) Hx of pulmonary embolus   (+) Hx of suicide attempt   (+) Obsessive compulsive disorder   (+) Seizures (HCC)      Other   (+) Bipolar disorder (HCC)   (+) Morbidly obese (HCC)   (+) S/P gastric bypass        Physical Exam    Airway    Mallampati score: III  TM Distance: >3 FB  Neck ROM: full     Dental   No notable dental hx     Cardiovascular      Pulmonary      Other Findings        Anesthesia Plan  ASA Score- 3     Anesthesia Type- IV sedation with anesthesia with ASA Monitors  Additional Monitors:   Airway Plan:           Plan Factors-    Chart reviewed  Existing labs reviewed  Patient summary reviewed  Induction- intravenous  Postoperative Plan- Plan for postoperative opioid use  Informed Consent- Anesthetic plan and risks discussed with patient  I personally reviewed this patient with the CRNA  Discussed and agreed on the Anesthesia Plan with the CRNA Gerhardt Sep

## 2021-06-08 NOTE — QUICK NOTE
Pt seen and examined  Borderline hypotension post procedure - sleepy  No abdominal pain, or tachycardia       H/H checked - unchanged from outside labs from last month    After some PO intake she perked up, SBP around 100 which is same as pre procedure    OK to go home    lovenox to resume tonight

## 2021-06-08 NOTE — NURSING NOTE
Dr Abernathy Diver aware of patients blood pressure 76/46  Patient is asymptomatic  Pt offers no c/o abdominal pain

## 2021-06-08 NOTE — ANESTHESIA POSTPROCEDURE EVALUATION
Post-Op Assessment Note    CV Status:  Stable  Pain Score: 0    Pain management: adequate     Mental Status:  Alert   Hydration Status:  Euvolemic   PONV Controlled:  None      Post Op Vitals Reviewed: Yes      Staff: Anesthesiologist, CRNA         No complications documented      BP   87/60   Temp   97   Pulse  88   Resp   12   SpO2   96

## 2021-06-08 NOTE — BRIEF OP NOTE (RAD/CATH)
INTERVENTIONAL RADIOLOGY PROCEDURE NOTE    Date: 6/8/2021    Procedure: IR IVC FILTER REMOVAL   VENOGRAPHY  INTRAVASCULAR ULTRASOUND    Preoperative diagnosis:   1  Presence of IVC filter         Postoperative diagnosis: Same  Surgeon: Johnny White MD     Assistant: None  No qualified resident was available  Blood loss: minimal    Specimens: 0    Findings:     Widely patent inferior vena cava    Patent bilateral common iliac veins by intravascular ultrasound and patent inferior vena cava by ultrasound    Removal of JANIE inferior vena cava filter    No definitive evidence of post thrombotic changes to the visualized veins    No significant left common iliac vein external compression    Right internal jugular access    Complications: None immediate      Anesthesia: conscious sedation   Additional medications:  Intravenous heparin 3000 units

## 2021-06-08 NOTE — INTERVAL H&P NOTE
Update: (This section must be completed if the H&P was completed greater than 24 hrs to procedure or admission)    H&P reviewed  After examining the patient, I find no changed to the H&P since it had been written  45F w/ [place] IVC filter from 2017,  Caval thrombosis Aug 2020  She requires lifelong Lovenox given her gastric bypass surgery  Filter retrieval is indicated and she has been seen in clinic with extensive discussions  Unfortunately procedures have been delayed due to abdominal wall infections and more recently GI bleeding    CT venogram from March available with apparently patent pelvic veins  We discussed today's procedure which will include the not a fee, intravascular ultrasound, possible venoplasty, possible stenting  There risks of filter retrieval including rupture of the cava, bleeding, access site problems including bleeding, distal embolization, hemopericardium, pulmonary embolism, or even death    Pelvic intervention may or may not be appropriate depending on the findings    If we see a completely thrombosed cava we may delay intervention for further risk benefit discussion    She wishes to proceed    Her legs are very swollen  I performed survey ultrasound of the left groin and the major femoral vein is patent    Appreciate anesthesia support    Patient re-evaluated   Accept as history and physical     Alejandra Anderson MD/June 8, 2021/12:57 PM

## 2021-06-15 DIAGNOSIS — D50.8 OTHER IRON DEFICIENCY ANEMIA: ICD-10-CM

## 2021-06-15 DIAGNOSIS — K95.89 IRON DEFICIENCY ANEMIA FOLLOWING BARIATRIC SURGERY: ICD-10-CM

## 2021-06-15 DIAGNOSIS — D62 ACUTE BLOOD LOSS ANEMIA: Primary | ICD-10-CM

## 2021-06-15 DIAGNOSIS — D50.8 IRON DEFICIENCY ANEMIA FOLLOWING BARIATRIC SURGERY: ICD-10-CM

## 2021-06-15 RX ORDER — SODIUM CHLORIDE 9 MG/ML
20 INJECTION, SOLUTION INTRAVENOUS ONCE
Status: CANCELLED | OUTPATIENT
Start: 2021-06-17

## 2021-06-17 ENCOUNTER — HOSPITAL ENCOUNTER (OUTPATIENT)
Dept: INFUSION CENTER | Facility: CLINIC | Age: 46
End: 2021-06-17

## 2021-06-18 ENCOUNTER — APPOINTMENT (EMERGENCY)
Dept: RADIOLOGY | Facility: HOSPITAL | Age: 46
DRG: 682 | End: 2021-06-18
Payer: MEDICARE

## 2021-06-18 ENCOUNTER — APPOINTMENT (EMERGENCY)
Dept: NON INVASIVE DIAGNOSTICS | Facility: HOSPITAL | Age: 46
DRG: 682 | End: 2021-06-18
Payer: MEDICARE

## 2021-06-18 ENCOUNTER — APPOINTMENT (EMERGENCY)
Dept: CT IMAGING | Facility: HOSPITAL | Age: 46
DRG: 682 | End: 2021-06-18
Payer: MEDICARE

## 2021-06-18 ENCOUNTER — HOSPITAL ENCOUNTER (INPATIENT)
Facility: HOSPITAL | Age: 46
LOS: 5 days | Discharge: HOME HEALTH | DRG: 682 | End: 2021-06-23
Attending: EMERGENCY MEDICINE | Admitting: INTERNAL MEDICINE
Payer: MEDICARE

## 2021-06-18 ENCOUNTER — APPOINTMENT (INPATIENT)
Dept: ULTRASOUND IMAGING | Facility: HOSPITAL | Age: 46
DRG: 682 | End: 2021-06-18
Payer: MEDICARE

## 2021-06-18 DIAGNOSIS — N83.209 OVARIAN CYST: ICD-10-CM

## 2021-06-18 DIAGNOSIS — R74.01 TRANSAMINITIS: ICD-10-CM

## 2021-06-18 DIAGNOSIS — Z86.718 HISTORY OF DVT (DEEP VEIN THROMBOSIS): ICD-10-CM

## 2021-06-18 DIAGNOSIS — E87.8 ELECTROLYTE IMBALANCE: ICD-10-CM

## 2021-06-18 DIAGNOSIS — R65.10 SIRS (SYSTEMIC INFLAMMATORY RESPONSE SYNDROME) (HCC): Primary | ICD-10-CM

## 2021-06-18 DIAGNOSIS — N17.9 AKI (ACUTE KIDNEY INJURY) (HCC): ICD-10-CM

## 2021-06-18 DIAGNOSIS — M79.89 LEG SWELLING: ICD-10-CM

## 2021-06-18 DIAGNOSIS — D72.829 LEUKOCYTOSIS: ICD-10-CM

## 2021-06-18 DIAGNOSIS — I95.9 HYPOTENSION: ICD-10-CM

## 2021-06-18 LAB
ALBUMIN SERPL BCP-MCNC: 1.7 G/DL (ref 3.5–5)
ALP SERPL-CCNC: 417 U/L (ref 46–116)
ALT SERPL W P-5'-P-CCNC: 50 U/L (ref 12–78)
ANION GAP SERPL CALCULATED.3IONS-SCNC: 13 MMOL/L (ref 4–13)
AST SERPL W P-5'-P-CCNC: 144 U/L (ref 5–45)
ATRIAL RATE: 91 BPM
BACTERIA UR QL AUTO: ABNORMAL /HPF
BASOPHILS # BLD AUTO: 0.08 THOUSANDS/ΜL (ref 0–0.1)
BASOPHILS NFR BLD AUTO: 1 % (ref 0–1)
BILIRUB SERPL-MCNC: 2.73 MG/DL (ref 0.2–1)
BILIRUB UR QL STRIP: ABNORMAL
BUN SERPL-MCNC: 6 MG/DL (ref 5–25)
CALCIUM ALBUM COR SERPL-MCNC: 9.4 MG/DL (ref 8.3–10.1)
CALCIUM SERPL-MCNC: 7.6 MG/DL (ref 8.3–10.1)
CHLORIDE SERPL-SCNC: 88 MMOL/L (ref 100–108)
CLARITY UR: ABNORMAL
CO2 SERPL-SCNC: 29 MMOL/L (ref 21–32)
COLOR UR: ABNORMAL
CREAT SERPL-MCNC: 2.22 MG/DL (ref 0.6–1.3)
EOSINOPHIL # BLD AUTO: 0.03 THOUSAND/ΜL (ref 0–0.61)
EOSINOPHIL NFR BLD AUTO: 0 % (ref 0–6)
ERYTHROCYTE [DISTWIDTH] IN BLOOD BY AUTOMATED COUNT: 23.1 % (ref 11.6–15.1)
GFR SERPL CREATININE-BSD FRML MDRD: 26 ML/MIN/1.73SQ M
GLUCOSE SERPL-MCNC: 100 MG/DL (ref 65–140)
GLUCOSE UR STRIP-MCNC: ABNORMAL MG/DL
HCT VFR BLD AUTO: 29.2 % (ref 34.8–46.1)
HGB BLD-MCNC: 9.4 G/DL (ref 11.5–15.4)
HGB UR QL STRIP.AUTO: ABNORMAL
IMM GRANULOCYTES # BLD AUTO: 0.12 THOUSAND/UL (ref 0–0.2)
IMM GRANULOCYTES NFR BLD AUTO: 1 % (ref 0–2)
KETONES UR STRIP-MCNC: ABNORMAL MG/DL
LACTATE SERPL-SCNC: 3.7 MMOL/L (ref 0.5–2)
LEUKOCYTE ESTERASE UR QL STRIP: ABNORMAL
LIPASE SERPL-CCNC: 20 U/L (ref 73–393)
LYMPHOCYTES # BLD AUTO: 1.93 THOUSANDS/ΜL (ref 0.6–4.47)
LYMPHOCYTES NFR BLD AUTO: 14 % (ref 14–44)
MAGNESIUM SERPL-MCNC: 1.3 MG/DL (ref 1.6–2.6)
MCH RBC QN AUTO: 28.9 PG (ref 26.8–34.3)
MCHC RBC AUTO-ENTMCNC: 32.2 G/DL (ref 31.4–37.4)
MCV RBC AUTO: 90 FL (ref 82–98)
MONOCYTES # BLD AUTO: 1.28 THOUSAND/ΜL (ref 0.17–1.22)
MONOCYTES NFR BLD AUTO: 10 % (ref 4–12)
NEUTROPHILS # BLD AUTO: 10.08 THOUSANDS/ΜL (ref 1.85–7.62)
NEUTS SEG NFR BLD AUTO: 74 % (ref 43–75)
NITRITE UR QL STRIP: POSITIVE
NON-SQ EPI CELLS URNS QL MICRO: ABNORMAL /HPF
NRBC BLD AUTO-RTO: 0 /100 WBCS
NT-PROBNP SERPL-MCNC: 1096 PG/ML
P AXIS: 51 DEGREES
PH UR STRIP.AUTO: 5 [PH]
PHOSPHATE SERPL-MCNC: 2.3 MG/DL (ref 2.7–4.5)
PLATELET # BLD AUTO: 253 THOUSANDS/UL (ref 149–390)
PMV BLD AUTO: 12.6 FL (ref 8.9–12.7)
POTASSIUM SERPL-SCNC: 2.5 MMOL/L (ref 3.5–5.3)
PR INTERVAL: 146 MS
PROT SERPL-MCNC: 7 G/DL (ref 6.4–8.2)
PROT UR STRIP-MCNC: ABNORMAL MG/DL
QRS AXIS: -10 DEGREES
QRSD INTERVAL: 100 MS
QT INTERVAL: 438 MS
QTC INTERVAL: 538 MS
RBC # BLD AUTO: 3.25 MILLION/UL (ref 3.81–5.12)
RBC #/AREA URNS AUTO: ABNORMAL /HPF
SARS-COV-2 RNA RESP QL NAA+PROBE: NEGATIVE
SODIUM SERPL-SCNC: 130 MMOL/L (ref 136–145)
SP GR UR STRIP.AUTO: 1.02 (ref 1–1.03)
T WAVE AXIS: 8 DEGREES
TROPONIN I SERPL-MCNC: <0.02 NG/ML
UROBILINOGEN UR QL STRIP.AUTO: 2 E.U./DL
VENTRICULAR RATE: 91 BPM
WBC # BLD AUTO: 13.52 THOUSAND/UL (ref 4.31–10.16)
WBC #/AREA URNS AUTO: ABNORMAL /HPF

## 2021-06-18 PROCEDURE — 73502 X-RAY EXAM HIP UNI 2-3 VIEWS: CPT

## 2021-06-18 PROCEDURE — 96361 HYDRATE IV INFUSION ADD-ON: CPT

## 2021-06-18 PROCEDURE — 71046 X-RAY EXAM CHEST 2 VIEWS: CPT

## 2021-06-18 PROCEDURE — 74176 CT ABD & PELVIS W/O CONTRAST: CPT

## 2021-06-18 PROCEDURE — 36415 COLL VENOUS BLD VENIPUNCTURE: CPT | Performed by: PHYSICIAN ASSISTANT

## 2021-06-18 PROCEDURE — 84145 PROCALCITONIN (PCT): CPT | Performed by: INTERNAL MEDICINE

## 2021-06-18 PROCEDURE — 99223 1ST HOSP IP/OBS HIGH 75: CPT | Performed by: INTERNAL MEDICINE

## 2021-06-18 PROCEDURE — 83735 ASSAY OF MAGNESIUM: CPT | Performed by: PHYSICIAN ASSISTANT

## 2021-06-18 PROCEDURE — 87086 URINE CULTURE/COLONY COUNT: CPT | Performed by: INTERNAL MEDICINE

## 2021-06-18 PROCEDURE — 96374 THER/PROPH/DIAG INJ IV PUSH: CPT

## 2021-06-18 PROCEDURE — 93005 ELECTROCARDIOGRAM TRACING: CPT

## 2021-06-18 PROCEDURE — U0005 INFEC AGEN DETEC AMPLI PROBE: HCPCS | Performed by: PHYSICIAN ASSISTANT

## 2021-06-18 PROCEDURE — 81001 URINALYSIS AUTO W/SCOPE: CPT | Performed by: INTERNAL MEDICINE

## 2021-06-18 PROCEDURE — 84484 ASSAY OF TROPONIN QUANT: CPT | Performed by: PHYSICIAN ASSISTANT

## 2021-06-18 PROCEDURE — 83690 ASSAY OF LIPASE: CPT | Performed by: PHYSICIAN ASSISTANT

## 2021-06-18 PROCEDURE — 99285 EMERGENCY DEPT VISIT HI MDM: CPT

## 2021-06-18 PROCEDURE — 93970 EXTREMITY STUDY: CPT

## 2021-06-18 PROCEDURE — 80053 COMPREHEN METABOLIC PANEL: CPT | Performed by: PHYSICIAN ASSISTANT

## 2021-06-18 PROCEDURE — 85025 COMPLETE CBC W/AUTO DIFF WBC: CPT | Performed by: PHYSICIAN ASSISTANT

## 2021-06-18 PROCEDURE — 93010 ELECTROCARDIOGRAM REPORT: CPT | Performed by: INTERNAL MEDICINE

## 2021-06-18 PROCEDURE — 83880 ASSAY OF NATRIURETIC PEPTIDE: CPT | Performed by: PHYSICIAN ASSISTANT

## 2021-06-18 PROCEDURE — 83605 ASSAY OF LACTIC ACID: CPT | Performed by: PHYSICIAN ASSISTANT

## 2021-06-18 PROCEDURE — U0003 INFECTIOUS AGENT DETECTION BY NUCLEIC ACID (DNA OR RNA); SEVERE ACUTE RESPIRATORY SYNDROME CORONAVIRUS 2 (SARS-COV-2) (CORONAVIRUS DISEASE [COVID-19]), AMPLIFIED PROBE TECHNIQUE, MAKING USE OF HIGH THROUGHPUT TECHNOLOGIES AS DESCRIBED BY CMS-2020-01-R: HCPCS | Performed by: PHYSICIAN ASSISTANT

## 2021-06-18 PROCEDURE — 87040 BLOOD CULTURE FOR BACTERIA: CPT | Performed by: PHYSICIAN ASSISTANT

## 2021-06-18 PROCEDURE — 84100 ASSAY OF PHOSPHORUS: CPT | Performed by: PHYSICIAN ASSISTANT

## 2021-06-18 PROCEDURE — 99285 EMERGENCY DEPT VISIT HI MDM: CPT | Performed by: PHYSICIAN ASSISTANT

## 2021-06-18 RX ORDER — VENLAFAXINE HYDROCHLORIDE 150 MG/1
150 CAPSULE, EXTENDED RELEASE ORAL DAILY
Status: DISCONTINUED | OUTPATIENT
Start: 2021-06-19 | End: 2021-06-23 | Stop reason: HOSPADM

## 2021-06-18 RX ORDER — GABAPENTIN 400 MG/1
400 CAPSULE ORAL 3 TIMES DAILY
COMMUNITY

## 2021-06-18 RX ORDER — FOLIC ACID 1 MG/1
1 TABLET ORAL DAILY
Status: DISCONTINUED | OUTPATIENT
Start: 2021-06-19 | End: 2021-06-23 | Stop reason: HOSPADM

## 2021-06-18 RX ORDER — ONDANSETRON 2 MG/ML
4 INJECTION INTRAMUSCULAR; INTRAVENOUS EVERY 4 HOURS PRN
Status: DISCONTINUED | OUTPATIENT
Start: 2021-06-18 | End: 2021-06-23 | Stop reason: HOSPADM

## 2021-06-18 RX ORDER — ACETAMINOPHEN 325 MG/1
650 TABLET ORAL ONCE
Status: COMPLETED | OUTPATIENT
Start: 2021-06-18 | End: 2021-06-18

## 2021-06-18 RX ORDER — POTASSIUM CHLORIDE 29.8 MG/ML
40 INJECTION INTRAVENOUS ONCE
Status: DISCONTINUED | OUTPATIENT
Start: 2021-06-18 | End: 2021-06-18 | Stop reason: SDUPTHER

## 2021-06-18 RX ORDER — POTASSIUM CHLORIDE 20 MEQ/1
40 TABLET, EXTENDED RELEASE ORAL ONCE
Status: COMPLETED | OUTPATIENT
Start: 2021-06-18 | End: 2021-06-18

## 2021-06-18 RX ORDER — ROPINIROLE 1 MG/1
1 TABLET, FILM COATED ORAL
Status: DISCONTINUED | OUTPATIENT
Start: 2021-06-18 | End: 2021-06-23 | Stop reason: HOSPADM

## 2021-06-18 RX ORDER — POTASSIUM CHLORIDE 14.9 MG/ML
20 INJECTION INTRAVENOUS ONCE
Status: COMPLETED | OUTPATIENT
Start: 2021-06-18 | End: 2021-06-18

## 2021-06-18 RX ORDER — GABAPENTIN 400 MG/1
400 CAPSULE ORAL 3 TIMES DAILY
Status: DISCONTINUED | OUTPATIENT
Start: 2021-06-18 | End: 2021-06-23 | Stop reason: HOSPADM

## 2021-06-18 RX ORDER — QUETIAPINE FUMARATE 100 MG/1
100 TABLET, FILM COATED ORAL
Status: DISCONTINUED | OUTPATIENT
Start: 2021-06-18 | End: 2021-06-23 | Stop reason: HOSPADM

## 2021-06-18 RX ORDER — POTASSIUM CHLORIDE 14.9 MG/ML
20 INJECTION INTRAVENOUS
Status: DISPENSED | OUTPATIENT
Start: 2021-06-18 | End: 2021-06-18

## 2021-06-18 RX ORDER — FUROSEMIDE 40 MG/1
80 TABLET ORAL 2 TIMES DAILY
COMMUNITY
End: 2021-06-23 | Stop reason: HOSPADM

## 2021-06-18 RX ORDER — QUETIAPINE FUMARATE 100 MG/1
100 TABLET, FILM COATED ORAL
COMMUNITY

## 2021-06-18 RX ORDER — LITHIUM CARBONATE 300 MG/1
300 TABLET, FILM COATED, EXTENDED RELEASE ORAL 2 TIMES DAILY
Status: DISCONTINUED | OUTPATIENT
Start: 2021-06-18 | End: 2021-06-23 | Stop reason: HOSPADM

## 2021-06-18 RX ORDER — LANOLIN ALCOHOL/MO/W.PET/CERES
100 CREAM (GRAM) TOPICAL DAILY
Status: DISCONTINUED | OUTPATIENT
Start: 2021-06-19 | End: 2021-06-23 | Stop reason: HOSPADM

## 2021-06-18 RX ORDER — ACETAMINOPHEN 325 MG/1
650 TABLET ORAL EVERY 6 HOURS PRN
Status: DISCONTINUED | OUTPATIENT
Start: 2021-06-18 | End: 2021-06-23 | Stop reason: HOSPADM

## 2021-06-18 RX ORDER — PANTOPRAZOLE SODIUM 40 MG/1
40 TABLET, DELAYED RELEASE ORAL
Status: DISCONTINUED | OUTPATIENT
Start: 2021-06-18 | End: 2021-06-23 | Stop reason: HOSPADM

## 2021-06-18 RX ORDER — FERROUS SULFATE 325(65) MG
325 TABLET ORAL
Status: DISCONTINUED | OUTPATIENT
Start: 2021-06-19 | End: 2021-06-23 | Stop reason: HOSPADM

## 2021-06-18 RX ORDER — QUETIAPINE FUMARATE 400 MG/1
400 TABLET, FILM COATED ORAL
COMMUNITY
End: 2021-08-09

## 2021-06-18 RX ORDER — POTASSIUM CHLORIDE AND SODIUM CHLORIDE 900; 300 MG/100ML; MG/100ML
100 INJECTION, SOLUTION INTRAVENOUS CONTINUOUS
Status: DISCONTINUED | OUTPATIENT
Start: 2021-06-18 | End: 2021-06-19

## 2021-06-18 RX ORDER — LEVOTHYROXINE SODIUM 0.1 MG/1
100 TABLET ORAL
Status: DISCONTINUED | OUTPATIENT
Start: 2021-06-19 | End: 2021-06-23 | Stop reason: HOSPADM

## 2021-06-18 RX ORDER — QUETIAPINE FUMARATE 200 MG/1
400 TABLET, FILM COATED ORAL
Status: DISCONTINUED | OUTPATIENT
Start: 2021-06-18 | End: 2021-06-23 | Stop reason: HOSPADM

## 2021-06-18 RX ORDER — PYRIDOXINE HCL (VITAMIN B6) 50 MG
100 TABLET ORAL DAILY
Status: DISCONTINUED | OUTPATIENT
Start: 2021-06-19 | End: 2021-06-23 | Stop reason: HOSPADM

## 2021-06-18 RX ORDER — POTASSIUM CHLORIDE 20 MEQ/1
40 TABLET, EXTENDED RELEASE ORAL EVERY 4 HOURS
Status: COMPLETED | OUTPATIENT
Start: 2021-06-18 | End: 2021-06-18

## 2021-06-18 RX ADMIN — SODIUM CHLORIDE 1000 ML: 0.9 INJECTION, SOLUTION INTRAVENOUS at 13:23

## 2021-06-18 RX ADMIN — POTASSIUM CHLORIDE 40 MEQ: 1500 TABLET, EXTENDED RELEASE ORAL at 20:01

## 2021-06-18 RX ADMIN — POTASSIUM CHLORIDE 40 MEQ: 1500 TABLET, EXTENDED RELEASE ORAL at 16:45

## 2021-06-18 RX ADMIN — TOPIRAMATE 150 MG: 100 TABLET ORAL at 20:03

## 2021-06-18 RX ADMIN — POTASSIUM CHLORIDE 20 MEQ: 14.9 INJECTION, SOLUTION INTRAVENOUS at 20:02

## 2021-06-18 RX ADMIN — PANTOPRAZOLE SODIUM 40 MG: 40 TABLET, DELAYED RELEASE ORAL at 18:22

## 2021-06-18 RX ADMIN — ACETAMINOPHEN 650 MG: 325 TABLET, FILM COATED ORAL at 20:02

## 2021-06-18 RX ADMIN — GABAPENTIN 400 MG: 400 CAPSULE ORAL at 20:01

## 2021-06-18 RX ADMIN — ACETAMINOPHEN 650 MG: 325 TABLET, FILM COATED ORAL at 13:25

## 2021-06-18 RX ADMIN — QUETIAPINE FUMARATE 400 MG: 200 TABLET ORAL at 22:32

## 2021-06-18 RX ADMIN — ROPINIROLE 1 MG: 1 TABLET, FILM COATED ORAL at 22:32

## 2021-06-18 RX ADMIN — CEFEPIME HYDROCHLORIDE 2000 MG: 2 INJECTION, POWDER, FOR SOLUTION INTRAVENOUS at 15:44

## 2021-06-18 RX ADMIN — POTASSIUM CHLORIDE 20 MEQ: 14.9 INJECTION, SOLUTION INTRAVENOUS at 17:37

## 2021-06-18 RX ADMIN — LITHIUM CARBONATE 300 MG: 300 TABLET, FILM COATED, EXTENDED RELEASE ORAL at 20:04

## 2021-06-18 RX ADMIN — POTASSIUM CHLORIDE AND SODIUM CHLORIDE 100 ML/HR: 900; 300 INJECTION, SOLUTION INTRAVENOUS at 19:45

## 2021-06-18 RX ADMIN — POTASSIUM CHLORIDE 40 MEQ: 1500 TABLET, EXTENDED RELEASE ORAL at 18:20

## 2021-06-18 NOTE — ASSESSMENT & PLAN NOTE
· Transaminitis in the setting of hepatic steatosis  · Will have elevated lactic acid due to inability to clear      Results from last 7 days   Lab Units 06/18/21  1318   AST U/L 144*   ALT U/L 50   TOTAL BILIRUBIN mg/dL 2 73*     Results from last 7 days   Lab Units 06/18/21  1450   LACTIC ACID mmol/L 3 7*

## 2021-06-18 NOTE — PLAN OF CARE
Problem: Potential for Falls  Goal: Patient will remain free of falls  Description: INTERVENTIONS:  - Educate patient/family on patient safety including physical limitations  - Instruct patient to call for assistance with activity   - Consult OT/PT to assist with strengthening/mobility   - Keep Call bell within reach  - Keep bed low and locked with side rails adjusted as appropriate  - Keep care items and personal belongings within reach  - Initiate and maintain comfort rounds  - Make Fall Risk Sign visible to staff  - Offer Toileting everyHours, in advance of need  - Initiate/Maintain alarm  - Obtain necessary fall risk management equipment: - Apply yellow socks and bracelet for high fall risk patients  - Consider moving patient to room near nurses station  Outcome: Progressing     Problem: PAIN - ADULT  Goal: Verbalizes/displays adequate comfort level or baseline comfort level  Description: Interventions:  - Encourage patient to monitor pain and request assistance  - Assess pain using appropriate pain scale  - Administer analgesics based on type and severity of pain and evaluate response  - Implement non-pharmacological measures as appropriate and evaluate response  - Consider cultural and social influences on pain and pain management  - Notify physician/advanced practitioner if interventions unsuccessful or patient reports new pain  Outcome: Progressing     Problem: INFECTION - ADULT  Goal: Absence or prevention of progression during hospitalization  Description: INTERVENTIONS:  - Assess and monitor for signs and symptoms of infection  - Monitor lab/diagnostic results  - Monitor all insertion sites, i e  indwelling lines, tubes, and drains  - Monitor endotracheal if appropriate and nasal secretions for changes in amount and color  - [place] appropriate cooling/warming therapies per order  - Administer medications as ordered  - Instruct and encourage patient and family to use good hand hygiene technique  - Identify and instruct in appropriate isolation precautions for identified infection/condition  Outcome: Progressing  Goal: Absence of fever/infection during neutropenic period  Description: INTERVENTIONS:  - Monitor WBC    Outcome: Progressing     Problem: SAFETY ADULT  Goal: Patient will remain free of falls  Description: INTERVENTIONS:  - Educate patient/family on patient safety including physical limitations  - Instruct patient to call for assistance with activity   - Consult OT/PT to assist with strengthening/mobility   - Keep Call bell within reach  - Keep bed low and locked with side rails adjusted as appropriate  - Keep care items and personal belongings within reach  - Initiate and maintain comfort rounds  - Make Fall Risk Sign visible to staff  - Offer Toileting every Hours, in advance of need  - Initiate/Maintain arm  - Obtain necessary fall risk management equipment: - Consider moving patient to room near nurses station  Outcome: Progressing  Goal: Maintain or return to baseline ADL function  Description: INTERVENTIONS:  -  Assess patient's ability to carry out ADLs; assess patient's baseline for ADL function and identify physical deficits which impact ability to perform ADLs (bathing, care of mouth/teeth, toileting, grooming, dressing, etc )  - Assess/evaluate cause of self-care deficits   - Assess range of motion  - Assess patient's mobility; develop plan if impaired  - Assess patient's need for assistive devices and provide as appropriate  - Encourage maximum independence but intervene and supervise when necessary  - Involve family in performance of ADLs  - Assess for home care needs following discharge   - Consider OT consult to assist with ADL evaluation and planning for discharge  - Provide patient education as appropriate  Outcome: Progressing  Goal: Maintains/Returns to pre admission functional level  Description: INTERVENTIONS:  - Perform BMAT or MOVE assessment daily    - Set and communicate daily mobility goal to care team and patient/family/caregiver  - Collaborate with rehabilitation services on mobility goals if consulted  - Record patient progress and toleration of activity level   Outcome: Progressing     Problem: DISCHARGE PLANNING  Goal: Discharge to home or other facility with appropriate resources  Description: INTERVENTIONS:  - Identify barriers to discharge w/patient and caregiver  - Arrange for needed discharge resources and transportation as appropriate  - Identify discharge learning needs (meds, wound care, etc )  - Arrange for interpretive services to assist at discharge as needed  - Refer to Case Management Department for coordinating discharge planning if the patient needs post-hospital services based on physician/advanced practitioner order or complex needs related to functional status, cognitive ability, or social support system  Outcome: Progressing     Problem: Knowledge Deficit  Goal: Patient/family/caregiver demonstrates understanding of disease process, treatment plan, medications, and discharge instructions  Description: Complete learning assessment and assess knowledge base    Interventions:  - Provide teaching at level of understanding  - Provide teaching via preferred learning methods  Outcome: Progressing

## 2021-06-18 NOTE — H&P
[address] 1975, 50 y o  female MRN: [id number]  Unit/Bed#: ED 04 Encounter: [id number]  Primary Care Provider: Doug Ortega PA-C   Date and time admitted to hospital: 6/18/2021 12:26 PM    Assessment and Plan  * ARF (acute renal failure) ([facility] )  Assessment & Plan  · Acute renal failure in the setting of recent administration furosemide upwards of 80 mg b i d  For lower extremity edema  · Lower extremity edema is likely secondary to PVD from her chronic thrombosis  IVC recently removed  · Hydrate with NSS+KCL and recheck in a m  Continue holding diuretics    Results from last 7 days   Lab Units 06/18/21  1318   BUN mg/dL 6   CREATININE mg/dL 2 22*   EGFR ml/min/1 73sq m 26       Hypokalemia  Assessment & Plan  · Secondary to excessive diuretic intake  Replete and recheck in a m  Results from last 7 days   Lab Units 06/18/21  1318   POTASSIUM mmol/L 2 5*       SIRS (systemic inflammatory response syndrome) ([facility])  Assessment & Plan  · Without sepsis  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  · Elevated lactic acid secondary to hepatic steatosis and inability to clear  · Defer further antibiotics for now    Bipolar depression ([place] [address] )  Assessment & Plan  · Mood stable continue quetiapine lithium and venlafaxine    History of DVT (deep vein thrombosis)  Assessment & Plan  · History of DVT follows with vascular as outpatient  IVC recently removed  · 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  · Continue weight based lovenox which she currently uses 100 mg daily  · Switch to alternate agent if renal function worsens    Morbidly obese ([facility])  Assessment & Plan  · Body mass index is 40 26 kg/m²     · Does have history of gastric bypass    Ovarian cyst  Assessment & Plan  · Ovarian cyst/mass known to patient as she recently had an ultrasound to further characterize  · Will leave information for gynecology/oncology to evaluate as outpatient    Acquired hypothyroidism  Assessment & Plan  · Continue levothyroxine 100 mcg daily    Transaminitis  Assessment & Plan  · Transaminitis in the setting of hepatic steatosis  · Will have elevated lactic acid due to inability to clear  Results from last 7 days   Lab Units 06/18/21  1318   AST U/L 144*   ALT U/L 50   TOTAL BILIRUBIN mg/dL 2 73*     Results from last 7 days   Lab Units 06/18/21  1450   LACTIC ACID mmol/L 3 7*       VTE Prophylaxis: Enoxaparin (Lovenox)  Code Status:  Full code  Anticipated Length of Stay:  Patient will be admitted on an Inpatient basis with an anticipated length of stay of  greater than 2 midnights  Justification for Hospital Stay: ARF (acute renal failure) ([facility] )  Total Time for Visit, including Counseling / Coordination of Care: x mins  Greater than 50% of this total time spent on direct patient counseling and coordination of care  Chief Complaint:     Chief Complaint   Patient presents with    Abnormal Lab     Pt reports PCP called her telling her she had abnormal labs  Potassium 2 5, sodium 130  Pt reports dizziness, falls  No CP, SOB  History of Present Illness:    Jaylyn Carrasco is a 50 y o  female with a past medical history of bipolar depression history of DVT and obesity with history of gastric bypass who presents with abnormal labs  Patient was recently started on furosemide 40 mg b i d  for lower extremity edema  Edema did not improved with this was subsequently increased to 80 mg b i d  She denies any vomiting or diarrhea  She had routine blood work today which demonstrated hypokalemia and renal failure and the patient was referred to the emergency department where imaging did demonstrate ovarian mass known to patient  With exception of leg swelling she denies any chest pain or shortness of breath  Review of Systems:  Review of Systems   Constitutional: Negative for chills, diaphoresis and fever     HENT: Negative for dental problem and facial swelling  Eyes: Negative for photophobia, pain and visual disturbance  Respiratory: Negative for shortness of breath, wheezing and stridor  Cardiovascular: Negative for chest pain and palpitations  Gastrointestinal: Negative for abdominal distention, abdominal pain, diarrhea, nausea and vomiting  Genitourinary: Negative for dysuria, hematuria and urgency  Musculoskeletal: Positive for back pain  Negative for myalgias  Leg swelling and pain   Skin: Negative for rash  Neurological: Positive for weakness  Negative for dizziness, seizures, speech difficulty, numbness and headaches  Psychiatric/Behavioral: Negative for agitation and suicidal ideas  The patient is not nervous/anxious  All other systems reviewed and are negative  Past Medical and Surgical History:   Past Medical History:   Diagnosis Date    Anemia     Bipolar depression ([facility] )     bipolar II, suicide    History of DVT (deep vein thrombosis)     History of gastric bypass     Pulmonary embolism (HCC)     Seizures (HCC)      Past Surgical History:   Procedure Laterality Date    APPENDECTOMY      Open    CHOLECYSTECTOMY      Laparoscopic    GASTRIC BYPASS      was 205 date of surgery    IR DVT THROMBOLYSIS/THROMBECTOMY ILIAC/IVC WITH VENOGRAM  8/4/2020    IR IVC FILTER REMOVAL  6/8/2021    IR TPA LYSIS CHECK  8/5/2020    IVC FILTER INSERTION  2017    STOMACH SURGERY      for morbid obesity    TUBAL LIGATION Bilateral 2010     Meds/Allergies: Allergies: Allergies   Allergen Reactions    Morphine Seizures     Prior to Admission Medications   Prescriptions Last Dose Informant Patient Reported? Taking?    Multiple Vitamin (MULTI-VITAMIN DAILY PO)  Self Yes No   Sig: Multi Vitamin   DAILY   QUEtiapine (SEROquel) 100 mg tablet   Yes Yes   Sig: Take 100 mg by mouth 3 (three) times a day 100 mg t i d  and 400 mg at bedtime   QUEtiapine (SEROquel) 400 MG tablet   Yes Yes   Sig: Take 400 mg by mouth daily at bedtime   SUMAtriptan (IMITREX) 100 mg tablet  Self Yes No   Sig: Take 100 mg by mouth as needed for migraine    atoMOXetine (STRATTERA) 60 mg capsule  Self Yes No   Sig: TAKE 1 CAPSULE (60 MG) BY ORAL ROUTE ONCE DAILY IN THE MORNING   enoxaparin (LOVENOX) 100 mg/mL   Yes Yes   Sig: Inject 100 mg under the skin daily   ferrous sulfate 325 (65 Fe) mg tablet  Self Yes No   Sig: Take 325 mg by mouth daily with breakfast   folic acid (FOLVITE) 1 mg tablet  Self Yes No   Sig: Take 1 mg by mouth daily   furosemide (LASIX) 40 mg tablet   Yes Yes   Sig: Take 80 mg by mouth 2 (two) times a day   gabapentin (NEURONTIN) 400 mg capsule   Yes Yes   Sig: Take 400 mg by mouth 3 (three) times a day   levothyroxine 100 mcg tablet  Self No No   Sig: Take 1 tablet (100 mcg total) by mouth daily in the early morning   lithium carbonate (LITHOBID) 300 mg CR tablet  Self Yes No   Sig: Take 300 mg by mouth 2 (two) times a day    pantoprazole (PROTONIX) 40 mg tablet  Self No No   Sig: Take 1 tablet (40 mg total) by mouth 2 (two) times a day before meals   potassium chloride (K-DUR,KLOR-CON) 20 mEq tablet  Self No No   Sig: Take 1 tablet (20 mEq total) by mouth 2 (two) times a day   pyridoxine (VITAMIN B6) 100 mg tablet  Self Yes No   Sig: Take 100 mg by mouth daily   rOPINIRole (REQUIP) 1 mg tablet  Self Yes No   Sig: TAKE 1 TABLET (1 MG) BY ORAL ROUTE 1-3 HOURS BEFORE BEDTIME   thiamine 100 MG tablet  Self Yes No   Sig: Daily   topiramate (TOPAMAX) 100 mg tablet  Self Yes No   Sig: Take 150 mg by mouth 2 (two) times a day    venlafaxine (EFFEXOR-XR) 150 mg 24 hr capsule  Self Yes No   Sig: Take 150 mg by mouth daily       Facility-Administered Medications: None     Social History:     Social History     Socioeconomic History    Marital status: Single     Spouse name: Not on file    Number of children: Not on file    Years of education: Not on file    Highest education level: Not on file   Occupational History    Occupation:      Comment: [employer]   Tobacco Use    Smoking status: Never Smoker    Smokeless tobacco: Never Used    Tobacco comment: former quit in 1999 per All[employer]   Vaping Use    Vaping Use: Every day    Substances: THC   Substance and Sexual Activity    Alcohol use: Yes     Comment: ocassional    Drug use: Yes     Types: Marijuana    Sexual activity: Yes   Other Topics Concern    Not on file   Social History Narrative    Sleep 6 in 24 hours    Caffeine use; 2 cps coffee per day    Uses seatbelts             Social Determinants of Health     Financial Resource Strain:     Difficulty of Paying Living Expenses:    Food Insecurity:     Worried About Running Out of Food in the Last Year:     Ran Out of Food in the Last Year:    Transportation Needs:     Lack of Transportation (Medical):      Lack of Transportation (Non-Medical):    Physical Activity:     Days of Exercise per Week:     Minutes of Exercise per Session:    Stress:     Feeling of Stress :    Social Connections:     Frequency of Communication with Friends and Family:     Frequency of Social Gatherings with Friends and Family:     Attends Holiness Services:     Active Member of Clubs or Organizations:     Attends Club or Organization Meetings:     Marital Status:    Intimate Partner Violence:     Fear of Current or Ex-Partner:     Emotionally Abused:     Physically Abused:     Sexually Abused:      Patient Pre-hospital Living Situation:   Patient Pre-hospital Level of Mobility:   Patient Pre-hospital Diet Restrictions:     Family History:  Family History   Problem Relation Age of Onset    Other Mother         headache    Hypertension Mother     Depression Mother     Arthritis Father     Other Father         H, pylori infection    Other Sister         esophageal reflux    Migraines Sister     No Known Problems Paternal Grandfather     Diabetes Paternal Aunt         Mellitus    Breast cancer Paternal Aunt     Diabetes Paternal Uncle         Mellitus    Liver cancer Paternal Uncle     Asthma Daughter     No Known Problems Maternal Grandmother     No Known Problems Maternal Grandfather     No Known Problems Paternal Grandmother     No Known Problems Brother     No Known Problems Sister     No Known Problems Sister     Asthma Daughter     No Known Problems Maternal Aunt      Physical Exam:   Vitals:   Blood Pressure: 92/57 (06/18/21 1707)  Pulse: 88 (06/18/21 1707)  Temperature: 98 °F (36 7 °C) (06/18/21 1707)  Temp Source: Oral (06/18/21 1231)  Respirations: 16 (06/18/21 1646)  Weight - Scale: 103 kg (227 lb 4 7 oz) (06/18/21 1231)  SpO2: 96 % (06/18/21 1707)    Physical Exam  Vitals reviewed  Constitutional:       General: She is not in acute distress  Appearance: Normal appearance  HENT:      Head: Atraumatic  Mouth/Throat:      Mouth: Mucous membranes are moist       Pharynx: Oropharynx is clear  Eyes:      General: No scleral icterus  Extraocular Movements: Extraocular movements intact  Pupils: Pupils are equal, round, and reactive to light  Cardiovascular:      Rate and Rhythm: Normal rate and regular rhythm  Heart sounds: Normal heart sounds  Pulmonary:      Breath sounds: Decreased breath sounds present  No wheezing  Abdominal:      General: Bowel sounds are normal       Palpations: Abdomen is soft  Tenderness: There is no guarding or rebound  Musculoskeletal:         General: No swelling (2+ lower extremities bilaterally)  Cervical back: Neck supple  Skin:     General: Skin is warm  Neurological:      Mental Status: She is alert and oriented to person, place, and time  Mental status is at baseline  Psychiatric:         Mood and Affect: Mood normal        Lab Results: I have personally reviewed pertinent reports      Results from last 7 days   Lab Units 06/18/21  1318   WBC Thousand/uL 13 52*   HEMOGLOBIN g/dL 9 4* HEMATOCRIT % 29 2*   PLATELETS Thousands/uL 253   NEUTROS PCT % 74   LYMPHS PCT % 14   MONOS PCT % 10   EOS PCT % 0     Results from last 7 days   Lab Units 06/18/21  1318   SODIUM mmol/L 130*   POTASSIUM mmol/L 2 5*   CHLORIDE mmol/L 88*   CO2 mmol/L 29   ANION GAP mmol/L 13   BUN mg/dL 6   CREATININE mg/dL 2 22*   CALCIUM mg/dL 7 6*   ALBUMIN g/dL 1 7*   TOTAL BILIRUBIN mg/dL 2 73*   ALK PHOS U/L 417*   ALT U/L 50   AST U/L 144*   EGFR ml/min/1 73sq m 26   GLUCOSE RANDOM mg/dL 100         Results from last 7 days   Lab Units 06/18/21  1318   TROPONIN I ng/mL <0 02     Results from last 7 days   Lab Units 06/18/21  1450   LACTIC ACID mmol/L 3 7*         Results from last 7 days   Lab Units 06/18/21  1318   NT-PRO BNP pg/mL 1,096*            Imaging: I have personally reviewed pertinent films in PACS  CT abdomen pelvis wo contrast    Result Date: 6/18/2021  Impression: Complex pelvic cyst, located in the midline anterior pelvis both components extending into the right adnexa likely of right ovarian origin measuring 9 4 x 5 9 x 5 4 cm with a potential solid components may represent an ovarian malignancy  Follow-up with ultrasound characterization  The study was marked in [facility] for immediate notification  Workstation performed: [id number]     XR chest 2 views    Result Date: 6/18/2021  Impression: No acute cardiopulmonary disease  Workstation performed: [id number]     XR hip/pelv 2-3 vws left    Result Date: 6/18/2021  Impression: No acute osseous abnormality  Workstation performed: [id number]       EKG, Pathology, and Other Studies Reviewed on Admission:   EKG  Result Date: 06/18/21  Personally reviewed strips with impression of:  Normal sinus rhythm 91 bpm    Allscripts/ Epic Records Reviewed: Yes    ** Please Note: This note has been constructed using a voice recognition system   **

## 2021-06-18 NOTE — ASSESSMENT & PLAN NOTE
· Without sepsis  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  · Elevated lactic acid secondary to hepatic steatosis and inability to clear    · Defer further antibiotics for now

## 2021-06-18 NOTE — ASSESSMENT & PLAN NOTE
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

## 2021-06-18 NOTE — ASSESSMENT & PLAN NOTE
· Acute renal failure in the setting of recent administration furosemide upwards of 80 mg b i d  For lower extremity edema  · Lower extremity edema is likely secondary to PVD from her chronic thrombosis  IVC recently removed  · Hydrate with NSS+KCL and recheck in a m   Continue holding diuretics    Results from last 7 days   Lab Units 06/18/21  1318   BUN mg/dL 6   CREATININE mg/dL 2 22*   EGFR ml/min/1 73sq m 26

## 2021-06-18 NOTE — ASSESSMENT & PLAN NOTE
· Ovarian cyst/mass known to patient as she recently had an ultrasound to further characterize  · Will leave information for gynecology/oncology to evaluate as outpatient

## 2021-06-18 NOTE — ASSESSMENT & PLAN NOTE
· Secondary to excessive diuretic intake  Replete and recheck in a m      Results from last 7 days   Lab Units 06/18/21  1318   POTASSIUM mmol/L 2 5*

## 2021-06-18 NOTE — ED PROVIDER NOTES
History  Chief Complaint   Patient presents with    Abnormal Lab     Pt reports PCP called her telling her she had abnormal labs  Potassium 2 5, sodium 130  Pt reports dizziness, falls  No CP, SOB       45y  o female with PMH of anemia, DVT, bipolar, PE and seizures presents to the ER for evaluation  Patient states she had routine labs and was called by her pcp for hypokalemia of 2 5 and hyponatremia of 130  Patient reports headache, dizziness, dyspnea on exertion, falling due to dizziness, left hip pain from falling, vomiting 2x daily for 5 days, diffuse abdominal pain and bilateral lower leg swelling  Patient states most of these symptoms have been ongoing  She reports multiple medication changes such as her Lasix being increased to 40mg BID, Potassium taking 20mg once daily, Lovenox once daily and Lithium decreased to 300mg daily but she has been missing doses  Symptoms are constant  She denies fever, chills, URI symptoms, chest pain, diarrhea, urinary symptoms, weakness or paresthesias  Patient recently had her IVC filter removed on 6/8 and did not have it replaced  History provided by:  Patient   used: No        Prior to Admission Medications   Prescriptions Last Dose Informant Patient Reported? Taking?    Multiple Vitamin (MULTI-VITAMIN DAILY PO)  Self Yes No   Sig: Multi Vitamin   DAILY   QUEtiapine (SEROquel) 100 mg tablet   Yes Yes   Sig: Take 100 mg by mouth 3 (three) times a day 100 mg t i d  and 400 mg at bedtime   QUEtiapine (SEROquel) 400 MG tablet   Yes Yes   Sig: Take 400 mg by mouth daily at bedtime   SUMAtriptan (IMITREX) 100 mg tablet  Self Yes No   Sig: Take 100 mg by mouth as needed for migraine    atoMOXetine (STRATTERA) 60 mg capsule  Self Yes No   Sig: TAKE 1 CAPSULE (60 MG) BY ORAL ROUTE ONCE DAILY IN THE MORNING   enoxaparin (LOVENOX) 100 mg/mL   Yes Yes   Sig: Inject 100 mg under the skin daily   ferrous sulfate 325 (65 Fe) mg tablet  Self Yes No   Sig: Take 325 mg by mouth daily with breakfast   folic acid (FOLVITE) 1 mg tablet  Self Yes No   Sig: Take 1 mg by mouth daily   furosemide (LASIX) 40 mg tablet   Yes Yes   Sig: Take 80 mg by mouth 2 (two) times a day   gabapentin (NEURONTIN) 400 mg capsule   Yes Yes   Sig: Take 400 mg by mouth 3 (three) times a day   levothyroxine 100 mcg tablet  Self No No   Sig: Take 1 tablet (100 mcg total) by mouth daily in the early morning   lithium carbonate (LITHOBID) 300 mg CR tablet  Self Yes No   Sig: Take 300 mg by mouth 2 (two) times a day    pantoprazole (PROTONIX) 40 mg tablet  Self No No   Sig: Take 1 tablet (40 mg total) by mouth 2 (two) times a day before meals   potassium chloride (K-DUR,KLOR-CON) 20 mEq tablet  Self No No   Sig: Take 1 tablet (20 mEq total) by mouth 2 (two) times a day   pyridoxine (VITAMIN B6) 100 mg tablet  Self Yes No   Sig: Take 100 mg by mouth daily   rOPINIRole (REQUIP) 1 mg tablet  Self Yes No   Sig: TAKE 1 TABLET (1 MG) BY ORAL ROUTE 1-3 HOURS BEFORE BEDTIME   thiamine 100 MG tablet  Self Yes No   Sig: Daily   topiramate (TOPAMAX) 100 mg tablet  Self Yes No   Sig: Take 150 mg by mouth 2 (two) times a day    venlafaxine (EFFEXOR-XR) 150 mg 24 hr capsule  Self Yes No   Sig: Take 150 mg by mouth daily       Facility-Administered Medications: None       Past Medical History:   Diagnosis Date    Anemia     Bipolar depression (HCC)     bipolar II, suicide    History of DVT (deep vein thrombosis)     History of gastric bypass     Pulmonary embolism (HCC)     Seizures (HCC)        Past Surgical History:   Procedure Laterality Date    APPENDECTOMY      Open    CHOLECYSTECTOMY      Laparoscopic    GASTRIC BYPASS      was 205 date of surgery    IR DVT THROMBOLYSIS/THROMBECTOMY ILIAC/IVC WITH VENOGRAM  8/4/2020    IR IVC FILTER REMOVAL  6/8/2021    IR TPA LYSIS CHECK  8/5/2020    IVC FILTER INSERTION  2017    STOMACH SURGERY      for morbid obesity    TUBAL LIGATION Bilateral 2010       Family History Problem Relation Age of Onset    Other Mother         headache    Hypertension Mother     Depression Mother     Arthritis Father     Other Father         H, pylori infection    Other Sister         esophageal reflux    Migraines Sister     No Known Problems Paternal Grandfather     Diabetes Paternal Aunt         Mellitus    Breast cancer Paternal Aunt     Diabetes Paternal Uncle         Mellitus    Liver cancer Paternal Uncle     Asthma Daughter     No Known Problems Maternal Grandmother     No Known Problems Maternal Grandfather     No Known Problems Paternal Grandmother     No Known Problems Brother     No Known Problems Sister     No Known Problems Sister     Asthma Daughter     No Known Problems Maternal Aunt      I have reviewed and agree with the history as documented  E-Cigarette/Vaping    E-Cigarette Use Current Every Day User      E-Cigarette/Vaping Substances    THC Yes      Social History     Tobacco Use    Smoking status: Never Smoker    Smokeless tobacco: Never Used    Tobacco comment: former quit in 1999 per [employer]   Vaping Use    Vaping Use: Every day    Substances: THC   Substance Use Topics    Alcohol use: Yes     Comment: ocassional    Drug use: Yes     Types: Marijuana       Review of Systems   Constitutional: Negative for activity change, appetite change, chills and fever  HENT: Negative for congestion, drooling, ear discharge, ear pain, facial swelling, rhinorrhea and sore throat  Eyes: Negative for redness  Respiratory: Positive for shortness of breath (on exertion)  Negative for cough  Cardiovascular: Positive for leg swelling  Negative for chest pain  Gastrointestinal: Positive for abdominal pain, nausea and vomiting  Negative for diarrhea  Genitourinary: Negative for dysuria, frequency, hematuria and urgency  Musculoskeletal: Negative for neck stiffness  Skin: Negative for rash  Allergic/Immunologic: Negative for food allergies  Neurological: Positive for dizziness and headaches  Negative for weakness and numbness  Physical Exam  Physical Exam  Vitals and nursing note reviewed  Constitutional:       General: She is not in acute distress  Appearance: She is not toxic-appearing  HENT:      Head: Normocephalic and atraumatic  Right Ear: Tympanic membrane, ear canal and external ear normal  No drainage, swelling or tenderness  No foreign body  No hemotympanum  Tympanic membrane is not erythematous  Left Ear: Tympanic membrane, ear canal and external ear normal  No drainage, swelling or tenderness  No foreign body  No hemotympanum  Tympanic membrane is not erythematous  Nose: Nose normal       Mouth/Throat:      Pharynx: Uvula midline  No posterior oropharyngeal erythema or uvula swelling  Tonsils: No tonsillar exudate or tonsillar abscesses  Eyes:      Extraocular Movements: Extraocular movements intact  Conjunctiva/sclera: Conjunctivae normal       Pupils: Pupils are equal, round, and reactive to light  Neck:      Trachea: Phonation normal  No tracheal deviation  Cardiovascular:      Rate and Rhythm: Normal rate and regular rhythm  Heart sounds: Normal heart sounds, S1 normal and S2 normal  No murmur heard  No friction rub  No gallop  Pulmonary:      Effort: Pulmonary effort is normal  No respiratory distress  Breath sounds: Normal breath sounds  No decreased breath sounds, wheezing, rhonchi or rales  Chest:      Chest wall: No tenderness  Abdominal:      General: Bowel sounds are normal  There is no distension  Palpations: Abdomen is soft  Tenderness: There is abdominal tenderness in the left upper quadrant and left lower quadrant  There is no guarding or rebound  Musculoskeletal:         General: No deformity  Normal range of motion  Cervical back: Normal, normal range of motion and neck supple        Thoracic back: Normal       Lumbar back: Normal       Left hip: Tenderness and bony tenderness present  No deformity, lacerations or crepitus  Normal range of motion  Normal strength  Right lower leg: Edema present  Left lower leg: Edema present  Legs:    Skin:     General: Skin is warm and dry  Findings: No rash  Neurological:      Mental Status: She is alert  GCS: GCS eye subscore is 4  GCS verbal subscore is 5  GCS motor subscore is 6  Cranial Nerves: No cranial nerve deficit  Sensory: No sensory deficit  Motor: No abnormal muscle tone        Gait: Gait normal    Psychiatric:         Mood and Affect: Mood normal          Vital Signs  ED Triage Vitals [06/18/21 1231]   Temperature Pulse Respirations Blood Pressure SpO2   98 2 °F (36 8 °C) 101 18 109/62 100 %      Temp Source Heart Rate Source Patient Position - Orthostatic VS BP Location FiO2 (%)   Oral Monitor Sitting Right arm --      Pain Score       8           Vitals:    06/18/21 1646 06/18/21 1707 06/18/21 1948 06/19/21 0710   BP: 100/57 92/57 115/80 113/81   Pulse: 88 88 90 82   Patient Position - Orthostatic VS: Sitting            Visual Acuity      ED Medications  Medications   potassium chloride 20 mEq IVPB (premix) ( Intravenous Canceled Entry 6/18/21 1755)   enoxaparin (LOVENOX) subcutaneous injection 100 mg (has no administration in time range)   ferrous sulfate tablet 325 mg (has no administration in time range)   folic acid (FOLVITE) tablet 1 mg (has no administration in time range)   gabapentin (NEURONTIN) capsule 400 mg (400 mg Oral Given 6/18/21 2001)   levothyroxine tablet 100 mcg (100 mcg Oral Given 6/19/21 0600)   lithium carbonate (LITHOBID) CR tablet 300 mg (300 mg Oral Given 6/18/21 2004)   pantoprazole (PROTONIX) EC tablet 40 mg (40 mg Oral Given 6/19/21 0600)   pyridoxine (VITAMIN B6) tablet 100 mg (has no administration in time range)   QUEtiapine (SEROquel) tablet 100 mg (100 mg Oral Given 6/19/21 0600)   QUEtiapine (SEROquel) tablet 400 mg (400 mg Oral Given 6/18/21 2232)   rOPINIRole (REQUIP) tablet 1 mg (1 mg Oral Given 6/18/21 2232)   topiramate (TOPAMAX) tablet 150 mg (150 mg Oral Given 6/18/21 2003)   venlafaxine (EFFEXOR-XR) 24 hr capsule 150 mg (has no administration in time range)   thiamine tablet 100 mg (has no administration in time range)   sodium chloride 0 9 % with KCl 40 mEq/L infusion (premix) (100 mL/hr Intravenous New Bag 6/19/21 0559)   acetaminophen (TYLENOL) tablet 650 mg (650 mg Oral Given 6/18/21 2002)   ondansetron (ZOFRAN) injection 4 mg (has no administration in time range)   sodium chloride 0 9 % bolus 1,000 mL (0 mL Intravenous Stopped 6/18/21 1500)   acetaminophen (TYLENOL) tablet 650 mg (650 mg Oral Given 6/18/21 1325)   cefepime (MAXIPIME) 2 g/50 mL dextrose IVPB (0 mg Intravenous Stopped 6/18/21 1644)   potassium chloride (K-DUR,KLOR-CON) CR tablet 40 mEq (40 mEq Oral Given 6/18/21 1645)   potassium chloride (K-DUR,KLOR-CON) CR tablet 40 mEq (40 mEq Oral Given 6/18/21 2001)   potassium chloride 20 mEq IVPB (premix) (20 mEq Intravenous New Bag 6/18/21 2002)       Diagnostic Studies  Results Reviewed     Procedure Component Value Units Date/Time    Procalcitonin with AM Reflex [[id number]]  (Abnormal) Collected: 06/18/21 1753    Lab Status: Final result Specimen: Blood from Arm, Right Updated: 06/19/21 0052     Procalcitonin 1 69 ng/ml     Blood culture #1 [[id number]] Collected: 06/18/21 1449    Lab Status: Preliminary result Specimen: Blood from Arm, Right Updated: 06/18/21 2301     Blood Culture Received in Microbiology Lab  Culture in Progress  Blood culture #2 [[id number]] Collected: 06/18/21 1523    Lab Status: Preliminary result Specimen: Blood from Arm, Right Updated: 06/18/21 2301     Blood Culture Received in Microbiology Lab  Culture in Progress      UA w Reflex to Microscopic w Reflex to Culture [[id number]]  (Abnormal) Collected: 06/18/21 2013    Lab Status: Final result Specimen: Urine, Clean Catch Updated: 06/18/21 2028     Color, UA Giselle     Clarity, UA Turbid     Specific Gravity, UA 1 025     pH, UA 5 0     Leukocytes, UA Moderate     Nitrite, UA Positive     Protein,  (2+) mg/dl      Glucose,  (1/10%) mg/dl      Ketones, UA Trace mg/dl      Urobilinogen, UA 2 0 E U /dl      Bilirubin, UA Interference- unable to analyze     Blood, UA Moderate    Novel Coronavirus (Covid-19),PCR [facility] [[id number]]  (Normal) Collected: 06/18/21 1523    Lab Status: Final result Specimen: Nares from Nasopharyngeal Swab Updated: 06/18/21 1629     SARS-CoV-2 Negative    Narrative: The specimen collection materials, transport medium, and/or testing methodology utilized in the production of these test results have been proven to be reliable in a limited validation with an abbreviated program under the Emergency Utilization Authorization provided by the FDA  Testing reported as "Presumptive positive" will be confirmed with secondary testing to ensure result accuracy  Clinical caution and judgement should be used with the interpretation of these results with consideration of the clinical impression and other laboratory testing  Testing reported as "Positive" or "Negative" has been proven to be accurate according to standard laboratory validation requirements  All testing is performed with control materials showing appropriate reactivity at standard intervals  Osmolality, urine [[id number]]     Lab Status: No result Specimen: Urine     Sodium, urine, random [[id number]]     Lab Status: No result Specimen: Urine     Lactic acid [[id number]]  (Abnormal) Collected: 06/18/21 1450    Lab Status: Final result Specimen: Blood from Arm, Right Updated: 06/18/21 1527     LACTIC ACID 3 7 mmol/L     Narrative:      Result may be elevated if tourniquet was used during collection      Comprehensive metabolic panel [[id number]]  (Abnormal) Collected: 06/18/21 1318    Lab Status: Final result Specimen: Blood from Arm, Left Updated: 06/18/21 1415 Sodium 130 mmol/L      Potassium 2 5 mmol/L      Chloride 88 mmol/L      CO2 29 mmol/L      ANION GAP 13 mmol/L      BUN 6 mg/dL      Creatinine 2 22 mg/dL      Glucose 100 mg/dL      Calcium 7 6 mg/dL      Corrected Calcium 9 4 mg/dL       U/L      ALT 50 U/L      Alkaline Phosphatase 417 U/L      Total Protein 7 0 g/dL      Albumin 1 7 g/dL      Total Bilirubin 2 73 mg/dL      eGFR 26 ml/min/1 73sq m     Narrative:      [place] guidelines for Chronic Kidney Disease (CKD):     Stage 1 with normal or high GFR (GFR > 90 mL/min/1 73 square meters)    Stage 2 Mild CKD (GFR = 60-89 mL/min/1 73 square meters)    Stage 3A Moderate CKD (GFR = 45-59 mL/min/1 73 square meters)    Stage 3B Moderate CKD (GFR = 30-44 mL/min/1 73 square meters)    Stage 4 Severe CKD (GFR = 15-29 mL/min/1 73 square meters)    Stage 5 End Stage CKD (GFR <15 mL/min/1 73 square meters)  Note: GFR calculation is accurate only with a steady state creatinine    Lipase [[id number]]  (Abnormal) Collected: 06/18/21 1318    Lab Status: Final result Specimen: Blood from Arm, Left Updated: 06/18/21 1411     Lipase 20 u/L     NT-BNP PRO [[id number]]  (Abnormal) Collected: 06/18/21 1318    Lab Status: Final result Specimen: Blood from Arm, Left Updated: 06/18/21 1411     NT-proBNP 1,096 pg/mL     Magnesium [[id number]]  (Abnormal) Collected: 06/18/21 1318    Lab Status: Final result Specimen: Blood from Arm, Left Updated: 06/18/21 1411     Magnesium 1 3 mg/dL     Phosphorus [[id number]]  (Abnormal) Collected: 06/18/21 1318    Lab Status: Final result Specimen: Blood from Arm, Left Updated: 06/18/21 1411     Phosphorus 2 3 mg/dL     Troponin I [[id number]]  (Normal) Collected: 06/18/21 1318    Lab Status: Final result Specimen: Blood from Arm, Left Updated: 06/18/21 1353     Troponin I <0 02 ng/mL     CBC and differential [[id number]]  (Abnormal) Collected: 06/18/21 1318    Lab Status: Final result Specimen: Blood from Arm, Left Updated: 06/18/21 1336     WBC 13 52 Thousand/uL      RBC 3 25 Million/uL      Hemoglobin 9 4 g/dL      Hematocrit 29 2 %      MCV 90 fL      MCH 28 9 pg      MCHC 32 2 g/dL      RDW 23 1 %      MPV 12 6 fL      Platelets 877 Thousands/uL      nRBC 0 /100 WBCs      Neutrophils Relative 74 %      Immat GRANS % 1 %      Lymphocytes Relative 14 %      Monocytes Relative 10 %      Eosinophils Relative 0 %      Basophils Relative 1 %      Neutrophils Absolute 10 08 Thousands/µL      Immature Grans Absolute 0 12 Thousand/uL      Lymphocytes Absolute 1 93 Thousands/µL      Monocytes Absolute 1 28 Thousand/µL      Eosinophils Absolute 0 03 Thousand/µL      Basophils Absolute 0 08 Thousands/µL                  CT abdomen pelvis wo contrast   Final Result by Arabella Villanueva MD (06/18 1537)      Complex pelvic cyst, located in the midline anterior pelvis both components extending into the right adnexa likely of right ovarian origin measuring 9 4 x 5 9 x 5 4 cm with a potential solid components may represent an ovarian malignancy  Follow-up with ultrasound characterization  The study was marked in [facility] for immediate notification  Workstation performed: [id number]         XR chest 2 views   ED Interpretation by Tl Olson PA-C (06/18 1541)   No acute findings seen by me at this time  Final Result by Khanh Cordero MD (06/18 1649)      No acute cardiopulmonary disease  Workstation performed: [id number]         XR hip/pelv 2-3 vws left   ED Interpretation by Tl Olson PA-C (06/18 1412)   No acute abnormalities seen by me at this time  Final Result by Khanh Cordero MD (06/18 1634)      No acute osseous abnormality              Workstation performed: [id number]         VAS lower limb venous duplex study, complete bilateral    (Results Pending)              Procedures  ECG 12 Lead Documentation Only    Date/Time: 6/18/2021 1:16 PM  Performed by: Tl Olson FEDREICO  Authorized by: Greta Brown PA-C     Indications / Diagnosis:  Dyspnea  ECG reviewed by me, the ED Provider: yes (Also reviewed by Dr Marissa Ontiveros)    Patient location:  ED  Previous ECG:     Previous ECG:  Compared to current  Interpretation:     Interpretation: abnormal    Rate:     ECG rate:  91    ECG rate assessment: normal    Rhythm:     Rhythm: sinus rhythm    Ectopy:     Ectopy: none    QRS:     QRS intervals:  Normal  Conduction:     Conduction: normal    ST segments:     ST segments:  Normal  T waves:     T waves: inverted      Inverted:  III, aVR, V1 and V3             ED Course  ED Course as of Jun 19 0728 Fri Jun 18, 2021   1344 HR may be elevated due to patient ambulating  Patient's HR consistently in the 80s once resting comfortably on stretcher  Pulse: 101   1345 Patient denying any fevers or infectious symptoms currently  WBC(!): 13 52   1415 Magnesium(!): 1 3   1415 Phosphorus(!): 2 3   1415 NT-proBNP(!): 1,096   1415 Troponin I: <0 02   1417 Giving fluids  Sodium(!): 130   1417 Will replete  Potassium(!!): 2 5   1417 Up from 0 59 yesterday  Receiving fluids  Creatinine(!): 2 22   1451 Patient negative for DVT bilaterally  VAS lower limb venous duplex study, complete bilateral   1521 Spoke with Dr Damian Lomeli from [address]   Patient was initially tachycardic during triage at 101 but once she was in room sitting on stretcher, she remained in the 80s so I do not believe this was true tachycardia from sepsis  Patient meeting one SIRS criteria (WBC of 13) and if infection, source is currently unknown  Will give a dose of Cefepime incase of infection currently  Awaiting other labs and imaging  80 Down from 4 8 during last admission in May  Receiving fluids       LACTIC ACID(!!): 3 7   1541 CT abdomen pelvis wo contrast   1544 Baseline normally 2 5   TOTAL BILIRUBIN(!): 2 73                         Initial Sepsis Screening     Row Name 06/18/21 1425                Is the patient's history suggestive of a new or worsening infection? No  -AR        Suspected source of infection  --        Are two or more of the following signs & symptoms of infection both present and new to the patient?  --        Indicate SIRS criteria  Leukocytosis (WBC > 48600 IJL); Tachycardia > 90 bpm  -AR        If the answer is yes to both questions, suspicion of sepsis is present  --        If severe sepsis is present AND tissue hypoperfusion perists in the hour after fluid resuscitation or lactate > 4, the patient meets criteria for SEPTIC SHOCK  --        Are any of the following organ dysfunction criteria present within 6 hours of suspected infection and SIRS criteria that are NOT considered to be chronic conditions? --        Organ dysfunction  --        Date of presentation of severe sepsis  --        Time of presentation of severe sepsis  --        Tissue hypoperfusion persists in the hour after crystalloid fluid administration, evidenced, by either:  --        Was hypotension present within one hour of the conclusion of crystalloid fluid administration?  --        Date of presentation of septic shock  --        Time of presentation of septic shock  --          User Key  (r) = Recorded By, (t) = Taken By, (c) = Cosigned By    [facility] [address]  Name Provider Type    Sommer 44, PA-C Physician Assistant          SBIRT 20yo+      Most Recent Value   SBIRT (25 yo +)   In order to provide better care to our patients, we are screening all of our patients for alcohol and drug use  Would it be okay to ask you these screening questions? Yes Filed at: 06/18/2021 1525   Initial Alcohol Screen: US AUDIT-C    1  How often do you have a drink containing alcohol? 3 Filed at: 06/18/2021 1525   2  How many drinks containing alcohol do you have on a typical day you are drinking? 2 Filed at: 06/18/2021 1525   3b  FEMALE Any Age, or MALE 65+: How often do you have 4 or more drinks on one occassion?   1 Filed at: 06/18/2021 5316 Audit-C Score  6 Filed at: 06/18/2021 1525   JOSIAH: How many times in the past year have you    Used an illegal drug or used a prescription medication for non-medical reasons? Never Filed at: 06/18/2021 1525                    MDM  Number of Diagnoses or Management Options  MADHU (acute kidney injury) [facility]): new and requires workup  Electrolyte imbalance: new and requires workup  Leg swelling: new and requires workup  Leukocytosis: new and requires workup  Ovarian cyst: new and requires workup  SIRS (systemic inflammatory response syndrome) ([facility] [address] ): new and requires workup  Transaminitis: new and requires workup  Diagnosis management comments: DDX consists of but not limited to: electrolyte abnormality, cardiac causes, DVT, abdominal pathology    Will check labs and imaging  1344 HR may be elevated due to patient ambulating  Patient's HR consistently in the 80s once resting comfortably on stretcher  - Pulse: 101    1345 Patient denying any fevers or infectious symptoms currently  - WBC(!): 13 52    1415 Magnesium(!): 1 3    1415 Phosphorus(!): 2 3    1415 NT-proBNP(!): 1,096    1415 Troponin I: <0 02    1417 Giving fluids  - Sodium(!): 130    1417 Will replete  - Potassium(!!): 2 5    1417 Up from 0 59 yesterday  Receiving fluids  - Creatinine(!): 2 22    1451 Patient negative for DVT bilaterally  - VAS lower limb venous duplex study, complete bilateral    1521 Spoke with Dr Jimbo Reeves from [facility]  Patient was initially tachycardic during triage at 101 but once she was in room sitting on stretcher, she remained in the 80s so I do not believe this was true tachycardia from sepsis  Patient meeting one SIRS criteria (WBC of 13) and if infection, source is currently unknown  Will give a dose of Cefepime incase of infection currently  Awaiting other labs and imaging  Patient accepted for inpatient by Dr Jimbo Reeves once imaging returns  80 Down from 4 8 during last admission in May   Receiving fluids  - LACTIC ACID(!!): 3 7    1541 CT abdomen pelvis wo contrast    1544 Baseline normally 2 5 - TOTAL BILIRUBIN(!): 2 73             Amount and/or Complexity of Data Reviewed  Clinical lab tests: ordered and reviewed  Tests in the radiology section of CPT®: ordered and reviewed  Decide to obtain previous medical records or to obtain history from someone other than the patient: yes  Discuss the patient with other providers: yes    Patient Progress  Patient progress: stable      Disposition  Final diagnoses:   SIRS (systemic inflammatory response syndrome) ([facility])   Electrolyte imbalance - hyponatremia, hypokalemia, hypomagnesium, hypophosphate   MADHU (acute kidney injury) ([facility] )   Transaminitis   Leukocytosis   Leg swelling   Ovarian cyst     Time reflects when diagnosis was documented in both MDM as applicable and the Disposition within this note     Time User Action Codes Description Comment    6/18/2021  3:51 PM Devin Bushwood A Add [R65 10] SIRS (systemic inflammatory response syndrome) ([facility] )     6/18/2021  3:51 PM Devin Bushwood A Add [E87 1] Hyponatremia     6/18/2021  3:51 PM Honorio Quevedo Dr [E87 1] Hyponatremia     6/18/2021  3:51 PM Devin Bushwood A Add [E87 8] Electrolyte imbalance     6/18/2021  3:51 PM Devin Bushwood A Modify [E87 8] Electrolyte imbalance hyponatremia, hypokalemia, hypomagnesium, hypophosphate    6/18/2021  3:52 PM Devin Bushwood A Add [N17 9] MADHU (acute kidney injury) ([facility] 75 )     6/18/2021  3:52 PM Devin Bushwood A Add [R74 01] [address]     6/18/2021  3:52 PM Honorio Quevedo Dr [R74 01] Transaminasemia     6/18/2021  3:52 PM Devin Bushwood A Add [R74 01] Transaminitis     6/18/2021  3:52 PM Devin Bushwood A Add [D72 829] Leukocytosis     6/18/2021  3:52 PM Dylan Prater Add [M79 89] Leg swelling     6/18/2021  3:52 PM Dylan Craft Add [S90 471] Ovarian cyst       ED Disposition     ED Disposition Condition Date/Time Comment    Admit Stable Fri Jun 18, 2021  3:49 PM Case was discussed with Dr Silvia Rutledge from [facility] and the patient's admission status was agreed to be Admission Status: inpatient status to the service of Dr Silvia Rutledge   Follow-up Information     Follow up With Specialties Details Why Contact Info Additional [address] Gynecologic Oncology Schedule an appointment as soon as possible for a visit in 1 week(s)  [place] [address]  [phone] [place] [address], [address] [place] [address], [facility]          Current Discharge Medication List      CONTINUE these medications which have NOT CHANGED    Details   enoxaparin (LOVENOX) 100 mg/mL Inject 100 mg under the skin daily      furosemide (LASIX) 40 mg tablet Take 80 mg by mouth 2 (two) times a day      gabapentin (NEURONTIN) 400 mg capsule Take 400 mg by mouth 3 (three) times a day      !! QUEtiapine (SEROquel) 100 mg tablet Take 100 mg by mouth 3 (three) times a day 100 mg t i d  and 400 mg at bedtime      !!  QUEtiapine (SEROquel) 400 MG tablet Take 400 mg by mouth daily at bedtime      atoMOXetine (STRATTERA) 60 mg capsule TAKE 1 CAPSULE (60 MG) BY ORAL ROUTE ONCE DAILY IN THE MORNING      ferrous sulfate 325 (65 Fe) mg tablet Take 325 mg by mouth daily with breakfast      folic acid (FOLVITE) 1 mg tablet Take 1 mg by mouth daily      levothyroxine 100 mcg tablet Take 1 tablet (100 mcg total) by mouth daily in the early morning  Qty: 30 tablet, Refills: 0    Associated Diagnoses: Acquired hypothyroidism      lithium carbonate (LITHOBID) 300 mg CR tablet Take 300 mg by mouth 2 (two) times a day       Multiple Vitamin (MULTI-VITAMIN DAILY PO) Multi Vitamin   DAILY      pantoprazole (PROTONIX) 40 mg tablet Take 1 tablet (40 mg total) by mouth 2 (two) times a day before meals  Qty: 60 tablet, Refills: 2    Associated Diagnoses: Acid reflux      potassium chloride (K-DUR,KLOR-CON) 20 mEq tablet Take 1 tablet (20 mEq total) by mouth 2 (two) times a day  Qty: 30 tablet, Refills: 1    Associated Diagnoses: Hypokalemia      pyridoxine (VITAMIN B6) 100 mg tablet Take 100 mg by mouth daily      rOPINIRole (REQUIP) 1 mg tablet TAKE 1 TABLET (1 MG) BY ORAL ROUTE 1-3 HOURS BEFORE BEDTIME      SUMAtriptan (IMITREX) 100 mg tablet Take 100 mg by mouth as needed for migraine       thiamine 100 MG tablet Daily      topiramate (TOPAMAX) 100 mg tablet Take 150 mg by mouth 2 (two) times a day       venlafaxine (EFFEXOR-XR) 150 mg 24 hr capsule Take 150 mg by mouth daily        ! ! - Potential duplicate medications found  Please discuss with provider  No discharge procedures on file      PDMP Review       Value Time User    PDMP Reviewed  Yes 6/18/2021  5:08 PM Kandy Manley DO          ED Provider  Electronically Signed by           Torrie Sanabria PA-C  06/19/21 0155

## 2021-06-18 NOTE — ED NOTES
Pt going to US with Reyna and then to admission bed as assigned  Vania Hsieh RN  06/18/21 5696  Pt was not transported to [address] as she reports to have had the same test completed recently at [address]  Dr Jimbo Reeves made aware of same via [place] Text by [facility]       Vania Hsieh RN  06/18/21 5674

## 2021-06-19 LAB
ALBUMIN SERPL BCP-MCNC: 1.6 G/DL (ref 3.5–5)
ALP SERPL-CCNC: 387 U/L (ref 46–116)
ALT SERPL W P-5'-P-CCNC: 45 U/L (ref 12–78)
ANION GAP SERPL CALCULATED.3IONS-SCNC: 11 MMOL/L (ref 4–13)
AST SERPL W P-5'-P-CCNC: 132 U/L (ref 5–45)
BACTERIA UR CULT: NORMAL
BILIRUB SERPL-MCNC: 2.88 MG/DL (ref 0.2–1)
BUN SERPL-MCNC: 5 MG/DL (ref 5–25)
CALCIUM ALBUM COR SERPL-MCNC: 9.3 MG/DL (ref 8.3–10.1)
CALCIUM SERPL-MCNC: 7.4 MG/DL (ref 8.3–10.1)
CHLORIDE SERPL-SCNC: 97 MMOL/L (ref 100–108)
CO2 SERPL-SCNC: 27 MMOL/L (ref 21–32)
CREAT SERPL-MCNC: 1.55 MG/DL (ref 0.6–1.3)
ERYTHROCYTE [DISTWIDTH] IN BLOOD BY AUTOMATED COUNT: 23.9 % (ref 11.6–15.1)
GFR SERPL CREATININE-BSD FRML MDRD: 40 ML/MIN/1.73SQ M
GLUCOSE SERPL-MCNC: 79 MG/DL (ref 65–140)
HCT VFR BLD AUTO: 27.9 % (ref 34.8–46.1)
HGB BLD-MCNC: 8.9 G/DL (ref 11.5–15.4)
MCH RBC QN AUTO: 28.7 PG (ref 26.8–34.3)
MCHC RBC AUTO-ENTMCNC: 31.9 G/DL (ref 31.4–37.4)
MCV RBC AUTO: 90 FL (ref 82–98)
PLATELET # BLD AUTO: 233 THOUSANDS/UL (ref 149–390)
PMV BLD AUTO: 12.9 FL (ref 8.9–12.7)
POTASSIUM SERPL-SCNC: 3.6 MMOL/L (ref 3.5–5.3)
PROCALCITONIN SERPL-MCNC: 1.35 NG/ML
PROCALCITONIN SERPL-MCNC: 1.69 NG/ML
PROT SERPL-MCNC: 6.3 G/DL (ref 6.4–8.2)
RBC # BLD AUTO: 3.1 MILLION/UL (ref 3.81–5.12)
SODIUM SERPL-SCNC: 135 MMOL/L (ref 136–145)
WBC # BLD AUTO: 10.26 THOUSAND/UL (ref 4.31–10.16)

## 2021-06-19 PROCEDURE — 80053 COMPREHEN METABOLIC PANEL: CPT | Performed by: INTERNAL MEDICINE

## 2021-06-19 PROCEDURE — 85027 COMPLETE CBC AUTOMATED: CPT | Performed by: INTERNAL MEDICINE

## 2021-06-19 PROCEDURE — 84145 PROCALCITONIN (PCT): CPT | Performed by: INTERNAL MEDICINE

## 2021-06-19 PROCEDURE — 99232 SBSQ HOSP IP/OBS MODERATE 35: CPT | Performed by: HOSPITALIST

## 2021-06-19 RX ORDER — QUETIAPINE FUMARATE 200 MG/1
400 TABLET, FILM COATED ORAL ONCE
Status: DISCONTINUED | OUTPATIENT
Start: 2021-06-19 | End: 2021-06-21 | Stop reason: SDUPTHER

## 2021-06-19 RX ORDER — POTASSIUM CHLORIDE 20 MEQ/1
40 TABLET, EXTENDED RELEASE ORAL ONCE
Status: COMPLETED | OUTPATIENT
Start: 2021-06-19 | End: 2021-06-19

## 2021-06-19 RX ORDER — SODIUM CHLORIDE 9 MG/ML
75 INJECTION, SOLUTION INTRAVENOUS CONTINUOUS
Status: DISCONTINUED | OUTPATIENT
Start: 2021-06-19 | End: 2021-06-21

## 2021-06-19 RX ADMIN — SODIUM CHLORIDE 75 ML/HR: 0.9 INJECTION, SOLUTION INTRAVENOUS at 12:06

## 2021-06-19 RX ADMIN — GABAPENTIN 400 MG: 400 CAPSULE ORAL at 08:27

## 2021-06-19 RX ADMIN — LEVOTHYROXINE SODIUM 100 MCG: 100 TABLET ORAL at 06:00

## 2021-06-19 RX ADMIN — QUETIAPINE 100 MG: 100 TABLET, FILM COATED ORAL at 06:00

## 2021-06-19 RX ADMIN — LITHIUM CARBONATE 300 MG: 300 TABLET, FILM COATED, EXTENDED RELEASE ORAL at 21:20

## 2021-06-19 RX ADMIN — TOPIRAMATE 150 MG: 100 TABLET ORAL at 08:26

## 2021-06-19 RX ADMIN — POTASSIUM CHLORIDE AND SODIUM CHLORIDE 100 ML/HR: 900; 300 INJECTION, SOLUTION INTRAVENOUS at 05:59

## 2021-06-19 RX ADMIN — QUETIAPINE 100 MG: 100 TABLET, FILM COATED ORAL at 17:00

## 2021-06-19 RX ADMIN — PYRIDOXINE HCL TAB 50 MG 100 MG: 50 TAB at 08:27

## 2021-06-19 RX ADMIN — ONDANSETRON 4 MG: 2 INJECTION INTRAMUSCULAR; INTRAVENOUS at 16:12

## 2021-06-19 RX ADMIN — TOPIRAMATE 150 MG: 100 TABLET ORAL at 17:23

## 2021-06-19 RX ADMIN — GABAPENTIN 400 MG: 400 CAPSULE ORAL at 21:19

## 2021-06-19 RX ADMIN — PANTOPRAZOLE SODIUM 40 MG: 40 TABLET, DELAYED RELEASE ORAL at 17:00

## 2021-06-19 RX ADMIN — POTASSIUM CHLORIDE 40 MEQ: 1500 TABLET, EXTENDED RELEASE ORAL at 12:05

## 2021-06-19 RX ADMIN — THIAMINE HCL TAB 100 MG 100 MG: 100 TAB at 08:27

## 2021-06-19 RX ADMIN — GABAPENTIN 400 MG: 400 CAPSULE ORAL at 17:00

## 2021-06-19 RX ADMIN — ENOXAPARIN SODIUM 100 MG: 100 INJECTION SUBCUTANEOUS at 08:27

## 2021-06-19 RX ADMIN — QUETIAPINE 100 MG: 100 TABLET, FILM COATED ORAL at 12:04

## 2021-06-19 RX ADMIN — PANTOPRAZOLE SODIUM 40 MG: 40 TABLET, DELAYED RELEASE ORAL at 06:00

## 2021-06-19 RX ADMIN — VENLAFAXINE HYDROCHLORIDE 150 MG: 150 CAPSULE, EXTENDED RELEASE ORAL at 08:26

## 2021-06-19 RX ADMIN — ROPINIROLE 1 MG: 1 TABLET, FILM COATED ORAL at 21:19

## 2021-06-19 RX ADMIN — FERROUS SULFATE TAB 325 MG (65 MG ELEMENTAL FE) 325 MG: 325 (65 FE) TAB at 12:05

## 2021-06-19 RX ADMIN — LITHIUM CARBONATE 300 MG: 300 TABLET, FILM COATED, EXTENDED RELEASE ORAL at 08:26

## 2021-06-19 RX ADMIN — FOLIC ACID 1 MG: 1 TABLET ORAL at 08:27

## 2021-06-19 NOTE — ASSESSMENT & PLAN NOTE
· Secondary to excessive diuretic intake  Replete and recheck in a m      Results from last 7 days   Lab Units 06/19/21  0449 06/18/21  1318   POTASSIUM mmol/L 3 6 2 5*

## 2021-06-19 NOTE — ASSESSMENT & PLAN NOTE
· Acute renal failure in the setting of recent administration furosemide upwards of 80 mg b i d  For lower extremity edema  · Lower extremity edema is likely secondary to PVD from her chronic thrombosis  IVC recently removed  · Improving with IVF  Continue holding diuretics - would restart on much lower dose       Results from last 7 days   Lab Units 06/19/21  0449 06/18/21  1318   BUN mg/dL 5 6   CREATININE mg/dL 1 55* 2 22*   EGFR ml/min/1 73sq m 40 26

## 2021-06-19 NOTE — ASSESSMENT & PLAN NOTE
· Transaminitis in the setting of hepatic steatosis    · Trending down    Results from last 7 days   Lab Units 06/19/21  0449 06/18/21  1318   AST U/L 132* 144*   ALT U/L 45 50   TOTAL BILIRUBIN mg/dL 2 88* 2 73*     Results from last 7 days   Lab Units 06/18/21  1450   LACTIC ACID mmol/L 3 7*

## 2021-06-19 NOTE — PROGRESS NOTES
[facility]  Progress Note - [address] [facility] 1975, 50 y o  female MRN: [id number]  Unit/Bed#: [place] 68 2 -01 Encounter: [id number]  Primary Care Provider: Tracee Hill PA-C   Date and time admitted to hospital: 6/18/2021 12:26 PM    * ARF (acute renal failure) ([facility] )  Assessment & Plan  · Acute renal failure in the setting of recent administration furosemide upwards of 80 mg b i d  For lower extremity edema  · Lower extremity edema is likely secondary to PVD from her chronic thrombosis  IVC recently removed  · Improving with IVF  Continue holding diuretics - would restart on much lower dose  Results from last 7 days   Lab Units 06/19/21  0449 06/18/21  1318   BUN mg/dL 5 6   CREATININE mg/dL 1 55* 2 22*   EGFR ml/min/1 73sq m 40 26       SIRS (systemic inflammatory response syndrome) (HCC)  Assessment & Plan  · Without sepsis  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  · Elevated lactic acid secondary to hepatic steatosis and inability to clear  · Defer further antibiotics for now    Hypokalemia  Assessment & Plan  · Secondary to excessive diuretic intake  Replete and recheck in a m  Results from last 7 days   Lab Units 06/19/21  0449 06/18/21  1318   POTASSIUM mmol/L 3 6 2 5*       History of DVT (deep vein thrombosis)  Assessment & Plan  · History of DVT follows with vascular as outpatient  IVC recently removed  · 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  · Continue weight based lovenox which she currently uses 100 mg daily  · Discussed continuing to use compression stockings, leg elevation and low salt diet to control edema  · Can follow up with vascular surgery, could consider referral to lymphedema clinic    Transaminitis  Assessment & Plan  · Transaminitis in the setting of hepatic steatosis    · Trending down    Results from last 7 days   Lab Units 06/19/21  0449 06/18/21  1318   AST U/L 132* 144* ALT U/L 45 50   TOTAL BILIRUBIN mg/dL 2 88* 2 73*     Results from last 7 days   Lab Units 21  1450   LACTIC ACID mmol/L 3 7*       Ovarian cyst  Assessment & Plan  · Ovarian cyst/mass known to patient as she recently had an ultrasound to further characterize  · Will leave information for gynecology/oncology to evaluate as outpatient    Acquired hypothyroidism  Assessment & Plan  · Continue levothyroxine 100 mcg daily    Morbidly obese (HCC)  Assessment & Plan  · Body mass index is 40 26 kg/m²  · Does have history of gastric bypass    Bipolar depression ([facility] )  Assessment & Plan  · Mood stable continue quetiapine lithium and venlafaxine          VTE Pharmacologic Prophylaxis: VTE Score: 1 Lovenox    Patient Centered Rounds: I performed bedside rounds with nursing staff today  Discussions with Specialists or Other Care Team Provider: case management    Education and Discussions with Family / Patient: Patient declined call to   Time Spent for Care: 30 minutes  More than 50% of total time spent on counseling and coordination of care as described above  Current Length of Stay: 1 day(s)  Current Patient Status: Inpatient   Certification Statement: The patient will continue to require additional inpatient hospital stay due to IVF  Discharge Plan: Anticipate discharge tomorrow to home  Code Status: Level 1 - Full Code    Subjective:   Feeling better  Objective:     Vitals:   Temp (24hrs), Av 1 °F (36 7 °C), Min:98 °F (36 7 °C), Max:98 2 °F (36 8 °C)    Temp:  [98 °F (36 7 °C)-98 2 °F (36 8 °C)] 98 °F (36 7 °C)  HR:  [] 82  Resp:  [16-18] 17  BP: ()/(57-81) 113/81  SpO2:  [96 %-100 %] 97 %  Body mass index is 40 26 kg/m²  Input and Output Summary (last 24 hours):      Intake/Output Summary (Last 24 hours) at 2021 1158  Last data filed at 2021 1644  Gross per 24 hour   Intake 1050 ml   Output --   Net 1050 ml       Physical Exam:   Physical Exam  Constitutional:       Appearance: Normal appearance  Cardiovascular:      Rate and Rhythm: Normal rate and regular rhythm  Heart sounds: No murmur heard  Comments: Bilateral lower extremity edema  Pulmonary:      Effort: Pulmonary effort is normal       Breath sounds: Normal breath sounds  Abdominal:      General: Bowel sounds are normal  There is no distension  Palpations: Abdomen is soft  Tenderness: There is no abdominal tenderness  Skin:     General: Skin is warm and dry  Neurological:      General: No focal deficit present  Mental Status: She is alert and oriented to person, place, and time  Psychiatric:         Mood and Affect: Mood normal           Additional Data:     Labs:  Results from last 7 days   Lab Units 06/19/21  0449 06/18/21  1318   WBC Thousand/uL 10 26* 13 52*   HEMOGLOBIN g/dL 8 9* 9 4*   HEMATOCRIT % 27 9* 29 2*   PLATELETS Thousands/uL 233 253   NEUTROS PCT %  --  74   LYMPHS PCT %  --  14   MONOS PCT %  --  10   EOS PCT %  --  0     Results from last 7 days   Lab Units 06/19/21  0449   SODIUM mmol/L 135*   POTASSIUM mmol/L 3 6   CHLORIDE mmol/L 97*   CO2 mmol/L 27   BUN mg/dL 5   CREATININE mg/dL 1 55*   ANION GAP mmol/L 11   CALCIUM mg/dL 7 4*   ALBUMIN g/dL 1 6*   TOTAL BILIRUBIN mg/dL 2 88*   ALK PHOS U/L 387*   ALT U/L 45   AST U/L 132*   GLUCOSE RANDOM mg/dL 79                 Results from last 7 days   Lab Units 06/18/21  1753 06/18/21  1450   LACTIC ACID mmol/L  --  3 7*   PROCALCITONIN ng/ml 1 69*  --        Lines/Drains:  Invasive Devices     Peripheral Intravenous Line            Peripheral IV 06/18/21 Left Antecubital <1 day                      Imaging: Reviewed radiology reports from this admission including: abdominal/pelvic CT    Recent Cultures (last 7 days):   Results from last 7 days   Lab Units 06/18/21  1523 06/18/21  1449   BLOOD CULTURE  Received in Microbiology Lab  Culture in Progress  Received in Microbiology Lab   Culture in Progress  Last 24 Hours Medication List:   Current Facility-Administered Medications   Medication Dose Route Frequency Provider Last Rate    acetaminophen  650 mg Oral Q6H PRN Radha Claudine, DO      enoxaparin  100 mg Subcutaneous Daily Sloan Tank, DO      ferrous sulfate  325 mg Oral Daily [employer], DO      folic acid  1 mg Oral Daily Radha Claudine, DO      gabapentin  400 mg Oral TID Radha Claudine, DO      levothyroxine  100 mcg Oral Early Morning Radha Claudine, DO      lithium carbonate  300 mg Oral BID Sloan Tank, DO      ondansetron  4 mg Intravenous Q4H PRN Radha Claudine, DO      pantoprazole  40 mg Oral BID AC Sloan Fu, DO      potassium chloride  40 mEq Oral Once Margo Pacheco PA-C      pyridoxine  100 mg Oral Daily Slona Fu, DO      QUEtiapine  100 mg Oral TID AC Sloan Fu, DO      QUEtiapine  400 mg Oral HS Sloan Fu, DO      rOPINIRole  1 mg Oral HS Sloan Fu, DO      sodium chloride  75 mL/hr Intravenous Continuous Milagro Eisenberg PA-C      thiamine  100 mg Oral Daily Sloan Fu, DO      topiramate  150 mg Oral BID Sloan Fu, DO      venlafaxine  150 mg Oral Daily Radha Claudine, DO          Today, Patient Was Seen By: Milagro Eisenberg PA-C    **Please Note: This note may have been constructed using a voice recognition system  **

## 2021-06-19 NOTE — CASE MANAGEMENT
LOS Day 1  Unplanned readmission score is 28 (yellow, moderate risk)       CM met with pt at bedside to do an assessment  Pt reports she was independent/min A and has HHA that come 37 hours a month thru the agency, [facility]  Pt is + driving, when she has to  Pt lives alone in a 1st floor apartment with 2 very small SUE as she reports she is unable to do "normal" steps  Pt has multiple DMEs: cane, RW, shower chair, lift chair (that she sleeps in) & Med Alert  Pt's sister, Marlee Richter, is her  and she will also be providing transport home for the pt  Pt's dtr, Dipti Mitchell, would be the one to make medical decisions if need be  Pt is on SSD  She reports no hx of I/P rehab, but was gone to [address] [place] [address] for OP rehab  VNA hx with [facility]  Hx of drugs/ETOH abuse, but not current  Pt reports hx of Bipolar d/o, anxiety & depression  Pt's PCP is Dr Paola Slater MD, located at [address] [facility], [address] [place] [address] , phone is [phone]  Pharmacy is [facility], located at [address] [place] [address], phone is [phone]

## 2021-06-19 NOTE — PROGRESS NOTES
Pt with reported poor appetite x 1 mo due to abdominal pain/GI intolerance  Only tolerating soft foods like yogurt/cottage cheese and broth at home  Was drinking ensure enlive though not regularly/scheduled amounts  Significant wt loss of 13% per chart review from 3/5/21 261lb to 5/27/21 226lb  Overdiuresis noted as of recent, suspect weight loss partly fluid though also related to poor intake  Meets criteria for acute malnutrition  Will maintain regular diet as ordered, will order ensure enlive BID

## 2021-06-19 NOTE — MALNUTRITION/BMI
This medical record reflects one or more clinical indicators suggestive of malnutrition and/or morbid obesity  Malnutrition Findings:   Adult Malnutrition type: Acute illness (related to poor appetite, abdominal pain, decreased oral intake/decreased food tolerance as evidenced by <50% energy intake > 5 days, 13% wt loss x 2 mo (3/5/21 261lb, 5/27/21 226lb)  )  Adult Degree of Malnutrition: Other severe protein calorie malnutrition (Treated with: Regular diet continuation, +ensure enlive BID)  Malnutrition Characteristics: Weight loss, Inadequate energy    BMI Findings: Body mass index is 40 26 kg/m²  See Nutrition note dated 6/19/21 for additional details  Completed nutrition assessment is viewable in the nutrition documentation

## 2021-06-19 NOTE — ASSESSMENT & PLAN NOTE
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

## 2021-06-20 PROBLEM — E43 SEVERE PROTEIN-CALORIE MALNUTRITION (HCC): Status: ACTIVE | Noted: 2021-06-20

## 2021-06-20 LAB
ANION GAP SERPL CALCULATED.3IONS-SCNC: 10 MMOL/L (ref 4–13)
BUN SERPL-MCNC: 4 MG/DL (ref 5–25)
CALCIUM SERPL-MCNC: 8.1 MG/DL (ref 8.3–10.1)
CHLORIDE SERPL-SCNC: 102 MMOL/L (ref 100–108)
CO2 SERPL-SCNC: 26 MMOL/L (ref 21–32)
CREAT SERPL-MCNC: 1.01 MG/DL (ref 0.6–1.3)
GFR SERPL CREATININE-BSD FRML MDRD: 66 ML/MIN/1.73SQ M
GLUCOSE SERPL-MCNC: 91 MG/DL (ref 65–140)
POTASSIUM SERPL-SCNC: 3.6 MMOL/L (ref 3.5–5.3)
SODIUM SERPL-SCNC: 138 MMOL/L (ref 136–145)

## 2021-06-20 PROCEDURE — 80048 BASIC METABOLIC PNL TOTAL CA: CPT | Performed by: PHYSICIAN ASSISTANT

## 2021-06-20 PROCEDURE — 99232 SBSQ HOSP IP/OBS MODERATE 35: CPT | Performed by: HOSPITALIST

## 2021-06-20 PROCEDURE — 93970 EXTREMITY STUDY: CPT | Performed by: SURGERY

## 2021-06-20 RX ORDER — ONDANSETRON 4 MG/1
4 TABLET, ORALLY DISINTEGRATING ORAL ONCE
Status: COMPLETED | OUTPATIENT
Start: 2021-06-20 | End: 2021-06-20

## 2021-06-20 RX ADMIN — GABAPENTIN 400 MG: 400 CAPSULE ORAL at 21:21

## 2021-06-20 RX ADMIN — FERROUS SULFATE TAB 325 MG (65 MG ELEMENTAL FE) 325 MG: 325 (65 FE) TAB at 12:27

## 2021-06-20 RX ADMIN — LITHIUM CARBONATE 300 MG: 300 TABLET, FILM COATED, EXTENDED RELEASE ORAL at 21:21

## 2021-06-20 RX ADMIN — LITHIUM CARBONATE 300 MG: 300 TABLET, FILM COATED, EXTENDED RELEASE ORAL at 12:26

## 2021-06-20 RX ADMIN — QUETIAPINE 100 MG: 100 TABLET, FILM COATED ORAL at 12:27

## 2021-06-20 RX ADMIN — QUETIAPINE FUMARATE 400 MG: 200 TABLET ORAL at 22:21

## 2021-06-20 RX ADMIN — FOLIC ACID 1 MG: 1 TABLET ORAL at 08:56

## 2021-06-20 RX ADMIN — LEVOTHYROXINE SODIUM 100 MCG: 100 TABLET ORAL at 05:11

## 2021-06-20 RX ADMIN — QUETIAPINE 100 MG: 100 TABLET, FILM COATED ORAL at 06:25

## 2021-06-20 RX ADMIN — TOPIRAMATE 150 MG: 100 TABLET ORAL at 08:56

## 2021-06-20 RX ADMIN — QUETIAPINE 100 MG: 100 TABLET, FILM COATED ORAL at 17:14

## 2021-06-20 RX ADMIN — PANTOPRAZOLE SODIUM 40 MG: 40 TABLET, DELAYED RELEASE ORAL at 06:25

## 2021-06-20 RX ADMIN — ONDANSETRON 4 MG: 4 TABLET, ORALLY DISINTEGRATING ORAL at 13:40

## 2021-06-20 RX ADMIN — GABAPENTIN 400 MG: 400 CAPSULE ORAL at 08:55

## 2021-06-20 RX ADMIN — ROPINIROLE 1 MG: 1 TABLET, FILM COATED ORAL at 22:21

## 2021-06-20 RX ADMIN — ENOXAPARIN SODIUM 100 MG: 100 INJECTION SUBCUTANEOUS at 08:55

## 2021-06-20 RX ADMIN — THIAMINE HCL TAB 100 MG 100 MG: 100 TAB at 08:56

## 2021-06-20 RX ADMIN — PANTOPRAZOLE SODIUM 40 MG: 40 TABLET, DELAYED RELEASE ORAL at 17:14

## 2021-06-20 RX ADMIN — PYRIDOXINE HCL TAB 50 MG 100 MG: 50 TAB at 08:55

## 2021-06-20 RX ADMIN — TOPIRAMATE 150 MG: 100 TABLET ORAL at 17:14

## 2021-06-20 RX ADMIN — SODIUM CHLORIDE 75 ML/HR: 0.9 INJECTION, SOLUTION INTRAVENOUS at 00:43

## 2021-06-20 RX ADMIN — VENLAFAXINE HYDROCHLORIDE 150 MG: 150 CAPSULE, EXTENDED RELEASE ORAL at 08:55

## 2021-06-20 RX ADMIN — GABAPENTIN 400 MG: 400 CAPSULE ORAL at 17:14

## 2021-06-20 NOTE — PLAN OF CARE
Problem: Potential for Falls  Goal: Patient will remain free of falls  Description: INTERVENTIONS:  - Educate patient/family on patient safety including physical limitations  - Instruct patient to call for assistance with activity   - Consult OT/PT to assist with strengthening/mobility   - Keep Call bell within reach  - Keep bed low and locked with side rails adjusted as appropriate  - Keep care items and personal belongings within reach  - Initiate and maintain comfort rounds  - Make Fall Risk Sign visible to staff  - Offer Toileting every  Hours, in advance of need  - Initiate/Maintain   Problem: PAIN - ADULT  Goal: Verbalizes/displays adequate comfort level or baseline comfort level  Description: Interventions:  - Encourage patient to monitor pain and request assistance  - Assess pain using appropriate pain scale  - Administer analgesics based on type and severity of pain and evaluate response  - Implement non-pharmacological measures as appropriate and evaluate response  - Consider cultural and social influences on pain and pain management  - Notify physician/advanced practitioner if interventions unsuccessful or patient reports new pain  Outcome: Progressing     Problem: INFECTION - ADULT  Goal: Absence or prevention of progression during hospitalization  Description: INTERVENTIONS:  - Assess and monitor for signs and symptoms of infection  - Monitor lab/diagnostic results  - Monitor all insertion sites, i e  indwelling lines, tubes, and drains  - Monitor endotracheal if appropriate and nasal secretions for changes in amount and color  - [place] appropriate cooling/warming therapies per order  - Administer medications as ordered  - Instruct and encourage patient and family to use good hand hygiene technique  - Identify and instruct in appropriate isolation precautions for identified infection/condition  Outcome: Progressing  Goal: Absence of fever/infection during neutropenic period  Description: INTERVENTIONS:  - Monitor WBC    Outcome: Progressing     Problem: SAFETY ADULT  Goal: Patient will remain free of falls  Description: INTERVENTIONS:  - Educate patient/family on patient safety including physical limitations  - Instruct patient to call for assistance with activity   - Consult OT/PT to assist with strengthening/mobility   - Keep Call bell within reach  - Keep bed low and locked with side rails adjusted as appropriate  - Keep care items and personal belongings within reach  - Initiate and maintain comfort rounds  - Make Fall Risk Sign visible to staff  - Offer Toileting every Hours, in advance of need  - Initiate/Maintain alarm  - Obtain necessary fall risk management equipment:   - Apply yellow socks and bracelet for high fall risk patients  - Consider moving patient to room near nurses station  Outcome: Progressing  Goal: Maintain or return to baseline ADL function  Description: INTERVENTIONS:  - Educate patient/family on patient safety including physical limitations  - Instruct patient to call for assistance with activity   - Consult OT/PT to assist with strengthening/mobility   - Keep Call bell within reach  - Keep bed low and locked with side rails adjusted as appropriate  - Keep care items and personal belongings within reach  - Initiate and maintain comfort rounds  - Make Fall Risk Sign visible to staff  - Offer Toileting every Hours, in advance of need  - Initiate/Maintain alar  - Obtain necessary fall risk management equipment:   - Apply yellow socks and bracelet for high fall risk patients  - Consider moving patient to room near nurses station  Outcome: Progressing  Goal: Maintains/Returns to pre admission functional level  Description: INTERVENTIONS:  -  Assess patient's ability to carry out ADLs; assess patient's baseline for ADL function and identify physical deficits which impact ability to perform ADLs (bathing, care of mouth/teeth, toileting, grooming, dressing, etc )  - Assess/evaluate cause of self-care deficits   - Assess range of motion  - Assess patient's mobility; develop plan if impaired  - Assess patient's need for assistive devices and provide as appropriate  - Encourage maximum independence but intervene and supervise when necessary  - Involve family in performance of ADLs  - Assess for home care needs following discharge   - Consider OT consult to assist with ADL evaluation and planning for discharge  - Provide patient education as appropriate  Outcome: Progressing     Problem: DISCHARGE PLANNING  Goal: Discharge to home or other facility with appropriate resources  Description: INTERVENTIONS:  - Identify barriers to discharge w/patient and caregiver  - Arrange for needed discharge resources and transportation as appropriate  - Identify discharge learning needs (meds, wound care, etc )  - Arrange for interpretive services to assist at discharge as needed  - Refer to Case Management Department for coordinating discharge planning if the patient needs post-hospital services based on physician/advanced practitioner order or complex needs related to functional status, cognitive ability, or social support system  Outcome: Progressing     Problem: Knowledge Deficit  Goal: Patient/family/caregiver demonstrates understanding of disease process, treatment plan, medications, and discharge instructions  Description: Complete learning assessment and assess knowledge base  Interventions:  - Provide teaching at level of understanding  - Provide teaching via preferred learning methods  Outcome: Progressing     Problem: Nutrition/Hydration-ADULT  Goal: Nutrient/Hydration intake appropriate for improving, restoring or maintaining nutritional needs  Description: Monitor and assess patient's nutrition/hydration status for malnutrition  Collaborate with interdisciplinary team and initiate plan and interventions as ordered    Monitor patient's weight and dietary intake as ordered or per policy  Utilize nutrition screening tool and intervene as necessary  Determine patient's food preferences and provide high-protein, high-caloric foods as appropriate       INTERVENTIONS:  - Monitor oral intake, urinary output, labs, and treatment plans  - Assess nutrition and hydration status and recommend course of action  - Evaluate amount of meals eaten  - Assist patient with eating if necessary   - Allow adequate time for meals  - Recommend/ encourage appropriate diets, oral nutritional supplements, and vitamin/mineral supplements  - Order, calculate, and assess calorie counts as needed  - Recommend, monitor, and adjust tube feedings and TPN/PPN based on assessed needs  - Assess need for intravenous fluids  - Provide specific nutrition/hydration education as appropriate  - Include patient/family/caregiver in decisions related to nutrition  Outcome: Progressing   alarm  - Obtain necessary fall risk management equipment:   - Apply yellow socks and bracelet for high fall risk patients  - Consider moving patient to room near nurses station  Outcome: Progressing

## 2021-06-20 NOTE — ASSESSMENT & PLAN NOTE
Results from last 7 days   Lab Units 06/20/21  0517 06/19/21  0449 06/18/21  1318   BUN mg/dL 4* 5 6   CREATININE mg/dL 1 01 1 55* 2 22*   EGFR ml/min/1 73sq m 66 40 26       Patient was on diuretics for lower extremity edema and had been recently increased likely causing this dehydration and acute kidney injury    We are holding the diuretics and gently hydrating    The acute kidney injury has essentially resolved    But she says she has been lightheaded and is nervous about going home    I will have Physical therapy evaluate her and see if she is safe to ambulate

## 2021-06-20 NOTE — NURSING NOTE
Patients IV site noted with drainage  IV fluids placed on hold  Dr Topher Madrigal,  made aware of same  OK to discontinue IV due to leakage and stop fluids  IV site removed and attending aware  Attending aware no IV access at this time

## 2021-06-20 NOTE — NURSING NOTE
Patient started to eat her meal today and vomited  Patient reporting nausea/vomiting  Unable to administer IV Zofran as pt has no IV access  This nurse attempted and unable to successfully place IV  Dr Mei Adair paged, made aware, order for po Zofran received and administered at 1340  Dr Mei Adair would like iv replaced  Another RN able to place IV at 1357

## 2021-06-20 NOTE — ASSESSMENT & PLAN NOTE
· Secondary to excessive diuretic intake  Repleted      Results from last 7 days   Lab Units 06/20/21  0517 06/19/21  0449 06/18/21  1318   POTASSIUM mmol/L 3 6 3 6 2 5*

## 2021-06-20 NOTE — PROGRESS NOTES
Jeraldbrooklynnwillian 30 1975, 50 y o  female MRN: [id number]  Unit/Bed#: [place] [address] -01 Encounter: [id number]  Primary Care Provider: Terry López PA-C   Date and time admitted to hospital: 2021 12:26 PM    * ARF (acute renal failure) ([facility] )  Assessment & Plan    Results from last 7 days   Lab Units 21  1318   BUN mg/dL 4* 5 6   CREATININE mg/dL 1 01 1 55* 2 22*   EGFR ml/min/1 73sq m 66 40 26       Patient was on diuretics for lower extremity edema and had been recently increased likely causing this dehydration and acute kidney injury    We are holding the diuretics and gently hydrating    The acute kidney injury has essentially resolved  But she says she has been lightheaded and is nervous about going home    I will have Physical therapy evaluate her and see if she is safe to ambulate    Bipolar depression ([facility] )  Assessment & Plan  · Mood stable continue quetiapine lithium and venlafaxine    Hypokalemia  Assessment & Plan  · Secondary to excessive diuretic intake  Repleted  Results from last 7 days   Lab Units 21  0521  1318   POTASSIUM mmol/L 3 6 3 6 2 5*             Subjective:   Complains of lightheadedness when standing  Does not think she can go home          Objective:     Vitals:   Temp (24hrs), Av 6 °F (36 4 °C), Min:97 5 °F (36 4 °C), Max:97 9 °F (36 6 °C)    Temp:  [97 5 °F (36 4 °C)-97 9 °F (36 6 °C)] 97 9 °F (36 6 °C)  HR:  [88-91] 89  Resp:  [16-18] 18  BP: (96-98)/(62) 96/62  SpO2:  [94 %-97 %] 96 %  Body mass index is 40 26 kg/m²  Input and Output Summary (last 24 hours): Intake/Output Summary (Last 24 hours) at 2021 1431  Last data filed at 2021 1332  Gross per 24 hour   Intake  ml   Output --   Net 2015 ml       Physical Exam:     Physical Exam  Vitals and nursing note reviewed  HENT:      Head: Normocephalic and atraumatic     Eyes: Pupils: Pupils are equal, round, and reactive to light  Cardiovascular:      Rate and Rhythm: Normal rate and regular rhythm  Heart sounds: No murmur heard  No friction rub  No gallop  Pulmonary:      Effort: Pulmonary effort is normal       Breath sounds: Normal breath sounds  No wheezing or rales  Abdominal:      General: Bowel sounds are normal       Palpations: Abdomen is soft  Tenderness: There is no abdominal tenderness  Musculoskeletal:      Right lower leg: No edema  Left lower leg: No edema          Additional Data:     Labs:    Results from last 7 days   Lab Units 06/19/21  0449 06/18/21  1318   WBC Thousand/uL 10 26* 13 52*   HEMOGLOBIN g/dL 8 9* 9 4*   HEMATOCRIT % 27 9* 29 2*   PLATELETS Thousands/uL 233 253   NEUTROS PCT %  --  74   LYMPHS PCT %  --  14   MONOS PCT %  --  10   EOS PCT %  --  0     Results from last 7 days   Lab Units 06/20/21  0517 06/19/21  0449   POTASSIUM mmol/L 3 6 3 6   CHLORIDE mmol/L 102 97*   CO2 mmol/L 26 27   BUN mg/dL 4* 5   CREATININE mg/dL 1 01 1 55*   CALCIUM mg/dL 8 1* 7 4*   ALK PHOS U/L  --  387*   ALT U/L  --  45   AST U/L  --  132*                       * I Have Reviewed All Lab Data     Recent Cultures (last 7 days):     Results from last 7 days   Lab Units 06/18/21  2013 06/18/21  1523 06/18/21  1449   BLOOD CULTURE   --  No Growth at 24 hrs  No Growth at 24 hrs     URINE CULTURE  40,000-49,000 cfu/ml   --   --          Last 24 Hours Medication List:   Current Facility-Administered Medications   Medication Dose Route Frequency Provider Last Rate    acetaminophen  650 mg Oral Q6H PRN Kandy Brome, DO      enoxaparin  100 mg Subcutaneous Daily Sloan Fu, DO      ferrous sulfate  325 mg Oral Daily With [employer], DO      folic acid  1 mg Oral Daily Kandy Brome, DO      gabapentin  400 mg Oral TID Kandy Brome, DO      levothyroxine  100 mcg Oral Early Morning Kandy Brome, DO      lithium carbonate  300 mg Oral BID Pat Aschoff, DO      ondansetron  4 mg Intravenous Q4H PRN Pat Aschoff, DO      pantoprazole  40 mg Oral BID AC Sloan Fu, DO      pyridoxine  100 mg Oral Daily Sloan Tank, DO      QUEtiapine  100 mg Oral TID AC Sloan Tank, DO      QUEtiapine  400 mg Oral HS Sloan Mezang, DO      QUEtiapine  400 mg Oral Once MARGUERITE Talbot      rOPINIRole  1 mg Oral HS Pat Aschoff, DO      sodium chloride  75 mL/hr Intravenous Continuous Ricardo Felix PA-C Stopped (06/20/21 1239)    thiamine  100 mg Oral Daily Sloan Fu, DO      topiramate  150 mg Oral BID Sloan Fu, DO      venlafaxine  150 mg Oral Daily Pat Aschoff, DO           VTE Pharmacologic Prophylaxis:   Pharmacologic: Enoxaparin (Lovenox)      Current Length of Stay: 2 day(s)    Current Patient Status: Inpatient       Discharge Plan: PT eval to see if she is having trouble ambulating      Code Status: Level 1 - Full Code           Today, Patient Was Seen By: Lucille Mijares DO    ** Please Note: Dictation voice to text software may have been used in the creation of this document   **

## 2021-06-21 PROBLEM — R74.01 TRANSAMINITIS: Status: RESOLVED | Noted: 2020-08-03 | Resolved: 2021-06-21

## 2021-06-21 LAB
ANION GAP SERPL CALCULATED.3IONS-SCNC: 12 MMOL/L (ref 4–13)
ATRIAL RATE: 89 BPM
BASOPHILS # BLD AUTO: 0.07 THOUSANDS/ΜL (ref 0–0.1)
BASOPHILS NFR BLD AUTO: 1 % (ref 0–1)
BUN SERPL-MCNC: 1 MG/DL (ref 5–25)
CALCIUM SERPL-MCNC: 8.3 MG/DL (ref 8.3–10.1)
CHLORIDE SERPL-SCNC: 104 MMOL/L (ref 100–108)
CO2 SERPL-SCNC: 24 MMOL/L (ref 21–32)
CREAT SERPL-MCNC: 0.86 MG/DL (ref 0.6–1.3)
EOSINOPHIL # BLD AUTO: 0.11 THOUSAND/ΜL (ref 0–0.61)
EOSINOPHIL NFR BLD AUTO: 1 % (ref 0–6)
ERYTHROCYTE [DISTWIDTH] IN BLOOD BY AUTOMATED COUNT: 25.1 % (ref 11.6–15.1)
GFR SERPL CREATININE-BSD FRML MDRD: 81 ML/MIN/1.73SQ M
GLUCOSE SERPL-MCNC: 106 MG/DL (ref 65–140)
HCT VFR BLD AUTO: 32.9 % (ref 34.8–46.1)
HGB BLD-MCNC: 9.7 G/DL (ref 11.5–15.4)
IMM GRANULOCYTES # BLD AUTO: 0.15 THOUSAND/UL (ref 0–0.2)
IMM GRANULOCYTES NFR BLD AUTO: 1 % (ref 0–2)
LYMPHOCYTES # BLD AUTO: 1.88 THOUSANDS/ΜL (ref 0.6–4.47)
LYMPHOCYTES NFR BLD AUTO: 16 % (ref 14–44)
MAGNESIUM SERPL-MCNC: 1.8 MG/DL (ref 1.6–2.6)
MCH RBC QN AUTO: 27.9 PG (ref 26.8–34.3)
MCHC RBC AUTO-ENTMCNC: 29.5 G/DL (ref 31.4–37.4)
MCV RBC AUTO: 95 FL (ref 82–98)
MONOCYTES # BLD AUTO: 0.81 THOUSAND/ΜL (ref 0.17–1.22)
MONOCYTES NFR BLD AUTO: 7 % (ref 4–12)
NEUTROPHILS # BLD AUTO: 8.67 THOUSANDS/ΜL (ref 1.85–7.62)
NEUTS SEG NFR BLD AUTO: 74 % (ref 43–75)
NRBC BLD AUTO-RTO: 0 /100 WBCS
P AXIS: 58 DEGREES
PLATELET # BLD AUTO: 276 THOUSANDS/UL (ref 149–390)
PMV BLD AUTO: 12.8 FL (ref 8.9–12.7)
POTASSIUM SERPL-SCNC: 3.3 MMOL/L (ref 3.5–5.3)
PR INTERVAL: 158 MS
QRS AXIS: -11 DEGREES
QRSD INTERVAL: 98 MS
QT INTERVAL: 420 MS
QTC INTERVAL: 511 MS
RBC # BLD AUTO: 3.48 MILLION/UL (ref 3.81–5.12)
SODIUM SERPL-SCNC: 140 MMOL/L (ref 136–145)
T WAVE AXIS: 29 DEGREES
VENTRICULAR RATE: 89 BPM
WBC # BLD AUTO: 11.69 THOUSAND/UL (ref 4.31–10.16)

## 2021-06-21 PROCEDURE — 93010 ELECTROCARDIOGRAM REPORT: CPT | Performed by: INTERNAL MEDICINE

## 2021-06-21 PROCEDURE — 85025 COMPLETE CBC W/AUTO DIFF WBC: CPT | Performed by: HOSPITALIST

## 2021-06-21 PROCEDURE — 83735 ASSAY OF MAGNESIUM: CPT | Performed by: HOSPITALIST

## 2021-06-21 PROCEDURE — 99232 SBSQ HOSP IP/OBS MODERATE 35: CPT | Performed by: INTERNAL MEDICINE

## 2021-06-21 PROCEDURE — 80048 BASIC METABOLIC PNL TOTAL CA: CPT | Performed by: HOSPITALIST

## 2021-06-21 PROCEDURE — 93005 ELECTROCARDIOGRAM TRACING: CPT

## 2021-06-21 RX ORDER — MIDODRINE HYDROCHLORIDE 2.5 MG/1
2.5 TABLET ORAL
Status: DISCONTINUED | OUTPATIENT
Start: 2021-06-22 | End: 2021-06-23 | Stop reason: HOSPADM

## 2021-06-21 RX ORDER — MIDODRINE HYDROCHLORIDE 5 MG/1
5 TABLET ORAL ONCE
Status: COMPLETED | OUTPATIENT
Start: 2021-06-21 | End: 2021-06-21

## 2021-06-21 RX ORDER — POTASSIUM CHLORIDE 14.9 MG/ML
20 INJECTION INTRAVENOUS
Status: COMPLETED | OUTPATIENT
Start: 2021-06-21 | End: 2021-06-21

## 2021-06-21 RX ADMIN — ROPINIROLE 1 MG: 1 TABLET, FILM COATED ORAL at 21:08

## 2021-06-21 RX ADMIN — PANTOPRAZOLE SODIUM 40 MG: 40 TABLET, DELAYED RELEASE ORAL at 06:25

## 2021-06-21 RX ADMIN — LITHIUM CARBONATE 300 MG: 300 TABLET, FILM COATED, EXTENDED RELEASE ORAL at 21:08

## 2021-06-21 RX ADMIN — GABAPENTIN 400 MG: 400 CAPSULE ORAL at 17:00

## 2021-06-21 RX ADMIN — FOLIC ACID 1 MG: 1 TABLET ORAL at 08:33

## 2021-06-21 RX ADMIN — ENOXAPARIN SODIUM 100 MG: 100 INJECTION SUBCUTANEOUS at 08:30

## 2021-06-21 RX ADMIN — MIDODRINE HYDROCHLORIDE 5 MG: 5 TABLET ORAL at 17:00

## 2021-06-21 RX ADMIN — TOPIRAMATE 150 MG: 100 TABLET ORAL at 17:00

## 2021-06-21 RX ADMIN — LEVOTHYROXINE SODIUM 100 MCG: 100 TABLET ORAL at 05:31

## 2021-06-21 RX ADMIN — QUETIAPINE 100 MG: 100 TABLET, FILM COATED ORAL at 11:00

## 2021-06-21 RX ADMIN — TOPIRAMATE 150 MG: 100 TABLET ORAL at 08:32

## 2021-06-21 RX ADMIN — LITHIUM CARBONATE 300 MG: 300 TABLET, FILM COATED, EXTENDED RELEASE ORAL at 08:33

## 2021-06-21 RX ADMIN — POTASSIUM CHLORIDE 20 MEQ: 14.9 INJECTION, SOLUTION INTRAVENOUS at 11:56

## 2021-06-21 RX ADMIN — QUETIAPINE 100 MG: 100 TABLET, FILM COATED ORAL at 17:00

## 2021-06-21 RX ADMIN — THIAMINE HCL TAB 100 MG 100 MG: 100 TAB at 08:32

## 2021-06-21 RX ADMIN — GABAPENTIN 400 MG: 400 CAPSULE ORAL at 08:32

## 2021-06-21 RX ADMIN — SODIUM CHLORIDE 1000 ML: 0.9 INJECTION, SOLUTION INTRAVENOUS at 10:52

## 2021-06-21 RX ADMIN — VENLAFAXINE HYDROCHLORIDE 150 MG: 150 CAPSULE, EXTENDED RELEASE ORAL at 08:32

## 2021-06-21 RX ADMIN — FERROUS SULFATE TAB 325 MG (65 MG ELEMENTAL FE) 325 MG: 325 (65 FE) TAB at 11:00

## 2021-06-21 RX ADMIN — POTASSIUM CHLORIDE 20 MEQ: 14.9 INJECTION, SOLUTION INTRAVENOUS at 13:18

## 2021-06-21 RX ADMIN — ONDANSETRON 4 MG: 2 INJECTION INTRAMUSCULAR; INTRAVENOUS at 09:03

## 2021-06-21 RX ADMIN — GABAPENTIN 400 MG: 400 CAPSULE ORAL at 21:08

## 2021-06-21 RX ADMIN — QUETIAPINE FUMARATE 400 MG: 200 TABLET ORAL at 21:08

## 2021-06-21 RX ADMIN — PYRIDOXINE HCL TAB 50 MG 100 MG: 50 TAB at 08:32

## 2021-06-21 RX ADMIN — QUETIAPINE 100 MG: 100 TABLET, FILM COATED ORAL at 06:24

## 2021-06-21 RX ADMIN — PANTOPRAZOLE SODIUM 40 MG: 40 TABLET, DELAYED RELEASE ORAL at 17:00

## 2021-06-21 NOTE — PROGRESS NOTES
Herberthsalud 30 1975, 50 y o  female MRN: [id number]  Unit/Bed#: [place] [facility] 2 -01 Encounter: [id number]  Primary Care Provider: Ivania Saldivar PA-C   Date and time admitted to hospital: 6/18/2021 12:26 PM    Severe protein-calorie malnutrition [facility])  Assessment & Plan  Malnutrition Findings:   Adult Malnutrition type: Acute illness (related to poor appetite, abdominal pain, decreased oral intake/decreased food tolerance as evidenced by <50% energy intake > 5 days, 13% wt loss x 2 mo (3/5/21 261lb, 5/27/21 226lb)  )  Adult Degree of Malnutrition: Other severe protein calorie malnutrition (Treated with: Regular diet continuation, +ensure enlive BID)    BMI Findings: Body mass index is 40 26 kg/m²  Severe protein-calorie malnutrition in the setting of acute illness related to poor appetite, abdominal pain, decreased PO intake, decreased food tolerance as evidenced by <50% energy intake >5 days, 13% weight loss x 2 months, requiring dietician consults, regular diet and Ensure  SIRS (systemic inflammatory response syndrome) (HCC)  Assessment & Plan  · Without sepsis  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  · Elevated lactic acid secondary to hepatic steatosis and inability to clear  · Defer further antibiotics for now    Bipolar depression ([facility] )  Assessment & Plan  · Mood stable continue quetiapine lithium and venlafaxine    History of DVT (deep vein thrombosis)  Assessment & Plan  · History of DVT follows with vascular as outpatient  IVC recently removed  · 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  · Continue weight based lovenox which she currently uses 100 mg daily  · Discussed continuing to use compression stockings, leg elevation and low salt diet to control edema    · Can follow up with vascular surgery, could consider referral to lymphedema clinic    Morbidly obese [facility])  Assessment & Plan  · Body mass index is 40 26 kg/m²  · Does have history of gastric bypass    Hypotension  Assessment & Plan  Patient with intermittent hypotension  Ten hose ordered  Will start on midodrine scheduled is patient remains symptomatic  Given intravenous fluid bolus    Ovarian cyst  Assessment & Plan  · Ovarian cyst/mass known to patient as she recently had an ultrasound to further characterize  · Will leave information for gynecology/oncology to evaluate as outpatient    Hypokalemia  Assessment & Plan  · Secondary to excessive diuretic intake  Repleted  Results from last 7 days   Lab Units 06/21/21  0524 06/20/21  0517 06/19/21  0449 06/18/21  1318   POTASSIUM mmol/L 3 3* 3 6 3 6 2 5*   Will replete intravenously    Acquired hypothyroidism  Assessment & Plan  · Continue levothyroxine 100 mcg daily    * ARF (acute renal failure) ([facility] )  Assessment & Plan    Results from last 7 days   Lab Units 06/21/21  0524 06/20/21  0517 06/19/21 0449 06/18/21  1318   BUN mg/dL 1* 4* 5 6   CREATININE mg/dL 0 86 1 01 1 55* 2 22*   EGFR ml/min/1 73sq m 81 66 40 26       Patient was on diuretics for lower extremity edema and had been recently increased likely causing this dehydration and acute kidney injury  Continue to hold diuretic  Still lightheaded - will place on midodrine schedule      Transaminitis-resolved as of 6/21/2021  Assessment & Plan  · Transaminitis in the setting of hepatic steatosis    · Trending down    Results from last 7 days   Lab Units 06/19/21 0449 06/18/21  1318   AST U/L 132* 144*   ALT U/L 45 50   TOTAL BILIRUBIN mg/dL 2 88* 2 73*           [facility]'s Internal Medicine Progress Note  Patient: Milagro Camargo 50 y o  female   MRN: [id number]  PCP: Dieter Middleton PA-C  [facility] Encounter: [id number]  Date Of Visit: 06/21/21    Assessment:    Principal Problem:    [facility]  Active Problems:    Acquired hypothyroidism    Hypokalemia    Ovarian cyst    Hypotension    Morbidly obese (HCC)    History of DVT (deep vein thrombosis)    Bipolar depression (HCC)    SIRS (systemic inflammatory response syndrome) (HCC)    Severe protein-calorie malnutrition (HCC)      Plan:    · Start midodrine  · Monitor blood pressure  · IV fluid bolus  · Replete potassium       VTE Pharmacologic Prophylaxis:   Pharmacologic: Enoxaparin (Lovenox)  Mechanical VTE Prophylaxis in Place: Yes    Patient Centered Rounds: I have performed bedside rounds with nursing staff today  Discussions with Specialists or Other Care Team Provider:  Case management    Education and Discussions with Family / Patient:  Patient will update family    Time Spent for Care: 1 hour  More than 50% of total time spent on counseling and coordination of care as described above  Current Length of Stay: 3 day(s)    Current Patient Status: Inpatient   Certification Statement: The patient will continue to require additional inpatient hospital stay due to Hypotension    Discharge Plan / Estimated Discharge Date:  24 hours    Code Status: Level 1 - Full Code      Subjective:   Patient seen and examined, reports she feels lightheaded  No nausea vomiting  No diarrhea    A complete and comprehensive 14 point organ system review has been performed and all other systems are negative other than stated above  Objective:     Vitals:   Temp (24hrs), Av 5 °F (36 4 °C), Min:97 4 °F (36 3 °C), Max:97 8 °F (36 6 °C)    Temp:  [97 4 °F (36 3 °C)-97 8 °F (36 6 °C)] 97 4 °F (36 3 °C)  HR:  [77-82] 79  Resp:  [18] 18  BP: (87-95)/(49-63) 87/51  SpO2:  [95 %-100 %] 100 %  Body mass index is 40 26 kg/m²  Input and Output Summary (last 24 hours):        Intake/Output Summary (Last 24 hours) at 2021 1643  Last data filed at 2021 1052  Gross per 24 hour   Intake 1460 ml   Output --   Net 1460 ml       Physical Exam:     General: well appearing, no acute distress  HEENT: atraumatic, PERRLA, moist mucosa, normal pharynx, normal tonsils and adenoids, normal tongue, no fluid in sinuses  Neck: Trachea midline, no carotid bruit, no masses  Respiratory: normal chest wall expansion, CTA B, no r/r/w, no rubs  Cardiovascular: RRR, no m/r/g, Normal S1 and S2  Abdomen: Soft, non-tender, non-distended, normal bowel sounds in all quadrants, no hepatosplenomegaly, no tympany  Rectal: deferred  Musculoskeletal: normal ROM in upper and lower extremities  Integumentary: warm, dry, and pink, with no rash, purpura, or petechia  Heme/Lymph:  Notable lower extremity edema, 2+  Neurological: Cranial Nerves II-XII grossly intact, no tics, normal sensation to pressure and light touch  Psychiatric: cooperative with normal mood, affect, and cognition      Additional Data:     Labs:    Results from last 7 days   Lab Units 06/21/21  0524   WBC Thousand/uL 11 69*   HEMOGLOBIN g/dL 9 7*   HEMATOCRIT % 32 9*   PLATELETS Thousands/uL 276   NEUTROS PCT % 74   LYMPHS PCT % 16   MONOS PCT % 7   EOS PCT % 1     Results from last 7 days   Lab Units 06/21/21  0524 06/19/21  0449   POTASSIUM mmol/L 3 3* 3 6   CHLORIDE mmol/L 104 97*   CO2 mmol/L 24 27   BUN mg/dL 1* 5   CREATININE mg/dL 0 86 1 55*   CALCIUM mg/dL 8 3 7 4*   ALK PHOS U/L  --  387*   ALT U/L  --  45   AST U/L  --  132*           * I Have Reviewed All Lab Data Listed Above  * Additional Pertinent Lab Tests Reviewed: [facility] Admission Reviewed    Imaging:    Imaging Reports Reviewed Today Include:  No new imaging  Imaging Personally Reviewed by Myself Includes:  No new imaging    Recent Cultures (last 7 days):     Results from last 7 days   Lab Units 06/18/21 2013 06/18/21  1523 06/18/21  1449   BLOOD CULTURE   --  No Growth at 48 hrs  No Growth at 48 hrs     URINE CULTURE  40,000-49,000 cfu/ml   --   --        Last 24 Hours Medication List:   Current Facility-Administered Medications   Medication Dose Route Frequency Provider Last Rate    acetaminophen  650 mg Oral Q6H PRN Radha Claudine, DO      enoxaparin  100 mg Subcutaneous Daily Sloan Fu, DO      ferrous sulfate  325 mg Oral Daily With [employer], DO      folic acid  1 mg Oral Daily Radha Claudine, DO      gabapentin  400 mg Oral TID Radha Claudine, DO      levothyroxine  100 mcg Oral Early Morning Radha Claudine, DO      lithium carbonate  300 mg Oral BID Radha Claudine, DO      midodrine  2 5 mg Oral TID AC Ramirez Hilliard, DO      midodrine  5 mg Oral Once Ramirez Russell, DO      ondansetron  4 mg Intravenous Q4H PRN Radha Claudine, DO      pantoprazole  40 mg Oral BID AC Sloan Fu, DO      pyridoxine  100 mg Oral Daily Sloan Fu, DO      QUEtiapine  100 mg Oral TID AC Sloan Fu, DO      QUEtiapine  400 mg Oral HS Sloan Fu, DO      QUEtiapine  400 mg Oral Once ZAHRAA PattenNP      rOPINIRole  1 mg Oral HS Sloan Fu, DO      thiamine  100 mg Oral Daily Sloan Fu, DO      topiramate  150 mg Oral BID Sloan Fu, DO      venlafaxine  150 mg Oral Daily Radha Chow, DO          Today, Patient Was Seen By: Andrew Nelson DO    ** Please Note: This note was completed in part utilizing M-Modal Flu[employer] Direct Software  Grammatical errors, random word insertions, spelling mistakes, and incomplete sentences may be an occasional consequence of this system secondary to software limitations, ambient noise, and hardware issues  If you have any questions or concerns about the content, text, or information contained within the body of this dictation, please contact the provider for clarification   **

## 2021-06-21 NOTE — ASSESSMENT & PLAN NOTE
· Secondary to excessive diuretic intake  Repleted      Results from last 7 days   Lab Units 06/21/21  0524 06/20/21  0517 06/19/21  0449 06/18/21  1318   POTASSIUM mmol/L 3 3* 3 6 3 6 2 5*   Will replete intravenously

## 2021-06-21 NOTE — ASSESSMENT & PLAN NOTE
Patient with intermittent hypotension  Ten hose ordered  Will start on midodrine scheduled is patient remains symptomatic  Given intravenous fluid bolus

## 2021-06-21 NOTE — ASSESSMENT & PLAN NOTE
Malnutrition Findings:   Adult Malnutrition type: Acute illness (related to poor appetite, abdominal pain, decreased oral intake/decreased food tolerance as evidenced by <50% energy intake > 5 days, 13% wt loss x 2 mo (3/5/21 261lb, 5/27/21 226lb)  )  Adult Degree of Malnutrition: Other severe protein calorie malnutrition (Treated with: Regular diet continuation, +ensure enlive BID)    BMI Findings: Body mass index is 40 26 kg/m²  Severe protein-calorie malnutrition in the setting of acute illness related to poor appetite, abdominal pain, decreased PO intake, decreased food tolerance as evidenced by <50% energy intake >5 days, 13% weight loss x 2 months, requiring dietician consults, regular diet and Ensure

## 2021-06-21 NOTE — ASSESSMENT & PLAN NOTE
Results from last 7 days   Lab Units 06/21/21  0524 06/20/21  0517 06/19/21  0449 06/18/21  1318   BUN mg/dL 1* 4* 5 6   CREATININE mg/dL 0 86 1 01 1 55* 2 22*   EGFR ml/min/1 73sq m 81 66 40 26       Patient was on diuretics for lower extremity edema and had been recently increased likely causing this dehydration and acute kidney injury  Continue to hold diuretic  Still lightheaded - will place on midodrine schedule

## 2021-06-22 PROBLEM — R94.31 PROLONGED Q-T INTERVAL ON ECG: Status: ACTIVE | Noted: 2021-06-22

## 2021-06-22 LAB
ANION GAP SERPL CALCULATED.3IONS-SCNC: 9 MMOL/L (ref 4–13)
BUN SERPL-MCNC: 1 MG/DL (ref 5–25)
CALCIUM SERPL-MCNC: 8 MG/DL (ref 8.3–10.1)
CHLORIDE SERPL-SCNC: 107 MMOL/L (ref 100–108)
CO2 SERPL-SCNC: 24 MMOL/L (ref 21–32)
CREAT SERPL-MCNC: 0.7 MG/DL (ref 0.6–1.3)
ERYTHROCYTE [DISTWIDTH] IN BLOOD BY AUTOMATED COUNT: 24.9 % (ref 11.6–15.1)
GFR SERPL CREATININE-BSD FRML MDRD: 104 ML/MIN/1.73SQ M
GLUCOSE SERPL-MCNC: 101 MG/DL (ref 65–140)
HCT VFR BLD AUTO: 27.9 % (ref 34.8–46.1)
HGB BLD-MCNC: 8.6 G/DL (ref 11.5–15.4)
MCH RBC QN AUTO: 28.3 PG (ref 26.8–34.3)
MCHC RBC AUTO-ENTMCNC: 30.8 G/DL (ref 31.4–37.4)
MCV RBC AUTO: 92 FL (ref 82–98)
PLATELET # BLD AUTO: 243 THOUSANDS/UL (ref 149–390)
PMV BLD AUTO: 12.5 FL (ref 8.9–12.7)
POTASSIUM SERPL-SCNC: 2.8 MMOL/L (ref 3.5–5.3)
POTASSIUM SERPL-SCNC: 3.6 MMOL/L (ref 3.5–5.3)
RBC # BLD AUTO: 3.04 MILLION/UL (ref 3.81–5.12)
SODIUM SERPL-SCNC: 140 MMOL/L (ref 136–145)
WBC # BLD AUTO: 10.86 THOUSAND/UL (ref 4.31–10.16)

## 2021-06-22 PROCEDURE — 84132 ASSAY OF SERUM POTASSIUM: CPT | Performed by: INTERNAL MEDICINE

## 2021-06-22 PROCEDURE — 85027 COMPLETE CBC AUTOMATED: CPT | Performed by: INTERNAL MEDICINE

## 2021-06-22 PROCEDURE — 99232 SBSQ HOSP IP/OBS MODERATE 35: CPT | Performed by: INTERNAL MEDICINE

## 2021-06-22 PROCEDURE — 80048 BASIC METABOLIC PNL TOTAL CA: CPT | Performed by: INTERNAL MEDICINE

## 2021-06-22 RX ORDER — POTASSIUM CHLORIDE 14.9 MG/ML
20 INJECTION INTRAVENOUS
Status: COMPLETED | OUTPATIENT
Start: 2021-06-22 | End: 2021-06-22

## 2021-06-22 RX ORDER — POTASSIUM CHLORIDE 20 MEQ/1
40 TABLET, EXTENDED RELEASE ORAL ONCE
Status: COMPLETED | OUTPATIENT
Start: 2021-06-22 | End: 2021-06-22

## 2021-06-22 RX ORDER — PROMETHAZINE HYDROCHLORIDE 25 MG/1
25 TABLET ORAL
Status: DISCONTINUED | OUTPATIENT
Start: 2021-06-22 | End: 2021-06-23 | Stop reason: HOSPADM

## 2021-06-22 RX ADMIN — TOPIRAMATE 150 MG: 100 TABLET ORAL at 07:30

## 2021-06-22 RX ADMIN — QUETIAPINE 100 MG: 100 TABLET, FILM COATED ORAL at 16:08

## 2021-06-22 RX ADMIN — ROPINIROLE 1 MG: 1 TABLET, FILM COATED ORAL at 21:30

## 2021-06-22 RX ADMIN — QUETIAPINE 100 MG: 100 TABLET, FILM COATED ORAL at 11:11

## 2021-06-22 RX ADMIN — PROMETHAZINE HYDROCHLORIDE 25 MG: 25 TABLET ORAL at 16:08

## 2021-06-22 RX ADMIN — TOPIRAMATE 150 MG: 100 TABLET ORAL at 16:06

## 2021-06-22 RX ADMIN — MIDODRINE HYDROCHLORIDE 2.5 MG: 2.5 TABLET ORAL at 11:11

## 2021-06-22 RX ADMIN — VENLAFAXINE HYDROCHLORIDE 150 MG: 150 CAPSULE, EXTENDED RELEASE ORAL at 07:30

## 2021-06-22 RX ADMIN — POTASSIUM CHLORIDE 40 MEQ: 1500 TABLET, EXTENDED RELEASE ORAL at 17:00

## 2021-06-22 RX ADMIN — MIDODRINE HYDROCHLORIDE 2.5 MG: 2.5 TABLET ORAL at 06:50

## 2021-06-22 RX ADMIN — QUETIAPINE 100 MG: 100 TABLET, FILM COATED ORAL at 06:02

## 2021-06-22 RX ADMIN — ENOXAPARIN SODIUM 100 MG: 100 INJECTION SUBCUTANEOUS at 07:29

## 2021-06-22 RX ADMIN — POTASSIUM CHLORIDE 40 MEQ: 1500 TABLET, EXTENDED RELEASE ORAL at 08:42

## 2021-06-22 RX ADMIN — POTASSIUM CHLORIDE 20 MEQ: 14.9 INJECTION, SOLUTION INTRAVENOUS at 08:43

## 2021-06-22 RX ADMIN — FOLIC ACID 1 MG: 1 TABLET ORAL at 07:30

## 2021-06-22 RX ADMIN — PROMETHAZINE HYDROCHLORIDE 25 MG: 25 TABLET ORAL at 11:35

## 2021-06-22 RX ADMIN — GABAPENTIN 400 MG: 400 CAPSULE ORAL at 16:06

## 2021-06-22 RX ADMIN — GABAPENTIN 400 MG: 400 CAPSULE ORAL at 07:29

## 2021-06-22 RX ADMIN — LEVOTHYROXINE SODIUM 100 MCG: 100 TABLET ORAL at 05:13

## 2021-06-22 RX ADMIN — LITHIUM CARBONATE 300 MG: 300 TABLET, FILM COATED, EXTENDED RELEASE ORAL at 21:28

## 2021-06-22 RX ADMIN — PANTOPRAZOLE SODIUM 40 MG: 40 TABLET, DELAYED RELEASE ORAL at 06:02

## 2021-06-22 RX ADMIN — POTASSIUM CHLORIDE 20 MEQ: 14.9 INJECTION, SOLUTION INTRAVENOUS at 10:31

## 2021-06-22 RX ADMIN — LITHIUM CARBONATE 300 MG: 300 TABLET, FILM COATED, EXTENDED RELEASE ORAL at 07:31

## 2021-06-22 RX ADMIN — FERROUS SULFATE TAB 325 MG (65 MG ELEMENTAL FE) 325 MG: 325 (65 FE) TAB at 11:11

## 2021-06-22 RX ADMIN — THIAMINE HCL TAB 100 MG 100 MG: 100 TAB at 07:31

## 2021-06-22 RX ADMIN — PYRIDOXINE HCL TAB 50 MG 100 MG: 50 TAB at 07:29

## 2021-06-22 RX ADMIN — PANTOPRAZOLE SODIUM 40 MG: 40 TABLET, DELAYED RELEASE ORAL at 16:08

## 2021-06-22 RX ADMIN — GABAPENTIN 400 MG: 400 CAPSULE ORAL at 21:30

## 2021-06-22 RX ADMIN — MIDODRINE HYDROCHLORIDE 2.5 MG: 2.5 TABLET ORAL at 16:08

## 2021-06-22 RX ADMIN — QUETIAPINE FUMARATE 400 MG: 200 TABLET ORAL at 21:30

## 2021-06-22 NOTE — ASSESSMENT & PLAN NOTE
· Secondary to excessive diuretic intake  Repleted      Results from last 7 days   Lab Units 06/22/21  1406 06/22/21  0443 06/21/21  0524 06/20/21  0517 06/19/21  0449 06/18/21  1318   POTASSIUM mmol/L 3 6 2 8* 3 3* 3 6 3 6 2 5*   Will replete intravenously and orally  Hypokalemia secondary to nausea and vomiting - will place on Phenergan as patient has prolonged QT

## 2021-06-22 NOTE — NURSING NOTE
Patient walked off floor and went to lobby  She said she thought her cousin was coming to pick her up  She admits to being confused   Dr Sean Esparza informed

## 2021-06-22 NOTE — PROGRESS NOTES
Sreehuyen 30 1975, 50 y o  female MRN: [id number]  Unit/Bed#: [place] 68 2 -01 Encounter: [id number]  Primary Care Provider: Machelle Cedeno PA-C   Date and time admitted to hospital: 6/18/2021 12:26 PM    Prolonged Q-T interval on ECG  Assessment & Plan  Patient with prolonged QT on EKG  Recommend outpatient evaluation of psychiatric medication - hold all QT prolonging medication including Zofran      Severe protein-calorie malnutrition ([facility] )  Assessment & Plan  Malnutrition Findings:   Adult Malnutrition type: Acute illness (related to poor appetite, abdominal pain, decreased oral intake/decreased food tolerance as evidenced by <50% energy intake > 5 days, 13% wt loss x 2 mo (3/5/21 261lb, 5/27/21 226lb)  )  Adult Degree of Malnutrition: Other severe protein calorie malnutrition (Treated with: Regular diet continuation, +ensure enlive BID)    BMI Findings: Body mass index is 40 26 kg/m²  Severe protein-calorie malnutrition in the setting of acute illness related to poor appetite, abdominal pain, decreased PO intake, decreased food tolerance as evidenced by <50% energy intake >5 days, 13% weight loss x 2 months, requiring dietician consults, regular diet and Ensure  SIRS (systemic inflammatory response syndrome) (HCC)  Assessment & Plan  · Without sepsis  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  · Elevated lactic acid secondary to hepatic steatosis and inability to clear  · Defer further antibiotics for now    Bipolar depression ([facility] 75 )  Assessment & Plan  · Mood stable continue quetiapine lithium and venlafaxine    History of DVT (deep vein thrombosis)  Assessment & Plan  · History of DVT follows with vascular as outpatient  IVC recently removed    · 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  · Continue weight based lovenox which she currently uses 100 mg daily   · Discussed continuing to use compression stockings, leg elevation and low salt diet to control edema  · Can follow up with vascular surgery, could consider referral to lymphedema clinic    Morbidly obese ([facility] )  Assessment & Plan  · Body mass index is 40 26 kg/m²  · Does have history of gastric bypass    Hypotension  Assessment & Plan  Patient with intermittent hypotension  Edgardo hose ordered  Will start on midodrine scheduled is patient remains symptomatic      Ovarian cyst  Assessment & Plan  · Ovarian cyst/mass known to patient as she recently had an ultrasound to further characterize  · Will leave information for gynecology/oncology to evaluate as outpatient    Hypokalemia  Assessment & Plan  · Secondary to excessive diuretic intake  Repleted      Results from last 7 days   Lab Units 06/22/21  1406 06/22/21  0443 06/21/21  0524 06/20/21  0517 06/19/21  0449 06/18/21  1318   POTASSIUM mmol/L 3 6 2 8* 3 3* 3 6 3 6 2 5*   Will replete intravenously and orally  Hypokalemia secondary to nausea and vomiting - will place on Phenergan as patient has prolonged QT    Acquired hypothyroidism  Assessment & Plan  · Continue levothyroxine 100 mcg daily    * ARF (acute renal failure) ([place] [address] )  Assessment & Plan    Results from last 7 days   Lab Units 06/22/21  0443 06/21/21  0524 06/20/21  0517 06/19/21  0449 06/18/21  1318   BUN mg/dL 1* 1* 4* 5 6   CREATININE mg/dL 0 70 0 86 1 01 1 55* 2 22*   EGFR ml/min/1 73sq m 104 81 66 40 26       Patient was on diuretics for lower extremity edema and had been recently increased likely causing this dehydration and acute kidney injury  Continue to hold diuretic  Still lightheaded - will place on midodrine scheduled  Resolved        [facility] Progress Note  Patient: Bigg Alvarado 50 y o  female   MRN: [id number]  PCP: Anderson Infante PA-C  Unit/Bed#: [place] 2 -01 Encounter: [id number]  Date Of Visit: 06/22/21    Assessment:    Principal Problem:    ARF (acute renal failure) ([facility] )  Active Problems:    Acquired hypothyroidism    Hypokalemia    Ovarian cyst    Hypotension    Morbidly obese (HCC)    History of DVT (deep vein thrombosis)    Bipolar depression (HCC)    SIRS (systemic inflammatory response syndrome) (HCC)    Severe protein-calorie malnutrition (HCC)    Prolonged Q-T interval on ECG      Plan:    · Replete potassium       VTE Pharmacologic Prophylaxis:   Pharmacologic: Rivaroxaban (Xarelto)  Mechanical VTE Prophylaxis in Place: Yes    Patient Centered Rounds: I have performed bedside rounds with nursing staff today  Discussions with Specialists or Other Care Team Provider:  Case management    Education and Discussions with Family / Patient:  Patient updating family    Time Spent for Care: 30 minutes  More than 50% of total time spent on counseling and coordination of care as described above  Current Length of Stay: 4 day(s)    Current Patient Status: Inpatient   Certification Statement: The patient will continue to require additional inpatient hospital stay due to Hypokalemia    Discharge Plan / Estimated Discharge Date:  Tomorrow    Code Status: Level 1 - Full Code      Subjective:   Patient seen examined, reports she feels extremely nauseous  Had multiple episodes of vomiting yesterday  No abdominal pain      A complete and comprehensive 14 point organ system review has been performed and all other systems are negative other than stated above  Objective:     Vitals:   Temp (24hrs), Av 7 °F (36 5 °C), Min:97 3 °F (36 3 °C), Max:98 1 °F (36 7 °C)    Temp:  [97 3 °F (36 3 °C)-98 1 °F (36 7 °C)] 97 8 °F (36 6 °C)  HR:  [] 84  Resp:  [16-18] 18  BP: ()/(64-82) 100/67  SpO2:  [97 %-100 %] 100 %  Body mass index is 40 26 kg/m²       Input and Output Summary (last 24 hours):     No intake or output data in the 24 hours ending 21 1650    Physical Exam:     General: well appearing, no acute distress  HEENT: atraumatic, PERRLA, moist mucosa, normal pharynx, normal tonsils and adenoids, normal tongue, no fluid in sinuses  Neck: Trachea midline, no carotid bruit, no masses  Respiratory: normal chest wall expansion, CTA B, no r/r/w, no rubs  Cardiovascular: RRR, no m/r/g, Normal S1 and S2  Abdomen: Soft, non-tender, non-distended, normal bowel sounds in all quadrants, no hepatosplenomegaly, no tympany  Rectal: deferred  Musculoskeletal: normal ROM in upper and lower extremities  Integumentary: warm, dry, and pink, with no rash, purpura, or petechia  Heme/Lymph:  Lower extremity lymphedema  Neurological: Cranial Nerves II-XII grossly intact, no tics, normal sensation to pressure and light touch  Psychiatric: cooperative with normal mood, affect, and cognition      Additional Data:     Labs:    Results from last 7 days   Lab Units 06/22/21  0443 06/21/21  0524   WBC Thousand/uL 10 86* 11 69*   HEMOGLOBIN g/dL 8 6* 9 7*   HEMATOCRIT % 27 9* 32 9*   PLATELETS Thousands/uL 243 276   NEUTROS PCT %  --  74   LYMPHS PCT %  --  16   MONOS PCT %  --  7   EOS PCT %  --  1     Results from last 7 days   Lab Units 06/22/21  1406 06/22/21  0443 06/19/21  0449   POTASSIUM mmol/L 3 6 2 8* 3 6   CHLORIDE mmol/L  --  107 97*   CO2 mmol/L  --  24 27   BUN mg/dL  --  1* 5   CREATININE mg/dL  --  0 70 1 55*   CALCIUM mg/dL  --  8 0* 7 4*   ALK PHOS U/L  --   --  387*   ALT U/L  --   --  45   AST U/L  --   --  132*           * I Have Reviewed All Lab Data Listed Above  * Additional Pertinent Lab Tests Reviewed: [facility] Admission Reviewed    Imaging:    Imaging Reports Reviewed Today Include:  No new imaging  Imaging Personally Reviewed by Myself Includes:  No known    Recent Cultures (last 7 days):     Results from last 7 days   Lab Units 06/18/21  2013 06/18/21  1523 06/18/21  1449   BLOOD CULTURE   --  No Growth at 72 hrs  No Growth at 72 hrs     URINE CULTURE  40,000-49,000 cfu/ml   --   --        Last 24 Hours Medication List:   Current Facility-Administered Medications   Medication Dose Route Frequency Provider Last Rate    acetaminophen  650 mg Oral Q6H PRN Yosvany Neelima, DO      enoxaparin  100 mg Subcutaneous Daily Sloan Fu, DO      ferrous sulfate  325 mg Oral Daily With [employer], DO      folic acid  1 mg Oral Daily Yosvany Neelima, DO      gabapentin  400 mg Oral TID Yosvany Neelima, DO      levothyroxine  100 mcg Oral Early Morning Yosvany Neelima, DO      lithium carbonate  300 mg Oral BID Yosvany Muiros, DO      midodrine  2 5 mg Oral TID AC Ramirez Russell, DO      ondansetron  4 mg Intravenous Q4H PRN Yosvany Neelima, DO      pantoprazole  40 mg Oral BID AC Sloan Fu, DO      potassium chloride  40 mEq Oral Once Ramirez Russell, DO      promethazine  25 mg Oral TID With Meals Ramirez Russell, DO      pyridoxine  100 mg Oral Daily lSoan Fu, DO      QUEtiapine  100 mg Oral TID AC Sloan Fu, DO      QUEtiapine  400 mg Oral HS Sloan Fu, DO      rOPINIRole  1 mg Oral HS Sloan Fu, DO      thiamine  100 mg Oral Daily Sloan Fu, DO      topiramate  150 mg Oral BID Sloan Fu, DO      venlafaxine  150 mg Oral Daily Yosvany Ortez, DO          Today, Patient Was Seen By: Shi Swain DO    ** Please Note: This note was completed in part utilizing M-Modal Fluency Direct Software  Grammatical errors, random word insertions, spelling mistakes, and incomplete sentences may be an occasional consequence of this system secondary to software limitations, ambient noise, and hardware issues  If you have any questions or concerns about the content, text, or information contained within the body of this dictation, please contact the provider for clarification   **

## 2021-06-22 NOTE — ASSESSMENT & PLAN NOTE
Patient with prolonged QT on EKG  Recommend outpatient evaluation of psychiatric medication - hold all QT prolonging medication including Zofran

## 2021-06-22 NOTE — ASSESSMENT & PLAN NOTE
Patient with intermittent hypotension  Edgardo hose ordered  Will start on midodrine scheduled is patient remains symptomatic

## 2021-06-23 VITALS
TEMPERATURE: 97.3 F | SYSTOLIC BLOOD PRESSURE: 100 MMHG | WEIGHT: 227.29 LBS | DIASTOLIC BLOOD PRESSURE: 69 MMHG | BODY MASS INDEX: 40.27 KG/M2 | RESPIRATION RATE: 16 BRPM | HEART RATE: 77 BPM | OXYGEN SATURATION: 96 % | HEIGHT: 63 IN

## 2021-06-23 LAB
ANION GAP SERPL CALCULATED.3IONS-SCNC: 10 MMOL/L (ref 4–13)
BACTERIA BLD CULT: NORMAL
BACTERIA BLD CULT: NORMAL
BUN SERPL-MCNC: 1 MG/DL (ref 5–25)
CALCIUM SERPL-MCNC: 8.6 MG/DL (ref 8.3–10.1)
CHLORIDE SERPL-SCNC: 106 MMOL/L (ref 100–108)
CO2 SERPL-SCNC: 22 MMOL/L (ref 21–32)
CREAT SERPL-MCNC: 0.87 MG/DL (ref 0.6–1.3)
ERYTHROCYTE [DISTWIDTH] IN BLOOD BY AUTOMATED COUNT: 24.7 % (ref 11.6–15.1)
GFR SERPL CREATININE-BSD FRML MDRD: 80 ML/MIN/1.73SQ M
GLUCOSE SERPL-MCNC: 101 MG/DL (ref 65–140)
HCT VFR BLD AUTO: 33.8 % (ref 34.8–46.1)
HGB BLD-MCNC: 10.2 G/DL (ref 11.5–15.4)
MCH RBC QN AUTO: 28.5 PG (ref 26.8–34.3)
MCHC RBC AUTO-ENTMCNC: 30.2 G/DL (ref 31.4–37.4)
MCV RBC AUTO: 94 FL (ref 82–98)
PLATELET # BLD AUTO: 281 THOUSANDS/UL (ref 149–390)
PMV BLD AUTO: 13 FL (ref 8.9–12.7)
POTASSIUM SERPL-SCNC: 4.1 MMOL/L (ref 3.5–5.3)
RBC # BLD AUTO: 3.58 MILLION/UL (ref 3.81–5.12)
SODIUM SERPL-SCNC: 138 MMOL/L (ref 136–145)
WBC # BLD AUTO: 12.74 THOUSAND/UL (ref 4.31–10.16)

## 2021-06-23 PROCEDURE — 99239 HOSP IP/OBS DSCHRG MGMT >30: CPT | Performed by: INTERNAL MEDICINE

## 2021-06-23 PROCEDURE — 85027 COMPLETE CBC AUTOMATED: CPT | Performed by: INTERNAL MEDICINE

## 2021-06-23 PROCEDURE — 80048 BASIC METABOLIC PNL TOTAL CA: CPT | Performed by: INTERNAL MEDICINE

## 2021-06-23 RX ORDER — MIDODRINE HYDROCHLORIDE 2.5 MG/1
2.5 TABLET ORAL
Qty: 90 TABLET | Refills: 0 | Status: SHIPPED | OUTPATIENT
Start: 2021-06-23 | End: 2021-08-09

## 2021-06-23 RX ADMIN — VENLAFAXINE HYDROCHLORIDE 150 MG: 150 CAPSULE, EXTENDED RELEASE ORAL at 08:26

## 2021-06-23 RX ADMIN — THIAMINE HCL TAB 100 MG 100 MG: 100 TAB at 08:26

## 2021-06-23 RX ADMIN — MIDODRINE HYDROCHLORIDE 2.5 MG: 2.5 TABLET ORAL at 06:01

## 2021-06-23 RX ADMIN — PANTOPRAZOLE SODIUM 40 MG: 40 TABLET, DELAYED RELEASE ORAL at 06:01

## 2021-06-23 RX ADMIN — LEVOTHYROXINE SODIUM 100 MCG: 100 TABLET ORAL at 05:23

## 2021-06-23 RX ADMIN — TOPIRAMATE 150 MG: 100 TABLET ORAL at 08:27

## 2021-06-23 RX ADMIN — PROMETHAZINE HYDROCHLORIDE 25 MG: 25 TABLET ORAL at 08:27

## 2021-06-23 RX ADMIN — ENOXAPARIN SODIUM 100 MG: 100 INJECTION SUBCUTANEOUS at 08:25

## 2021-06-23 RX ADMIN — MIDODRINE HYDROCHLORIDE 2.5 MG: 2.5 TABLET ORAL at 12:00

## 2021-06-23 RX ADMIN — GABAPENTIN 400 MG: 400 CAPSULE ORAL at 08:26

## 2021-06-23 RX ADMIN — PYRIDOXINE HCL TAB 50 MG 100 MG: 50 TAB at 08:26

## 2021-06-23 RX ADMIN — QUETIAPINE 100 MG: 100 TABLET, FILM COATED ORAL at 12:45

## 2021-06-23 RX ADMIN — QUETIAPINE 100 MG: 100 TABLET, FILM COATED ORAL at 06:01

## 2021-06-23 RX ADMIN — LITHIUM CARBONATE 300 MG: 300 TABLET, FILM COATED, EXTENDED RELEASE ORAL at 08:27

## 2021-06-23 RX ADMIN — FERROUS SULFATE TAB 325 MG (65 MG ELEMENTAL FE) 325 MG: 325 (65 FE) TAB at 12:45

## 2021-06-23 RX ADMIN — FOLIC ACID 1 MG: 1 TABLET ORAL at 08:26

## 2021-06-23 NOTE — DISCHARGE INSTRUCTIONS
Acute Kidney Injury   WHAT YOU NEED TO KNOW:   What is acute kidney injury? Acute kidney injury (MADHU) is also called acute kidney failure, or acute renal failure  MADHU happens when your kidneys suddenly stop working correctly  Normally, the kidneys remove fluid, chemicals, and waste from your blood  These wastes are turned into urine by your kidneys  MADHU usually happens over hours or days  When you have MADHU, your kidneys do not remove the waste, chemicals, or extra fluid from your body  A normal amount of urine is not produced  MADHU is usually temporary, but it may become a chronic kidney condition  What causes MADHU? · Decreased blood flow to the kidney, such as from hypercalcemia (high blood calcium level) or severe heart disease     · A disease or condition that affects the kidneys, such as hypertension (high blood pressure) or diabetes     · A blockage in the kidney or ureter, such as a kidney or bladder stone, enlarged prostate, or tumor    What increases my risk for MADHU? · Being hospitalized with a serious illness, such as sepsis or severe burns    · Peripheral artery disease    · Older age in adults    · Kidney or liver diseases    · Medical conditions such as dehydration, hypertension, diabetes, or heart failure    · Certain medicines such as NSAIDs    What are the signs and symptoms of MADHU? You may not have any symptoms with early or mild MADHU  As MADHU progresses, you may have any of the following:  · Decrease in the amount of urine or no urination    · Swelling in your arms, legs, or feet     · Weakness, drowsiness, or no appetite    · Nausea, flank pain, muscle twitching or muscle cramps    · Itchy skin, or your breath or body smells like urine    · Behavior changes, confusion, disorientation, or seizures    How is MADHU diagnosed? There are many causes of MADHU   To find the cause and to treat your MADHU correctly, your healthcare provider may do any of the following:  · Blood and urine tests  show how well your kidneys are working  They may also show the cause of your MADHU  · An x-ray or ultrasound  may show problems with your kidneys  Your healthcare provider may see a blockage in your kidneys  He or she may see narrowing of the artery that sends blood to your kidneys  You may be given contrast liquid to help your kidneys show up better in the pictures  Tell the healthcare provider if you have ever had an allergic reaction to contrast liquid  How is MADHU treated? Treatment depends upon the cause of your acute kidney injury and how severe it is  Usually, MADHU will be monitored in the hospital  If you have mild MADHU, you may be able to go home to recover  Your healthcare providers will treat the cause of your MADHU  You may need IV fluids if your MADHU was caused by little or no fluid in your body  You may need dialysis to remove waste and extra fluid from your body  Your healthcare provider may tell you to eat food low in sodium (salt), potassium, phosphorus, or protein  You may need to see a dietitian before you are discharged to get help with planning your meals  How can I prevent MADHU? · Manage other health conditions  such as diabetes, high blood pressure, or heart disease  These conditions increase your risk for acute kidney injury  Take your medicines for these conditions as directed  Also, monitor your blood sugar and blood pressure levels as directed  Contact your healthcare provider if your levels are not in the range he or she says it should be  · Talk to your healthcare provider before you take over-the-counter-medicine  NSAIDs, stomach medicine, or laxatives may harm your kidneys and increase your risk for acute kidney injury  If it is okay to take the medicine, follow the directions on the package  Do not take more than directed  · Tell healthcare providers if you have had MADHU  before you get contrast liquid for an x-ray or CT scan   Your healthcare provider may give you medicine to prevent kidney problems caused by the liquid  CARE AGREEMENT:   You have the right to help plan your care  Learn about your health condition and how it may be treated  Discuss treatment options with your healthcare providers to decide what care you want to receive  You always have the right to refuse treatment  The above information is an  only  It is not intended as medical advice for individual conditions or treatments  Talk to your doctor, nurse or pharmacist before following any medical regimen to see if it is safe and effective for you  © Copyright [address] Information is for End User's use only and may not be sold, redistributed or otherwise used for commercial purposes   All illustrations and images included in CareNotes® are the copyrighted property of [employer] D [employer] M [employer]  or [address] [place] [address]

## 2021-06-23 NOTE — DISCHARGE SUMMARY
[address] [facility]  Discharge- [address] 1975, 50 y o  female MRN: [id number]  Unit/Bed#: [address] 87 214-01 Encounter: [id number]  Primary Care Provider: Merlyn Rodriguez PA-C   Date and time admitted to hospital: 6/18/2021 12:26 PM    Admitting Provider:  Margie Schilling DO  Discharge Provider:  Mega Christian DO  Admission Date: 6/18/2021       Discharge Date: 06/23/21   LOS: 5  Primary Care Physician at Discharge: Cuate Pino [facility] [address]:  [address] is a 50 y o  female who presented  With a chief complaint of abnormal labs  The patient has a history of noncardiogenic leg swelling secondary to chronic lymphedema due to vascular insufficiency and history of DVT  She had no vomiting or diarrhea  And had notable hypokalemia and renal failure  The patient was admitted to the general medical floor, her diuretics were held and she received intravenous IV fluid  Her renal function did improve  She was subsequently discontinued on her diuretic  She was referred to outpatient lymphedema clinic  In her hospitalization she did have some nausea, her QTC prevented administration of Zofran, it was felt that this was likely secondary to hypotension  She was started on midodrine and her blood pressure did improve  At the time of discharge the patient's renal function had normalized, her potassium levels remained stable and she was medically cleared for discharge  The patient should follow-up with her PCP to determine resumption of diuretics and or workup for her chronic hypotension  Additionally she was found to have a abnormal CT imaging demonstrating ovarian mass  This is known to the patient that she should follow-up in the outpatient setting  At the time of discharge the patient will acute complaint, she was tolerating oral diet medically cleared for discharge    All questions answered to patient's satisfaction and she was in agreement discharge plan       Thank you for choosing [facility] for your healthcare needs  If I can be of any assistance in the future, please feel free to contact me  DISCHARGE DIAGNOSES  Prolonged Q-T interval on ECG  Assessment & Plan  Patient with prolonged QT on EKG  Recommend outpatient evaluation of psychiatric medication - hold all QT prolonging medication including Zofran      Severe protein-calorie malnutrition ([facility] [address] )  Assessment & Plan  Malnutrition Findings:   Adult Malnutrition type: Acute illness (related to poor appetite, abdominal pain, decreased oral intake/decreased food tolerance as evidenced by <50% energy intake > 5 days, 13% wt loss x 2 mo (3/5/21 261lb, 5/27/21 226lb)  )  Adult Degree of Malnutrition: Other severe protein calorie malnutrition (Treated with: Regular diet continuation, +ensure enlive BID)    BMI Findings: Body mass index is 40 26 kg/m²  Severe protein-calorie malnutrition in the setting of acute illness related to poor appetite, abdominal pain, decreased PO intake, decreased food tolerance as evidenced by <50% energy intake >5 days, 13% weight loss x 2 months, requiring dietician consults, regular diet and Ensure  SIRS (systemic inflammatory response syndrome) (HCC)  Assessment & Plan  · Without sepsis  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  · Elevated lactic acid secondary to hepatic steatosis and inability to clear  · Defer further antibiotics for now    Bipolar depression ([facility] )  Assessment & Plan  · Mood stable continue quetiapine lithium and venlafaxine    History of DVT (deep vein thrombosis)  Assessment & Plan  · History of DVT follows with vascular as outpatient  IVC recently removed  · 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)  Assessment & Plan  · Body mass index is 40 26 kg/m²  · Does have history of gastric bypass    Hypotension  Assessment & Plan  Edgardo javed ordered   Will discharge on midodrine      Ovarian cyst  Assessment & Plan  · Ovarian cyst/mass known to patient as she recently had an ultrasound to further characterize  · Will leave information for gynecology/oncology to evaluate as outpatient    Hypokalemia  Assessment & Plan  · Secondary to excessive diuretic intake  Repleted  Results from last 7 days   Lab Units 06/23/21  0540 06/22/21  1406 06/22/21  0443 06/21/21  0524 06/20/21  0517 06/19/21  0449 06/18/21  1318   POTASSIUM mmol/L 4 1 3 6 2 8* 3 3* 3 6 3 6 2 5*   Will replete intravenously and orally  Hypokalemia secondary to nausea and vomiting  Resolved    Acquired hypothyroidism  Assessment & Plan  · Continue levothyroxine 100 mcg daily    * ARF (acute renal failure) [facility])  Assessment & Plan    Results from last 7 days   Lab Units 06/23/21  0540 06/22/21  0443 06/21/21  0524 06/20/21  0517 06/19/21  0449 06/18/21  1318   BUN mg/dL 1* 1* 1* 4* 5 6   CREATININE mg/dL 0 87 0 70 0 86 1 01 1 55* 2 22*   EGFR ml/min/1 73sq m 80 104 81 66 40 26       Patient was on diuretics for lower extremity edema and had been recently increased likely causing this dehydration and acute kidney injury  Continue to hold diuretic-  Should discuss with PCP about resumption   Repeat BMP in 1 week  Still lightheaded - will place on midodrine scheduled  Resolved        CONSULTING PROVIDERS   None    PROCEDURES PERFORMED  * No surgery found *    RADIOLOGY RESULTS  CT abdomen pelvis wo contrast    Result Date: 6/18/2021  Narrative: CT ABDOMEN AND PELVIS WITHOUT IV CONTRAST INDICATION:   left sided abdominal pain  "Lab (Pt reports PCP called her telling her she had abnormal labs  Potassium 2 5, sodium 130   Pt reports dizziness, falls  No CP, SOB"  Patient has suspected COVID-19  COMPARISON:  None  TECHNIQUE:  CT examination of the abdomen and pelvis was performed without intravenous contrast   Axial, sagittal, and coronal 2D reformatted images were created from the source data and submitted for interpretation  Radiation dose length product (DLP) for this visit:  991 mGy-cm   This examination, like all CT scans performed in the [facility], was performed utilizing techniques to minimize radiation dose exposure, including the use of iterative reconstruction and automated exposure control  Enteric contrast was administered  FINDINGS: ABDOMEN LOWER CHEST:  No clinically significant abnormality identified in the visualized lower chest  LIVER/BILIARY TREE: Hepatic steatosis and a hepatomegaly  GALLBLADDER:  Gallbladder is surgically absent  SPLEEN:  Unremarkable  PANCREAS:  Unremarkable  ADRENAL GLANDS:  Unremarkable  KIDNEYS/URETERS:  Unremarkable  No hydronephrosis  STOMACH AND BOWEL:  Status post gastric bypass surgery without apparent complication  APPENDIX:  No findings to suggest appendicitis  ABDOMINOPELVIC CAVITY:  No ascites  No pneumoperitoneum  No lymphadenopathy  VESSELS:  Unremarkable for patient's age  PELVIS REPRODUCTIVE ORGANS: Complex pelvic cyst, likely an adnexal cyst located in the midline anterior pelvis with components extending into the right adnexa likely of right ovarian origin measuring 9 4 x 5 9 x 5 4 cm with a potential solid components  URINARY BLADDER:  Unremarkable  ABDOMINAL WALL/INGUINAL REGIONS:  Diffuse body wall edema  Focal areas of increased density throughout the anterior abdominal wall subcutaneous tissues in keeping with injection sites  OSSEOUS STRUCTURES:  No acute fracture or destructive osseous lesion       Impression: Complex pelvic cyst, located in the midline anterior pelvis both components extending into the right adnexa likely of right ovarian origin measuring 9 4 x 5 9 x 5 4 cm with a potential solid components may represent an ovarian malignancy  Follow-up with ultrasound characterization  The study was marked in [facility] for immediate notification  Workstation performed: [id number]     XR chest 2 views    Result Date: 6/18/2021  Narrative: CHEST INDICATION:   dyspnea  COMPARISON:  December 3, 2017  EXAM PERFORMED/VIEWS:  XR CHEST PA & LATERAL FINDINGS: Cardiomediastinal silhouette appears unremarkable  The lungs are clear  No pneumothorax or pleural effusion  Osseous structures appear within normal limits for patient age  Impression: No acute cardiopulmonary disease  Workstation performed: [id number]     XR hip/pelv 2-3 vws left    Result Date: 6/18/2021  Narrative: LEFT HIP INDICATION:   fall; pain  COMPARISON:  CT abdomen and pelvis dated 6/18/2021  VIEWS:  XR HIP/PELV 2-3 VWS LEFT  W PELVIS IF PERFORMED FINDINGS: There is no acute fracture or dislocation  No significant hip degenerative changes  No lytic or blastic osseous lesion  Phleboliths in the pelvis  The visualized lumbar spine is unremarkable  Impression: No acute osseous abnormality  Workstation performed: [id number]     VAS lower limb venous duplex study, complete bilateral    Result Date: 6/20/2021  Narrative:  [facility] REPORT CLINICAL: Indications: Patient presents with bilateral lower extremity pain swelling S/P IVC Filter Removal on 6/8/21  Operative History: 2020-08-04 Venography with IVC / Common iliac thrombolysis Gastric bypass IVC filter in [address] Factors The patient has history of Obesity  FINDINGS:  Segment       Right            Left                        Impression       Impression       CFV           Normal (Patent)  Normal (Patent)  GSV Inguinal  Normal (Patent)  Normal (Patent)     CONCLUSION:  Impression: RIGHT LOWER LIMB: No evidence of acute or chronic deep vein thrombosis  No evidence of superficial thrombophlebitis noted   Doppler evaluation shows a normal response to augmentation maneuvers  Popliteal, posterior tibial and anterior tibial arterial Doppler waveforms are triphasic  LEFT LOWER LIMB: No evidence of acute or chronic deep vein thrombosis  No evidence of superficial thrombophlebitis noted  Doppler evaluation shows a normal response to augmentation maneuvers  Popliteal, posterior tibial and anterior tibial arterial Doppler waveforms are triphasic  Technical findings shared with MADHU aPyton and posted to chart  SIGNATURE: Electronically Signed by: Shu Mata MD on 2021-06-20 11:27:48 PM    IR IVC filter removal    Result Date: 6/11/2021  Narrative: Inferior venacavogram Inferior venacaval filter removal Intravascular ultrasound of the inferior vena cava Venography and intravascular ultrasound of the left femoral and external iliac vein Intravascular ultrasound of the right common iliac vein Ultrasound-guided access of the right internal jugular vein Clinical History: Inferior vena cava filter (JANIE)  placed in 2017  Patient had vena cava thrombosis off anticoagulation treated with thrombolysis  She is now maintained on lifelong anticoagulation and filter retrieval is indicated  She has bilateral lower extremity venous swelling and the plan today is to remove the filter and assess for any treatable etiology of her lower extremity edema  Contrast: 50 cc Omnipaque Fluoro time: 8 2 minutes Anesthesia: Monitored anesthesia care Additional medications: Intravenous heparin Technique: Patient was previously seen in clinic  Prior CT venogram was reviewed  The patient was brought to the interventional radiology suite and identified verbally and by wrist band  The patient was placed supine on the table  Anesthesia was initiated   imaging was performed of the chest and the filter  The right internal jugular vein was evaluated as a potential access site with ultrasound  The vessel was found to be patent and compressible   Lidocaine was administered to the skin and a small skin incision was made  Under ultrasound guidance, the right internal vein was accessed using single wall Seldinger technique  Static images of real time needle entry into the vessel were obtained  A Amplatz wire was advanced into the inferior vena cava, and after tract dilatation, an 8-[place] sheath was placed  Angled catheters and wires were now used to select the right common iliac vein and intravascular ultrasound was performed of the inferior vena cava and right common iliac vein  Glidewire and angled catheter were now used to select the left femoral vein and intravascular ultrasound was performed of the left femoral vein to the mid thigh  This was followed by contrast venography of the left femoral, left external iliac, left common femoral vein  Venography of the inferior vena cava was performed with a  straight flush catheter  Decision was made to retrieve the filter  A [employer] filter retrieval set was assembled and inserted  The gooseneck loop snare component was then advanced through the sheath  The filter was re captured by sliding the inner sheath over it  This required several minutes of gentle traction  At one point the filter struts collapsed the cava and heparin was  immediately administered  Using the outer sheath component of the retrieval set the filter was captured  The filter was then successfully removed in its entirety  It was inspected on the table  Final inferior venacavogram was performed from multiple obliques  The sheath was removed and hemostasis obtained manual compression  Medical glue was applied  Findings: The preremoval examination demonstrates that there is no thrombus in the inferior vena cava or within the filter  The bilateral common iliac veins as well as the left femoral vein appear widely patent by venography  Complete removal of retrievable Theresa inferior vena cava filter  It was inspected outside the patient and all expected components were present  Follow-up inferior venacavogram demonstrated no extravasation  Intravascular ultrasound of the bilateral common iliac veins as well as the inferior vena cava and left common, external, and femoral veins reveals no extrinsic compression  There are no discernible post thrombotic changes of the pelvic venous system  There is some flattening of the left common iliac vein but an area reduction of only 26%  This does not meet criteria for intervention such as stent placement  Intravascular ultrasound reveals a widely patent inferior vena cava with normal expected appearance of the filter struts and no significant adherent thrombus  The patient tolerated the procedure without immediate complication  Specimens: Filter for gross pathology     Impression: Impression: Retrieval of a [place] inferior vena cava filter in its entirety  Widely patent inferior vena cava without thrombus  Intravascular ultrasound demonstrating widely patent veins without extrinsic compression  There are no discernible postthrombotic changes  There is no central/pelvic venous outflow problem causing the patient's lower extremity edema   Workstation performed: [id number]       LABS  Results from last 7 days   Lab Units 06/23/21  0540 06/22/21  0443 06/21/21  0524 06/19/21  0449 06/18/21  1318   WBC Thousand/uL 12 74* 10 86* 11 69* 10 26* 13 52*   HEMOGLOBIN g/dL 10 2* 8 6* 9 7* 8 9* 9 4*   HEMATOCRIT % 33 8* 27 9* 32 9* 27 9* 29 2*   MCV fL 94 92 95 90 90   PLATELETS Thousands/uL 281 243 276 233 253     Results from last 7 days   Lab Units 06/23/21  0540 06/22/21  1406 06/22/21  0443 06/21/21  0524 06/20/21  0517 06/19/21  0449 06/18/21  1318   SODIUM mmol/L 138  --  140 140 138 135* 130*   POTASSIUM mmol/L 4 1 3 6 2 8* 3 3* 3 6 3 6 2 5*   CHLORIDE mmol/L 106  --  107 104 102 97* 88*   CO2 mmol/L 22  --  24 24 26 27 29   BUN mg/dL 1*  --  1* 1* 4* 5 6   CREATININE mg/dL 0 87  --  0 70 0 86 1 01 1 55* 2 22*   CALCIUM mg/dL 8 6  --  8 0* 8 3 8 1* 7  4* 7 6*   ALBUMIN g/dL  --   --   --   --   --  1 6* 1 7*   TOTAL BILIRUBIN mg/dL  --   --   --   --   --  2 88* 2 73*   ALK PHOS U/L  --   --   --   --   --  387* 417*   ALT U/L  --   --   --   --   --  45 50   AST U/L  --   --   --   --   --  132* 144*   EGFR ml/min/1 73sq m 80  --  104 81 66 40 26   GLUCOSE RANDOM mg/dL 101  --  101 106 91 79 100     Results from last 7 days   Lab Units 06/18/21  1318   TROPONIN I ng/mL <0 02     Results from last 7 days   Lab Units 06/18/21  1318   NT-PRO BNP pg/mL 1,096*                      Results from last 7 days   Lab Units 06/19/21  0449 06/18/21  1450   LACTIC ACID mmol/L  --  3 7*   PROCALCITONIN ng/ml 1 35*  --            Cultures:   Results from last 7 days   Lab Units 06/18/21 2013   COLOR UA  Giselle   CLARITY UA  Turbid   SPEC GRAV UA  1 025   PH UA  5 0   LEUKOCYTES UA  Moderate*   NITRITE UA  Positive*   GLUCOSE UA mg/dl 100 (1/10%)*   KETONES UA mg/dl Trace*   BILIRUBIN UA  Interference- unable to analyze*   BLOOD UA  Moderate*      Results from last 7 days   Lab Units 06/18/21 2013   RBC UA /hpf 4-10*   WBC UA /hpf Innumerable*   EPITHELIAL CELLS WET PREP /hpf Innumerable*   BACTERIA UA /hpf Innumerable*      Results from last 7 days   Lab Units 06/18/21 2013 06/18/21  1523 06/18/21  1449   BLOOD CULTURE   --  No Growth After 4 Days  No Growth After 4 Days     URINE CULTURE  40,000-49,000 cfu/ml   --   --        PHYSICAL EXAM:  Vitals:   Blood Pressure: 100/69 (06/23/21 0722)  Pulse: 77 (06/23/21 0722)  Temperature: (!) 97 3 °F (36 3 °C) (06/23/21 0722)  Temp Source: Oral (06/23/21 4323)  Respirations: 16 (06/23/21 0722)  Height: 5' 3" (160 cm) (06/19/21 1255)  Weight - Scale: 103 kg (227 lb 4 7 oz) (06/18/21 1724)  SpO2: 96 % (06/23/21 0722)      General: well appearing, no acute distress  HEENT: atraumatic, PERRLA, moist mucosa, normal pharynx, normal tonsils and adenoids, normal tongue, no fluid in sinuses  Neck: Trachea midline, no carotid bruit, no masses  Respiratory: normal chest wall expansion, CTA B, no r/r/w, no rubs  Cardiovascular: RRR, no m/r/g, Normal S1 and S2  Abdomen: Soft, non-tender, non-distended, normal bowel sounds in all quadrants, no hepatosplenomegaly, no tympany  Rectal: deferred  Musculoskeletal: normal ROM in upper and lower extremities  Integumentary: warm, dry, and pink, with no rash, purpura, or petechia  Heme/Lymph:   Chronic lymphedema of the lower extremity  Neurological: Cranial Nerves II-XII grossly intact, no tics, normal sensation to pressure and light touch  Psychiatric: cooperative with normal mood, affect, and cognition      Discharge Disposition: Home/Self Care      Test Results Pending at Discharge:   Pending Labs     Order Current Status    Blood culture #1 Preliminary result    Blood culture #2 Preliminary result              Medications   · Summary of Medication Adjustments made as a result of this hospitalization:  Discontinue furosemide,  Midodrine scheduled  · Medication Dosing Tapers - Please refer to Discharge Medication List for details on any medication dosing tapers (if applicable to patient)  · Discharge Medication List: See after visit summary for reconciled discharge medications  Diet restrictions:         Diet Orders   (From admission, onward)             Start     Ordered    06/19/21 1305  Dietary nutrition supplements  Once     Question Answer Comment   Select Supplement: Ensure Enlive-Strawberry    Frequency Breakfast, Dinner        06/19/21 1305    06/18/21 1709  Diet Regular; Regular House  Diet effective now     Question Answer Comment   Diet Type Regular    Regular Regular House    RD to adjust diet per protocol? Yes        06/18/21 1708              Activity restrictions: No strenuous activity  Discharge Condition: good    Outpatient Follow-Up and Discharge Instructions  See after visit summary section titled Discharge Instructions for information provided to patient and family        Code Status: Level 1 - Full Code  Discharge Statement   I spent 35 minutes discharging the patient  This time was spent on the day of discharge  Greater than 50% of total time was spent with the patient and / or family counseling and / or coordination of care  ** Please Note: This note was completed in part utilizing M-Modal Fluency Direct Software  Grammatical errors, random word insertions, spelling mistakes, and incomplete sentences may be an occasional consequence of this system secondary to software limitations, ambient noise, and hardware issues  If you have any questions or concerns about the content, text, or information contained within the body of this dictation, please contact the provider for clarification  **

## 2021-06-23 NOTE — ASSESSMENT & PLAN NOTE
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

## 2021-06-23 NOTE — INCIDENTAL FINDINGS
The following findings require follow up:  Radiographic finding   Finding: CT abdomen pelvis wo contrast: Complex pelvic cyst, located in the midline anterior pelvis both components extending into the right adnexa likely of right ovarian origin measuring 9 4 x 5 9 x 5 4 cm with a potential solid components may represent an ovarian malignancy  , Follow-up with ultrasound characterization  , The study was marked in [facility] for immediate notification  , Workstation performed: [id number]   Follow up required: Yes   Follow up should be done within 1 week(s)    Please notify the following clinician to assist with the follow up:   PCP - Needs Pelvic US

## 2021-06-23 NOTE — PLAN OF CARE
Problem: Potential for Falls  Goal: Patient will remain free of falls  Description: INTERVENTIONS:  - Educate patient/family on patient safety including physical limitations  - Instruct patient to call for assistance with activity   - Consult OT/PT to assist with strengthening/mobility   - Keep Call bell within reach  - Keep bed low and locked with side rails adjusted as appropriate  - Keep care items and personal belongings within reach  - Initiate and maintain comfort rounds  Problem: PAIN - ADULT  Goal: Verbalizes/displays adequate comfort level or baseline comfort level  Description: Interventions:  - Encourage patient to monitor pain and request assistance  - Assess pain using appropriate pain scale  - Administer analgesics based on type and severity of pain and evaluate response  - Implement non-pharmacological measures as appropriate and evaluate response  - Consider cultural and social influences on pain and pain management  - Notify physician/advanced practitioner if interventions unsuccessful or patient reports new pain  Outcome: Adequate for Discharge     Problem: SAFETY ADULT  Goal: Patient will remain free of falls  Description: INTERVENTIONS:  - Educate patient/family on patient safety including physical limitations  - Instruct patient to call for assistance with activity   - Consult OT/PT to assist with strengthening/mobility   - Keep Call bell within reach  - Keep bed low and locked with side rails adjusted as appropriate  - Keep care items and personal belongings within reach  - Initiate and maintain comfort rounds  - Make Fall Risk Sign visible to staff  - Apply yellow socks and bracelet for high fall risk patients  - Consider moving patient to room near nurses station  Outcome: Adequate for Discharge  Goal: Maintain or return to baseline ADL function  Description: INTERVENTIONS:  -  Assess patient's ability to carry out ADLs; assess patient's baseline for ADL function and identify physical deficits which impact ability to perform ADLs (bathing, care of mouth/teeth, toileting, grooming, dressing, etc )  - Assess/evaluate cause of self-care deficits   - Assess range of motion  - Assess patient's mobility; develop plan if impaired  - Assess patient's need for assistive devices and provide as appropriate  - Encourage maximum independence but intervene and supervise when necessary  - Involve family in performance of ADLs  - Assess for home care needs following discharge   - Consider OT consult to assist with ADL evaluation and planning for discharge  - Provide patient education as appropriate  Outcome: Adequate for Discharge  Goal: Maintains/Returns to pre admission functional level  Description: INTERVENTIONS:  - Perform BMAT or MOVE assessment daily    - Set and communicate daily mobility goal to care team and patient/family/caregiver  - Collaborate with rehabilitation services on mobility goals if consulted  Problem: DISCHARGE PLANNING  Goal: Discharge to home or other facility with appropriate resources  Description: INTERVENTIONS:  - Identify barriers to discharge w/patient and caregiver  - Arrange for needed discharge resources and transportation as appropriate  - Identify discharge learning needs (meds, wound care, etc )  - Arrange for interpretive services to assist at discharge as needed  - Refer to Case Management Department for coordinating discharge planning if the patient needs post-hospital services based on physician/advanced practitioner order or complex needs related to functional status, cognitive ability, or social support system  Outcome: Adequate for Discharge     Problem: Knowledge Deficit  Goal: Patient/family/caregiver demonstrates understanding of disease process, treatment plan, medications, and discharge instructions  Description: Complete learning assessment and assess knowledge base    Interventions:  - Provide teaching at level of understanding  - Provide teaching via preferred learning methods  Outcome: Adequate for Discharge     Problem: Nutrition/Hydration-ADULT  Goal: Nutrient/Hydration intake appropriate for improving, restoring or maintaining nutritional needs  Description: Monitor and assess patient's nutrition/hydration status for malnutrition  Collaborate with interdisciplinary team and initiate plan and interventions as ordered  Monitor patient's weight and dietary intake as ordered or per policy  Utilize nutrition screening tool and intervene as necessary  Determine patient's food preferences and provide high-protein, high-caloric foods as appropriate       INTERVENTIONS:  - Monitor oral intake, urinary output, labs, and treatment plans  - Assess nutrition and hydration status and recommend course of action  - Evaluate amount of meals eaten  - Assist patient with eating if necessary   - Allow adequate time for meals  - Recommend/ encourage appropriate diets, oral nutritional supplements, and vitamin/mineral supplements  - Order, calculate, and assess calorie counts as needed  - Recommend, monitor, and adjust tube feedings and TPN/PPN based on assessed needs  - Assess need for intravenous fluids  - Provide specific nutrition/hydration education as appropriate  - Include patient/family/caregiver in decisions related to nutrition  Outcome: Adequate for Discharge     - Out of bed for toileting  - Record patient progress and toleration of activity level   Outcome: Adequate for Discharge     - Apply yellow socks and bracelet for high fall risk patients  - Consider moving patient to room near nurses station  Outcome: Adequate for Discharge

## 2021-06-23 NOTE — ASSESSMENT & PLAN NOTE
Results from last 7 days   Lab Units 06/23/21  0540 06/22/21  0443 06/21/21  0524 06/20/21  0517 06/19/21  0449 06/18/21  1318   BUN mg/dL 1* 1* 1* 4* 5 6   CREATININE mg/dL 0 87 0 70 0 86 1 01 1 55* 2 22*   EGFR ml/min/1 73sq m 80 104 81 66 40 26       Patient was on diuretics for lower extremity edema and had been recently increased likely causing this dehydration and acute kidney injury  Continue to hold diuretic-  Should discuss with PCP about resumption   Repeat BMP in 1 week  Still lightheaded - will place on midodrine scheduled  Resolved

## 2021-06-23 NOTE — ASSESSMENT & PLAN NOTE
· Secondary to excessive diuretic intake  Repleted      Results from last 7 days   Lab Units 06/23/21  0540 06/22/21  1406 06/22/21  0443 06/21/21  0524 06/20/21  0517 06/19/21  0449 06/18/21  1318   POTASSIUM mmol/L 4 1 3 6 2 8* 3 3* 3 6 3 6 2 5*   Will replete intravenously and orally  Hypokalemia secondary to nausea and vomiting  Resolved

## 2021-06-23 NOTE — CASE MANAGEMENT
IMM reviewed with patient including medicare rights  Form signed and placed in scan bin - pt agreeable to DC  Pt states she has family to transport  No further questions or concerns

## 2021-06-23 NOTE — DISCHARGE INSTR - AVS FIRST PAGE
Dear Lexie Kapadia,     It was our pleasure to care for you here at [facility]  It is our hope that we were always able to exceed the expected standards for your care during your stay  You were hospitalized due to acute kidney injury  You were cared for on the 2nd floor by Karyn Manrique DO with the [facility] who covers for your primary care physician (PCP), Henny Cleary PA-C, while you were hospitalized  If you have any questions or concerns related to this hospitalization, you may contact us at [phone]  For follow up as well as any medication refills, we recommend that you follow up with your primary care physician  A registered nurse will reach out to you by phone within a few days after your discharge to answer any additional questions that you may have after going home  However, at this time we provide for you here, the most important instructions / recommendations at discharge:     · Notable Medication Adjustments -   · Midodrine to help with high blood pressure  · Stop diuertics  · Testing Required after Discharge -   · BMP in one week  · Important follow up information -   · Will refer to Lymphedema clinic to help with your leg swelling  · Other Instructions -   ·   · Please review this entire after visit summary as additional general instructions including medication list, appointments, activity, diet, any pertinent wound care, and other additional recommendations from your care team that may be provided for you        Sincerely,     Karyn Manrique DO and Nurse Laura Srinivasan

## 2021-06-23 NOTE — NURSING NOTE
Patient discharged to home  Discharge instructions were reviewed with patient and daughter  Patients IV was removed  Patient agreed to follow up with PCP in 1-2 weeks    Patient was escorted out via wheelchair accompanied by RN

## 2021-06-24 DIAGNOSIS — D62 ACUTE BLOOD LOSS ANEMIA: Primary | ICD-10-CM

## 2021-06-24 DIAGNOSIS — D50.8 OTHER IRON DEFICIENCY ANEMIA: ICD-10-CM

## 2021-06-24 DIAGNOSIS — D50.8 IRON DEFICIENCY ANEMIA FOLLOWING BARIATRIC SURGERY: ICD-10-CM

## 2021-06-24 DIAGNOSIS — K95.89 IRON DEFICIENCY ANEMIA FOLLOWING BARIATRIC SURGERY: ICD-10-CM

## 2021-06-24 RX ORDER — SODIUM CHLORIDE 9 MG/ML
20 INJECTION, SOLUTION INTRAVENOUS ONCE
Status: CANCELLED | OUTPATIENT
Start: 2021-06-28

## 2021-07-07 ENCOUNTER — PREP FOR PROCEDURE (OUTPATIENT)
Dept: GASTROENTEROLOGY | Facility: CLINIC | Age: 46
End: 2021-07-07

## 2021-07-07 ENCOUNTER — TELEPHONE (OUTPATIENT)
Dept: GASTROENTEROLOGY | Facility: CLINIC | Age: 46
End: 2021-07-07

## 2021-07-07 DIAGNOSIS — K92.2 ACUTE GI BLEEDING: Primary | ICD-10-CM

## 2021-07-07 NOTE — TELEPHONE ENCOUNTER
Our mutual patient is scheduled for procedure: EGD    On: 7/21/21      With: Dr Mary Bennett is taking the following blood thinner: Lovenox    Can this be stopped 5 days prior to the procedure?       Physician Approving clearance: ________________________

## 2021-07-12 ENCOUNTER — HOSPITAL ENCOUNTER (OUTPATIENT)
Dept: INFUSION CENTER | Facility: CLINIC | Age: 46
Discharge: HOME | End: 2021-07-12
Payer: MEDICARE

## 2021-07-12 ENCOUNTER — TELEPHONE (OUTPATIENT)
Dept: HEMATOLOGY ONCOLOGY | Facility: CLINIC | Age: 46
End: 2021-07-12

## 2021-07-12 VITALS
RESPIRATION RATE: 18 BRPM | SYSTOLIC BLOOD PRESSURE: 113 MMHG | DIASTOLIC BLOOD PRESSURE: 76 MMHG | HEART RATE: 88 BPM | TEMPERATURE: 98.5 F

## 2021-07-12 DIAGNOSIS — K95.89 IRON DEFICIENCY ANEMIA FOLLOWING BARIATRIC SURGERY: ICD-10-CM

## 2021-07-12 DIAGNOSIS — D50.8 OTHER IRON DEFICIENCY ANEMIA: ICD-10-CM

## 2021-07-12 DIAGNOSIS — D50.8 IRON DEFICIENCY ANEMIA FOLLOWING BARIATRIC SURGERY: ICD-10-CM

## 2021-07-12 DIAGNOSIS — D62 ACUTE BLOOD LOSS ANEMIA: Primary | ICD-10-CM

## 2021-07-12 PROCEDURE — 96365 THER/PROPH/DIAG IV INF INIT: CPT

## 2021-07-12 RX ORDER — SODIUM CHLORIDE 9 MG/ML
20 INJECTION, SOLUTION INTRAVENOUS ONCE
Status: CANCELLED | OUTPATIENT
Start: 2021-07-19

## 2021-07-12 RX ORDER — SODIUM CHLORIDE 9 MG/ML
20 INJECTION, SOLUTION INTRAVENOUS ONCE
Status: COMPLETED | OUTPATIENT
Start: 2021-07-12 | End: 2021-07-12

## 2021-07-12 RX ADMIN — SODIUM CHLORIDE 20 ML/HR: 0.9 INJECTION, SOLUTION INTRAVENOUS at 09:55

## 2021-07-12 RX ADMIN — SODIUM CHLORIDE 200 MG: 9 INJECTION, SOLUTION INTRAVENOUS at 10:05

## 2021-07-13 ENCOUNTER — TELEPHONE (OUTPATIENT)
Dept: HEMATOLOGY ONCOLOGY | Facility: CLINIC | Age: 46
End: 2021-07-13

## 2021-07-13 ENCOUNTER — OFFICE VISIT (OUTPATIENT)
Dept: HEMATOLOGY ONCOLOGY | Facility: CLINIC | Age: 46
End: 2021-07-13
Payer: MEDICARE

## 2021-07-13 VITALS
BODY MASS INDEX: 41.89 KG/M2 | HEIGHT: 63 IN | HEART RATE: 85 BPM | DIASTOLIC BLOOD PRESSURE: 80 MMHG | RESPIRATION RATE: 18 BRPM | SYSTOLIC BLOOD PRESSURE: 122 MMHG | TEMPERATURE: 98.4 F | OXYGEN SATURATION: 100 % | WEIGHT: 236.4 LBS

## 2021-07-13 DIAGNOSIS — I82.220 IVC THROMBOSIS (HCC): ICD-10-CM

## 2021-07-13 DIAGNOSIS — L30.4 INTERTRIGO: ICD-10-CM

## 2021-07-13 DIAGNOSIS — D50.8 OTHER IRON DEFICIENCY ANEMIA: ICD-10-CM

## 2021-07-13 DIAGNOSIS — D50.8 IRON DEFICIENCY ANEMIA FOLLOWING BARIATRIC SURGERY: ICD-10-CM

## 2021-07-13 DIAGNOSIS — I89.0 LYMPHEDEMA OF UPPER EXTREMITY, BILATERAL: ICD-10-CM

## 2021-07-13 DIAGNOSIS — K95.89 IRON DEFICIENCY ANEMIA FOLLOWING BARIATRIC SURGERY: ICD-10-CM

## 2021-07-13 DIAGNOSIS — N83.8 OVARIAN MASS, RIGHT: Primary | ICD-10-CM

## 2021-07-13 PROCEDURE — 99215 OFFICE O/P EST HI 40 MIN: CPT | Performed by: PHYSICIAN ASSISTANT

## 2021-07-13 RX ORDER — CLOTRIMAZOLE 1 %
CREAM (GRAM) TOPICAL 2 TIMES DAILY
Qty: 30 G | Refills: 1 | Status: SHIPPED | OUTPATIENT
Start: 2021-07-13

## 2021-07-13 NOTE — PROGRESS NOTES
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  She is on Lovenox due to bariatric surgery status and possible malabsorption  Her D-dimer was normal   CT scan had showed resolution of clots  IVC filter has been removed  Patient was recommended transition to preventative dose of Lovenox however she still had 100 mg doses, she states she still has 5 boxes  Therefore she is on 100 mg once a day due to pre filled syringes of 100 mg  She was advised to hold her Lovenox 24 hours prior to EGD and colonoscopy on 07/21/2021  GI to then tell patient when to restart  2  Other iron deficiency anemia, 3  Iron deficiency anemia following bariatric surgery  Iron deficiency most recently related to GI bleed  She completed iron infusions  Hemoglobin has improved  Iron studies so adequate storage  She continues on oral iron once daily  She should continue to do this  - CBC and differential; Future  - Iron Panel (Includes Ferritin, Iron Sat%, Iron, and TIBC); Future  - Comprehensive metabolic panel; Future    4  Intertrigo  Intertrigo present under pannus as well in medial aspects of bilateral thighs  She states that last weekend she did go to a picnic and was outside for almost the entire weekend  Likely due to the heat and humidity and rubbing of skin is what caused this due to her body habitus  She has not used anything  Recommend washing the area with water and simple soap such as Dove, non scented letting and dry then applying cream twice a day  Viv Pichardo so that area is open to air    - clotrimazole (LOTRIMIN) 1 % cream; Apply topically 2 (two) times a day  Dispense: 30 g; Refill: 1    5   Ovarian mass, right  Also during recent hospitalization patient found have a complex cyst new the right adnexal region measuring 9 4 x 5 9 x 5 4 with potential solid components  Patient to be referred to gyn Oncology for likely surgical removal     - Ambulatory referral to Gynecologic Oncology; Future    6  Lymphedema of upper extremity, bilateral  Patient was referred to lymphedema therapy for last hospitalization  This has not been scheduled  Patient request order information for scheduling     - Ambulatory referral to PT/OT lymphedema therapy; Future    Follow up in 8 weeks  HPI:  57-year-old female presents for follow-up      Past medical history significant for hepatic steatosis, hypothyroidism, pulmonary embolism, IVC thrombosis, migraines, seizures, psychiatric disorder (bipolar disorder, SABINO), history of gastric bypass surgery ()     Patient presented to the emergency department on 2020 due to abdominal pain radiating into bilateral upper legs      She had a history of bilateral lower extremity DVT and PE in 2017 for which she had an IVC filter placed in [facility] in 2017  She was treated with Xarelto but developed a GI bleed in discontinued   Per patient this was unprovoked  Patient admitted to being off of anticoagulation during this admission and denied a previous workup for clotting disorder in the past      Family history significant for DVT and PE and her sister  Her sister  of PE  Of note her sister had lupus    Paternal uncle had DVT as well       A CT of the abdomen pelvis was completed on 2020   This showed known IVC filter, diffuse thrombus extending from the IVC filter inferiorly into the iliac vasculature, extensive surrounding fat stranding with thrombophlebitis, small amount of thrombus extends superior to the filter        A venous Doppler study of bilateral lower extremities were also completed on 2020 which was negative for DVT      She underwent lysis of thrombus with IR on       She returned to IR on 08/05/2020 for IR venogram      Patient was on heparin during hospitalization      Patient was seen by Dr Jordan on 08/07/2020 during hospitalization   Due to patient's history of gastric bypass surgery patient was recommended Lovenox 0 75 milligrams/kilogram subcu b i d        She had partial thrombosis workup in the hospital               Homocystine normal, 7 4              Protein S activity normal, 111              Protein C activity normal, 91 0              Factor 5 Leiden mutation negative              Prothrombin gene mutation negative              Anticardiolipin antibody IgA and IgG normal   IgM 18 which is a indeterminate results              Lupus anticoagulant negative               Beta-2 glycoproteins negative      Patient was discharged on Lovenox 0 75 milligram/kilogram   Discharge date was 08/09/2020      She presented back to the emergency department on 08/14/2020 due to abdominal pain and right leg pain      Patient was diagnosed with cellulitis of lower extremity, right, and received IV Ancef for 3 days in [place] on p o  Keflex      She had outpatient follow-up with vascular surgery on 08/21/2020   Recommendation for her venous disease included conservative measures and medical management   Patient was scheduled for follow-up in November 2020 with vascular surgery with a repeat IVC/iliac vein duplex and bilateral lower extremity DVT study       At consultation patient was recommended to have EGD and colonoscopy secondary to weight loss   She had an EGD and colonoscopy on 12/03/2020   This showed  anastomotic ulcer on the jejunal side   Colonoscopy showed 1 polyp measuring 5-9 mm in the sigmoid colon   A clip was placed to prevent bleeding secondary to anticoagulation  There is no of inadequate bowel prep and patient was recommended to have repeat in 6 months (June 2021)  Patient was admitted to the hospital to the ICU due to acute GI bleed from 5/2 - 5/5/21    She states that she was taking NSAIDs due to migraines even though she knew she was not supposed to do this  She had EGD on 05/02/2021 due to bleeding while inpatient and this showed single 5 mm crater drowned benign-appearing ulcer in the stomach  This was treated  Patient's Lovenox was held during parts of hospitalization then restarted on discharge      Patient is planned to have her IVC filter removed on 05/18/2021 however I note that this appears to have been canceled  She really had CT venogram in March 2021 which showed no evidence of thrombus  There was a incident right ovarian cyst which patient's PCP at [facility] ordered an ultrasound and patient was referred to a gyn at [facility] as well  Interval history:  Patient then admitted again on 6/18 - 6/23/21 due to MADHU and hypokalemia, both due to too much diuretic managed by PCP at [facility]    6/8/21 IVC filter was removed     Ferritin 141, continues on oral iron once per day     7/9/21   Potassium 3 2, PCP treating this     She still is taking 100 mg SQ Lovenox due to supply, still has 5 boxes left     7/21/21 - have EGD and colonoscopy     ROS: Review of Systems   Constitutional: Negative for appetite change, chills, fatigue (only sometimes ), fever and unexpected weight change  HENT: Negative for nosebleeds  Respiratory: Negative for cough and shortness of breath  Cardiovascular: Positive for leg swelling (bilateral, chronic)  Negative for chest pain and palpitations  Gastrointestinal: Negative for abdominal pain, constipation, diarrhea, nausea and vomiting  Genitourinary: Negative for difficulty urinating, dysuria and hematuria  Musculoskeletal: Negative for arthralgias  Skin: Positive for rash  Neurological: Negative for dizziness, weakness, light-headedness, numbness and headaches  Hematological: Negative  Psychiatric/Behavioral: Negative          Past Medical History:   Diagnosis Date    Anemia     Bipolar depression ([facility] [address] )     bipolar II, suicide    History of DVT (deep vein thrombosis)     History of gastric bypass     Pulmonary embolism (HCC)     Seizures (HCC)        Past Surgical History:   Procedure Laterality Date    APPENDECTOMY      Open    CHOLECYSTECTOMY      Laparoscopic    GASTRIC BYPASS      was 205 date of surgery    IR DVT THROMBOLYSIS/THROMBECTOMY ILIAC/IVC WITH VENOGRAM  8/4/2020    IR IVC FILTER REMOVAL  6/8/2021    IR TPA LYSIS CHECK  8/5/2020    IVC FILTER INSERTION  2017    STOMACH SURGERY      for morbid obesity    TUBAL LIGATION Bilateral 2010       Social History     Socioeconomic History    Marital status: Single     Spouse name: None    Number of children: None    Years of education: None    Highest education level: None   Occupational History    Occupation:      Comment: [employer]   Tobacco Use    Smoking status: Never Smoker    Smokeless tobacco: Never Used    Tobacco comment: former quit in 1999 per [employer]   Vaping Use    Vaping Use: Every day    Substances: THC   Substance and Sexual Activity    Alcohol use: Yes     Comment: ocassional    Drug use: Yes     Types: Marijuana    Sexual activity: Yes   Other Topics Concern    None   Social History Narrative    Sleep 6 in 24 hours    Caffeine use; 2 cps coffee per day    Uses seatbelts             Social Determinants of Health     Financial Resource Strain:     Difficulty of Paying Living Expenses:    Food Insecurity:     Worried About Running Out of Food in the Last Year:     Ran Out of Food in the Last Year:    Transportation Needs:     Lack of Transportation (Medical):      Lack of Transportation (Non-Medical):    Physical Activity:     Days of Exercise per Week:     Minutes of Exercise per Session:    Stress:     Feeling of Stress :    Social Connections:     Frequency of Communication with Friends and Family:     Frequency of Social Gatherings with Friends and Family:     Attends [employer] Services:     Active Member of Clubs or Organizations:     Attends Club or Organization Meetings:     Marital Status:    Intimate Partner Violence:     Fear of Current or Ex-Partner:     Emotionally Abused:     Physically Abused:     Sexually Abused:        Family History   Problem Relation Age of Onset    Other Mother         headache    Hypertension Mother     Depression Mother     Arthritis Father     Other Father         H, pylori infection    Other Sister         esophageal reflux    Migraines Sister     No Known Problems Paternal Grandfather     Diabetes Paternal Aunt         Mellitus    Breast cancer Paternal Aunt     Diabetes Paternal Uncle         Mellitus    Liver cancer Paternal Uncle     Asthma Daughter     No Known Problems Maternal Grandmother     No Known Problems Maternal Grandfather     No Known Problems Paternal Grandmother     No Known Problems Brother     No Known Problems Sister     No Known Problems Sister     Asthma Daughter     No Known Problems Maternal Aunt        Allergies   Allergen Reactions    Morphine Seizures         Current Outpatient Medications:     atoMOXetine (STRATTERA) 60 mg capsule, TAKE 1 CAPSULE (60 MG) BY ORAL ROUTE ONCE DAILY IN THE MORNING, Disp: , Rfl:     enoxaparin (LOVENOX) 100 mg/mL, Inject 100 mg under the skin daily, Disp: , Rfl:     ferrous sulfate 325 (65 Fe) mg tablet, Take 325 mg by mouth daily with breakfast, Disp: , Rfl:     folic acid (FOLVITE) 1 mg tablet, Take 1 mg by mouth daily, Disp: , Rfl:     gabapentin (NEURONTIN) 400 mg capsule, Take 400 mg by mouth 3 (three) times a day, Disp: , Rfl:     levothyroxine 100 mcg tablet, Take 1 tablet (100 mcg total) by mouth daily in the early morning, Disp: 30 tablet, Rfl: 0    lithium carbonate (LITHOBID) 300 mg CR tablet, Take 300 mg by mouth 2 (two) times a day , Disp: , Rfl:     midodrine (PROAMATINE) 2 5 mg tablet, Take 1 tablet (2 5 mg total) by mouth 3 (three) times a day before meals, Disp: 90 tablet, Rfl: 0    Multiple Vitamin (MULTI-VITAMIN DAILY PO), Multi Vitamin  DAILY, Disp: , Rfl:     pantoprazole (PROTONIX) 40 mg tablet, Take 1 tablet (40 mg total) by mouth 2 (two) times a day before meals, Disp: 60 tablet, Rfl: 2    pyridoxine (VITAMIN B6) 100 mg tablet, Take 100 mg by mouth daily, Disp: , Rfl:     QUEtiapine (SEROquel) 100 mg tablet, Take 100 mg by mouth 3 (three) times a day 100 mg t i d  and 400 mg at bedtime, Disp: , Rfl:     QUEtiapine (SEROquel) 400 MG tablet, Take 400 mg by mouth daily at bedtime, Disp: , Rfl:     rOPINIRole (REQUIP) 1 mg tablet, TAKE 1 TABLET (1 MG) BY ORAL ROUTE 1-3 HOURS BEFORE BEDTIME, Disp: , Rfl:     SUMAtriptan (IMITREX) 100 mg tablet, Take 100 mg by mouth as needed for migraine , Disp: , Rfl:     thiamine 100 MG tablet, Daily, Disp: , Rfl:     topiramate (TOPAMAX) 100 mg tablet, Take 150 mg by mouth 2 (two) times a day , Disp: , Rfl:     venlafaxine (EFFEXOR-XR) 150 mg 24 hr capsule, Take 150 mg by mouth daily , Disp: , Rfl:     clotrimazole (LOTRIMIN) 1 % cream, Apply topically 2 (two) times a day, Disp: 30 g, Rfl: 1  No current facility-administered medications for this visit  Physical Exam:  /80 (BP Location: Left arm, Patient Position: Sitting, Cuff Size: Adult)   Pulse 85   Temp 98 4 °F (36 9 °C) (Temporal)   Resp 18   Ht 5' 3" (1 6 m)   Wt 107 kg (236 lb 6 4 oz)   LMP 10/04/2020   SpO2 100%   BMI 41 88 kg/m²     Physical Exam  Vitals reviewed  Constitutional:       General: She is not in acute distress  Appearance: She is well-developed  She is obese  HENT:      Head: Normocephalic and atraumatic  Eyes:      General: No scleral icterus  Conjunctiva/sclera: Conjunctivae normal    Cardiovascular:      Rate and Rhythm: Normal rate and regular rhythm  Heart sounds: Normal heart sounds  No murmur heard       Pulmonary:      Effort: Pulmonary effort is normal  No respiratory distress  Breath sounds: Normal breath sounds  Abdominal:      Palpations: Abdomen is soft  Tenderness: There is no abdominal tenderness  Musculoskeletal:         General: No tenderness  Normal range of motion  Cervical back: Normal range of motion and neck supple  Right lower leg: Edema (trace) present  Left lower leg: Edema (trace) present  Lymphadenopathy:      Cervical: No cervical adenopathy  Skin:     General: Skin is warm and dry  Comments: Intertrigo present in medial thighs and under pannus    Neurological:      Mental Status: She is alert and oriented to person, place, and time  Cranial Nerves: No cranial nerve deficit  Labs:  Lab Results   Component Value Date    WBC 21 2 (H) 07/12/2021    HGB 10 8 (L) 07/12/2021    HCT 33 6 (L) 07/12/2021    MCV 87 0 07/12/2021     07/12/2021     Lab Results   Component Value Date    K 4 1 06/23/2021     06/23/2021    CO2 22 06/23/2021    BUN 1 (L) 06/23/2021    CREATININE 0 87 06/23/2021    GLUF 99 08/16/2020    CALCIUM 8 6 06/23/2021    CORRECTEDCA 9 3 06/19/2021     (H) 06/19/2021    ALT 45 06/19/2021    ALKPHOS 387 (H) 06/19/2021    EGFR 80 06/23/2021       Patient voiced understanding and agreement in the above discussion  Aware to contact our office with questions/symptoms in the interim  This note has been generated by voice recognition software system  Therefore, there may be spelling, grammar, and or syntax errors  Please contact if questions arise

## 2021-07-13 NOTE — TELEPHONE ENCOUNTER
Patient to hold for 24 hours only  I told patient this today with visit  Please call to remind her over the phone as well  GI physician to fetermine when she can restart Lovenox after procedure

## 2021-07-13 NOTE — TELEPHONE ENCOUNTER
New Patient Request   Patient Details:     Lee Escobedo      1975      [id number]      Reason for Appointment   Who is calling to schedule? Physician Office   If not Patient, what is their name? Clarissa Vilchis    What is the diagnosis? Ovarian mass, right   Who is the referring doctor? Ting Pham   Scheduling Information   Which department are you scheduling with ? [address]   Best Number to call back on? If calling from the [facility], use the Nurse number   [phone]   Miscellaneous Information:     Please advise the patient, a new patient  will be calling them back within 1 business day

## 2021-07-14 ENCOUNTER — TELEPHONE (OUTPATIENT)
Dept: HEMATOLOGY ONCOLOGY | Facility: CLINIC | Age: 46
End: 2021-07-14

## 2021-07-14 NOTE — TELEPHONE ENCOUNTER
Intra-Department Referral    Patient Details:  Mimi Torres  1975  [id number]   Background Information:  [address] starts by opening a telephone encounter and gathering the following information   Who is calling to schedule and relationship? Patient   Diagnosis Ovarian mass, right   Is this Cancer or Non-Cancer? Non-Cancer   What department was patient seen in last? Medical Onc   Scheduling Information:    Preferred [facility]   Are there any days the patient cannot be seen? Miscellaneous: 6/18 CT abdomen pelvis wo contrast  Paperwork mailed and pt will arrive early should she not receive paperwork  Pt has no access to printer  After completing the above information, please route to nurse navigation, clinical trials (for re-evaluation) and [place]

## 2021-07-16 LAB
BASOPHILS # BLD AUTO: 148 CELLS/UL (ref 0–200)
BASOPHILS NFR BLD AUTO: 0.7 %
EOSINOPHIL # BLD AUTO: 954 CELLS/UL (ref 15–500)
EOSINOPHIL NFR BLD AUTO: 4.5 %
ERYTHROCYTE [DISTWIDTH] IN BLOOD BY AUTOMATED COUNT: 19 % (ref 11–15)
FERRITIN SERPL-MCNC: 141 NG/ML (ref 16–232)
HCT VFR BLD AUTO: 33.6 % (ref 35–45)
HGB BLD-MCNC: 10.8 G/DL (ref 11.7–15.5)
IRON SERPL-MCNC: 250 MCG/DL (ref 40–190)
LYMPHOCYTES # BLD AUTO: 2926 CELLS/UL (ref 850–3900)
LYMPHOCYTES NFR BLD AUTO: 13.8 %
MCH RBC QN AUTO: 28 PG (ref 27–33)
MCHC RBC AUTO-ENTMCNC: 32.1 G/DL (ref 32–36)
MCV RBC AUTO: 87 FL (ref 80–100)
MONOCYTES # BLD AUTO: 2014 CELLS/UL (ref 200–950)
MONOCYTES NFR BLD AUTO: 9.5 %
NEUTROPHILS # BLD AUTO: ABNORMAL CELLS/UL (ref 1500–7800)
NEUTROPHILS NFR BLD AUTO: 71.5 %
PLATELET # BLD AUTO: 320 THOUSAND/UL (ref 140–400)
PMV BLD REES-ECKER: 12.4 FL (ref 7.5–12.5)
RBC # BLD AUTO: 3.86 MILLION/UL (ref 3.8–5.1)
WBC # BLD AUTO: 21.2 THOUSAND/UL (ref 3.8–10.8)

## 2021-07-19 ENCOUNTER — TELEPHONE (OUTPATIENT)
Dept: GASTROENTEROLOGY | Facility: MEDICAL CENTER | Age: 46
End: 2021-07-19

## 2021-07-19 ENCOUNTER — HOSPITAL ENCOUNTER (OUTPATIENT)
Dept: INFUSION CENTER | Facility: CLINIC | Age: 46
Discharge: HOME | End: 2021-07-19
Payer: MEDICARE

## 2021-07-19 VITALS
HEART RATE: 102 BPM | SYSTOLIC BLOOD PRESSURE: 114 MMHG | RESPIRATION RATE: 18 BRPM | TEMPERATURE: 97.4 F | DIASTOLIC BLOOD PRESSURE: 77 MMHG

## 2021-07-19 DIAGNOSIS — K95.89 IRON DEFICIENCY ANEMIA FOLLOWING BARIATRIC SURGERY: ICD-10-CM

## 2021-07-19 DIAGNOSIS — D62 ACUTE BLOOD LOSS ANEMIA: Primary | ICD-10-CM

## 2021-07-19 DIAGNOSIS — D50.8 OTHER IRON DEFICIENCY ANEMIA: ICD-10-CM

## 2021-07-19 DIAGNOSIS — D50.8 IRON DEFICIENCY ANEMIA FOLLOWING BARIATRIC SURGERY: ICD-10-CM

## 2021-07-19 PROCEDURE — 96365 THER/PROPH/DIAG IV INF INIT: CPT

## 2021-07-19 RX ORDER — SODIUM CHLORIDE 9 MG/ML
20 INJECTION, SOLUTION INTRAVENOUS ONCE
Status: CANCELLED | OUTPATIENT
Start: 2021-07-19

## 2021-07-19 RX ORDER — SODIUM CHLORIDE 9 MG/ML
20 INJECTION, SOLUTION INTRAVENOUS ONCE
Status: COMPLETED | OUTPATIENT
Start: 2021-07-19 | End: 2021-07-19

## 2021-07-19 RX ADMIN — SODIUM CHLORIDE 20 ML/HR: 0.9 INJECTION, SOLUTION INTRAVENOUS at 08:55

## 2021-07-19 RX ADMIN — SODIUM CHLORIDE 200 MG: 9 INJECTION, SOLUTION INTRAVENOUS at 09:02

## 2021-07-19 NOTE — PLAN OF CARE
Problem: Potential for Falls  Goal: Patient will remain free of falls  Description: INTERVENTIONS:  - Educate patient/family on patient safety including physical limitations  - Instruct patient to call for assistance with activity   - Consult OT/PT to assist with strengthening/mobility   - Keep Call bell within reach  - Keep bed low and locked with side rails adjusted as appropriate  - Keep care items and personal belongings within reach  - Initiate and maintain comfort rounds  - Make Fall Risk Sign visible to staff      - Apply yellow socks and bracelet for high fall risk patients  - Consider moving patient to room near nurses station  Outcome: Progressing

## 2021-07-19 NOTE — PROGRESS NOTES
Pt  Tolerated Venofer without adverse event  Pt  Will follow up with physician  Pt  Has no future infusion appts at this time  AVS provided

## 2021-07-19 NOTE — TELEPHONE ENCOUNTER
Patient called and wanted to reschedule her EGD and Colonoscopy  Please call patient to reschedule  Thank you

## 2021-07-20 ENCOUNTER — CONSULT (OUTPATIENT)
Dept: GYNECOLOGIC ONCOLOGY | Facility: CLINIC | Age: 46
End: 2021-07-20
Payer: MEDICARE

## 2021-07-20 ENCOUNTER — TELEPHONE (OUTPATIENT)
Dept: NEPHROLOGY | Facility: CLINIC | Age: 46
End: 2021-07-20

## 2021-07-20 VITALS
TEMPERATURE: 95.4 F | SYSTOLIC BLOOD PRESSURE: 140 MMHG | WEIGHT: 233 LBS | OXYGEN SATURATION: 80 % | HEART RATE: 81 BPM | RESPIRATION RATE: 16 BRPM | BODY MASS INDEX: 41.29 KG/M2 | DIASTOLIC BLOOD PRESSURE: 80 MMHG | HEIGHT: 63 IN

## 2021-07-20 DIAGNOSIS — I26.99 PULMONARY EMBOLISM, UNSPECIFIED CHRONICITY, UNSPECIFIED PULMONARY EMBOLISM TYPE, UNSPECIFIED WHETHER ACUTE COR PULMONALE PRESENT (HCC): ICD-10-CM

## 2021-07-20 DIAGNOSIS — N83.8 OVARIAN MASS, RIGHT: ICD-10-CM

## 2021-07-20 DIAGNOSIS — I87.009 POST-THROMBOTIC SYNDROME: ICD-10-CM

## 2021-07-20 DIAGNOSIS — K70.30 ALCOHOLIC CIRRHOSIS OF LIVER WITHOUT ASCITES (HCC): ICD-10-CM

## 2021-07-20 DIAGNOSIS — Z86.711 HX OF PULMONARY EMBOLUS: ICD-10-CM

## 2021-07-20 DIAGNOSIS — F31.70 BIPOLAR DISORDER IN FULL REMISSION, MOST RECENT EPISODE UNSPECIFIED TYPE (HCC): ICD-10-CM

## 2021-07-20 DIAGNOSIS — R01.1 MURMUR, CARDIAC: ICD-10-CM

## 2021-07-20 DIAGNOSIS — N83.201 CYST OF RIGHT OVARY: Primary | ICD-10-CM

## 2021-07-20 DIAGNOSIS — Z86.718 HISTORY OF DVT (DEEP VEIN THROMBOSIS): ICD-10-CM

## 2021-07-20 DIAGNOSIS — E66.01 MORBID OBESITY WITH BMI OF 50.0-59.9, ADULT (HCC): ICD-10-CM

## 2021-07-20 DIAGNOSIS — D68.59 HYPERCOAGULATION SYNDROME (HCC): ICD-10-CM

## 2021-07-20 PROBLEM — R65.10 SIRS (SYSTEMIC INFLAMMATORY RESPONSE SYNDROME) (HCC): Status: RESOLVED | Noted: 2021-06-18 | Resolved: 2021-07-20

## 2021-07-20 PROBLEM — L03.90 CELLULITIS: Status: RESOLVED | Noted: 2020-08-14 | Resolved: 2021-07-20

## 2021-07-20 PROBLEM — N17.9 ARF (ACUTE RENAL FAILURE) (HCC): Status: RESOLVED | Noted: 2021-06-18 | Resolved: 2021-07-20

## 2021-07-20 PROBLEM — E87.2 LACTIC ACID ACIDOSIS: Status: RESOLVED | Noted: 2021-05-01 | Resolved: 2021-07-20

## 2021-07-20 PROBLEM — E87.20 LACTIC ACID ACIDOSIS: Status: RESOLVED | Noted: 2021-05-01 | Resolved: 2021-07-20

## 2021-07-20 PROBLEM — Z95.828 PRESENCE OF IVC FILTER: Status: RESOLVED | Noted: 2020-08-21 | Resolved: 2021-07-20

## 2021-07-20 PROCEDURE — 99205 OFFICE O/P NEW HI 60 MIN: CPT | Performed by: OBSTETRICS & GYNECOLOGY

## 2021-07-20 RX ORDER — GABAPENTIN 100 MG/1
200 CAPSULE ORAL ONCE
Status: CANCELLED | OUTPATIENT
Start: 2021-08-13 | End: 2021-07-20

## 2021-07-20 RX ORDER — HEPARIN SODIUM 5000 [USP'U]/ML
7500 INJECTION, SOLUTION INTRAVENOUS; SUBCUTANEOUS
Status: CANCELLED | OUTPATIENT
Start: 2021-08-13 | End: 2021-08-14

## 2021-07-20 RX ORDER — ACETAMINOPHEN 325 MG/1
975 TABLET ORAL ONCE
Status: CANCELLED | OUTPATIENT
Start: 2021-08-13 | End: 2021-07-20

## 2021-07-20 RX ORDER — DOXYCYCLINE HYCLATE 20 MG
TABLET ORAL
COMMUNITY
Start: 2021-07-19

## 2021-07-20 NOTE — ASSESSMENT & PLAN NOTE
Patient high risk for perioperative complications and poor healing  Will limit extent of surgery depending on intraoperative findings and history of cirrhosis  MELD laboratory factors seem appropriate for proposed minimally invasive surgical procedure  Check preop labs

## 2021-07-20 NOTE — ASSESSMENT & PLAN NOTE
Approximately 9 5 cm right complex pelvic mass  I discussed with patient differential diagnosis including benign, borderline malignant pathology  We discussed possibility of observation versus definitive intervention  After discussing pros and cons of both approaches she wishes to proceed with definitive surgery  I have recommended and she has consented to proceed with robotic resection of pelvic mass, possible hysterectomy, bilateral salpingo-oophorectomy, staging, laparotomy, tumor debulking and all other indicated procedures  Patient reports poor exercise tolerance  On exam, I also appreciate what appears to be a grade 3 holosystolic murmur  Last echocardiogram and August of 2020  However, given plan for major surgery I plan to refer her to cardiology for evaluation clearance  Will obtain preoperative testing as per procedure pass  See below for plan for perioperative anticoagulation

## 2021-07-20 NOTE — TELEPHONE ENCOUNTER
Received a call from patient's OBGYN office requesting an appointment prior to a surgical procedure scheduled for August 13th  I informed them that I was going to make some calls to switch a consult appointment to get the patient in prior to August 13th       Germaine's call back number is [phone]

## 2021-07-20 NOTE — H&P (VIEW-ONLY)
Assessment/Plan:    Problem List Items Addressed This Visit        Digestive    Alcoholic cirrhosis of liver without ascites [facility])       Patient high risk for perioperative complications and poor healing  Will limit extent of surgery depending on intraoperative findings and history of cirrhosis  MELD laboratory factors seem appropriate for proposed minimally invasive surgical procedure  Check preop labs  Relevant Orders    Protime-INR    Comprehensive metabolic panel       Endocrine    Ovarian cyst - Primary       Approximately 9 5 cm right complex pelvic mass  I discussed with patient differential diagnosis including benign, borderline malignant pathology  We discussed possibility of observation versus definitive intervention  After discussing pros and cons of both approaches she wishes to proceed with definitive surgery  I have recommended and she has consented to proceed with robotic resection of pelvic mass, possible hysterectomy, bilateral salpingo-oophorectomy, staging, laparotomy, tumor debulking and all other indicated procedures  Patient reports poor exercise tolerance  On exam, I also appreciate what appears to be a grade 3 holosystolic murmur  Last echocardiogram and August of 2020  However, given plan for major surgery I plan to refer her to cardiology for evaluation clearance  Will obtain preoperative testing as per procedure pass  See below for plan for perioperative anticoagulation           Relevant Orders    Case request operating room: DIAGNOSTIC LAPAROSCOPY FOLLOWED BY POSSIBLE ROBOTIC RESECTION OF PELVIC MASS, POSSIBLE HYSTERECTOMY, BILATERAL SALPINGO-OOPHORECTOMY, STAGING, LAPAROTOMY, TUMOR DEBULKING AND ALL OTHER INDICATED PROCEDURES  (Completed)    Ambulatory referral to Nephrology    HEMOGLOBIN A1C W/ EAG ESTIMATION    Type and screen    Ambulatory referral to Cardiology       Cardiovascular and Mediastinum    Post-thrombotic syndrome    RESOLVED: [facility] embol[facility] ([facility] )       Other    Hx of pulmonary embolus    Bipolar disorder ([facility] )    Morbid obesity with BMI of 50 0-59 9, adult ([facility] )    Hypercoagulation syndrome ([facility] )      Patient with history of recurrent venous thromboembolism with prior IVC filters ( none currently) and therapeutic anticoagulation  She was evaluated extensively by Hematology  Currently plan is to transition to prophylactic Lovenox suspecting malabsorption of oral agents  She is currently receiving 100 mg subQ daily and there were plans to decreased doses further  Given plan for upcoming surgery, will plan to maintain perioperatively at current doses  She will use her last dose of Lovenox 24 hours prior to surgery  On day of surgery will use heparin 7500 mg subcu t i d  to then transition her back to her usual doses  Closely monitor for signs or symptoms of venous thromboembolism  I have discussed her case with her hematology provider Lisa Coleman PA-C           History of DVT (deep vein thrombosis)      Other Visit Diagnoses     Ovarian mass, right        Relevant Orders    Case request operating room: DIAGNOSTIC LAPAROSCOPY FOLLOWED BY POSSIBLE ROBOTIC RESECTION OF PELVIC MASS, POSSIBLE HYSTERECTOMY, BILATERAL SALPINGO-OOPHORECTOMY, STAGING, LAPAROTOMY, TUMOR DEBULKING AND ALL OTHER INDICATED PROCEDURES  (Completed)    Ambulatory referral to Nephrology    HEMOGLOBIN A1C W/ EAG ESTIMATION    Type and screen    Ambulatory referral to Cardiology    Protime-INR    CBC and differential    Comprehensive metabolic panel        Murmur, cardiac        Relevant Orders    Ambulatory referral to Cardiology              CHIEF COMPLAINT:    here for evaluation and treatment recommendations for pelvic mass      Patient ID: Lexie Kapadia is a 50 y o  female  HPI   22-year-old  perimenopausal morbidly obese white female with complex medical history including previous Oc-en-Y gastric bypass surgery with suspected malabsorption, bipolar disorder, ADHD, recurrent venous thromboembolism requiring IVC filters twice and therapeutic anticoagulation twice in the past   This has been fully evaluated by Hematology who now has her on Lovenox 100 mg subcutaneous daily with plans to titrate down to prophylactic doses  Her last menstrual period was in October of 2020  She presents today after recent hospitalization in which abdominal CT demonstrated incidental pelvic mass  There is an approximately 9 5 cm complex right pelvic mass  Patient denies significant pain  Some pelvic pressure  Denies abnormal vaginal bleeding, drainage or discharge  No changes in bowel or bladder function  No prior history of abnormal Paps or other gynecologic pathology  Mammogram up-to-date  Review of Systems    Chronic lower extremity edema/post thrombotic syndrome  Recurrent lower extremity cellulitis on chronic bacterial suppression with doxycycline  Episodes of low blood pressure for which Midodrinewas previously prescribed, not taking now  Reports poor exercise tolerance with lower extremity heaviness and shortness of breath on exertion  Twelve point review of systems otherwise noncontributory    Current Outpatient Medications   Medication Sig Dispense Refill    atoMOXetine (STRATTERA) 60 mg capsule TAKE 1 CAPSULE (60 MG) BY ORAL ROUTE ONCE DAILY IN THE MORNING      clotrimazole (LOTRIMIN) 1 % cream Apply topically 2 (two) times a day 30 g 1    doxycycline (PERIOSTAT) 20 MG tablet TAKE 1 TABLET BY MOUTH TWICE A DAY WITH FOOD (NOT DAIRY)      enoxaparin (LOVENOX) 100 mg/mL Inject 100 mg under the skin daily      ferrous sulfate 325 (65 Fe) mg tablet Take 325 mg by mouth daily with breakfast      folic acid (FOLVITE) 1 mg tablet Take 1 mg by mouth daily      gabapentin (NEURONTIN) 400 mg capsule Take 400 mg by mouth 3 (three) times a day      levothyroxine 100 mcg tablet Take 1 tablet (100 mcg total) by mouth daily in the early morning 30 tablet 0    lithium carbonate (LITHOBID) 300 mg CR tablet Take 300 mg by mouth 2 (two) times a day       midodrine (PROAMATINE) 2 5 mg tablet Take 1 tablet (2 5 mg total) by mouth 3 (three) times a day before meals 90 tablet 0    Multiple Vitamin (MULTI-VITAMIN DAILY PO) Multi Vitamin   DAILY      pantoprazole (PROTONIX) 40 mg tablet Take 1 tablet (40 mg total) by mouth 2 (two) times a day before meals 60 tablet 2    pyridoxine (VITAMIN B6) 100 mg tablet Take 100 mg by mouth daily      QUEtiapine (SEROquel) 100 mg tablet Take 100 mg by mouth 3 (three) times a day 100 mg t i d  and 400 mg at bedtime      QUEtiapine (SEROquel) 400 MG tablet Take 400 mg by mouth daily at bedtime      rOPINIRole (REQUIP) 1 mg tablet TAKE 1 TABLET (1 MG) BY ORAL ROUTE 1-3 HOURS BEFORE BEDTIME      SUMAtriptan (IMITREX) 100 mg tablet Take 100 mg by mouth as needed for migraine       thiamine 100 MG tablet Daily      topiramate (TOPAMAX) 100 mg tablet Take 150 mg by mouth 2 (two) times a day       venlafaxine (EFFEXOR-XR) 150 mg 24 hr capsule Take 150 mg by mouth daily        No current facility-administered medications for this visit         Allergies   Allergen Reactions    Morphine Seizures       Past Medical History:   Diagnosis Date    Anemia     Bipolar depression ([facility] )     bipolar II, suicide    History of DVT (deep vein thrombosis)     History of gastric bypass     Pulmonary embolism (HCC)     Seizures (HCC)        Past Surgical History:   Procedure Laterality Date    APPENDECTOMY      Open    CHOLECYSTECTOMY      Laparoscopic    GASTRIC BYPASS      was 205 date of surgery    IR DVT THROMBOLYSIS/THROMBECTOMY ILIAC/IVC WITH VENOGRAM  2020    IR IVC FILTER REMOVAL  2021    IR TPA LYSIS CHECK  2020    IVC FILTER INSERTION      STOMACH SURGERY      for morbid obesity    TUBAL LIGATION Bilateral        OB History        2    Para   2    Term   2            AB        Living           SAB        TAB Ectopic        Multiple        Live Births                     Family History   Problem Relation Age of Onset    Other Mother         headache    Hypertension Mother     Depression Mother     Arthritis Father     Other Father         H, pylori infection    Other Sister         esophageal reflux    Migraines Sister     No Known Problems Paternal Grandfather     Diabetes Paternal Aunt         Mellitus    Breast cancer Paternal Aunt     Diabetes Paternal Uncle         Mellitus    Liver cancer Paternal Uncle     Asthma Daughter     No Known Problems Maternal Grandmother     No Known Problems Maternal Grandfather     No Known Problems Paternal Grandmother     No Known Problems Brother     No Known Problems Sister     No Known Problems Sister     Asthma Daughter     No Known Problems Maternal Aunt        The following portions of the patient's history were reviewed and updated as appropriate: allergies, current medications, past family history, past medical history, past social history, past surgical history and problem list       Objective:    Blood pressure 140/80, pulse 81, temperature (!) 95 4 °F (35 2 °C), temperature source Tympanic, resp  rate 16, height 5' 3" (1 6 m), weight 106 kg (233 lb), last menstrual period 10/04/2020, SpO2 (!) 80 %  Body mass index is 41 27 kg/m²  Physical Exam  Vitals reviewed  Exam conducted with a chaperone present  Constitutional:       General: She is not in acute distress  Appearance: She is obese  She is not ill-appearing  Eyes:      General: No scleral icterus  Right eye: No discharge  Left eye: No discharge  Conjunctiva/sclera: Conjunctivae normal    Cardiovascular:      Rate and Rhythm: Normal rate and regular rhythm  Heart sounds: Murmur (  Harsh grade 3 holosystolic murmur) heard  Pulmonary:      Effort: Pulmonary effort is normal       Breath sounds: Normal breath sounds  No stridor  No wheezing     Abdominal: General: There is no distension  Palpations: Abdomen is soft  There is no mass  Tenderness: There is no abdominal tenderness  There is no guarding  Comments: Obese without significant overhanging pannus   Genitourinary:     Comments: Normal external female genitalia  Normal [place]'s and [place]'s glands  Normal urethral meatus and no evidence of urethral discharge or masses  Bladder without fullness mass or tenderness  Vagina without lesion or discharge  No significant pelvic organ prolapse noted  Cervix grossly normal appearing without visible lesions  Bimanual exam limited by body habitus, I am unable to palpate pelvic masses  Anus without fissure of lesion  Musculoskeletal:      Right lower leg: Edema present  Left lower leg: Edema present  Comments: Bilateral lower extremity skin trophic changes consistent with post thrombotic syndrome           No results found for:   Lab Results   Component Value Date    WBC 21 2 (H) 07/12/2021    HGB 10 8 (L) 07/12/2021    HCT 33 6 (L) 07/12/2021    MCV 87 0 07/12/2021     07/12/2021     Lab Results   Component Value Date    K 4 1 06/23/2021     06/23/2021    CO2 22 06/23/2021    BUN 1 (L) 06/23/2021    CREATININE 0 87 06/23/2021    GLUF 99 08/16/2020    CALCIUM 8 6 06/23/2021    CORRECTEDCA 9 3 06/19/2021     (H) 06/19/2021    ALT 45 06/19/2021    ALKPHOS 387 (H) 06/19/2021    EGFR 80 06/23/2021     Study Result    Narrative & Impression   CT ABDOMEN AND PELVIS WITHOUT IV CONTRAST     INDICATION:   left sided abdominal pain  "Lab (Pt reports PCP called her telling her she had abnormal labs  Potassium 2 5, sodium 130  Pt reports dizziness, falls   No CP, SOB"  Patient has suspected COVID-19      COMPARISON:  None      TECHNIQUE:  CT examination of the abdomen and pelvis was performed without intravenous contrast   Axial, sagittal, and coronal 2D reformatted images were created from the source data and submitted for interpretation       Radiation dose length product (DLP) for this visit:  991 mGy-cm   This examination, like all CT scans performed in the [facility], was performed utilizing techniques to minimize radiation dose exposure, including the use of iterative   reconstruction and automated exposure control       Enteric contrast was administered       FINDINGS:     ABDOMEN     LOWER CHEST:  No clinically significant abnormality identified in the visualized lower chest      LIVER/BILIARY TREE: Hepatic steatosis and a hepatomegaly      GALLBLADDER:  Gallbladder is surgically absent      SPLEEN:  Unremarkable      PANCREAS:  Unremarkable      ADRENAL GLANDS:  Unremarkable      KIDNEYS/URETERS:  Unremarkable  No hydronephrosis      STOMACH AND BOWEL:  Status post gastric bypass surgery without apparent complication      APPENDIX:  No findings to suggest appendicitis      ABDOMINOPELVIC CAVITY:  No ascites  No pneumoperitoneum  No lymphadenopathy      VESSELS:  Unremarkable for patient's age      PELVIS     REPRODUCTIVE ORGANS: Complex pelvic cyst, likely an adnexal cyst located in the midline anterior pelvis with components extending into the right adnexa likely of right ovarian origin measuring 9 4 x 5 9 x 5 4 cm with a potential solid components      URINARY BLADDER:  Unremarkable      ABDOMINAL WALL/INGUINAL REGIONS:  Diffuse body wall edema   Focal areas of increased density throughout the anterior abdominal wall subcutaneous tissues in keeping with injection sites      OSSEOUS STRUCTURES:  No acute fracture or destructive osseous lesion      IMPRESSION:     Complex pelvic cyst, located in the midline anterior pelvis both components extending into the right adnexa likely of right ovarian origin measuring 9 4 x 5 9 x 5 4 cm with a potential solid components may represent an ovarian malignancy      Follow-up with ultrasound characterization      The study was marked in EPIC for immediate notification            Workstation performed: [id number]     Farhan De León MD, [id number] [id number]  7/20/2021  12:16 PM no

## 2021-07-20 NOTE — ASSESSMENT & PLAN NOTE
Patient with history of recurrent venous thromboembolism with prior IVC filters ( none currently) and therapeutic anticoagulation  She was evaluated extensively by Hematology  Currently plan is to transition to prophylactic Lovenox suspecting malabsorption of oral agents  She is currently receiving 100 mg subQ daily and there were plans to decreased doses further  Given plan for upcoming surgery, will plan to maintain perioperatively at current doses  She will use her last dose of Lovenox 24 hours prior to surgery  On day of surgery will use heparin 7500 mg subcu t i d  to then transition her back to her usual doses  Closely monitor for signs or symptoms of venous thromboembolism

## 2021-07-20 NOTE — ASSESSMENT & PLAN NOTE
Patient with history of recurrent venous thromboembolism with prior IVC filters ( none currently) and therapeutic anticoagulation  She was evaluated extensively by Hematology  Currently plan is to transition to prophylactic Lovenox suspecting malabsorption of oral agents  She is currently receiving 100 mg subQ daily and there were plans to decreased doses further  Given plan for upcoming surgery, will plan to maintain perioperatively at current doses  She will use her last dose of Lovenox 24 hours prior to surgery  On day of surgery will use heparin 7500 mg subcu t i d  to then transition her back to her usual doses  Closely monitor for signs or symptoms of venous thromboembolism        I have discussed her case with her hematology provider Kath Mike PA-C

## 2021-07-20 NOTE — PROGRESS NOTES
Assessment/Plan:    Problem List Items Addressed This Visit        Digestive    Alcoholic cirrhosis of liver without ascites [facility])       Patient high risk for perioperative complications and poor healing  Will limit extent of surgery depending on intraoperative findings and history of cirrhosis  MELD laboratory factors seem appropriate for proposed minimally invasive surgical procedure  Check preop labs  Relevant Orders    Protime-INR    Comprehensive metabolic panel       Endocrine    Ovarian cyst - Primary       Approximately 9 5 cm right complex pelvic mass  I discussed with patient differential diagnosis including benign, borderline malignant pathology  We discussed possibility of observation versus definitive intervention  After discussing pros and cons of both approaches she wishes to proceed with definitive surgery  I have recommended and she has consented to proceed with robotic resection of pelvic mass, possible hysterectomy, bilateral salpingo-oophorectomy, staging, laparotomy, tumor debulking and all other indicated procedures  Patient reports poor exercise tolerance  On exam, I also appreciate what appears to be a grade 3 holosystolic murmur  Last echocardiogram and August of 2020  However, given plan for major surgery I plan to refer her to cardiology for evaluation clearance  Will obtain preoperative testing as per procedure pass  See below for plan for perioperative anticoagulation           Relevant Orders    Case request operating room: DIAGNOSTIC LAPAROSCOPY FOLLOWED BY POSSIBLE ROBOTIC RESECTION OF PELVIC MASS, POSSIBLE HYSTERECTOMY, BILATERAL SALPINGO-OOPHORECTOMY, STAGING, LAPAROTOMY, TUMOR DEBULKING AND ALL OTHER INDICATED PROCEDURES  (Completed)    Ambulatory referral to Nephrology    HEMOGLOBIN A1C W/ EAG ESTIMATION    Type and screen    Ambulatory referral to Cardiology       Cardiovascular and Mediastinum    Post-thrombotic syndrome    RESOLVED: [facility] embolism ([facility] )       Other    Hx of pulmonary embolus    Bipolar disorder ([facility] )    Morbid obesity with BMI of 50 0-59 9, adult ([facility] )    Hypercoagulation syndrome ([facility] )      Patient with history of recurrent venous thromboembolism with prior IVC filters ( none currently) and therapeutic anticoagulation  She was evaluated extensively by Hematology  Currently plan is to transition to prophylactic Lovenox suspecting malabsorption of oral agents  She is currently receiving 100 mg subQ daily and there were plans to decreased doses further  Given plan for upcoming surgery, will plan to maintain perioperatively at current doses  She will use her last dose of Lovenox 24 hours prior to surgery  On day of surgery will use heparin 7500 mg subcu t i d  to then transition her back to her usual doses  Closely monitor for signs or symptoms of venous thromboembolism  I have discussed her case with her hematology provider Marty Carter PA-C           History of DVT (deep vein thrombosis)      Other Visit Diagnoses     Ovarian mass, right        Relevant Orders    Case request operating room: DIAGNOSTIC LAPAROSCOPY FOLLOWED BY POSSIBLE ROBOTIC RESECTION OF PELVIC MASS, POSSIBLE HYSTERECTOMY, BILATERAL SALPINGO-OOPHORECTOMY, STAGING, LAPAROTOMY, TUMOR DEBULKING AND ALL OTHER INDICATED PROCEDURES  (Completed)    Ambulatory referral to Nephrology    HEMOGLOBIN A1C W/ EAG ESTIMATION    Type and screen    Ambulatory referral to Cardiology    Protime-INR    CBC and differential    Comprehensive metabolic panel        Murmur, cardiac        Relevant Orders    Ambulatory referral to Cardiology              CHIEF COMPLAINT:    here for evaluation and treatment recommendations for pelvic mass      Patient ID: Milagro Camargo is a 50 y o  female  HPI   47-year-old  perimenopausal morbidly obese white female with complex medical history including previous Oc-en-Y gastric bypass surgery with suspected malabsorption, bipolar disorder, ADHD, recurrent venous thromboembolism requiring IVC filters twice and therapeutic anticoagulation twice in the past   This has been fully evaluated by Hematology who now has her on Lovenox 100 mg subcutaneous daily with plans to titrate down to prophylactic doses  Her last menstrual period was in October of 2020  She presents today after recent hospitalization in which abdominal CT demonstrated incidental pelvic mass  There is an approximately 9 5 cm complex right pelvic mass  Patient denies significant pain  Some pelvic pressure  Denies abnormal vaginal bleeding, drainage or discharge  No changes in bowel or bladder function  No prior history of abnormal Paps or other gynecologic pathology  Mammogram up-to-date  Review of Systems    Chronic lower extremity edema/post thrombotic syndrome  Recurrent lower extremity cellulitis on chronic bacterial suppression with doxycycline  Episodes of low blood pressure for which Midodrinewas previously prescribed, not taking now  Reports poor exercise tolerance with lower extremity heaviness and shortness of breath on exertion  Twelve point review of systems otherwise noncontributory    Current Outpatient Medications   Medication Sig Dispense Refill    atoMOXetine (STRATTERA) 60 mg capsule TAKE 1 CAPSULE (60 MG) BY ORAL ROUTE ONCE DAILY IN THE MORNING      clotrimazole (LOTRIMIN) 1 % cream Apply topically 2 (two) times a day 30 g 1    doxycycline (PERIOSTAT) 20 MG tablet TAKE 1 TABLET BY MOUTH TWICE A DAY WITH FOOD (NOT DAIRY)      enoxaparin (LOVENOX) 100 mg/mL Inject 100 mg under the skin daily      ferrous sulfate 325 (65 Fe) mg tablet Take 325 mg by mouth daily with breakfast      folic acid (FOLVITE) 1 mg tablet Take 1 mg by mouth daily      gabapentin (NEURONTIN) 400 mg capsule Take 400 mg by mouth 3 (three) times a day      levothyroxine 100 mcg tablet Take 1 tablet (100 mcg total) by mouth daily in the early morning 30 tablet 0    lithium carbonate (LITHOBID) 300 mg CR tablet Take 300 mg by mouth 2 (two) times a day       midodrine (PROAMATINE) 2 5 mg tablet Take 1 tablet (2 5 mg total) by mouth 3 (three) times a day before meals 90 tablet 0    Multiple Vitamin (MULTI-VITAMIN DAILY PO) Multi Vitamin   DAILY      pantoprazole (PROTONIX) 40 mg tablet Take 1 tablet (40 mg total) by mouth 2 (two) times a day before meals 60 tablet 2    pyridoxine (VITAMIN B6) 100 mg tablet Take 100 mg by mouth daily      QUEtiapine (SEROquel) 100 mg tablet Take 100 mg by mouth 3 (three) times a day 100 mg t i d  and 400 mg at bedtime      QUEtiapine (SEROquel) 400 MG tablet Take 400 mg by mouth daily at bedtime      rOPINIRole (REQUIP) 1 mg tablet TAKE 1 TABLET (1 MG) BY ORAL ROUTE 1-3 HOURS BEFORE BEDTIME      SUMAtriptan (IMITREX) 100 mg tablet Take 100 mg by mouth as needed for migraine       thiamine 100 MG tablet Daily      topiramate (TOPAMAX) 100 mg tablet Take 150 mg by mouth 2 (two) times a day       venlafaxine (EFFEXOR-XR) 150 mg 24 hr capsule Take 150 mg by mouth daily        No current facility-administered medications for this visit         Allergies   Allergen Reactions    Morphine Seizures       Past Medical History:   Diagnosis Date    Anemia     Bipolar depression ([facility] )     bipolar II, suicide    History of DVT (deep vein thrombosis)     History of gastric bypass     Pulmonary embolism (HCC)     Seizures (HCC)        Past Surgical History:   Procedure Laterality Date    APPENDECTOMY      Open    CHOLECYSTECTOMY      Laparoscopic    GASTRIC BYPASS      was 205 date of surgery    IR DVT THROMBOLYSIS/THROMBECTOMY ILIAC/IVC WITH VENOGRAM  2020    IR IVC FILTER REMOVAL  2021    IR TPA LYSIS CHECK  2020    IVC FILTER INSERTION      STOMACH SURGERY      for morbid obesity    TUBAL LIGATION Bilateral        OB History        2    Para   2    Term   2            AB        Living           SAB        TAB Ectopic        Multiple        Live Births                     Family History   Problem Relation Age of Onset    Other Mother         headache    Hypertension Mother     Depression Mother     Arthritis Father     Other Father         H, pylori infection    Other Sister         esophageal reflux    Migraines Sister     No Known Problems Paternal Grandfather     Diabetes Paternal Aunt         Mellitus    Breast cancer Paternal Aunt     Diabetes Paternal Uncle         Mellitus    Liver cancer Paternal Uncle     Asthma Daughter     No Known Problems Maternal Grandmother     No Known Problems Maternal Grandfather     No Known Problems Paternal Grandmother     No Known Problems Brother     No Known Problems Sister     No Known Problems Sister     Asthma Daughter     No Known Problems Maternal Aunt        The following portions of the patient's history were reviewed and updated as appropriate: allergies, current medications, past family history, past medical history, past social history, past surgical history and problem list       Objective:    Blood pressure 140/80, pulse 81, temperature (!) 95 4 °F (35 2 °C), temperature source Tympanic, resp  rate 16, height 5' 3" (1 6 m), weight 106 kg (233 lb), last menstrual period 10/04/2020, SpO2 (!) 80 %  Body mass index is 41 27 kg/m²  Physical Exam  Vitals reviewed  Exam conducted with a chaperone present  Constitutional:       General: She is not in acute distress  Appearance: She is obese  She is not ill-appearing  Eyes:      General: No scleral icterus  Right eye: No discharge  Left eye: No discharge  Conjunctiva/sclera: Conjunctivae normal    Cardiovascular:      Rate and Rhythm: Normal rate and regular rhythm  Heart sounds: Murmur (  Harsh grade 3 holosystolic murmur) heard  Pulmonary:      Effort: Pulmonary effort is normal       Breath sounds: Normal breath sounds  No stridor  No wheezing     Abdominal: General: There is no distension  Palpations: Abdomen is soft  There is no mass  Tenderness: There is no abdominal tenderness  There is no guarding  Comments: Obese without significant overhanging pannus   Genitourinary:     Comments: Normal external female genitalia  Normal Bartholin's [place]'s glands  Normal urethral meatus and no evidence of urethral discharge or masses  Bladder without fullness mass or tenderness  Vagina without lesion or discharge  No significant pelvic organ prolapse noted  Cervix grossly normal appearing without visible lesions  Bimanual exam limited by body habitus, I am unable to palpate pelvic masses  Anus without fissure of lesion  Musculoskeletal:      Right lower leg: Edema present  Left lower leg: Edema present  Comments: Bilateral lower extremity skin trophic changes consistent with post thrombotic syndrome           No results found for:   Lab Results   Component Value Date    WBC 21 2 (H) 07/12/2021    HGB 10 8 (L) 07/12/2021    HCT 33 6 (L) 07/12/2021    MCV 87 0 07/12/2021     07/12/2021     Lab Results   Component Value Date    K 4 1 06/23/2021     06/23/2021    CO2 22 06/23/2021    BUN 1 (L) 06/23/2021    CREATININE 0 87 06/23/2021    GLUF 99 08/16/2020    CALCIUM 8 6 06/23/2021    CORRECTEDCA 9 3 06/19/2021     (H) 06/19/2021    ALT 45 06/19/2021    ALKPHOS 387 (H) 06/19/2021    EGFR 80 06/23/2021     Study Result    Narrative & Impression   CT ABDOMEN AND PELVIS WITHOUT IV CONTRAST     INDICATION:   left sided abdominal pain  "Lab (Pt reports PCP called her telling her she had abnormal labs  Potassium 2 5, sodium 130  Pt reports dizziness, falls   No CP, SOB"  Patient has suspected COVID-19      COMPARISON:  None      TECHNIQUE:  CT examination of the abdomen and pelvis was performed without intravenous contrast   Axial, sagittal, and coronal 2D reformatted images were created from the source data and submitted for interpretation       Radiation dose length product (DLP) for this visit:  991 mGy-cm   This examination, like all CT scans performed in the [facility], was performed utilizing techniques to minimize radiation dose exposure, including the use of iterative   reconstruction and automated exposure control       Enteric contrast was administered       FINDINGS:     ABDOMEN     LOWER CHEST:  No clinically significant abnormality identified in the visualized lower chest      LIVER/BILIARY TREE: Hepatic steatosis and a hepatomegaly      GALLBLADDER:  Gallbladder is surgically absent      SPLEEN:  Unremarkable      PANCREAS:  Unremarkable      ADRENAL GLANDS:  Unremarkable      KIDNEYS/URETERS:  Unremarkable  No hydronephrosis      STOMACH AND BOWEL:  Status post gastric bypass surgery without apparent complication      APPENDIX:  No findings to suggest appendicitis      ABDOMINOPELVIC CAVITY:  No ascites  No pneumoperitoneum  No lymphadenopathy      VESSELS:  Unremarkable for patient's age      PELVIS     REPRODUCTIVE ORGANS: Complex pelvic cyst, likely an adnexal cyst located in the midline anterior pelvis with components extending into the right adnexa likely of right ovarian origin measuring 9 4 x 5 9 x 5 4 cm with a potential solid components      URINARY BLADDER:  Unremarkable      ABDOMINAL WALL/INGUINAL REGIONS:  Diffuse body wall edema   Focal areas of increased density throughout the anterior abdominal wall subcutaneous tissues in keeping with injection sites      OSSEOUS STRUCTURES:  No acute fracture or destructive osseous lesion      IMPRESSION:     Complex pelvic cyst, located in the midline anterior pelvis both components extending into the right adnexa likely of right ovarian origin measuring 9 4 x 5 9 x 5 4 cm with a potential solid components may represent an ovarian malignancy      Follow-up with ultrasound characterization      The study was marked in EPIC for immediate notification            Workstation performed: [id number]     Chuck Askew MD, [address]  7/20/2021  12:16 PM

## 2021-07-20 NOTE — TELEPHONE ENCOUNTER
Called patient and was able to schedule an appointment in [facility] on 8/5  I have contacted OBGYN office to inform them of the scheduled appointment

## 2021-07-20 NOTE — PATIENT INSTRUCTIONS
Keep appointment for preoperative clearance with Cardiology and Nephrology  Preoperative testing as indicated  Surgical instructions as per operative packet  Use last dose of Lovenox 100 mg subcutaneously 24 hours before surgery  No Lovenox within 24 hours of planned surgery  No other medication should be taken on the day of surgery  Call us if you have any questions about your upcoming procedure

## 2021-07-22 ENCOUNTER — TELEPHONE (OUTPATIENT)
Dept: HEMATOLOGY ONCOLOGY | Facility: CLINIC | Age: 46
End: 2021-07-22

## 2021-07-22 ENCOUNTER — EVALUATION (OUTPATIENT)
Dept: PHYSICAL THERAPY | Facility: CLINIC | Age: 46
DRG: 690 | End: 2021-07-22
Payer: MEDICARE

## 2021-07-22 ENCOUNTER — TELEPHONE (OUTPATIENT)
Dept: GASTROENTEROLOGY | Facility: CLINIC | Age: 46
End: 2021-07-22

## 2021-07-22 DIAGNOSIS — I89.0 LYMPHEDEMA OF BOTH LOWER EXTREMITIES: Primary | ICD-10-CM

## 2021-07-22 DIAGNOSIS — Z86.718 HISTORY OF DVT (DEEP VEIN THROMBOSIS): ICD-10-CM

## 2021-07-22 DIAGNOSIS — I89.0 LYMPHEDEMA OF UPPER EXTREMITY, BILATERAL: ICD-10-CM

## 2021-07-22 DIAGNOSIS — M79.89 SWELLING OF LOWER EXTREMITY: Primary | ICD-10-CM

## 2021-07-22 DIAGNOSIS — M79.89 SWELLING OF LOWER EXTREMITY: ICD-10-CM

## 2021-07-22 DIAGNOSIS — M79.89 LEG SWELLING: ICD-10-CM

## 2021-07-22 PROCEDURE — 97162 PT EVAL MOD COMPLEX 30 MIN: CPT | Performed by: PHYSICAL THERAPIST

## 2021-07-22 NOTE — TELEPHONE ENCOUNTER
Bella calling in with [facility] requesting that patients referral be updated to state  Lymphedema of lower extremity, bilateral   Myrtice Drivers can be reached at [phone]

## 2021-07-22 NOTE — PROGRESS NOTES
PT Evaluation     Today's date: 2021  Patient name: Nancy Zheng  : 1975  MRN: [id number]  Referring provider: MADHU Albert*  Dx:   Encounter Diagnosis     ICD-10-CM    1  Lymphedema of both lower extremities  I89 0    2  Lymphedema of upper extremity, bilateral  I89 0 Ambulatory referral to PT/OT lymphedema therapy   3  Leg swelling  M79 [id number] Ambulatory referral to PT/OT lymphedema therapy   4  History of DVT (deep vein thrombosis)  [id number] [id number] Ambulatory referral to PT/OT lymphedema therapy                  Assessment  Assessment details: Nancy Zheng is a 50y o  year old female presenting to PT with pain, decreased range of motion,swelling and decreased tolerance to activity  Signs and symptoms are consistent with referring diagnosis of BLE lymphedema  She has not tolerated compression garments in the past and would benefit from home pneumatic compression pumps  Home exercise provided and all questions answered   Thank you for the referral     Impairments: abnormal or restricted ROM  Other impairment: BLE edema  Understanding of Dx/Px/POC: fair   Prognosis: fair    Goals  STGs to be achieved in 2 - 4 weeks  Demonstrate at least 75% compliance with self MLD  Demonstrate at least 75% compliance with self compression  Demonstrate at least 75% compliance with self skin and nail inspection  Free from s/s of infection  Demonstrate understanding of importance of compliance with POC    LTGs to be achieved in 4 - 6 weeks  Demonstrate at least 100% compliance with self MLD  Demonstrate at least 100% compliance with self compression  Demonstrate at least 100% compliance with self skin and nail inspection  Free from s/s of infection  Demonstrate understanding of day/night compression wearing schedule  Obtain alternative compression to ensure independence with self management    Plan  Planned therapy interventions: compression  Frequency: 1x week  Duration in weeks: 4        Subjective Evaluation    History of Present Illness  Mechanism of injury: Khushboo Flores reports several months of intermittent BLE edema with PMH of DVT and IVC filter removal and cellulitis  Symptoms have not responded well to water pill and she has not been able to tolerate compression garment wear  She reports requiring home health aid to assist her with LE care     Pain  Current pain ratin  At best pain ratin  At worst pain ratin  Location: BLE  Quality: burning, throbbing and tight  Progression: no change    Patient Goals  Patient goals for therapy: decreased edema, decreased pain and increased motion          Objective    LE girth measurements  Right Left    Metatarsals     Malleolus 22 9 23 5   +10 cm  35 8 34 0   +20 cm  48 4 51 3   Knee joint line 47 5 51 6   +40 cm  68 8 73 4   +50 cm  77 5 80 0   inseam 68 cm      - Hemosiderin staining    + Skin discoloration = redness+itching  - Open wounds/Infection     - Peau d'orange   - Weeping    + Pitting edema   + Skin dryness/flaking          Precautions: hx DVT, IVC filter removal      Manuals                                                                 Neuro Re-Ed                                                                                                        Ther Ex                                                                                                                     Ther Activity                                       Gait Training                                       Modalities

## 2021-07-22 NOTE — TELEPHONE ENCOUNTER
Our mutual patient is scheduled for procedure: On: 11/18/21     With: Dr Pee Lindquist is taking the following blood thinner:  Lovenox     Can this be stopped ______ days prior to the procedure?       Physician Approving clearance: ________________________

## 2021-07-22 NOTE — TELEPHONE ENCOUNTER
Colon/egd scheduled for 11/18/21 @[facility] with Dr Laith Azul  Repeat colon due to poor prep  2day dulcolax/miralax prep instructions given to pt verbally/mailed  Pt offered other dates but  Pt did not want to go anywhere but [facility]

## 2021-07-24 ENCOUNTER — HOSPITAL ENCOUNTER (INPATIENT)
Facility: HOSPITAL | Age: 46
LOS: 2 days | Discharge: HOME HEALTH | DRG: 690 | End: 2021-07-27
Attending: EMERGENCY MEDICINE | Admitting: INTERNAL MEDICINE
Payer: MEDICARE

## 2021-07-24 ENCOUNTER — APPOINTMENT (EMERGENCY)
Dept: NON INVASIVE DIAGNOSTICS | Facility: HOSPITAL | Age: 46
DRG: 690 | End: 2021-07-24
Payer: MEDICARE

## 2021-07-24 ENCOUNTER — APPOINTMENT (EMERGENCY)
Dept: CT IMAGING | Facility: HOSPITAL | Age: 46
DRG: 690 | End: 2021-07-24
Payer: MEDICARE

## 2021-07-24 DIAGNOSIS — R42 DIZZINESS: ICD-10-CM

## 2021-07-24 DIAGNOSIS — N39.0 UTI (URINARY TRACT INFECTION): Primary | ICD-10-CM

## 2021-07-24 DIAGNOSIS — R53.1 GENERALIZED WEAKNESS: ICD-10-CM

## 2021-07-24 DIAGNOSIS — N83.201 RIGHT OVARIAN CYST: ICD-10-CM

## 2021-07-24 DIAGNOSIS — Z98.84 HISTORY OF GASTRIC BYPASS: ICD-10-CM

## 2021-07-24 DIAGNOSIS — E87.6 HYPOKALEMIA: ICD-10-CM

## 2021-07-24 DIAGNOSIS — D72.829 LEUKOCYTOSIS: ICD-10-CM

## 2021-07-24 DIAGNOSIS — R51.9 HEADACHE: ICD-10-CM

## 2021-07-24 LAB
ALBUMIN SERPL BCP-MCNC: 1.4 G/DL (ref 3.5–5)
ALP SERPL-CCNC: 250 U/L (ref 46–116)
ALT SERPL W P-5'-P-CCNC: 25 U/L (ref 12–78)
AMMONIA PLAS-SCNC: <10 UMOL/L (ref 11–35)
ANION GAP SERPL CALCULATED.3IONS-SCNC: 6 MMOL/L (ref 4–13)
APTT PPP: 51 SECONDS (ref 23–37)
AST SERPL W P-5'-P-CCNC: 74 U/L (ref 5–45)
ATRIAL RATE: 88 BPM
BACTERIA UR QL AUTO: ABNORMAL /HPF
BASOPHILS # BLD AUTO: 0.1 THOUSANDS/ΜL (ref 0–0.1)
BASOPHILS NFR BLD AUTO: 1 % (ref 0–1)
BILIRUB SERPL-MCNC: 1.3 MG/DL (ref 0.2–1)
BILIRUB UR QL STRIP: NEGATIVE
BUN SERPL-MCNC: 2 MG/DL (ref 5–25)
CALCIUM ALBUM COR SERPL-MCNC: 10.5 MG/DL (ref 8.3–10.1)
CALCIUM SERPL-MCNC: 8.4 MG/DL (ref 8.3–10.1)
CHLORIDE SERPL-SCNC: 109 MMOL/L (ref 100–108)
CK SERPL-CCNC: 18 U/L (ref 26–192)
CLARITY UR: CLEAR
CO2 SERPL-SCNC: 25 MMOL/L (ref 21–32)
COLOR UR: YELLOW
COLOR, POC: YELLOW
CREAT SERPL-MCNC: 0.79 MG/DL (ref 0.6–1.3)
EOSINOPHIL # BLD AUTO: 0.16 THOUSAND/ΜL (ref 0–0.61)
EOSINOPHIL NFR BLD AUTO: 1 % (ref 0–6)
ERYTHROCYTE [DISTWIDTH] IN BLOOD BY AUTOMATED COUNT: 22.9 % (ref 11.6–15.1)
EXT PREG TEST URINE: NEGATIVE
EXT. CONTROL ED NAV: NORMAL
GFR SERPL CREATININE-BSD FRML MDRD: 89 ML/MIN/1.73SQ M
GLUCOSE SERPL-MCNC: 90 MG/DL (ref 65–140)
GLUCOSE UR STRIP-MCNC: NEGATIVE MG/DL
HCT VFR BLD AUTO: 34.7 % (ref 34.8–46.1)
HGB BLD-MCNC: 10.5 G/DL (ref 11.5–15.4)
HGB UR QL STRIP.AUTO: NEGATIVE
IMM GRANULOCYTES # BLD AUTO: 0.11 THOUSAND/UL (ref 0–0.2)
IMM GRANULOCYTES NFR BLD AUTO: 1 % (ref 0–2)
INR PPP: 1.35 (ref 0.84–1.19)
KETONES UR STRIP-MCNC: NEGATIVE MG/DL
LACTATE SERPL-SCNC: 0.9 MMOL/L (ref 0.5–2)
LEUKOCYTE ESTERASE UR QL STRIP: ABNORMAL
LIPASE SERPL-CCNC: 40 U/L (ref 73–393)
LYMPHOCYTES # BLD AUTO: 1.99 THOUSANDS/ΜL (ref 0.6–4.47)
LYMPHOCYTES NFR BLD AUTO: 12 % (ref 14–44)
MCH RBC QN AUTO: 28.7 PG (ref 26.8–34.3)
MCHC RBC AUTO-ENTMCNC: 30.3 G/DL (ref 31.4–37.4)
MCV RBC AUTO: 95 FL (ref 82–98)
MONOCYTES # BLD AUTO: 1.35 THOUSAND/ΜL (ref 0.17–1.22)
MONOCYTES NFR BLD AUTO: 8 % (ref 4–12)
NEUTROPHILS # BLD AUTO: 12.39 THOUSANDS/ΜL (ref 1.85–7.62)
NEUTS SEG NFR BLD AUTO: 77 % (ref 43–75)
NITRITE UR QL STRIP: POSITIVE
NON-SQ EPI CELLS URNS QL MICRO: ABNORMAL /HPF
NRBC BLD AUTO-RTO: 0 /100 WBCS
P AXIS: 42 DEGREES
PH UR STRIP.AUTO: 7 [PH] (ref 4.5–8)
PLATELET # BLD AUTO: 380 THOUSANDS/UL (ref 149–390)
PMV BLD AUTO: 12.6 FL (ref 8.9–12.7)
POTASSIUM SERPL-SCNC: 3.3 MMOL/L (ref 3.5–5.3)
PR INTERVAL: 162 MS
PROT SERPL-MCNC: 7.1 G/DL (ref 6.4–8.2)
PROT UR STRIP-MCNC: NEGATIVE MG/DL
PROTHROMBIN TIME: 16.4 SECONDS (ref 11.6–14.5)
QRS AXIS: 12 DEGREES
QRSD INTERVAL: 98 MS
QT INTERVAL: 428 MS
QTC INTERVAL: 517 MS
RBC # BLD AUTO: 3.66 MILLION/UL (ref 3.81–5.12)
RBC #/AREA URNS AUTO: ABNORMAL /HPF
SODIUM SERPL-SCNC: 140 MMOL/L (ref 136–145)
SP GR UR STRIP.AUTO: 1.01 (ref 1–1.03)
T WAVE AXIS: 0 DEGREES
TROPONIN I SERPL-MCNC: <0.02 NG/ML
UROBILINOGEN UR QL STRIP.AUTO: 1 E.U./DL
VENTRICULAR RATE: 88 BPM
WBC # BLD AUTO: 16.1 THOUSAND/UL (ref 4.31–10.16)
WBC #/AREA URNS AUTO: ABNORMAL /HPF

## 2021-07-24 PROCEDURE — 83605 ASSAY OF LACTIC ACID: CPT | Performed by: PHYSICIAN ASSISTANT

## 2021-07-24 PROCEDURE — 99220 PR INITIAL OBSERVATION CARE/DAY 70 MINUTES: CPT | Performed by: INTERNAL MEDICINE

## 2021-07-24 PROCEDURE — 81001 URINALYSIS AUTO W/SCOPE: CPT

## 2021-07-24 PROCEDURE — 85025 COMPLETE CBC W/AUTO DIFF WBC: CPT | Performed by: PHYSICIAN ASSISTANT

## 2021-07-24 PROCEDURE — 70498 CT ANGIOGRAPHY NECK: CPT

## 2021-07-24 PROCEDURE — 80053 COMPREHEN METABOLIC PANEL: CPT | Performed by: PHYSICIAN ASSISTANT

## 2021-07-24 PROCEDURE — 85730 THROMBOPLASTIN TIME PARTIAL: CPT | Performed by: PHYSICIAN ASSISTANT

## 2021-07-24 PROCEDURE — 82550 ASSAY OF CK (CPK): CPT | Performed by: PHYSICIAN ASSISTANT

## 2021-07-24 PROCEDURE — 87040 BLOOD CULTURE FOR BACTERIA: CPT | Performed by: PHYSICIAN ASSISTANT

## 2021-07-24 PROCEDURE — 87086 URINE CULTURE/COLONY COUNT: CPT | Performed by: INTERNAL MEDICINE

## 2021-07-24 PROCEDURE — 87186 SC STD MICRODIL/AGAR DIL: CPT | Performed by: INTERNAL MEDICINE

## 2021-07-24 PROCEDURE — 87077 CULTURE AEROBIC IDENTIFY: CPT | Performed by: INTERNAL MEDICINE

## 2021-07-24 PROCEDURE — 70496 CT ANGIOGRAPHY HEAD: CPT

## 2021-07-24 PROCEDURE — 74177 CT ABD & PELVIS W/CONTRAST: CPT

## 2021-07-24 PROCEDURE — 36415 COLL VENOUS BLD VENIPUNCTURE: CPT | Performed by: PHYSICIAN ASSISTANT

## 2021-07-24 PROCEDURE — 93005 ELECTROCARDIOGRAM TRACING: CPT

## 2021-07-24 PROCEDURE — 96365 THER/PROPH/DIAG IV INF INIT: CPT

## 2021-07-24 PROCEDURE — 99285 EMERGENCY DEPT VISIT HI MDM: CPT

## 2021-07-24 PROCEDURE — 93970 EXTREMITY STUDY: CPT

## 2021-07-24 PROCEDURE — 85610 PROTHROMBIN TIME: CPT | Performed by: PHYSICIAN ASSISTANT

## 2021-07-24 PROCEDURE — 84484 ASSAY OF TROPONIN QUANT: CPT | Performed by: PHYSICIAN ASSISTANT

## 2021-07-24 PROCEDURE — 99285 EMERGENCY DEPT VISIT HI MDM: CPT | Performed by: PHYSICIAN ASSISTANT

## 2021-07-24 PROCEDURE — 81025 URINE PREGNANCY TEST: CPT | Performed by: PHYSICIAN ASSISTANT

## 2021-07-24 PROCEDURE — 83690 ASSAY OF LIPASE: CPT | Performed by: PHYSICIAN ASSISTANT

## 2021-07-24 PROCEDURE — 93010 ELECTROCARDIOGRAM REPORT: CPT | Performed by: INTERNAL MEDICINE

## 2021-07-24 PROCEDURE — 82140 ASSAY OF AMMONIA: CPT | Performed by: PHYSICIAN ASSISTANT

## 2021-07-24 RX ORDER — VENLAFAXINE HYDROCHLORIDE 150 MG/1
150 CAPSULE, EXTENDED RELEASE ORAL DAILY
Status: DISCONTINUED | OUTPATIENT
Start: 2021-07-25 | End: 2021-07-27 | Stop reason: HOSPADM

## 2021-07-24 RX ORDER — FOLIC ACID 1 MG/1
1 TABLET ORAL DAILY
Status: DISCONTINUED | OUTPATIENT
Start: 2021-07-25 | End: 2021-07-27 | Stop reason: HOSPADM

## 2021-07-24 RX ORDER — ROPINIROLE 1 MG/1
1 TABLET, FILM COATED ORAL
Status: DISCONTINUED | OUTPATIENT
Start: 2021-07-24 | End: 2021-07-27 | Stop reason: HOSPADM

## 2021-07-24 RX ORDER — ATOMOXETINE 60 MG/1
60 CAPSULE ORAL DAILY
Status: DISCONTINUED | OUTPATIENT
Start: 2021-07-25 | End: 2021-07-27 | Stop reason: HOSPADM

## 2021-07-24 RX ORDER — FERROUS SULFATE 325(65) MG
325 TABLET ORAL
Status: DISCONTINUED | OUTPATIENT
Start: 2021-07-25 | End: 2021-07-27 | Stop reason: HOSPADM

## 2021-07-24 RX ORDER — ONDANSETRON 2 MG/ML
4 INJECTION INTRAMUSCULAR; INTRAVENOUS EVERY 6 HOURS PRN
Status: DISCONTINUED | OUTPATIENT
Start: 2021-07-24 | End: 2021-07-24

## 2021-07-24 RX ORDER — QUETIAPINE FUMARATE 100 MG/1
100 TABLET, FILM COATED ORAL 3 TIMES DAILY
Status: DISCONTINUED | OUTPATIENT
Start: 2021-07-24 | End: 2021-07-27 | Stop reason: HOSPADM

## 2021-07-24 RX ORDER — PANTOPRAZOLE SODIUM 40 MG/1
40 TABLET, DELAYED RELEASE ORAL
Status: DISCONTINUED | OUTPATIENT
Start: 2021-07-24 | End: 2021-07-27 | Stop reason: HOSPADM

## 2021-07-24 RX ORDER — PYRIDOXINE HCL (VITAMIN B6) 50 MG
100 TABLET ORAL DAILY
Status: DISCONTINUED | OUTPATIENT
Start: 2021-07-25 | End: 2021-07-27 | Stop reason: HOSPADM

## 2021-07-24 RX ORDER — SUCRALFATE 1 G/1
1 TABLET ORAL
Status: DISCONTINUED | OUTPATIENT
Start: 2021-07-24 | End: 2021-07-27 | Stop reason: HOSPADM

## 2021-07-24 RX ORDER — LEVOTHYROXINE SODIUM 0.1 MG/1
100 TABLET ORAL
Status: DISCONTINUED | OUTPATIENT
Start: 2021-07-25 | End: 2021-07-27 | Stop reason: HOSPADM

## 2021-07-24 RX ORDER — POTASSIUM CHLORIDE 20 MEQ/1
20 TABLET, EXTENDED RELEASE ORAL ONCE
Status: COMPLETED | OUTPATIENT
Start: 2021-07-24 | End: 2021-07-24

## 2021-07-24 RX ORDER — SODIUM CHLORIDE 9 MG/ML
100 INJECTION, SOLUTION INTRAVENOUS CONTINUOUS
Status: DISPENSED | OUTPATIENT
Start: 2021-07-24 | End: 2021-07-25

## 2021-07-24 RX ORDER — QUETIAPINE FUMARATE 200 MG/1
400 TABLET, FILM COATED ORAL
Status: DISCONTINUED | OUTPATIENT
Start: 2021-07-24 | End: 2021-07-27 | Stop reason: HOSPADM

## 2021-07-24 RX ORDER — MAGNESIUM HYDROXIDE/ALUMINUM HYDROXICE/SIMETHICONE 120; 1200; 1200 MG/30ML; MG/30ML; MG/30ML
30 SUSPENSION ORAL EVERY 6 HOURS PRN
Status: DISCONTINUED | OUTPATIENT
Start: 2021-07-24 | End: 2021-07-27 | Stop reason: HOSPADM

## 2021-07-24 RX ORDER — LITHIUM CARBONATE 300 MG/1
300 TABLET, FILM COATED, EXTENDED RELEASE ORAL 2 TIMES DAILY
Status: DISCONTINUED | OUTPATIENT
Start: 2021-07-24 | End: 2021-07-27 | Stop reason: HOSPADM

## 2021-07-24 RX ORDER — LANOLIN ALCOHOL/MO/W.PET/CERES
100 CREAM (GRAM) TOPICAL DAILY
Status: DISCONTINUED | OUTPATIENT
Start: 2021-07-25 | End: 2021-07-27 | Stop reason: HOSPADM

## 2021-07-24 RX ORDER — GABAPENTIN 400 MG/1
400 CAPSULE ORAL 3 TIMES DAILY
Status: DISCONTINUED | OUTPATIENT
Start: 2021-07-24 | End: 2021-07-27 | Stop reason: HOSPADM

## 2021-07-24 RX ORDER — ACETAMINOPHEN 325 MG/1
650 TABLET ORAL EVERY 8 HOURS PRN
Status: DISCONTINUED | OUTPATIENT
Start: 2021-07-24 | End: 2021-07-27 | Stop reason: HOSPADM

## 2021-07-24 RX ADMIN — PANTOPRAZOLE SODIUM 40 MG: 40 TABLET, DELAYED RELEASE ORAL at 18:48

## 2021-07-24 RX ADMIN — LITHIUM CARBONATE 300 MG: 300 TABLET, FILM COATED, EXTENDED RELEASE ORAL at 18:50

## 2021-07-24 RX ADMIN — CEFTRIAXONE SODIUM 1000 MG: 10 INJECTION, POWDER, FOR SOLUTION INTRAVENOUS at 15:04

## 2021-07-24 RX ADMIN — GABAPENTIN 400 MG: 400 CAPSULE ORAL at 18:48

## 2021-07-24 RX ADMIN — SUCRALFATE 1 G: 1 TABLET ORAL at 21:39

## 2021-07-24 RX ADMIN — SODIUM CHLORIDE 500 ML: 0.9 INJECTION, SOLUTION INTRAVENOUS at 16:44

## 2021-07-24 RX ADMIN — TOPIRAMATE 150 MG: 100 TABLET ORAL at 18:48

## 2021-07-24 RX ADMIN — ENOXAPARIN SODIUM 100 MG: 100 INJECTION SUBCUTANEOUS at 18:49

## 2021-07-24 RX ADMIN — IOHEXOL 100 ML: 350 INJECTION, SOLUTION INTRAVENOUS at 14:09

## 2021-07-24 RX ADMIN — SODIUM CHLORIDE 100 ML/HR: 0.9 INJECTION, SOLUTION INTRAVENOUS at 18:48

## 2021-07-24 RX ADMIN — POTASSIUM CHLORIDE 20 MEQ: 1500 TABLET, EXTENDED RELEASE ORAL at 14:57

## 2021-07-24 RX ADMIN — GABAPENTIN 400 MG: 400 CAPSULE ORAL at 21:39

## 2021-07-24 RX ADMIN — QUETIAPINE FUMARATE 100 MG: 100 TABLET ORAL at 18:48

## 2021-07-24 RX ADMIN — QUETIAPINE FUMARATE 400 MG: 200 TABLET ORAL at 21:39

## 2021-07-24 RX ADMIN — SUCRALFATE 1 G: 1 TABLET ORAL at 18:48

## 2021-07-24 RX ADMIN — ROPINIROLE 1 MG: 1 TABLET, FILM COATED ORAL at 21:39

## 2021-07-24 NOTE — ASSESSMENT & PLAN NOTE
Generalized weakness likely related to poor oral intake, urinary tract infection  Supportive care  IV fluids, antibiotics, Protonix/Carafate  PT OT eval

## 2021-07-24 NOTE — ASSESSMENT & PLAN NOTE
History of multiple DVTs, IVC thrombosis, prior IVC filters  Currently on therapeutic Lovenox daily, she was on Lovenox 100 mg b i d   Up until May  Repeat venous Doppler negative

## 2021-07-24 NOTE — H&P
[address] 1975, 50 y o  female MRN: [id number]  Unit/Bed#: E5 -01 Encounter: [id number]  Primary Care Provider: Cherelle Han PA-C   Date and time admitted to hospital: 7/24/2021 12:05 PM    * Weakness  Assessment & Plan  Generalized weakness likely related to poor oral intake, urinary tract infection  Supportive care  IV fluids, antibiotics, Protonix/Carafate  PT OT eval    Acne  Assessment & Plan  Reportedly on doxycycline 20 mg b i d  Not on hospital formulary will need to bring in from home    UTI (urinary tract infection)  Assessment & Plan  Patient reports increased urinary frequency, incontinence  Urinalysis with nitrites, leukocytes, innumerable bacteria  Continue IV Rocephin  Follow-up urine culture results      Gastroesophageal reflux disease  Assessment & Plan  Continue Protonix  Add Carafate    Hypercoagulation syndrome (HCC)  Assessment & Plan  History of multiple DVTs, IVC thrombosis, prior IVC filters  Currently on therapeutic Lovenox daily    Ovarian cyst  Assessment & Plan  Right adnexal lesion 6 4 cm there is a simple cyst versus hydrosalpinx  Plan for laparoscopy with Gyne Onc 8/13/2021    Nausea  Assessment & Plan  Patient presents with poor oral intake nausea, vomiting  Similar symptoms with prior ulceration  Plan for EGD/colonoscopy 7/21 which was rescheduled  Continue Protonix  Add Carafate  IV Zofran p r n    If no improvement oral intake consult GI for possible inpatient procedure    Acquired hypothyroidism  Assessment & Plan  Continue Synthroid    Morbid obesity with BMI of 50 0-59 9, adult [facility])  Assessment & Plan  Status post gastric bypass  Dietary Education    Leukocytosis  Assessment & Plan  Likely related to urinary tract infection  Follow-up blood cultures, urine cultures  Trend fever/WBC curve    Bipolar disorder [facility])  Assessment & Plan  Continue home medications    Anemia  Assessment & Plan  History of acute on chronic anemia with concern for malabsorption syndrome  Baseline appears to be between 9 and 10  Likely concentrated on this admission  Trend CBC    S/P gastric bypass  Assessment & Plan  With concern for malabsorption  Planned for repeat EGD/colonoscopy 7/21 with GI but rescheduled    OTC prolongation   517 on admission today  Patient is on several QTC prolonging drugs  Will monitor with EKG tomorrow morning    VTE Prophylaxis:  Lovenox  Code Status: Level 1 Full Code     Anticipated Length of Stay:  Patient will be admitted on an Observation basis with an anticipated length of stay of  Less than 2 midnights  Justification for Hospital Stay: Need for IVF and IV antibiotics, PT/OT evaluation for weakness     Total Time for Visit, including Counseling / Coordination of Care: 60 minutes  Greater than 50% of this total time spent on direct patient counseling and coordination of care  Chief Complaint:  Generalized weakness     History of Present Illness:    Earl Casas is a 50 y o  female With past medical history of morbid obesity status post gastric bypass with suspected malabsorption, hepatic steatosis, 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, bipolar disorder who presents the hospital with generalized fatigue  Patient reports fatigue, headaches, dizziness, nausea, vomiting  Patient reports the symptoms have been going on for the last 2 weeks and continue to progress  She discussed with PCP to obtain outpatient labs and found to have a [place] blood [place] [id number]  She has reported poor oral intake over the last week  Additionally has been having urinary incontinence  She reports compliance with medication and no recent changes to medication  No recent sick contacts  Denies any NSAID use  Review of Systems:    Review of Systems   Constitutional: Positive for appetite change, chills and fatigue  Negative for fever  HENT: Negative for sore throat and trouble swallowing      Eyes: Negative for photophobia and visual disturbance  Respiratory: Negative for shortness of breath and wheezing  Cardiovascular: Negative for chest pain and palpitations  Gastrointestinal: Positive for nausea  Negative for constipation, diarrhea and vomiting  Genitourinary: Positive for frequency  Negative for difficulty urinating and dysuria  Musculoskeletal: Negative for arthralgias and myalgias  Skin: Negative for rash and wound  Neurological: Positive for dizziness and light-headedness  Negative for headaches  Past Medical and Surgical History:     Past Medical History:   Diagnosis Date    Anemia     Bipolar depression ([address] )     bipolar II, suicide    Disease of thyroid gland     History of DVT (deep vein thrombosis)     History of gastric bypass     Psychiatric disorder     Pulmonary embolism (HCC)     Seizures (HCC)        Past Surgical History:   Procedure Laterality Date    APPENDECTOMY      Open    CHOLECYSTECTOMY      Laparoscopic    GASTRIC BYPASS      was 205 date of surgery    IR DVT THROMBOLYSIS/THROMBECTOMY ILIAC/IVC WITH VENOGRAM  8/4/2020    IR IVC FILTER REMOVAL  6/8/2021    IR TPA LYSIS CHECK  8/5/2020    IVC FILTER INSERTION  2017    STOMACH SURGERY      for morbid obesity    TUBAL LIGATION Bilateral 2010       Meds/Allergies:    Prior to Admission medications    Medication Sig Start Date End Date Taking?  Authorizing Provider   atoMOXetine (STRATTERA) 60 mg capsule TAKE 1 CAPSULE (60 MG) BY ORAL ROUTE ONCE DAILY IN THE MORNING 9/17/20  Yes Historical Provider, MD   clotrimazole (LOTRIMIN) 1 % cream Apply topically 2 (two) times a day 7/13/21  Yes Adrian Iniguez PA-C   doxycycline (PERIOSTAT) 20 MG tablet TAKE 1 TABLET BY MOUTH TWICE A DAY WITH FOOD (NOT DAIRY) 7/19/21  Yes Historical Provider, MD   enoxaparin (LOVENOX) 100 mg/mL Inject 100 mg under the skin daily   Yes Historical Provider, MD   ferrous sulfate 325 (65 Fe) mg tablet Take 325 mg by mouth daily with breakfast   Yes Historical Provider, MD   folic acid (FOLVITE) 1 mg tablet Take 1 mg by mouth daily   Yes Historical Provider, MD   gabapentin (NEURONTIN) 400 mg capsule Take 400 mg by mouth 3 (three) times a day   Yes Historical Provider, MD   levothyroxine 100 mcg tablet Take 1 tablet (100 mcg total) by mouth daily in the early morning 8/18/20  Yes Neela Shafer MD   lithium carbonate (LITHOBID) 300 mg CR tablet Take 300 mg by mouth 2 (two) times a day    Yes Historical Provider, MD   Multiple Vitamin (MULTI-VITAMIN DAILY PO) Multi Vitamin   DAILY   Yes Historical Provider, MD   pantoprazole (PROTONIX) 40 mg tablet Take 1 tablet (40 mg total) by mouth 2 (two) times a day before meals 5/5/21  Yes Sloan Fu DO   pyridoxine (VITAMIN B6) 100 mg tablet Take 100 mg by mouth daily   Yes Historical Provider, MD   QUEtiapine (SEROquel) 100 mg tablet Take 100 mg by mouth 3 (three) times a day 100 mg t i d  and 400 mg at bedtime   Yes Historical Provider, MD   QUEtiapine (SEROquel) 400 MG tablet Take 400 mg by mouth daily at bedtime   Yes Historical Provider, MD   rOPINIRole (REQUIP) 1 mg tablet TAKE 1 TABLET (1 MG) BY ORAL ROUTE 1-3 HOURS BEFORE BEDTIME 2/3/21  Yes Historical Provider, MD   SUMAtriptan (IMITREX) 100 mg tablet Take 100 mg by mouth as needed for migraine    Yes Historical Provider, MD   thiamine 100 MG tablet Daily   Yes Historical Provider, MD   topiramate (TOPAMAX) 100 mg tablet Take 150 mg by mouth 2 (two) times a day    Yes Historical Provider, MD   venlafaxine (EFFEXOR-XR) 150 mg 24 hr capsule Take 150 mg by mouth daily    Yes Historical Provider, MD   midodrine (PROAMATINE) 2 5 mg tablet Take 1 tablet (2 5 mg total) by mouth 3 (three) times a day before meals 6/23/21 7/23/21  Ramirez Russell DO       Allergies:    Allergies   Allergen Reactions    Morphine Seizures       Social History:     Marital Status: Single   Substance Use History:   Social History     Substance and Sexual Activity   Alcohol Use Yes    Comment: ocassional     Social History     Tobacco Use   Smoking Status Never Smoker   Smokeless Tobacco Never Used   Tobacco Comment    former quit in 1999 per [employer]     Social History     Substance and Sexual Activity   Drug Use Yes    Types: Marijuana       Family History:    Family History   Problem Relation Age of Onset    Other Mother         headache    Hypertension Mother     Depression Mother     Arthritis Father     Other Father         H, pylori infection    Other Sister         esophageal reflux    Migraines Sister     No Known Problems Paternal Grandfather     Diabetes Paternal Aunt         Mellitus    Breast cancer Paternal Aunt     Diabetes Paternal Uncle         Mellitus    Liver cancer Paternal Uncle     Asthma Daughter     No Known Problems Maternal Grandmother     No Known Problems Maternal Grandfather     No Known Problems Paternal Grandmother     No Known Problems Brother     No Known Problems Sister     No Known Problems Sister     Asthma Daughter     No Known Problems Maternal Aunt        Physical Exam:     Vitals:   Blood Pressure: 100/64 (07/24/21 1641)  Pulse: 82 (07/24/21 1641)  Temperature: 98 °F (36 7 °C) (07/24/21 1157)  Temp Source: Oral (07/24/21 1157)  Respirations: 18 (07/24/21 1641)  Weight - Scale: 104 kg (229 lb 15 oz) (07/24/21 1157)  SpO2: 100 % (07/24/21 1641)    Physical Exam  Vitals reviewed  Constitutional:       General: She is not in acute distress  Appearance: She is well-developed  She is obese  She is not ill-appearing, toxic-appearing or diaphoretic  HENT:      Head: Normocephalic and atraumatic  Mouth/Throat:      Mouth: Mucous membranes are moist       Pharynx: No oropharyngeal exudate  Eyes:      General: No scleral icterus  Extraocular Movements: Extraocular movements intact  Conjunctiva/sclera: Conjunctivae normal    Cardiovascular:      Rate and Rhythm: Normal rate and regular rhythm        Heart sounds: Normal heart sounds  Pulmonary:      Effort: Pulmonary effort is normal  No respiratory distress  Breath sounds: Normal breath sounds  No wheezing or rales  Abdominal:      General: There is no distension  Palpations: Abdomen is soft  Tenderness: There is no abdominal tenderness  There is no guarding or rebound  Musculoskeletal:         General: No swelling, tenderness or deformity  Cervical back: Normal range of motion  Skin:     General: Skin is warm and dry  Neurological:      General: No focal deficit present  Mental Status: She is alert  Mental status is at baseline  Psychiatric:         Mood and Affect: Mood normal          Behavior: Behavior normal          Thought Content: Thought content normal          Judgment: Judgment normal           Additional Data:     Lab Results: I have reviewed pertinent results     Results from last 7 days   Lab Units 07/24/21  1238   WBC Thousand/uL 16 10*   HEMOGLOBIN g/dL 10 5*   HEMATOCRIT % 34 7*   PLATELETS Thousands/uL 380   NEUTROS PCT % 77*   LYMPHS PCT % 12*   MONOS PCT % 8   EOS PCT % 1     Results from last 7 days   Lab Units 07/24/21  1238   SODIUM mmol/L 140   POTASSIUM mmol/L 3 3*   CHLORIDE mmol/L 109*   CO2 mmol/L 25   BUN mg/dL 2*   CREATININE mg/dL 0 79   ANION GAP mmol/L 6   CALCIUM mg/dL 8 4   ALBUMIN g/dL 1 4*   TOTAL BILIRUBIN mg/dL 1 30*   ALK PHOS U/L 250*   ALT U/L 25   AST U/L 74*   GLUCOSE RANDOM mg/dL 90     Results from last 7 days   Lab Units 07/24/21  1238   INR  1 35*             Results from last 7 days   Lab Units 07/24/21  1330   LACTIC ACID mmol/L 0 9       Imaging: I have reviewed pertinent imaging     CTA head and neck with and without contrast   Final Result by Lida Coronel MD (07/24 1429)      Unremarkable head CT  Unremarkable CTA of the head and neck aside from an incidental noted subcentimeter left thyroid nodule  No additional workup is recommended at this time                    Workstation performed: [id number]         CT abdomen pelvis with contrast   Final Result by Carissa Robbins MD (07/24 7844)         1  No evidence of bowel obstruction, colitis or diverticulitis  2   Cystic lesions arising from the right adnexa measuring up to 6 4 cm there is a simple cyst or hydrosalpinx  Nonemergent ultrasound of the pelvis recommended  Workstation performed: [id number]         VAS lower limb venous duplex study, complete bilateral    (Results Pending)       EKG, Pathology, and Other Studies Reviewed on Admission:   · EKG: Reviewed     [employer] / Epic Records Reviewed    ** Please Note: This note has been constructed using a voice recognition system   **

## 2021-07-24 NOTE — ASSESSMENT & PLAN NOTE
Patient reports increased urinary frequency, incontinence  Urinalysis with nitrites, leukocytes, innumerable bacteria  Continue IV Rocephin  Follow-up urine culture results

## 2021-07-24 NOTE — ASSESSMENT & PLAN NOTE
Patient presents with poor oral intake nausea, vomiting, denies coffee-ground emesis or bloody emesis  She states she has been having nausea and vomiting on and off for months    CT scan showed no evidence of bowel obstruction, colitis or diverticulitis  Patient was admitted in May for acute blood loss anemia secondary to bleeding marginal ulcer at the gastrojejunostomy  Patient has a scheduled repeat EGD  Continue Protonix, Carafate, Tigan due to prolonged QTC  Nausea and vomiting improved

## 2021-07-24 NOTE — ASSESSMENT & PLAN NOTE
Patient presents with poor oral intake nausea, vomiting  Similar symptoms with prior ulceration  Plan for EGD/colonoscopy 7/21 which was rescheduled  Continue Protonix  Add Carafate  IV Zofran p r n    If no improvement oral intake consult GI for possible inpatient procedure

## 2021-07-24 NOTE — ASSESSMENT & PLAN NOTE
History of acute on chronic anemia with concern for malabsorption syndrome  Baseline appears to be between 9 and 10  Likely concentrated on this admission  Trend CBC

## 2021-07-24 NOTE — ASSESSMENT & PLAN NOTE
Right adnexal lesion 6 4 cm there is a simple cyst versus hydrosalpinx  Plan for laparoscopy with Gyne Onc 8/13/2021

## 2021-07-24 NOTE — ED PROVIDER NOTES
History  Chief Complaint   Patient presents with    Abnormal Lab     Pt reporting her PCP referred her to the ED due to elevated WBC from blood work taken 2 weeks ago  Pt complaing of HA/Dizzy/N/V and recent bladder incontince  Denies chest pain/SOB or recent fevers  Patient presents to the ER for evaluation of an abnormal lab value, headache,dizziness, weakness and vomiting  Patient states that over the past two weeks she has been having increasing symptoms  States that she went for outpatient lab work 2 weeks ago and was found to have a WBC count of 21,000  Patient states that her doctor called her today and told her to come to the ER for evaluation  Patient states that over the past few days she has had increased generalized weakness and fatigue  States that she has not been eating or drinking due to this  Patient also notes that she has been having persistent headaches and intermittent dizziness  Patient states that she has had these symptoms in the past however they have been significantly worsening over the past few days  States she also began with urinary incontinence this week  Patient notes that she has a history of multiple blood clots which she is on lovenox for  Patient denies any recent trauma  Notes she also has a history of gastric bypass surgery, cholecystectomy and appendectomy  Patient states that she is followed by gyn onc for a large ovarian mass which she is scheduled to have removed on 8/13/21  Patient denies any fevers, syncope, chest pain, difficulty breathing, diarrhea, dysuria, abdominal pain, back pain or any other concerning symptoms  Prior to Admission Medications   Prescriptions Last Dose Informant Patient Reported? Taking?    Multiple Vitamin (MULTI-VITAMIN DAILY PO) 7/23/2021 at Unknown time Self Yes Yes   Sig: Multi Vitamin   DAILY   QUEtiapine (SEROquel) 100 mg tablet 7/23/2021 at Unknown time Self Yes Yes   Sig: Take 100 mg by mouth 3 (three) times a day 100 mg t i d  and 400 mg at bedtime   QUEtiapine (SEROquel) 400 MG tablet 7/23/2021 at Unknown time Self Yes Yes   Sig: Take 400 mg by mouth daily at bedtime   SUMAtriptan (IMITREX) 100 mg tablet 7/23/2021 at Unknown time Self Yes Yes   Sig: Take 100 mg by mouth as needed for migraine    atoMOXetine (STRATTERA) 60 mg capsule 7/23/2021 at Unknown time Self Yes Yes   Sig: TAKE 1 CAPSULE (60 MG) BY ORAL ROUTE ONCE DAILY IN THE MORNING   clotrimazole (LOTRIMIN) 1 % cream 7/23/2021 at Unknown time Self No Yes   Sig: Apply topically 2 (two) times a day   doxycycline (PERIOSTAT) 20 MG tablet 7/23/2021 at Unknown time Self Yes Yes   Sig: TAKE 1 TABLET BY MOUTH TWICE A DAY WITH FOOD (NOT DAIRY)   enoxaparin (LOVENOX) 100 mg/mL 7/23/2021 at Unknown time Self Yes Yes   Sig: Inject 100 mg under the skin daily   ferrous sulfate 325 (65 Fe) mg tablet 7/23/2021 at Unknown time Self Yes Yes   Sig: Take 325 mg by mouth daily with breakfast   folic acid (FOLVITE) 1 mg tablet 7/23/2021 at Unknown time Self Yes Yes   Sig: Take 1 mg by mouth daily   gabapentin (NEURONTIN) 400 mg capsule 7/23/2021 at Unknown time Self Yes Yes   Sig: Take 400 mg by mouth 3 (three) times a day   levothyroxine 100 mcg tablet 7/23/2021 at Unknown time Self No Yes   Sig: Take 1 tablet (100 mcg total) by mouth daily in the early morning   lithium carbonate (LITHOBID) 300 mg CR tablet 7/23/2021 at Unknown time Self Yes Yes   Sig: Take 300 mg by mouth 2 (two) times a day    midodrine (PROAMATINE) 2 5 mg tablet  Self No No   Sig: Take 1 tablet (2 5 mg total) by mouth 3 (three) times a day before meals   pantoprazole (PROTONIX) 40 mg tablet 7/23/2021 at Unknown time Self No Yes   Sig: Take 1 tablet (40 mg total) by mouth 2 (two) times a day before meals   pyridoxine (VITAMIN B6) 100 mg tablet 7/23/2021 at Unknown time Self Yes Yes   Sig: Take 100 mg by mouth daily   rOPINIRole (REQUIP) 1 mg tablet 7/23/2021 at Unknown time Self Yes Yes   Sig: TAKE 1 TABLET (1 MG) BY ORAL ROUTE 1-3 HOURS BEFORE BEDTIME   thiamine 100 MG tablet 7/23/2021 at Unknown time Self Yes Yes   Sig: Daily   topiramate (TOPAMAX) 100 mg tablet 7/23/2021 at Unknown time Self Yes Yes   Sig: Take 150 mg by mouth 2 (two) times a day    venlafaxine (EFFEXOR-XR) 150 mg 24 hr capsule 7/23/2021 at Unknown time Self Yes Yes   Sig: Take 150 mg by mouth daily       Facility-Administered Medications: None       Past Medical History:   Diagnosis Date    Anemia     Bipolar depression ([facility] [address] )     bipolar II, suicide    Disease of thyroid gland     History of DVT (deep vein thrombosis)     History of gastric bypass     Psychiatric disorder     Pulmonary embolism (HCC)     Seizures (HCC)        Past Surgical History:   Procedure Laterality Date    APPENDECTOMY      Open    CHOLECYSTECTOMY      Laparoscopic    GASTRIC BYPASS      was 89 date of surgery    IR DVT THROMBOLYSIS/THROMBECTOMY ILIAC/IVC WITH VENOGRAM  8/4/2020    IR IVC FILTER REMOVAL  6/8/2021    IR TPA LYSIS CHECK  8/5/2020    IVC FILTER INSERTION  2017    STOMACH SURGERY      for morbid obesity    TUBAL LIGATION Bilateral 2010       Family History   Problem Relation Age of Onset    Other Mother         headache    Hypertension Mother     Depression Mother     Arthritis Father     Other Father         H, pylori infection    Other Sister         esophageal reflux    Migraines Sister     No Known Problems Paternal Grandfather     Diabetes Paternal Aunt         Mellitus    Breast cancer Paternal Aunt     Diabetes Paternal Uncle         Mellitus    Liver cancer Paternal Uncle     Asthma Daughter     No Known Problems Maternal Grandmother     No Known Problems Maternal Grandfather     No Known Problems Paternal Grandmother     No Known Problems Brother     No Known Problems Sister     No Known Problems Sister     Asthma Daughter     No Known Problems Maternal Aunt      I have reviewed and agree with the history as documented  E-Cigarette/Vaping    E-Cigarette Use Current Every Day User      E-Cigarette/Vaping Substances    THC Yes      Social History     Tobacco Use    Smoking status: Never Smoker    Smokeless tobacco: Never Used    Tobacco comment: former quit in 1999 per [employer]   Vaping Use    Vaping Use: Every day    Substances: THC   Substance Use Topics    Alcohol use: Yes     Comment: ocassional    Drug use: Yes     Types: Marijuana       Review of Systems   Constitutional: Negative for fever  HENT: Negative for congestion, rhinorrhea and sore throat  Respiratory: Negative for shortness of breath  Cardiovascular: Negative for chest pain  Gastrointestinal: Positive for nausea and vomiting  Negative for abdominal pain  Genitourinary: Negative for dysuria  Musculoskeletal: Negative for back pain and neck pain  Skin: Negative for rash  Neurological: Positive for dizziness, weakness and headaches  Negative for numbness  All other systems reviewed and are negative  Physical Exam  Physical Exam  Constitutional:       Appearance: She is well-developed  HENT:      Head: Normocephalic and atraumatic  Comments: No facial asymmetry     Nose: Nose normal    Eyes:      Conjunctiva/sclera: Conjunctivae normal    Cardiovascular:      Rate and Rhythm: Normal rate  Heart sounds: Murmur heard  Pulmonary:      Effort: Pulmonary effort is normal       Breath sounds: Normal breath sounds  Abdominal:      Palpations: Abdomen is soft  Tenderness: There is no abdominal tenderness  There is no guarding  Musculoskeletal:         General: Normal range of motion  Cervical back: Normal range of motion  Comments: 5/5 strength of upper and lower extremities bilaterally   Skin:     General: Skin is warm  Capillary Refill: Capillary refill takes less than 2 seconds  Neurological:      Mental Status: She is alert and oriented to person, place, and time        Comments: GCS: 15/15  Normal finger to nose  Patient notes mild subjective change in sensation of left lower leg however notes this is chronic            Vital Signs  ED Triage Vitals   Temperature Pulse Respirations Blood Pressure SpO2   07/24/21 1157 07/24/21 1157 07/24/21 1157 07/24/21 1157 07/24/21 1157   98 °F (36 7 °C) 90 16 111/63 100 %      Temp Source Heart Rate Source Patient Position - Orthostatic VS BP Location FiO2 (%)   07/24/21 1157 07/24/21 1157 07/24/21 1157 07/24/21 1157 --   Oral Monitor Sitting Left arm       Pain Score       07/24/21 1439       4           Vitals:    07/24/21 1457 07/24/21 1641 07/25/21 0014 07/25/21 0021   BP: 96/63 100/64 90/55 92/60   Pulse: 84 82 90    Patient Position - Orthostatic VS: Lying Sitting  Lying         Visual Acuity  Visual Acuity      Most Recent Value   L Pupil Size (mm)  2   R Pupil Size (mm)  2          ED Medications  Medications   atoMOXetine (STRATTERA) capsule 60 mg (has no administration in time range)   enoxaparin (LOVENOX) subcutaneous injection 100 mg (100 mg Subcutaneous Given 7/24/21 1849)   ferrous sulfate tablet 325 mg (has no administration in time range)   folic acid (FOLVITE) tablet 1 mg (has no administration in time range)   levothyroxine tablet 100 mcg (100 mcg Oral Given 7/25/21 0540)   lithium carbonate (LITHOBID) CR tablet 300 mg (300 mg Oral Given 7/24/21 1850)   pantoprazole (PROTONIX) EC tablet 40 mg (40 mg Oral Not Given 7/25/21 0617)   pyridoxine (VITAMIN B6) tablet 100 mg (has no administration in time range)   QUEtiapine (SEROquel) tablet 100 mg (100 mg Oral Given 7/24/21 1848)   QUEtiapine (SEROquel) tablet 400 mg (400 mg Oral Given 7/24/21 2139)   rOPINIRole (REQUIP) tablet 1 mg (1 mg Oral Given 7/24/21 2139)   thiamine tablet 100 mg (has no administration in time range)   topiramate (TOPAMAX) tablet 150 mg (150 mg Oral Given 7/24/21 1848)   venlafaxine (EFFEXOR-XR) 24 hr capsule 150 mg (has no administration in time range)   gabapentin (NEURONTIN) capsule 400 mg (400 mg Oral Given 7/24/21 2139)   sodium chloride 0 9 % infusion (100 mL/hr Intravenous New Bag 7/25/21 0254)   acetaminophen (TYLENOL) tablet 650 mg (has no administration in time range)   aluminum-magnesium hydroxide-simethicone (MYLANTA) oral suspension 30 mL (has no administration in time range)   sucralfate (CARAFATE) tablet 1 g (1 g Oral Not Given 7/25/21 0617)   iohexol (OMNIPAQUE) 350 MG/ML injection (SINGLE-DOSE) 100 mL (100 mL Intravenous Given 7/24/21 1409)   potassium chloride (K-DUR,KLOR-CON) CR tablet 20 mEq (20 mEq Oral Given 7/24/21 1457)   ceftriaxone (ROCEPHIN) 1 g/50 mL in dextrose IVPB ( Intravenous Restarted 7/24/21 1645)   sodium chloride 0 9 % bolus 500 mL (500 mL Intravenous New Bag 7/24/21 1644)       Diagnostic Studies  Results Reviewed     Procedure Component Value Units Date/Time    Blood culture #1 [[id number]] Collected: 07/24/21 1238    Lab Status: Preliminary result Specimen: Blood from Arm, Left Updated: 07/25/21 0101     Blood Culture Received in Microbiology Lab  Culture in Progress  Blood culture #2 [[id number]] Collected: 07/24/21 1504    Lab Status: Preliminary result Specimen: Blood from Arm, Right Updated: 07/25/21 0101     Blood Culture Received in Microbiology Lab  Culture in Progress  CK (with reflex to MB) [[id number]]  (Abnormal) Collected: 07/24/21 1238    Lab Status: Final result Specimen: Blood from Arm, Left Updated: 07/24/21 1554     Total CK 18 U/L     Lactic acid, plasma [[id number]]  (Normal) Collected: 07/24/21 1330    Lab Status: Final result Specimen: Blood from Arm, Left Updated: 07/24/21 1400     LACTIC ACID 0 9 mmol/L     Narrative:      Result may be elevated if tourniquet was used during collection      Urine Microscopic [[id number]]  (Abnormal) Collected: 07/24/21 1306    Lab Status: Final result Specimen: Urine, Clean Catch Updated: 07/24/21 1343     RBC, UA None Seen /hpf      WBC, UA None Seen /hpf      Epithelial Cells Occasional /hpf      Bacteria, UA Innumerable /hpf     Comprehensive metabolic panel [[id number]]  (Abnormal) Collected: 07/24/21 1238    Lab Status: Final result Specimen: Blood from Arm, Left Updated: 07/24/21 1330     Sodium 140 mmol/L      Potassium 3 3 mmol/L      Chloride 109 mmol/L      CO2 25 mmol/L      ANION GAP 6 mmol/L      BUN 2 mg/dL      Creatinine 0 79 mg/dL      Glucose 90 mg/dL      Calcium 8 4 mg/dL      Corrected Calcium 10 5 mg/dL      AST 74 U/L      ALT 25 U/L      Alkaline Phosphatase 250 U/L      Total Protein 7 1 g/dL      Albumin 1 4 g/dL      Total Bilirubin 1 30 mg/dL      eGFR 89 ml/min/1 73sq m     Narrative:      [place] guidelines for Chronic Kidney Disease (CKD):     Stage 1 with normal or high GFR (GFR > 90 mL/min/1 73 square meters)    Stage 2 Mild CKD (GFR = 60-89 mL/min/1 73 square meters)    Stage 3A Moderate CKD (GFR = 45-59 mL/min/1 73 square meters)    Stage 3B Moderate CKD (GFR = 30-44 mL/min/1 73 square meters)    Stage 4 Severe CKD (GFR = 15-29 mL/min/1 73 square meters)    Stage 5 End Stage CKD (GFR <15 mL/min/1 73 square meters)  Note: GFR calculation is accurate only with a steady state creatinine    Ammonia [[id number]]  (Abnormal) Collected: 07/24/21 1238    Lab Status: Final result Specimen: Blood from Arm, Left Updated: 07/24/21 1329     Ammonia <10 umol/L     Troponin I [[id number]]  (Normal) Collected: 07/24/21 1238    Lab Status: Final result Specimen: Blood from Arm, Left Updated: 07/24/21 1323     Troponin I <0 02 ng/mL     Lipase [[id number]]  (Abnormal) Collected: 07/24/21 1238    Lab Status: Final result Specimen: Blood from Arm, Left Updated: 07/24/21 1322     Lipase 40 u/L     Protime-INR [[id number]]  (Abnormal) Collected: 07/24/21 1238    Lab Status: Final result Specimen: Blood from Arm, Left Updated: 07/24/21 1318     Protime 16 4 seconds      INR 1 35    APTT [[id number]]  (Abnormal) Collected: 07/24/21 1238    Lab Status: Final result Specimen: Blood from Arm, Left Updated: 07/24/21 1318     PTT 51 seconds     POCT urinalysis dipstick [[id number]]  (Normal) Resulted: 07/24/21 1309    Lab Status: Final result Specimen: Urine Updated: 07/24/21 1309     Color, UA yellow    POCT pregnancy, urine [[id number]]  (Normal) Resulted: 07/24/21 1308    Lab Status: Final result Updated: 07/24/21 1309     EXT PREG TEST UR (Ref: Negative) negative     Control valid    Urine Macroscopic, POC [[id number]]  (Abnormal) Collected: 07/24/21 1306    Lab Status: Final result Specimen: Urine Updated: 07/24/21 1308     Color, UA Yellow     Clarity, UA Clear     pH, UA 7 0     Leukocytes, UA Trace     Nitrite, UA Positive     Protein, UA Negative mg/dl      Glucose, UA Negative mg/dl      Ketones, UA Negative mg/dl      Urobilinogen, UA 1 0 E U /dl      Bilirubin, UA Negative     Blood, UA Negative     Specific Gravity, UA 1 010    Narrative:      CLINITEK RESULT    CBC and differential [[id number]]  (Abnormal) Collected: 07/24/21 1238    Lab Status: Final result Specimen: Blood from Arm, Left Updated: 07/24/21 1302     WBC 16 10 Thousand/uL      RBC 3 66 Million/uL      Hemoglobin 10 5 g/dL      Hematocrit 34 7 %      MCV 95 fL      MCH 28 7 pg      MCHC 30 3 g/dL      RDW 22 9 %      MPV 12 6 fL      Platelets 112 Thousands/uL      nRBC 0 /100 WBCs      Neutrophils Relative 77 %      Immat GRANS % 1 %      Lymphocytes Relative 12 %      Monocytes Relative 8 %      Eosinophils Relative 1 %      Basophils Relative 1 %      Neutrophils Absolute 12 39 Thousands/µL      Immature Grans Absolute 0 11 Thousand/uL      Lymphocytes Absolute 1 99 Thousands/µL      Monocytes Absolute 1 35 Thousand/µL      Eosinophils Absolute 0 16 Thousand/µL      Basophils Absolute 0 10 Thousands/µL                  VAS lower limb venous duplex study, complete bilateral   Final Result by Edgar Morillo DO (07/25 0216)      CTA head and neck with and without contrast   Final Result by Randy Dennison MD (07/24 6289)      Unremarkable head CT  Unremarkable CTA of the head and neck aside from an incidental noted subcentimeter left thyroid nodule  No additional workup is recommended at this time  Workstation performed: [id number]         CT abdomen pelvis with contrast   Final Result by Richy Billy MD (07/24 9368)         1  No evidence of bowel obstruction, colitis or diverticulitis  2   Cystic lesions arising from the right adnexa measuring up to 6 4 cm there is a simple cyst or hydrosalpinx  Nonemergent ultrasound of the pelvis recommended  Workstation performed: [id number]                    Procedures  Procedures         ED Course  ED Course as of Jul 25 0733   Sat Jul 24, 2021   1405 Blood Pressure: 111/63   1405 Temperature: 98 °F (36 7 °C)   1405 Pulse: 90   1405 Respirations: 16   1405 SpO2: 100 %   1405 WBC(!): 16 10   1405 Hemoglobin(!): 10 5   1405 Troponin I: <0 02   1438 IMPRESSION:     Unremarkable head CT      Unremarkable CTA of the head and neck aside from an incidental noted subcentimeter left thyroid nodule  No additional workup is recommended at this time  CTA head and neck with and without contrast   1608 Discussed with vascular techDulce Maria, negative for DVT bilaterally   VAS lower limb venous duplex study, complete bilateral   1610 Repleated with 20mEq PO in ED   Potassium(!): 3 3                             SBIRT 22yo+      Most Recent Value   SBIRT (25 yo +)   In order to provide better care to our patients, we are screening all of our patients for alcohol and drug use  Would it be okay to ask you these screening questions? No Filed at: 07/24/2021 1245                    MDM     Patient with UTI noted on UA  White count still elevated at 16 however did improved from 21,000 two weeks ago  Patient went to generalized weakness  States that she is not eating and drinking due to no appetite and the generalized weakness  Will admit for treatment it and hydration  Stable throughout ER stay  Disposition  Final diagnoses:   UTI (urinary tract infection)   Leukocytosis   Right ovarian cyst   History of gastric bypass   Headache   Dizziness   Generalized weakness   Hypokalemia     Time reflects when diagnosis was documented in both MDM as applicable and the Disposition within this note     Time User Action Codes Description Comment    7/24/2021  4:25 PM Misael Sheer Add [N39 0] UTI (urinary tract infection)     7/24/2021  4:25 PM Misael Sheer Add [D72 829] Leukocytosis     7/24/2021  4:25 PM Misael Sheer Add [S62 699] Right ovarian cyst     7/24/2021  4:25 PM Crowley Sheer Add [Z98 84] History of gastric bypass     7/24/2021  4:25 PM Misael Sheer Add [R51 9] Headache     7/24/2021  4:25 PM Misael Sheer Add [R42] Dizziness     7/24/2021  4:25 PM Misael Sheer Add [R53 1] Generalized weakness     7/24/2021  4:25 PM Crowley Sheer Add [E87 6] Hypokalemia       ED Disposition     ED Disposition Condition Date/Time Comment    Admit Stable Sat Jul 24, 2021  4:26 PM Case was discussed with FILIPPO and the patient's admission status was agreed to be Admission Status: observation status to the service of Dr Dm Bowers           Follow-up Information    None         Current Discharge Medication List      CONTINUE these medications which have NOT CHANGED    Details   atoMOXetine (STRATTERA) 60 mg capsule TAKE 1 CAPSULE (60 MG) BY ORAL ROUTE ONCE DAILY IN THE MORNING      clotrimazole (LOTRIMIN) 1 % cream Apply topically 2 (two) times a day  Qty: 30 g, Refills: 1    Associated Diagnoses: Intertrigo      doxycycline (PERIOSTAT) 20 MG tablet TAKE 1 TABLET BY MOUTH TWICE A DAY WITH FOOD (NOT DAIRY)      enoxaparin (LOVENOX) 100 mg/mL Inject 100 mg under the skin daily      ferrous sulfate 325 (65 Fe) mg tablet Take 325 mg by mouth daily with breakfast      folic acid (FOLVITE) 1 mg tablet Take 1 mg by mouth daily      gabapentin (NEURONTIN) 400 mg capsule Take 400 mg by mouth 3 (three) times a day      levothyroxine 100 mcg tablet Take 1 tablet (100 mcg total) by mouth daily in the early morning  Qty: 30 tablet, Refills: 0    Associated Diagnoses: Acquired hypothyroidism      lithium carbonate (LITHOBID) 300 mg CR tablet Take 300 mg by mouth 2 (two) times a day       Multiple Vitamin (MULTI-VITAMIN DAILY PO) Multi Vitamin   DAILY      pantoprazole (PROTONIX) 40 mg tablet Take 1 tablet (40 mg total) by mouth 2 (two) times a day before meals  Qty: 60 tablet, Refills: 2    Associated Diagnoses: Acid reflux      pyridoxine (VITAMIN B6) 100 mg tablet Take 100 mg by mouth daily      !! QUEtiapine (SEROquel) 100 mg tablet Take 100 mg by mouth 3 (three) times a day 100 mg t i d  and 400 mg at bedtime      !! QUEtiapine (SEROquel) 400 MG tablet Take 400 mg by mouth daily at bedtime      rOPINIRole (REQUIP) 1 mg tablet TAKE 1 TABLET (1 MG) BY ORAL ROUTE 1-3 HOURS BEFORE BEDTIME      SUMAtriptan (IMITREX) 100 mg tablet Take 100 mg by mouth as needed for migraine       thiamine 100 MG tablet Daily      topiramate (TOPAMAX) 100 mg tablet Take 150 mg by mouth 2 (two) times a day       venlafaxine (EFFEXOR-XR) 150 mg 24 hr capsule Take 150 mg by mouth daily       midodrine (PROAMATINE) 2 5 mg tablet Take 1 tablet (2 5 mg total) by mouth 3 (three) times a day before meals  Qty: 90 tablet, Refills: 0    Associated Diagnoses: Hypotension       !! - Potential duplicate medications found  Please discuss with provider  No discharge procedures on file      PDMP Review       Value Time User    PDMP Reviewed  Yes 6/18/2021  5:08 PM Briana Winchester DO          ED Provider  Electronically Signed by           Keith Conner PA-C  07/25/21 4732

## 2021-07-24 NOTE — ASSESSMENT & PLAN NOTE
Generalized weakness likely related to poor oral intake, nausea, vomiting, urinary tract infection  Supportive care  IV fluids, antibiotics, Protonix/Carafate  PT OT eval pending

## 2021-07-24 NOTE — ASSESSMENT & PLAN NOTE
History of acute on chronic anemia with concern for malabsorption syndrome  Baseline appears to be between 9 and 10  At baseline  Trend CBC

## 2021-07-24 NOTE — PLAN OF CARE
Problem: Potential for Falls  Goal: Patient will remain free of falls  Description: INTERVENTIONS:  - Educate patient/family on patient safety including physical limitations  - Instruct patient to call for assistance with activity   - Consult OT/PT to assist with strengthening/mobility   - Keep Call bell within reach  - Keep bed low and locked with side rails adjusted as appropriate  - Keep care items and personal belongings within reach  - Initiate and maintain comfort rounds  - Make Fall Risk Sign visible to staff  - - Obtain necessary fall risk management equipment: - Apply yellow socks and bracelet for high fall risk patients  - Consider moving patient to room near nurses station  Outcome: Progressing     Problem: GENITOURINARY - ADULT  Goal: Maintains or returns to baseline urinary function  Description: INTERVENTIONS:  - Assess urinary function  - Encourage oral fluids to ensure adequate hydration if ordered  - Administer IV fluids as ordered to ensure adequate hydration  - Administer ordered medications as needed  - Offer frequent toileting  - Follow urinary retention protocol if ordered  Outcome: Progressing  Goal: Absence of urinary retention  Description: INTERVENTIONS:  - Assess patients ability to void and empty bladder  - Monitor I/O  - Bladder scan as needed  - Discuss with physician/AP medications to alleviate retention as needed  - Discuss catheterization for long term situations as appropriate  Outcome: Progressing  Goal: Urinary catheter remains patent  Description: INTERVENTIONS:  - Assess patency of urinary catheter  - If patient has a chronic horn, consider changing catheter if non-functioning  - Follow guidelines for intermittent irrigation of non-functioning urinary catheter  Outcome: Progressing     Problem: MUSCULOSKELETAL - ADULT  Goal: Maintain or return mobility to safest level of function  Description: INTERVENTIONS:  - Assess patient's ability to carry out ADLs; assess patient's baseline for ADL function and identify physical deficits which impact ability to perform ADLs (bathing, care of mouth/teeth, toileting, grooming, dressing, etc )  - Assess/evaluate cause of self-care deficits   - Assess range of motion  - Assess patient's mobility  - Assess patient's need for assistive devices and provide as appropriate  - Encourage maximum independence but intervene and supervise when necessary  - Involve family in performance of ADLs  - Assess for home care needs following discharge   - Consider OT consult to assist with ADL evaluation and planning for discharge  - Provide patient education as appropriate  Outcome: Progressing  Goal: Maintain proper alignment of affected body part  Description: INTERVENTIONS:  - Support, maintain and protect limb and body alignment  - Provide patient/ family with appropriate education  Outcome: Progressing

## 2021-07-24 NOTE — ASSESSMENT & PLAN NOTE
Likely related to urinary tract infection versus nausea/vomiting   Outpatient labs showed white blood cell count of 21, today 15  Follow-up blood cultures, urine cultures  Trend fever/WBC curve

## 2021-07-25 LAB
ALBUMIN SERPL BCP-MCNC: 1.1 G/DL (ref 3.5–5)
ALP SERPL-CCNC: 210 U/L (ref 46–116)
ALT SERPL W P-5'-P-CCNC: 18 U/L (ref 12–78)
ANION GAP SERPL CALCULATED.3IONS-SCNC: 7 MMOL/L (ref 4–13)
AST SERPL W P-5'-P-CCNC: 62 U/L (ref 5–45)
ATRIAL RATE: 85 BPM
BASOPHILS # BLD AUTO: 0.13 THOUSANDS/ΜL (ref 0–0.1)
BASOPHILS NFR BLD AUTO: 1 % (ref 0–1)
BILIRUB SERPL-MCNC: 1.02 MG/DL (ref 0.2–1)
BUN SERPL-MCNC: 1 MG/DL (ref 5–25)
CALCIUM ALBUM COR SERPL-MCNC: 10 MG/DL (ref 8.3–10.1)
CALCIUM SERPL-MCNC: 7.7 MG/DL (ref 8.3–10.1)
CHLORIDE SERPL-SCNC: 112 MMOL/L (ref 100–108)
CO2 SERPL-SCNC: 23 MMOL/L (ref 21–32)
CREAT SERPL-MCNC: 0.69 MG/DL (ref 0.6–1.3)
EOSINOPHIL # BLD AUTO: 0.3 THOUSAND/ΜL (ref 0–0.61)
EOSINOPHIL NFR BLD AUTO: 2 % (ref 0–6)
ERYTHROCYTE [DISTWIDTH] IN BLOOD BY AUTOMATED COUNT: 22.8 % (ref 11.6–15.1)
GFR SERPL CREATININE-BSD FRML MDRD: 104 ML/MIN/1.73SQ M
GLUCOSE P FAST SERPL-MCNC: 74 MG/DL (ref 65–99)
GLUCOSE SERPL-MCNC: 74 MG/DL (ref 65–140)
HCT VFR BLD AUTO: 31.6 % (ref 34.8–46.1)
HGB BLD-MCNC: 9.6 G/DL (ref 11.5–15.4)
IMM GRANULOCYTES # BLD AUTO: 0.13 THOUSAND/UL (ref 0–0.2)
IMM GRANULOCYTES NFR BLD AUTO: 1 % (ref 0–2)
LYMPHOCYTES # BLD AUTO: 2.65 THOUSANDS/ΜL (ref 0.6–4.47)
LYMPHOCYTES NFR BLD AUTO: 17 % (ref 14–44)
MCH RBC QN AUTO: 28.7 PG (ref 26.8–34.3)
MCHC RBC AUTO-ENTMCNC: 30.4 G/DL (ref 31.4–37.4)
MCV RBC AUTO: 95 FL (ref 82–98)
MONOCYTES # BLD AUTO: 1.31 THOUSAND/ΜL (ref 0.17–1.22)
MONOCYTES NFR BLD AUTO: 9 % (ref 4–12)
NEUTROPHILS # BLD AUTO: 10.74 THOUSANDS/ΜL (ref 1.85–7.62)
NEUTS SEG NFR BLD AUTO: 70 % (ref 43–75)
NRBC BLD AUTO-RTO: 0 /100 WBCS
P AXIS: 37 DEGREES
PLATELET # BLD AUTO: 373 THOUSANDS/UL (ref 149–390)
PMV BLD AUTO: 12.1 FL (ref 8.9–12.7)
POTASSIUM SERPL-SCNC: 3.4 MMOL/L (ref 3.5–5.3)
PR INTERVAL: 162 MS
PROT SERPL-MCNC: 6.4 G/DL (ref 6.4–8.2)
QRS AXIS: 6 DEGREES
QRSD INTERVAL: 98 MS
QT INTERVAL: 382 MS
QTC INTERVAL: 454 MS
RBC # BLD AUTO: 3.34 MILLION/UL (ref 3.81–5.12)
SODIUM SERPL-SCNC: 142 MMOL/L (ref 136–145)
T WAVE AXIS: 15 DEGREES
VENTRICULAR RATE: 85 BPM
WBC # BLD AUTO: 15.26 THOUSAND/UL (ref 4.31–10.16)

## 2021-07-25 PROCEDURE — 93970 EXTREMITY STUDY: CPT | Performed by: SURGERY

## 2021-07-25 PROCEDURE — 80053 COMPREHEN METABOLIC PANEL: CPT | Performed by: INTERNAL MEDICINE

## 2021-07-25 PROCEDURE — 99232 SBSQ HOSP IP/OBS MODERATE 35: CPT | Performed by: INTERNAL MEDICINE

## 2021-07-25 PROCEDURE — 93010 ELECTROCARDIOGRAM REPORT: CPT | Performed by: INTERNAL MEDICINE

## 2021-07-25 PROCEDURE — 93005 ELECTROCARDIOGRAM TRACING: CPT

## 2021-07-25 PROCEDURE — 85025 COMPLETE CBC W/AUTO DIFF WBC: CPT | Performed by: INTERNAL MEDICINE

## 2021-07-25 RX ORDER — POTASSIUM CHLORIDE 20 MEQ/1
40 TABLET, EXTENDED RELEASE ORAL ONCE
Status: COMPLETED | OUTPATIENT
Start: 2021-07-25 | End: 2021-07-25

## 2021-07-25 RX ADMIN — SUCRALFATE 1 G: 1 TABLET ORAL at 13:13

## 2021-07-25 RX ADMIN — THIAMINE HCL TAB 100 MG 100 MG: 100 TAB at 09:59

## 2021-07-25 RX ADMIN — VENLAFAXINE HYDROCHLORIDE 150 MG: 150 CAPSULE, EXTENDED RELEASE ORAL at 10:01

## 2021-07-25 RX ADMIN — POTASSIUM CHLORIDE 20 MEQ: 1500 TABLET, EXTENDED RELEASE ORAL at 09:59

## 2021-07-25 RX ADMIN — SUCRALFATE 1 G: 1 TABLET ORAL at 15:58

## 2021-07-25 RX ADMIN — FERROUS SULFATE TAB 325 MG (65 MG ELEMENTAL FE) 325 MG: 325 (65 FE) TAB at 10:01

## 2021-07-25 RX ADMIN — ATOMOXETINE 60 MG: 60 CAPSULE ORAL at 10:01

## 2021-07-25 RX ADMIN — GABAPENTIN 400 MG: 400 CAPSULE ORAL at 21:39

## 2021-07-25 RX ADMIN — LITHIUM CARBONATE 300 MG: 300 TABLET, FILM COATED, EXTENDED RELEASE ORAL at 17:39

## 2021-07-25 RX ADMIN — QUETIAPINE FUMARATE 100 MG: 100 TABLET ORAL at 13:13

## 2021-07-25 RX ADMIN — LEVOTHYROXINE SODIUM 100 MCG: 100 TABLET ORAL at 05:40

## 2021-07-25 RX ADMIN — FOLIC ACID 1 MG: 1 TABLET ORAL at 10:00

## 2021-07-25 RX ADMIN — QUETIAPINE FUMARATE 100 MG: 100 TABLET ORAL at 10:00

## 2021-07-25 RX ADMIN — PYRIDOXINE HCL TAB 50 MG 100 MG: 50 TAB at 10:00

## 2021-07-25 RX ADMIN — SODIUM CHLORIDE 100 ML/HR: 0.9 INJECTION, SOLUTION INTRAVENOUS at 02:54

## 2021-07-25 RX ADMIN — ENOXAPARIN SODIUM 100 MG: 100 INJECTION SUBCUTANEOUS at 17:39

## 2021-07-25 RX ADMIN — GABAPENTIN 400 MG: 400 CAPSULE ORAL at 10:00

## 2021-07-25 RX ADMIN — PANTOPRAZOLE SODIUM 40 MG: 40 TABLET, DELAYED RELEASE ORAL at 15:58

## 2021-07-25 RX ADMIN — SODIUM CHLORIDE 100 ML/HR: 0.9 INJECTION, SOLUTION INTRAVENOUS at 13:15

## 2021-07-25 RX ADMIN — SUCRALFATE 1 G: 1 TABLET ORAL at 21:40

## 2021-07-25 RX ADMIN — SUCRALFATE 1 G: 1 TABLET ORAL at 05:41

## 2021-07-25 RX ADMIN — CEFTRIAXONE SODIUM 1000 MG: 10 INJECTION, POWDER, FOR SOLUTION INTRAVENOUS at 18:01

## 2021-07-25 RX ADMIN — QUETIAPINE FUMARATE 100 MG: 100 TABLET ORAL at 17:39

## 2021-07-25 RX ADMIN — TOPIRAMATE 150 MG: 100 TABLET ORAL at 17:39

## 2021-07-25 RX ADMIN — GABAPENTIN 400 MG: 400 CAPSULE ORAL at 15:58

## 2021-07-25 RX ADMIN — ROPINIROLE 1 MG: 1 TABLET, FILM COATED ORAL at 21:39

## 2021-07-25 RX ADMIN — LITHIUM CARBONATE 300 MG: 300 TABLET, FILM COATED, EXTENDED RELEASE ORAL at 10:01

## 2021-07-25 RX ADMIN — PANTOPRAZOLE SODIUM 40 MG: 40 TABLET, DELAYED RELEASE ORAL at 05:40

## 2021-07-25 RX ADMIN — TOPIRAMATE 150 MG: 100 TABLET ORAL at 10:00

## 2021-07-25 RX ADMIN — QUETIAPINE FUMARATE 400 MG: 200 TABLET ORAL at 21:39

## 2021-07-25 NOTE — PROGRESS NOTES
[facility] [place]  Progress Note - [address] 1975, 50 y o  female MRN: [id number]  Unit/Bed#: E5 -01 Encounter: [id number]  Primary Care Provider: Maximino Thibodeaux PA-C   Date and time admitted to hospital: 7/24/2021 12:05 PM    * Weakness  Assessment & Plan  Generalized weakness likely related to poor oral intake, nausea, vomiting, urinary tract infection  Supportive care  IV fluids, antibiotics, Protonix/Carafate  PT OT eval pending    Leukocytosis  Assessment & Plan  Likely related to urinary tract infection versus nausea/vomiting   Outpatient labs showed white blood cell count of 21, today 15  Follow-up blood cultures, urine cultures  Trend fever/WBC curve    UTI (urinary tract infection)  Assessment & Plan  Patient reports increased urinary frequency, incontinence  Urinalysis with nitrites, leukocytes, innumerable bacteria  Continue IV Rocephin  Follow-up urine culture results      Nausea  Assessment & Plan  Patient presents with poor oral intake nausea, vomiting, denies coffee-ground emesis or bloody emesis  She states she has been having nausea and vomiting on and off for months  CT scan showed no evidence of bowel obstruction, colitis or diverticulitis  Patient was admitted in May for acute blood loss anemia secondary to bleeding marginal ulcer at the gastrojejunostomy  Patient has a scheduled repeat EGD  Continue Protonix, Carafate, Tigan due to prolonged QTC  Nausea and vomiting improved    S/P gastric bypass  Assessment & Plan  With concern for malabsorption  Planned for repeat EGD/colonoscopy 7/21 with GI but rescheduled    Gastroesophageal reflux disease  Assessment & Plan  Continue Protonix and Carafate      Acne  Assessment & Plan  Reportedly on doxycycline 20 mg b i d    Not on hospital formulary will need to bring in from home    Prolonged Q-T interval on ECG  Assessment & Plan  Resolved    Hypercoagulation syndrome [facility])  Assessment & Plan  History of multiple DVTs, IVC thrombosis, prior IVC filters  Currently on therapeutic Lovenox daily, she was on Lovenox 100 mg b i d  Up until May  Repeat venous Doppler negative    Ovarian cyst  Assessment & Plan  Right adnexal lesion 6 4 cm there is a simple cyst versus hydrosalpinx  Plan for laparoscopy with Gyne Onc 2021    Acquired hypothyroidism  Assessment & Plan  Continue Synthroid    Morbid obesity with BMI of 50 0-59 9, adult [facility])  Assessment & Plan  Status post gastric bypass  Dietary Education    Bipolar disorder [facility])  Assessment & Plan  Continue home medications    Anemia  Assessment & Plan  History of acute on chronic anemia with concern for malabsorption syndrome  Baseline appears to be between 9 and 10  At baseline  Trend CBC    Chronically elevated LFTs  Suspect secondary to hepatic steatosis  VTE Pharmacologic Prophylaxis: VTE Score: 9 High Risk (Score >/= 5) - Pharmacological DVT Prophylaxis Ordered: enoxaparin (Lovenox)  Sequential Compression Devices Ordered  Patient Centered Rounds: I performed bedside rounds with nursing staff today  Discussions with Specialists or Other Care Team Provider:  Nursing    Education and Discussions with Family / Patient: Attempted to update  (daughter) via phone  Left voicemail  Time Spent for Care: 30 minutes  More than 50% of total time spent on counseling and coordination of care as described above  Current Length of Stay: 0 day(s)  Current Patient Status: Inpatient   Certification Statement: The patient will continue to require additional inpatient hospital stay due to Still nausea, vomited today once  Await urine culture  Discharge Plan: Anticipate discharge in 24-48 hrs to home  Code Status: Level 1 - Full Code    Subjective:   Patient was seen evaluated bedside  She is slightly better but still with slight nausea and vomiting    Denies abdominal pain    Objective:     Vitals:   Temp (24hrs), Av 4 °F (36 9 °C), Min:98 3 °F (36 8 °C), Max:98 5 °F (36 9 °C)    Temp:  [98 3 °F (36 8 °C)-98 5 °F (36 9 °C)] 98 3 °F (36 8 °C)  HR:  [88-92] 88  Resp:  [16-22] 16  BP: ()/(55-60) 102/60  SpO2:  [94 %-97 %] 94 %  Body mass index is 40 73 kg/m²  Input and Output Summary (last 24 hours): Intake/Output Summary (Last 24 hours) at 7/25/2021 1851  Last data filed at 7/25/2021 1700  Gross per 24 hour   Intake 90 ml   Output --   Net 90 ml       Physical Exam:   Physical Exam  Constitutional:       General: She is not in acute distress  HENT:      Head: Atraumatic  Cardiovascular:      Rate and Rhythm: Normal rate and regular rhythm  Heart sounds: No murmur heard  No friction rub  No gallop  Pulmonary:      Effort: Pulmonary effort is normal  No respiratory distress  Breath sounds: Normal breath sounds  No wheezing  Abdominal:      General: Bowel sounds are normal  There is no distension  Palpations: Abdomen is soft  Tenderness: There is no abdominal tenderness  Musculoskeletal:         General: No swelling  Cervical back: Neck supple  Skin:     General: Skin is warm and dry  Neurological:      General: No focal deficit present  Mental Status: She is alert     Psychiatric:         Mood and Affect: Mood normal         Additional Data:     Labs:  Results from last 7 days   Lab Units 07/25/21  0539   WBC Thousand/uL 15 26*   HEMOGLOBIN g/dL 9 6*   HEMATOCRIT % 31 6*   PLATELETS Thousands/uL 373   NEUTROS PCT % 70   LYMPHS PCT % 17   MONOS PCT % 9   EOS PCT % 2     Results from last 7 days   Lab Units 07/25/21  0539   SODIUM mmol/L 142   POTASSIUM mmol/L 3 4*   CHLORIDE mmol/L 112*   CO2 mmol/L 23   BUN mg/dL 1*   CREATININE mg/dL 0 69   ANION GAP mmol/L 7   CALCIUM mg/dL 7 7*   ALBUMIN g/dL 1 1*   TOTAL BILIRUBIN mg/dL 1 02*   ALK PHOS U/L 210*   ALT U/L 18   AST U/L 62*   GLUCOSE RANDOM mg/dL 74     Results from last 7 days   Lab Units 07/24/21  1238   INR  1 35*             Results from last 7 days Lab Units 07/24/21  1330   LACTIC ACID mmol/L 0 9       Lines/Drains:  Invasive Devices     Peripheral Intravenous Line            Peripheral IV 07/24/21 Left Antecubital 1 day                      Imaging: Reviewed radiology reports from this admission including: abdominal/pelvic CT and CT head    Recent Cultures (last 7 days):   Results from last 7 days   Lab Units 07/24/21  1504 07/24/21  1238   BLOOD CULTURE  Received in Microbiology Lab  Culture in Progress  Received in Microbiology Lab  Culture in Progress         Last 24 Hours Medication List:   Current Facility-Administered Medications   Medication Dose Route Frequency Provider Last Rate    acetaminophen  650 mg Oral Q8H PRN [place] Batres, DO      aluminum-magnesium hydroxide-simethicone  30 mL Oral Q6H PRN [place] Batres, DO      atoMOXetine  60 mg Oral Daily [place] Batres, DO      cefTRIAXone  1,000 mg Intravenous Q24H Leon Mcclelland MD 1,000 mg (07/25/21 1801)    enoxaparin  100 mg Subcutaneous Q24H [place] Batres, DO      ferrous sulfate  325 mg Oral Daily With Breakfast [place] Batres, DO      folic acid  1 mg Oral Daily Ajit Batres, DO      gabapentin  400 mg Oral TID Ajit Batres, DO      levothyroxine  100 mcg Oral Early Morning [place] Batres, DO      lithium carbonate  300 mg Oral BID [place] Batres, DO      pantoprazole  40 mg Oral BID AC [place] Batres, DO      pyridoxine  100 mg Oral Daily [place] Batres, DO      QUEtiapine  100 mg Oral TID Ajit Batres, DO      QUEtiapine  400 mg Oral HS [place] Batres, DO      rOPINIRole  1 mg Oral HS [place] Batres, DO      sucralfate  1 g Oral 4x Daily (AC & HS) Ajit Batres, DO      thiamine  100 mg Oral Daily Ajit Batres, DO      topiramate  150 mg Oral BID Ajit Batres, DO      trimethobenzamide  200 mg Intramuscular Q6H PRN Leon Mcclelland MD      venlafaxine  150 mg Oral Daily [place] Batres, DO Today, Patient Was Seen By: Laquita Gordon MD    **Please Note: This note may have been constructed using a voice recognition system  **

## 2021-07-26 PROBLEM — E46 PROTEIN-CALORIE MALNUTRITION (HCC): Status: ACTIVE | Noted: 2021-07-26

## 2021-07-26 PROBLEM — N39.0 UTI (URINARY TRACT INFECTION), BACTERIAL: Status: ACTIVE | Noted: 2021-07-24

## 2021-07-26 PROBLEM — A49.9 UTI (URINARY TRACT INFECTION), BACTERIAL: Status: ACTIVE | Noted: 2021-07-24

## 2021-07-26 LAB
ANION GAP SERPL CALCULATED.3IONS-SCNC: 6 MMOL/L (ref 4–13)
BASOPHILS # BLD AUTO: 0.12 THOUSANDS/ΜL (ref 0–0.1)
BASOPHILS NFR BLD AUTO: 1 % (ref 0–1)
BUN SERPL-MCNC: 2 MG/DL (ref 5–25)
CALCIUM SERPL-MCNC: 8.5 MG/DL (ref 8.3–10.1)
CHLORIDE SERPL-SCNC: 112 MMOL/L (ref 100–108)
CO2 SERPL-SCNC: 24 MMOL/L (ref 21–32)
CREAT SERPL-MCNC: 0.71 MG/DL (ref 0.6–1.3)
EOSINOPHIL # BLD AUTO: 0.81 THOUSAND/ΜL (ref 0–0.61)
EOSINOPHIL NFR BLD AUTO: 6 % (ref 0–6)
ERYTHROCYTE [DISTWIDTH] IN BLOOD BY AUTOMATED COUNT: 23 % (ref 11.6–15.1)
GFR SERPL CREATININE-BSD FRML MDRD: 102 ML/MIN/1.73SQ M
GLUCOSE SERPL-MCNC: 90 MG/DL (ref 65–140)
HCT VFR BLD AUTO: 32 % (ref 34.8–46.1)
HGB BLD-MCNC: 9.7 G/DL (ref 11.5–15.4)
IMM GRANULOCYTES # BLD AUTO: 0.1 THOUSAND/UL (ref 0–0.2)
IMM GRANULOCYTES NFR BLD AUTO: 1 % (ref 0–2)
LYMPHOCYTES # BLD AUTO: 2.88 THOUSANDS/ΜL (ref 0.6–4.47)
LYMPHOCYTES NFR BLD AUTO: 20 % (ref 14–44)
MCH RBC QN AUTO: 28.5 PG (ref 26.8–34.3)
MCHC RBC AUTO-ENTMCNC: 30.3 G/DL (ref 31.4–37.4)
MCV RBC AUTO: 94 FL (ref 82–98)
MONOCYTES # BLD AUTO: 1.19 THOUSAND/ΜL (ref 0.17–1.22)
MONOCYTES NFR BLD AUTO: 8 % (ref 4–12)
NEUTROPHILS # BLD AUTO: 9.28 THOUSANDS/ΜL (ref 1.85–7.62)
NEUTS SEG NFR BLD AUTO: 64 % (ref 43–75)
NRBC BLD AUTO-RTO: 0 /100 WBCS
PLATELET # BLD AUTO: 386 THOUSANDS/UL (ref 149–390)
PMV BLD AUTO: 12.6 FL (ref 8.9–12.7)
POTASSIUM SERPL-SCNC: 3.3 MMOL/L (ref 3.5–5.3)
RBC # BLD AUTO: 3.4 MILLION/UL (ref 3.81–5.12)
SODIUM SERPL-SCNC: 142 MMOL/L (ref 136–145)
WBC # BLD AUTO: 14.38 THOUSAND/UL (ref 4.31–10.16)

## 2021-07-26 PROCEDURE — 85025 COMPLETE CBC W/AUTO DIFF WBC: CPT | Performed by: INTERNAL MEDICINE

## 2021-07-26 PROCEDURE — 99232 SBSQ HOSP IP/OBS MODERATE 35: CPT | Performed by: INTERNAL MEDICINE

## 2021-07-26 PROCEDURE — 97163 PT EVAL HIGH COMPLEX 45 MIN: CPT

## 2021-07-26 PROCEDURE — 97166 OT EVAL MOD COMPLEX 45 MIN: CPT

## 2021-07-26 PROCEDURE — 80048 BASIC METABOLIC PNL TOTAL CA: CPT | Performed by: INTERNAL MEDICINE

## 2021-07-26 RX ORDER — POTASSIUM CHLORIDE 20 MEQ/1
40 TABLET, EXTENDED RELEASE ORAL ONCE
Status: COMPLETED | OUTPATIENT
Start: 2021-07-26 | End: 2021-07-26

## 2021-07-26 RX ADMIN — SUCRALFATE 1 G: 1 TABLET ORAL at 15:33

## 2021-07-26 RX ADMIN — QUETIAPINE FUMARATE 100 MG: 100 TABLET ORAL at 17:50

## 2021-07-26 RX ADMIN — SUCRALFATE 1 G: 1 TABLET ORAL at 05:53

## 2021-07-26 RX ADMIN — PANTOPRAZOLE SODIUM 40 MG: 40 TABLET, DELAYED RELEASE ORAL at 15:32

## 2021-07-26 RX ADMIN — SUCRALFATE 1 G: 1 TABLET ORAL at 12:16

## 2021-07-26 RX ADMIN — LITHIUM CARBONATE 300 MG: 300 TABLET, FILM COATED, EXTENDED RELEASE ORAL at 17:48

## 2021-07-26 RX ADMIN — LITHIUM CARBONATE 300 MG: 300 TABLET, FILM COATED, EXTENDED RELEASE ORAL at 08:14

## 2021-07-26 RX ADMIN — GABAPENTIN 400 MG: 400 CAPSULE ORAL at 21:29

## 2021-07-26 RX ADMIN — GABAPENTIN 400 MG: 400 CAPSULE ORAL at 15:33

## 2021-07-26 RX ADMIN — THIAMINE HCL TAB 100 MG 100 MG: 100 TAB at 08:08

## 2021-07-26 RX ADMIN — GABAPENTIN 400 MG: 400 CAPSULE ORAL at 08:14

## 2021-07-26 RX ADMIN — POTASSIUM CHLORIDE 40 MEQ: 1500 TABLET, EXTENDED RELEASE ORAL at 15:31

## 2021-07-26 RX ADMIN — ROPINIROLE 1 MG: 1 TABLET, FILM COATED ORAL at 21:29

## 2021-07-26 RX ADMIN — VENLAFAXINE HYDROCHLORIDE 150 MG: 150 CAPSULE, EXTENDED RELEASE ORAL at 08:08

## 2021-07-26 RX ADMIN — ENOXAPARIN SODIUM 100 MG: 100 INJECTION SUBCUTANEOUS at 17:48

## 2021-07-26 RX ADMIN — QUETIAPINE FUMARATE 100 MG: 100 TABLET ORAL at 12:16

## 2021-07-26 RX ADMIN — QUETIAPINE FUMARATE 400 MG: 200 TABLET ORAL at 21:29

## 2021-07-26 RX ADMIN — ATOMOXETINE 60 MG: 60 CAPSULE ORAL at 08:08

## 2021-07-26 RX ADMIN — SUCRALFATE 1 G: 1 TABLET ORAL at 21:29

## 2021-07-26 RX ADMIN — PYRIDOXINE HCL TAB 50 MG 100 MG: 50 TAB at 08:08

## 2021-07-26 RX ADMIN — PANTOPRAZOLE SODIUM 40 MG: 40 TABLET, DELAYED RELEASE ORAL at 05:53

## 2021-07-26 RX ADMIN — LEVOTHYROXINE SODIUM 100 MCG: 100 TABLET ORAL at 05:53

## 2021-07-26 RX ADMIN — FERROUS SULFATE TAB 325 MG (65 MG ELEMENTAL FE) 325 MG: 325 (65 FE) TAB at 08:10

## 2021-07-26 RX ADMIN — TOPIRAMATE 150 MG: 100 TABLET ORAL at 17:46

## 2021-07-26 RX ADMIN — TOPIRAMATE 150 MG: 100 TABLET ORAL at 08:08

## 2021-07-26 RX ADMIN — QUETIAPINE FUMARATE 100 MG: 100 TABLET ORAL at 08:08

## 2021-07-26 RX ADMIN — FOLIC ACID 1 MG: 1 TABLET ORAL at 08:10

## 2021-07-26 RX ADMIN — CEFTRIAXONE SODIUM 1000 MG: 10 INJECTION, POWDER, FOR SOLUTION INTRAVENOUS at 17:51

## 2021-07-26 NOTE — PHYSICAL THERAPY NOTE
PHYSICAL THERAPY EVALUATION  NAME:  Miguel Barber  DATE: 07/26/21    AGE:   50 y o  Mrn:   [id number]  ADMIT DX:  Hypokalemia [E87 6]  Dizziness [R42]  Leukocytosis [D72 829]  UTI (urinary tract infection) [N39 0]  Abnormal laboratory test [R89 9]  Right ovarian cyst [N83 201]  History of gastric bypass [Z98 84]  Generalized weakness [R53 1]  Headache [R51 9]    Past Medical History:   Diagnosis Date    Anemia     Bipolar depression ([facility] )     bipolar II, suicide    Disease of thyroid gland     History of DVT (deep vein thrombosis)     History of gastric bypass     Psychiatric disorder     Pulmonary embolism (HCC)     Seizures ([facility] )      Length Of Stay: 1  Performed at least 2 patient identifiers during session: Name and Birthday  PHYSICAL THERAPY EVALUATION :        07/26/21 0913   Note Type   Note type Evaluation   Pain Assessment   Pain Assessment Tool Pain Assessment not indicated - pt denies pain   Home Living   Type of Home Apartment  (2 SUE, no HR)   Home Layout One level;Performs ADLs on one level; Able to live on main level with bedroom/bathroom;Stairs to enter without rails   Bathroom Shower/Tub Walk-in shower   Bathroom Toilet Standard   Bathroom Equipment Grab bars in shower; Shower chair   Home Equipment Walker;Cane  (lift chair)   Prior Function   Level of Secretary Needs assistance with ADLs and functional mobility; Needs assistance with IADLs   Lives With Alone   Receives Help From Family  (Dtr is paid caregiver, 9AM-3PM Monday-Friday)   ADL Assistance Needs assistance  (setup with UB ADLs, Assist w/ LB ADLs)   IADLs Needs assistance   Falls in the last 6 months >10  (reports 6-8 falls over the last 2 weeks)   Vocational On disability   Comments At baseline, pt reports requiring assist w/ ADLs (setup UB ADLs, assist LB ADLs) and IADLs, Mod I for functional mobility/transfers w/ use of SPC, (-) , and >14 falls PTA     Restrictions/Precautions   Weight Bearing Precautions Per Order No Other Precautions Chair Alarm; Bed Alarm; Fall Risk   Cognition   Overall Cognitive Status WFL   Orientation Level Oriented X4   Following Commands Follows all commands and directions without difficulty   RLE Assessment   RLE Assessment WFL  (4/5 strength)   LLE Assessment   LLE Assessment WFL  (4/5 strength)   Bed Mobility   Additional Comments At start and end of session pt seated OOB in recliner with chair alarm engaged and all needs met; bed mobility not assessed this session   Transfers   Sit to Stand 5  Supervision   Additional items Increased time required;Verbal cues   Stand to Sit 5  Supervision   Additional items Increased time required;Verbal cues   Additional Comments Min VC for safety and hand placement  RW used for all transfers   Ambulation/Elevation   Gait pattern Improper Weight shift;Decreased foot clearance; Wide ILYA; Excessively slow   Gait Assistance 5  Supervision   Additional items Verbal cues   Assistive Device Rolling walker   Distance 80'   Balance   Static Sitting Good   Dynamic Sitting Fair +   Static Standing Fair   Dynamic Standing Fair -   Ambulatory Fair -   Endurance Deficit   Endurance Deficit Yes   Endurance Deficit Description fatigue   Activity Tolerance   Activity Tolerance Patient limited by fatigue;Patient tolerated treatment well   Medical Staff Made Aware OTEsequiel   Nurse Made Aware RN, [facility]   Assessment   Prognosis Good   Problem List Decreased strength;Decreased endurance; Impaired balance;Decreased mobility;Obesity   Barriers to Discharge None   Goals   Patient Goals Get stronger   STG Expiration Date 08/09/21   Plan   Treatment/Interventions Functional transfer training; Therapeutic exercise;LE strengthening/ROM; Elevations; Endurance training;Patient/family training;Equipment eval/education; Bed mobility;Gait training; Compensatory technique education;Spoke to nursing;OT   PT Frequency 2-3x/wk   Recommendation   PT Discharge Recommendation Home with home health rehabilitation Equipment Recommended   (pt owns RW)   PT - OK to Discharge   (when medically clear)   [address] Mobility Inpatient   Turning in Bed Without Bedrails 4   Lying on Back to Sitting on Edge of Flat Bed 4   Moving Bed to Chair 3   Standing Up From Chair 3   Walk in Room 3   Climb 3-5 Stairs 3   Basic Mobility Inpatient Raw Score 20   Basic Mobility Standardized Score 43 99     The patient's AM-PAC Basic Mobility Inpatient Short Form Raw Score is 20  , Standardized Score is 43 99    A standardized score greater than 42 9 suggests the patient may benefit from discharge to home  Please also refer to the recommendation of the Physical Therapist for safe discharge planning  (Please find full objective findings from PT assessment regarding body systems outlined above)  Assessment: Pt is a 50 y o  female seen for PT evaluation s/p admission to [address] [place] [address] on 7/24/2021 with Weakness  Order placed for PT services  Upon evaluation: Pt is presenting with impaired functional mobility due to decreased strength, decreased endurance, impaired balance, gait deviations and fall risk requiring supervision assistance for transfers and ambulation with RW  Pt's clinical presentation is currently unstable/unpredictable given the functional mobility deficits above coupled with fall risks as indicated by AM-PAC 6-Clicks: 86/82 as well as hx of falls, impaired balance and polypharmacy and combined with medical complications of hypotension, abnormal renal lab values, abnormal H&H, abnormal WBCs, abnormal potassium values and need for input for mobility technique/safety  Pt's PMHx and comorbidities that may affect physical performance and progress include: obesity, h/o DVT, h/o PE and bipolar disorder, OCD, alcohol use disorder, hx of seizures, hx of gastric bypass surgery, and hypercoagulation syndrome   Personal factors affecting pt at time of IE include: step(s) to enter environment, limited home support, past experience, inability to perform current job functions, inability to perform IADLs, inability to perform ADLs, inability to navigate level surfaces without external assistance, inability to navigate community distances and recent fall(s)/fall history  Pt will benefit from continued skilled PT services to address deficits as defined above and to maximize level of functional mobility to facilitate return toward PLOF and improved QOL  From PT/mobility standpoint, recommendation at time of d/c would be Home PT pending progress in order to reduce fall risk and maximize pt's functional independence and consistency with mobility in order to facilitate return to PLOF  Recommend trial with walker next 1-2 sessions and ther ex next 1-2 sessions  Goals: Pt will: Perform bed mobility tasks with modified I to reposition in bed and prepare for transfers  Pt will perform transfers with modified I to increase Indep in home alone environment and prepare for ambulation  Pt will ambulate with RW for >/= 200' with  modified I  to improve gait quality and promote proper use of assistive device and to access home environment  Pt will complete >/= 2 steps with without handrail with Supervision to return to home with SUE  Increase bilateral LE strength 1/2 grade to facilitate independent mobility and Increase all balance 1/2 grade to decrease risk for falls          Tyler Brooks, PT,DPT

## 2021-07-26 NOTE — CASE MANAGEMENT
Order for DME (rollator and commode) signed by provider and awaiting delivery from [place] [id number]  No other CM needs anticipated

## 2021-07-26 NOTE — ASSESSMENT & PLAN NOTE
· UTI sent in by psychiatrist for leukocytosis  WBC improving  · Continue empiric ceftriaxone    Urine culture remains preliminary    Results from last 7 days   Lab Units 07/26/21  0444 07/25/21  0539 07/24/21  1238   WBC Thousand/uL 14 38* 15 26* 16 10*

## 2021-07-26 NOTE — PLAN OF CARE
Problem: PHYSICAL THERAPY ADULT  Goal: Performs mobility at highest level of function for planned discharge setting  See evaluation for individualized goals  Description: Treatment/Interventions: Functional transfer training, Therapeutic exercise, LE strengthening/ROM, Elevations, Endurance training, Patient/family training, Equipment eval/education, Bed mobility, Gait training, Compensatory technique education, Spoke to nursing, OT  Equipment Recommended:  (pt owns RW)       See flowsheet documentation for full assessment, interventions and recommendations  Note: Prognosis: Good  Problem List: Decreased strength, Decreased endurance, Impaired balance, Decreased mobility, Obesity  Assessment: Pt is a 50 y o  female seen for PT evaluation s/p admission to [place] [address] on 7/24/2021 with Weakness  Order placed for PT services  Upon evaluation: Pt is presenting with impaired functional mobility due to decreased strength, decreased endurance, impaired balance, gait deviations and fall risk requiring supervision assistance for transfers and ambulation with RW  Pt's clinical presentation is currently unstable/unpredictable given the functional mobility deficits above coupled with fall risks as indicated by AM-PAC 6-Clicks: 80/38 as well as hx of falls, impaired balance and polypharmacy and combined with medical complications of hypotension, abnormal renal lab values, abnormal H&H, abnormal WBCs, abnormal potassium values and need for input for mobility technique/safety  Pt's PMHx and comorbidities that may affect physical performance and progress include: obesity, h/o DVT, h/o PE and bipolar disorder, OCD, alcohol use disorder, hx of seizures, hx of gastric bypass surgery, and hypercoagulation syndrome   Personal factors affecting pt at time of IE include: step(s) to enter environment, limited home support, past experience, inability to perform current job functions, inability to perform IADLs, inability to perform ADLs, inability to navigate level surfaces without external assistance, inability to navigate community distances and recent fall(s)/fall history  Pt will benefit from continued skilled PT services to address deficits as defined above and to maximize level of functional mobility to facilitate return toward PLOF and improved QOL  From PT/mobility standpoint, recommendation at time of d/c would be Home PT pending progress in order to reduce fall risk and maximize pt's functional independence and consistency with mobility in order to facilitate return to PLOF  Recommend trial with walker next 1-2 sessions and ther ex next 1-2 sessions  Barriers to Discharge: None        PT Discharge Recommendation: Home with home health rehabilitation     PT - OK to Discharge:  (when medically clear)    See flowsheet documentation for full assessment

## 2021-07-26 NOTE — CASE MANAGEMENT
CM met with patient and explained the CM role  Patient lives alone in an apartment that has 2 SUE  Patient reports being independent with ADLs but c/o ambulatory dysfunction and frequent weakness and fall for which she is requesting a rollator and commode to reduce her falls at night  Patient also reports using a cane  Patient is not on O2  Patient recently stopped driving but reports her daughter as her home health aide and drives her to her appointments and will drive her home from the hospital   Patient reports Rx coverage from medicare and also [place] [facility]  Patient uses [facility] [address]  Hector Flores is the patient's PCP  Patient reports having inpatient [facility] in the last 2 years and alcohol rehab in the last 2 years (patient unsure when exactly and where)  CM will continue to follow  CM reviewed d/c planning process including the following: identifying help at home, patient preference for d/c planning needs, Discharge Lounge, [facility] Meds to Bed program, availability of treatment team to discuss questions or concerns patient and/or family may have regarding understanding medications and recognizing signs and symptoms once discharged  CM also encouraged patient to follow up with all recommended appointments after discharge  Patient advised of importance for patient and family to participate in managing patients medical well being

## 2021-07-26 NOTE — OCCUPATIONAL THERAPY NOTE
Occupational Therapy Evaluation     Patient Name: Luann LUNA Date: 7/26/2021  Problem List  Principal Problem:    Weakness  Active Problems:    S/P gastric bypass    Anemia    Bipolar disorder (HCC)    Leukocytosis    Morbid obesity with BMI of 50 0-59 9, adult (HCC)    Acquired hypothyroidism    Nausea    Ovarian cyst    Hypercoagulation syndrome (HCC)    Gastroesophageal reflux disease    Prolonged Q-T interval on ECG    UTI (urinary tract infection)    Acne    Protein-calorie malnutrition (HCC)    Past Medical History  Past Medical History:   Diagnosis Date    Anemia     Bipolar depression ([facility] [address] )     bipolar II, suicide    Disease of thyroid gland     History of DVT (deep vein thrombosis)     History of gastric bypass     Psychiatric disorder     Pulmonary embolism (HCC)     Seizures ([facility] [address] )      Past Surgical History  Past Surgical History:   Procedure Laterality Date    APPENDECTOMY      Open    CHOLECYSTECTOMY      Laparoscopic    GASTRIC BYPASS      was 205 date of surgery    IR DVT THROMBOLYSIS/THROMBECTOMY ILIAC/IVC WITH VENOGRAM  8/4/2020    IR IVC FILTER REMOVAL  6/8/2021    IR TPA LYSIS CHECK  8/5/2020    IVC FILTER INSERTION  2017    STOMACH SURGERY      for morbid obesity    TUBAL LIGATION Bilateral 2010 07/26/21 0928   Note Type   Note type Evaluation   Restrictions/Precautions   Weight Bearing Precautions Per Order No   Other Precautions Chair Alarm; Bed Alarm; Fall Risk   Pain Assessment   Pain Assessment Tool Pain Assessment not indicated - pt denies pain   Pain Score No Pain   Home Living   Type of Home Apartment   Home Layout One level;Stairs to enter without rails  (2 SUE)   Bathroom Shower/Tub Walk-in shower   Bathroom Toilet Standard   Bathroom Equipment Grab bars in shower; Shower chair   Bathroom Accessibility Accessible   Home Equipment Walker;Cane;Other (Comment)  (Lift chair)   Additional Comments Pt lives alone in a one level apt with 2 SUE   Pt's dtr is paid caregiver Monday-Friday from 9AM-3PM    Prior Function   Level of [place] Needs assistance with ADLs and functional mobility; Needs assistance with IADLs   Lives With Alone   Receives Help From Family  (Dtr is paid caregiver, 9AM-3PM Monday-Friday)   ADL Assistance Needs assistance  (setup with UB ADLs, Assist w/ LB ADLs)   IADLs Needs assistance   Falls in the last 6 months >10  (reports 6-8 falls over the last 2 weeks)   Vocational On disability   Comments At baseline, pt reports requiring assist w/ ADLs (setup UB ADLs, assist LB ADLs) and IADLs, Mod I for functional mobility/transfers w/ use of SPC, (-) , and >07 falls PTA  Lifestyle   Autonomy At baseline, pt reports requiring assist w/ ADLs (setup UB ADLs, assist LB ADLs) and IADLs, Mod I for functional mobility/transfers w/ use of SPC, (-) , and >61 falls PTA  Reciprocal Relationships Dtr   Service to Others On disability   Intrinsic Gratification Spending time with family   Psychosocial   Psychosocial (WDL) WDL   ADL   Where Assessed Chair   Eating Assistance 7  Independent   Grooming Assistance 5  Supervision/Setup   UB Bathing Assistance 5  Supervision/Setup   LB Bathing Assistance 4  Minimal Assistance   UB Dressing Assistance 5  Supervision/Setup   LB Dressing Assistance 3  Moderate [address]  5  Supervision/Setup   Functional Assistance 5  Supervision/Setup   Bed Mobility   Supine to Sit Unable to assess   Sit to Supine Unable to assess   Additional Comments N/A  Pt seated OOB in chair at start/end of session  Chair alarm activated  Call bell and phone within reach  All needs met and pt reports no further questions for OT at this time   Transfers   Sit to Stand 5  Supervision   Additional items Armrests; Increased time required;Verbal cues   Stand to Sit 5  Supervision   Additional items Armrests; Increased time required;Verbal cues   Additional Comments Cues for safe technique and hand placement Functional Mobility   Functional Mobility 5  Supervision   Additional Comments Assist x1   Additional items Rolling walker   Balance   Static Sitting Good   Dynamic Sitting Fair +   Static Standing Fair   Dynamic Standing Fair -   Ambulatory Fair -   Activity Tolerance   Activity Tolerance Patient limited by fatigue;Patient tolerated treatment well   Medical Staff Made Loyda Warren, PT   Nurse Made Aware yes; Ashley Silver RN   RUE Assessment   RUE Assessment WFL   RUE Strength   RUE Overall Strength Within Functional Limits - able to perform ADL tasks with strength  (4-/5 throughout)   LUE Assessment   LUE Assessment WFL   LUE Strength   LUE Overall Strength Within Functional Limits - able to perform ADL tasks with strength  (4-/5 throughout)   Hand Function   Gross Motor Coordination Functional   Fine Motor Coordination Functional   Sensation   Light Touch Partial deficits in the RLE;Partial deficits in the LLE   Proprioception   Proprioception No apparent deficits   Vision-Basic Assessment   Current Vision Wears glasses all the time  (Not present at time of eval)   Vision - Complex Assessment   Ocular Range of Motion [facility]   Acuity Able to read clock/calendar on wall without difficulty; Able to read employee name badge without difficulty   Perception   Inattention/Neglect Appears intact   Cognition   Overall Cognitive Status [facility]   Arousal/Participation Alert; Cooperative   Attention Within functional limits   Orientation Level Oriented X4   Memory Within functional limits   Following Commands Follows all commands and directions without difficulty   Comments Pleasant and cooperative   Assessment   Limitation Decreased ADL status; Decreased UE strength;Decreased endurance;Decreased self-care trans;Decreased high-level ADLs   Prognosis Good   Assessment Pt is a 50 y o  female seen for OT evaluation s/p adm to [facility] on 7/24/2021 w/ generalized Weakness and UTI   Comorbidities affecting pts functional performance include a significant PMH of Anemia, Bipolar depression, DVT, Morbid obesity s/p gastric bypass with suspected malabsorption, Pulmonary Embolism,a nd Seizures  Pt with active OT orders and activity orders for Up with assistance  Pt lives alone in a one level apt with 2 SUE  Pt's dtr is paid caregiver Monday-Friday from 9AM-3PM  At baseline, pt reports requiring assist w/ ADLs (setup UB ADLs, assist LB ADLs) and IADLs, Mod I for functional mobility/transfers w/ use of SPC, (-) , and >46 falls PTA  Upon evaluation, pt currently requires Supervision for UB ADLs, Mod-Min A for LB ADLs, Supervision for toileting, and Supervision for functional mobility/transfers 2* the following deficits impacting occupational performance: decreased strength, decreased balance and decreased tolerance  These impairments, as well at pts steps to enter environment, limited home support and difficulty performing ADLS limit pts ability to safely engage in all baseline areas of occupation  The patient's raw score on the AM-PAC Daily Activity inpatient short form is 20, standardized score is 42 03, greater than 39 4  Patients at this level are likely to benefit from discharge to home  Please refer to the recommendation of the Occupational Therapist for safe discharge planning  Based on the aforementioned OT evaluation, functional performance deficits, and assessments, pt has been identified as a Moderate complexity evaluation  Pt to continue to benefit from continued acute OT services during hospital stay to address defined deficits and to maximize level of functional independence in the following Occupational Performance areas: grooming, bathing/shower, toilet hygiene, dressing, medication management, health maintenance, functional mobility, community mobility and clothing management  From OT standpoint, recommend Home w/ social support and continued PT upon D/C   OT will continue to follow pt 2-3x/wk to address the following goals to  w/in 10-14 days:   Goals   Patient Goals To get stronger    LTG Time Frame 10-14   Long Term Goal Please refer to LTGs listed below   Plan   Treatment Interventions ADL retraining;Functional transfer training;UE strengthening/ROM; Endurance training;Patient/family training;Equipment evaluation/education; Compensatory technique education; Activityengagement   Goal Expiration Date 21   OT Treatment Day 0   OT Frequency 2-3x/wk   Recommendation   OT Discharge Recommendation No rehabilitation needs  (Home w/ social support and continued PT)   OT - OK to Discharge Yes  (when medically cleared)   AM-PAC Daily Activity Inpatient   Lower Body Dressing 2   Bathing 3   Toileting 3   Upper Body Dressing 4   Grooming 4   Eating 4   Daily Activity Raw Score 20   Daily Activity Standardized Score (Calc for Raw Score >=11) 42 03   AM-PAC Applied Cognition Inpatient   Following a Speech/Presentation 4   Understanding Ordinary Conversation 4   Taking Medications 4   Remembering Where Things Are Placed or Put Away 4   Remembering List of 4-5 Errands 4   Taking Care of Complicated Tasks 4   Applied Cognition Raw Score 24   Applied Cognition Standardized Score 62 21          GOALS    1  Pt will improve activity tolerance to G for min 30 min txment sessions for increase engagement in functional tasks    2  Pt will complete bed mobility at a Mod I level w/ G balance/safety demonstrated to decrease caregiver assistance required     3  Pt will complete UB dressing/self care w/ mod I using adaptive device and DME as needed     4  Pt will complete LB dressing/self care w/ Supervision using adaptive device and DME as needed    5  Pt will complete toileting w/ mod I w/ G hygiene/thoroughness using DME as needed    6  Pt will improve functional transfers to Mod I on/off all surfaces using DME as needed w/ G balance/safety     7   Pt will improve functional mobility during ADL/IADL/leisure tasks to Mod I using DME as needed w/ G balance/safety     8  Pt will be attentive 100% of the time during ongoing cognitive assessment w/ G participation to assist w/ safe d/c planning/recommendations    9  Pt will demonstrate G carryover of pt/caregiver education and training as appropriate w/o cues w/ good tolerance to increase safety during functional tasks    10  Pt will demonstrate 100% carryover of energy conservation techniques t/o functional I/ADL/leisure tasks w/o cues s/p skilled education to increase endurance during functional tasks    11  Pt will increase BUE strength by 1MM grade via AROM exercises to increase independence in ADLs and transfers    12  Pt will verbalize 3 potential fall hazards and identify appropriate compensatory techniques to decrease fall risk in home environment     13   Pt will increase standing tolerance to 8-10 mins with Fair+ dynamic standing balance to increase safety during participation in ADLs         Virginia Keith OTR/L

## 2021-07-26 NOTE — PROGRESS NOTES
[address] 1975, 50 y o  female MRN: [id number]  Unit/Bed#: E5 -01 Encounter: [id number]  Primary Care Provider: Cherelle Han PA-C   Date and time admitted to hospital: 7/24/2021 12:05 PM    * UTI (urinary tract infection), bacterial  Assessment & Plan  · UTI sent in by psychiatrist for leukocytosis  WBC improving  · Continue empiric ceftriaxone  Urine culture remains preliminary    Results from last 7 days   Lab Units 07/26/21  0444 07/25/21  0539 07/24/21  1238   WBC Thousand/uL 14 38* 15 26* 16 10*       Hypokalemia  Assessment & Plan  · Replete and recheck in a m  Results from last 7 days   Lab Units 07/26/21 0444 07/25/21  0539 07/24/21  1238   POTASSIUM mmol/L 3 3* 3 4* 3 3*       Protein-calorie malnutrition (HCC)  Assessment & Plan  Malnutrition Findings:   Adult Malnutrition type: Social/enviromental  Adult Degree of Malnutrition: Unspecified protein calorie malnutrition  BMI Findings: Body mass index is 40 73 kg/m²  Gastroesophageal reflux disease  Assessment & Plan  · Continue pantoprazole    Morbidly obese (HCC)  Assessment & Plan  · Body mass index is 40 73 kg/m²  Ovarian cyst  Assessment & Plan  · Scheduled for laparoscopy in August    Acquired hypothyroidism  Assessment & Plan  · Continue levothyroxine    Bipolar disorder (HCC)  Assessment & Plan  · Mood stable continue lithium topiramate atomoxetine quetiapine and venlafaxine      VTE Pharmacologic Prophylaxis: VTE Score: 9 High Risk (Score >/= 5) - Pharmacological DVT Prophylaxis Ordered: Enoxaparin (Lovenox)  Sequential Compression Devices Ordered  Patient Centered Rounds: I have performed bedside rounds with nursing staff today  Discussions with Specialists or Other Care Team Provider:  Case management    Education and Discussions with Family / Patient:     Time Spent for Care: 25 mins    More than 50% of total time spent on counseling and coordination of care as described above  Current Length of Stay: 1 day(s)  Current Patient Status: Inpatient   Certification Statement: The patient will continue to require additional inpatient hospital stay due to urinary tract infection  Discharge Plan / Estimated Discharge Date: Anticipate discharge tomorrow to home  Code Status: Level 1 - Full Code      Subjective:   Patient seen and examined  Feeling okay, no complaints  No dysuria or weakness  Objective:   Vitals: Blood pressure 116/74, pulse 87, temperature 98 3 °F (36 8 °C), temperature source Oral, resp  rate 18, weight 104 kg (229 lb 15 oz), last menstrual period 10/04/2020, SpO2 97 %  Physical Exam  Vitals reviewed  Constitutional:       General: She is not in acute distress  Appearance: Normal appearance  Eyes:      General: No scleral icterus  Cardiovascular:      Rate and Rhythm: Regular rhythm  Heart sounds: Normal heart sounds  Pulmonary:      Breath sounds: Normal breath sounds  No wheezing  Abdominal:      General: Bowel sounds are normal       Palpations: Abdomen is soft  Tenderness: There is no guarding or rebound  Musculoskeletal:         General: Swelling present  Skin:     General: Skin is warm  Neurological:      Mental Status: She is alert and oriented to person, place, and time  Mental status is at baseline     Psychiatric:         Mood and Affect: Mood normal        Additional Data:   Labs:  Results from last 7 days   Lab Units 07/26/21  0444 07/25/21  0539 07/24/21  1238   WBC Thousand/uL 14 38* 15 26* 16 10*   HEMOGLOBIN g/dL 9 7* 9 6* 10 5*   HEMATOCRIT % 32 0* 31 6* 34 7*   MCV fL 94 95 95   PLATELETS Thousands/uL 386 373 380   INR   --   --  1 35*     Results from last 7 days   Lab Units 07/26/21  0444 07/25/21  0539 07/24/21  1238   SODIUM mmol/L 142 142 140   POTASSIUM mmol/L 3 3* 3 4* 3 3*   CHLORIDE mmol/L 112* 112* 109*   CO2 mmol/L 24 23 25   ANION GAP mmol/L 6 7 6   BUN mg/dL 2* 1* 2*   CREATININE mg/dL 0  71 0 69 0 79   CALCIUM mg/dL 8 5 7 7* 8 4   ALBUMIN g/dL  --  1 1* 1 4*   TOTAL BILIRUBIN mg/dL  --  1 02* 1 30*   ALK PHOS U/L  --  210* 250*   ALT U/L  --  18 25   AST U/L  --  62* 74*   EGFR ml/min/1 73sq m 102 104 89   GLUCOSE RANDOM mg/dL 90 74 90         Results from last 7 days   Lab Units 07/24/21  1238   CK TOTAL U/L 18*   TROPONIN I ng/mL <0 02          Results from last 7 days   Lab Units 07/24/21  1330   LACTIC ACID mmol/L 0 9                 * I Have Reviewed All Lab Data Listed Above  Cultures:   Results from last 7 days   Lab Units 07/24/21  1504 07/24/21  1306 07/24/21  1238   BLOOD CULTURE  No Growth at 24 hrs   --  No Growth at 24 hrs  URINE CULTURE   --  >100,000 cfu/ml Escherichia coli*  --                  Lines/Drains:  Invasive Devices     Peripheral Intravenous Line            Peripheral IV 07/24/21 Left Antecubital 2 days              Telemetry:      Imaging:  Imaging Reports Reviewed Today Include:   CTA head and neck with and without contrast    Result Date: 7/24/2021  Impression: Unremarkable head CT  Unremarkable CTA of the head and neck aside from an incidental noted subcentimeter left thyroid nodule  No additional workup is recommended at this time  Workstation performed: [id number]     CT abdomen pelvis with contrast    Result Date: 7/24/2021  Impression: 1  No evidence of bowel obstruction, colitis or diverticulitis  2   Cystic lesions arising from the right adnexa measuring up to 6 4 cm there is a simple cyst or hydrosalpinx  Nonemergent ultrasound of the pelvis recommended   Workstation performed: [id number]     Scheduled Meds:  Current Facility-Administered Medications   Medication Dose Route Frequency Provider Last Rate    acetaminophen  650 mg Oral Q8H PRN Genella Zackery, DO      aluminum-magnesium hydroxide-simethicone  30 mL Oral Q6H PRN Genella Zackery, DO      atoMOXetine  60 mg Oral Daily Geneanand Vizcarra DO      cefTRIAXone  1,000 mg Intravenous Q24H Vandana Miller MD 1,000 mg (07/25/21 1801)    enoxaparin  100 mg Subcutaneous Q24H Veronica Empire, DO      ferrous sulfate  325 mg Oral Daily With Breakfast Veronica Empire, DO      folic acid  1 mg Oral Daily Veronica Empire, DO      gabapentin  400 mg Oral TID Veronica Empire, DO      levothyroxine  100 mcg Oral Early Morning Veronica Empire, DO      lithium carbonate  300 mg Oral BID Veronica Empire, DO      pantoprazole  40 mg Oral BID AC Veronica Empire, DO      pyridoxine  100 mg Oral Daily Veronica Empire, DO      QUEtiapine  100 mg Oral TID Veronica Empire, DO      QUEtiapine  400 mg Oral HS Veronica Empire, DO      rOPINIRole  1 mg Oral HS Veronica Empire, DO      sucralfate  1 g Oral 4x Daily (AC & HS) Veronica Empire, DO      thiamine  100 mg Oral Daily Veronica Empire, DO      topiramate  150 mg Oral BID Veronica Empire, DO      trimethobenzamide  200 mg Intramuscular Q6H PRN Vandana Miller MD      venlafaxine  150 mg Oral Daily Veronica Empire, DO         Huma Woodward, DO [facility]  Hospitalist    ** Please Note: This note has been constructed using a voice recognition system   **

## 2021-07-26 NOTE — PLAN OF CARE
Problem: OCCUPATIONAL THERAPY ADULT  Goal: Performs self-care activities at highest level of function for planned discharge setting  See evaluation for individualized goals  Description: Treatment Interventions: ADL retraining, Functional transfer training, UE strengthening/ROM, Endurance training, Patient/family training, Equipment evaluation/education, Compensatory technique education, Activityengagement          See flowsheet documentation for full assessment, interventions and recommendations  Note: Limitation: Decreased ADL status, Decreased UE strength, Decreased endurance, Decreased self-care trans, Decreased high-level ADLs  Prognosis: Good  Assessment: Pt is a 50 y o  female seen for OT evaluation s/p adm to [address] on 7/24/2021 w/ generalized Weakness and UTI  Comorbidities affecting pts functional performance include a significant PMH of Anemia, Bipolar depression, DVT, Morbid obesity s/p gastric bypass with suspected malabsorption, Pulmonary Embolism,a nd Seizures  Pt with active OT orders and activity orders for Up with assistance  Pt lives alone in a one level apt with 2 SUE  Pt's dtr is paid caregiver Monday-Friday from 9AM-3PM  At baseline, pt reports requiring assist w/ ADLs (setup UB ADLs, assist LB ADLs) and IADLs, Mod I for functional mobility/transfers w/ use of SPC, (-) , and >70 falls PTA  Upon evaluation, pt currently requires Supervision for UB ADLs, Mod-Min A for LB ADLs, Supervision for toileting, and Supervision for functional mobility/transfers 2* the following deficits impacting occupational performance: decreased strength, decreased balance and decreased tolerance  These impairments, as well at pts steps to enter environment, limited home support and difficulty performing ADLS limit pts ability to safely engage in all baseline areas of occupation   The patient's raw score on the AM-PAC Daily Activity inpatient short form is 20, standardized score is 42 03, greater than 39 4  Patients at this level are likely to benefit from discharge to home  Please refer to the recommendation of the Occupational Therapist for safe discharge planning  Based on the aforementioned OT evaluation, functional performance deficits, and assessments, pt has been identified as a Moderate complexity evaluation  Pt to continue to benefit from continued acute OT services during hospital stay to address defined deficits and to maximize level of functional independence in the following Occupational Performance areas: grooming, bathing/shower, toilet hygiene, dressing, medication management, health maintenance, functional mobility, community mobility and clothing management  From OT standpoint, recommend Home w/ social support and continued PT upon D/C   OT will continue to follow pt 2-3x/wk to address the following goals to  w/in 10-14 days:     OT Discharge Recommendation: No rehabilitation needs (Home w/ social support and continued PT)  OT - OK to Discharge: Yes (when medically cleared)

## 2021-07-26 NOTE — ASSESSMENT & PLAN NOTE
Malnutrition Findings:   Adult Malnutrition type: Social/enviromental  Adult Degree of Malnutrition: Unspecified protein calorie malnutrition  BMI Findings: Body mass index is 40 73 kg/m²

## 2021-07-26 NOTE — ASSESSMENT & PLAN NOTE
· Replete and recheck in a m      Results from last 7 days   Lab Units 07/26/21  0444 07/25/21  0539 07/24/21  1238   POTASSIUM mmol/L 3 3* 3 4* 3 3*

## 2021-07-27 VITALS
WEIGHT: 229.94 LBS | RESPIRATION RATE: 18 BRPM | SYSTOLIC BLOOD PRESSURE: 124 MMHG | BODY MASS INDEX: 40.73 KG/M2 | DIASTOLIC BLOOD PRESSURE: 72 MMHG | OXYGEN SATURATION: 99 % | TEMPERATURE: 98.4 F | HEART RATE: 86 BPM

## 2021-07-27 LAB
ALBUMIN SERPL BCP-MCNC: 1.4 G/DL (ref 3.5–5)
ALP SERPL-CCNC: 229 U/L (ref 46–116)
ALT SERPL W P-5'-P-CCNC: 16 U/L (ref 12–78)
ANION GAP SERPL CALCULATED.3IONS-SCNC: 6 MMOL/L (ref 4–13)
AST SERPL W P-5'-P-CCNC: 65 U/L (ref 5–45)
BACTERIA UR CULT: ABNORMAL
BILIRUB SERPL-MCNC: 0.89 MG/DL (ref 0.2–1)
BUN SERPL-MCNC: 1 MG/DL (ref 5–25)
CALCIUM ALBUM COR SERPL-MCNC: 10.6 MG/DL (ref 8.3–10.1)
CALCIUM SERPL-MCNC: 8.5 MG/DL (ref 8.3–10.1)
CHLORIDE SERPL-SCNC: 111 MMOL/L (ref 100–108)
CO2 SERPL-SCNC: 24 MMOL/L (ref 21–32)
CREAT SERPL-MCNC: 0.74 MG/DL (ref 0.6–1.3)
ERYTHROCYTE [DISTWIDTH] IN BLOOD BY AUTOMATED COUNT: 22.6 % (ref 11.6–15.1)
GFR SERPL CREATININE-BSD FRML MDRD: 97 ML/MIN/1.73SQ M
GLUCOSE SERPL-MCNC: 108 MG/DL (ref 65–140)
HCT VFR BLD AUTO: 35.4 % (ref 34.8–46.1)
HGB BLD-MCNC: 10.7 G/DL (ref 11.5–15.4)
MCH RBC QN AUTO: 28.8 PG (ref 26.8–34.3)
MCHC RBC AUTO-ENTMCNC: 30.2 G/DL (ref 31.4–37.4)
MCV RBC AUTO: 95 FL (ref 82–98)
PLATELET # BLD AUTO: 432 THOUSANDS/UL (ref 149–390)
PMV BLD AUTO: 12.1 FL (ref 8.9–12.7)
POTASSIUM SERPL-SCNC: 3.4 MMOL/L (ref 3.5–5.3)
PROT SERPL-MCNC: 7.2 G/DL (ref 6.4–8.2)
RBC # BLD AUTO: 3.71 MILLION/UL (ref 3.81–5.12)
SODIUM SERPL-SCNC: 141 MMOL/L (ref 136–145)
WBC # BLD AUTO: 16.86 THOUSAND/UL (ref 4.31–10.16)

## 2021-07-27 PROCEDURE — 80053 COMPREHEN METABOLIC PANEL: CPT | Performed by: INTERNAL MEDICINE

## 2021-07-27 PROCEDURE — 99239 HOSP IP/OBS DSCHRG MGMT >30: CPT | Performed by: INTERNAL MEDICINE

## 2021-07-27 PROCEDURE — 85027 COMPLETE CBC AUTOMATED: CPT | Performed by: INTERNAL MEDICINE

## 2021-07-27 RX ORDER — CEPHALEXIN 500 MG/1
500 CAPSULE ORAL EVERY 6 HOURS SCHEDULED
Qty: 20 CAPSULE | Refills: 0 | Status: SHIPPED | OUTPATIENT
Start: 2021-07-27 | End: 2021-08-01

## 2021-07-27 RX ADMIN — LEVOTHYROXINE SODIUM 100 MCG: 100 TABLET ORAL at 05:52

## 2021-07-27 RX ADMIN — QUETIAPINE FUMARATE 100 MG: 100 TABLET ORAL at 08:22

## 2021-07-27 RX ADMIN — FERROUS SULFATE TAB 325 MG (65 MG ELEMENTAL FE) 325 MG: 325 (65 FE) TAB at 05:52

## 2021-07-27 RX ADMIN — LITHIUM CARBONATE 300 MG: 300 TABLET, FILM COATED, EXTENDED RELEASE ORAL at 08:22

## 2021-07-27 RX ADMIN — ATOMOXETINE 60 MG: 60 CAPSULE ORAL at 08:22

## 2021-07-27 RX ADMIN — PANTOPRAZOLE SODIUM 40 MG: 40 TABLET, DELAYED RELEASE ORAL at 05:52

## 2021-07-27 RX ADMIN — THIAMINE HCL TAB 100 MG 100 MG: 100 TAB at 08:23

## 2021-07-27 RX ADMIN — TOPIRAMATE 150 MG: 100 TABLET ORAL at 08:22

## 2021-07-27 RX ADMIN — PYRIDOXINE HCL TAB 50 MG 100 MG: 50 TAB at 08:22

## 2021-07-27 RX ADMIN — SUCRALFATE 1 G: 1 TABLET ORAL at 11:58

## 2021-07-27 RX ADMIN — VENLAFAXINE HYDROCHLORIDE 150 MG: 150 CAPSULE, EXTENDED RELEASE ORAL at 08:22

## 2021-07-27 RX ADMIN — SUCRALFATE 1 G: 1 TABLET ORAL at 05:52

## 2021-07-27 RX ADMIN — GABAPENTIN 400 MG: 400 CAPSULE ORAL at 08:22

## 2021-07-27 RX ADMIN — FOLIC ACID 1 MG: 1 TABLET ORAL at 08:22

## 2021-07-27 NOTE — DISCHARGE SUMMARY
Sita 46  Discharge- [address] 1975, 50 y o  female MRN: [id number]  Unit/Bed#: E5 -01 Encounter: [id number]  Primary Care Provider: Kiran Haywood PA-C   Date and time admitted to hospital: 7/24/2021 12:05 PM    Admitting Provider:  Marissa Olivas MD  Discharge Provider:  Teresita Mendoza DO  Admission Date: 7/24/2021       Discharge Date: 07/27/21   LOS: 2  Primary Care Physician at Discharge: Kiran Haywood, [address]:  [address] is a 50 y o  female with a history of hypercoagulability bipolar depression and gastric bypass who presented to hospital with abnormal labs  Her psychiatrist ordered routine blood work to check her lithium level and she was noted to have leukocytosis  She was recommended evaluation in the emergency department but initially she hesitated  In the ED she was found have concerning findings of UTI and was started on empiric ceftriaxone  Her leukocytosis did improve during hospitalization  She remained asymptomatic without any dysuria or any abdominal discomfort  She denies any shortness of breath  No other systemic symptoms  She has chronic lymphedema and a known adnexal mass which is scheduled for gynecologic surgery next month  She feels well and is being discharged home with keflex to complete a seven day course of antibiotics  Of note her leukocytosis did spike up again on day of discharge however she remained medically stable    Please see problem list listed below  DISCHARGE DIAGNOSES  * UTI (urinary tract infection), bacterial  Assessment & Plan  · UTI sent in by psychiatrist for leukocytosis  Initially did improve  · Patient was started on empiric ceftriaxone  Growth with pansensitive e coli and can be discharged with cephalexin  · Did have abdominal imaging without evidence of any abscess    Should have repeat CBC in 1-2 weeks to ensure resolution    Results from last 7 days   Lab Units 07/27/21  0558 07/26/21  0444 07/25/21  0539 07/24/21  1238   WBC Thousand/uL 16 86* 14 38* 15 26* 16 10*       Hypokalemia  Assessment & Plan  · Repleted during hospitalization    Results from last 7 days   Lab Units 07/27/21  0558 07/26/21  0444 07/25/21  0539 07/24/21  1238   POTASSIUM mmol/L 3 4* 3 3* 3 4* 3 3*       Protein-calorie malnutrition (HCC)  Assessment & Plan  Malnutrition Findings:   Adult Malnutrition type: Social/enviromental  Adult Degree of Malnutrition: Unspecified protein calorie malnutrition  BMI Findings: Body mass index is 40 73 kg/m²  Acne  Assessment & Plan  · Can continue doxycycline as previously taken    Gastroesophageal reflux disease  Assessment & Plan  · Continue pantoprazole    Hypercoagulation syndrome (HCC)  Assessment & Plan  · 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    Morbidly obese (HCC)  Assessment & Plan  · Body mass index is 40 73 kg/m²  Ovarian cyst  Assessment & Plan  · Scheduled for laparoscopy in August    Acquired hypothyroidism  Assessment & Plan  · Continue levothyroxine    Bipolar disorder ([facility] [address] )  Assessment & Plan  · Mood stable continue lithium topiramate atomoxetine quetiapine and venlafaxine    CONSULTING PROVIDERS   IP CONSULT TO CASE MANAGEMENT    PROCEDURES PERFORMED  * No surgery found *    RADIOLOGY RESULTS  CTA head and neck with and without contrast  Result Date: 7/24/2021  Impression: Unremarkable head CT  Unremarkable CTA of the head and neck aside from an incidental noted subcentimeter left thyroid nodule  No additional workup is recommended at this time  Workstation performed: [id number]     CT abdomen pelvis with contrast  Result Date: 7/24/2021  Impression: 1  No evidence of bowel obstruction, colitis or diverticulitis  2   Cystic lesions arising from the right adnexa measuring up to 6 4 cm there is a simple cyst or hydrosalpinx  Nonemergent ultrasound of the pelvis recommended   Workstation performed: [id number]       LABS  Results from last 7 days   Lab Units 07/27/21  0558 07/26/21  0444 07/25/21  0539 07/24/21  1238   WBC Thousand/uL 16 86* 14 38* 15 26* 16 10*   HEMOGLOBIN g/dL 10 7* 9 7* 9 6* 10 5*   HEMATOCRIT % 35 4 32 0* 31 6* 34 7*   MCV fL 95 94 95 95   PLATELETS Thousands/uL 432* 386 373 380   INR   --   --   --  1 35*     Results from last 7 days   Lab Units 07/27/21  0558 07/26/21  0444 07/25/21  0539 07/24/21  1238   SODIUM mmol/L 141 142 142 140   POTASSIUM mmol/L 3 4* 3 3* 3 4* 3 3*   CHLORIDE mmol/L 111* 112* 112* 109*   CO2 mmol/L 24 24 23 25   BUN mg/dL 1* 2* 1* 2*   CREATININE mg/dL 0 74 0 71 0 69 0 79   CALCIUM mg/dL 8 5 8 5 7 7* 8 4   ALBUMIN g/dL 1 4*  --  1 1* 1 4*   TOTAL BILIRUBIN mg/dL 0 89  --  1 02* 1 30*   ALK PHOS U/L 229*  --  210* 250*   ALT U/L 16  --  18 25   AST U/L 65*  --  62* 74*   EGFR ml/min/1 73sq m 97 102 104 89   GLUCOSE RANDOM mg/dL 108 90 74 90     Results from last 7 days   Lab Units 07/24/21  1238   CK TOTAL U/L 18*   TROPONIN I ng/mL <0 02             Results from last 7 days   Lab Units 07/24/21  1330   LACTIC ACID mmol/L 0 9           Cultures:   Results from last 7 days   Lab Units 07/24/21  1309 07/24/21  1306   COLOR UA  yellow Yellow   CLARITY UA   --  Clear   SPEC GRAV UA   --  1 010   PH UA   --  7 0   LEUKOCYTES UA   --  Trace*   NITRITE UA   --  Positive*   GLUCOSE UA mg/dl  --  Negative   KETONES UA mg/dl  --  Negative   BILIRUBIN UA   --  Negative   BLOOD UA   --  Negative      Results from last 7 days   Lab Units 07/24/21  1306   RBC UA /hpf None Seen   WBC UA /hpf None Seen   EPITHELIAL CELLS WET PREP /hpf Occasional   BACTERIA UA /hpf Innumerable*      Results from last 7 days   Lab Units 07/24/21  1504 07/24/21  1306 07/24/21  1238   BLOOD CULTURE  No Growth at 48 hrs  --  No Growth at 48 hrs  URINE CULTURE   --  >100,000 cfu/ml Escherichia coli*  --        DISCHARGE DAY VISIT AND PHYSICAL EXAM:  Subjective:  Patient seen examined  Feeling good  No complaints    No abdominal pain   Legs are swollen  Vitals:   Blood Pressure: 124/72 (07/27/21 0833)  Pulse: 86 (07/27/21 0833)  Temperature: 98 4 °F (36 9 °C) (07/27/21 0833)  Temp Source: Oral (07/27/21 8761)  Respirations: 18 (07/27/21 9843)  Weight - Scale: 104 kg (229 lb 15 oz) (07/24/21 1157)  SpO2: 99 % (07/27/21 6859)    Physical Exam  Vitals reviewed  Constitutional:       General: She is not in acute distress  Appearance: Normal appearance  HENT:      Head: Atraumatic  Eyes:      General: No scleral icterus  Extraocular Movements: Extraocular movements intact  Cardiovascular:      Rate and Rhythm: Regular rhythm  Heart sounds: Normal heart sounds  Pulmonary:      Breath sounds: Normal breath sounds  No wheezing  Abdominal:      General: Bowel sounds are normal       Palpations: Abdomen is soft  Tenderness: There is no guarding or rebound  Musculoskeletal:         General: Swelling (2+ lower extremities bilaterally) present  Skin:     General: Skin is warm  Neurological:      Mental Status: She is alert and oriented to person, place, and time  Mental status is at baseline  Psychiatric:         Mood and Affect: Mood normal        Planned Re-admission:  No  Discharge Disposition: Home with [address] [place] [address]    Test Results Pending at Discharge:   Pending Labs     Order Current Status    Blood culture #1 Preliminary result    Blood culture #2 Preliminary result      Incidental findings:  Adnexal mass  Patient scheduled for surgery  Medications   · Discharge Medication List: See after visit summary for reconciled discharge medications  Diet restrictions:  Regular diet   Activity restrictions: No strenuous activity  Discharge Condition: stable    Outpatient Follow-Up and Discharge Instructions  See after visit summary section titled Discharge Instructions for information provided to patient and family        Code Status: Level 1 - Full Code  Discharge Statement   I spent 35 minutes discharging the patient  This time was spent on the day of discharge  Greater than 50% of total time was spent with the patient and / or family counseling and / or coordination of care  ** Please Note: This note has been constructed using a voice recognition system   **

## 2021-07-28 NOTE — ASSESSMENT & PLAN NOTE
· UTI sent in by psychiatrist for leukocytosis  Initially did improve  · Patient was started on empiric ceftriaxone  Growth with pansensitive e coli and can be discharged with cephalexin  · Did have abdominal imaging without evidence of any abscess    Should have repeat CBC in 1-2 weeks to ensure resolution    Results from last 7 days   Lab Units 07/27/21  0558 07/26/21  0444 07/25/21  0539 07/24/21  1238   WBC Thousand/uL 16 86* 14 38* 15 26* 16 10*

## 2021-07-28 NOTE — ASSESSMENT & PLAN NOTE
· Repleted during hospitalization    Results from last 7 days   Lab Units 07/27/21  0558 07/26/21  0444 07/25/21  0539 07/24/21  1238   POTASSIUM mmol/L 3 4* 3 3* 3 4* 3 3*

## 2021-07-29 LAB
DME PARACHUTE DELIVERY DATE ACTUAL: NORMAL
DME PARACHUTE DELIVERY DATE ACTUAL: NORMAL
DME PARACHUTE DELIVERY DATE REQUESTED: NORMAL
DME PARACHUTE DELIVERY DATE REQUESTED: NORMAL
DME PARACHUTE ITEM DESCRIPTION: NORMAL
DME PARACHUTE ORDER STATUS: NORMAL
DME PARACHUTE ORDER STATUS: NORMAL
DME PARACHUTE SUPPLIER NAME: NORMAL
DME PARACHUTE SUPPLIER NAME: NORMAL
DME PARACHUTE SUPPLIER PHONE: NORMAL
DME PARACHUTE SUPPLIER PHONE: NORMAL

## 2021-07-30 LAB
BACTERIA BLD CULT: NORMAL
BACTERIA BLD CULT: NORMAL

## 2021-08-03 ENCOUNTER — APPOINTMENT (OUTPATIENT)
Dept: LAB | Facility: HOSPITAL | Age: 46
End: 2021-08-03
Attending: OBSTETRICS & GYNECOLOGY
Payer: MEDICARE

## 2021-08-03 ENCOUNTER — ANESTHESIA EVENT (OUTPATIENT)
Dept: PERIOP | Facility: HOSPITAL | Age: 46
End: 2021-08-03
Payer: MEDICARE

## 2021-08-03 DIAGNOSIS — N83.8 OVARIAN MASS, RIGHT: ICD-10-CM

## 2021-08-03 DIAGNOSIS — N83.201 CYST OF RIGHT OVARY: ICD-10-CM

## 2021-08-03 LAB
ABO GROUP BLD: NORMAL
BLD GP AB SCN SERPL QL: NEGATIVE
CANCER AG125 SERPL-ACNC: 8.5 U/ML (ref 0–30)
EST. AVERAGE GLUCOSE BLD GHB EST-MCNC: 82 MG/DL
HBA1C MFR BLD: 4.5 %
RH BLD: POSITIVE
SPECIMEN EXPIRATION DATE: NORMAL

## 2021-08-03 PROCEDURE — 86304 IMMUNOASSAY TUMOR CA 125: CPT

## 2021-08-03 PROCEDURE — 86850 RBC ANTIBODY SCREEN: CPT

## 2021-08-03 PROCEDURE — 86900 BLOOD TYPING SEROLOGIC ABO: CPT

## 2021-08-03 PROCEDURE — 36415 COLL VENOUS BLD VENIPUNCTURE: CPT

## 2021-08-03 PROCEDURE — 83036 HEMOGLOBIN GLYCOSYLATED A1C: CPT

## 2021-08-03 PROCEDURE — 86901 BLOOD TYPING SEROLOGIC RH(D): CPT

## 2021-08-05 ENCOUNTER — CONSULT (OUTPATIENT)
Dept: NEPHROLOGY | Facility: CLINIC | Age: 46
End: 2021-08-05
Payer: MEDICARE

## 2021-08-05 VITALS — WEIGHT: 229 LBS | BODY MASS INDEX: 40.57 KG/M2 | HEIGHT: 63 IN

## 2021-08-05 DIAGNOSIS — N28.9 RENAL INSUFFICIENCY: Primary | ICD-10-CM

## 2021-08-05 DIAGNOSIS — N83.8 OVARIAN MASS, RIGHT: ICD-10-CM

## 2021-08-05 DIAGNOSIS — N83.201 CYST OF RIGHT OVARY: ICD-10-CM

## 2021-08-05 PROCEDURE — 99203 OFFICE O/P NEW LOW 30 MIN: CPT | Performed by: INTERNAL MEDICINE

## 2021-08-05 NOTE — PROGRESS NOTES
Consultation - Nephrology   Sadie Torres 50 y o  female MRN: [id number]  Unit/Bed#:  Encounter: [id number]      Assessment/Plan     Assessment / Plan:  1  Renal    The patient was referred to me for surgical clearance as she is going to undergo pelvic surgery to remove a cystic ovarian growth  Her creatinine is 0 7 and she was recently hospitalized for urine infection and on that urinalysis there was no proteinuria  There is no evidence of any kidney disease or issues with her kidney function at this time  The patient has clearance to undergo surgical procedure with routine surgical protocol  No other testing ordered at this time  The patient does have chronic lymphedema she has history of DVTs with IVC filters  I will forward a copy this to her family physician and postoperatively if lymphedema is an issue she should be referred to the lymphedema clinic through physical therapy and I told her about this  She says this is been chronic in really unchanged  History of Present Illness   Physician Requesting Consult: No att  providers found  Reason for Consult / Principal Problem:  Renal about for clearance for surgery  Hx and PE limited by:   HPI: Sadie Torres is a 50y o  year old female who presents to me for the 1st visit  The patient was discovered to have a cystic ovarian growth and is scheduled to undergo surgery  She was referred for clearance preoperatively  The patient has no complaints for me today I reviewed her history and chronic conditions  She had no other complaints  History obtained from chart review and the patient  Consults    Review of Systems   Constitutional: Negative for chills, diaphoresis, fatigue and fever  HENT: Negative  Eyes: Negative  Respiratory: Negative  Negative for cough, chest tightness, shortness of breath and wheezing  Cardiovascular: Positive for leg swelling  Negative for chest pain and palpitations          Chronic leg swelling Gastrointestinal: Negative  Negative for abdominal distention, abdominal pain, diarrhea, nausea and vomiting  Genitourinary: Negative  Negative for difficulty urinating, dysuria, flank pain and hematuria  Neurological: Negative  Negative for dizziness, syncope, light-headedness and headaches  Psychiatric/Behavioral: Negative  Negative for agitation, behavioral problems, confusion and hallucinations  The patient is not hyperactive          Historical Information   Patient Active Problem List   Diagnosis    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HCC)    Hx of pulmonary embolus    S/P gastric bypass    Anemia    Migraine    Bipolar disorder (HCC)    Swelling of lower extremity    Leukocytosis    Hx of suicide attempt    Morbid obesity with BMI of 50 0-59 9, adult [facility]    Acquired hypothyroidism    Vitamin D deficiency    Hypokalemia    Nausea    Ovarian cyst    Post-thrombotic syndrome    Iron deficiency anemia following bariatric surgery    Acute on chronic blood loss anemia    Hypotension    Morbidly obese (HCC)    Hypercoagulation syndrome (HCC)    Acute blood loss anemia    Acute gastric ulcer with hemorrhage    Alcohol use disorder, severe, dependence (HCC)    Alcoholic cirrhosis of liver without ascites (HCC)    Obsessive compulsive disorder    Gastroesophageal reflux disease    Mixed hyperlipidemia    Seizures (HCC)    History of gastric bypass    History of DVT (deep vein thrombosis)    Bipolar depression (HCC)    Severe protein-calorie malnutrition (HCC)    Prolonged Q-T interval on ECG    UTI (urinary tract infection)    UTI (urinary tract infection), bacterial    Acne    Protein-calorie malnutrition ([facility] [address] )    Renal insufficiency     Past Medical History:   Diagnosis Date    Anemia     Bipolar depression ([facility] )     bipolar II, suicide    Disease of thyroid gland     History of DVT (deep vein thrombosis)     History of gastric bypass     Psychiatric disorder     Pulmonary embolism (HCC)     Seizures (HCC)      Past Surgical History:   Procedure Laterality Date    APPENDECTOMY      Open    CHOLECYSTECTOMY      Laparoscopic    GASTRIC BYPASS      was 205 date of surgery    IR DVT THROMBOLYSIS/THROMBECTOMY ILIAC/IVC WITH VENOGRAM  8/4/2020    IR IVC FILTER REMOVAL  6/8/2021    IR TPA LYSIS CHECK  8/5/2020    IVC FILTER INSERTION  2017    STOMACH SURGERY      for morbid obesity    TUBAL LIGATION Bilateral 2010     Social History   Social History     Substance and Sexual Activity   Alcohol Use Yes    Comment: ocassional     Social History     Substance and Sexual Activity   Drug Use Yes    Types: Marijuana     Social History     Tobacco Use   Smoking Status Never Smoker   Smokeless Tobacco Never Used   Tobacco Comment    former quit in 1999 per [employer]     Family History   Problem Relation Age of Onset    Other Mother         headache    Hypertension Mother     Depression Mother     Arthritis Father     Other Father         H, pylori infection    Other Sister         esophageal reflux    Migraines Sister     No Known Problems Paternal Grandfather     Diabetes Paternal Aunt         Mellitus    Breast cancer Paternal Aunt     Diabetes Paternal Uncle         Mellitus    Liver cancer Paternal Uncle     Asthma Daughter     No Known Problems Maternal Grandmother     No Known Problems Maternal Grandfather     No Known Problems Paternal Grandmother     No Known Problems Brother     No Known Problems Sister     No Known Problems Sister     Asthma Daughter     No Known Problems Maternal Aunt        Meds/Allergies   current meds:   No current facility-administered medications for this visit  Allergies   Allergen Reactions    Morphine Seizures       Objective   [unfilled]  Body mass index is 40 57 kg/m²      Invasive Devices:        PHYSICAL EXAM:  Ht 5' 3" (1 6 m)   Wt 104 kg (229 lb)   LMP 10/04/2020   BMI 40 57 kg/m²     Physical Exam  Constitutional: General: She is not in acute distress  Appearance: She is not toxic-appearing or diaphoretic  HENT:      Head: Normocephalic and atraumatic  Nose:      Comments: Wearing mask     Mouth/Throat:      Comments: Wearing mask  Eyes:      General: No scleral icterus  Extraocular Movements: Extraocular movements intact  Cardiovascular:      Rate and Rhythm: Normal rate and regular rhythm  Heart sounds: No friction rub  No gallop  Comments: Positive edema  Pulmonary:      Effort: Pulmonary effort is normal  No respiratory distress  Breath sounds: Normal breath sounds  No wheezing, rhonchi or rales  Abdominal:      General: Bowel sounds are normal  There is no distension  Palpations: Abdomen is soft  Tenderness: There is no abdominal tenderness  There is no rebound  Musculoskeletal:      Cervical back: Normal range of motion and neck supple  Neurological:      General: No focal deficit present  Mental Status: She is alert and oriented to person, place, and time  Mental status is at baseline  Psychiatric:         Mood and Affect: Mood normal          Behavior: Behavior normal          Thought Content:  Thought content normal          Judgment: Judgment normal            Current Weight: Weight - Scale: 104 kg (229 lb)  First Weight: Weight - Scale: 104 kg (229 lb)    Lab Results:              Invalid input(s): LABGLOM        Invalid input(s): LABALBU

## 2021-08-05 NOTE — LETTER
August 5, 2021     Rico Luciano  [place] [address]    Patient: Mason Briceno   YOB: 1975   Date of Visit: 8/5/2021       Dear Dr Ivelisse Oliva:    Thank you for referring Mason Briceno to me for evaluation  Below are my notes for this consultation  If you have questions, please do not hesitate to call me  I look forward to following your patient along with you  Sincerely,        Holger Cameron MD        CC: FEDERICO De La Rosa MD  8/5/2021  4:00 PM  Sign when Signing Visit  Consultation - Nephrology   Mason Briceno 50 y o  female MRN: [id number]  Unit/Bed#:  Encounter: [id number]      Assessment/Plan     Assessment / Plan:  1  Renal    The patient was referred to me for surgical clearance as she is going to undergo pelvic surgery to remove a cystic ovarian growth  Her creatinine is 0 7 and she was recently hospitalized for urine infection and on that urinalysis there was no proteinuria  There is no evidence of any kidney disease or issues with her kidney function at this time  The patient has clearance to undergo surgical procedure with routine surgical protocol  No other testing ordered at this time  The patient does have chronic lymphedema she has history of DVTs with IVC filters  I will forward a copy this to her family physician and postoperatively if lymphedema is an issue she should be referred to the lymphedema clinic through physical therapy and I told her about this  She says this is been chronic in really unchanged  History of Present Illness   Physician Requesting Consult: No att  providers found  Reason for Consult / Principal Problem:  Renal about for clearance for surgery  Hx and PE limited by:   HPI: Mason Briceno is a 50y o  year old female who presents to me for the 1st visit  The patient was discovered to have a cystic ovarian growth and is scheduled to undergo surgery    She was referred for clearance preoperatively  The patient has no complaints for me today I reviewed her history and chronic conditions  She had no other complaints  History obtained from chart review and the patient  Consults    Review of Systems   Constitutional: Negative for chills, diaphoresis, fatigue and fever  HENT: Negative  Eyes: Negative  Respiratory: Negative  Negative for cough, chest tightness, shortness of breath and wheezing  Cardiovascular: Positive for leg swelling  Negative for chest pain and palpitations  Chronic leg swelling   Gastrointestinal: Negative  Negative for abdominal distention, abdominal pain, diarrhea, nausea and vomiting  Genitourinary: Negative  Negative for difficulty urinating, dysuria, flank pain and hematuria  Neurological: Negative  Negative for dizziness, syncope, light-headedness and headaches  Psychiatric/Behavioral: Negative  Negative for agitation, behavioral problems, confusion and hallucinations  The patient is not hyperactive          Historical Information   Patient Active Problem List   Diagnosis    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HCC)    Hx of pulmonary embolus    S/P gastric bypass    Anemia    Migraine    Bipolar disorder (HCC)    Swelling of lower extremity    Leukocytosis    Hx of suicide attempt    Morbid obesity with BMI of 50 0-59 9, adult ([facility] [address] [facility]    Acquired hypothyroidism    Vitamin D deficiency    Hypokalemia    Nausea    Ovarian cyst    Post-thrombotic syndrome    Iron deficiency anemia following bariatric surgery    Acute on chronic blood loss anemia    Hypotension    Morbidly obese (HCC)    Hypercoagulation syndrome (HCC)    Acute blood loss anemia    Acute gastric ulcer with hemorrhage    Alcohol use disorder, severe, dependence (HCC)    Alcoholic cirrhosis of liver without ascites (HCC)    Obsessive compulsive disorder    Gastroesophageal reflux disease    Mixed hyperlipidemia    Seizures ([facility]    History of gastric bypass    History of DVT (deep vein thrombosis)    Bipolar depression (HCC)    Severe protein-calorie malnutrition (HCC)    Prolonged Q-T interval on ECG    UTI (urinary tract infection)    UTI (urinary tract infection), bacterial    Acne    Protein-calorie malnutrition ([facility] [address] [facility]    Renal insufficiency     Past Medical History:   Diagnosis Date    Anemia     Bipolar depression ([facility] )     bipolar II, suicide    Disease of thyroid gland     History of DVT (deep vein thrombosis)     History of gastric bypass     Psychiatric disorder     Pulmonary embolism (HCC)     Seizures (HCC)      Past Surgical History:   Procedure Laterality Date    APPENDECTOMY      Open    CHOLECYSTECTOMY      Laparoscopic    GASTRIC BYPASS      was 205 date of surgery    IR DVT THROMBOLYSIS/THROMBECTOMY ILIAC/IVC WITH VENOGRAM  8/4/2020    IR IVC FILTER REMOVAL  6/8/2021    IR TPA LYSIS CHECK  8/5/2020    IVC FILTER INSERTION  2017    STOMACH SURGERY      for morbid obesity    TUBAL LIGATION Bilateral 2010     Social History   Social History     Substance and Sexual Activity   Alcohol Use Yes    Comment: ocassional     Social History     Substance and Sexual Activity   Drug Use Yes    Types: Marijuana     Social History     Tobacco Use   Smoking Status Never Smoker   Smokeless Tobacco Never Used   Tobacco Comment    former quit in 1999 per [employer]     Family History   Problem Relation Age of Onset    Other Mother         headache    Hypertension Mother     Depression Mother     Arthritis Father     Other Father         H, pylori infection    Other Sister         esophageal reflux    Migraines Sister     No Known Problems Paternal Grandfather     Diabetes Paternal Aunt         Mellitus    Breast cancer Paternal Aunt     Diabetes Paternal Uncle         Mellitus    Liver cancer Paternal Uncle     Asthma Daughter     No Known Problems Maternal Grandmother     No Known Problems Maternal Grandfather     No Known Problems Paternal Grandmother     No Known Problems Brother     No Known Problems Sister     No Known Problems Sister     Asthma Daughter     No Known Problems Maternal Aunt        Meds/Allergies   current meds:   No current facility-administered medications for this visit  Allergies   Allergen Reactions    Morphine Seizures       Objective   [unfilled]  Body mass index is 40 57 kg/m²  Invasive Devices:        PHYSICAL EXAM:  Ht 5' 3" (1 6 m)   Wt 104 kg (229 lb)   LMP 10/04/2020   BMI 40 57 kg/m²     Physical Exam  Constitutional:       General: She is not in acute distress  Appearance: She is not toxic-appearing or diaphoretic  HENT:      Head: Normocephalic and atraumatic  Nose:      Comments: Wearing mask     Mouth/Throat:      Comments: Wearing mask  Eyes:      General: No scleral icterus  Extraocular Movements: Extraocular movements intact  Cardiovascular:      Rate and Rhythm: Normal rate and regular rhythm  Heart sounds: No friction rub  No gallop  Comments: Positive edema  Pulmonary:      Effort: Pulmonary effort is normal  No respiratory distress  Breath sounds: Normal breath sounds  No wheezing, rhonchi or rales  Abdominal:      General: Bowel sounds are normal  There is no distension  Palpations: Abdomen is soft  Tenderness: There is no abdominal tenderness  There is no rebound  Musculoskeletal:      Cervical back: Normal range of motion and neck supple  Neurological:      General: No focal deficit present  Mental Status: She is alert and oriented to person, place, and time  Mental status is at baseline  Psychiatric:         Mood and Affect: Mood normal          Behavior: Behavior normal          Thought Content:  Thought content normal          Judgment: Judgment normal            Current Weight: Weight - Scale: 104 kg (229 lb)  First Weight: Weight - Scale: 104 kg (229 lb)    Lab Results:              Invalid input(s): LABGLOM        Invalid input(s): LABALBU

## 2021-08-05 NOTE — PATIENT INSTRUCTIONS
You are here for surgical clearance from me as your scheduled undergo surgery for your ovary next week  I reviewed her lab work and the creatinine which is the blood test that measures the kidney function is normal at 0 7  I also reviewed your latest urinalysis and there was no protein in the urine so at this point your kidney function is normal     I will communicate this to the doctor to tell him you have clearance to undergo surgery

## 2021-08-09 DIAGNOSIS — E66.01 MORBID OBESITY (HCC): Primary | ICD-10-CM

## 2021-08-09 NOTE — PRE-PROCEDURE INSTRUCTIONS
Pre-Surgery Instructions:   Medication Instructions    atoMOXetine (STRATTERA) 60 mg capsule Instructed patient per Anesthesia Guidelines   clotrimazole (LOTRIMIN) 1 % cream Instructed patient per Anesthesia Guidelines   doxycycline (PERIOSTAT) 20 MG tablet Instructed patient per Anesthesia Guidelines   enoxaparin (LOVENOX) 100 mg/mL Instructed patient per Anesthesia Guidelines   ferrous sulfate 325 (65 Fe) mg tablet Instructed patient per Anesthesia Guidelines   folic acid (FOLVITE) 1 mg tablet Instructed patient per Anesthesia Guidelines   gabapentin (NEURONTIN) 400 mg capsule Instructed patient per Anesthesia Guidelines   levothyroxine 100 mcg tablet Instructed patient per Anesthesia Guidelines   lithium carbonate (LITHOBID) 300 mg CR tablet Instructed patient per Anesthesia Guidelines   Multiple Vitamin (MULTI-VITAMIN DAILY PO) Instructed patient per Anesthesia Guidelines   NON FORMULARY Instructed patient per Anesthesia Guidelines   pantoprazole (PROTONIX) 40 mg tablet Instructed patient per Anesthesia Guidelines   QUEtiapine (SEROquel) 100 mg tablet Instructed patient per Anesthesia Guidelines   rOPINIRole (REQUIP) 1 mg tablet Instructed patient per Anesthesia Guidelines   SUMAtriptan (IMITREX) 100 mg tablet Instructed patient per Anesthesia Guidelines   thiamine 100 MG tablet Instructed patient per Anesthesia Guidelines   topiramate (TOPAMAX) 100 mg tablet Instructed patient per Anesthesia Guidelines   venlafaxine (EFFEXOR-XR) 150 mg 24 hr capsule Instructed patient per Anesthesia Guidelines  Instructed to take gabapentin/ pantoprazole/ levothyroxine/ lithium/seroquel/ topamax and effexor  am of surgery am of surgery with sip of water per anesthesia guidelines

## 2021-08-10 ENCOUNTER — OFFICE VISIT (OUTPATIENT)
Dept: CARDIOLOGY CLINIC | Facility: CLINIC | Age: 46
End: 2021-08-10
Payer: MEDICARE

## 2021-08-10 VITALS
BODY MASS INDEX: 38.45 KG/M2 | TEMPERATURE: 98.3 F | HEIGHT: 63 IN | SYSTOLIC BLOOD PRESSURE: 108 MMHG | WEIGHT: 217 LBS | DIASTOLIC BLOOD PRESSURE: 70 MMHG | HEART RATE: 82 BPM | OXYGEN SATURATION: 99 %

## 2021-08-10 DIAGNOSIS — Z01.810 PREOPERATIVE CARDIOVASCULAR EXAMINATION: Primary | ICD-10-CM

## 2021-08-10 DIAGNOSIS — N83.201 CYST OF RIGHT OVARY: ICD-10-CM

## 2021-08-10 DIAGNOSIS — R01.1 MURMUR, CARDIAC: ICD-10-CM

## 2021-08-10 DIAGNOSIS — N83.8 OVARIAN MASS, RIGHT: ICD-10-CM

## 2021-08-10 PROCEDURE — 99215 OFFICE O/P EST HI 40 MIN: CPT | Performed by: INTERNAL MEDICINE

## 2021-08-10 NOTE — LETTER
August 10, 2021     [place]renee Sergio, [address]    Patient: [address]   YOB: 1975   Date of Visit: 8/10/2021       Dear Dr Christiano Resendiz: Thank you for referring Luis [address]to [address] to me for evaluation  Below are my notes for this consultation  If you have questions, please do not hesitate to call me  I look forward to following your patient along with you  Sincerely,        Yan Fernandez DO        CC: MD Yan Morales DO  8/10/2021  2:59 PM  Sign when Signing Visit   Subjective:     Luis [address]to [address] is a 50 y o  female  who presents to the office today for a preoperative consultation at the request of surgeon Dr Leanna Kanner who plans on performing   Robotic resection of pelvic mass with robotic hysterectomy bilateral  oophorectomy, staging laparotomy and tumor debulking on August 13  Planned anesthesia is general  The patient has no known anesthesia issues  Most recent surgery was IVC filter removal      she has difficulty ambulating due to LE edema  She has had bilateral LE edema for over a year  Workup has lead to diagnosis of ovarian cancer  She also has ascites related to ovarian mass  She had an echocardiogram in August 2020 which showed ejection fraction of 70 percent with no significant valve disease  The following portions of the patient's history were reviewed and updated as appropriate: allergies, current medications, past family history, past medical history, past social history, past surgical history and problem list     Review of Systems  Review of Systems   Constitutional: Positive for fatigue  Respiratory: Negative for shortness of breath  Cardiovascular: Positive for leg swelling  Negative for chest pain and palpitations  Gastrointestinal: Positive for abdominal distention  Musculoskeletal: Positive for arthralgias and myalgias  All other systems reviewed and are negative           Objective:      Physical Exam  /70 (BP Location: Left arm, Patient Position: Sitting, Cuff Size: Large)   Pulse 82   Temp 98 3 °F (36 8 °C) (Temporal)   Ht 5' 3" (1 6 m)   Wt 98 4 kg (217 lb)   LMP 10/04/2020   SpO2 99%   BMI 38 44 kg/m²    Physical Exam   Constitutional: She appears healthy  No distress  Eyes: Pupils are equal, round, and reactive to light  Conjunctivae are normal    Neck: No JVD present  Cardiovascular: Normal rate and regular rhythm  Exam reveals no gallop and no friction rub  Murmur heard  Early systolic murmur is present with a grade of 3/6  Pulmonary/Chest: Effort normal and breath sounds normal  She has no wheezes  She has no rales  Abdominal: She exhibits distension  There is abdominal tenderness  Musculoskeletal:         General: Tenderness and edema present  No deformity  Cervical back: Normal range of motion and neck supple  Neurological: She is alert and oriented to person, place, and time  Skin: Skin is warm and dry  Cardiographics  ECG: normal sinus rhythm, no blocks or conduction defects, no ischemic changes  Echocardiogram: normal and reviewed by myself -  Patient had echocardiogram in August 2020  She had hyperdynamic LV function with no significant valve abnormalities  Imaging  Chest x-ray: normal     Lab Review   Lab Results   Component Value Date    K 3 4 (L) 07/27/2021    K 3 4 (L) 05/12/2021     (H) 07/27/2021     05/12/2021    CO2 24 07/27/2021    CO2 23 05/12/2021    BUN 1 (L) 07/27/2021    BUN 5 (L) 05/12/2021    CREATININE 0 74 07/27/2021    CALCIUM 8 5 07/27/2021    CALCIUM 8 5 (L) 05/12/2021     Lab Results   Component Value Date    WBC 16 86 (H) 07/27/2021    HGB 10 7 (L) 07/27/2021    HCT 35 4 07/27/2021    MCV 95 07/27/2021     (H) 07/27/2021     No results found for: CHOL, TRIG, HDL, LDLDIRECT       Assessment:     1  Preoperative cardiovascular examination    2  Ovarian mass, right    3  Cyst of right ovary    4   Murmur, cardiac           56 y o  female  with planned surgery as above  Known risk factors for perioperative complications: None     patient has a systolic murmur consistent with hyperdynamic LV function with out any significant LV outflow tract obstruction  No further workup needed at this time  Cardiac Risk Estimation: per the Revised Cardiac Risk Index, the patient has a score of 0 placing her at low risk of major cardiac event (3 9%),  and may proceed to OR as planned  No further cardiac workup is indicated at this time  Plan:      1  Preoperative workup as follows none  2  Change in medication regimen before surgery: Hold Lovenox for 24 hours prior to study  3  Prophylaxis for cardiac events with perioperative beta-blockers: not indicated  4  Invasive hemodynamic monitoring perioperatively: at the discretion of anesthesiologist   5  Deep vein thrombosis prophylaxis postoperatively:regimen to be chosen by surgical team   6  Call if any change prior to surgical date     P

## 2021-08-10 NOTE — PROGRESS NOTES
Subjective:     Rivera Rocha is a 50 y o  female  who presents to the office today for a preoperative consultation at the request of surgeon Dr Vicki Downey who plans on performing   Robotic resection of pelvic mass with robotic hysterectomy bilateral  oophorectomy, staging laparotomy and tumor debulking on August 13  Planned anesthesia is general  The patient has no known anesthesia issues  Most recent surgery was IVC filter removal      she has difficulty ambulating due to LE edema  She has had bilateral LE edema for over a year  Workup has lead to diagnosis of ovarian cancer  She also has ascites related to ovarian mass  She had an echocardiogram in August 2020 which showed ejection fraction of 70 percent with no significant valve disease  The following portions of the patient's history were reviewed and updated as appropriate: allergies, current medications, past family history, past medical history, past social history, past surgical history and problem list     Review of Systems  Review of Systems   Constitutional: Positive for fatigue  Respiratory: Negative for shortness of breath  Cardiovascular: Positive for leg swelling  Negative for chest pain and palpitations  Gastrointestinal: Positive for abdominal distention  Musculoskeletal: Positive for arthralgias and myalgias  All other systems reviewed and are negative  Objective:      Physical Exam  /70 (BP Location: Left arm, Patient Position: Sitting, Cuff Size: Large)   Pulse 82   Temp 98 3 °F (36 8 °C) (Temporal)   Ht 5' 3" (1 6 m)   Wt 98 4 kg (217 lb)   LMP 10/04/2020   SpO2 99%   BMI 38 44 kg/m²    Physical Exam   Constitutional: She appears healthy  No distress  Eyes: Pupils are equal, round, and reactive to light  Conjunctivae are normal    Neck: No JVD present  Cardiovascular: Normal rate and regular rhythm  Exam reveals no gallop and no friction rub  Murmur heard     Early systolic murmur is present with a grade of 3/6  Pulmonary/Chest: Effort normal and breath sounds normal  She has no wheezes  She has no rales  Abdominal: She exhibits distension  There is abdominal tenderness  Musculoskeletal:         General: Tenderness and edema present  No deformity  Cervical back: Normal range of motion and neck supple  Neurological: She is alert and oriented to person, place, and time  Skin: Skin is warm and dry  Cardiographics  ECG: normal sinus rhythm, no blocks or conduction defects, no ischemic changes  Echocardiogram: normal and reviewed by myself -  Patient had echocardiogram in August 2020  She had hyperdynamic LV function with no significant valve abnormalities  Imaging  Chest x-ray: normal     Lab Review   Lab Results   Component Value Date    K 3 4 (L) 07/27/2021    K 3 4 (L) 05/12/2021     (H) 07/27/2021     05/12/2021    CO2 24 07/27/2021    CO2 23 05/12/2021    BUN 1 (L) 07/27/2021    BUN 5 (L) 05/12/2021    CREATININE 0 74 07/27/2021    CALCIUM 8 5 07/27/2021    CALCIUM 8 5 (L) 05/12/2021     Lab Results   Component Value Date    WBC 16 86 (H) 07/27/2021    HGB 10 7 (L) 07/27/2021    HCT 35 4 07/27/2021    MCV 95 07/27/2021     (H) 07/27/2021     No results found for: CHOL, TRIG, HDL, LDLDIRECT       Assessment:     1  Preoperative cardiovascular examination    2  Ovarian mass, right    3  Cyst of right ovary    4  Murmur, cardiac           56 y o  female  with planned surgery as above  Known risk factors for perioperative complications: None     patient has a systolic murmur consistent with hyperdynamic LV function with out any significant LV outflow tract obstruction  No further workup needed at this time  Cardiac Risk Estimation: per the Revised Cardiac Risk Index, the patient has a score of 0 placing her at low risk of major cardiac event (3 9%),  and may proceed to OR as planned  No further cardiac workup is indicated at this time             Plan: 1  Preoperative workup as follows none  2  Change in medication regimen before surgery: Hold Lovenox for 24 hours prior to study  3  Prophylaxis for cardiac events with perioperative beta-blockers: not indicated  4  Invasive hemodynamic monitoring perioperatively: at the discretion of anesthesiologist   5  Deep vein thrombosis prophylaxis postoperatively:regimen to be chosen by surgical team   6  Call if any change prior to surgical date     P

## 2021-08-13 ENCOUNTER — ANESTHESIA (OUTPATIENT)
Dept: PERIOP | Facility: HOSPITAL | Age: 46
End: 2021-08-13
Payer: MEDICARE

## 2021-08-13 ENCOUNTER — TELEPHONE (OUTPATIENT)
Dept: HEMATOLOGY ONCOLOGY | Facility: CLINIC | Age: 46
End: 2021-08-13

## 2021-08-13 ENCOUNTER — HOSPITAL ENCOUNTER (OUTPATIENT)
Facility: HOSPITAL | Age: 46
Setting detail: OUTPATIENT SURGERY
Discharge: HOME | End: 2021-08-13
Attending: OBSTETRICS & GYNECOLOGY | Admitting: OBSTETRICS & GYNECOLOGY
Payer: MEDICARE

## 2021-08-13 VITALS
HEIGHT: 63 IN | HEART RATE: 75 BPM | BODY MASS INDEX: 40.57 KG/M2 | WEIGHT: 229 LBS | DIASTOLIC BLOOD PRESSURE: 54 MMHG | OXYGEN SATURATION: 99 % | SYSTOLIC BLOOD PRESSURE: 98 MMHG | TEMPERATURE: 97.5 F | RESPIRATION RATE: 14 BRPM

## 2021-08-13 DIAGNOSIS — L03.311 ABDOMINAL WALL CELLULITIS: Primary | ICD-10-CM

## 2021-08-13 DIAGNOSIS — N83.201 CYST OF RIGHT OVARY: ICD-10-CM

## 2021-08-13 DIAGNOSIS — N83.8 OVARIAN MASS, RIGHT: ICD-10-CM

## 2021-08-13 PROBLEM — Z90.721 STATUS POST UNILATERAL SALPINGO-OOPHORECTOMY: Status: ACTIVE | Noted: 2021-08-13

## 2021-08-13 LAB
ABO GROUP BLD: NORMAL
BLD GP AB SCN SERPL QL: NEGATIVE
EXT PREGNANCY TEST URINE: NEGATIVE
EXT. CONTROL: NORMAL
RH BLD: POSITIVE
SPECIMEN EXPIRATION DATE: NORMAL

## 2021-08-13 PROCEDURE — 58661 LAPAROSCOPY REMOVE ADNEXA: CPT | Performed by: OBSTETRICS & GYNECOLOGY

## 2021-08-13 PROCEDURE — 88342 IMHCHEM/IMCYTCHM 1ST ANTB: CPT | Performed by: PATHOLOGY

## 2021-08-13 PROCEDURE — NC001 PR NO CHARGE: Performed by: PHYSICIAN ASSISTANT

## 2021-08-13 PROCEDURE — 86901 BLOOD TYPING SEROLOGIC RH(D): CPT | Performed by: OBSTETRICS & GYNECOLOGY

## 2021-08-13 PROCEDURE — 86900 BLOOD TYPING SEROLOGIC ABO: CPT | Performed by: OBSTETRICS & GYNECOLOGY

## 2021-08-13 PROCEDURE — 86850 RBC ANTIBODY SCREEN: CPT | Performed by: OBSTETRICS & GYNECOLOGY

## 2021-08-13 PROCEDURE — 88112 CYTOPATH CELL ENHANCE TECH: CPT | Performed by: PATHOLOGY

## 2021-08-13 PROCEDURE — 88331 PATH CONSLTJ SURG 1 BLK 1SPC: CPT | Performed by: PATHOLOGY

## 2021-08-13 PROCEDURE — 81025 URINE PREGNANCY TEST: CPT | Performed by: OBSTETRICS & GYNECOLOGY

## 2021-08-13 PROCEDURE — 88331 PATH CONSLTJ SURG 1 BLK 1SPC: CPT | Performed by: OBSTETRICS & GYNECOLOGY

## 2021-08-13 PROCEDURE — 88341 IMHCHEM/IMCYTCHM EA ADD ANTB: CPT | Performed by: PATHOLOGY

## 2021-08-13 PROCEDURE — 88305 TISSUE EXAM BY PATHOLOGIST: CPT | Performed by: PATHOLOGY

## 2021-08-13 RX ORDER — ONDANSETRON 2 MG/ML
INJECTION INTRAMUSCULAR; INTRAVENOUS AS NEEDED
Status: DISCONTINUED | OUTPATIENT
Start: 2021-08-13 | End: 2021-08-13

## 2021-08-13 RX ORDER — LIDOCAINE HYDROCHLORIDE 20 MG/ML
INJECTION, SOLUTION EPIDURAL; INFILTRATION; INTRACAUDAL; PERINEURAL AS NEEDED
Status: DISCONTINUED | OUTPATIENT
Start: 2021-08-13 | End: 2021-08-13

## 2021-08-13 RX ORDER — BUPIVACAINE HYDROCHLORIDE 2.5 MG/ML
INJECTION, SOLUTION EPIDURAL; INFILTRATION; INTRACAUDAL AS NEEDED
Status: DISCONTINUED | OUTPATIENT
Start: 2021-08-13 | End: 2021-08-13 | Stop reason: HOSPADM

## 2021-08-13 RX ORDER — SODIUM CHLORIDE 9 MG/ML
125 INJECTION, SOLUTION INTRAVENOUS CONTINUOUS
Status: DISCONTINUED | OUTPATIENT
Start: 2021-08-13 | End: 2021-08-13 | Stop reason: HOSPADM

## 2021-08-13 RX ORDER — FENTANYL CITRATE/PF 50 MCG/ML
50 SYRINGE (ML) INJECTION
Status: DISCONTINUED | OUTPATIENT
Start: 2021-08-13 | End: 2021-08-13 | Stop reason: HOSPADM

## 2021-08-13 RX ORDER — HYDROMORPHONE HCL/PF 1 MG/ML
SYRINGE (ML) INJECTION AS NEEDED
Status: DISCONTINUED | OUTPATIENT
Start: 2021-08-13 | End: 2021-08-13

## 2021-08-13 RX ORDER — AMOXICILLIN AND CLAVULANATE POTASSIUM 875; 125 MG/1; MG/1
1 TABLET, FILM COATED ORAL EVERY 12 HOURS SCHEDULED
Qty: 28 TABLET | Refills: 0 | Status: SHIPPED | OUTPATIENT
Start: 2021-08-13 | End: 2021-08-27

## 2021-08-13 RX ORDER — ACETAMINOPHEN 325 MG/1
975 TABLET ORAL ONCE
Status: COMPLETED | OUTPATIENT
Start: 2021-08-13 | End: 2021-08-13

## 2021-08-13 RX ORDER — CEFAZOLIN SODIUM 2 G/50ML
2000 SOLUTION INTRAVENOUS EVERY 8 HOURS
Status: DISCONTINUED | OUTPATIENT
Start: 2021-08-13 | End: 2021-08-13 | Stop reason: HOSPADM

## 2021-08-13 RX ORDER — ONDANSETRON 2 MG/ML
4 INJECTION INTRAMUSCULAR; INTRAVENOUS ONCE AS NEEDED
Status: DISCONTINUED | OUTPATIENT
Start: 2021-08-13 | End: 2021-08-13 | Stop reason: HOSPADM

## 2021-08-13 RX ORDER — METOPROLOL TARTRATE 5 MG/5ML
INJECTION INTRAVENOUS AS NEEDED
Status: DISCONTINUED | OUTPATIENT
Start: 2021-08-13 | End: 2021-08-13

## 2021-08-13 RX ORDER — PROPOFOL 10 MG/ML
INJECTION, EMULSION INTRAVENOUS AS NEEDED
Status: DISCONTINUED | OUTPATIENT
Start: 2021-08-13 | End: 2021-08-13

## 2021-08-13 RX ORDER — SODIUM CHLORIDE 9 MG/ML
INJECTION, SOLUTION INTRAVENOUS CONTINUOUS PRN
Status: DISCONTINUED | OUTPATIENT
Start: 2021-08-13 | End: 2021-08-13

## 2021-08-13 RX ORDER — OXYCODONE HYDROCHLORIDE 5 MG/1
5 TABLET ORAL EVERY 4 HOURS PRN
Status: DISCONTINUED | OUTPATIENT
Start: 2021-08-13 | End: 2021-08-13 | Stop reason: HOSPADM

## 2021-08-13 RX ORDER — GABAPENTIN 100 MG/1
200 CAPSULE ORAL ONCE
Status: COMPLETED | OUTPATIENT
Start: 2021-08-13 | End: 2021-08-13

## 2021-08-13 RX ORDER — SODIUM CHLORIDE, SODIUM LACTATE, POTASSIUM CHLORIDE, CALCIUM CHLORIDE 600; 310; 30; 20 MG/100ML; MG/100ML; MG/100ML; MG/100ML
INJECTION, SOLUTION INTRAVENOUS CONTINUOUS PRN
Status: DISCONTINUED | OUTPATIENT
Start: 2021-08-13 | End: 2021-08-13

## 2021-08-13 RX ORDER — HEPARIN SODIUM 5000 [USP'U]/ML
7500 INJECTION, SOLUTION INTRAVENOUS; SUBCUTANEOUS
Status: COMPLETED | OUTPATIENT
Start: 2021-08-13 | End: 2021-08-13

## 2021-08-13 RX ORDER — MIDAZOLAM HYDROCHLORIDE 2 MG/2ML
INJECTION, SOLUTION INTRAMUSCULAR; INTRAVENOUS AS NEEDED
Status: DISCONTINUED | OUTPATIENT
Start: 2021-08-13 | End: 2021-08-13

## 2021-08-13 RX ORDER — ROCURONIUM BROMIDE 10 MG/ML
INJECTION, SOLUTION INTRAVENOUS AS NEEDED
Status: DISCONTINUED | OUTPATIENT
Start: 2021-08-13 | End: 2021-08-13

## 2021-08-13 RX ORDER — FENTANYL CITRATE 50 UG/ML
INJECTION, SOLUTION INTRAMUSCULAR; INTRAVENOUS AS NEEDED
Status: DISCONTINUED | OUTPATIENT
Start: 2021-08-13 | End: 2021-08-13

## 2021-08-13 RX ADMIN — ACETAMINOPHEN 975 MG: 325 TABLET, FILM COATED ORAL at 06:21

## 2021-08-13 RX ADMIN — PROPOFOL 200 MG: 10 INJECTION, EMULSION INTRAVENOUS at 07:30

## 2021-08-13 RX ADMIN — GABAPENTIN 200 MG: 100 CAPSULE ORAL at 06:22

## 2021-08-13 RX ADMIN — METOROPROLOL TARTRATE 2.5 MG: 5 INJECTION, SOLUTION INTRAVENOUS at 08:57

## 2021-08-13 RX ADMIN — OXYCODONE HYDROCHLORIDE 5 MG: 5 TABLET ORAL at 12:02

## 2021-08-13 RX ADMIN — FENTANYL CITRATE 100 MCG: 50 INJECTION INTRAMUSCULAR; INTRAVENOUS at 07:22

## 2021-08-13 RX ADMIN — SODIUM CHLORIDE: 0.9 INJECTION, SOLUTION INTRAVENOUS at 07:34

## 2021-08-13 RX ADMIN — ROCURONIUM BROMIDE 25 MG: 50 INJECTION, SOLUTION INTRAVENOUS at 08:25

## 2021-08-13 RX ADMIN — CEFAZOLIN SODIUM 2000 MG: 2 SOLUTION INTRAVENOUS at 07:17

## 2021-08-13 RX ADMIN — FENTANYL CITRATE 50 MCG: 50 INJECTION INTRAMUSCULAR; INTRAVENOUS at 10:37

## 2021-08-13 RX ADMIN — METOROPROLOL TARTRATE 2.5 MG: 5 INJECTION, SOLUTION INTRAVENOUS at 08:40

## 2021-08-13 RX ADMIN — HYDROMORPHONE HYDROCHLORIDE 0.5 MG: 1 INJECTION, SOLUTION INTRAMUSCULAR; INTRAVENOUS; SUBCUTANEOUS at 08:35

## 2021-08-13 RX ADMIN — HYDROMORPHONE HYDROCHLORIDE 0.5 MG: 1 INJECTION, SOLUTION INTRAMUSCULAR; INTRAVENOUS; SUBCUTANEOUS at 08:03

## 2021-08-13 RX ADMIN — LIDOCAINE HYDROCHLORIDE 100 MG: 20 INJECTION, SOLUTION EPIDURAL; INFILTRATION; INTRACAUDAL; PERINEURAL at 07:30

## 2021-08-13 RX ADMIN — ROCURONIUM BROMIDE 25 MG: 50 INJECTION, SOLUTION INTRAVENOUS at 07:57

## 2021-08-13 RX ADMIN — HEPARIN SODIUM 7500 UNITS: 5000 INJECTION INTRAVENOUS; SUBCUTANEOUS at 06:44

## 2021-08-13 RX ADMIN — ONDANSETRON 4 MG: 2 INJECTION INTRAMUSCULAR; INTRAVENOUS at 09:43

## 2021-08-13 RX ADMIN — SODIUM CHLORIDE 125 ML/HR: 0.9 INJECTION, SOLUTION INTRAVENOUS at 06:38

## 2021-08-13 RX ADMIN — ROCURONIUM BROMIDE 50 MG: 50 INJECTION, SOLUTION INTRAVENOUS at 07:30

## 2021-08-13 RX ADMIN — METRONIDAZOLE 500 MG: 500 INJECTION, SOLUTION INTRAVENOUS at 08:15

## 2021-08-13 RX ADMIN — SUGAMMADEX 200 MG: 100 INJECTION, SOLUTION INTRAVENOUS at 10:05

## 2021-08-13 RX ADMIN — MIDAZOLAM 2 MG: 1 INJECTION INTRAMUSCULAR; INTRAVENOUS at 07:22

## 2021-08-13 RX ADMIN — SODIUM CHLORIDE, SODIUM LACTATE, POTASSIUM CHLORIDE, AND CALCIUM CHLORIDE: .6; .31; .03; .02 INJECTION, SOLUTION INTRAVENOUS at 07:45

## 2021-08-13 NOTE — ANESTHESIA POSTPROCEDURE EVALUATION
Post-Op Assessment Note    CV Status:  Stable           Staff: Anesthesiologist         No complications documented      BP      Temp      Pulse     Resp      SpO2

## 2021-08-13 NOTE — ANESTHESIA POSTPROCEDURE EVALUATION
Post-Op Assessment Note    CV Status:  Stable  Pain Score: 3    Pain management: adequate     Mental Status:  Alert and awake   Hydration Status:  Euvolemic and stable   PONV Controlled:  Controlled   Airway Patency:  Patent      Post Op Vitals Reviewed: Yes      Staff: Anesthesiologist         No complications documented      BP      Temp      Pulse     Resp      SpO2

## 2021-08-13 NOTE — INTERVAL H&P NOTE
H&P reviewed  After examining the patient I find no changes in the patients condition since the H&P was written  Vitals:    08/13/21 0604   BP: 107/63   Pulse: 90   Resp: 16   Temp: 97 9 °F (36 6 °C)   SpO2: 96%     Recent hospitalization for UTI noted  I have calculated risk of perioperative mortality given h/o cirrhosis with current liver/kidney/coag function and MELD score calculator yields a risk of perioperative mortality at 30 days of <3% which is deemed acceptable given surgical indication / risk of malignancy  Pt aware  All questions answered  Pt wants to proceed with surgery as planned      Shweta Canas MD, [facility]  8/13/2021  6:48 AM

## 2021-08-13 NOTE — ANESTHESIA PREPROCEDURE EVALUATION
Procedure:  LAPAROSCOPY DIAGNOSTIC (N/A Uterus)  ROBOTIC RESECTION OF PELVIC MASS (N/A Abdomen)  ROBOTIC HYSTERECTOMY, BILATERAL SALPINGO-OOPHORECTOMY, STAGING, LAPAROTOMY, TUMOR DEBULKING AND ALL OTHER INDICATED PROCEDURES  (N/A Abdomen)    Relevant Problems   CARDIO   (+) Acute gastric ulcer with hemorrhage   (+) Mixed hyperlipidemia      ENDO   (+) Acquired hypothyroidism      GI/HEPATIC   (+) Acute gastric ulcer with hemorrhage   (+) Alcoholic cirrhosis of liver without ascites (HCC)   (+) Gastroesophageal reflux disease      HEMATOLOGY   (+) Acute blood loss anemia   (+) Acute on chronic blood loss anemia   (+) Anemia   (+) Hypercoagulation syndrome (HCC)   (+) Iron deficiency anemia following bariatric surgery      NEURO/PSYCH   (+) History of DVT (deep vein thrombosis)   (+) Hx of pulmonary embolus   (+) Hx of suicide attempt   (+) Obsessive compulsive disorder   (+) Seizures (HCC)      Other   (+) Bipolar disorder (HCC)   (+) History of gastric bypass        Physical Exam    Airway    Mallampati score: II  TM Distance: >3 FB  Neck ROM: full     Dental   No notable dental hx     Cardiovascular  Rhythm: regular, Rate: normal, Cardiovascular exam normal    Pulmonary  Pulmonary exam normal Breath sounds clear to auscultation,     Other Findings        Anesthesia Plan  ASA Score- 3     Anesthesia Type- general and regional with ASA Monitors  Additional Monitors:   Airway Plan: ETT  Comment: Patient consented for TAP block if necessary  Plan Factors-    Chart reviewed  Existing labs reviewed  Patient summary reviewed  Induction- intravenous  Postoperative Plan- Plan for postoperative opioid use  Informed Consent- Anesthetic plan and risks discussed with patient

## 2021-08-13 NOTE — DISCHARGE INSTRUCTIONS
Laparoscopic Oophorectomy   WHAT YOU SHOULD KNOW:   Laparoscopic oophorectomy is surgery to remove one or both of your ovaries  Your surgeon will use a laparoscope (a thin tube with a light and tiny video camera on the end) and small tools  He may use a machine (robot) that has mechanical arms to operate the tools  AFTER YOU LEAVE:   Medicines: The following medicines may be ordered for you:  · NSAIDs  help decrease swelling and pain or fever  This medicine is available with or without a doctor's order  NSAIDs can cause stomach bleeding or kidney problems in certain people  If you take blood thinner medicine, always ask your healthcare provider if NSAIDs are safe for you  Always read the medicine label and follow directions  · Pain medicine  takes away or decreases pain  Do not wait until the pain is severe before you take your medicine  · Take your medicine as directed  Call your healthcare provider if you think your medicine is not helping or if you have side effects  Tell him if you are allergic to any medicine  Keep a list of the medicines, vitamins, and herbs you take  Include the amounts, and when and why you take them  Bring the list or the pill bottles to follow-up visits  Carry your medicine list with you in case of an emergency  Follow up with your primary healthcare provider or surgeon as directed: You may need to return to have your stitches removed  Write down your questions so you remember to ask them during your visits  Wound care:   · Do not remove the strips or medical glue used to close your incisions for a week or as directed by your surgeon  · Keep your surgical incision wound clean  Ask your primary healthcare provider or surgeon how to care for your wound  He will tell you when and how to clean it and to check for signs of infection  · Keep your wound dry  Do not take a shower or bath for 24 hours after surgery, or as directed by your primary healthcare provider   Ask how long you will need to keep your wound covered while you bathe, and what to use to cover it  Check your wound for signs of infection, such as redness or swelling  Self-care:   · You may have a sore throat if a tube was used to give you anesthesia during surgery  Use throat lozenges or gargle with warm salt water to help relieve your sore throat  · Do not lift heavy objects for up to 6 weeks after surgery or as directed by your primary healthcare provider  · Ask your primary healthcare provider when it is okay to start having sex again  Contact your primary healthcare provider or surgeon if:   · You have a fever  · You have shoulder or back pain or nausea that does not go away after a few days or gets worse  · Your wound is red, swollen, or draining pus  · You have questions or concerns about your condition or care  Seek care immediately or call 911 if:   · Blood soaks through your bandage  · Your leg feels warm, tender, and painful  It may look swollen and red  · You feel weak, dizzy, or faint  · You feel lightheaded, short of breath, or have chest pain  © 2014 [address] is for End User's use only and may not be sold, redistributed or otherwise used for commercial purposes  All illustrations and images included in CareNotes® are the copyrighted property of [employer] , [employer]  or Raad Mitchell  The above information is an  only  It is not intended as medical advice for individual conditions or treatments  Talk to your doctor, nurse or pharmacist before following any medical regimen to see if it is safe and effective for you

## 2021-08-13 NOTE — DISCHARGE INSTR - AVS FIRST PAGE
Robotic gynecologic surgery - Discharge Instructions    WHAT YOU NEED TO KNOW:   Today we removed your right fallopian tube and ovary and your left fallopian tube  Those organs were removed through an incision year vagina  We also discovered that your colon is attached to your abdominal wall probably causing chronic infection  Will prescribe antibiotics and set you up for colonoscopy and management of that condition in the near future  DISCHARGE INSTRUCTIONS:   Contact your doctor at the number above if:   · You have a fever over 101o  · You have nausea or are vomiting that does not improve after a light meal    · Your pain is getting worse, even after you take medicine  · You feel pain or burning when you urinate, or you have trouble urinating  · You have pus or a foul-smelling odor coming from your vagina  · Your wound is red, swollen, or draining pus  · You see new or an increased amount of bright red blood coming from your vagina or your incisions  · You have questions or concerns about your condition or care  Seek care immediately:   · Your arm or leg feels warm, tender, and painful  It may look swollen and red  · You have increasing abdominal or pelvic pain  · You have heavy vaginal bleeding that fills 1 or more sanitary pads in 1 hour  Call 911 for any of the following:   · You feel lightheaded, short of breath, and have chest pain  · You cough up blood  Medicines: You may need any of the following:  · You may use extra-strength Tylenol 2 tablets orally every 6-8 hours as needed for mild-to-moderate pain or Motrin/Advil/ibuprofen 200 mg tablets 3 tablets every 8 hours as needed for moderate to severe pain  Take medications with food  Drink plenty of fluids will using these medications to protect your kidneys    · MiraLax/Metamucil use either 1 of these products by dissolving 1 large tbsp large glass of water once or twice a day for the 1st 2 weeks after surgery to prevent and or treat constipation  · You may resume your Lovenox/blood thinners on Saturday August 14, 2021 at or after noon time  · Take your medicine as directed  Contact your healthcare provider if you think your medicine is not helping or if you have side effects  Tell him or her if you are allergic to any medicine  Keep a list of the medicines, vitamins, and herbs you take  Include the amounts, and when and why you take them  Bring the list or the pill bottles to follow-up visits  Carry your medicine list with you in case of an emergency  Activity:   · Rest as needed  Get up and move around as directed to help prevent blood clots  Start with short walks and slowly increase the distance every day  Limit the number of times you climb stairs to 2 times each day for the first week  Plan most of your daily activities on one level of your home  · Do not lift objects heavier than 10 pounds for 6 weeks  Avoid strenuous activity for 2 weeks  · Do not strain during bowel movements  High-fiber foods and extra liquids can help you prevent constipation  Examples of high-fiber foods are fruit and bran  Prune juice and water are good liquids to drink  · Do not have sex, use tampons, or douche for up to 8 weeks  You may shower as soon as the day after surgery  Tub baths can be taken starting 2 weeks after surgery  Do not go into pools or hot tubs until cleared by your doctor  · Ask when it is safe for you to drive  It is generally safe to drive after 2 weeks and when no longer taking prescription pain medication  · Ask when you may return to work and to other regular activities  Wound care: Care for your abdominal incisions as directed  Carefully wash around the wound with soap and water  If you have Hibiclens or medicated soap that you were instructed to use before surgery, you may use that to wash with for up to 2 days after surgery  If not, any mild non-scented, non-abrasive soap is safe    It is okay to let the soap and water run over your incision  Do not scrub your incision  Dry the area and put on new, clean bandages as directed  Change your bandages when they get wet or dirty  If you have strips of medical tape, let them fall off on their own  It may take 7 to 14 days for them to fall off  Check your incision every day for redness, swelling, or pus  Deep breathing: Take deep breaths and cough 10 times each hour  This will decrease your risk for a lung infection  Take a deep breath and hold it for as long as you can  Let the air out and then cough strongly  Deep breaths help open your airway  You may be given an incentive spirometer to help you take deep breaths  Put the plastic piece in your mouth and take a slow, deep breath, then let the air out and cough  Repeat these steps 10 times every hour  Get support: This surgery may be life-changing for you and your family  You will no longer be able to get pregnant  Sudden changes in the levels of your hormones may occur and cause mood swings and depression  You may feel angry, sad, or frightened, or cry frequently and unexpectedly  These feelings are normal  Talk to your healthcare provider about where you can get support  You can also ask if hormone replacement medicine is right for you  Follow up with your healthcare provider or gynecologist as directed: You may need to return to have stitches removed, and for other tests  Write down your questions so you remember to ask them during your visits  © 2017 [address] [employer] Information is for End User's use only and may not be sold, redistributed or otherwise used for commercial purposes  All illustrations and images included in CareNotes® are the copyrighted property of [employer] , [employer]  or Raad Mitchell  The above information is an  only  It is not intended as medical advice for individual conditions or treatments   Talk to your doctor, nurse or pharmacist before following any medical regimen to see if it is safe and effective for you

## 2021-08-13 NOTE — OP NOTE
OPERATIVE REPORT  PATIENT NAME: Alix Lin    :  1975  MRN: [id number]  Pt Location: [facility] OR ROOM 07    SURGERY DATE: 2021    Surgeon(s) and Role:     * Sophie Carrera MD - Primary     * Kathrine Simmons MD - Assisting    Preop Diagnosis:  Ovarian mass, right [N83 8]  Cyst of right ovary [N83 201]    Post-Op Diagnosis Codes:     * Ovarian mass, right [N83 8]     * Cyst of right ovary [N83 201]    Procedure(s) (LRB):  LAPAROSCOPY DIAGNOSTIC (N/A)  ROBOTIC RESECTION OF PELVIC MASS/ RIGHT SALPINGO-OOPHORECTOMY, LEFT SALPINGECTOMY (N/A)  CYSTOSCOPY (N/A)    Specimen(s):  ID Type Source Tests Collected by Time Destination   1 : pelvic washings Washing Pelvic Washing NON-GYNECOLOGIC CYTOLOGY Sophie Carrera MD 2021 0831    2 : Right tube and ovarian mass Tissue Ovary, Right TISSUE EXAM Sophie Carrera MD 2021 1696    3 : left fallopian tube Tissue Fallopian Tube, Left TISSUE EXAM Sophie Carrera MD 2021 0902        Estimated Blood Loss:   100 mL    Drains:  Urethral Catheter Non-latex 16 Fr  (Active)   Number of days: 0       Anesthesia Type:   General    Operative Indications:  Patient with a to 10 cm right ovarian mass, complex  She has history of Oc-en-Y gastric bypass surgery, suspected malabsorption, bipolar disorder, ADHD, recurrent venous thromboembolisms with IVC filter and therapeutic anticoagulation in the past now being transition to prophylactic anticoagulation  She was recently diagnosed with complex pelvic mass and referred to us for management  She consented to undergo definitive surgery  Of interest, she has multiple hospital admissions for leukocytosis and history of chronic unexplained erythema of the lower abdominal wall and groins/thighs  Operative Findings:  1  The cecum appeared splayed out and inseparable from the anterior abdominal wall concerning for possible fistulization    This is adjacent to area of lower abdominal wall erythema and chronic induration explaining such findings  No no intervention performed at this time but plan to proceed with colonoscopy and subsequent referral to colorectal specialist for takedown and consideration of ileocecal resection after malignancy is ruled out  2  Approximately 8-10 cm bilobulated and multi cystic right ovary  Evaluation by pathologist demonstrated no solid elements to freeze  3  Grossly normal left fallopian tube and ovary  4  Normal uterus  5  Cystoscopy demonstrated normal bladder mucosa and potent left ureteral jet  A right ureteral jet could not be visualized  Therefore, a 5 Western Mattie open-ended ureteral catheter was advanced in a retrograde fashion to 22 cm without any difficulties  Then, the catheter was left in place the bladder was emptied  Attention was turned to laparoscopic re-evaluation which clearly delineated the trajectory of the catheter which was noted to be far away from all areas of dissection  At the completion the ureteral catheter was removed  Complications:   None    Procedure and Technique:  The patient was taken to the operating room where general endotracheal anesthesia was induced without complications  The patient received antibiotic prophylaxis per hospital protocol  Sequential compression devices were applied to the lower extremities and activated prior to induction of anesthesia  A single dose of preoperative heparin was administered  The patient was placed in the dorsolithotomy position in [address] [place] [address] and her lower abdomen, perineum and vagina were prepped and draped in the usual sterile fashion  Under direct visualization the uterus was sounded to approximately 7 cm  I uterine dilator and tenaculum were secured in place and used for uterine manipulation  Whitehead catheter was placed  Attention was turned to the abdomen  All port sites were infiltrated with bupivacaine at the beginning of completion of the procedure   An 8 mm skin incision was made approximately 4 cm above the umbilicus near the midline  Then, using a 5 mm Endopath Excel trocar and the 5 mm laparoscope, the peritoneal cavity was entered under directvisualization  Good intraperitoneal location was confirmed and pneumoperitoneum was created to maximal pressure 15 mm of mercury  A total of four 8 mm robotic trocars were placed (2 on the left, 1 in the midline replacing the 5 mm entry port and 1 on the right) and the 5 mm entry trocar was reinserted in the right flank for assistant's use  The patient was placed in steep Trendelenburg and the [employer] system was docked  The above-mentioned findings were noted  On the right side, I peritoneal incision was made lateral to the ovarian vessels  The retroperitoneal spaces were developed  The right ureter was clearly identified  The right ovarian vessels were skeletonized cauterized and divided with the vessel sealer device  Then, attachments to the pelvic sidewall were divided sharply way above the level of the ureter  Finally, the right utero-ovarian ligament and fallopian tube were cauterized and divided with the vessel sealer device  The mass was placed temporarily in the left upper quadrant  A left salpingectomy was performed  The tube was elevated and mesosalpinx serially cauterized and divided with the vessel sealer device  The tube was amputated at the level of the left cornual region  Then, using a sponge stick the posterior cul-de-sac was clearly delineated and a horizontal incision was made underlying the incision of the uterus sacral ligaments  This allowed advancement of an Endo-Catch bag  Fallopian tube and right ovarian mass replacing the Endo-Catch bag  The bag was delivered vaginally and the right ovarian cyst was decompressed using a Veress needle connected to suction without any material/fluid spillage  Specimen was evaluated by pathology and the above-mentioned finding was reported      Posterior colpotomy was closed using 2 all PDO Stratafix in airtight closure and excellent hemostasis was confirmed both intraperitoneal and vaginally  Copious irrigation was used and excellent hemostasis was confirmed at low intraperitoneal pressures  The Whitehead catheter was removed  Cystoscopy was performed using a 22 Western Mattie 30 degree scope and the above-mentioned findings were noted  A 5 Vietnamese open-ended ureteral catheter was advanced in a retrograde fashion through the right ureteral orifice  Then, similar changes laparoscopic assessment after bladder decompression clearly demonstrated the presence of the catheter and its trajectory with the ureter away from all areas of dissection  The ureteral catheter was removed  At this point the robot was undocked  Pneumoperitoneum was completely released with the assistance of Valsalva maneuvers  All trocars were removed and the skin was closed using a subcuticular stitch of 4-0 Monocryl and Histoacryl was applied to all incisions       The patient tolerated the procedure well  Sponge, lap, needle and instrument counts were reported as correct x2    The patient was successfully extubated in the operating room and transferred to the post anesthetic care unit in stable condition       I was present for the entire procedure    Patient Disposition:  PACU     SIGNATURE: Jaswinder Taylor [facility]  DATE: August 13, 2021  TIME: 9:39 AM

## 2021-08-13 NOTE — ANESTHESIA POSTPROCEDURE EVALUATION
Post-Op Assessment Note    CV Status:  Stable  Pain Score: 3    Pain management: adequate     Mental Status:  Alert and awake   Hydration Status:  Euvolemic and stable   PONV Controlled:  Controlled   Airway Patency:  Patent      Post Op Vitals Reviewed: Yes      Staff: Anesthesiologist   Comments: na        No complications documented      BP      Temp      Pulse     Resp      SpO2

## 2021-08-13 NOTE — TELEPHONE ENCOUNTER
Scheduling Appointment     Who Is Calling to Schedule Patient    Doctor Dr Judi Blackman    Date and Time 8/30 10am          Patient verbalized understanding   Yes

## 2021-08-16 NOTE — ANESTHESIA POSTPROCEDURE EVALUATION
Post-Op Assessment Note    CV Status:  Stable    Pain management: adequate     Mental Status:  Alert and awake   Hydration Status:  Euvolemic   PONV Controlled:  Controlled   Airway Patency:  Patent      Post Op Vitals Reviewed: Yes      Staff: Anesthesiologist   Reason for prolonged intubation > 24 hours:   Reason for prolonged intubation > 48 hours:    No complications documented      BP      Temp      Pulse     Resp      SpO2

## 2021-08-31 ENCOUNTER — OFFICE VISIT (OUTPATIENT)
Dept: GYNECOLOGIC ONCOLOGY | Facility: CLINIC | Age: 46
End: 2021-08-31

## 2021-08-31 VITALS
DIASTOLIC BLOOD PRESSURE: 80 MMHG | SYSTOLIC BLOOD PRESSURE: 122 MMHG | TEMPERATURE: 97.5 F | HEIGHT: 63 IN | BODY MASS INDEX: 40.4 KG/M2 | HEART RATE: 66 BPM | WEIGHT: 228 LBS | OXYGEN SATURATION: 99 % | RESPIRATION RATE: 16 BRPM

## 2021-08-31 DIAGNOSIS — Z09 POSTOP CHECK: Primary | ICD-10-CM

## 2021-08-31 DIAGNOSIS — K63.2 COLONIC FISTULA: ICD-10-CM

## 2021-08-31 PROBLEM — Z90.721 STATUS POST UNILATERAL SALPINGO-OOPHORECTOMY: Status: RESOLVED | Noted: 2021-08-13 | Resolved: 2021-08-31

## 2021-08-31 PROBLEM — N83.209 OVARIAN CYST: Status: RESOLVED | Noted: 2020-08-17 | Resolved: 2021-08-31

## 2021-08-31 PROCEDURE — 99024 POSTOP FOLLOW-UP VISIT: CPT | Performed by: OBSTETRICS & GYNECOLOGY

## 2021-08-31 NOTE — ASSESSMENT & PLAN NOTE
Patient is 2 weeks out after robotic unilateral salpingo-oophorectomy/contralateral salpingectomy with vaginotomy for specimen extraction  Final pathology benign  She has healed well  However, 1 of her robotic incisions has some separation and debris  This was debrided today  Patient struck did on wound care  I plan to see her back in 4 weeks for wound recheck and vaginal cuff check  I emphasized importance of strict pelvic rest until next visit

## 2021-08-31 NOTE — LETTER
August 31, 2021     Je Santiago, [address]    Patient: Savana Bravo   YOB: 1975   Date of Visit: 8/31/2021       Dear Dr Kale Holley: Thank you for referring Savana Bravo to me for evaluation  Below are my notes for this consultation  If you have questions, please do not hesitate to call me  I look forward to following your patient along with you  Sincerely,        Selvin Steele MD        CC: MD Selvin Mitchell MD  8/31/2021 11:05 AM  Sign when Signing Visit  Assessment/Plan:    Problem List Items Addressed This Visit        Digestive    Colonic fistula       Patient has history of recurrent abdominal wall cellulitis/ groin cellulitis  At the time of surgery her cecum was noted to be inseparable from undersurface of anterior abdominal wall  I am concerned about possibility of some fistulous tract sitting and causing recurrent infections of the abdominal wall  Patient was referred to colorectal surgery  They have scheduled her for CT scan with rectal contrast and then will consider possible colonoscopy to follow  I defer management to Dr Yohan David expertise  Other    Postop check - Primary       Patient is 2 weeks out after robotic unilateral salpingo-oophorectomy/contralateral salpingectomy with vaginotomy for specimen extraction  Final pathology benign  She has healed well  However, 1 of her robotic incisions has some separation and debris  This was debrided today  Patient struck did on wound care  I plan to see her back in 4 weeks for wound recheck and vaginal cuff check  I emphasized importance of strict pelvic rest until next visit  CHIEF COMPLAINT:    Postoperative follow-up, complains of wound issue         Patient ID: Savana Bravo is a 50 y o  female  HPI    Patient is 2 weeks out after robotic right salpingo-oophorectomy, left salpingectomy for benign ovarian cyst   She was noted to have colon densely adherent to undersurface of anterior abdominal wall  For specimen extraction to avoid chronically infected abdominal wall I performed a posterior vaginotomy with specimen extraction  She remains in strict pelvic rest   Denies vaginal bleeding, drainage or discharge  Reports separation of 1 of her laparoscopic ports  Normal appetite  Normal bowel / bladder function  No other complaints  The following portions of the patient's history were reviewed and updated as appropriate: allergies, current medications, past family history, past medical history, past social history, past surgical history and problem list     Review of Systems    As per HPI  Twelve point review of systems otherwise unremarkable      Current Outpatient Medications:     atoMOXetine (STRATTERA) 60 mg capsule, TAKE 1 CAPSULE (60 MG) BY ORAL ROUTE ONCE DAILY IN THE MORNING, Disp: , Rfl:     clotrimazole (LOTRIMIN) 1 % cream, Apply topically 2 (two) times a day, Disp: 30 g, Rfl: 1    doxycycline (PERIOSTAT) 20 MG tablet, TAKE 1 TABLET BY MOUTH TWICE A DAY WITH FOOD (NOT DAIRY), Disp: , Rfl:     enoxaparin (LOVENOX) 100 mg/mL, Inject 100 mg under the skin daily To stop 24 hrs prior to surgery, Disp: , Rfl:     ferrous sulfate 325 (65 Fe) mg tablet, Take 325 mg by mouth daily with breakfast, Disp: , Rfl:     folic acid (FOLVITE) 1 mg tablet, Take 1 mg by mouth daily, Disp: , Rfl:     gabapentin (NEURONTIN) 400 mg capsule, Take 400 mg by mouth 3 (three) times a day, Disp: , Rfl:     levothyroxine 100 mcg tablet, Take 1 tablet (100 mcg total) by mouth daily in the early morning, Disp: 30 tablet, Rfl: 0    lithium carbonate (LITHOBID) 300 mg CR tablet, Take 300 mg by mouth daily , Disp: , Rfl:     Multiple Vitamin (MULTI-VITAMIN DAILY PO), Multi Vitamin  DAILY, Disp: , Rfl:     NON FORMULARY, Medical marijuana prn pain/ anxiety, Disp: , Rfl:     pantoprazole (PROTONIX) 40 mg tablet, Take 1 tablet (40 mg total) by mouth 2 (two) times a day before meals, Disp: 60 tablet, Rfl: 2    pyridoxine (VITAMIN B6) 100 mg tablet, Take 100 mg by mouth daily, Disp: , Rfl:     QUEtiapine (SEROquel) 100 mg tablet, Take 100 mg by mouth 3 (three) times a day 100 mg t i d  and 400 mg at bedtime, Disp: , Rfl:     rOPINIRole (REQUIP) 1 mg tablet, Take 2 mg by mouth daily at bedtime , Disp: , Rfl:     SUMAtriptan (IMITREX) 100 mg tablet, Take 100 mg by mouth as needed for migraine , Disp: , Rfl:     thiamine 100 MG tablet, Daily, Disp: , Rfl:     topiramate (TOPAMAX) 100 mg tablet, Take 150 mg by mouth 2 (two) times a day , Disp: , Rfl:     venlafaxine (EFFEXOR-XR) 150 mg 24 hr capsule, Take 150 mg by mouth daily , Disp: , Rfl:     Objective:    Blood pressure 122/80, pulse 66, temperature 97 5 °F (36 4 °C), temperature source Temporal, resp  rate 16, height 5' 3" (1 6 m), weight 103 kg (228 lb), last menstrual period 10/04/2020, SpO2 99 %, not currently breastfeeding  Body mass index is 40 39 kg/m²  Body surface area is 2 04 meters squared  Physical Exam    Abdomen: Incisions well-healed with the exception of right upper quadrant most medial incision which has superficial separation with fibrinous debris  Wound was debrided with pickups and scissors until clean  Patient instructed on topical wet to dry with small gauze  No evidence of hernias  Chronic changes in right lower quadrant consistent with chronic/ recurrent infection      Monika Mcmahan MD, [address]  8/31/2021  11:05 AM

## 2021-08-31 NOTE — ASSESSMENT & PLAN NOTE
Patient has history of recurrent abdominal wall cellulitis/ groin cellulitis  At the time of surgery her cecum was noted to be inseparable from undersurface of anterior abdominal wall  I am concerned about possibility of some fistulous tract sitting and causing recurrent infections of the abdominal wall  Patient was referred to colorectal surgery  They have scheduled her for CT scan with rectal contrast and then will consider possible colonoscopy to follow  I defer management to Dr Avery Bautista expertise

## 2021-08-31 NOTE — PROGRESS NOTES
Assessment/Plan:    Problem List Items Addressed This Visit        Digestive    Colonic fistula       Patient has history of recurrent abdominal wall cellulitis/ groin cellulitis  At the time of surgery her cecum was noted to be inseparable from undersurface of anterior abdominal wall  I am concerned about possibility of some fistulous tract sitting and causing recurrent infections of the abdominal wall  Patient was referred to colorectal surgery  They have scheduled her for CT scan with rectal contrast and then will consider possible colonoscopy to follow  I defer management to Dr Jenny Andino expertise  Other    Postop check - Primary       Patient is 2 weeks out after robotic unilateral salpingo-oophorectomy/contralateral salpingectomy with vaginotomy for specimen extraction  Final pathology benign  She has healed well  However, 1 of her robotic incisions has some separation and debris  This was debrided today  Patient struck did on wound care  I plan to see her back in 4 weeks for wound recheck and vaginal cuff check  I emphasized importance of strict pelvic rest until next visit  CHIEF COMPLAINT:    Postoperative follow-up, complains of wound issue  Patient ID: Pee Patel is a 50 y o  female  HPI    Patient is 2 weeks out after robotic right salpingo-oophorectomy, left salpingectomy for benign ovarian cyst   She was noted to have colon densely adherent to undersurface of anterior abdominal wall  For specimen extraction to avoid chronically infected abdominal wall I performed a posterior vaginotomy with specimen extraction  She remains in strict pelvic rest   Denies vaginal bleeding, drainage or discharge  Reports separation of 1 of her laparoscopic ports  Normal appetite  Normal bowel / bladder function  No other complaints    The following portions of the patient's history were reviewed and updated as appropriate: allergies, current medications, past family history, past medical history, past social history, past surgical history and problem list     Review of Systems    As per HPI  Twelve point review of systems otherwise unremarkable      Current Outpatient Medications:     atoMOXetine (STRATTERA) 60 mg capsule, TAKE 1 CAPSULE (60 MG) BY ORAL ROUTE ONCE DAILY IN THE MORNING, Disp: , Rfl:     clotrimazole (LOTRIMIN) 1 % cream, Apply topically 2 (two) times a day, Disp: 30 g, Rfl: 1    doxycycline (PERIOSTAT) 20 MG tablet, TAKE 1 TABLET BY MOUTH TWICE A DAY WITH FOOD (NOT DAIRY), Disp: , Rfl:     enoxaparin (LOVENOX) 100 mg/mL, Inject 100 mg under the skin daily To stop 24 hrs prior to surgery, Disp: , Rfl:     ferrous sulfate 325 (65 Fe) mg tablet, Take 325 mg by mouth daily with breakfast, Disp: , Rfl:     folic acid (FOLVITE) 1 mg tablet, Take 1 mg by mouth daily, Disp: , Rfl:     gabapentin (NEURONTIN) 400 mg capsule, Take 400 mg by mouth 3 (three) times a day, Disp: , Rfl:     levothyroxine 100 mcg tablet, Take 1 tablet (100 mcg total) by mouth daily in the early morning, Disp: 30 tablet, Rfl: 0    lithium carbonate (LITHOBID) 300 mg CR tablet, Take 300 mg by mouth daily , Disp: , Rfl:     Multiple Vitamin (MULTI-VITAMIN DAILY PO), Multi Vitamin  DAILY, Disp: , Rfl:     NON FORMULARY, Medical marijuana prn pain/ anxiety, Disp: , Rfl:     pantoprazole (PROTONIX) 40 mg tablet, Take 1 tablet (40 mg total) by mouth 2 (two) times a day before meals, Disp: 60 tablet, Rfl: 2    pyridoxine (VITAMIN B6) 100 mg tablet, Take 100 mg by mouth daily, Disp: , Rfl:     QUEtiapine (SEROquel) 100 mg tablet, Take 100 mg by mouth 3 (three) times a day 100 mg t i d  and 400 mg at bedtime, Disp: , Rfl:     rOPINIRole (REQUIP) 1 mg tablet, Take 2 mg by mouth daily at bedtime , Disp: , Rfl:     SUMAtriptan (IMITREX) 100 mg tablet, Take 100 mg by mouth as needed for migraine , Disp: , Rfl:     thiamine 100 MG tablet, Daily, Disp: , Rfl:     topiramate (TOPAMAX) 100 mg tablet, Take 150 mg by mouth 2 (two) times a day , Disp: , Rfl:     venlafaxine (EFFEXOR-XR) 150 mg 24 hr capsule, Take 150 mg by mouth daily , Disp: , Rfl:     Objective:    Blood pressure 122/80, pulse 66, temperature 97 5 °F (36 4 °C), temperature source Temporal, resp  rate 16, height 5' 3" (1 6 m), weight 103 kg (228 lb), last menstrual period 10/04/2020, SpO2 99 %, not currently breastfeeding  Body mass index is 40 39 kg/m²  Body surface area is 2 04 meters squared  Physical Exam    Abdomen: Incisions well-healed with the exception of right upper quadrant most medial incision which has superficial separation with fibrinous debris  Wound was debrided with pickups and scissors until clean  Patient instructed on topical wet to dry with small gauze  No evidence of hernias  Chronic changes in right lower quadrant consistent with chronic/ recurrent infection      Wen Gamble MD, [address] [address]  8/31/2021  11:05 AM

## 2021-09-05 ENCOUNTER — APPOINTMENT (OUTPATIENT)
Dept: LAB | Facility: HOSPITAL | Age: 46
End: 2021-09-05
Payer: MEDICARE

## 2021-09-05 DIAGNOSIS — E87.6 HYPOKALEMIA: ICD-10-CM

## 2021-09-05 DIAGNOSIS — N83.8 OVARIAN MASS, RIGHT: ICD-10-CM

## 2021-09-05 DIAGNOSIS — D50.8 OTHER IRON DEFICIENCY ANEMIA: ICD-10-CM

## 2021-09-05 DIAGNOSIS — K95.89 IRON DEFICIENCY ANEMIA FOLLOWING BARIATRIC SURGERY: ICD-10-CM

## 2021-09-05 DIAGNOSIS — D50.8 IRON DEFICIENCY ANEMIA FOLLOWING BARIATRIC SURGERY: ICD-10-CM

## 2021-09-05 DIAGNOSIS — K70.30 ALCOHOLIC CIRRHOSIS OF LIVER WITHOUT ASCITES (HCC): ICD-10-CM

## 2021-09-05 DIAGNOSIS — D62 ACUTE BLOOD LOSS ANEMIA: ICD-10-CM

## 2021-09-05 LAB
ALBUMIN SERPL BCP-MCNC: 2.5 G/DL (ref 3.5–5)
ALP SERPL-CCNC: 101 U/L (ref 46–116)
ALT SERPL W P-5'-P-CCNC: 22 U/L (ref 12–78)
ANION GAP SERPL CALCULATED.3IONS-SCNC: 4 MMOL/L (ref 4–13)
AST SERPL W P-5'-P-CCNC: 32 U/L (ref 5–45)
BASOPHILS # BLD AUTO: 0.09 THOUSANDS/ΜL (ref 0–0.1)
BASOPHILS NFR BLD AUTO: 1 % (ref 0–1)
BILIRUB SERPL-MCNC: 0.33 MG/DL (ref 0.2–1)
BUN SERPL-MCNC: 13 MG/DL (ref 5–25)
CALCIUM ALBUM COR SERPL-MCNC: 10 MG/DL (ref 8.3–10.1)
CALCIUM SERPL-MCNC: 8.8 MG/DL (ref 8.3–10.1)
CHLORIDE SERPL-SCNC: 110 MMOL/L (ref 100–108)
CO2 SERPL-SCNC: 25 MMOL/L (ref 21–32)
CREAT SERPL-MCNC: 0.61 MG/DL (ref 0.6–1.3)
D DIMER PPP FEU-MCNC: 1.64 UG/ML FEU
EOSINOPHIL # BLD AUTO: 0.64 THOUSAND/ΜL (ref 0–0.61)
EOSINOPHIL NFR BLD AUTO: 5 % (ref 0–6)
ERYTHROCYTE [DISTWIDTH] IN BLOOD BY AUTOMATED COUNT: 15.2 % (ref 11.6–15.1)
FERRITIN SERPL-MCNC: 29 NG/ML (ref 8–388)
GFR SERPL CREATININE-BSD FRML MDRD: 108 ML/MIN/1.73SQ M
GLUCOSE SERPL-MCNC: 97 MG/DL (ref 65–140)
HCT VFR BLD AUTO: 33.5 % (ref 34.8–46.1)
HGB BLD-MCNC: 10.2 G/DL (ref 11.5–15.4)
IMM GRANULOCYTES # BLD AUTO: 0.04 THOUSAND/UL (ref 0–0.2)
IMM GRANULOCYTES NFR BLD AUTO: 0 % (ref 0–2)
INR PPP: 1.22 (ref 0.84–1.19)
IRON SATN MFR SERPL: 11 %
IRON SERPL-MCNC: 38 UG/DL (ref 50–170)
LYMPHOCYTES # BLD AUTO: 2.02 THOUSANDS/ΜL (ref 0.6–4.47)
LYMPHOCYTES NFR BLD AUTO: 16 % (ref 14–44)
MCH RBC QN AUTO: 27.5 PG (ref 26.8–34.3)
MCHC RBC AUTO-ENTMCNC: 30.4 G/DL (ref 31.4–37.4)
MCV RBC AUTO: 90 FL (ref 82–98)
MONOCYTES # BLD AUTO: 1 THOUSAND/ΜL (ref 0.17–1.22)
MONOCYTES NFR BLD AUTO: 8 % (ref 4–12)
NEUTROPHILS # BLD AUTO: 9.21 THOUSANDS/ΜL (ref 1.85–7.62)
NEUTS SEG NFR BLD AUTO: 70 % (ref 43–75)
NRBC BLD AUTO-RTO: 0 /100 WBCS
PLATELET # BLD AUTO: 341 THOUSANDS/UL (ref 149–390)
PMV BLD AUTO: 11.4 FL (ref 8.9–12.7)
POTASSIUM SERPL-SCNC: 3.8 MMOL/L (ref 3.5–5.3)
PROT SERPL-MCNC: 8 G/DL (ref 6.4–8.2)
PROTHROMBIN TIME: 14.9 SECONDS (ref 11.6–14.5)
RBC # BLD AUTO: 3.71 MILLION/UL (ref 3.81–5.12)
SODIUM SERPL-SCNC: 139 MMOL/L (ref 136–145)
TIBC SERPL-MCNC: 332 UG/DL (ref 250–450)
WBC # BLD AUTO: 13 THOUSAND/UL (ref 4.31–10.16)

## 2021-09-05 PROCEDURE — 85025 COMPLETE CBC W/AUTO DIFF WBC: CPT

## 2021-09-05 PROCEDURE — 80053 COMPREHEN METABOLIC PANEL: CPT

## 2021-09-05 PROCEDURE — 85610 PROTHROMBIN TIME: CPT

## 2021-09-05 PROCEDURE — 85379 FIBRIN DEGRADATION QUANT: CPT

## 2021-09-05 PROCEDURE — 83540 ASSAY OF IRON: CPT

## 2021-09-05 PROCEDURE — 82728 ASSAY OF FERRITIN: CPT

## 2021-09-05 PROCEDURE — 36415 COLL VENOUS BLD VENIPUNCTURE: CPT

## 2021-09-05 PROCEDURE — 83550 IRON BINDING TEST: CPT

## 2021-09-07 ENCOUNTER — OFFICE VISIT (OUTPATIENT)
Dept: HEMATOLOGY ONCOLOGY | Facility: CLINIC | Age: 46
End: 2021-09-07
Payer: MEDICARE

## 2021-09-07 VITALS
WEIGHT: 228 LBS | RESPIRATION RATE: 16 BRPM | OXYGEN SATURATION: 100 % | HEART RATE: 74 BPM | TEMPERATURE: 97.5 F | BODY MASS INDEX: 40.4 KG/M2 | DIASTOLIC BLOOD PRESSURE: 60 MMHG | SYSTOLIC BLOOD PRESSURE: 98 MMHG | HEIGHT: 63 IN

## 2021-09-07 DIAGNOSIS — R19.00 PELVIC MASS: ICD-10-CM

## 2021-09-07 DIAGNOSIS — D50.8 OTHER IRON DEFICIENCY ANEMIA: ICD-10-CM

## 2021-09-07 DIAGNOSIS — I82.220 IVC THROMBOSIS (HCC): ICD-10-CM

## 2021-09-07 DIAGNOSIS — Z86.718 HISTORY OF DVT (DEEP VEIN THROMBOSIS): ICD-10-CM

## 2021-09-07 DIAGNOSIS — D50.8 IRON DEFICIENCY ANEMIA FOLLOWING BARIATRIC SURGERY: ICD-10-CM

## 2021-09-07 DIAGNOSIS — K95.89 IRON DEFICIENCY ANEMIA FOLLOWING BARIATRIC SURGERY: ICD-10-CM

## 2021-09-07 DIAGNOSIS — D62 ACUTE BLOOD LOSS ANEMIA: Primary | ICD-10-CM

## 2021-09-07 PROCEDURE — 99214 OFFICE O/P EST MOD 30 MIN: CPT | Performed by: PHYSICIAN ASSISTANT

## 2021-09-07 RX ORDER — SODIUM CHLORIDE 9 MG/ML
20 INJECTION, SOLUTION INTRAVENOUS ONCE
Status: CANCELLED | OUTPATIENT
Start: 2021-09-21

## 2021-09-07 NOTE — PROGRESS NOTES
Hematology/Oncology Outpatient Follow-up  Lexie Kapadia 50 y o  female 1975 [id number]    Date:  9/7/2021      Assessment and Plan:    1  Acute blood loss anemia, 2  Iron deficiency anemia following bariatric surgery  History of iron deficiency anemia from blood loss as well as from malabsorption from bariatric surgery  Hemoglobin has recently decreased  Would recommend additional IV iron therapy  Yet this could have been related to her surgery  She denies any GI bleeding symptoms  A day she is agreeable to IV iron  Follow-up in a 2 months with labs  - CBC and differential; Future  - Iron Panel (Includes Ferritin, Iron Sat%, Iron, and TIBC); Future    3  History of DVT (deep vein thrombosis), 4  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HCC)    Previous history of thromboses  IVC thrombosis was most extensive recent thrombosis which was noted to be completely resolved on imaging after being on therapeutic anticoagulation  She still Lovenox 100 subQ daily due to having in a excess since supply  She states she still has over 2 weeks of this  She will call when she needs refill and will send in 40 mg subQ for maintenance dosing    5  Pelvic mass    This was removed  This was negative for malignancy  Patient has an area on her abdomen at site of 1 of the insertions of laparoscopic instrument that is still an open wound  This appears to be healing  She does have this pack  She has an upcoming follow-up with this he gynecology surgeon, Dr Nickolas Townsend       HPI:  47-year-old female presents for follow-up      Past medical history significant for hepatic steatosis, hypothyroidism, pulmonary embolism, IVC thrombosis, migraines, seizures, psychiatric disorder (bipolar disorder, SABINO), history of gastric bypass surgery (2010)       Patient presented to the emergency department on 08/03/2020 due to abdominal pain radiating into bilateral upper legs      She had a history of bilateral lower extremity DVT and PE in 2017 for which she had an IVC filter placed in [facility] in 2017  She was treated with Xarelto but developed a GI bleed in discontinued   Per patient this was unprovoked  Patient admitted to being off of anticoagulation during this admission and denied a previous workup for clotting disorder in the past      Family history significant for DVT and PE and her sister  Her sister  of PE  Of note her sister had lupus  Paternal uncle had DVT as well       A CT of the abdomen pelvis was completed on 2020   This showed known IVC filter, diffuse thrombus extending from the IVC filter inferiorly into the iliac vasculature, extensive surrounding fat stranding with thrombophlebitis, small amount of thrombus extends superior to the filter        A venous Doppler study of bilateral lower extremities were also completed on 2020 which was negative for DVT      She underwent lysis of thrombus with IR on       She returned to IR on 2020 for IR venogram      Patient was on heparin during hospitalization      Patient was seen by Dr Jordan on 2020 during hospitalization   Due to patient's history of gastric bypass surgery patient was recommended Lovenox 0 75 milligrams/kilogram subcu b i d        She had partial thrombosis workup in the hospital               Homocystine normal, 7 4              Protein S activity normal, 111              Protein C activity normal, 91 0              Factor 5 Leiden mutation negative              Prothrombin gene mutation negative              Anticardiolipin antibody IgA and IgG normal   IgM 18 which is a indeterminate results              Lupus anticoagulant negative               Beta-2 glycoproteins negative      Patient was discharged on Lovenox 0 75 milligram/kilogram  Jesus Steward date was 2020      She presented back to the emergency department on 2020 due to abdominal pain and right leg pain      Patient was diagnosed with cellulitis of lower extremity, right, and received IV Ancef for 3 days in [place] on p o  Keflex      She had outpatient follow-up with vascular surgery on 08/21/2020   Recommendation for her venous disease included conservative measures and medical management   Patient was scheduled for follow-up in November 2020 with vascular surgery with a repeat IVC/iliac vein duplex and bilateral lower extremity DVT study       At consultation patient was recommended to have EGD and colonoscopy secondary to weight loss   She had an EGD and colonoscopy on 12/03/2020   This showed  anastomotic ulcer on the jejunal side   Colonoscopy showed 1 polyp measuring 5-9 mm in the sigmoid colon   A clip was placed to prevent bleeding secondary to anticoagulation  There is no of inadequate bowel prep and patient was recommended to have repeat in 6 months (June 2021)        Patient was admitted to the hospital to the ICU due to acute GI bleed from 5/2 - 5/5/21   She states that she was taking NSAIDs due to migraines even though she knew she was not supposed to do this   She had EGD on 05/02/2021 due to bleeding while inpatient and this showed single 5 mm crater drowned benign-appearing ulcer in the stomach   This was treated   Patient's Lovenox was held during parts of hospitalization then restarted on discharge      Patient is planned to have her IVC filter removed on 05/18/2021 however I note that this appears to have been canceled   She really had CT venogram in March 2021 which showed no evidence of thrombus  Filiberto Matthiass was a incident right ovarian cyst which patient's PCP at [facility] ordered an ultrasound and patient was referred to a gyn at [facility] as well       Patient then admitted again on 6/18 - 6/23/21 due to MADHU and hypokalemia, both due to too much diuretic managed by PCP at [facility]     6/8/21 IVC filter was removed      Ferritin 141, continues on oral iron once per day      7/9/21   Potassium 3 2, PCP treating this      She still is taking 100 mg SQ Lovenox due to supply, still has 5 boxes left      7/21/21 - have EGD and colonoscopy     Interval history: 08/13/2021 patient underwent a  Laparoscopic be diagnostic with robotic resection of right pelvic mass, right salpingo-oophorectomy left salpingectomy, cystoscopy   pathology was negative  ROS: Review of Systems   Constitutional: Negative for appetite change, chills, fatigue and fever  HENT: Negative for trouble swallowing  Respiratory: Negative for cough and shortness of breath  Cardiovascular: Positive for leg swelling (A lot better since using compression leg pumps from lymphedema therapy that she now has at home)  Gastrointestinal: Negative for abdominal pain, diarrhea, nausea and vomiting  Bowel movements are 2 times per week  No black or blood   Genitourinary: Negative for difficulty urinating, dysuria and hematuria  Musculoskeletal: Negative for arthralgias  Neurological: Positive for headaches (x 2 weeks; has been coming and going, base of left skull; suffers from migraines; )  Negative for dizziness, weakness and light-headedness  Hematological: Negative  Psychiatric/Behavioral: Negative          Past Medical History:   Diagnosis Date    Anemia     Anxiety     Balance disorder     Bipolar depression (HCC)     bipolar II, suicide    Depression     Disease of thyroid gland     GERD (gastroesophageal reflux disease)     Heart murmur     History of DVT (deep vein thrombosis)     History of gastric bypass     2010    Lymphedema     bles- chronic    Migraines     Pelvic mass     Pulmonary embolism (HCC)     Rash     under abd fold    Seizures (HCC)     Use of cane as ambulatory aid     UTI (urinary tract infection)     7/24/21       Past Surgical History:   Procedure Laterality Date    APPENDECTOMY      Open    CHOLECYSTECTOMY      Laparoscopic    CYSTOSCOPY N/A 8/13/2021    Procedure: CYSTOSCOPY;  Surgeon: Piter Casper MD;  Location: [facility]; Service: Gynecology Oncology    GASTRIC BYPASS      was 205 date of surgery    IR DVT THROMBOLYSIS/THROMBECTOMY ILIAC/IVC WITH VENOGRAM  8/4/2020    IR IVC FILTER REMOVAL  6/8/2021    IR TPA LYSIS CHECK  8/5/2020    IVC FILTER INSERTION  2017    MD LAP,DIAGNOSTIC ABDOMEN N/A 8/13/2021    Procedure: LAPAROSCOPY DIAGNOSTIC;  Surgeon: Gabbi Kaye MD;  Location: [facility];  Service: Gynecology Oncology    MD LAP,FULGURATE/EXCISE LESIONS N/A 8/13/2021    Procedure: ROBOTIC RESECTION OF PELVIC MASS/ RIGHT SALPINGO-OOPHORECTOMY, LEFT SALPINGECTOMY;  Surgeon: Gabbi Kaye MD;  Location[facility];  Service: Gynecology Oncology    STOMACH SURGERY      for morbid obesity    TUBAL LIGATION Bilateral 2010       Social History     Socioeconomic History    Marital status: Single     Spouse name: None    Number of children: None    Years of education: None    Highest education level: None   Occupational History    Occupation:      Comment: [employer]   Tobacco Use    Smoking status: Never Smoker    Smokeless tobacco: Never Used    Tobacco comment: former quit in 1999 per [employer]   Vaping Use    Vaping Use: Every day    Substances: THC   Substance and Sexual Activity    Alcohol use: Yes     Comment: ocassional    Drug use: Yes     Types: Marijuana     Comment: medical marijuana     Sexual activity: Not Currently   Other Topics Concern    None   Social History Narrative    Sleep 6 in 24 hours    Caffeine use; 2 cps coffee per day    Uses seatbelts             Social Determinants of Health     Financial Resource Strain:     Difficulty of Paying Living Expenses:    Food Insecurity:     Worried About Running Out of Food in the Last Year:     Ran Out of Food in the Last Year:    Transportation Needs:     Lack of Transportation (Medical):      Lack of Transportation (Non-Medical):    Physical Activity:     Days of Exercise per Week:     Minutes of Exercise per Session:    Stress:     Feeling of Stress :    Social Connections:     Frequency of Communication with Friends and Family:     Frequency of Social Gatherings with Friends and Family:     Attends Jainism Services:     Active Member of Clubs or Organizations:     Attends Club or Organization Meetings:     Marital Status:    Intimate Partner Violence:     Fear of Current or Ex-Partner:     Emotionally Abused:     Physically Abused:     Sexually Abused:        Family History   Problem Relation Age of Onset    Other Mother         headache    Hypertension Mother     Depression Mother     Arthritis Father     Other Father         H, pylori infection    Other Sister         esophageal reflux    Migraines Sister     No Known Problems Paternal Grandfather     Diabetes Paternal Aunt         Mellitus    Breast cancer Paternal Aunt     Diabetes Paternal Uncle         Mellitus    Liver cancer Paternal Uncle     Asthma Daughter     No Known Problems Maternal Grandmother     No Known Problems Maternal Grandfather     No Known Problems Paternal Grandmother     No Known Problems Brother     No Known Problems Sister     No Known Problems Sister     Asthma Daughter     No Known Problems Maternal Aunt        Allergies   Allergen Reactions    Morphine Seizures     Pt denies         Current Outpatient Medications:     atoMOXetine (STRATTERA) 60 mg capsule, TAKE 1 CAPSULE (60 MG) BY ORAL ROUTE ONCE DAILY IN THE MORNING, Disp: , Rfl:     clotrimazole (LOTRIMIN) 1 % cream, Apply topically 2 (two) times a day, Disp: 30 g, Rfl: 1    doxycycline (PERIOSTAT) 20 MG tablet, TAKE 1 TABLET BY MOUTH TWICE A DAY WITH FOOD (NOT DAIRY), Disp: , Rfl:     enoxaparin (LOVENOX) 100 mg/mL, Inject 100 mg under the skin daily To stop 24 hrs prior to surgery, Disp: , Rfl:     ferrous sulfate 325 (65 Fe) mg tablet, Take 325 mg by mouth daily with breakfast, Disp: , Rfl:     folic acid (FOLVITE) 1 mg tablet, Take 1 mg by mouth daily, Disp: , Rfl:     gabapentin (NEURONTIN) 400 mg capsule, Take 400 mg by mouth 3 (three) times a day, Disp: , Rfl:     levothyroxine 100 mcg tablet, Take 1 tablet (100 mcg total) by mouth daily in the early morning, Disp: 30 tablet, Rfl: 0    lithium carbonate (LITHOBID) 300 mg CR tablet, Take 300 mg by mouth daily , Disp: , Rfl:     Multiple Vitamin (MULTI-VITAMIN DAILY PO), Multi Vitamin  DAILY, Disp: , Rfl:     NON FORMULARY, Medical marijuana prn pain/ anxiety, Disp: , Rfl:     pantoprazole (PROTONIX) 40 mg tablet, Take 1 tablet (40 mg total) by mouth 2 (two) times a day before meals, Disp: 60 tablet, Rfl: 2    pyridoxine (VITAMIN B6) 100 mg tablet, Take 100 mg by mouth daily, Disp: , Rfl:     QUEtiapine (SEROquel) 100 mg tablet, Take 100 mg by mouth 3 (three) times a day 100 mg t i d  and 400 mg at bedtime, Disp: , Rfl:     SUMAtriptan (IMITREX) 100 mg tablet, Take 100 mg by mouth as needed for migraine , Disp: , Rfl:     thiamine 100 MG tablet, Daily, Disp: , Rfl:     topiramate (TOPAMAX) 100 mg tablet, Take 150 mg by mouth 2 (two) times a day , Disp: , Rfl:     venlafaxine (EFFEXOR-XR) 150 mg 24 hr capsule, Take 150 mg by mouth daily , Disp: , Rfl:     rOPINIRole (REQUIP) 1 mg tablet, Take 2 mg by mouth daily at bedtime , Disp: , Rfl:       Physical Exam:  BP 98/60 (BP Location: Left arm, Patient Position: Sitting, Cuff Size: Standard)   Pulse 74   Temp 97 5 °F (36 4 °C) (Temporal)   Resp 16   Ht 5' 3" (1 6 m)   Wt 103 kg (228 lb)   LMP 10/04/2020   SpO2 100%   BMI 40 39 kg/m²     Physical Exam  Vitals reviewed  Constitutional:       General: She is not in acute distress  Appearance: She is well-developed  She is not ill-appearing  HENT:      Head: Normocephalic and atraumatic  Eyes:      General: No scleral icterus  Conjunctiva/sclera: Conjunctivae normal    Cardiovascular:      Rate and Rhythm: Normal rate and regular rhythm        Heart sounds: Normal heart sounds  No murmur heard  Pulmonary:      Effort: Pulmonary effort is normal  No respiratory distress  Breath sounds: Normal breath sounds  Abdominal:      Palpations: Abdomen is soft  Tenderness: There is no abdominal tenderness  Musculoskeletal:         General: No tenderness  Normal range of motion  Cervical back: Normal range of motion and neck supple  Right lower leg: No edema  Left lower leg: No edema  Lymphadenopathy:      Cervical: No cervical adenopathy  Skin:     General: Skin is warm and dry  Neurological:      Mental Status: She is alert and oriented to person, place, and time  Cranial Nerves: No cranial nerve deficit  Psychiatric:         Mood and Affect: Mood normal          Behavior: Behavior normal        Labs:  Lab Results   Component Value Date    WBC 13 00 (H) 09/05/2021    HGB 10 2 (L) 09/05/2021    HCT 33 5 (L) 09/05/2021    MCV 90 09/05/2021     09/05/2021     Lab Results   Component Value Date    K 3 8 09/05/2021     (H) 09/05/2021    CO2 25 09/05/2021    BUN 13 09/05/2021    CREATININE 0 61 09/05/2021    GLUF 74 07/25/2021    CALCIUM 8 8 09/05/2021    CORRECTEDCA 10 0 09/05/2021    AST 32 09/05/2021    ALT 22 09/05/2021    ALKPHOS 101 09/05/2021    EGFR 108 09/05/2021       Patient voiced understanding and agreement in the above discussion  Aware to contact our office with questions/symptoms in the interim  This note has been generated by voice recognition software system  Therefore, there may be spelling, grammar, and or syntax errors  Please contact if questions arise

## 2021-09-20 ENCOUNTER — TELEPHONE (OUTPATIENT)
Dept: HEMATOLOGY ONCOLOGY | Facility: CLINIC | Age: 46
End: 2021-09-20

## 2021-09-20 DIAGNOSIS — Z86.718 HISTORY OF DVT (DEEP VEIN THROMBOSIS): Primary | ICD-10-CM

## 2021-09-20 NOTE — TELEPHONE ENCOUNTER
Patient called requesting Lovenox 40 mg prescription  Per 9/7/21 office note: She will call when she needs refill and will send in 40 mg subQ for maintenance dosing      Prescription pended to [facility]

## 2021-09-20 NOTE — TELEPHONE ENCOUNTER
Patient calling to have new lovenox script for 40mg be sent to [facility]   Patient can be reached at [phone]

## 2021-09-21 ENCOUNTER — HOSPITAL ENCOUNTER (OUTPATIENT)
Dept: INFUSION CENTER | Facility: CLINIC | Age: 46
Discharge: HOME | End: 2021-09-21
Payer: MEDICARE

## 2021-09-21 VITALS
TEMPERATURE: 97.8 F | RESPIRATION RATE: 18 BRPM | DIASTOLIC BLOOD PRESSURE: 75 MMHG | SYSTOLIC BLOOD PRESSURE: 111 MMHG | HEART RATE: 64 BPM

## 2021-09-21 DIAGNOSIS — D62 ACUTE BLOOD LOSS ANEMIA: Primary | ICD-10-CM

## 2021-09-21 DIAGNOSIS — D50.8 OTHER IRON DEFICIENCY ANEMIA: ICD-10-CM

## 2021-09-21 DIAGNOSIS — D50.8 IRON DEFICIENCY ANEMIA FOLLOWING BARIATRIC SURGERY: ICD-10-CM

## 2021-09-21 DIAGNOSIS — K95.89 IRON DEFICIENCY ANEMIA FOLLOWING BARIATRIC SURGERY: ICD-10-CM

## 2021-09-21 PROCEDURE — 96365 THER/PROPH/DIAG IV INF INIT: CPT

## 2021-09-21 RX ORDER — SODIUM CHLORIDE 9 MG/ML
20 INJECTION, SOLUTION INTRAVENOUS ONCE
Status: CANCELLED | OUTPATIENT
Start: 2021-09-27

## 2021-09-21 RX ORDER — SODIUM CHLORIDE 9 MG/ML
20 INJECTION, SOLUTION INTRAVENOUS ONCE
Status: COMPLETED | OUTPATIENT
Start: 2021-09-21 | End: 2021-09-21

## 2021-09-21 RX ADMIN — IRON SUCROSE 200 MG: 20 INJECTION, SOLUTION INTRAVENOUS at 09:36

## 2021-09-21 RX ADMIN — SODIUM CHLORIDE 20 ML/HR: 0.9 INJECTION, SOLUTION INTRAVENOUS at 09:33

## 2021-09-27 ENCOUNTER — HOSPITAL ENCOUNTER (OUTPATIENT)
Dept: INFUSION CENTER | Facility: CLINIC | Age: 46
End: 2021-09-27

## 2021-10-07 ENCOUNTER — TELEPHONE (OUTPATIENT)
Dept: HEMATOLOGY ONCOLOGY | Facility: CLINIC | Age: 46
End: 2021-10-07

## 2021-10-12 ENCOUNTER — APPOINTMENT (OUTPATIENT)
Dept: LAB | Facility: HOSPITAL | Age: 46
End: 2021-10-12
Payer: MEDICARE

## 2021-10-12 ENCOUNTER — OFFICE VISIT (OUTPATIENT)
Dept: GYNECOLOGIC ONCOLOGY | Facility: CLINIC | Age: 46
End: 2021-10-12

## 2021-10-12 VITALS
WEIGHT: 223 LBS | HEART RATE: 73 BPM | RESPIRATION RATE: 16 BRPM | TEMPERATURE: 97.2 F | SYSTOLIC BLOOD PRESSURE: 122 MMHG | DIASTOLIC BLOOD PRESSURE: 80 MMHG | HEIGHT: 63 IN | BODY MASS INDEX: 39.51 KG/M2 | OXYGEN SATURATION: 100 %

## 2021-10-12 DIAGNOSIS — K95.89 IRON DEFICIENCY ANEMIA FOLLOWING BARIATRIC SURGERY: ICD-10-CM

## 2021-10-12 DIAGNOSIS — D50.8 IRON DEFICIENCY ANEMIA FOLLOWING BARIATRIC SURGERY: ICD-10-CM

## 2021-10-12 DIAGNOSIS — Z79.899 ENCOUNTER FOR LONG-TERM (CURRENT) USE OF OTHER MEDICATIONS: ICD-10-CM

## 2021-10-12 DIAGNOSIS — Z09 POSTOP CHECK: Primary | ICD-10-CM

## 2021-10-12 LAB
ALBUMIN SERPL BCP-MCNC: 3.1 G/DL (ref 3.5–5)
ALP SERPL-CCNC: 137 U/L (ref 46–116)
ALT SERPL W P-5'-P-CCNC: 44 U/L (ref 12–78)
ANION GAP SERPL CALCULATED.3IONS-SCNC: 7 MMOL/L (ref 4–13)
AST SERPL W P-5'-P-CCNC: 53 U/L (ref 5–45)
BASOPHILS # BLD AUTO: 0.03 THOUSANDS/ΜL (ref 0–0.1)
BASOPHILS NFR BLD AUTO: 1 % (ref 0–1)
BILIRUB SERPL-MCNC: 0.37 MG/DL (ref 0.2–1)
BUN SERPL-MCNC: 8 MG/DL (ref 5–25)
CALCIUM ALBUM COR SERPL-MCNC: 10.2 MG/DL (ref 8.3–10.1)
CALCIUM SERPL-MCNC: 9.5 MG/DL (ref 8.3–10.1)
CHLORIDE SERPL-SCNC: 107 MMOL/L (ref 100–108)
CO2 SERPL-SCNC: 25 MMOL/L (ref 21–32)
CREAT SERPL-MCNC: 0.72 MG/DL (ref 0.6–1.3)
EOSINOPHIL # BLD AUTO: 0.11 THOUSAND/ΜL (ref 0–0.61)
EOSINOPHIL NFR BLD AUTO: 3 % (ref 0–6)
ERYTHROCYTE [DISTWIDTH] IN BLOOD BY AUTOMATED COUNT: 17.3 % (ref 11.6–15.1)
FERRITIN SERPL-MCNC: 66 NG/ML (ref 8–388)
GFR SERPL CREATININE-BSD FRML MDRD: 100 ML/MIN/1.73SQ M
GLUCOSE P FAST SERPL-MCNC: 96 MG/DL (ref 65–99)
HCT VFR BLD AUTO: 37.5 % (ref 34.8–46.1)
HGB BLD-MCNC: 11.4 G/DL (ref 11.5–15.4)
IMM GRANULOCYTES # BLD AUTO: 0.01 THOUSAND/UL (ref 0–0.2)
IMM GRANULOCYTES NFR BLD AUTO: 0 % (ref 0–2)
IRON SATN MFR SERPL: 16 % (ref 15–50)
IRON SERPL-MCNC: 50 UG/DL (ref 50–170)
LITHIUM SERPL-SCNC: 0.7 MMOL/L (ref 0.5–1)
LYMPHOCYTES # BLD AUTO: 1.17 THOUSANDS/ΜL (ref 0.6–4.47)
LYMPHOCYTES NFR BLD AUTO: 28 % (ref 14–44)
MCH RBC QN AUTO: 27.6 PG (ref 26.8–34.3)
MCHC RBC AUTO-ENTMCNC: 30.4 G/DL (ref 31.4–37.4)
MCV RBC AUTO: 91 FL (ref 82–98)
MONOCYTES # BLD AUTO: 0.89 THOUSAND/ΜL (ref 0.17–1.22)
MONOCYTES NFR BLD AUTO: 22 % (ref 4–12)
NEUTROPHILS # BLD AUTO: 1.93 THOUSANDS/ΜL (ref 1.85–7.62)
NEUTS SEG NFR BLD AUTO: 46 % (ref 43–75)
NRBC BLD AUTO-RTO: 0 /100 WBCS
PLATELET # BLD AUTO: 198 THOUSANDS/UL (ref 149–390)
PMV BLD AUTO: 11.8 FL (ref 8.9–12.7)
POTASSIUM SERPL-SCNC: 3.9 MMOL/L (ref 3.5–5.3)
PROT SERPL-MCNC: 8.4 G/DL (ref 6.4–8.2)
RBC # BLD AUTO: 4.13 MILLION/UL (ref 3.81–5.12)
SODIUM SERPL-SCNC: 139 MMOL/L (ref 136–145)
T4 SERPL-MCNC: 6.2 UG/DL (ref 4.7–13.3)
TIBC SERPL-MCNC: 316 UG/DL (ref 250–450)
TSH SERPL DL<=0.05 MIU/L-ACNC: 2.36 UIU/ML (ref 0.36–3.74)
WBC # BLD AUTO: 4.14 THOUSAND/UL (ref 4.31–10.16)

## 2021-10-12 PROCEDURE — 82728 ASSAY OF FERRITIN: CPT

## 2021-10-12 PROCEDURE — 83540 ASSAY OF IRON: CPT

## 2021-10-12 PROCEDURE — 85025 COMPLETE CBC W/AUTO DIFF WBC: CPT

## 2021-10-12 PROCEDURE — 83550 IRON BINDING TEST: CPT

## 2021-10-12 PROCEDURE — 80053 COMPREHEN METABOLIC PANEL: CPT

## 2021-10-12 PROCEDURE — 99024 POSTOP FOLLOW-UP VISIT: CPT | Performed by: NURSE PRACTITIONER

## 2021-10-12 PROCEDURE — 36415 COLL VENOUS BLD VENIPUNCTURE: CPT

## 2021-10-12 PROCEDURE — 80178 ASSAY OF LITHIUM: CPT

## 2021-10-12 PROCEDURE — 84443 ASSAY THYROID STIM HORMONE: CPT

## 2021-10-12 PROCEDURE — 84436 ASSAY OF TOTAL THYROXINE: CPT

## 2021-10-13 ENCOUNTER — HOSPITAL ENCOUNTER (OUTPATIENT)
Dept: INFUSION CENTER | Facility: CLINIC | Age: 46
Discharge: HOME | End: 2021-10-13
Payer: MEDICARE

## 2021-10-13 VITALS
SYSTOLIC BLOOD PRESSURE: 92 MMHG | TEMPERATURE: 97.9 F | HEART RATE: 82 BPM | DIASTOLIC BLOOD PRESSURE: 58 MMHG | RESPIRATION RATE: 18 BRPM

## 2021-10-13 DIAGNOSIS — D50.8 OTHER IRON DEFICIENCY ANEMIA: ICD-10-CM

## 2021-10-13 DIAGNOSIS — D50.8 IRON DEFICIENCY ANEMIA FOLLOWING BARIATRIC SURGERY: ICD-10-CM

## 2021-10-13 DIAGNOSIS — K95.89 IRON DEFICIENCY ANEMIA FOLLOWING BARIATRIC SURGERY: ICD-10-CM

## 2021-10-13 DIAGNOSIS — D62 ACUTE BLOOD LOSS ANEMIA: Primary | ICD-10-CM

## 2021-10-13 PROCEDURE — 96365 THER/PROPH/DIAG IV INF INIT: CPT

## 2021-10-13 RX ORDER — SODIUM CHLORIDE 9 MG/ML
20 INJECTION, SOLUTION INTRAVENOUS ONCE
Status: CANCELLED | OUTPATIENT
Start: 2021-10-20

## 2021-10-13 RX ORDER — SODIUM CHLORIDE 9 MG/ML
20 INJECTION, SOLUTION INTRAVENOUS ONCE
Status: COMPLETED | OUTPATIENT
Start: 2021-10-13 | End: 2021-10-13

## 2021-10-13 RX ADMIN — SODIUM CHLORIDE 200 MG: 9 INJECTION, SOLUTION INTRAVENOUS at 09:02

## 2021-10-13 RX ADMIN — SODIUM CHLORIDE 20 ML/HR: 0.9 INJECTION, SOLUTION INTRAVENOUS at 09:00

## 2021-10-15 ENCOUNTER — EVALUATION (OUTPATIENT)
Dept: PHYSICAL THERAPY | Age: 46
End: 2021-10-15
Payer: MEDICARE

## 2021-10-15 DIAGNOSIS — I89.0 LYMPHEDEMA: Primary | ICD-10-CM

## 2021-10-15 PROCEDURE — 97162 PT EVAL MOD COMPLEX 30 MIN: CPT

## 2021-10-15 PROCEDURE — 97110 THERAPEUTIC EXERCISES: CPT

## 2021-10-19 ENCOUNTER — OFFICE VISIT (OUTPATIENT)
Dept: PHYSICAL THERAPY | Age: 46
End: 2021-10-19
Payer: MEDICARE

## 2021-10-19 DIAGNOSIS — I89.0 LYMPHEDEMA: Primary | ICD-10-CM

## 2021-10-19 PROCEDURE — 97113 AQUATIC THERAPY/EXERCISES: CPT

## 2021-10-20 ENCOUNTER — HOSPITAL ENCOUNTER (OUTPATIENT)
Dept: INFUSION CENTER | Facility: CLINIC | Age: 46
Discharge: HOME | End: 2021-10-20
Payer: MEDICARE

## 2021-10-20 VITALS
DIASTOLIC BLOOD PRESSURE: 80 MMHG | TEMPERATURE: 96.4 F | HEART RATE: 76 BPM | RESPIRATION RATE: 18 BRPM | SYSTOLIC BLOOD PRESSURE: 120 MMHG

## 2021-10-20 DIAGNOSIS — D50.8 OTHER IRON DEFICIENCY ANEMIA: ICD-10-CM

## 2021-10-20 DIAGNOSIS — D62 ACUTE BLOOD LOSS ANEMIA: Primary | ICD-10-CM

## 2021-10-20 DIAGNOSIS — K95.89 IRON DEFICIENCY ANEMIA FOLLOWING BARIATRIC SURGERY: ICD-10-CM

## 2021-10-20 DIAGNOSIS — D50.8 IRON DEFICIENCY ANEMIA FOLLOWING BARIATRIC SURGERY: ICD-10-CM

## 2021-10-20 PROCEDURE — 96365 THER/PROPH/DIAG IV INF INIT: CPT

## 2021-10-20 RX ORDER — SODIUM CHLORIDE 9 MG/ML
20 INJECTION, SOLUTION INTRAVENOUS ONCE
Status: COMPLETED | OUTPATIENT
Start: 2021-10-20 | End: 2021-10-20

## 2021-10-20 RX ORDER — SODIUM CHLORIDE 9 MG/ML
20 INJECTION, SOLUTION INTRAVENOUS ONCE
Status: CANCELLED | OUTPATIENT
Start: 2021-10-26

## 2021-10-20 RX ADMIN — SODIUM CHLORIDE 200 MG: 9 INJECTION, SOLUTION INTRAVENOUS at 09:03

## 2021-10-20 RX ADMIN — SODIUM CHLORIDE 20 ML/HR: 0.9 INJECTION, SOLUTION INTRAVENOUS at 08:58

## 2021-10-21 ENCOUNTER — APPOINTMENT (OUTPATIENT)
Dept: PHYSICAL THERAPY | Age: 46
End: 2021-10-21
Payer: MEDICARE

## 2021-10-26 ENCOUNTER — OFFICE VISIT (OUTPATIENT)
Dept: PHYSICAL THERAPY | Age: 46
End: 2021-10-26
Payer: MEDICARE

## 2021-10-26 DIAGNOSIS — I89.0 LYMPHEDEMA: Primary | ICD-10-CM

## 2021-10-26 PROCEDURE — 97113 AQUATIC THERAPY/EXERCISES: CPT | Performed by: PHYSICAL THERAPIST

## 2021-10-29 ENCOUNTER — APPOINTMENT (OUTPATIENT)
Dept: PHYSICAL THERAPY | Age: 46
End: 2021-10-29
Payer: MEDICARE

## 2021-11-16 ENCOUNTER — TELEPHONE (OUTPATIENT)
Dept: GASTROENTEROLOGY | Facility: AMBULARY SURGERY CENTER | Age: 46
End: 2021-11-16

## 2021-11-16 ENCOUNTER — TELEPHONE (OUTPATIENT)
Dept: HEMATOLOGY ONCOLOGY | Facility: CLINIC | Age: 46
End: 2021-11-16

## 2021-11-18 ENCOUNTER — TELEPHONE (OUTPATIENT)
Dept: HEMATOLOGY ONCOLOGY | Facility: CLINIC | Age: 46
End: 2021-11-18

## 2021-11-19 ENCOUNTER — TELEPHONE (OUTPATIENT)
Dept: GASTROENTEROLOGY | Facility: AMBULARY SURGERY CENTER | Age: 46
End: 2021-11-19

## 2021-11-19 ENCOUNTER — TELEPHONE (OUTPATIENT)
Dept: GASTROENTEROLOGY | Facility: CLINIC | Age: 46
End: 2021-11-19

## 2021-11-26 ENCOUNTER — TELEPHONE (OUTPATIENT)
Dept: HEMATOLOGY ONCOLOGY | Facility: MEDICAL CENTER | Age: 46
End: 2021-11-26

## 2021-11-29 ENCOUNTER — TELEPHONE (OUTPATIENT)
Dept: HEMATOLOGY ONCOLOGY | Facility: CLINIC | Age: 46
End: 2021-11-29

## 2021-11-29 DIAGNOSIS — Z86.718 HISTORY OF DVT (DEEP VEIN THROMBOSIS): ICD-10-CM

## 2021-11-30 ENCOUNTER — TELEPHONE (OUTPATIENT)
Dept: SURGICAL ONCOLOGY | Facility: CLINIC | Age: 46
End: 2021-11-30

## 2021-12-01 ENCOUNTER — OFFICE VISIT (OUTPATIENT)
Dept: GYNECOLOGIC ONCOLOGY | Facility: CLINIC | Age: 46
End: 2021-12-01
Payer: MEDICARE

## 2021-12-01 VITALS
OXYGEN SATURATION: 98 % | HEIGHT: 63 IN | HEART RATE: 88 BPM | TEMPERATURE: 98.4 F | WEIGHT: 206 LBS | BODY MASS INDEX: 36.5 KG/M2 | SYSTOLIC BLOOD PRESSURE: 134 MMHG | RESPIRATION RATE: 18 BRPM | DIASTOLIC BLOOD PRESSURE: 80 MMHG

## 2021-12-01 DIAGNOSIS — N95.0 PMB (POSTMENOPAUSAL BLEEDING): Primary | ICD-10-CM

## 2021-12-01 PROCEDURE — 88305 TISSUE EXAM BY PATHOLOGIST: CPT | Performed by: PATHOLOGY

## 2021-12-01 PROCEDURE — 58100 BIOPSY OF UTERUS LINING: CPT | Performed by: NURSE PRACTITIONER

## 2021-12-01 PROCEDURE — 99214 OFFICE O/P EST MOD 30 MIN: CPT | Performed by: NURSE PRACTITIONER

## 2021-12-01 RX ORDER — CLONIDINE HYDROCHLORIDE 0.1 MG/1
0.1 TABLET ORAL 3 TIMES DAILY
COMMUNITY
Start: 2021-11-08

## 2021-12-01 RX ORDER — HYDROXYZINE PAMOATE 25 MG/1
1 CAPSULE ORAL EVERY 12 HOURS
COMMUNITY
Start: 2021-10-08

## 2021-12-01 RX ORDER — CICLOPIROX OLAMINE 7.7 MG/100ML
SUSPENSION TOPICAL
COMMUNITY
Start: 2021-11-13

## 2021-12-01 RX ORDER — ACETAMINOPHEN 160 MG
2000 TABLET,DISINTEGRATING ORAL DAILY
COMMUNITY
Start: 2021-11-08

## 2021-12-02 ENCOUNTER — TELEPHONE (OUTPATIENT)
Dept: HEMATOLOGY ONCOLOGY | Facility: CLINIC | Age: 46
End: 2021-12-02

## 2021-12-26 NOTE — ASSESSMENT & PLAN NOTE
Likely related to urinary tract infection  Follow-up blood cultures, urine cultures  Trend fever/WBC curve There are no Wet Read(s) to document.

## 2022-01-06 NOTE — ASSESSMENT & PLAN NOTE
· Transaminitis in the setting of hepatic steatosis    · Trending down    Results from last 7 days   Lab Units 06/19/21  0449 06/18/21  1318   AST U/L 132* 144*   ALT U/L 45 50   TOTAL BILIRUBIN mg/dL 2 88* 2 73* (1) obesity (BMI greater than 25)

## 2022-01-18 ENCOUNTER — TELEMEDICINE (OUTPATIENT)
Dept: HEMATOLOGY ONCOLOGY | Facility: CLINIC | Age: 47
End: 2022-01-18
Payer: MEDICARE

## 2022-01-18 ENCOUNTER — TELEPHONE (OUTPATIENT)
Dept: HEMATOLOGY ONCOLOGY | Facility: CLINIC | Age: 47
End: 2022-01-18

## 2022-01-18 DIAGNOSIS — Z86.718 HISTORY OF DVT (DEEP VEIN THROMBOSIS): ICD-10-CM

## 2022-01-18 DIAGNOSIS — D62 ACUTE BLOOD LOSS ANEMIA: Primary | ICD-10-CM

## 2022-01-18 DIAGNOSIS — K95.89 IRON DEFICIENCY ANEMIA FOLLOWING BARIATRIC SURGERY: ICD-10-CM

## 2022-01-18 DIAGNOSIS — D50.8 IRON DEFICIENCY ANEMIA FOLLOWING BARIATRIC SURGERY: ICD-10-CM

## 2022-01-18 PROCEDURE — 99214 OFFICE O/P EST MOD 30 MIN: CPT | Performed by: PHYSICIAN ASSISTANT

## 2022-01-18 NOTE — PROGRESS NOTES
Virtual Regular Visit    Verification of patient location:    Patient is located in the following state in which I hold an active license [place]      Assessment/Plan:    Problem List Items Addressed This Visit     None               Reason for visit is No chief complaint on file  Encounter provider Lizabeth Philippe PA-C    Provider located at [address] [place] [id number]      Recent Visits  No visits were found meeting these conditions  Showing recent visits within past 7 days and meeting all other requirements  Future Appointments  No visits were found meeting these conditions  Showing future appointments within next 150 days and meeting all other requirements       The patient was identified by name and date of birth  Michaelle Green was informed that this is a telemedicine visit and that the visit is being conducted through [address] [place] [address] Now and patient was informed that this is a secure, HIPAA-compliant platform  She agrees to proceed     My office door was closed  No one else was in the room  She acknowledged consent and understanding of privacy and security of the video platform  The patient has agreed to participate and understands they can discontinue the visit at any time  Patient is aware this is a billable service  Subjective  Michaelle Green is a 48 y o  female presents for follow up for history of thrombosis, iron def anemia due to malabsorption from bariatric surgery and history of GI blood loss  Past medical history significant for hepatic steatosis, hypothyroidism, pulmonary embolism, IVC thrombosis, migraines, seizures, psychiatric disorder (bipolar disorder, SABINO), history of gastric bypass surgery (2010)       Patient presented to the emergency department on 08/03/2020 due to abdominal pain radiating into bilateral upper legs      She had a history of bilateral lower extremity DVT and PE in 2017 for which she had an IVC filter placed in [facility] in 2017  She was treated with Xarelto but developed a GI bleed in discontinued   Per patient this was unprovoked  Patient admitted to being off of anticoagulation during this admission and denied a previous workup for clotting disorder in the past      Family history significant for DVT and PE and her sister  Her sister  of PE  Of note her sister had lupus  Paternal uncle had DVT as well       A CT of the abdomen pelvis was completed on 2020   This showed known IVC filter, diffuse thrombus extending from the IVC filter inferiorly into the iliac vasculature, extensive surrounding fat stranding with thrombophlebitis, small amount of thrombus extends superior to the filter        A venous Doppler study of bilateral lower extremities were also completed on 2020 which was negative for DVT      She underwent lysis of thrombus with IR on       She returned to IR on 2020 for IR venogram      Patient was on heparin during hospitalization      Patient was seen by Dr Jordan on 2020 during hospitalization   Due to patient's history of gastric bypass surgery patient was recommended Lovenox 0 75 milligrams/kilogram subcu b i d        She had partial thrombosis workup in the hospital               Homocystine normal, 7 4              Protein S activity normal, 111              Protein C activity normal, 91 0              Factor 5 Leiden mutation negative              Prothrombin gene mutation negative              Anticardiolipin antibody IgA and IgG normal   IgM 18 which is a indeterminate results              Lupus anticoagulant negative               Beta-2 glycoproteins negative      Patient was discharged on Lovenox 0 75 milligram/kilogram  Geo Sloo date was 2020      She presented back to the emergency department on 2020 due to abdominal pain and right leg pain      Patient was diagnosed with cellulitis of lower extremity, right, and received IV Ancef for 3 days in [place] on p o  Keflex      She had outpatient follow-up with vascular surgery on 08/21/2020   Recommendation for her venous disease included conservative measures and medical management   Patient was scheduled for follow-up in November 2020 with vascular surgery with a repeat IVC/iliac vein duplex and bilateral lower extremity DVT study       At consultation patient was recommended to have EGD and colonoscopy secondary to weight loss   She had an EGD and colonoscopy on 12/03/2020   This showed  anastomotic ulcer on the jejunal side   Colonoscopy showed 1 polyp measuring 5-9 mm in the sigmoid colon   A clip was placed to prevent bleeding secondary to anticoagulation  There is no of inadequate bowel prep and patient was recommended to have repeat in 6 months (June 2021)        Patient was admitted to the hospital to the ICU due to acute GI bleed from 5/2 - 5/5/21  Leola Ojeda states that she was taking NSAIDs due to migraines even though she knew she was not supposed to do this   She had EGD on 05/02/2021 due to bleeding while inpatient and this showed single 5 mm crater drowned benign-appearing ulcer in the stomach   This was treated   Patient's Lovenox was held during parts of hospitalization then restarted on discharge      Patient is planned to have her IVC filter removed on 05/18/2021 however I note that this appears to have been canceled   She really had CT venogram in March 2021 which showed no evidence of thrombus  Yannikimberly Santa was a incident right ovarian cyst which patient's PCP at [facility] ordered an ultrasound and patient was referred to a gyn at [facility] as well       Patient then admitted again on 6/18 - 6/23/21 due to MADHU and hypokalemia, both due to too much diuretic managed by PCP at [facility]     6/8/21 IVC filter was removed      Ferritin 141, continues on oral iron once per day      7/9/21   Potassium 3 2, PCP treating this      She still is taking 100 mg SQ Lovenox due to supply, still has 5 boxes left      7/21/21 - have EGD and colonoscopy       Past Medical History:   Diagnosis Date    Anemia     Anxiety     Balance disorder     Bipolar depression (HCC)     bipolar II, suicide    Depression     Disease of thyroid gland     GERD (gastroesophageal reflux disease)     Heart murmur     History of DVT (deep vein thrombosis)     History of gastric bypass     2010    Lymphedema     bles- chronic    Migraines     Pelvic mass     Pulmonary embolism (HCC)     Rash     under abd fold    Seizures (HCC)     Use of cane as ambulatory aid     UTI (urinary tract infection)     7/24/21       Past Surgical History:   Procedure Laterality Date    APPENDECTOMY      Open    CHOLECYSTECTOMY      Laparoscopic    CYSTOSCOPY N/A 8/13/2021    Procedure: CYSTOSCOPY;  Surgeon: Lizzy John MD;  [facility];  Service: Gynecology Oncology    GASTRIC BYPASS      was 89 date of surgery    IR DVT THROMBOLYSIS/THROMBECTOMY ILIAC/IVC WITH VENOGRAM  8/4/2020    IR IVC FILTER REMOVAL  6/8/2021    IR TPA LYSIS CHECK  8/5/2020    IVC FILTER INSERTION  2017    TX LAP,DIAGNOSTIC ABDOMEN N/A 8/13/2021    Procedure: LAPAROSCOPY DIAGNOSTIC;  Surgeon: Lizzy John MD;  [facility];  Service: Gynecology Oncology    TX LAP,FULGURATE/EXCISE LESIONS N/A 8/13/2021    Procedure: ROBOTIC RESECTION OF PELVIC MASS/ RIGHT SALPINGO-OOPHORECTOMY, LEFT SALPINGECTOMY;  Surgeon: Lizzy John MD;  [facility];  Service: Gynecology Oncology    STOMACH SURGERY      for morbid obesity    TUBAL LIGATION Bilateral 2010       Current Outpatient Medications   Medication Sig Dispense Refill    atoMOXetine (STRATTERA) 60 mg capsule TAKE 1 CAPSULE (60 MG) BY ORAL ROUTE ONCE DAILY IN THE MORNING      Cholecalciferol (Vitamin D3) 50 MCG (2000 UT) capsule Take 2,000 Units by mouth daily      ciclopirox (LOPROX) 0 77 % SUSP APPLY ONCE A DAY TO RIGHT THUMBNAIL  cloNIDine (CATAPRES) 0 1 mg tablet Take 0 1 mg by mouth 3 (three) times a day      clotrimazole (LOTRIMIN) 1 % cream Apply topically 2 (two) times a day 30 g 1    doxycycline (PERIOSTAT) 20 MG tablet TAKE 1 TABLET BY MOUTH TWICE A DAY WITH FOOD (NOT DAIRY)      enoxaparin (LOVENOX) 40 mg/0 4 mL Inject SQ into the skin daily  30 mL 2    ferrous sulfate 325 (65 Fe) mg tablet Take 325 mg by mouth daily with breakfast      folic acid (FOLVITE) 1 mg tablet Take 1 mg by mouth daily      gabapentin (NEURONTIN) 400 mg capsule Take 400 mg by mouth 3 (three) times a day      hydrOXYzine pamoate (VISTARIL) 25 mg capsule Take 1 capsule by mouth Every 12 hours      levothyroxine 100 mcg tablet Take 1 tablet (100 mcg total) by mouth daily in the early morning 30 tablet 0    lithium carbonate (LITHOBID) 300 mg CR tablet Take 300 mg by mouth daily       Multiple Vitamin (MULTI-VITAMIN DAILY PO) Multi Vitamin   DAILY      NON FORMULARY Medical marijuana prn pain/ anxiety      pantoprazole (PROTONIX) 40 mg tablet Take 1 tablet (40 mg total) by mouth 2 (two) times a day before meals 60 tablet 2    pyridoxine (VITAMIN B6) 100 mg tablet Take 100 mg by mouth daily      QUEtiapine (SEROquel) 100 mg tablet Take 100 mg by mouth 3 (three) times a day 100 mg t i d  and 400 mg at bedtime      rOPINIRole (REQUIP) 1 mg tablet Take 2 mg by mouth daily at bedtime       SUMAtriptan (IMITREX) 100 mg tablet Take 100 mg by mouth as needed for migraine       thiamine 100 MG tablet Daily      topiramate (TOPAMAX) 100 mg tablet Take 150 mg by mouth 2 (two) times a day       venlafaxine (EFFEXOR-XR) 150 mg 24 hr capsule Take 150 mg by mouth daily        No current facility-administered medications for this visit  Allergies   Allergen Reactions    Morphine Seizures     Pt denies       Review of Systems   Constitutional: Positive for unexpected weight change (weight gain due to fluid retention)   Negative for appetite change, chills, fatigue and fever  HENT: Negative for nosebleeds  Respiratory: Negative for cough and shortness of breath  Cardiovascular: Positive for leg swelling  Negative for chest pain and palpitations  Gastrointestinal: Negative for abdominal pain, blood in stool, constipation, diarrhea, nausea and vomiting  Denies dark or black stool      Genitourinary: Negative for difficulty urinating, dysuria and hematuria  Musculoskeletal: Negative for arthralgias  Skin: Negative  Neurological: Negative for dizziness, weakness, light-headedness, numbness and headaches  Hematological: Negative  Psychiatric/Behavioral: Negative  Video Exam    There were no vitals filed for this visit  Physical Exam  Constitutional:       Appearance: Normal appearance  She is not ill-appearing  HENT:      Head: Normocephalic and atraumatic  Eyes:      General: No scleral icterus  Pulmonary:      Effort: Pulmonary effort is normal  No respiratory distress  Abdominal:      Comments: Denies abdominal pain on her own palpation    Musculoskeletal:      Cervical back: Normal range of motion  Comments: Admits to bilateral lower extremity edema, has known lymphedema, has compression boots, using 2 times a day   Skin:     Coloration: Skin is not pale  Neurological:      General: No focal deficit present  Mental Status: She is alert  Mental status is at baseline  Psychiatric:         Mood and Affect: Mood normal          Behavior: Behavior normal           Iron studies on 12/15/21 showed iron 73, iron sat 20%, TIBC 365  Ferritin 165  Hemoglobin 12 4, platelets 022,   Repeat CBC on 1/13/22 showed hgb 11 8,     She was recently started on Propanolol 1/2 tab BID    1  History of DVT (deep vein thrombosis)  She is on lifelong lovenox 40 mg SQ due to bariatric surgery status and history of bleeding  No bleeding at present  Tolerating well   Cleaning skin with alcohol swabs before injections     - enoxaparin (LOVENOX) 40 mg/0 4 mL; Inject SQ into the skin daily  Dispense: 30 mL; Refill: 2    2  Acute blood loss anemia, 3  Iron deficiency anemia following bariatric surgery  SHe had IV iron  She tolerated well  She continues to deny bleeding  Recommend repeat labs in 6 months  Follow up at that time as well  - CBC and differential; Future  - Iron; Future  - Iron Saturation %; Future  - Ferritin; Future  - TIBC; Future      I spent 15 minutes directly with the patient during this visit    [address] verbally agrees to participate in [employer]  Pt is aware that [employer] could be limited without vital signs or the ability to perform a full hands-on physical Reyne Pouch understands she or the provider may request at any time to terminate the video visit and request the patient to seek care or treatment in person

## 2022-01-18 NOTE — TELEPHONE ENCOUNTER
Patient was notified of 6 month follow up with Eduar Alcaraz  Patient declined for AVS and labs to be mailed

## 2022-01-19 ENCOUNTER — TELEPHONE (OUTPATIENT)
Dept: GASTROENTEROLOGY | Facility: CLINIC | Age: 47
End: 2022-01-19

## 2022-01-19 ENCOUNTER — TELEPHONE (OUTPATIENT)
Dept: GASTROENTEROLOGY | Facility: HOSPITAL | Age: 47
End: 2022-01-19

## 2022-01-19 NOTE — TELEPHONE ENCOUNTER
Patients GI provider:  Dr Maribel Chisholm    Number to return call: [phone]    Reason for call: Pt calling to reschedule her procedure  Pt was schedule for tomorrow but had to cancel  Pt stated she forgot about it and didn't do any of the prep       Scheduled procedure/appointment date if applicable: Apt/procedure NA

## 2022-02-03 ENCOUNTER — TELEMEDICINE (OUTPATIENT)
Dept: GASTROENTEROLOGY | Facility: MEDICAL CENTER | Age: 47
End: 2022-02-03
Payer: MEDICARE

## 2022-02-03 DIAGNOSIS — Z12.11 COLON CANCER SCREENING: ICD-10-CM

## 2022-02-03 DIAGNOSIS — K27.9 PEPTIC ULCER: Primary | ICD-10-CM

## 2022-02-03 PROCEDURE — 99214 OFFICE O/P EST MOD 30 MIN: CPT | Performed by: INTERNAL MEDICINE

## 2022-02-03 NOTE — PROGRESS NOTES
Virtual Regular Visit    Verification of patient location:    Patient is located in the following state in which I hold an active license [place]      Assessment/Plan:  Arminda Granado is a 48 y o  female with history of Oc en Y, on chronic anticoagulation, who presented with bleeding gastric ulcer in the setting of NSAID use  She is doing well 9 months after her EGD without signs of bleeding  She is due for CRC screening  We will schedule EGD and colonoscopy at this time  I discussed the risks of bleeding, infection, and perforation associated with endoscopic procedures  She will skip Lovenox the night before her procedure  She will do a 2-day prep  She should remain on PPI and avoid NSAIDs  Problem List Items Addressed This Visit     None      Visit Diagnoses     Peptic ulcer    -  Primary    Colon cancer screening                   Reason for visit is   Chief Complaint   Patient presents with    Virtual Regular Visit        Encounter provider Juan Guzman MD    Provider located at [address] [id number]      Recent Visits  No visits were found meeting these conditions  Showing recent visits within past 7 days and meeting all other requirements  Today's Visits  Date Type Provider Dept   02/03/22 Telemedicine Juan Guzman MD [facility]   Showing today's visits and meeting all other requirements  Future Appointments  No visits were found meeting these conditions  Showing future appointments within next 150 days and meeting all other requirements       The patient was identified by name and date of birth  Arminda Grandao was informed that this is a telemedicine visit and that the visit is being conducted through [address] [place] [address] Now and patient was informed that this is a secure, HIPAA-compliant platform  She agrees to proceed     My office door was closed  No one else was in the room    She acknowledged consent and understanding of privacy and security of the video platform  The patient has agreed to participate and understands they can discontinue the visit at any time  Patient is aware this is a billable service  HPI  Laura Royal is a 48 y o  female with history of Oc en Y gastric bypass and PE/DVT on chronic anticoagulation  I met her in May 2021 when she presented with upper GI bleeding in the setting of NSAID use and anticoagulation  Her EGD showed a gastric ulcer with clot, and this was treated  Since 5/2021, she has not had further bleeding  Hgb has improved and she has received IV iron infusions  She no longer takes NSAIDs and remains on pantoprazole  No abdominal pain  She also had gynecologic surgery for a benign adnexal mass  She was supposed to have repeat EGD with me in July to ensure healing of the gastric ulcer  She was also supposed to have colonoscopy at the same time because her last colonoscopy in 2020 had suboptimal prep  However, these procedures were cancelled and she would like to reschedule them now             Past Medical History:   Diagnosis Date    Anemia     Anxiety     Balance disorder     Bipolar depression (HCC)     bipolar II, suicide    Depression     Disease of thyroid gland     GERD (gastroesophageal reflux disease)     Heart murmur     History of DVT (deep vein thrombosis)     History of gastric bypass     2010    Lymphedema     bles- chronic    Migraines     Pelvic mass     Pulmonary embolism (HCC)     Rash     under abd fold    Seizures (HCC)     Use of cane as ambulatory aid     UTI (urinary tract infection)     7/24/21       Past Surgical History:   Procedure Laterality Date    APPENDECTOMY      Open    CHOLECYSTECTOMY      Laparoscopic    CYSTOSCOPY N/A 8/13/2021    Procedure: CYSTOSCOPY;  Surgeon: Emir Skinner MD;  [facility];  Service: Gynecology Oncology    GASTRIC BYPASS      was 89 date of surgery  IR DVT THROMBOLYSIS/THROMBECTOMY ILIAC/IVC WITH VENOGRAM  8/4/2020    IR IVC FILTER REMOVAL  6/8/2021    IR TPA LYSIS CHECK  8/5/2020    IVC FILTER INSERTION  2017    WA LAP,DIAGNOSTIC ABDOMEN N/A 8/13/2021    Procedure: LAPAROSCOPY DIAGNOSTIC;  Surgeon: Joes Daigle MD;  [facility];  Service: Gynecology Oncology    WA LAP,FULGURATE/EXCISE LESIONS N/A 8/13/2021    Procedure: ROBOTIC RESECTION OF PELVIC MASS/ RIGHT SALPINGO-OOPHORECTOMY, LEFT SALPINGECTOMY;  Surgeon: Jose Daigle MD;  [facility];  Service: Gynecology Oncology    STOMACH SURGERY      for morbid obesity    TUBAL LIGATION Bilateral 2010       Current Outpatient Medications   Medication Sig Dispense Refill    atoMOXetine (STRATTERA) 60 mg capsule TAKE 1 CAPSULE (60 MG) BY ORAL ROUTE ONCE DAILY IN THE MORNING      Cholecalciferol (Vitamin D3) 50 MCG (2000 UT) capsule Take 2,000 Units by mouth daily      ciclopirox (LOPROX) 0 77 % SUSP APPLY ONCE A DAY TO RIGHT THUMBNAIL      cloNIDine (CATAPRES) 0 1 mg tablet Take 0 1 mg by mouth 3 (three) times a day      clotrimazole (LOTRIMIN) 1 % cream Apply topically 2 (two) times a day 30 g 1    doxycycline (PERIOSTAT) 20 MG tablet TAKE 1 TABLET BY MOUTH TWICE A DAY WITH FOOD (NOT DAIRY)      enoxaparin (LOVENOX) 40 mg/0 4 mL Inject SQ into the skin daily   30 mL 2    ferrous sulfate 325 (65 Fe) mg tablet Take 325 mg by mouth daily with breakfast      folic acid (FOLVITE) 1 mg tablet Take 1 mg by mouth daily      gabapentin (NEURONTIN) 400 mg capsule Take 400 mg by mouth 3 (three) times a day      hydrOXYzine pamoate (VISTARIL) 25 mg capsule Take 1 capsule by mouth Every 12 hours      levothyroxine 100 mcg tablet Take 1 tablet (100 mcg total) by mouth daily in the early morning 30 tablet 0    lithium carbonate (LITHOBID) 300 mg CR tablet Take 300 mg by mouth daily       Multiple Vitamin (MULTI-VITAMIN DAILY PO) Multi Vitamin   DAILY      NON FORMULARY Medical marijuana prn pain/ anxiety      pantoprazole (PROTONIX) 40 mg tablet Take 1 tablet (40 mg total) by mouth 2 (two) times a day before meals 60 tablet 2    pyridoxine (VITAMIN B6) 100 mg tablet Take 100 mg by mouth daily      rOPINIRole (REQUIP) 1 mg tablet Take 2 mg by mouth daily at bedtime       SUMAtriptan (IMITREX) 100 mg tablet Take 100 mg by mouth as needed for migraine       thiamine 100 MG tablet Daily      topiramate (TOPAMAX) 100 mg tablet Take 150 mg by mouth 2 (two) times a day       venlafaxine (EFFEXOR-XR) 150 mg 24 hr capsule Take 150 mg by mouth daily       QUEtiapine (SEROquel) 100 mg tablet Take 100 mg by mouth 3 (three) times a day 100 mg t i d  and 400 mg at bedtime (Patient not taking: Reported on 2/3/2022 )       No current facility-administered medications for this visit  Allergies   Allergen Reactions    Morphine Seizures     Pt denies       REVIEW OF SYSTEMS:    CONSTITUTIONAL: Denies any fever, chills, rigors, and weight loss  HEENT: No earache or tinnitus  Denies hearing loss or visual disturbances  CARDIOVASCULAR: No chest pain or palpitations  RESPIRATORY: Denies any cough, hemoptysis, shortness of breath or dyspnea on exertion  GASTROINTESTINAL: As noted in the History of Present Illness  GENITOURINARY: No problems with urination  Denies any hematuria or dysuria  NEUROLOGIC: No dizziness or vertigo, denies headaches  MUSCULOSKELETAL: Denies any muscle or joint pain  SKIN: Denies skin rashes or itching  ENDOCRINE: Denies excessive thirst  Denies intolerance to heat or cold  PSYCHOSOCIAL: Denies depression or anxiety  Denies any recent memory loss  PHYSICAL EXAMINATION:  Appearance and vitals taken from home devices  General Appearance:   Alert, cooperative, no distress   HEENT:  Normocephalic, atraumatic, anicteric  Neck supple, symmetrical, trachea midline  Lungs:   Equal chest rise and unlabored breathing, normal effort, no coughing  Cardiovascular:   No visualized JVD  Abdomen:   No abdominal distension  Skin:   No jaundice, rashes, or lesions  Musculoskeletal:   Normal range of motion visualized  Psych:  Normal affect and normal insight  Neuro:  Alert and appropriate  VIRTUAL VISIT [address] verbally agrees to participate in [facility]  Pt is aware that [facility] could be limited without vital signs or the ability to perform a full hands-on physical Regenia Sarah understands she or the provider may request at any time to terminate the video visit and request the patient to seek care or treatment in person

## 2022-02-22 ENCOUNTER — TELEPHONE (OUTPATIENT)
Dept: HEMATOLOGY ONCOLOGY | Facility: CLINIC | Age: 47
End: 2022-02-22

## 2022-02-22 ENCOUNTER — TELEPHONE (OUTPATIENT)
Dept: GYNECOLOGIC ONCOLOGY | Facility: CLINIC | Age: 47
End: 2022-02-22

## 2022-02-22 NOTE — TELEPHONE ENCOUNTER
Patient called and notified of appointment with Dr Cassi Mederos for 3/15/2022 @ 3:15pm at the [facility]  She is aware  to have her US done prior to that visit

## 2022-02-22 NOTE — TELEPHONE ENCOUNTER
Spoke with patient who is still concerned about having heavy bleeding monthly  Patient thought she had a hysterectomy last August   Explained she still has her uterus and cervix intact and that an EMB was done in December with benign pathology  Patient is wanting direction as to what to do related to "monthly bleeding"  She is on Lovenox and is seen in Hematology Oncology  Per Priya's direction patient to have an [address] and then follow up in the office after he US to discuss results and possible treatment options

## 2022-02-23 ENCOUNTER — TELEPHONE (OUTPATIENT)
Dept: GASTROENTEROLOGY | Facility: CLINIC | Age: 47
End: 2022-02-23

## 2022-02-23 NOTE — TELEPHONE ENCOUNTER
Patients GI provider:  Dr Haydee Root    Number to return call: [phone]    Reason for call: Pt calling to sched procedues    Scheduled procedure/appointment date if applicable: NA

## 2022-02-24 ENCOUNTER — TELEPHONE (OUTPATIENT)
Dept: GASTROENTEROLOGY | Facility: CLINIC | Age: 47
End: 2022-02-24

## 2022-02-24 NOTE — TELEPHONE ENCOUNTER
Called pt and offered multiple dates at multiple locations  Pt insisted that she had her procedure at [address] [facility] with Dr Natalia Bingham  Offered pt next available for [facility]  Colon/EGD rescheduled to 6/9 with Dr Natalia Bingham at [facility]   2 day Miralax/Dulcolax prep instructions and paper work mailed to pt

## 2022-02-24 NOTE — TELEPHONE ENCOUNTER
Our mutual patient is scheduled for procedure: EGD/Colonoscopy    On: 6/9/2022     With: Dr Renée Del Rosario    She is taking the following blood thinner:   Lovenox       Can this be stopped ______ days prior to the procedure?       Physician Approving clearance: ________________________

## 2022-03-03 ENCOUNTER — HOSPITAL ENCOUNTER (OUTPATIENT)
Dept: ULTRASOUND IMAGING | Facility: MEDICAL CENTER | Age: 47
Discharge: HOME | End: 2022-03-03
Payer: MEDICARE

## 2022-03-03 DIAGNOSIS — N95.0 PMB (POSTMENOPAUSAL BLEEDING): ICD-10-CM

## 2022-03-03 PROCEDURE — 76856 US EXAM PELVIC COMPLETE: CPT

## 2022-03-03 PROCEDURE — 76830 TRANSVAGINAL US NON-OB: CPT

## 2022-03-15 ENCOUNTER — OFFICE VISIT (OUTPATIENT)
Dept: GYNECOLOGIC ONCOLOGY | Facility: CLINIC | Age: 47
End: 2022-03-15
Payer: MEDICARE

## 2022-03-15 VITALS
WEIGHT: 205 LBS | OXYGEN SATURATION: 96 % | DIASTOLIC BLOOD PRESSURE: 70 MMHG | RESPIRATION RATE: 16 BRPM | BODY MASS INDEX: 36.32 KG/M2 | HEIGHT: 63 IN | SYSTOLIC BLOOD PRESSURE: 130 MMHG | TEMPERATURE: 97.1 F | HEART RATE: 69 BPM

## 2022-03-15 DIAGNOSIS — K63.9 ABNORMALITY OF COLON: ICD-10-CM

## 2022-03-15 DIAGNOSIS — I82.220 IVC THROMBOSIS (HCC): ICD-10-CM

## 2022-03-15 DIAGNOSIS — N93.9 ABNORMAL UTERINE BLEEDING (AUB): Primary | ICD-10-CM

## 2022-03-15 DIAGNOSIS — R79.9 ABNORMAL FINDING OF BLOOD CHEMISTRY, UNSPECIFIED: ICD-10-CM

## 2022-03-15 PROBLEM — A49.9 UTI (URINARY TRACT INFECTION), BACTERIAL: Status: RESOLVED | Noted: 2021-07-24 | Resolved: 2022-03-15

## 2022-03-15 PROBLEM — N39.0 UTI (URINARY TRACT INFECTION), BACTERIAL: Status: RESOLVED | Noted: 2021-07-24 | Resolved: 2022-03-15

## 2022-03-15 PROBLEM — K63.2 COLONIC FISTULA: Status: RESOLVED | Noted: 2021-08-31 | Resolved: 2022-03-15

## 2022-03-15 PROBLEM — N39.0 UTI (URINARY TRACT INFECTION): Status: RESOLVED | Noted: 2021-07-24 | Resolved: 2022-03-15

## 2022-03-15 PROCEDURE — 99215 OFFICE O/P EST HI 40 MIN: CPT | Performed by: OBSTETRICS & GYNECOLOGY

## 2022-03-15 RX ORDER — GABAPENTIN 100 MG/1
100 CAPSULE ORAL ONCE
Status: CANCELLED | OUTPATIENT
Start: 2022-06-23 | End: 2022-03-15

## 2022-03-15 RX ORDER — CELECOXIB 200 MG/1
200 CAPSULE ORAL ONCE
Status: CANCELLED | OUTPATIENT
Start: 2022-06-23 | End: 2022-03-15

## 2022-03-15 RX ORDER — ENOXAPARIN SODIUM 100 MG/ML
40 INJECTION SUBCUTANEOUS
Status: CANCELLED | OUTPATIENT
Start: 2022-06-23 | End: 2022-06-24

## 2022-03-15 RX ORDER — CEFAZOLIN SODIUM 2 G/50ML
2000 SOLUTION INTRAVENOUS ONCE
Status: CANCELLED | OUTPATIENT
Start: 2022-06-23 | End: 2022-03-15

## 2022-03-15 RX ORDER — ACETAMINOPHEN 325 MG/1
975 TABLET ORAL ONCE
Status: CANCELLED | OUTPATIENT
Start: 2022-06-23 | End: 2022-03-15

## 2022-03-15 NOTE — ASSESSMENT & PLAN NOTE
Patient with menometrorrhagia  Endometrial biopsy negative for atypia or malignancy  After discussing potential management options she wants to proceed with definitive surgical intervention  Have counseled her and she consented to proceed with robotic hysterectomy and all other indicated procedures  She is status post unilateral salpingo-oophorectomy and contralateral salpingectomy  We plan to maintain her remaining ovary if it appears normal     Preoperative testing as per procedure pass  Patient with hypercoagulability on prophylactic daily Lovenox  Will continue perioperatively  No indication for further medical or cardiac clearance  The patient was counseled regarding indications, risks, benefits and alternatives to surgical management  We discussed risks including but not limited to bleeding and potential need for blood transfusions, infection, pain, injury to surrounding organs such as bladder, intestines, ureters and neurovascular structures  Patient understands potential risks associated with stress of surgery and general anesthesia including but not limited to acute cardiac events, venous thromboembolism, etc   All questions answered to patient's satisfaction  Patient agrees and wants to proceed

## 2022-03-15 NOTE — PATIENT INSTRUCTIONS
Surgical instructions as per operative packet  Do not use Lovenox in the morning of surgery    Will administer you this medication in the hospital

## 2022-03-15 NOTE — PROGRESS NOTES
Assessment/Plan:    Problem List Items Addressed This Visit        Digestive    Abnormality of colon     During prior surgery performed by me in August of 2021 her cecum was noted to be densely adherent to the anterior abdominal wall with evidence of chronic and recurrent infections of the anterior abdominal wall/cellulitis  There was concern for fistula  All the symptoms have resolved spontaneously  Patient is scheduled for upper endoscopy and colonoscopy in early June 2022  Will perform upcoming gynecologic surgery only after colonoscopy/upper endoscopy  Completed  I will follow up on results preoperatively  Cardiovascular and Mediastinum    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HCC)     Remote history of IVC thrombosis  Patient is now on Lovenox prophylactic dose 40 mg daily  She will continue perioperatively  On day of surgery we will be the once administer in her Lovenox prior to her procedure  Genitourinary    Abnormal uterine bleeding (AUB) - Primary     Patient with menometrorrhagia  Endometrial biopsy negative for atypia or malignancy  After discussing potential management options she wants to proceed with definitive surgical intervention  Have counseled her and she consented to proceed with robotic hysterectomy and all other indicated procedures  She is status post unilateral salpingo-oophorectomy and contralateral salpingectomy  We plan to maintain her remaining ovary if it appears normal     Preoperative testing as per procedure pass  Patient with hypercoagulability on prophylactic daily Lovenox  Will continue perioperatively  No indication for further medical or cardiac clearance  The patient was counseled regarding indications, risks, benefits and alternatives to surgical management    We discussed risks including but not limited to bleeding and potential need for blood transfusions, infection, pain, injury to surrounding organs such as bladder, intestines, ureters and neurovascular structures  Patient understands potential risks associated with stress of surgery and general anesthesia including but not limited to acute cardiac events, venous thromboembolism, etc   All questions answered to patient's satisfaction  Patient agrees and wants to proceed  Relevant Orders    Case request operating room: HYSTERECTOMY LAPAROSCOPIC TOTAL ([address]) W/ ROBOTICS, AND ALL OTHER INDICATED PROCEDURES (Completed)    Comprehensive metabolic panel    HEMOGLOBIN A1C W/ EAG ESTIMATION    Type and screen    CBC and Platelet      Other Visit Diagnoses     Abnormal finding of blood chemistry, unspecified         Relevant Orders    HEMOGLOBIN A1C W/ EAG ESTIMATION              CHIEF COMPLAINT:   Abnormal uterine bleeding, desires definitive surgical treatment  Patient ID: Jesse Ta is a 48 y o  female  HPI  64-year-old perimenopausal known to me from prior robotic excision of large pelvic mass  She has multiple comorbidities including mood disorder, hypercoagulability on prophylactic daily Lovenox, gastroesophageal reflux disease, prior gastric bypass surgery with malabsorption, migraine headaches  In August of 2021 I performed robotic resection of large cystic pelvic mass which demonstrated benign findings  She also had contralateral salpingectomy  She has uterus and 1 remaining ovary  She now has abnormal uterine bleeding and desires definitive treatment  She has been counseled about potential medical and less invasive interventions and declines  Endometrial biopsy obtained at last visit demonstrated no evidence of atypia or malignancy  She is hypercoagulable with prior history of IVC thrombosis/filter  After evaluation by Hematology sees now on daily prophylactic Lovenox doses  Review of Systems  As per HPI  Twelve point review of systems otherwise noncontributory    Current Outpatient Medications   Medication Sig Dispense Refill    atoMOXetine (STRATTERA) 60 mg capsule TAKE 1 CAPSULE (60 MG) BY ORAL ROUTE ONCE DAILY IN THE MORNING      Cholecalciferol (Vitamin D3) 50 MCG (2000 UT) capsule Take 2,000 Units by mouth daily      ciclopirox (LOPROX) 0 77 % SUSP APPLY ONCE A DAY TO RIGHT THUMBNAIL      cloNIDine (CATAPRES) 0 1 mg tablet Take 0 1 mg by mouth 3 (three) times a day      clotrimazole (LOTRIMIN) 1 % cream Apply topically 2 (two) times a day 30 g 1    doxycycline (PERIOSTAT) 20 MG tablet TAKE 1 TABLET BY MOUTH TWICE A DAY WITH FOOD (NOT DAIRY)      enoxaparin (LOVENOX) 40 mg/0 4 mL Inject SQ into the skin daily   30 mL 2    ferrous sulfate 325 (65 Fe) mg tablet Take 325 mg by mouth daily with breakfast      folic acid (FOLVITE) 1 mg tablet Take 1 mg by mouth daily      gabapentin (NEURONTIN) 400 mg capsule Take 400 mg by mouth 3 (three) times a day      hydrOXYzine pamoate (VISTARIL) 25 mg capsule Take 1 capsule by mouth Every 12 hours      levothyroxine 100 mcg tablet Take 1 tablet (100 mcg total) by mouth daily in the early morning 30 tablet 0    lithium carbonate (LITHOBID) 300 mg CR tablet Take 300 mg by mouth daily       Multiple Vitamin (MULTI-VITAMIN DAILY PO) Multi Vitamin   DAILY      NON FORMULARY Medical marijuana prn pain/ anxiety      pantoprazole (PROTONIX) 40 mg tablet Take 1 tablet (40 mg total) by mouth 2 (two) times a day before meals 60 tablet 2    pyridoxine (VITAMIN B6) 100 mg tablet Take 100 mg by mouth daily      QUEtiapine (SEROquel) 100 mg tablet Take 100 mg by mouth 3 (three) times a day 100 mg t i d  and 400 mg at bedtime (Patient not taking: Reported on 2/3/2022 )      rOPINIRole (REQUIP) 1 mg tablet Take 2 mg by mouth daily at bedtime       SUMAtriptan (IMITREX) 100 mg tablet Take 100 mg by mouth as needed for migraine       thiamine 100 MG tablet Daily      topiramate (TOPAMAX) 100 mg tablet Take 150 mg by mouth 2 (two) times a day       venlafaxine (EFFEXOR-XR) 150 mg 24 hr capsule Take 150 mg by mouth daily        No current facility-administered medications for this visit         Allergies   Allergen Reactions    Morphine Seizures     Pt denies       Past Medical History:   Diagnosis Date    Anemia     Anxiety     Balance disorder     Bipolar depression (HCC)     bipolar II, suicide    Depression     Disease of thyroid gland     GERD (gastroesophageal reflux disease)     Heart murmur     History of DVT (deep vein thrombosis)     History of gastric bypass         Lymphedema     bles- chronic    Migraines     Pelvic mass     Pulmonary embolism (HCC)     Rash     under abd fold    Seizures (HCC)     Use of cane as ambulatory aid     UTI (urinary tract infection)     21       Past Surgical History:   Procedure Laterality Date    APPENDECTOMY      Open    CHOLECYSTECTOMY      Laparoscopic    CYSTOSCOPY N/A 2021    Procedure: CYSTOSCOPY;  Surgeon: bA Mcguire MD;  [facility];  Service: Gynecology Oncology    GASTRIC BYPASS      was 205 date of surgery    IR DVT THROMBOLYSIS/THROMBECTOMY ILIAC/IVC WITH VENOGRAM  2020    IR IVC FILTER REMOVAL  2021    IR TPA LYSIS CHECK  2020    IVC FILTER INSERTION      CO LAP,DIAGNOSTIC ABDOMEN N/A 2021    Procedure: LAPAROSCOPY DIAGNOSTIC;  Surgeon: Ab Mcguire MD;  [facility];  Service: Gynecology Oncology    CO LAP,FULGURATE/EXCISE LESIONS N/A 2021    Procedure: ROBOTIC RESECTION OF PELVIC MASS/ RIGHT SALPINGO-OOPHORECTOMY, LEFT SALPINGECTOMY;  Surgeon: Ab Mcguire MD;  [facility];  Service: Gynecology Oncology    STOMACH SURGERY      for morbid obesity    TUBAL LIGATION Bilateral        OB History        2    Para   2    Term   2            AB        Living           SAB        IAB        Ectopic        Multiple        Live Births                     Family History   Problem Relation Age of Onset    Other Mother         headache    Hypertension Mother    Jeet Macchristina Depression Mother     Arthritis Father     Other Father         H, pylori infection    Other Sister         esophageal reflux    Migraines Sister     No Known Problems Paternal Grandfather     Diabetes Paternal Aunt         Mellitus    Breast cancer Paternal Aunt     Diabetes Paternal Uncle         Mellitus    Liver cancer Paternal Uncle     Asthma Daughter     No Known Problems Maternal Grandmother     No Known Problems Maternal Grandfather     No Known Problems Paternal Grandmother     No Known Problems Brother     No Known Problems Sister     No Known Problems Sister     Asthma Daughter     No Known Problems Maternal Aunt        The following portions of the patient's history were reviewed and updated as appropriate: allergies, current medications, past family history, past medical history, past social history, past surgical history and problem list       Objective:    Blood pressure 130/70, pulse 69, temperature (!) 97 1 °F (36 2 °C), temperature source Temporal, resp  rate 16, height 5' 3" (1 6 m), weight 93 kg (205 lb), last menstrual period 10/04/2020, SpO2 96 %, not currently breastfeeding  Body mass index is 36 31 kg/m²  Physical Exam  Vitals reviewed  Exam conducted with a chaperone present  Constitutional:       General: She is not in acute distress  Appearance: Normal appearance  She is obese  Eyes:      General: No scleral icterus  Right eye: No discharge  Left eye: No discharge  Conjunctiva/sclera: Conjunctivae normal    Cardiovascular:      Rate and Rhythm: Normal rate and regular rhythm  Heart sounds: Normal heart sounds  No murmur heard  Pulmonary:      Effort: Pulmonary effort is normal  No respiratory distress  Breath sounds: Normal breath sounds  No wheezing  Abdominal:      General: There is no distension  Palpations: Abdomen is soft  There is no mass  Tenderness: There is no abdominal tenderness  There is no guarding  Hernia: No hernia is present  Comments: Prior robotic incisions well-healed  Genitourinary:     Comments: Pelvic exam today deferred  Please see last office visit  Musculoskeletal:      Right lower leg: No edema  Left lower leg: No edema  Neurological:      Mental Status: She is alert           Tristin Saucedo MD, [facility] 132  3/15/2022  4:12 PM

## 2022-03-15 NOTE — ASSESSMENT & PLAN NOTE
Remote history of IVC thrombosis  Patient is now on Lovenox prophylactic dose 40 mg daily  She will continue perioperatively  On day of surgery we will be the once administer in her Lovenox prior to her procedure

## 2022-03-15 NOTE — ASSESSMENT & PLAN NOTE
During prior surgery performed by me in August of 2021 her cecum was noted to be densely adherent to the anterior abdominal wall with evidence of chronic and recurrent infections of the anterior abdominal wall/cellulitis  There was concern for fistula  All the symptoms have resolved spontaneously  Patient is scheduled for upper endoscopy and colonoscopy in early June 2022  Will perform upcoming gynecologic surgery only after colonoscopy/upper endoscopy  Completed  I will follow up on results preoperatively

## 2022-04-04 ENCOUNTER — TELEPHONE (OUTPATIENT)
Dept: HEMATOLOGY ONCOLOGY | Facility: CLINIC | Age: 47
End: 2022-04-04

## 2022-04-04 NOTE — TELEPHONE ENCOUNTER
CALL RETURN FORM   Reason for patient call? [facility] office calling regarding a bill prior authorization for an appointment 1/18 9AM with Ms Felecia Camacho   Patient's primary oncologist? Ms Felecia Camacho   Name of person the patient was calling for? Ms Felecia Camacho   Any additional information to add, if applicable? Please call [phone]   Informed patient that the message will be forwarded to the team and someone will get back to them as soon as possible    Did you relay this information to the patient?  Yes

## 2022-04-19 ENCOUNTER — HOSPITAL ENCOUNTER (EMERGENCY)
Facility: HOSPITAL | Age: 47
Discharge: HOME | End: 2022-04-20
Attending: EMERGENCY MEDICINE | Admitting: EMERGENCY MEDICINE
Payer: MEDICARE

## 2022-04-19 ENCOUNTER — APPOINTMENT (EMERGENCY)
Dept: RADIOLOGY | Facility: HOSPITAL | Age: 47
End: 2022-04-19
Payer: MEDICARE

## 2022-04-19 VITALS
DIASTOLIC BLOOD PRESSURE: 59 MMHG | OXYGEN SATURATION: 97 % | HEART RATE: 65 BPM | RESPIRATION RATE: 20 BRPM | TEMPERATURE: 98.7 F | SYSTOLIC BLOOD PRESSURE: 98 MMHG

## 2022-04-19 DIAGNOSIS — S82.832A CLOSED FRACTURE OF DISTAL END OF LEFT FIBULA, UNSPECIFIED FRACTURE MORPHOLOGY, INITIAL ENCOUNTER: Primary | ICD-10-CM

## 2022-04-19 DIAGNOSIS — S92.352A CLOSED DISPLACED FRACTURE OF FIFTH METATARSAL BONE OF LEFT FOOT, INITIAL ENCOUNTER: ICD-10-CM

## 2022-04-19 PROCEDURE — 99284 EMERGENCY DEPT VISIT MOD MDM: CPT | Performed by: EMERGENCY MEDICINE

## 2022-04-19 PROCEDURE — 73610 X-RAY EXAM OF ANKLE: CPT

## 2022-04-19 PROCEDURE — 99284 EMERGENCY DEPT VISIT MOD MDM: CPT

## 2022-04-19 PROCEDURE — 73630 X-RAY EXAM OF FOOT: CPT

## 2022-04-19 PROCEDURE — 73590 X-RAY EXAM OF LOWER LEG: CPT

## 2022-04-19 PROCEDURE — 96374 THER/PROPH/DIAG INJ IV PUSH: CPT

## 2022-04-19 RX ORDER — KETOROLAC TROMETHAMINE 30 MG/ML
30 INJECTION, SOLUTION INTRAMUSCULAR; INTRAVENOUS ONCE
Status: COMPLETED | OUTPATIENT
Start: 2022-04-19 | End: 2022-04-19

## 2022-04-19 RX ORDER — OXYCODONE HYDROCHLORIDE AND ACETAMINOPHEN 5; 325 MG/1; MG/1
1 TABLET ORAL EVERY 6 HOURS PRN
Qty: 20 TABLET | Refills: 0 | Status: SHIPPED | OUTPATIENT
Start: 2022-04-19

## 2022-04-19 RX ORDER — FENTANYL CITRATE 50 UG/ML
1 INJECTION, SOLUTION INTRAMUSCULAR; INTRAVENOUS ONCE
Status: COMPLETED | OUTPATIENT
Start: 2022-04-19 | End: 2022-04-19

## 2022-04-19 RX ADMIN — KETOROLAC TROMETHAMINE 30 MG: 30 INJECTION, SOLUTION INTRAMUSCULAR at 22:35

## 2022-04-19 NOTE — Clinical Note
Jeanette Love was seen and treated in our emergency department on 4/19/2022  No sports until cleared by Family Doctor/Orthopedics        Diagnosis:     Estefania Laguna    She may return on this date: If you have any questions or concerns, please don't hesitate to call        Esteban Bush RN    ______________________________           _______________          _______________  Hospital Representative                              Date                                Time

## 2022-04-20 NOTE — ED PROVIDER NOTES
History  Chief Complaint   Patient presents with    Ankle Injury     Pt reports she stood up from couch and felt a "pop" on left ankle  Left ankle swelling and deformity noted  Patient is a 53-year-old female  She started from the couch prior to arrival when she twisted her left ankle  She is complaining of pain swelling to the lateral aspect of the ankle  It radiates up the leg  She arrives by EMS for evaluation  Denies other injuries  No associated motor or sensory complaints          Prior to Admission Medications   Prescriptions Last Dose Informant Patient Reported? Taking? Cholecalciferol (Vitamin D3) 50 MCG (2000 UT) capsule  Self Yes No   Sig: Take 2,000 Units by mouth daily   Multiple Vitamin (MULTI-VITAMIN DAILY PO)  Self Yes No   Sig: Multi Vitamin   DAILY   NON FORMULARY  Self Yes No   Sig: Medical marijuana prn pain/ anxiety   QUEtiapine (SEROquel) 100 mg tablet  Self Yes No   Sig: Take 100 mg by mouth 3 (three) times a day 100 mg t i d  and 400 mg at bedtime   Patient not taking: Reported on 2/3/2022    SUMAtriptan (IMITREX) 100 mg tablet  Self Yes No   Sig: Take 100 mg by mouth as needed for migraine    atoMOXetine (STRATTERA) 60 mg capsule  Self Yes No   Sig: TAKE 1 CAPSULE (60 MG) BY ORAL ROUTE ONCE DAILY IN THE MORNING   ciclopirox (LOPROX) 0 77 % SUSP  Self Yes No   Sig: APPLY ONCE A DAY TO RIGHT THUMBNAIL   cloNIDine (CATAPRES) 0 1 mg tablet  Self Yes No   Sig: Take 0 1 mg by mouth 3 (three) times a day   clotrimazole (LOTRIMIN) 1 % cream  Self No No   Sig: Apply topically 2 (two) times a day   doxycycline (PERIOSTAT) 20 MG tablet  Self Yes No   Sig: TAKE 1 TABLET BY MOUTH TWICE A DAY WITH FOOD (NOT DAIRY)   enoxaparin (LOVENOX) 40 mg/0 4 mL   No No   Sig: Inject SQ into the skin daily     ferrous sulfate 325 (65 Fe) mg tablet  Self Yes No   Sig: Take 325 mg by mouth daily with breakfast   folic acid (FOLVITE) 1 mg tablet  Self Yes No   Sig: Take 1 mg by mouth daily   gabapentin (NEURONTIN) 400 mg capsule  Self Yes No   Sig: Take 400 mg by mouth 3 (three) times a day   hydrOXYzine pamoate (VISTARIL) 25 mg capsule  Self Yes No   Sig: Take 1 capsule by mouth Every 12 hours   levothyroxine 100 mcg tablet  Self No No   Sig: Take 1 tablet (100 mcg total) by mouth daily in the early morning   lithium carbonate (LITHOBID) 300 mg CR tablet  Self Yes No   Sig: Take 300 mg by mouth daily    pantoprazole (PROTONIX) 40 mg tablet  Self No No   Sig: Take 1 tablet (40 mg total) by mouth 2 (two) times a day before meals   pyridoxine (VITAMIN B6) 100 mg tablet  Self Yes No   Sig: Take 100 mg by mouth daily   rOPINIRole (REQUIP) 1 mg tablet  Self Yes No   Sig: Take 2 mg by mouth daily at bedtime    thiamine 100 MG tablet  Self Yes No   Sig: Daily   topiramate (TOPAMAX) 100 mg tablet  Self Yes No   Sig: Take 150 mg by mouth 2 (two) times a day    venlafaxine (EFFEXOR-XR) 150 mg 24 hr capsule  Self Yes No   Sig: Take 150 mg by mouth daily       Facility-Administered Medications: None       Past Medical History:   Diagnosis Date    Anemia     Anxiety     Balance disorder     Bipolar depression (HCC)     bipolar II, suicide    Depression     Disease of thyroid gland     GERD (gastroesophageal reflux disease)     Heart murmur     History of DVT (deep vein thrombosis)     History of gastric bypass     2010    Lymphedema     bles- chronic    Migraines     Pelvic mass     Pulmonary embolism (HCC)     Rash     under abd fold    Seizures (HCC)     Use of cane as ambulatory aid     UTI (urinary tract infection)     7/24/21       Past Surgical History:   Procedure Laterality Date    APPENDECTOMY      Open    CHOLECYSTECTOMY      Laparoscopic    CYSTOSCOPY N/A 8/13/2021    Procedure: CYSTOSCOPY;  Surgeon: Conrado Zapien MD;  [facility];  Service: Gynecology Oncology    GASTRIC BYPASS      was 205 date of surgery    IR DVT THROMBOLYSIS/THROMBECTOMY ILIAC/IVC WITH VENOGRAM  8/4/2020    IR IVC FILTER REMOVAL  6/8/2021    IR TPA LYSIS CHECK  8/5/2020    IVC FILTER INSERTION  2017    GA LAP,DIAGNOSTIC ABDOMEN N/A 8/13/2021    Procedure: LAPAROSCOPY DIAGNOSTIC;  Surgeon: Wilman Jensen MD;  [facility];  Service: Gynecology Oncology    GA LAP,FULGURATE/EXCISE LESIONS N/A 8/13/2021    Procedure: ROBOTIC RESECTION OF PELVIC MASS/ RIGHT SALPINGO-OOPHORECTOMY, LEFT SALPINGECTOMY;  Surgeon: Wilman Jensen MD;  [facility];  Service: Gynecology Oncology    STOMACH SURGERY      for morbid obesity    TUBAL LIGATION Bilateral 2010       Family History   Problem Relation Age of Onset    Other Mother         headache    Hypertension Mother     Depression Mother     Arthritis Father     Other Father         H, pylori infection    Other Sister         esophageal reflux    Migraines Sister     No Known Problems Paternal Grandfather     Diabetes Paternal Aunt         Mellitus    Breast cancer Paternal Aunt     Diabetes Paternal Uncle         Mellitus    Liver cancer Paternal Uncle     Asthma Daughter     No Known Problems Maternal Grandmother     No Known Problems Maternal Grandfather     No Known Problems Paternal Grandmother     No Known Problems Brother     No Known Problems Sister     No Known Problems Sister     Asthma Daughter     No Known Problems Maternal Aunt      I have reviewed and agree with the history as documented  E-Cigarette/Vaping    E-Cigarette Use Current Every Day User      E-Cigarette/Vaping Substances    THC Yes      Social History     Tobacco Use    Smoking status: Never Smoker    Smokeless tobacco: Never Used    Tobacco comment: former quit in 1999 per [employer]   Vaping Use    Vaping Use: Every day    Substances: THC   Substance Use Topics    Alcohol use: Yes     Comment: ocassional    Drug use: Yes     Types: Marijuana     Comment: medical marijuana        Review of Systems   Skin: Negative for pallor and wound  Neurological: Negative for weakness and numbness  All other systems reviewed and are negative  Physical Exam  Physical Exam  Vitals reviewed  Constitutional:       General: She is not in acute distress  Appearance: She is obese  HENT:      Head: Normocephalic and atraumatic  Nose: Nose normal       Mouth/Throat:      Mouth: Mucous membranes are moist    Eyes:      General:         Right eye: No discharge  Left eye: No discharge  Conjunctiva/sclera: Conjunctivae normal    Cardiovascular:      Rate and Rhythm: Normal rate  Pulses: Normal pulses  Pulmonary:      Effort: Pulmonary effort is normal  No respiratory distress  Musculoskeletal:         General: Swelling and tenderness present  No deformity  Cervical back: Normal range of motion and neck supple  Comments: There is tenderness to the lateral malleolus  There is swelling there also  There is some tenderness to the 5th metatarsal   There is also some tenderness more proximal to the fibula  Neurovascular exam is normal   Paty Bihari test is negative  Skin:     General: Skin is warm and dry  Findings: No rash  Neurological:      General: No focal deficit present  Mental Status: She is alert and oriented to person, place, and time     Psychiatric:         Mood and Affect: Mood normal          Behavior: Behavior normal          Vital Signs  ED Triage Vitals [04/19/22 2212]   Temperature Pulse Respirations Blood Pressure SpO2   98 7 °F (37 1 °C) 65 20 98/59 97 %      Temp Source Heart Rate Source Patient Position - Orthostatic VS BP Location FiO2 (%)   Oral Monitor -- Right arm --      Pain Score       7           Vitals:    04/19/22 2212   BP: 98/59   Pulse: 65         Visual Acuity      ED Medications  Medications   fentanyl citrate (PF) (FOR EMS ONLY) 100 mcg/2 mL injection 100 mcg (0 mcg Does not apply Given to EMS 4/19/22 2214)   ketorolac (TORADOL) injection 30 mg (30 mg Intravenous Given 4/19/22 4592)       Diagnostic Studies  Results Reviewed     None                 XR ankle 3+ views LEFT   ED Interpretation by Valentin Ricks MD (04/19 2305)   Nondisplaced fracture of the distal tibia  No dislocation  Mortise maintained  XR tibia fibula 2 views LEFT   ED Interpretation by Valentin Ricks MD (04/19 2305)   No fracture to the proximal fibula  No fracture to the shaft of the tibia or fibula  XR foot 3+ views LEFT   ED Interpretation by Valentin Ricks MD (04/19 2306)   Fracture to the shaft of the 5th metatarsal   No dislocation  Procedures  Procedures         ED Course                                             MDM  Number of Diagnoses or Management Options  Diagnosis management comments: Patient was splinted by nursing/ancillary ED staff  Neurovascularly intact after splinting  Appropriate for discharge and outpatient management  Amount and/or Complexity of Data Reviewed  Tests in the radiology section of CPT®: ordered and reviewed  Independent visualization of images, tracings, or specimens: yes        Disposition  Final diagnoses:   Closed fracture of distal end of left fibula, unspecified fracture morphology, initial encounter   Closed displaced fracture of fifth metatarsal bone of left foot, initial encounter     Time reflects when diagnosis was documented in both MDM as applicable and the Disposition within this note     Time User Action Codes Description Comment    4/19/2022 11:40 PM Valentin Nation Add [[id number]] Closed fracture of distal end of left fibula, unspecified fracture morphology, initial encounter     4/19/2022 11:41 PM Loli Cuadra Closed displaced fracture of fifth metatarsal bone of left foot, initial encounter       ED Disposition     ED Disposition Condition Date/Time Comment    Discharge Stable Tue Apr 19, 2022 11:40 PM Sumi Serra discharge to home/self care              Follow-up Information     Follow up With Specialties Details Why Contact Info Additional Information    [facility] In 2 days  [address] [address] [address]  [address] [facility] [address] [facility] [address], [facility] [address], [place], [place], [address] [phone]          Patient's Medications   Discharge Prescriptions    OXYCODONE-ACETAMINOPHEN (PERCOCET) 5-325 MG PER TABLET    Take 1 tablet by mouth every 6 (six) hours as needed for severe pain Max Daily Amount: 4 tablets       Start Date: 4/19/2022 End Date: --       Order Dose: 1 tablet       Quantity: 20 tablet    Refills: 0       No discharge procedures on file      PDMP Review       Value Time User    PDMP Reviewed  Yes 6/18/2021  5:08 PM Jeet Calderón DO          ED Provider  Electronically Signed by           Bev Lofton MD  04/19/22 9503

## 2022-04-26 ENCOUNTER — OFFICE VISIT (OUTPATIENT)
Dept: PODIATRY | Facility: CLINIC | Age: 47
End: 2022-04-26
Payer: MEDICARE

## 2022-04-26 ENCOUNTER — HOSPITAL ENCOUNTER (OUTPATIENT)
Dept: NON INVASIVE DIAGNOSTICS | Facility: HOSPITAL | Age: 47
Discharge: HOME | End: 2022-04-26
Attending: PODIATRIST
Payer: MEDICARE

## 2022-04-26 VITALS
BODY MASS INDEX: 36.32 KG/M2 | HEART RATE: 71 BPM | WEIGHT: 205 LBS | HEIGHT: 63 IN | OXYGEN SATURATION: 96 % | SYSTOLIC BLOOD PRESSURE: 100 MMHG | DIASTOLIC BLOOD PRESSURE: 60 MMHG

## 2022-04-26 DIAGNOSIS — S82.892A ANKLE FRACTURE, LEFT, CLOSED, INITIAL ENCOUNTER: ICD-10-CM

## 2022-04-26 DIAGNOSIS — S92.355A CLOSED NONDISPLACED FRACTURE OF FIFTH METATARSAL BONE OF LEFT FOOT, INITIAL ENCOUNTER: ICD-10-CM

## 2022-04-26 DIAGNOSIS — Z91.11 NONCOMPLIANCE WITH TREATMENT PLAN: Primary | ICD-10-CM

## 2022-04-26 DIAGNOSIS — R60.1 GENERALIZED EDEMA: ICD-10-CM

## 2022-04-26 PROCEDURE — 99204 OFFICE O/P NEW MOD 45 MIN: CPT | Performed by: PODIATRIST

## 2022-04-26 PROCEDURE — 93971 EXTREMITY STUDY: CPT | Performed by: SURGERY

## 2022-04-26 PROCEDURE — 93971 EXTREMITY STUDY: CPT

## 2022-04-26 NOTE — PROGRESS NOTES
PATIENT:  Hilario Bae  1975       ASSESSMENT:     1  Noncompliance with treatment plan     2  Closed nondisplaced fracture of fifth metatarsal bone of left foot, initial encounter     3  Ankle fracture, left, closed, initial encounter               PLAN:  1  Patient was counseled and educated on the condition and the diagnosis  2  X-ray was personally reviewed  The radiological findings were discussed with the patient  3  The exam and symptoms are consistent with left ankle fracture and left 5th metatarsal fracture  The diagnosis, treatment options and prognosis were discussed with the patient  4   Instructed supportive care, home exercise, icing, and proper footwear/ arch support  Discussed possible surgery depending on the progress  5  Patient will return in 1 weeks for re-evaluation  - venous duplex ordered due to significant swelling  -patient presented already being noncompliant with treatment, she had initial appointment this morning which she missed, and then showed up late to her 2nd scheduled appointment for today  She presented walking with 1 crutch and on her splint  Was told to be initially nonweightbearing, she is obviously complaining of increased pain  -the posterior splint applied today utilizing cast padding 4 and 6 in [employer] and fiberglass          Subjective:       HPI  The patient presents with chief complaint of left ankle fracture and also left 5th met fracture  The symptoms presents around all the time  Pain level is about 5 out of 10  The symptoms have been worse since the onset  The patient has more pain in the morning and after sitting for a while  Pain also increases with walking and standing  The patient does not recall any injury, but reported some overuse  The patient tried OTC meds, and different shoes without relieving the pain  Denied any swelling  No associated numbness or paresthesia  No significant weakness           The following portions of the patient's history were reviewed and updated as appropriate: allergies, current medications, past family history, past medical history, past social history, past surgical history and problem list   All pertinent labs and images were reviewed        Past Medical History  Past Medical History:   Diagnosis Date    Anemia     Anxiety     Balance disorder     Bipolar depression (HCC)     bipolar II, suicide    Depression     Disease of thyroid gland     GERD (gastroesophageal reflux disease)     Heart murmur     History of DVT (deep vein thrombosis)     History of gastric bypass     2010    Lymphedema     bles- chronic    Migraines     Pelvic mass     Pulmonary embolism (HCC)     Rash     under abd fold    Seizures (HCC)     Use of cane as ambulatory aid     UTI (urinary tract infection)     7/24/21       Past Surgical History  Past Surgical History:   Procedure Laterality Date    APPENDECTOMY      Open    CHOLECYSTECTOMY      Laparoscopic    CYSTOSCOPY N/A 8/13/2021    Procedure: CYSTOSCOPY;  Surgeon: Jaspreet Trotter MD;  Location: [facility];  Service: Gynecology Oncology    GASTRIC BYPASS      was 205 date of surgery    IR DVT THROMBOLYSIS/THROMBECTOMY ILIAC/IVC WITH VENOGRAM  8/4/2020    IR IVC FILTER REMOVAL  6/8/2021    IR TPA LYSIS CHECK  8/5/2020    IVC FILTER INSERTION  2017    CO LAP,DIAGNOSTIC ABDOMEN N/A 8/13/2021    Procedure: LAPAROSCOPY DIAGNOSTIC;  Surgeon: Jaspreet Trotter MD;  [facility];  Service: Gynecology Oncology    CO LAP,FULGURATE/EXCISE LESIONS N/A 8/13/2021    Procedure: ROBOTIC RESECTION OF PELVIC MASS/ RIGHT SALPINGO-OOPHORECTOMY, LEFT SALPINGECTOMY;  Surgeon: Jaspreet Trotter MD;  Location[facility];  Service: Gynecology Oncology    STOMACH SURGERY      for morbid obesity    TUBAL LIGATION Bilateral 2010        Allergies:  Morphine    Medications:  Current Outpatient Medications   Medication Sig Dispense Refill    atoMOXetine (STRATTERA) 60 mg capsule TAKE 1 CAPSULE (60 MG) BY ORAL ROUTE ONCE DAILY IN THE MORNING      Cholecalciferol (Vitamin D3) 50 MCG (2000 UT) capsule Take 2,000 Units by mouth daily      ciclopirox (LOPROX) 0 77 % SUSP APPLY ONCE A DAY TO RIGHT THUMBNAIL      cloNIDine (CATAPRES) 0 1 mg tablet Take 0 1 mg by mouth 3 (three) times a day      clotrimazole (LOTRIMIN) 1 % cream Apply topically 2 (two) times a day 30 g 1    doxycycline (PERIOSTAT) 20 MG tablet TAKE 1 TABLET BY MOUTH TWICE A DAY WITH FOOD (NOT DAIRY)      enoxaparin (LOVENOX) 40 mg/0 4 mL Inject SQ into the skin daily   30 mL 2    ferrous sulfate 325 (65 Fe) mg tablet Take 325 mg by mouth daily with breakfast      folic acid (FOLVITE) 1 mg tablet Take 1 mg by mouth daily      gabapentin (NEURONTIN) 400 mg capsule Take 400 mg by mouth 3 (three) times a day      hydrOXYzine pamoate (VISTARIL) 25 mg capsule Take 1 capsule by mouth Every 12 hours      levothyroxine 100 mcg tablet Take 1 tablet (100 mcg total) by mouth daily in the early morning 30 tablet 0    lithium carbonate (LITHOBID) 300 mg CR tablet Take 300 mg by mouth daily       Multiple Vitamin (MULTI-VITAMIN DAILY PO) Multi Vitamin   DAILY      NON FORMULARY Medical marijuana prn pain/ anxiety      oxyCODONE-acetaminophen (Percocet) 5-325 mg per tablet Take 1 tablet by mouth every 6 (six) hours as needed for severe pain Max Daily Amount: 4 tablets 20 tablet 0    pantoprazole (PROTONIX) 40 mg tablet Take 1 tablet (40 mg total) by mouth 2 (two) times a day before meals 60 tablet 2    pyridoxine (VITAMIN B6) 100 mg tablet Take 100 mg by mouth daily      rOPINIRole (REQUIP) 1 mg tablet Take 2 mg by mouth daily at bedtime       SUMAtriptan (IMITREX) 100 mg tablet Take 100 mg by mouth as needed for migraine       thiamine 100 MG tablet Daily      topiramate (TOPAMAX) 100 mg tablet Take 150 mg by mouth 2 (two) times a day       venlafaxine (EFFEXOR-XR) 150 mg 24 hr capsule Take 150 mg by mouth daily       QUEtiapine (SEROquel) 100 mg tablet Take 100 mg by mouth 3 (three) times a day 100 mg t i d  and 400 mg at bedtime (Patient not taking: Reported on 2/3/2022 )       No current facility-administered medications for this visit  Social History:  Social History     Socioeconomic History    Marital status: Single     Spouse name: None    Number of children: None    Years of education: None    Highest education level: None   Occupational History    Occupation:      Comment: [employer]   Tobacco Use    Smoking status: Never Smoker    Smokeless tobacco: Never Used    Tobacco comment: former quit in 1999 per [employer]   Vaping Use    Vaping Use: Every day    Substances: THC   Substance and Sexual Activity    Alcohol use: Yes     Comment: ocassional    Drug use: Yes     Types: Marijuana     Comment: medical marijuana     Sexual activity: Not Currently   Other Topics Concern    None   Social History Narrative    Sleep 6 in 24 hours    Caffeine use; 2 cps coffee per day    Uses seatbelts             Social Determinants of Health     Financial Resource Strain: Not on file   Food Insecurity: Not on file   Transportation Needs: Not on file   Physical Activity: Not on file   Stress: Not on file   Social Connections: Not on file   Intimate Partner Violence: Not on file   Housing Stability: Not on file          Review of Systems   Constitutional: Negative for chills and fever  HENT: Negative for ear pain and sore throat  Eyes: Negative for pain and visual disturbance  Respiratory: Negative for cough and shortness of breath  Cardiovascular: Negative for chest pain and palpitations  Gastrointestinal: Negative for abdominal pain and vomiting  Genitourinary: Negative for dysuria and hematuria  Musculoskeletal: Negative for arthralgias and back pain  Skin: Negative for color change and rash  Neurological: Negative for seizures and syncope     All other systems reviewed and are negative  Objective:      /60 (BP Location: Left arm, Patient Position: Sitting, Cuff Size: Adult)   Pulse 71   Ht 5' 3" (1 6 m)   Wt 93 kg (205 lb)   SpO2 96%   BMI 36 31 kg/m²          Physical Exam  Vitals reviewed  Constitutional:       Appearance: Normal appearance  She is obese  HENT:      Head: Normocephalic and atraumatic  Nose: Nose normal    Eyes:      Pupils: Pupils are equal, round, and reactive to light  Cardiovascular:      Pulses: Normal pulses  Pulmonary:      Effort: Pulmonary effort is normal    Musculoskeletal:         General: Swelling, tenderness and signs of injury present  Cervical back: Normal range of motion  Left lower leg: Edema present  Comments: There is significant erythema and swelling to the left lower extremity, there is significant pain with palpation about the distal aspect of the left fibula and also the left 5th metatarsal   Skin:     Capillary Refill: Capillary refill takes less than 2 seconds  Neurological:      General: No focal deficit present  Mental Status: She is alert and oriented to person, place, and time     Psychiatric:         Mood and Affect: Mood normal

## 2022-04-26 NOTE — PATIENT INSTRUCTIONS
Ankle Fracture   WHAT YOU NEED TO KNOW:   An ankle fracture is a break in 1 or more of the bones in your ankle  DISCHARGE INSTRUCTIONS:   Call your local emergency number (911 in the [address] [place] [address],[address]) for any of the following:   · You feel lightheaded, short of breath, and have chest pain  · You cough up blood  Return to the emergency department if:   · Your leg feels warm, tender, and painful  It may look swollen and red  · Blood soaks through your bandage  · You have severe pain in your ankle  Your cast feels too tight  Foot Fracture in Adults   WHAT YOU NEED TO KNOW:   A foot fracture is a break in a bone in your foot  DISCHARGE INSTRUCTIONS:   Call your local emergency number (911 in the [address] [facility],[address]) if:   · You suddenly feel lightheaded and short of breath  · You have chest pain when you take a deep breath or cough  · You cough up blood  Return to the emergency department if:   · The pain in your injured foot gets worse even after you rest and take pain medicine  · The skin or toes of your foot become numb, swollen, cold, white, or blue  · You have more pain or swelling than you did before a cast was put on  · Your leg feels warm, tender, and painful  It may look swollen and red  Call your doctor if:   · You have a fever  · You have new sores around your boot, cast, or splint  · You have new or worsening trouble moving your foot  · You notice a foul smell coming from under your cast     · Your boot, cast, or splint gets damaged  · You have questions or concerns about your condition or care  Medicines: You may need any of the following:  · Antibiotics: This medicine is given to help treat or prevent an infection caused by bacteria  · NSAIDs , such as ibuprofen, help decrease swelling, pain, and fever  NSAIDs can cause stomach bleeding or kidney problems in certain people   If you take blood thinner medicine, always ask your healthcare provider if NSAIDs are safe for you  Always read the medicine label and follow directions  · Prescription pain medicine  may be given  Ask your healthcare provider how to take this medicine safely  Some prescription pain medicines contain acetaminophen  Do not take other medicines that contain acetaminophen without talking to your healthcare provider  Too much acetaminophen may cause liver damage  Prescription pain medicine may cause constipation  Ask your healthcare provider how to prevent or treat constipation  · Take your medicine as directed  Contact your healthcare provider if you think your medicine is not helping or if you have side effects  Tell him or her if you are allergic to any medicine  Keep a list of the medicines, vitamins, and herbs you take  Include the amounts, and when and why you take them  Bring the list or the pill bottles to follow-up visits  Carry your medicine list with you in case of an emergency  Self-care:   · Rest  your foot and avoid activities that cause pain  · Apply ice  to decrease swelling and pain, and to prevent tissue damage  Use an ice pack, or put crushed ice in a plastic bag  Cover it with a towel before you apply it  Use ice for 15 to 20 minutes every hour or as directed  · Elevate your foot  above the level of your heart as often as you can  This will help decrease swelling and pain  Prop your foot on pillows or blankets to keep it elevated comfortably  · Physical therapy  may be needed when your foot has healed  A physical therapist can teach you exercises to help improve movement and strength, and to decrease pain  Cast or splint care:   · Ask when it is okay to take a bath or shower  Do not let your cast or splint get wet  Before you bathe, cover the cast or splint with a plastic bag  Tape the bag to your skin above the splint to seal out water  Keep your foot out of the water in case the bag leaks      · Check the skin around your splint daily for any redness or open areas     · Do not use a sharp or pointed object to scratch your skin under the splint  · Do not remove your splint unless your healthcare provider or orthopedic surgeon says it is okay  Assistive devices: You may be given a hard-soled shoe to wear while your foot is healing  You also may need to use crutches to help you walk while your foot heals  It is important to use your crutches correctly  Ask for more information about how to use crutches  Follow up with your doctor or bone specialist as directed: You may need to return to have your splint or stitches removed  You may also need to return for tests to make sure your foot is healing  Write down your questions so you remember to ask them during your visits  © Copyright [employer] 2022 Information is for End User's use only and may not be sold, redistributed or otherwise used for commercial purposes  All illustrations and images included in CareNotes® are the copyrighted property of A [employer] Inc  or [facility] [employer]   The above information is an  only  It is not intended as medical advice for individual conditions or treatments  Talk to your doctor, nurse or pharmacist before following any medical regimen to see if it is safe and effective for you  ·   · Your foot or toes are cold or numb  · Your foot or toenails turn blue or gray  Call your doctor if:   · Your splint feels too tight  · Your swelling has increased or returned  · You have a fever  · Your pain does not go away, even after treatment  · You have questions or concerns about your condition or care  Medicines: You may need any of the following:  · Acetaminophen  decreases pain and fever  It is available without a doctor's order  Ask how much to take and how often to take it  Follow directions   Read the labels of all other medicines you are using to see if they also contain acetaminophen, or ask your doctor or pharmacist  Acetaminophen can cause liver damage if not taken correctly  Do not use more than 4 grams (4,000 milligrams) total of acetaminophen in one day  · NSAIDs , such as ibuprofen, help decrease swelling, pain, and fever  This medicine is available with or without a doctor's order  NSAIDs can cause stomach bleeding or kidney problems in certain people  If you take blood thinner medicine, always ask your healthcare provider if NSAIDs are safe for you  Always read the medicine label and follow directions  · Prescription pain medicine  may be given  Ask your healthcare provider how to take this medicine safely  Some prescription pain medicines contain acetaminophen  Do not take other medicines that contain acetaminophen without talking to your healthcare provider  Too much acetaminophen may cause liver damage  Prescription pain medicine may cause constipation  Ask your healthcare provider how to prevent or treat constipation  · Take your medicine as directed  Contact your healthcare provider if you think your medicine is not helping or if you have side effects  Tell him or her if you are allergic to any medicine  Keep a list of the medicines, vitamins, and herbs you take  Include the amounts, and when and why you take them  Bring the list or the pill bottles to follow-up visits  Carry your medicine list with you in case of an emergency  Self-care:       · Rest  your ankle so that it can heal  Return to normal activities as directed  · Apply ice  on your ankle for 15 to 20 minutes every hour or as directed  Use an ice pack, or put crushed ice in a plastic bag  Cover it with a towel  Ice helps prevent tissue damage and decreases swelling and pain  · Use a support device,  such as a brace, cast, or splint to limit your movement and protect your ankle  You may need to use crutches to protect your ankle and decrease your pain as you move around  Do not remove your device and do not put weight on your injured ankle      · Elevate  your ankle above the level of your heart as often as you can  This will help decrease swelling and pain  Prop your ankle on pillows or blankets to keep it elevated comfortably  Splint and cast care:  Cover the splint or cast before you bathe so it does not get wet  Tape 2 plastic trash bags to your skin above the cast  Try to keep your ankle out of the water as much as possible  Follow up with your doctor in 1 to 2 days, or as directed: Your fracture may need to be reduced (bones pushed back into place) or you may need surgery  Write down your questions so you remember to ask them during your visits  © Copyright [employer] 2022 Information is for End User's use only and may not be sold, redistributed or otherwise used for commercial purposes  All illustrations and images included in CareNotes® are the copyrighted property of [employer]  or Bridgett Allen  The above information is an  only  It is not intended as medical advice for individual conditions or treatments  Talk to your doctor, nurse or pharmacist before following any medical regimen to see if it is safe and effective for you

## 2022-05-02 ENCOUNTER — OFFICE VISIT (OUTPATIENT)
Dept: PODIATRY | Facility: CLINIC | Age: 47
End: 2022-05-02
Payer: MEDICARE

## 2022-05-02 ENCOUNTER — APPOINTMENT (OUTPATIENT)
Dept: RADIOLOGY | Facility: CLINIC | Age: 47
End: 2022-05-02
Payer: MEDICARE

## 2022-05-02 VITALS
WEIGHT: 205 LBS | DIASTOLIC BLOOD PRESSURE: 60 MMHG | HEIGHT: 63 IN | SYSTOLIC BLOOD PRESSURE: 100 MMHG | BODY MASS INDEX: 36.32 KG/M2

## 2022-05-02 DIAGNOSIS — S82.892A ANKLE FRACTURE, LEFT, CLOSED, INITIAL ENCOUNTER: ICD-10-CM

## 2022-05-02 DIAGNOSIS — S92.355A CLOSED NONDISPLACED FRACTURE OF FIFTH METATARSAL BONE OF LEFT FOOT, INITIAL ENCOUNTER: ICD-10-CM

## 2022-05-02 DIAGNOSIS — Z91.11 NONCOMPLIANCE WITH TREATMENT PLAN: ICD-10-CM

## 2022-05-02 DIAGNOSIS — S92.355A CLOSED NONDISPLACED FRACTURE OF FIFTH METATARSAL BONE OF LEFT FOOT, INITIAL ENCOUNTER: Primary | ICD-10-CM

## 2022-05-02 PROCEDURE — 73610 X-RAY EXAM OF ANKLE: CPT

## 2022-05-02 PROCEDURE — 99213 OFFICE O/P EST LOW 20 MIN: CPT | Performed by: PODIATRIST

## 2022-05-02 NOTE — PROGRESS NOTES
Assessment/Plan:    No problem-specific Assessment & Plan notes found for this encounter  Diagnoses and all orders for this visit:    Closed nondisplaced fracture of fifth metatarsal bone of left foot, initial encounter  -     X-ray foot left 3+ views; Future  -     X-ray ankle left 3+ views; Future    Ankle fracture, left, closed, initial encounter  -     X-ray foot left 3+ views; Future  -     X-ray ankle left 3+ views; Future    Noncompliance with treatment plan      -x-rays were taken reviewed of the left ankle, and the fracture of the metatarsal is visible and also the distal tip of the lateral malleolus, they have not significantly intervally displaced   -she has continued to walk on this fracture against medical advice, I have taken a photo of her walking in with foot on the ground with using both crutches  This is an improvement from 1 cut to 2 crutches, but this is still not the definition of nonweightbearing  -I encouraged her again to be compliant with treatment and she is too swollen the cast, and we applied a AO splint to the left foot  -she is to return in 2 weeks for continued assessment because she is swollen, and with her neuropathy on unable to span her out significantly for interval healing     Subjective:      Patient ID: Hunter Rey is a 48 y o  female  Patient presents for evaluation of her left ankle fracture and also left 5th metatarsal fracture she is wearing her splint, and ambulating as she wants with her splint on and using 2 crutches to offload her area  She notes continued pain, she is having significant swelling  No new issues      The following portions of the patient's history were reviewed and updated as appropriate: allergies, current medications, past family history, past medical history, past social history, past surgical history and problem list     Review of Systems   Constitutional: Negative for chills and fever  HENT: Negative for ear pain and sore throat  Eyes: Negative for pain and visual disturbance  Respiratory: Negative for cough and shortness of breath  Cardiovascular: Negative for chest pain and palpitations  Gastrointestinal: Negative for abdominal pain and vomiting  Genitourinary: Negative for dysuria and hematuria  Musculoskeletal: Negative for arthralgias and back pain  Skin: Negative for color change and rash  Neurological: Negative for seizures and syncope  All other systems reviewed and are negative  Objective:      /60 (BP Location: Left arm, Patient Position: Sitting, Cuff Size: Adult)   Ht 5' 3" (1 6 m)   Wt 93 kg (205 lb)   BMI 36 31 kg/m²          Physical Exam  Vitals reviewed  Constitutional:       Appearance: Normal appearance  She is obese  HENT:      Head: Normocephalic and atraumatic  Right Ear: Tympanic membrane normal       Nose: Nose normal    Eyes:      Pupils: Pupils are equal, round, and reactive to light  Cardiovascular:      Pulses: Normal pulses  Pulmonary:      Effort: Pulmonary effort is normal    Musculoskeletal:         General: Swelling, tenderness and signs of injury present  Left lower leg: Edema present  Comments: There is significant ecchymosis and pain about the left ankle there is significant swelling overlying the fibula and also the 5th metatarsal   There is no open lesions but pain to palpation of this area is   Skin:     General: Skin is warm  Capillary Refill: Capillary refill takes 2 to 3 seconds  Neurological:      Mental Status: She is alert

## 2022-05-04 ENCOUNTER — TELEPHONE (OUTPATIENT)
Dept: PODIATRY | Facility: CLINIC | Age: 47
End: 2022-05-04

## 2022-05-17 ENCOUNTER — OFFICE VISIT (OUTPATIENT)
Dept: PODIATRY | Facility: CLINIC | Age: 47
End: 2022-05-17
Payer: MEDICARE

## 2022-05-17 ENCOUNTER — APPOINTMENT (OUTPATIENT)
Dept: RADIOLOGY | Facility: CLINIC | Age: 47
End: 2022-05-17
Payer: MEDICARE

## 2022-05-17 VITALS — HEIGHT: 63 IN | BODY MASS INDEX: 36.32 KG/M2 | WEIGHT: 205 LBS

## 2022-05-17 DIAGNOSIS — S82.892A ANKLE FRACTURE, LEFT, CLOSED, INITIAL ENCOUNTER: Primary | ICD-10-CM

## 2022-05-17 DIAGNOSIS — S92.355A CLOSED NONDISPLACED FRACTURE OF FIFTH METATARSAL BONE OF LEFT FOOT, INITIAL ENCOUNTER: ICD-10-CM

## 2022-05-17 DIAGNOSIS — S82.892A ANKLE FRACTURE, LEFT, CLOSED, INITIAL ENCOUNTER: ICD-10-CM

## 2022-05-17 PROCEDURE — 73610 X-RAY EXAM OF ANKLE: CPT

## 2022-05-17 PROCEDURE — 99214 OFFICE O/P EST MOD 30 MIN: CPT | Performed by: PODIATRIST

## 2022-05-17 PROCEDURE — 73630 X-RAY EXAM OF FOOT: CPT

## 2022-05-17 NOTE — PROGRESS NOTES
Assessment/Plan:    No problem-specific Assessment & Plan notes found for this encounter  Diagnoses and all orders for this visit:    Ankle fracture, left, closed, initial encounter  -     X-ray foot left 3+ views; Future  -     X-ray ankle left 3+ views; Future    Closed nondisplaced fracture of fifth metatarsal bone of left foot, initial encounter      -she is now being compliant, her leg has drastically decreased in size, is now normal in appearance  She has mild pain wound palpation about the left 5th metatarsal and also at the distal aspect of her left fibular malleolus  -x-rays three views nonweightbearing were taken of her left foot and also left ankle, and show a distal tip fracture of the left lateral malleolus and also a dancer's fracture of left 5th metatarsal with some mild displacement, this is not worsened since her initial issues  -she thankfully is now being more less compliant with nonweightbearing and is using a wheelchair for ambulation, her foot has decreased in size by about 3 times compared to her initial visit  -she is to return in 4 weeks for repeat x-rays and hopeful transition to normal shoes  Subjective:      Patient ID: Roberta Severs is a 48 y o  female  Patient presents for follow-up of her left foot and also left ankle fracture, states that she is being nonweightbearing as much as possible she does have to put her foot down occasionally for stability and also when her pressures are upsetting her armpit      The following portions of the patient's history were reviewed and updated as appropriate: allergies, current medications, past family history, past medical history, past social history, past surgical history and problem list     Review of Systems   Constitutional: Negative for chills and fever  HENT: Negative for ear pain and sore throat  Eyes: Negative for pain and visual disturbance  Respiratory: Negative for cough and shortness of breath      Cardiovascular: Negative for chest pain and palpitations  Gastrointestinal: Negative for abdominal pain and vomiting  Genitourinary: Negative for dysuria and hematuria  Musculoskeletal: Negative for arthralgias and back pain  Skin: Negative for color change and rash  Neurological: Negative for seizures and syncope  All other systems reviewed and are negative  Objective:      Ht 5' 3" (1 6 m)   Wt 93 kg (205 lb)   BMI 36 31 kg/m²          Physical Exam  Vitals reviewed  Constitutional:       Appearance: Normal appearance  She is obese  HENT:      Head: Normocephalic and atraumatic  Nose: Nose normal       Mouth/Throat:      Mouth: Mucous membranes are moist    Eyes:      Pupils: Pupils are equal, round, and reactive to light  Pulmonary:      Effort: Pulmonary effort is normal    Musculoskeletal:      Comments: There is tenderness to palpation along the left distal tip of the fibula and also along the left 5th metatarsal shaft, the edema is significantly improved and there is slight this pigmentation but no open lesions   Skin:     General: Skin is warm  Capillary Refill: Capillary refill takes less than 2 seconds  Neurological:      General: No focal deficit present  Mental Status: She is alert and oriented to person, place, and time

## 2022-05-20 ENCOUNTER — TELEPHONE (OUTPATIENT)
Dept: OBGYN CLINIC | Facility: HOSPITAL | Age: 47
End: 2022-05-20

## 2022-05-20 NOTE — TELEPHONE ENCOUNTER
Patient sees Dr Yenny Sears  Patient is calling in stating that she is in a lot of pain than usual to her left ankle  Now in the inside of her left ankle it is all bruised and black and blue along with having a lump  She is asking if the Dr wants her to have an xray done or what further she should do relating this  Please advise            Call back# [phone]

## 2022-05-24 NOTE — TELEPHONE ENCOUNTER
Called pt to move her appt up, she said she wanted to keep it the 13th because it's not bothering her too much right now  Pt said she will call and come in sooner if it gets any worse

## 2022-06-01 ENCOUNTER — TRANSCRIBE ORDERS (OUTPATIENT)
Dept: ADMINISTRATIVE | Facility: HOSPITAL | Age: 47
End: 2022-06-01

## 2022-06-01 ENCOUNTER — LAB (OUTPATIENT)
Dept: LAB | Facility: HOSPITAL | Age: 47
End: 2022-06-01
Payer: MEDICARE

## 2022-06-01 DIAGNOSIS — Z79.899 ENCOUNTER FOR LONG-TERM (CURRENT) USE OF OTHER MEDICATIONS: Primary | ICD-10-CM

## 2022-06-01 DIAGNOSIS — Z79.899 ENCOUNTER FOR LONG-TERM (CURRENT) USE OF OTHER MEDICATIONS: ICD-10-CM

## 2022-06-01 DIAGNOSIS — N93.9 ABNORMAL UTERINE BLEEDING (AUB): ICD-10-CM

## 2022-06-01 DIAGNOSIS — R79.9 ABNORMAL FINDING OF BLOOD CHEMISTRY, UNSPECIFIED: ICD-10-CM

## 2022-06-01 LAB
25(OH)D3 SERPL-MCNC: 25.2 NG/ML (ref 30–100)
ABO GROUP BLD: NORMAL
ALBUMIN SERPL BCP-MCNC: 3 G/DL (ref 3.5–5)
ALP SERPL-CCNC: 115 U/L (ref 46–116)
ALT SERPL W P-5'-P-CCNC: 29 U/L (ref 12–78)
ANION GAP SERPL CALCULATED.3IONS-SCNC: 8 MMOL/L (ref 4–13)
AST SERPL W P-5'-P-CCNC: 28 U/L (ref 5–45)
BASOPHILS # BLD AUTO: 0.1 THOUSANDS/ΜL (ref 0–0.1)
BASOPHILS NFR BLD AUTO: 1 % (ref 0–1)
BILIRUB SERPL-MCNC: 0.34 MG/DL (ref 0.2–1)
BLD GP AB SCN SERPL QL: NEGATIVE
BUN SERPL-MCNC: 10 MG/DL (ref 5–25)
CALCIUM ALBUM COR SERPL-MCNC: 9.8 MG/DL (ref 8.3–10.1)
CALCIUM SERPL-MCNC: 9 MG/DL (ref 8.3–10.1)
CHLORIDE SERPL-SCNC: 106 MMOL/L (ref 100–108)
CHOLEST SERPL-MCNC: 226 MG/DL
CO2 SERPL-SCNC: 25 MMOL/L (ref 21–32)
CREAT SERPL-MCNC: 0.71 MG/DL (ref 0.6–1.3)
EOSINOPHIL # BLD AUTO: 0.25 THOUSAND/ΜL (ref 0–0.61)
EOSINOPHIL NFR BLD AUTO: 3 % (ref 0–6)
ERYTHROCYTE [DISTWIDTH] IN BLOOD BY AUTOMATED COUNT: 17 % (ref 11.6–15.1)
EST. AVERAGE GLUCOSE BLD GHB EST-MCNC: 97 MG/DL
GFR SERPL CREATININE-BSD FRML MDRD: 101 ML/MIN/1.73SQ M
GLUCOSE P FAST SERPL-MCNC: 97 MG/DL (ref 65–99)
HBA1C MFR BLD: 5 %
HCT VFR BLD AUTO: 38.5 % (ref 34.8–46.1)
HDLC SERPL-MCNC: 87 MG/DL
HGB BLD-MCNC: 12 G/DL (ref 11.5–15.4)
IMM GRANULOCYTES # BLD AUTO: 0.05 THOUSAND/UL (ref 0–0.2)
IMM GRANULOCYTES NFR BLD AUTO: 1 % (ref 0–2)
LDLC SERPL CALC-MCNC: 117 MG/DL (ref 0–100)
LITHIUM SERPL-SCNC: 0.6 MMOL/L (ref 0.5–1)
LYMPHOCYTES # BLD AUTO: 1.41 THOUSANDS/ΜL (ref 0.6–4.47)
LYMPHOCYTES NFR BLD AUTO: 17 % (ref 14–44)
MCH RBC QN AUTO: 30.2 PG (ref 26.8–34.3)
MCHC RBC AUTO-ENTMCNC: 31.2 G/DL (ref 31.4–37.4)
MCV RBC AUTO: 97 FL (ref 82–98)
MONOCYTES # BLD AUTO: 0.56 THOUSAND/ΜL (ref 0.17–1.22)
MONOCYTES NFR BLD AUTO: 7 % (ref 4–12)
NEUTROPHILS # BLD AUTO: 5.81 THOUSANDS/ΜL (ref 1.85–7.62)
NEUTS SEG NFR BLD AUTO: 71 % (ref 43–75)
NONHDLC SERPL-MCNC: 139 MG/DL
NRBC BLD AUTO-RTO: 0 /100 WBCS
PLATELET # BLD AUTO: 292 THOUSANDS/UL (ref 149–390)
PMV BLD AUTO: 11.7 FL (ref 8.9–12.7)
POTASSIUM SERPL-SCNC: 4 MMOL/L (ref 3.5–5.3)
PROT SERPL-MCNC: 7.9 G/DL (ref 6.4–8.2)
RBC # BLD AUTO: 3.98 MILLION/UL (ref 3.81–5.12)
RH BLD: POSITIVE
SODIUM SERPL-SCNC: 139 MMOL/L (ref 136–145)
SPECIMEN EXPIRATION DATE: NORMAL
T4 FREE SERPL-MCNC: 0.74 NG/DL (ref 0.76–1.46)
TRIGL SERPL-MCNC: 112 MG/DL
TSH SERPL DL<=0.05 MIU/L-ACNC: 5.49 UIU/ML (ref 0.45–4.5)
WBC # BLD AUTO: 8.18 THOUSAND/UL (ref 4.31–10.16)

## 2022-06-01 PROCEDURE — 86900 BLOOD TYPING SEROLOGIC ABO: CPT

## 2022-06-01 PROCEDURE — 80053 COMPREHEN METABOLIC PANEL: CPT

## 2022-06-01 PROCEDURE — 84439 ASSAY OF FREE THYROXINE: CPT

## 2022-06-01 PROCEDURE — 80178 ASSAY OF LITHIUM: CPT

## 2022-06-01 PROCEDURE — 82306 VITAMIN D 25 HYDROXY: CPT

## 2022-06-01 PROCEDURE — 84443 ASSAY THYROID STIM HORMONE: CPT

## 2022-06-01 PROCEDURE — 80307 DRUG TEST PRSMV CHEM ANLYZR: CPT | Performed by: PSYCHIATRY & NEUROLOGY

## 2022-06-01 PROCEDURE — 86901 BLOOD TYPING SEROLOGIC RH(D): CPT

## 2022-06-01 PROCEDURE — 86850 RBC ANTIBODY SCREEN: CPT

## 2022-06-01 PROCEDURE — 85025 COMPLETE CBC W/AUTO DIFF WBC: CPT

## 2022-06-01 PROCEDURE — 80061 LIPID PANEL: CPT

## 2022-06-01 PROCEDURE — 83036 HEMOGLOBIN GLYCOSYLATED A1C: CPT

## 2022-06-01 PROCEDURE — 36415 COLL VENOUS BLD VENIPUNCTURE: CPT

## 2022-06-08 ENCOUNTER — TELEPHONE (OUTPATIENT)
Dept: GASTROENTEROLOGY | Facility: CLINIC | Age: 47
End: 2022-06-08

## 2022-06-08 ENCOUNTER — TELEPHONE (OUTPATIENT)
Dept: GYNECOLOGIC ONCOLOGY | Facility: CLINIC | Age: 47
End: 2022-06-08

## 2022-06-08 LAB
AMPHETAMINES UR QL SCN: NEGATIVE NG/ML
BARBITURATES UR QL SCN: NEGATIVE NG/ML
BENZODIAZ UR QL: NEGATIVE NG/ML
BZE UR QL: NEGATIVE NG/ML
CANNABINOIDS UR QL SCN: POSITIVE
METHADONE UR QL SCN: NEGATIVE NG/ML
OPIATES UR QL: NEGATIVE NG/ML
PCP UR QL: NEGATIVE NG/ML
PROPOXYPH UR QL SCN: NEGATIVE NG/ML

## 2022-06-08 NOTE — TELEPHONE ENCOUNTER
Patients GI provider:  Dr Metz Arrow     Number to return call: [phone]    Reason for call: Pt called to canceled procedure      Scheduled procedure/appointment date if applicable: procedure 35/31/3664

## 2022-06-08 NOTE — TELEPHONE ENCOUNTER
Patient called in and canceled her procedure for a colonoscopy and EGD  Patient wants to know what to do and requests that she skip prep  She is having surgery done with dr orourke on the 23rd that pertains to the colonoscopy that was scheduled

## 2022-06-13 ENCOUNTER — TELEPHONE (OUTPATIENT)
Dept: GYNECOLOGIC ONCOLOGY | Facility: CLINIC | Age: 47
End: 2022-06-13

## 2022-06-13 NOTE — TELEPHONE ENCOUNTER
Spoke with Alize Sheth colonoscopy prep made her very sick  She does not want to cancel procedure with Dr Jayro Alexandra  Spoke with Dr Jayro Alexandra, says she can still stay on for surgery that is scheduled for 06/23 but wants it noted that she will not do the colonoscopy

## 2022-06-17 NOTE — PRE-PROCEDURE INSTRUCTIONS
Pre-Surgery Instructions:   Medication Instructions    atoMOXetine (STRATTERA) 60 mg capsule continue as prescribed excluding DOS    Cholecalciferol (Vitamin D3) 50 MCG (2000 UT) capsule continue as prescribed excluding DOS    cloNIDine (CATAPRES) 0 1 mg tablet Continue to take as prescribed including DOS with a small sip of water, unless usually taken at night    doxycycline (PERIOSTAT) 20 MG tablet Continue to take as prescribed including DOS with a small sip of water, unless usually taken at night    enoxaparin (LOVENOX) 40 mg/0 4 mL continue as prescribed excluding DOS: Per surgeon- patient is to continue perioperatively   Dose ordered on admit    ferrous sulfate 325 (65 Fe) mg tablet continue as prescribed excluding DOS    folic acid (FOLVITE) 1 mg tablet continue as prescribed excluding DOS    gabapentin (NEURONTIN) 400 mg capsule Continue to take as prescribed including DOS with a small sip of water, unless usually taken at night    hydrOXYzine pamoate (VISTARIL) 25 mg capsule Continue to take as prescribed including DOS with a small sip of water, unless usually taken at night    levothyroxine 100 mcg tablet Continue to take as prescribed including DOS with a small sip of water, unless usually taken at night    lithium carbonate (LITHOBID) 300 mg CR tablet Continue to take as prescribed including DOS with a small sip of water, unless usually taken at night    Multiple Vitamin (MULTI-VITAMIN DAILY PO) Avoid 1 week prior to surgery     NON FORMULARY Hold medical marijuana for 24 hours prior to DOS    oxyCODONE-acetaminophen (Percocet) 5-325 mg per tablet Continue to take as prescribed including DOS with a small sip of water, unless usually taken at night    pantoprazole (PROTONIX) 40 mg tablet Continue to take as prescribed including DOS with a small sip of water, unless usually taken at night    pyridoxine (VITAMIN B6) 100 mg tablet continue as prescribed excluding DOS    QUEtiapine (SEROquel) 100 mg tablet Continue to take as prescribed including DOS with a small sip of water, unless usually taken at night    rOPINIRole (REQUIP) 1 mg tablet Continue to take as prescribed including DOS with a small sip of water, unless usually taken at night    SUMAtriptan (IMITREX) 100 mg tablet Continue to take as prescribed including DOS with a small sip of water, unless usually taken at night    thiamine 100 MG tablet continue as prescribed excluding DOS    topiramate (TOPAMAX) 100 mg tablet Continue to take as prescribed including DOS with a small sip of water, unless usually taken at night    venlafaxine (EFFEXOR-XR) 150 mg 24 hr capsule Continue to take as prescribed including DOS with a small sip of water, unless usually taken at night    Patient instructed to avoid ASPIRIN, OTC vitamins and NSAIDS prior to surgery  Tylenol okay PRN  Patient instructed to continue scheduled medications excluding DOS  Hold medical marijuana for 24 hours prior to DOS  Patient given NPO instructions  Patient has 3 ensures and instructions from surgeon's office  Patient given CHG bathing instruction per protocol  Patient instructed to avoid using lotions, powders, oils, etc  DOS  Patient given up to date visitor guidelines  Patient instructed to have a ride home after surgery  Patient instructed to remove jewelry and not to bring valuables DOS  Patient informed that dentures and contact lenses will have to be removed for surgery  Patient understands he or she will receive a call the afternoon before surgery regarding an arrival time  Patient verbalized understanding of all instructions

## 2022-06-20 ENCOUNTER — APPOINTMENT (OUTPATIENT)
Dept: RADIOLOGY | Facility: CLINIC | Age: 47
End: 2022-06-20
Payer: MEDICARE

## 2022-06-20 ENCOUNTER — OFFICE VISIT (OUTPATIENT)
Dept: PODIATRY | Facility: CLINIC | Age: 47
End: 2022-06-20
Payer: MEDICARE

## 2022-06-20 VITALS
BODY MASS INDEX: 36.32 KG/M2 | DIASTOLIC BLOOD PRESSURE: 68 MMHG | SYSTOLIC BLOOD PRESSURE: 108 MMHG | HEIGHT: 63 IN | WEIGHT: 205 LBS

## 2022-06-20 DIAGNOSIS — S82.892D CLOSED FRACTURE OF LEFT ANKLE WITH ROUTINE HEALING, SUBSEQUENT ENCOUNTER: ICD-10-CM

## 2022-06-20 DIAGNOSIS — S82.892D CLOSED FRACTURE OF LEFT ANKLE WITH ROUTINE HEALING, SUBSEQUENT ENCOUNTER: Primary | ICD-10-CM

## 2022-06-20 DIAGNOSIS — S92.355D CLOSED NONDISPLACED FRACTURE OF FIFTH METATARSAL BONE OF LEFT FOOT WITH ROUTINE HEALING, SUBSEQUENT ENCOUNTER: ICD-10-CM

## 2022-06-20 DIAGNOSIS — S92.355A CLOSED NONDISPLACED FRACTURE OF FIFTH METATARSAL BONE OF LEFT FOOT, INITIAL ENCOUNTER: ICD-10-CM

## 2022-06-20 PROCEDURE — 73630 X-RAY EXAM OF FOOT: CPT

## 2022-06-20 PROCEDURE — 99213 OFFICE O/P EST LOW 20 MIN: CPT | Performed by: PODIATRIST

## 2022-06-20 PROCEDURE — 73610 X-RAY EXAM OF ANKLE: CPT

## 2022-06-20 NOTE — PROGRESS NOTES
Assessment/Plan:    No problem-specific Assessment & Plan notes found for this encounter  Diagnoses and all orders for this visit:    Closed fracture of left ankle with routine healing, subsequent encounter  -     X-ray ankle left 3+ views; Future    Closed nondisplaced fracture of fifth metatarsal bone of left foot with routine healing, subsequent encounter  -     X-ray foot left 3+ views; Future      -x-rays taken and reviewed of the left foot and also left ankle, three views  Show a healing 5th metatarsal fracture with mostly unchanged alignment there is bony bridging across the fracture site  There is also a healing distal tip fibular fracture with some sclerosis about the fracture site but minimal  -she is to continue in Cam boot for the next 2 weeks, and slowly DC this, she is to return to clinic for repeat x-rays and 1 5 months if she is still having continued pain  She is to avoid high impact activities for another 4-5 weeks and to continue supportive shoes she is feeling any instability she is return to clinic to be placed in physical therapy    Subjective:      Patient ID: Roberta Severs is a 48 y o  female  Patient here to follow-up on her left 5th metatarsal and also left ankle fracture she is ambulating Cam boot without crutches  She states she has mild pain with use of Cam boot and also mild pain when she is not wearing her Cam boot  The following portions of the patient's history were reviewed and updated as appropriate: allergies, current medications, past family history, past medical history, past social history, past surgical history and problem list     Review of Systems   Constitutional: Negative for chills and fever  HENT: Negative for ear pain and sore throat  Eyes: Negative for pain and visual disturbance  Respiratory: Negative for cough and shortness of breath  Cardiovascular: Negative for chest pain and palpitations     Gastrointestinal: Negative for abdominal pain and vomiting  Genitourinary: Negative for dysuria and hematuria  Musculoskeletal: Negative for arthralgias and back pain  Skin: Negative for color change and rash  Neurological: Negative for seizures and syncope  All other systems reviewed and are negative  Objective:      /68   Ht 5' 3" (1 6 m)   Wt 93 kg (205 lb)   BMI 36 31 kg/m²          Physical Exam  Vitals reviewed  Constitutional:       Appearance: Normal appearance  She is obese  HENT:      Head: Normocephalic and atraumatic  Nose: Nose normal       Mouth/Throat:      Mouth: Mucous membranes are moist    Eyes:      Pupils: Pupils are equal, round, and reactive to light  Cardiovascular:      Pulses: Normal pulses  Pulmonary:      Effort: Pulmonary effort is normal    Musculoskeletal:         General: Swelling present  Comments: There is mild tenderness to palpation along the left 5th metatarsal shaft, there is mild continued swelling  There is no tenderness to palpation along the distal tip of the left fibula, there is significant hemosiderin deposits which were likely due to her increased swelling after the initial injury   Skin:     Capillary Refill: Capillary refill takes 2 to 3 seconds  Neurological:      General: No focal deficit present  Mental Status: She is alert and oriented to person, place, and time     Psychiatric:         Mood and Affect: Mood normal          Behavior: Behavior normal

## 2022-06-22 ENCOUNTER — ANESTHESIA EVENT (OUTPATIENT)
Dept: PERIOP | Facility: HOSPITAL | Age: 47
End: 2022-06-22
Payer: MEDICARE

## 2022-06-23 ENCOUNTER — HOSPITAL ENCOUNTER (OUTPATIENT)
Facility: HOSPITAL | Age: 47
Setting detail: OUTPATIENT SURGERY
Discharge: HOME | End: 2022-06-23
Attending: OBSTETRICS & GYNECOLOGY | Admitting: OBSTETRICS & GYNECOLOGY
Payer: MEDICARE

## 2022-06-23 ENCOUNTER — ANESTHESIA (OUTPATIENT)
Dept: PERIOP | Facility: HOSPITAL | Age: 47
End: 2022-06-23
Payer: MEDICARE

## 2022-06-23 VITALS
HEART RATE: 75 BPM | WEIGHT: 262.35 LBS | RESPIRATION RATE: 16 BRPM | OXYGEN SATURATION: 94 % | BODY MASS INDEX: 46.47 KG/M2 | DIASTOLIC BLOOD PRESSURE: 60 MMHG | SYSTOLIC BLOOD PRESSURE: 111 MMHG | TEMPERATURE: 98.1 F

## 2022-06-23 DIAGNOSIS — N93.9 ABNORMAL UTERINE BLEEDING (AUB): ICD-10-CM

## 2022-06-23 PROBLEM — Z90.710 S/P HYSTERECTOMY: Status: ACTIVE | Noted: 2022-06-23

## 2022-06-23 LAB
EXT PREGNANCY TEST URINE: NEGATIVE
EXT. CONTROL: NORMAL

## 2022-06-23 PROCEDURE — NC001 PR NO CHARGE: Performed by: OBSTETRICS & GYNECOLOGY

## 2022-06-23 PROCEDURE — 88307 TISSUE EXAM BY PATHOLOGIST: CPT | Performed by: SPECIALIST

## 2022-06-23 PROCEDURE — NC001 PR NO CHARGE: Performed by: PHYSICIAN ASSISTANT

## 2022-06-23 PROCEDURE — 58570 TLH UTERUS 250 G OR LESS: CPT | Performed by: OBSTETRICS & GYNECOLOGY

## 2022-06-23 PROCEDURE — S2900 ROBOTIC SURGICAL SYSTEM: HCPCS | Performed by: OBSTETRICS & GYNECOLOGY

## 2022-06-23 PROCEDURE — 81025 URINE PREGNANCY TEST: CPT | Performed by: OBSTETRICS & GYNECOLOGY

## 2022-06-23 RX ORDER — RISPERIDONE 1 MG/1
1 TABLET, FILM COATED ORAL DAILY
COMMUNITY

## 2022-06-23 RX ORDER — FENTANYL CITRATE 50 UG/ML
INJECTION, SOLUTION INTRAMUSCULAR; INTRAVENOUS AS NEEDED
Status: DISCONTINUED | OUTPATIENT
Start: 2022-06-23 | End: 2022-06-23

## 2022-06-23 RX ORDER — CEFAZOLIN SODIUM 2 G/50ML
2000 SOLUTION INTRAVENOUS ONCE
Status: COMPLETED | OUTPATIENT
Start: 2022-06-23 | End: 2022-06-23

## 2022-06-23 RX ORDER — LIDOCAINE HYDROCHLORIDE 10 MG/ML
INJECTION, SOLUTION EPIDURAL; INFILTRATION; INTRACAUDAL; PERINEURAL AS NEEDED
Status: DISCONTINUED | OUTPATIENT
Start: 2022-06-23 | End: 2022-06-23

## 2022-06-23 RX ORDER — KETOROLAC TROMETHAMINE 30 MG/ML
INJECTION, SOLUTION INTRAMUSCULAR; INTRAVENOUS AS NEEDED
Status: DISCONTINUED | OUTPATIENT
Start: 2022-06-23 | End: 2022-06-23

## 2022-06-23 RX ORDER — BUPIVACAINE HYDROCHLORIDE 2.5 MG/ML
INJECTION, SOLUTION EPIDURAL; INFILTRATION; INTRACAUDAL AS NEEDED
Status: DISCONTINUED | OUTPATIENT
Start: 2022-06-23 | End: 2022-06-23 | Stop reason: HOSPADM

## 2022-06-23 RX ORDER — OXYCODONE HYDROCHLORIDE 5 MG/1
5 TABLET ORAL EVERY 4 HOURS PRN
Status: DISCONTINUED | OUTPATIENT
Start: 2022-06-23 | End: 2022-06-23 | Stop reason: HOSPADM

## 2022-06-23 RX ORDER — VECURONIUM BROMIDE 1 MG/ML
INJECTION, POWDER, LYOPHILIZED, FOR SOLUTION INTRAVENOUS AS NEEDED
Status: DISCONTINUED | OUTPATIENT
Start: 2022-06-23 | End: 2022-06-23

## 2022-06-23 RX ORDER — GLYCOPYRROLATE 0.2 MG/ML
INJECTION INTRAMUSCULAR; INTRAVENOUS AS NEEDED
Status: DISCONTINUED | OUTPATIENT
Start: 2022-06-23 | End: 2022-06-23

## 2022-06-23 RX ORDER — ENOXAPARIN SODIUM 100 MG/ML
40 INJECTION SUBCUTANEOUS
Status: COMPLETED | OUTPATIENT
Start: 2022-06-23 | End: 2022-06-23

## 2022-06-23 RX ORDER — ONDANSETRON 2 MG/ML
4 INJECTION INTRAMUSCULAR; INTRAVENOUS ONCE AS NEEDED
Status: DISCONTINUED | OUTPATIENT
Start: 2022-06-23 | End: 2022-06-23 | Stop reason: HOSPADM

## 2022-06-23 RX ORDER — METRONIDAZOLE 500 MG/100ML
500 INJECTION, SOLUTION INTRAVENOUS ONCE
Status: COMPLETED | OUTPATIENT
Start: 2022-06-23 | End: 2022-06-23

## 2022-06-23 RX ORDER — CELECOXIB 200 MG/1
200 CAPSULE ORAL ONCE
Status: COMPLETED | OUTPATIENT
Start: 2022-06-23 | End: 2022-06-23

## 2022-06-23 RX ORDER — GABAPENTIN 100 MG/1
100 CAPSULE ORAL ONCE
Status: COMPLETED | OUTPATIENT
Start: 2022-06-23 | End: 2022-06-23

## 2022-06-23 RX ORDER — HYDROMORPHONE HCL/PF 1 MG/ML
0.5 SYRINGE (ML) INJECTION
Status: DISCONTINUED | OUTPATIENT
Start: 2022-06-23 | End: 2022-06-23 | Stop reason: HOSPADM

## 2022-06-23 RX ORDER — ACETAMINOPHEN 325 MG/1
975 TABLET ORAL ONCE
Status: COMPLETED | OUTPATIENT
Start: 2022-06-23 | End: 2022-06-23

## 2022-06-23 RX ORDER — ONDANSETRON 2 MG/ML
INJECTION INTRAMUSCULAR; INTRAVENOUS AS NEEDED
Status: DISCONTINUED | OUTPATIENT
Start: 2022-06-23 | End: 2022-06-23

## 2022-06-23 RX ORDER — MIDAZOLAM HYDROCHLORIDE 2 MG/2ML
INJECTION, SOLUTION INTRAMUSCULAR; INTRAVENOUS AS NEEDED
Status: DISCONTINUED | OUTPATIENT
Start: 2022-06-23 | End: 2022-06-23

## 2022-06-23 RX ORDER — DEXAMETHASONE SODIUM PHOSPHATE 10 MG/ML
INJECTION, SOLUTION INTRAMUSCULAR; INTRAVENOUS AS NEEDED
Status: DISCONTINUED | OUTPATIENT
Start: 2022-06-23 | End: 2022-06-23

## 2022-06-23 RX ORDER — PROPOFOL 10 MG/ML
INJECTION, EMULSION INTRAVENOUS AS NEEDED
Status: DISCONTINUED | OUTPATIENT
Start: 2022-06-23 | End: 2022-06-23

## 2022-06-23 RX ORDER — FENTANYL CITRATE/PF 50 MCG/ML
50 SYRINGE (ML) INJECTION
Status: DISCONTINUED | OUTPATIENT
Start: 2022-06-23 | End: 2022-06-23 | Stop reason: HOSPADM

## 2022-06-23 RX ORDER — SODIUM CHLORIDE 9 MG/ML
125 INJECTION, SOLUTION INTRAVENOUS CONTINUOUS
Status: DISCONTINUED | OUTPATIENT
Start: 2022-06-23 | End: 2022-06-23 | Stop reason: HOSPADM

## 2022-06-23 RX ORDER — MEPERIDINE HYDROCHLORIDE 25 MG/ML
12.5 INJECTION INTRAMUSCULAR; INTRAVENOUS; SUBCUTANEOUS ONCE AS NEEDED
Status: DISCONTINUED | OUTPATIENT
Start: 2022-06-23 | End: 2022-06-23 | Stop reason: HOSPADM

## 2022-06-23 RX ORDER — SODIUM CHLORIDE 9 MG/ML
INJECTION, SOLUTION INTRAVENOUS CONTINUOUS PRN
Status: DISCONTINUED | OUTPATIENT
Start: 2022-06-23 | End: 2022-06-23

## 2022-06-23 RX ORDER — NEOSTIGMINE METHYLSULFATE 1 MG/ML
INJECTION INTRAVENOUS AS NEEDED
Status: DISCONTINUED | OUTPATIENT
Start: 2022-06-23 | End: 2022-06-23

## 2022-06-23 RX ORDER — PROMETHAZINE HYDROCHLORIDE 25 MG/ML
6.25 INJECTION, SOLUTION INTRAMUSCULAR; INTRAVENOUS ONCE AS NEEDED
Status: DISCONTINUED | OUTPATIENT
Start: 2022-06-23 | End: 2022-06-23 | Stop reason: HOSPADM

## 2022-06-23 RX ADMIN — FENTANYL CITRATE 50 MCG: 50 INJECTION INTRAMUSCULAR; INTRAVENOUS at 11:42

## 2022-06-23 RX ADMIN — FENTANYL CITRATE 100 MCG: 50 INJECTION INTRAMUSCULAR; INTRAVENOUS at 10:49

## 2022-06-23 RX ADMIN — LIDOCAINE HYDROCHLORIDE 60 MG: 10 INJECTION, SOLUTION EPIDURAL; INFILTRATION; INTRACAUDAL; PERINEURAL at 10:18

## 2022-06-23 RX ADMIN — VECURONIUM BROMIDE 7 MG: 1 INJECTION, POWDER, LYOPHILIZED, FOR SOLUTION INTRAVENOUS at 10:18

## 2022-06-23 RX ADMIN — GLYCOPYRROLATE 0.4 MG: 0.2 INJECTION, SOLUTION INTRAMUSCULAR; INTRAVENOUS at 12:07

## 2022-06-23 RX ADMIN — SODIUM CHLORIDE: 0.9 INJECTION, SOLUTION INTRAVENOUS at 12:19

## 2022-06-23 RX ADMIN — ONDANSETRON 4 MG: 2 INJECTION INTRAMUSCULAR; INTRAVENOUS at 10:42

## 2022-06-23 RX ADMIN — PROPOFOL 200 MG: 10 INJECTION, EMULSION INTRAVENOUS at 10:18

## 2022-06-23 RX ADMIN — MIDAZOLAM 2 MG: 1 INJECTION INTRAMUSCULAR; INTRAVENOUS at 10:03

## 2022-06-23 RX ADMIN — METRONIDAZOLE: 500 INJECTION, SOLUTION INTRAVENOUS at 12:06

## 2022-06-23 RX ADMIN — ACETAMINOPHEN 325MG 975 MG: 325 TABLET ORAL at 08:38

## 2022-06-23 RX ADMIN — FENTANYL CITRATE 50 MCG: 50 INJECTION INTRAMUSCULAR; INTRAVENOUS at 10:18

## 2022-06-23 RX ADMIN — DEXAMETHASONE SODIUM PHOSPHATE 10 MG: 10 INJECTION INTRAMUSCULAR; INTRAVENOUS at 10:42

## 2022-06-23 RX ADMIN — GABAPENTIN 100 MG: 100 CAPSULE ORAL at 08:38

## 2022-06-23 RX ADMIN — ONDANSETRON 4 MG: 2 INJECTION INTRAMUSCULAR; INTRAVENOUS at 12:07

## 2022-06-23 RX ADMIN — HYDROMORPHONE HYDROCHLORIDE 0.5 MG: 1 INJECTION, SOLUTION INTRAMUSCULAR; INTRAVENOUS; SUBCUTANEOUS at 12:59

## 2022-06-23 RX ADMIN — HYDROMORPHONE HYDROCHLORIDE 0.5 MG: 1 INJECTION, SOLUTION INTRAMUSCULAR; INTRAVENOUS; SUBCUTANEOUS at 12:54

## 2022-06-23 RX ADMIN — FENTANYL CITRATE 50 MCG: 50 INJECTION, SOLUTION INTRAMUSCULAR; INTRAVENOUS at 12:43

## 2022-06-23 RX ADMIN — CEFAZOLIN SODIUM 2000 MG: 2 SOLUTION INTRAVENOUS at 10:01

## 2022-06-23 RX ADMIN — VECURONIUM BROMIDE 3 MG: 1 INJECTION, POWDER, LYOPHILIZED, FOR SOLUTION INTRAVENOUS at 10:59

## 2022-06-23 RX ADMIN — OXYCODONE HYDROCHLORIDE 5 MG: 5 TABLET ORAL at 13:38

## 2022-06-23 RX ADMIN — SODIUM CHLORIDE: 0.9 INJECTION, SOLUTION INTRAVENOUS at 10:27

## 2022-06-23 RX ADMIN — NEOSTIGMINE METHYLSULFATE 3 MG: 1 INJECTION INTRAVENOUS at 12:07

## 2022-06-23 RX ADMIN — FENTANYL CITRATE 50 MCG: 50 INJECTION, SOLUTION INTRAMUSCULAR; INTRAVENOUS at 12:48

## 2022-06-23 RX ADMIN — KETOROLAC TROMETHAMINE 30 MG: 30 INJECTION, SOLUTION INTRAMUSCULAR at 12:07

## 2022-06-23 RX ADMIN — ENOXAPARIN SODIUM 40 MG: 40 INJECTION SUBCUTANEOUS at 08:38

## 2022-06-23 RX ADMIN — CELECOXIB 200 MG: 200 CAPSULE ORAL at 08:38

## 2022-06-23 NOTE — OP NOTE
OPERATIVE REPORT  PATIENT NAME: Fe Greene    :  1975  MRN: [id number]  Pt Location: [facility] OR ROOM 07    SURGERY DATE: 2022    Surgeon(s) and Role:     * Gregory Garcia MD - Primary     * Luli Enrique PA-C - Assisting     * Jono Layton MD - Assisting    Preop Diagnosis:  Abnormal uterine bleeding (AUB) [N93 9]    Post-Op Diagnosis Codes:     * Abnormal uterine bleeding (AUB) [N93 9]    Procedure(s) (LRB):  ROBOTIC TOTAL HYSTERECTOMY, CYSTOSCOPY (N/A)    Specimen(s):  ID Type Source Tests Collected by Time Destination   1 : Uterus and cervix Tissue Uterus TISSUE EXAM Gregory Garcia MD 2022 1133        Estimated Blood Loss:   150 mL    Drains:  [REMOVED] Urethral Catheter Latex;Straight-tip 16 Fr  (Removed)   Number of days: 0       Anesthesia Type:   General    Operative Indications:  Patient with history of right salpingo-oophorectomy and left salpingectomy for benign adnexal mass  Now presents back with worsening menometrorrhagia desiring definitive therapy  After counseling, she elected to undergo hysterectomy  Operative Findings:  1  Surgically absent right fallopian tube and ovary  2  Surgically absent left fallopian tube  3  Approximately 8 week size uterus with no evidence of extra uterine disease  4  Cecum densely adherent to anterior abdominal wall as previously noted during last surgical exploration  5  Cystoscopy demonstrated normal bladder mucosa and bilateral potent ureteral urine jets  Complications:   None    Procedure and Technique:  The patient was taken to the operating room where general endotracheal anesthesia was induced without complications  The patient received antibiotic prophylaxis per hospital protocol  Sequential compression devices were applied to the lower extremities and activated prior to induction of anesthesia  A single dose of preoperative heparin was administered   The patient was placed in the dorsolithotomy position in [facility] stirrups and her lower abdomen, perineum and vagina were prepped and draped in the usual sterile fashion  Under direct visualization the uterus was sounded to approximately 7 cm  The endocervix was dilated  A JANIE uterine manipulator was secured in place with a stitch of 0 Vicryl  Whitehead catheter was placed and the intravaginal occluder balloon was inflated  Attention was turned to the abdomen  All port sites were infiltrated with bupivacaine at the beginning of completion of the procedure  An 8 mm skin incision was made approximately 6 cm above the umbilicus near the midline  Then, using a 5 mm Endopath Excel trocar and the 5 mm laparoscope, the peritoneal cavity was entered under directvisualization  Good intraperitoneal location was confirmed and pneumoperitoneum was created to maximal pressure 15 mm of mercury  A total of four 8 mm robotic trocars were placed (2 on the left, 1 in the midline replacing the 5 mm entry port and 1 on the right) and the 5 mm entry trocar was reinserted in the right flank for assistant's use  The patient was placed in steep Trendelenburg and the [employer] system was docked  The above-mentioned findings were noted  The round ligaments were cauterized and divided bilaterally and the bladder was mobilized anteriorly without difficulty  The ureters were clearly identified transperitoneally  the left utero-ovarian ligament was cauterized and divided using the vessel sealer device to mobilize the left ovary off of the uterine fundus  Then, the uterine vessels were skeletonized cauterized and divided bilaterally  The cardinal ligaments were serially cauterized and divided and a circumferential colpotomy was made  The specimen was removed through the vagina  The vaginal cuff was closed using a running stitch of 2-0 PDO Stratafix  Airtight closure an excellent hemostasis was obtained    Copious irrigation was used and excellent hemostasis was confirmed at low intraperitoneal pressures  At this point the robot was undocked  Pneumoperitoneum was completely released with the assistance of Valsalva maneuvers  All trocars were removed and the skin was closed using a subcuticular stitch of 4-0 Monocryl and Exofin was applied to all incisions  The Whitehead was removed  Using the [place], the bladder was retrograde filled with approximately 150 mL of NS  We examined the bladder then using the 5 mm laparoscope  The above mentioned findings were noted  The Whitehead catheter was then used to drain the bladder and the catheter was then removed  Tiny right medial gluteal laceration was noted and inherent to use of uterine manipulator  This was mostly hemostatic but was preemptively reapproximated with an interrupted stitch of 3-0 Monocryl  The patient tolerated the procedure well  Sponge, lap, needle and instrument counts were reported as correct x2  At the time of this dictation the patient remained stable in the operating room awaiting extubation         I was present for the entire procedure    Patient Disposition:  PACU     SIGNATURE: Azael Guillen MD [facility]   DATE: June 23, 2022  TIME: 12:19 PM

## 2022-06-23 NOTE — ANESTHESIA PREPROCEDURE EVALUATION
Procedure:  [address] W/ ROBOTICS, ALL OTHER INDICATED PROCEDURES (N/A Abdomen)    Relevant Problems   CARDIO   (+) Acute gastric ulcer with hemorrhage   (+) Migraine   (+) Mixed hyperlipidemia      ENDO   (+) Acquired hypothyroidism      GI/HEPATIC   (+) Acute gastric ulcer with hemorrhage   (+) Alcoholic cirrhosis of liver without ascites (HCC)   (+) Gastroesophageal reflux disease      HEMATOLOGY   (+) Acute blood loss anemia   (+) Acute on chronic blood loss anemia   (+) Anemia   (+) Hypercoagulation syndrome (HCC)   (+) Iron deficiency anemia following bariatric surgery      MUSCULOSKELETAL  right foot/ankle swollen due to previous fracture 4/22      NEURO/PSYCH   (+) History of DVT (deep vein thrombosis)   (+) Hx of pulmonary embolus   (+) Hx of suicide attempt   (+) Migraine   (+) Obsessive compulsive disorder   (+) Seizures (HCC)   medical marijuana     Physical Exam    Airway    Mallampati score: II  TM Distance: >3 FB  Neck ROM: full     Dental   No notable dental hx     Cardiovascular  Cardiovascular exam normal    Pulmonary  Pulmonary exam normal     Other Findings        Anesthesia Plan  ASA Score- 3     Anesthesia Type- general with ASA Monitors  Additional Monitors:   Airway Plan: ETT  Comment: TAP if open  Plan Factors-    Chart reviewed  Existing labs reviewed  Patient is not a current smoker  Patient instructed to abstain from smoking on day of procedure  Patient did not smoke on day of surgery  Obstructive sleep apnea risk education given perioperatively  Induction- intravenous  Postoperative Plan- Plan for postoperative opioid use  Planned trial extubation    Informed Consent- Anesthetic plan and risks discussed with patient

## 2022-06-23 NOTE — DISCHARGE INSTRUCTIONS
Robotic hysterectomy and cystoscopy   Discharge Instructions    WHAT YOU NEED TO KNOW:   Today were removed your uterus and cervix  At the completion the procedure we placed the camera inside the bladder and exam was normal with no evidence of lesions, abnormalities or injuries  Your procedures were uncomplicated  Due to manipulation of the uterus there was a tiny superficial laceration in the medial part of the right buttock, this was reapproximated with a single suture  This suture is self dissolving  DISCHARGE INSTRUCTIONS:   Contact your doctor at the number above if:   You have a fever over 101o  You have nausea or are vomiting that does not improve after a light meal    Your pain is getting worse, even after you take medicine  You feel pain or burning when you urinate, or you have trouble urinating  You have pus or a foul-smelling odor coming from your vagina and/or wound  Your wound is red, swollen, or draining pus  You see new or an increased amount of bright red blood coming from your vagina or your incisions  You have questions or concerns about your condition or care  Seek care immediately:   Your arm or leg feels warm, tender, and painful  It may look swollen and red  You have increasing abdominal or pelvic pain  You have heavy vaginal bleeding that fills 1 or more sanitary pads in 1 hour  Call 911 for any of the following: You feel lightheaded, short of breath, and have chest pain  You cough up blood  Medicines: You may need any of the following:  Prescription medicine will not be given for this procedure  Resume your previous medications as directed  Extra-strength Tylenol: This medications over-the-counter  Take 2 tablets every 6 hours as needed for mild-to-moderate pain  Ibuprofen/Advil/Motrin 200 mg tablets: This medication is over-the-counter  Take 3 tablets every 8 hours as needed for moderate to severe pain  Take this medication with food    The drink plenty of fluids while using this medication to prevent kidney injury  Metamucil or MiraLax: This product is over-the-counter  Distal 1 large tbsp in a large glass of water  Use once or twice a day for 1-2 weeks prevent and or treat constipation  Take your medicines as directed  Contact your healthcare provider if you think your medicine is not helping or if you have side effects  Tell him or her if you are allergic to any medicine  Keep a list of the medicines, vitamins, and herbs you take  Include the amounts, and when and why you take them  Bring the list or the pill bottles to follow-up visits  Carry your medicine list with you in case of an emergency  Activity:   Rest as needed  Get up and move around as directed to help prevent blood clots  Start with short walks and slowly increase the distance every day  Limit the number of times you climb stairs to 2 times each day for the first week  Plan most of your daily activities on one level of your home  Do not lift objects heavier than 10 pounds until seen in the office for your follow-up appointment  Do not strain during bowel movements  High-fiber foods and extra liquids can help you prevent constipation  Examples of high-fiber foods are fruit and bran  Prune juice and water are good liquids to drink  Do not have sex, use tampons, or douche and do not do tub baths/swimming until cleared by your physician at your postoperative appointment  You may shower as soon as the day after surgery  Do not go into pools or hot tubs until cleared by your doctor  Ask when it is safe for you to drive  It is generally safe to drive as soon as your legs are strong, you are off pain medicines and you feel like you will have the appropriate reflexes to step on the breaks in case of an emergency  Ask when you may return to work and to other regular activities  Wound care: Care for your abdominal incisions as directed   Carefully wash around the wound with soap and water  If you have Hibiclens or medicated soap that you were instructed to use before surgery, you may use that to wash with for up to 2 days after surgery  If not, any mild non-scented, non-abrasive soap is safe  It is okay to let the soap and water run over your incision  Do not scrub your incision  Dry the area and put on new, clean bandages as directed  Change your bandages when they get wet or dirty  If you have strips of medical tape, let them fall off on their own  It may take 7 to 14 days for them to fall off  Check your incision every day for redness, swelling, or pus  Deep breathing: Take deep breaths and cough 10 times each hour  This will decrease your risk for a lung infection  Take a deep breath and hold it for as long as you can  Let the air out and then cough strongly  Deep breaths help open your airway  You may be given an incentive spirometer to help you take deep breaths  Put the plastic piece in your mouth and take a slow, deep breath, then let the air out and cough  Repeat these steps 10 times every hour  Get support: This surgery may be life-changing for you and your family  You will no longer be able to get pregnant  Sudden changes in the levels of your hormones may occur and cause mood swings and depression  You may feel angry, sad, or frightened, or cry frequently and unexpectedly  These feelings are normal  Talk to your healthcare provider about where you can get support  You can also ask if hormone replacement medicine is right for you  Follow up with your healthcare provider or gynecologist as directed: You may need to return to have stitches removed, and for other tests  Write down your questions so you remember to ask them during your visits  © 2017 [employer] [employer] Information is for End User's use only and may not be sold, redistributed or otherwise used for commercial purposes   All illustrations and images included in CareNotes® are the copyrighted property of [employer]  or Raad [employer]  The above information is an  only  It is not intended as medical advice for individual conditions or treatments  Talk to your doctor, nurse or pharmacist before following any medical regimen to see if it is safe and effective for you

## 2022-06-23 NOTE — ANESTHESIA POSTPROCEDURE EVALUATION
Post-Op Assessment Note    CV Status:  Stable    Pain management: adequate     Mental Status:  Alert and awake   Hydration Status:  Euvolemic   PONV Controlled:  Controlled   Airway Patency:  Patent      Post Op Vitals Reviewed: Yes      Staff: Anesthesiologist, CRNA         No complications documented      BP      Temp      Pulse     Resp      SpO2      /60   Pulse 75   Temp 98 1 °F (36 7 °C) (Temporal)   Resp 16   Wt 119 kg (262 lb 5 6 oz)   SpO2 94%   BMI 46 47 kg/m²

## 2022-06-23 NOTE — H&P
H&P Exam - Gynecologic Oncology   Arminda Ramirez 48 y o  female MRN: [id number]  Unit/Bed#: [facility] [place] Encounter: [id number]    Assessment/Plan     Assessment:  44yo perimenopausal woman with AUB and prior robotic excision of large benign pelvic mass, for hysterectomy and all other indicated procedures  Plan:  Proceed to OR for RA-TLH, all other indicated procedures  2gm ancef surgical prophylaxis  ERAS pathway    History of Present Illness     HPI:  Arminda Ramirez is a 48 y o  perimenopausal woman with prior excision of benign pelvic mass, mood disorder, hypercoagulability on anticoagulation, prior gastric bypass with malabsorption with ongoing AUB  She has had bilateral salpingectomy previously  She has been counseled on alternatives, and declined  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  She denies any new symptoms or medical concerns  She fractured her foot  She was unable to get her colonoscopy/EGD prior to this procedure    Review of Systems   Constitutional: Negative for chills and fever  HENT: Negative for ear pain and sore throat  Eyes: Negative for pain and visual disturbance  Respiratory: Negative for cough and shortness of breath  Cardiovascular: Negative for chest pain and palpitations  Gastrointestinal: Negative for abdominal pain and vomiting  Genitourinary: Positive for vaginal bleeding  Negative for dysuria, hematuria and pelvic pain  Musculoskeletal: Negative for arthralgias and back pain  Skin: Negative for color change and rash  Neurological: Negative for seizures and syncope  All other systems reviewed and are negative        Historical Information     Past Medical History:   Diagnosis Date    Anemia     Anxiety     Balance disorder     Bipolar depression (HCC)     bipolar II, suicide    Depression     Disease of thyroid gland     GERD (gastroesophageal reflux disease)     Heart murmur     History of DVT (deep vein thrombosis)     History of gastric bypass         Lymphedema     bles- chronic    Migraines     Pelvic mass     Pulmonary embolism (HCC)     Rash     under abd fold    Seizures (HCC)     Use of cane as ambulatory aid     UTI (urinary tract infection)     21     Past Surgical History:   Procedure Laterality Date    APPENDECTOMY      Open    CHOLECYSTECTOMY      Laparoscopic    CYSTOSCOPY N/A 2021    Procedure: CYSTOSCOPY;  Surgeon: Tatum Ledesma MD;  Location: [facility];  Service: Gynecology Oncology    GASTRIC BYPASS      was 205 date of surgery    IR DVT THROMBOLYSIS/THROMBECTOMY ILIAC/IVC WITH VENOGRAM  2020    IR IVC FILTER REMOVAL  2021    IR TPA LYSIS CHECK  2020    IVC FILTER INSERTION      OR LAP,DIAGNOSTIC ABDOMEN N/A 2021    Procedure: LAPAROSCOPY DIAGNOSTIC;  Surgeon: Tatum Ledesma MD;  Location: [facility];  Service: Gynecology Oncology    OR LAP,FULGURATE/EXCISE LESIONS N/A 2021    Procedure: ROBOTIC RESECTION OF PELVIC MASS/ RIGHT SALPINGO-OOPHORECTOMY, LEFT SALPINGECTOMY;  Surgeon: Tatum Ledesma MD;  Location: [facility];  Service: Gynecology Oncology    STOMACH SURGERY      for morbid obesity    TUBAL LIGATION Bilateral      OB History    Para Term  AB Living   2 2 2         SAB IAB Ectopic Multiple Live Births                  # Outcome Date GA Lbr Keny/2nd Weight Sex Delivery Anes PTL Lv   2 Term            1 Term                Family History   Problem Relation Age of Onset    Other Mother         headache    Hypertension Mother     Depression Mother     Arthritis Father     Other Father         H, pylori infection    Other Sister         esophageal reflux    Migraines Sister     No Known Problems Paternal Grandfather     Diabetes Paternal Aunt         Mellitus    Breast cancer Paternal Aunt     Diabetes Paternal Uncle         Mellitus    Liver cancer Paternal Uncle     Asthma Daughter     No Known Problems Maternal Grandmother     No Known Problems Maternal Grandfather     No Known Problems Paternal Grandmother     No Known Problems Brother     No Known Problems Sister     No Known Problems Sister     Asthma Daughter     No Known Problems Maternal Aunt      Social History   Social History     Substance and Sexual Activity   Alcohol Use Yes    Comment: ocassional     Social History     Substance and Sexual Activity   Drug Use Yes    Frequency: 7 0 times per week    Types: Marijuana    Comment: medical marijuana      Social History     Tobacco Use   Smoking Status Former Smoker    Quit date:     Years since quittin 4   Smokeless Tobacco Never Used   Tobacco Comment    former quit in  per [employer]     E-Cigarette/Vaping    E-Cigarette Use Former User     Comments last vaped THC 0900 22      E-Cigarette/Vaping Substances    THC Yes        Meds/Allergies     Allergies   Allergen Reactions    Morphine Seizures     Pt denies       Objective   Vitals: Blood pressure 110/74, pulse 63, temperature 97 8 °F (36 6 °C), temperature source Temporal, resp  rate 16, weight 119 kg (262 lb 5 6 oz), SpO2 97 %, not currently breastfeeding  Intake/Output Summary (Last 24 hours) at 2022 0948  Last data filed at 2022 3933  Gross per 24 hour   Intake 150 ml   Output --   Net 150 ml       Invasive Devices:   Peripheral IV 22 Left;Proximal;Ventral (anterior) Forearm (Active)   Site Assessment [facility] 22   Dressing Type Transparent;Securing device 22   Line Status Infusing 22   Dressing Status Clean;Dry; Intact 22       ETT  Cuffed;Oral 7 mm (Active)       Physical Exam  Constitutional:       General: She is not in acute distress  Appearance: She is obese  She is not ill-appearing or toxic-appearing  HENT:      Head: Normocephalic and atraumatic  Eyes:      Extraocular Movements: Extraocular movements intact     Cardiovascular:      Rate and Rhythm: Normal rate  Pulses: Normal pulses  Pulmonary:      Effort: Pulmonary effort is normal  No respiratory distress  Abdominal:      General: There is no distension  Palpations: Abdomen is soft  Tenderness: There is no abdominal tenderness  There is no guarding or rebound  Musculoskeletal:         General: No signs of injury  Normal range of motion  Cervical back: Normal range of motion  Right lower leg: No edema  Skin:     General: Skin is warm and dry  Neurological:      General: No focal deficit present  Mental Status: She is alert  Mental status is at baseline     Psychiatric:         Mood and Affect: Mood normal          Behavior: Behavior normal

## 2022-07-19 ENCOUNTER — TELEPHONE (OUTPATIENT)
Dept: HEMATOLOGY ONCOLOGY | Facility: CLINIC | Age: 47
End: 2022-07-19

## 2022-07-19 NOTE — TELEPHONE ENCOUNTER
Left message for patient to have labs completed prior to appointment with our office on 7/20/22 at 8:30 AM

## 2022-07-20 ENCOUNTER — TELEPHONE (OUTPATIENT)
Dept: HEMATOLOGY ONCOLOGY | Facility: CLINIC | Age: 47
End: 2022-07-20

## 2022-07-20 ENCOUNTER — TELEPHONE (OUTPATIENT)
Dept: SURGICAL ONCOLOGY | Facility: CLINIC | Age: 47
End: 2022-07-20

## 2022-07-20 NOTE — TELEPHONE ENCOUNTER
Scheduling Appointment     Who Is Calling to Schedule  Patient   Doctor Digna Valenzuela   Date and Time 7/26/22 11:30am   Reason for scheduling appointment Missed appt on 7/20/22   Patient verbalized understanding   yes

## 2022-07-26 ENCOUNTER — OFFICE VISIT (OUTPATIENT)
Dept: HEMATOLOGY ONCOLOGY | Facility: CLINIC | Age: 47
End: 2022-07-26
Payer: MEDICARE

## 2022-07-26 ENCOUNTER — OFFICE VISIT (OUTPATIENT)
Dept: GYNECOLOGIC ONCOLOGY | Facility: CLINIC | Age: 47
End: 2022-07-26

## 2022-07-26 VITALS
HEART RATE: 79 BPM | OXYGEN SATURATION: 96 % | HEIGHT: 63 IN | DIASTOLIC BLOOD PRESSURE: 80 MMHG | SYSTOLIC BLOOD PRESSURE: 130 MMHG | BODY MASS INDEX: 44.3 KG/M2 | WEIGHT: 250 LBS | TEMPERATURE: 98.2 F | RESPIRATION RATE: 16 BRPM

## 2022-07-26 VITALS
HEIGHT: 63 IN | SYSTOLIC BLOOD PRESSURE: 130 MMHG | DIASTOLIC BLOOD PRESSURE: 80 MMHG | BODY MASS INDEX: 46.47 KG/M2 | TEMPERATURE: 98.3 F

## 2022-07-26 DIAGNOSIS — D50.8 IRON DEFICIENCY ANEMIA FOLLOWING BARIATRIC SURGERY: Primary | ICD-10-CM

## 2022-07-26 DIAGNOSIS — K95.89 IRON DEFICIENCY ANEMIA FOLLOWING BARIATRIC SURGERY: Primary | ICD-10-CM

## 2022-07-26 DIAGNOSIS — Z90.710 S/P HYSTERECTOMY: Primary | ICD-10-CM

## 2022-07-26 DIAGNOSIS — Z86.718 HISTORY OF DVT (DEEP VEIN THROMBOSIS): ICD-10-CM

## 2022-07-26 PROBLEM — N93.9 ABNORMAL UTERINE BLEEDING (AUB): Status: RESOLVED | Noted: 2021-12-01 | Resolved: 2022-07-26

## 2022-07-26 PROBLEM — Z09 POSTOP CHECK: Status: RESOLVED | Noted: 2021-08-31 | Resolved: 2022-07-26

## 2022-07-26 PROCEDURE — 99214 OFFICE O/P EST MOD 30 MIN: CPT | Performed by: PHYSICIAN ASSISTANT

## 2022-07-26 PROCEDURE — 99024 POSTOP FOLLOW-UP VISIT: CPT | Performed by: OBSTETRICS & GYNECOLOGY

## 2022-07-26 NOTE — ASSESSMENT & PLAN NOTE
Patient has healed uneventfully  Vaginal cuff is intact  I recommended to continue with strict pelvic rest for 2 additional weeks  Otherwise she can resume all activities of daily living with no restrictions  She will return to see her primary gynecologist for ongoing care  She will contact me if she has any questions or problems related to surgery

## 2022-07-26 NOTE — PROGRESS NOTES
Hematology/Oncology Outpatient Follow-up  Kwabena Dietrich 48 y o  female 1975 [id number]    Date:  7/26/2022      Assessment and Plan:  1  History of DVT (deep vein thrombosis)  She has history of recurrent thromboses  She also has history of thrombosis on oral anticoagulation  She is maintained on Lovenox 40 mg SQ daily  Again reviewed to continue to clean the skin prior to injecting      - enoxaparin (LOVENOX) 40 mg/0 4 mL; Inject SQ into the skin daily  Dispense: 30 mL; Refill: 3    2  Iron deficiency anemia following bariatric surgery  History of iron def anemia 2/2 malabsorption from bariatric surgery  She had CBC in the ED on 7/15/22 11 1 was hemoglobin  Reviewed she should have iron labs completed to determine if she needs any maintenance IV iron  Follow up in 6 months      - CBC and differential; Future  - Iron; Future  - Iron Saturation %; Future  - Ferritin; Future  - TIBC; Future  - Iron; Future  - Iron Saturation %; Future  - Ferritin; Future  - TIBC; Future  - CBC and differential; Future      HPI:  Kwabena Dietrich is a 48 y o  female presents for follow up for history of thrombosis, iron def anemia due to malabsorption from bariatric surgery and history of GI blood loss       Past medical history significant for hepatic steatosis, hypothyroidism, pulmonary embolism, IVC thrombosis, migraines, seizures, psychiatric disorder (bipolar disorder, SABINO), history of gastric bypass surgery (2010)     Patient presented to the emergency department on 08/03/2020 due to abdominal pain radiating into bilateral upper legs      She had a history of bilateral lower extremity DVT and PE in 2017 for which she had an IVC filter placed in [facility] in 2017   She was treated with Xarelto but developed a GI bleed in discontinued   Per patient this was unprovoked  Patient admitted to being off of anticoagulation during this admission and denied a previous workup for clotting disorder in the past      Family history significant for DVT and PE and her sister  Her sister  of PE  Of note her sister had lupus  Paternal uncle had DVT as well       A CT of the abdomen pelvis was completed on 2020   This showed known IVC filter, diffuse thrombus extending from the IVC filter inferiorly into the iliac vasculature, extensive surrounding fat stranding with thrombophlebitis, small amount of thrombus extends superior to the filter        A venous Doppler study of bilateral lower extremities were also completed on 2020 which was negative for DVT      She underwent lysis of thrombus with IR on       She returned to IR on 2020 for IR venogram      Patient was on heparin during hospitalization      Patient was seen by Dr Jordan on 2020 during hospitalization   Due to patient's history of gastric bypass surgery patient was recommended Lovenox 0 75 milligrams/kilogram subcu b i d        She had partial thrombosis workup in the hospital               Homocystine normal, 7 4              Protein S activity normal, 111              Protein C activity normal, 91 0              Factor 5 Leiden mutation negative              Prothrombin gene mutation negative              Anticardiolipin antibody IgA and IgG normal   IgM 18 which is a indeterminate results              Lupus anticoagulant negative               Beta-2 glycoproteins negative      Patient was discharged on Lovenox 0 75 milligram/kilogram   Discharge date was 2020      She presented back to the emergency department on 2020 due to abdominal pain and right leg pain      Patient was diagnosed with cellulitis of lower extremity, right, and received IV Ancef for 3 days in [place] on p o   Keflex      She had outpatient follow-up with vascular surgery on 2020   Recommendation for her venous disease included conservative measures and medical management   Patient was scheduled for follow-up in 2020 with vascular surgery with a repeat IVC/iliac vein duplex and bilateral lower extremity DVT study       At consultation patient was recommended to have EGD and colonoscopy secondary to weight loss   She had an EGD and colonoscopy on 12/03/2020   This showed  anastomotic ulcer on the jejunal side   Colonoscopy showed 1 polyp measuring 5-9 mm in the sigmoid colon   A clip was placed to prevent bleeding secondary to anticoagulation  There is no of inadequate bowel prep and patient was recommended to have repeat in 6 months (June 2021)        Patient was admitted to the hospital to the ICU due to acute GI bleed from 5/2 - 5/5/21  Ramiro Oquendo states that she was taking NSAIDs due to migraines even though she knew she was not supposed to do this   She had EGD on 05/02/2021 due to bleeding while inpatient and this showed single 5 mm crater drowned benign-appearing ulcer in the stomach   This was treated   Patient's Lovenox was held during parts of hospitalization then restarted on discharge      Patient is planned to have her IVC filter removed on 05/18/2021 however I note that this appears to have been canceled   She really had CT venogram in March 2021 which showed no evidence of thrombus  Elizajuan Gomez was a incident right ovarian cyst which patient's PCP at [facility] ordered an ultrasound and patient was referred to a gyn at [facility] as well       Patient then admitted again on 6/18 - 6/23/21 due to MADHU and hypokalemia, both due to too much diuretic managed by PCP at [facility]     6/8/21 IVC filter was removed      Ferritin 141, continues on oral iron once per day      7/9/21   Potassium 3 2, PCP treating this      She still is taking 100 mg SQ Lovenox due to supply, still has 5 boxes left      7/21/21 - have EGD and colonoscopy      Interval history:  She had hysterectomy with gyn onc due to bleeding, negative pathology   Recently tripped at home running to the door and broke her foot/ankle     ROS: Review of Systems   Constitutional: Positive for fatigue (since starting risperdal 2 weeks ago)  Negative for appetite change, chills, fever and unexpected weight change  Respiratory: Negative for cough and shortness of breath  Cardiovascular: Negative for chest pain, palpitations and leg swelling  Gastrointestinal: Negative for abdominal pain, constipation, diarrhea, nausea and vomiting  Genitourinary: Negative for difficulty urinating, dysuria and hematuria  Being treated for UTI by ER   Skin: Negative  Neurological: Negative for dizziness, weakness, light-headedness, numbness and headaches  Migraines, stable for her     Hematological: Negative  Psychiatric/Behavioral: Negative          Past Medical History:   Diagnosis Date    Anemia     Anxiety     Balance disorder     Bipolar depression (HCC)     bipolar II, suicide    Depression     Disease of thyroid gland     GERD (gastroesophageal reflux disease)     Heart murmur     History of DVT (deep vein thrombosis)     History of gastric bypass     2010    Lymphedema     bles- chronic    Migraines     Pelvic mass     Pulmonary embolism (HCC)     Rash     under abd fold    Seizures (HCC)     Use of cane as ambulatory aid     UTI (urinary tract infection)     7/24/21       Past Surgical History:   Procedure Laterality Date    APPENDECTOMY      Open    CHOLECYSTECTOMY      Laparoscopic    CYSTOSCOPY N/A 8/13/2021    Procedure: CYSTOSCOPY;  Surgeon: Vi Martinez MD;  [facility];  Service: Gynecology Oncology    GASTRIC BYPASS      was 205 date of surgery    IR DVT THROMBOLYSIS/THROMBECTOMY ILIAC/IVC WITH VENOGRAM  8/4/2020    IR IVC FILTER REMOVAL  6/8/2021    IR TPA LYSIS CHECK  8/5/2020    IVC FILTER INSERTION  2017    NH LAP,DIAGNOSTIC ABDOMEN N/A 8/13/2021    Procedure: LAPAROSCOPY DIAGNOSTIC;  Surgeon: Vi Martinez MD;  [facility];  Service: Gynecology Oncology    NH LAP,FULGURATE/EXCISE LESIONS N/A 8/13/2021 Procedure: ROBOTIC RESECTION OF PELVIC MASS/ RIGHT SALPINGO-OOPHORECTOMY, LEFT SALPINGECTOMY;  Surgeon: Blanca Dangelo MD;  [facility];  Service: Gynecology Oncology    IL LAPAROSCOPY W TOT HYSTERECT UTERUS 250 GRAM OR LESS N/A 2022    Procedure: ROBOTIC TOTAL HYSTERECTOMY, CYSTOSCOPY;  Surgeon: Blanca Dangelo MD;  [facility];  Service: Gynecology Oncology    STOMACH SURGERY      for morbid obesity    TUBAL LIGATION Bilateral        Social History     Socioeconomic History    Marital status: Single     Spouse name: None    Number of children: None    Years of education: None    Highest education level: None   Occupational History    Occupation:      Comment: [employer]   Tobacco Use    Smoking status: Former Smoker     Quit date:      Years since quittin 5    Smokeless tobacco: Never Used    Tobacco comment: former quit in  per [employer]   Vaping Use    Vaping Use: Former    Substances: THC   Substance and Sexual Activity    Alcohol use: Yes     Comment: ocassional    Drug use: Yes     Frequency: 7 0 times per week     Types: Marijuana     Comment: medical marijuana     Sexual activity: Not Currently   Other Topics Concern    None   Social History Narrative    Sleep 6 in 24 hours    Caffeine use; 2 cps coffee per day    Uses seatbelts             Social Determinants of Health     Financial Resource Strain: Not on file   Food Insecurity: Not on file   Transportation Needs: Not on file   Physical Activity: Not on file   Stress: Not on file   Social Connections: Not on file   Intimate Partner Violence: Not on file   Housing Stability: Not on file       Family History   Problem Relation Age of Onset    Other Mother         headache    Hypertension Mother     Depression Mother     Arthritis Father     Other Father         H, pylori infection    Other Sister         esophageal reflux    Migraines Sister     No Known Problems Paternal Grandfather     Diabetes Paternal Aunt         Mellitus    Breast cancer Paternal Aunt     Diabetes Paternal Uncle         Mellitus    Liver cancer Paternal Uncle     Asthma Daughter     No Known Problems Maternal Grandmother     No Known Problems Maternal Grandfather     No Known Problems Paternal Grandmother     No Known Problems Brother     No Known Problems Sister     No Known Problems Sister     Asthma Daughter     No Known Problems Maternal Aunt        Allergies   Allergen Reactions    Morphine Seizures     Pt denies         Current Outpatient Medications:     atoMOXetine (STRATTERA) 60 mg capsule, TAKE 1 CAPSULE (60 MG) BY ORAL ROUTE ONCE DAILY IN THE MORNING, Disp: , Rfl:     Cholecalciferol (Vitamin D3) 50 MCG (2000 UT) capsule, Take 2,000 Units by mouth daily, Disp: , Rfl:     ciclopirox (LOPROX) 0 77 % SUSP, APPLY ONCE A DAY TO RIGHT THUMBNAIL, Disp: , Rfl:     cloNIDine (CATAPRES) 0 1 mg tablet, Take 0 1 mg by mouth 3 (three) times a day, Disp: , Rfl:     clotrimazole (LOTRIMIN) 1 % cream, Apply topically 2 (two) times a day, Disp: 30 g, Rfl: 1    doxycycline (PERIOSTAT) 20 MG tablet, TAKE 1 TABLET BY MOUTH TWICE A DAY WITH FOOD (NOT DAIRY), Disp: , Rfl:     enoxaparin (LOVENOX) 40 mg/0 4 mL, Inject SQ into the skin daily  , Disp: 30 mL, Rfl: 2    ferrous sulfate 325 (65 Fe) mg tablet, Take 325 mg by mouth daily with breakfast, Disp: , Rfl:     folic acid (FOLVITE) 1 mg tablet, Take 1 mg by mouth daily, Disp: , Rfl:     gabapentin (NEURONTIN) 400 mg capsule, Take 400 mg by mouth 3 (three) times a day, Disp: , Rfl:     hydrOXYzine pamoate (VISTARIL) 25 mg capsule, Take 1 capsule by mouth Every 12 hours, Disp: , Rfl:     levothyroxine 100 mcg tablet, Take 1 tablet (100 mcg total) by mouth daily in the early morning, Disp: 30 tablet, Rfl: 0    lithium carbonate (LITHOBID) 300 mg CR tablet, Take 300 mg by mouth 2 (two) times a day, Disp: , Rfl:     Multiple Vitamin (MULTI-VITAMIN DAILY PO), Multi Vitamin  DAILY, Disp: , Rfl:     NON FORMULARY, Medical marijuana prn pain/ anxiety, Disp: , Rfl:     oxyCODONE-acetaminophen (Percocet) 5-325 mg per tablet, Take 1 tablet by mouth every 6 (six) hours as needed for severe pain Max Daily Amount: 4 tablets, Disp: 20 tablet, Rfl: 0    pantoprazole (PROTONIX) 40 mg tablet, Take 1 tablet (40 mg total) by mouth 2 (two) times a day before meals, Disp: 60 tablet, Rfl: 2    pyridoxine (VITAMIN B6) 100 mg tablet, Take 100 mg by mouth daily, Disp: , Rfl:     QUEtiapine (SEROquel) 100 mg tablet, Take 100 mg by mouth daily at bedtime 100 mg t i d  and 400 mg at bedtime, Disp: , Rfl:     rOPINIRole (REQUIP) 1 mg tablet, Take 2 mg by mouth daily at bedtime , Disp: , Rfl:     SUMAtriptan (IMITREX) 100 mg tablet, Take 100 mg by mouth as needed for migraine , Disp: , Rfl:     thiamine 100 MG tablet, Daily, Disp: , Rfl:     topiramate (TOPAMAX) 100 mg tablet, Take 100 mg by mouth 2 (two) times a day, Disp: , Rfl:     venlafaxine (EFFEXOR-XR) 150 mg 24 hr capsule, Take 150 mg by mouth daily , Disp: , Rfl:     risperiDONE (RisperDAL) 1 mg tablet, Take 1 mg by mouth daily, Disp: , Rfl:       Physical Exam:  /80 (BP Location: Left arm, Patient Position: Sitting, Cuff Size: Adult)   Pulse 79   Temp 98 2 °F (36 8 °C) (Temporal)   Resp 16   Ht 5' 3" (1 6 m)   Wt 113 kg (250 lb)   SpO2 96%   BMI 44 29 kg/m²     Physical Exam  Vitals reviewed  Constitutional:       General: She is not in acute distress  Appearance: She is well-developed  HENT:      Head: Normocephalic and atraumatic  Eyes:      General: No scleral icterus  Conjunctiva/sclera: Conjunctivae normal    Cardiovascular:      Rate and Rhythm: Normal rate and regular rhythm  Heart sounds: Normal heart sounds  No murmur heard  Pulmonary:      Effort: Pulmonary effort is normal  No respiratory distress  Breath sounds: Normal breath sounds  Abdominal:      Palpations: Abdomen is soft  Tenderness: There is no abdominal tenderness  Musculoskeletal:         General: No tenderness  Normal range of motion  Cervical back: Normal range of motion and neck supple  Right lower leg: No edema  Left lower leg: No edema  Comments: Wearing naomy boot of left foot/ankle due to fracture     Lymphadenopathy:      Cervical: No cervical adenopathy  Skin:     General: Skin is warm and dry  Neurological:      Mental Status: She is alert and oriented to person, place, and time  Cranial Nerves: No cranial nerve deficit  Psychiatric:         Mood and Affect: Mood normal          Behavior: Behavior normal          Labs:  Lab Results   Component Value Date    WBC 8 18 06/01/2022    HGB 12 0 06/01/2022    HCT 38 5 06/01/2022    MCV 97 06/01/2022     06/01/2022     Lab Results   Component Value Date    K 4 0 06/01/2022     06/01/2022    CO2 25 06/01/2022    BUN 10 06/01/2022    CREATININE 0 71 06/01/2022    GLUF 97 06/01/2022    CALCIUM 9 0 06/01/2022    CORRECTEDCA 9 8 06/01/2022    AST 28 06/01/2022    ALT 29 06/01/2022    ALKPHOS 115 06/01/2022    EGFR 101 06/01/2022       Patient voiced understanding and agreement in the above discussion  Aware to contact our office with questions/symptoms in the interim  This note has been generated by voice recognition software system  Therefore, there may be spelling, grammar, and or syntax errors  Please contact if questions arise

## 2022-07-26 NOTE — PROGRESS NOTES
Assessment/Plan:    Problem List Items Addressed This Visit        Other    S/P hysterectomy - Primary     Patient has healed uneventfully  Vaginal cuff is intact  I recommended to continue with strict pelvic rest for 2 additional weeks  Otherwise she can resume all activities of daily living with no restrictions  She will return to see her primary gynecologist for ongoing care  She will contact me if she has any questions or problems related to surgery  CHIEF COMPLAINT:   Postoperative follow-up, vaginal cuff check      Patient ID: Jeet Nj is a 48 y o  female  HPI  Patient is 1 month out after robotic hysterectomy for abnormal uterine bleeding/pelvic pain  Previously, she had had unilateral salpingo-oophorectomy and contralateral salpingectomy  She has 1 ovary in Situ  Has recovered uneventfully  Denies vaginal bleeding, drainage or discharge  Incisions healing well  Final pathology was benign  She has no complaints  Since surgery, she had a closed fracture of her left ankle or for which she is wearing a naomy boot  The following portions of the patient's history were reviewed and updated as appropriate: allergies, current medications, past family history, past medical history, past social history, past surgical history and problem list     Review of Systems  As per [facility]  Twelve point review of systems otherwise unremarkable    Current Outpatient Medications   Medication Sig Dispense Refill    atoMOXetine (STRATTERA) 60 mg capsule TAKE 1 CAPSULE (60 MG) BY ORAL ROUTE ONCE DAILY IN THE MORNING      Cholecalciferol (Vitamin D3) 50 MCG (2000 UT) capsule Take 2,000 Units by mouth daily      ciclopirox (LOPROX) 0 77 % SUSP APPLY ONCE A DAY TO RIGHT THUMBNAIL      cloNIDine (CATAPRES) 0 1 mg tablet Take 0 1 mg by mouth 3 (three) times a day      clotrimazole (LOTRIMIN) 1 % cream Apply topically 2 (two) times a day 30 g 1    doxycycline (PERIOSTAT) 20 MG tablet TAKE 1 TABLET BY MOUTH TWICE A DAY WITH FOOD (NOT DAIRY)      enoxaparin (LOVENOX) 40 mg/0 4 mL Inject SQ into the skin daily  30 mL 2    ferrous sulfate 325 (65 Fe) mg tablet Take 325 mg by mouth daily with breakfast      folic acid (FOLVITE) 1 mg tablet Take 1 mg by mouth daily      gabapentin (NEURONTIN) 400 mg capsule Take 400 mg by mouth 3 (three) times a day      hydrOXYzine pamoate (VISTARIL) 25 mg capsule Take 1 capsule by mouth Every 12 hours      levothyroxine 100 mcg tablet Take 1 tablet (100 mcg total) by mouth daily in the early morning 30 tablet 0    lithium carbonate (LITHOBID) 300 mg CR tablet Take 300 mg by mouth daily       Multiple Vitamin (MULTI-VITAMIN DAILY PO) Multi Vitamin   DAILY      NON FORMULARY Medical marijuana prn pain/ anxiety      oxyCODONE-acetaminophen (Percocet) 5-325 mg per tablet Take 1 tablet by mouth every 6 (six) hours as needed for severe pain Max Daily Amount: 4 tablets 20 tablet 0    pantoprazole (PROTONIX) 40 mg tablet Take 1 tablet (40 mg total) by mouth 2 (two) times a day before meals 60 tablet 2    pyridoxine (VITAMIN B6) 100 mg tablet Take 100 mg by mouth daily      QUEtiapine (SEROquel) 100 mg tablet Take 100 mg by mouth 3 (three) times a day 100 mg t i d  and 400 mg at bedtime      risperiDONE (RisperDAL) 1 mg tablet Take 1 mg by mouth daily      rOPINIRole (REQUIP) 1 mg tablet Take 2 mg by mouth daily at bedtime       SUMAtriptan (IMITREX) 100 mg tablet Take 100 mg by mouth as needed for migraine       thiamine 100 MG tablet Daily      topiramate (TOPAMAX) 100 mg tablet Take 150 mg by mouth 2 (two) times a day       venlafaxine (EFFEXOR-XR) 150 mg 24 hr capsule Take 150 mg by mouth daily        No current facility-administered medications for this visit  Objective:    Blood pressure 130/80, temperature 98 3 °F (36 8 °C), temperature source Tympanic, height 5' 3" (1 6 m), not currently breastfeeding  Body mass index is 46 47 kg/m²    Body surface area is 2 17 meters squared  Physical Exam  Abdomen: Incisions well-healed  No hernias  Vaginal cuff:  Well-healed, no granulation tissue, scant discharge  Intact  No dehiscence      De Lerma MD, David Arvizu [address]  7/26/2022  11:12 AM

## 2022-08-04 ENCOUNTER — TELEPHONE (OUTPATIENT)
Dept: HEMATOLOGY ONCOLOGY | Facility: CLINIC | Age: 47
End: 2022-08-04

## 2022-08-04 ENCOUNTER — APPOINTMENT (OUTPATIENT)
Dept: LAB | Facility: HOSPITAL | Age: 47
End: 2022-08-04
Payer: MEDICARE

## 2022-08-04 DIAGNOSIS — K95.89 IRON DEFICIENCY ANEMIA FOLLOWING BARIATRIC SURGERY: ICD-10-CM

## 2022-08-04 DIAGNOSIS — D62 ACUTE BLOOD LOSS ANEMIA: ICD-10-CM

## 2022-08-04 DIAGNOSIS — D50.8 IRON DEFICIENCY ANEMIA FOLLOWING BARIATRIC SURGERY: ICD-10-CM

## 2022-08-04 LAB
BASOPHILS # BLD AUTO: 0.06 THOUSANDS/ΜL (ref 0–0.1)
BASOPHILS NFR BLD AUTO: 1 % (ref 0–1)
EOSINOPHIL # BLD AUTO: 0.28 THOUSAND/ΜL (ref 0–0.61)
EOSINOPHIL NFR BLD AUTO: 5 % (ref 0–6)
ERYTHROCYTE [DISTWIDTH] IN BLOOD BY AUTOMATED COUNT: 21.2 % (ref 11.6–15.1)
FERRITIN SERPL-MCNC: 102 NG/ML (ref 8–388)
HCT VFR BLD AUTO: 36.3 % (ref 34.8–46.1)
HGB BLD-MCNC: 11.3 G/DL (ref 11.5–15.4)
IMM GRANULOCYTES # BLD AUTO: 0.04 THOUSAND/UL (ref 0–0.2)
IMM GRANULOCYTES NFR BLD AUTO: 1 % (ref 0–2)
IRON SATN MFR SERPL: 26 % (ref 15–50)
IRON SERPL-MCNC: 79 UG/DL (ref 50–170)
LYMPHOCYTES # BLD AUTO: 0.73 THOUSANDS/ΜL (ref 0.6–4.47)
LYMPHOCYTES NFR BLD AUTO: 13 % (ref 14–44)
MCH RBC QN AUTO: 29.1 PG (ref 26.8–34.3)
MCHC RBC AUTO-ENTMCNC: 31.1 G/DL (ref 31.4–37.4)
MCV RBC AUTO: 94 FL (ref 82–98)
MONOCYTES # BLD AUTO: 0.54 THOUSAND/ΜL (ref 0.17–1.22)
MONOCYTES NFR BLD AUTO: 9 % (ref 4–12)
NEUTROPHILS # BLD AUTO: 4.19 THOUSANDS/ΜL (ref 1.85–7.62)
NEUTS SEG NFR BLD AUTO: 71 % (ref 43–75)
NRBC BLD AUTO-RTO: 0 /100 WBCS
PLATELET # BLD AUTO: 120 THOUSANDS/UL (ref 149–390)
PMV BLD AUTO: 12.2 FL (ref 8.9–12.7)
RBC # BLD AUTO: 3.88 MILLION/UL (ref 3.81–5.12)
TIBC SERPL-MCNC: 304 UG/DL (ref 250–450)
WBC # BLD AUTO: 5.84 THOUSAND/UL (ref 4.31–10.16)

## 2022-08-04 PROCEDURE — 83550 IRON BINDING TEST: CPT

## 2022-08-04 PROCEDURE — 82728 ASSAY OF FERRITIN: CPT

## 2022-08-04 PROCEDURE — 36415 COLL VENOUS BLD VENIPUNCTURE: CPT

## 2022-08-04 PROCEDURE — 85025 COMPLETE CBC W/AUTO DIFF WBC: CPT

## 2022-08-04 PROCEDURE — 83540 ASSAY OF IRON: CPT

## 2022-08-04 NOTE — TELEPHONE ENCOUNTER
Per Wallace Round, informed patient that iron levels are good, informed patient to call the office with any concerns  Pt verbalized understanding

## 2022-08-05 ENCOUNTER — TELEPHONE (OUTPATIENT)
Dept: HEMATOLOGY ONCOLOGY | Facility: CLINIC | Age: 47
End: 2022-08-05

## 2022-08-05 NOTE — TELEPHONE ENCOUNTER
Spoke with patient advising her of the message below, patient voiced understanding      ----- Message from Zhang Delarosa PA-C sent at 8/4/2022  3:43 PM EDT -----  Please call patient and let her know iron levels are good

## 2022-08-30 ENCOUNTER — TELEPHONE (OUTPATIENT)
Dept: GASTROENTEROLOGY | Facility: CLINIC | Age: 47
End: 2022-08-30

## 2022-08-30 NOTE — TELEPHONE ENCOUNTER
Patients GI provider:  Dr Manpreet Granado    Number to return call: [phone]    Reason for call: Pt calling to reschedule procedures      Scheduled procedure/appointment date if applicable: Apt/procedure 6/9/22

## 2022-09-10 NOTE — ASSESSMENT & PLAN NOTE
Problem: Safety - Adult  Goal: Free from fall injury  Outcome: Progressing     Problem: Skin/Tissue Integrity  Goal: Absence of new skin breakdown  Description: 1. Monitor for areas of redness and/or skin breakdown  2. Assess vascular access sites hourly  3. Every 4-6 hours minimum:  Change oxygen saturation probe site  4. Every 4-6 hours:  If on nasal continuous positive airway pressure, respiratory therapy assess nares and determine need for appliance change or resting period.   Outcome: Progressing     Problem: Pain  Goal: Verbalizes/displays adequate comfort level or baseline comfort level  Outcome: Progressing Remote history of IVC thrombosis  Patient is now on Lovenox prophylactic dose 40 mg daily  She will continue perioperatively  On day of surgery we will be the once administer in her Lovenox prior to her procedure

## 2022-09-15 ENCOUNTER — PREP FOR PROCEDURE (OUTPATIENT)
Dept: GASTROENTEROLOGY | Facility: MEDICAL CENTER | Age: 47
End: 2022-09-15

## 2022-09-15 ENCOUNTER — TELEPHONE (OUTPATIENT)
Dept: GASTROENTEROLOGY | Facility: MEDICAL CENTER | Age: 47
End: 2022-09-15

## 2022-09-15 DIAGNOSIS — K27.9 PEPTIC ULCER: Primary | ICD-10-CM

## 2022-09-15 DIAGNOSIS — Z12.11 COLON CANCER SCREENING: ICD-10-CM

## 2022-09-15 NOTE — TELEPHONE ENCOUNTER
Our mutual patient is scheduled for procedure: Colon/EGD     On: _12/15/22      With: Dr Summer Mendez     He/She is taking the following blood thinner:    Lovenox        Can this be stopped __5_ days prior to the procedure?        Physician Approving clearance: ________________________

## 2022-09-15 NOTE — TELEPHONE ENCOUNTER
Scheduled date of Colon/EGD (as of today) 12/15/22  Physician performing : Shelton Fabry  Location of procedure : [place]   Bowel prep reviewed with patient: Two Day  Instructions reviewed with patient by: Mailed  Clearances: Lovenox

## 2022-09-16 ENCOUNTER — HOSPITAL ENCOUNTER (OUTPATIENT)
Dept: RADIOLOGY | Facility: HOSPITAL | Age: 47
Discharge: HOME | End: 2022-09-16
Payer: MEDICARE

## 2022-09-16 DIAGNOSIS — M25.551 RIGHT HIP PAIN: ICD-10-CM

## 2022-09-16 PROCEDURE — 73502 X-RAY EXAM HIP UNI 2-3 VIEWS: CPT

## 2022-10-25 ENCOUNTER — TELEPHONE (OUTPATIENT)
Dept: HEMATOLOGY ONCOLOGY | Facility: CLINIC | Age: 47
End: 2022-10-25

## 2022-10-25 NOTE — TELEPHONE ENCOUNTER
CALL RETURN FORM   Reason for patient call? Flip Butts from [facility] calling in stating she will be sending over a medication hold request for the patient's Lovenox, due to the patient's upcoming surgery  [employer] number provided [phone]     Patient's primary oncologist? Camelia Mcmanus    Name of person the patient was calling for? Mrak    Any additional information to add, if applicable? Any questions, Flip Butts can be called back at [phone]   Informed patient that the message will be forwarded to the team and someone will get back to them as soon as possible    Did you relay this information to the patient?   yes

## 2022-12-14 RX ORDER — SODIUM CHLORIDE 9 MG/ML
125 INJECTION, SOLUTION INTRAVENOUS CONTINUOUS
Status: CANCELLED | OUTPATIENT
Start: 2022-12-14

## 2022-12-15 ENCOUNTER — ANESTHESIA (OUTPATIENT)
Dept: GASTROENTEROLOGY | Facility: HOSPITAL | Age: 47
End: 2022-12-15

## 2022-12-15 ENCOUNTER — HOSPITAL ENCOUNTER (OUTPATIENT)
Dept: GASTROENTEROLOGY | Facility: HOSPITAL | Age: 47
Setting detail: OUTPATIENT SURGERY
End: 2022-12-15
Attending: INTERNAL MEDICINE

## 2022-12-15 ENCOUNTER — ANESTHESIA EVENT (OUTPATIENT)
Dept: GASTROENTEROLOGY | Facility: HOSPITAL | Age: 47
End: 2022-12-15

## 2022-12-15 VITALS
HEIGHT: 63 IN | HEART RATE: 80 BPM | DIASTOLIC BLOOD PRESSURE: 66 MMHG | WEIGHT: 260 LBS | BODY MASS INDEX: 46.07 KG/M2 | RESPIRATION RATE: 18 BRPM | TEMPERATURE: 98.2 F | SYSTOLIC BLOOD PRESSURE: 120 MMHG | OXYGEN SATURATION: 98 %

## 2022-12-15 DIAGNOSIS — K27.9 PEPTIC ULCER: ICD-10-CM

## 2022-12-15 DIAGNOSIS — Z12.11 COLON CANCER SCREENING: ICD-10-CM

## 2022-12-15 DIAGNOSIS — K21.9 ACID REFLUX: ICD-10-CM

## 2022-12-15 PROBLEM — E66.813 CLASS 3 SEVERE OBESITY WITH BODY MASS INDEX (BMI) OF 45.0 TO 49.9 IN ADULT (HCC): Status: ACTIVE | Noted: 2021-05-02

## 2022-12-15 RX ORDER — PANTOPRAZOLE SODIUM 40 MG/1
40 TABLET, DELAYED RELEASE ORAL DAILY
Qty: 60 TABLET | Refills: 2 | Status: SHIPPED | OUTPATIENT
Start: 2022-12-15

## 2022-12-15 RX ORDER — LIDOCAINE HYDROCHLORIDE 20 MG/ML
INJECTION, SOLUTION EPIDURAL; INFILTRATION; INTRACAUDAL; PERINEURAL AS NEEDED
Status: DISCONTINUED | OUTPATIENT
Start: 2022-12-15 | End: 2022-12-15

## 2022-12-15 RX ORDER — PROPOFOL 10 MG/ML
INJECTION, EMULSION INTRAVENOUS AS NEEDED
Status: DISCONTINUED | OUTPATIENT
Start: 2022-12-15 | End: 2022-12-15

## 2022-12-15 RX ORDER — PROPOFOL 10 MG/ML
INJECTION, EMULSION INTRAVENOUS CONTINUOUS PRN
Status: DISCONTINUED | OUTPATIENT
Start: 2022-12-15 | End: 2022-12-15

## 2022-12-15 RX ORDER — SODIUM CHLORIDE 9 MG/ML
125 INJECTION, SOLUTION INTRAVENOUS CONTINUOUS
Status: DISCONTINUED | OUTPATIENT
Start: 2022-12-15 | End: 2022-12-19 | Stop reason: HOSPADM

## 2022-12-15 RX ADMIN — PROPOFOL 150 MCG/KG/MIN: 10 INJECTION, EMULSION INTRAVENOUS at 14:48

## 2022-12-15 RX ADMIN — PROPOFOL 180 MCG/KG/MIN: 10 INJECTION, EMULSION INTRAVENOUS at 15:00

## 2022-12-15 RX ADMIN — PROPOFOL 20 MG: 10 INJECTION, EMULSION INTRAVENOUS at 14:51

## 2022-12-15 RX ADMIN — PROPOFOL 50 MG: 10 INJECTION, EMULSION INTRAVENOUS at 14:49

## 2022-12-15 RX ADMIN — PROPOFOL 50 MG: 10 INJECTION, EMULSION INTRAVENOUS at 15:05

## 2022-12-15 RX ADMIN — PROPOFOL 20 MG: 10 INJECTION, EMULSION INTRAVENOUS at 14:56

## 2022-12-15 RX ADMIN — PROPOFOL 50 MG: 10 INJECTION, EMULSION INTRAVENOUS at 15:30

## 2022-12-15 RX ADMIN — SODIUM CHLORIDE 125 ML/HR: 0.9 INJECTION, SOLUTION INTRAVENOUS at 13:30

## 2022-12-15 RX ADMIN — SODIUM CHLORIDE: 0.9 INJECTION, SOLUTION INTRAVENOUS at 15:20

## 2022-12-15 RX ADMIN — PROPOFOL 50 MG: 10 INJECTION, EMULSION INTRAVENOUS at 15:18

## 2022-12-15 RX ADMIN — PROPOFOL 20 MG: 10 INJECTION, EMULSION INTRAVENOUS at 14:50

## 2022-12-15 RX ADMIN — LIDOCAINE HYDROCHLORIDE 100 MG: 20 INJECTION, SOLUTION EPIDURAL; INFILTRATION; INTRACAUDAL at 14:48

## 2022-12-15 RX ADMIN — PROPOFOL 100 MG: 10 INJECTION, EMULSION INTRAVENOUS at 14:48

## 2022-12-15 RX ADMIN — PROPOFOL 50 MG: 10 INJECTION, EMULSION INTRAVENOUS at 15:11

## 2022-12-15 NOTE — H&P
History and Physical - [facility]  Bernabe Green 50 y o  female MRN: [id number]                  HPI: eBrnabe Green is a 50y o  year old female who presents for bleeding gastric ulcer, colon cancer screening  REVIEW OF SYSTEMS: Per the HPI, and otherwise unremarkable      Historical Information   Past Medical History:   Diagnosis Date   • Anemia    • Anxiety    • Balance disorder    • Bipolar depression ([facility] [address] )     bipolar II, suicide   • Depression    • Disease of thyroid gland    • GERD (gastroesophageal reflux disease)    • Heart murmur    • History of DVT (deep vein thrombosis)    • History of gastric bypass     2010   • Lymphedema     bles- chronic   • Migraines    • Pelvic mass    • Pulmonary embolism (HCC)    • Rash     under abd fold   • Seizures (HCC)    • Use of cane as ambulatory aid    • UTI (urinary tract infection)     7/24/21     Past Surgical History:   Procedure Laterality Date   • APPENDECTOMY      Open   • CHOLECYSTECTOMY      Laparoscopic   • CYSTOSCOPY N/A 8/13/2021    Procedure: CYSTOSCOPY;  Surgeon: Elis Grewal MD;  [facility];  Service: Gynecology Oncology   • GASTRIC BYPASS      was 205 date of surgery   • IR DVT THROMBOLYSIS/THROMBECTOMY ILIAC/IVC WITH VENOGRAM  8/4/2020   • IR IVC FILTER REMOVAL  6/8/2021   • IR TPA LYSIS CHECK  8/5/2020   • IVC FILTER INSERTION  2017   • AZ LAP,DIAGNOSTIC ABDOMEN N/A 8/13/2021    Procedure: LAPAROSCOPY DIAGNOSTIC;  Surgeon: Elis Grewal MD;  [facility];  Service: Gynecology Oncology   • AZ LAP,FULGURATE/EXCISE LESIONS N/A 8/13/2021    Procedure: ROBOTIC RESECTION OF PELVIC MASS/ RIGHT SALPINGO-OOPHORECTOMY, LEFT SALPINGECTOMY;  Surgeon: Elis Grewal MD;  [facility];  Service: Gynecology Oncology   • AZ LAPAROSCOPY W TOT HYSTERECT UTERUS 250 GRAM OR LESS N/A 6/23/2022    Procedure: ROBOTIC TOTAL HYSTERECTOMY, CYSTOSCOPY;  Surgeon: Elis Grewal MD;  [facility];  Service: Gynecology Oncology   • STOMACH SURGERY      for morbid obesity   • TUBAL LIGATION Bilateral      Social History   Social History     Substance and Sexual Activity   Alcohol Use Yes    Comment: ocassional     Social History     Substance and Sexual Activity   Drug Use Yes   • Frequency: 7 0 times per week   • Types: Marijuana    Comment: medical marijuana      Social History     Tobacco Use   Smoking Status Former   • Types: Cigarettes   • Quit date:    • Years since quittin 9   Smokeless Tobacco Never   Tobacco Comments    former quit in  per [employer]     Family History   Problem Relation Age of Onset   • Other Mother         headache   • Hypertension Mother    • Depression Mother    • Arthritis Father    • Other Father         H, pylori infection   • Other Sister         esophageal reflux   • Migraines Sister    • No Known Problems Paternal Grandfather    • Diabetes Paternal Aunt         Mellitus   • Breast cancer Paternal Aunt    • Diabetes Paternal Uncle         Mellitus   • Liver cancer Paternal Uncle    • Asthma Daughter    • No Known Problems Maternal Grandmother    • No Known Problems Maternal Grandfather    • No Known Problems Paternal Grandmother    • No Known Problems Brother    • No Known Problems Sister    • No Known Problems Sister    • Asthma Daughter    • No Known Problems Maternal Aunt        Meds/Allergies     (Not in a hospital admission)      Allergies   Allergen Reactions   • Morphine Seizures     Pt denies       Objective     not currently breastfeeding  PHYSICAL EXAMINATION:    General Appearance:   Alert, cooperative, no distress   HEENT:  Normocephalic, atraumatic, anicteric  Neck supple, symmetrical, trachea midline  Lungs:   Equal chest rise and unlabored breathing, normal effort, no coughing  Cardiovascular:   No visualized JVD  Abdomen:   No abdominal distension  Skin:   No jaundice, rashes, or lesions      Musculoskeletal:   Normal range of motion visualized  Psych:  Normal affect and normal insight  Neuro:  Alert and appropriate  ASSESSMENT/PLAN:  This is a 50y o  year old female here for EGD and colonoscopy, and she is stable and optimized for her procedure

## 2022-12-15 NOTE — ANESTHESIA POSTPROCEDURE EVALUATION
Post-Op Assessment Note    CV Status:  Stable    Pain management: adequate     Mental Status:  Alert and awake   Hydration Status:  Euvolemic   PONV Controlled:  Controlled   Airway Patency:  Patent      Post Op Vitals Reviewed: Yes      Staff: Anesthesiologist         No notable events documented      /66 (12/15/22 1605)    Temp      Pulse 80 (12/15/22 1605)   Resp 18 (12/15/22 1605)    SpO2 98 % (12/15/22 1605)

## 2022-12-15 NOTE — ANESTHESIA PREPROCEDURE EVALUATION
Procedure:  COLONOSCOPY  EGD    Relevant Problems   CARDIO   (+) Acute gastric ulcer with hemorrhage   (+) Migraine   (+) Mixed hyperlipidemia      ENDO   (+) Acquired hypothyroidism      GI/HEPATIC   (+) Acute gastric ulcer with hemorrhage   (+) Alcoholic cirrhosis of liver without ascites (HCC)   (+) Gastroesophageal reflux disease      GYN   (+) S/P hysterectomy      HEMATOLOGY   (+) Acute blood loss anemia   (+) Acute on chronic blood loss anemia   (+) Anemia   (+) Hypercoagulation syndrome (HCC)   (+) Iron deficiency anemia following bariatric surgery      NEURO/PSYCH   (+) History of DVT (deep vein thrombosis)   (+) Hx of pulmonary embolus   (+) Hx of suicide attempt   (+) Migraine   (+) Obsessive compulsive disorder   (+) Seizures (HCC)      Other   (+) Class 3 severe obesity with body mass index (BMI) of 45 0 to 49 9 in adult [facility])        Physical Exam    Airway    Mallampati score: II  TM Distance: >3 FB  Neck ROM: full     Dental   No notable dental hx     Cardiovascular  Rhythm: regular, Rate: normal, Cardiovascular exam normal    Pulmonary  Pulmonary exam normal Breath sounds clear to auscultation,     Other Findings        Anesthesia Plan  ASA Score- 3     Anesthesia Type- IV sedation with anesthesia with ASA Monitors  Additional Monitors:   Airway Plan:           Plan Factors-    Chart reviewed  Patient summary reviewed  Patient is not a current smoker  Patient instructed to abstain from smoking on day of procedure  Patient did not smoke on day of surgery  Obstructive sleep apnea risk education given perioperatively  Induction-     Postoperative Plan-     Informed Consent- Anesthetic plan and risks discussed with patient

## 2023-01-17 ENCOUNTER — TELEPHONE (OUTPATIENT)
Dept: HEMATOLOGY ONCOLOGY | Facility: CLINIC | Age: 48
End: 2023-01-17

## 2023-01-17 DIAGNOSIS — Z86.718 HISTORY OF DVT (DEEP VEIN THROMBOSIS): ICD-10-CM

## 2023-01-17 RX ORDER — ENOXAPARIN SODIUM 100 MG/ML
40 INJECTION SUBCUTANEOUS DAILY
Qty: 30 ML | Refills: 3 | Status: SHIPPED | OUTPATIENT
Start: 2023-01-17

## 2023-01-17 NOTE — TELEPHONE ENCOUNTER
MEDICATION REFILL ROUTE TO RN    Who is Calling  Patient   Medication enoxaparin (LOVENOX) 40 mg/0 4 mL       How many pills left 1 box    Preferred Pharmacy / [place] [place] [address] [place] [address] [address]    Who is your Physician?  Feroz Dahl PA-C   Call back number [phone]   Relevant Information  Yes

## 2023-01-18 ENCOUNTER — TELEPHONE (OUTPATIENT)
Dept: HEMATOLOGY ONCOLOGY | Facility: CLINIC | Age: 48
End: 2023-01-18

## 2023-01-18 NOTE — TELEPHONE ENCOUNTER
Instructions reviewed with patient  Forms faxed back to dental office - scanned in chart under media

## 2023-01-18 NOTE — TELEPHONE ENCOUNTER
Received clearance form from dental office  Instructions written for patient to hold Lovenox for 24 hours prior to procedure  Oral surgeon/dentist to advise when to restart

## 2023-01-30 ENCOUNTER — TELEPHONE (OUTPATIENT)
Dept: HEMATOLOGY ONCOLOGY | Facility: CLINIC | Age: 48
End: 2023-01-30

## 2023-01-30 NOTE — TELEPHONE ENCOUNTER
Appointment Cancellation Or Reschedule     Person calling in Patient   If someone other than patient calling, are they listed on the communication consent form? N/A   Provider Annabelle Mcgowan PA-C   Office Visit Date and Time 01/31 at 10:00am   [facility]   Did patient want to reschedule their office appointment? If so, when was it scheduled to? 02/01 at 9:30am   Did you have STAR scheduled for this appointment? No   Do you need STAR set up for your new appointment? If yes, please send to "PATIENT RIDESHARE" pool for STAR rescheduling No   Is this patient calling to reschedule an infusion appointment? No   When is their next infusion appointment? n/a   Is this patient a Chemo patient? No   Reason for Cancellation or Reschedule Patient is unable to make appointment      If the patient is cancelling an appointment and needs their STAR Transport cancelled, please route to [address]  If the patient is a treatment patient, please route this to the office nurse  If the patient is not on treatment, please route to the Clerical pool based on location  If the patient is a surgical oncology patient, please route to surg/onc clinical pool  Route message as high priority if same day cancellation

## 2023-02-01 ENCOUNTER — TELEPHONE (OUTPATIENT)
Dept: HEMATOLOGY ONCOLOGY | Facility: CLINIC | Age: 48
End: 2023-02-01

## 2023-02-01 NOTE — TELEPHONE ENCOUNTER
Appointment Cancellation Or Reschedule     Person calling In self   If someone other than patient calling, are they listed on the communication consent form? self   Provider LEATHA DUTTA   Office Visit Date and Time 2/1 9   Office Visit Location [place]   Did patient want to reschedule their office appointment? If so, when was it scheduled to? Yes, 5/2 8:40am [facility]   Did you have STAR scheduled for this appointment? no   Do you need STAR set up for your new appointment? If yes, [facility] for [facility] rescheduling no   Is this patient calling to reschedule an infusion appointment? no   When is their next infusion appointment? no   Is this patient a Chemo patient? no   Reason for Cancellation or Reschedule Not feeling well     If the patient is cancelling an appointment and needs their STAR Transport cancelled, please route to [address]  If the patient is a treatment patient, please route this to the office nurse  If the patient is not on treatment, please route to the Clerical pool based on location  If the patient is a surgical oncology patient, please route to surg/onc clinical pool  Route message as high priority if same day cancellation

## 2023-03-14 ENCOUNTER — APPOINTMENT (OUTPATIENT)
Dept: LAB | Facility: HOSPITAL | Age: 48
End: 2023-03-14

## 2023-03-14 DIAGNOSIS — K95.89 IRON DEFICIENCY ANEMIA FOLLOWING BARIATRIC SURGERY: ICD-10-CM

## 2023-03-14 DIAGNOSIS — D50.8 IRON DEFICIENCY ANEMIA FOLLOWING BARIATRIC SURGERY: ICD-10-CM

## 2023-03-14 LAB
BASOPHILS # BLD AUTO: 0.11 THOUSANDS/ÂΜL (ref 0–0.1)
BASOPHILS NFR BLD AUTO: 1 % (ref 0–1)
EOSINOPHIL # BLD AUTO: 0.65 THOUSAND/ÂΜL (ref 0–0.61)
EOSINOPHIL NFR BLD AUTO: 6 % (ref 0–6)
ERYTHROCYTE [DISTWIDTH] IN BLOOD BY AUTOMATED COUNT: 18.4 % (ref 11.6–15.1)
FERRITIN SERPL-MCNC: 104 NG/ML (ref 8–388)
HCT VFR BLD AUTO: 36.7 % (ref 34.8–46.1)
HGB BLD-MCNC: 11.2 G/DL (ref 11.5–15.4)
IMM GRANULOCYTES # BLD AUTO: 0.06 THOUSAND/UL (ref 0–0.2)
IMM GRANULOCYTES NFR BLD AUTO: 1 % (ref 0–2)
IRON SATN MFR SERPL: 16 % (ref 15–50)
IRON SERPL-MCNC: 51 UG/DL (ref 50–170)
LYMPHOCYTES # BLD AUTO: 2.36 THOUSANDS/ÂΜL (ref 0.6–4.47)
LYMPHOCYTES NFR BLD AUTO: 21 % (ref 14–44)
MCH RBC QN AUTO: 29 PG (ref 26.8–34.3)
MCHC RBC AUTO-ENTMCNC: 30.5 G/DL (ref 31.4–37.4)
MCV RBC AUTO: 95 FL (ref 82–98)
MONOCYTES # BLD AUTO: 0.63 THOUSAND/ÂΜL (ref 0.17–1.22)
MONOCYTES NFR BLD AUTO: 6 % (ref 4–12)
NEUTROPHILS # BLD AUTO: 7.59 THOUSANDS/ÂΜL (ref 1.85–7.62)
NEUTS SEG NFR BLD AUTO: 65 % (ref 43–75)
NRBC BLD AUTO-RTO: 0 /100 WBCS
PLATELET # BLD AUTO: 231 THOUSANDS/UL (ref 149–390)
PMV BLD AUTO: 12.6 FL (ref 8.9–12.7)
RBC # BLD AUTO: 3.86 MILLION/UL (ref 3.81–5.12)
TIBC SERPL-MCNC: 315 UG/DL (ref 250–450)
WBC # BLD AUTO: 11.4 THOUSAND/UL (ref 4.31–10.16)

## 2023-03-31 ENCOUNTER — TELEPHONE (OUTPATIENT)
Dept: HEMATOLOGY ONCOLOGY | Facility: CLINIC | Age: 48
End: 2023-03-31

## 2023-03-31 NOTE — TELEPHONE ENCOUNTER
Returned telephone call spoke with Derm office  Francisco Peng was not available  Explained not sure about the message I received, doxycycline is an antibx and has nothing to do with the Lovenox a blood thinner  Their office said they would give Francisco Peng the message and she will call me back

## 2023-03-31 NOTE — TELEPHONE ENCOUNTER
Patient Call Form   Reason for patient call? Dermatology calling to ask: still required doxycycline 20mg twice daily while on Lovenox injections? Patient's primary oncologist? [phone]   Name of person the patient was calling for? [facility]   Does the patient issue require a call back? Yes, [phone]   If call back required, inform patient that the message will be forwarded to the team and someone will get back to them as soon as possible    Did you relay this information to the patient?  yes

## 2023-04-26 PROBLEM — M47.816 LUMBAR SPONDYLOSIS: Status: ACTIVE | Noted: 2023-04-26

## 2023-04-26 PROBLEM — M47.812 CERVICAL SPONDYLOSIS WITHOUT MYELOPATHY: Status: ACTIVE | Noted: 2023-04-26

## 2023-04-26 PROBLEM — M54.16 CHRONIC RIGHT-SIDED LUMBAR RADICULOPATHY: Status: ACTIVE | Noted: 2023-04-26

## 2023-04-26 PROBLEM — G25.81 RLS (RESTLESS LEGS SYNDROME): Status: ACTIVE | Noted: 2023-04-26

## 2023-04-26 PROBLEM — G89.4 CHRONIC PAIN SYNDROME: Status: ACTIVE | Noted: 2023-04-26

## 2023-04-26 PROBLEM — I87.2 VENOUS INSUFFICIENCY OF BOTH LOWER EXTREMITIES: Status: ACTIVE | Noted: 2023-04-26

## 2023-04-30 PROBLEM — M35.9 UNDIFFERENTIATED CONNECTIVE TISSUE DISEASE (HCC): Status: ACTIVE | Noted: 2023-04-30

## 2023-04-30 PROBLEM — F10.90 ALCOHOL USE DISORDER: Status: ACTIVE | Noted: 2023-04-30

## 2023-05-02 ENCOUNTER — OFFICE VISIT (OUTPATIENT)
Dept: HEMATOLOGY ONCOLOGY | Facility: CLINIC | Age: 48
End: 2023-05-02

## 2023-05-02 VITALS
OXYGEN SATURATION: 98 % | TEMPERATURE: 98.2 F | BODY MASS INDEX: 50.5 KG/M2 | HEIGHT: 63 IN | DIASTOLIC BLOOD PRESSURE: 80 MMHG | RESPIRATION RATE: 16 BRPM | HEART RATE: 83 BPM | SYSTOLIC BLOOD PRESSURE: 126 MMHG | WEIGHT: 285 LBS

## 2023-05-02 DIAGNOSIS — D50.8 IRON DEFICIENCY ANEMIA FOLLOWING BARIATRIC SURGERY: Primary | ICD-10-CM

## 2023-05-02 DIAGNOSIS — K95.89 IRON DEFICIENCY ANEMIA FOLLOWING BARIATRIC SURGERY: Primary | ICD-10-CM

## 2023-05-02 DIAGNOSIS — Z86.718 HISTORY OF DVT (DEEP VEIN THROMBOSIS): ICD-10-CM

## 2023-05-02 RX ORDER — ENOXAPARIN SODIUM 100 MG/ML
40 INJECTION SUBCUTANEOUS DAILY
Qty: 30 ML | Refills: 3 | Status: SHIPPED | OUTPATIENT
Start: 2023-05-02

## 2023-05-02 NOTE — PROGRESS NOTES
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, requiring thrombectomy and thrombolysis  Unfortunately, patient has had episodes of bleeding complications also when she has been on chemical AC (initially in 2017 when she was in [place], also in 2021 when EGD revealed bleeding ulcer at the gastrojejunostomy site)  Her IVC filter was retrieved in June 2021  She is maintained on Lovenox 40 mg SQ daily; she has not had any bleeding complications or new/acute thrombotic events  Given history of bleeding complications and because patient hasn't had any recurrent acute thrombotic events on current Lovenox dosing, recommendation was to continue Lovenox at the 40mg daily dosing  Patient understands that this is not a therapeutic dose, however dose adjusted due to her history of bleeding complications  Her Lovenox has been renewed  - enoxaparin (LOVENOX) 40 mg/0 4 mL; Inject 0 4 mL (40 mg total) under the skin in the morning  Dispense: 30 mL; Refill: 3  - Comprehensive metabolic panel; Future    3  Iron deficiency anemia following bariatric surgery    Patient with history of iron deficiency anemia secondary to malabsorption from bariatric surgery  Most recent CBC from March 2023 shows stable hemoglobin (11 6) and MCV (91)  Iron panel shows normal ferritin of 104  Patient was recommended to continue taking oral iron supplementation  Patient was recommended to follow-up in office with repeat CBC and iron panel in about 6 months     - CBC and differential; Future  - Iron Panel (Includes Ferritin, Iron Sat%, Iron, and TIBC);  Future    HPI:    Zehra Sharpe is a 48 y o  female presents for follow up for history of recurrent thrombosis, iron def anemia due to malabsorption from bariatric surgery and history of GI blood loss       Past medical history significant for hepatic steatosis, hypothyroidism, pulmonary embolism, IVC thrombosis, migraines, seizures, psychiatric disorder (bipolar disorder, SABINO), history of gastric bypass surgery ()     Patient presented to the emergency department on 2020 due to abdominal pain radiating into bilateral upper legs      She had a history of bilateral lower extremity DVT and PE in 2017 for which she had an IVC filter placed in [facility] in 2017  She was treated with Xarelto but developed a GI bleed in discontinued   Per patient this was unprovoked  Patient admitted to being off of anticoagulation during this admission and denied a previous workup for clotting disorder in the past      Family history significant for DVT and PE and her sister  Her sister  of PE  Of note her sister had lupus  Paternal uncle had DVT as well       A CT of the abdomen pelvis was completed on 2020   This showed known IVC filter, diffuse thrombus extending from the IVC filter inferiorly into the iliac vasculature, extensive surrounding fat stranding with thrombophlebitis, small amount of thrombus extends superior to the filter   A venous Doppler study of bilateral lower extremities were also completed on 2020 which was negative for DVT  She underwent lysis of thrombus with IR on   She returned to IR on 2020 for IR venogram  Patient was on heparin during hospitalization    Patient was seen by Dr Jordan on 2020 during hospitalization   Due to patient's history of gastric bypass surgery patient was recommended Lovenox 0 75 milligrams/kilogram subcu b i d      She had partial thrombosis workup in the hospital               Homocystine normal, 7 4              Protein S activity normal, 111              Protein C activity normal, 91 0              Factor 5 Leiden mutation negative              Prothrombin gene mutation negative              Anticardiolipin antibody IgA and IgG normal   IgM 18 which is a indeterminate results              Lupus anticoagulant negative               Beta-2 glycoproteins negative      Patient was discharged on Lovenox 0 75 milligram/kilogram   Discharge date was 08/09/2020   Ke Acevedo had outpatient follow-up with vascular surgery on 08/21/2020   Recommendation for her venous disease included conservative measures and medical management   Patient was scheduled for follow-up in November 2020 with vascular surgery with a repeat IVC/iliac vein duplex and bilateral lower extremity DVT study       At consultation patient was recommended to have EGD and colonoscopy secondary to weight loss   She had an EGD and colonoscopy on 12/03/2020   This showed  anastomotic ulcer on the jejunal side   Colonoscopy showed 1 polyp measuring 5-9 mm in the sigmoid colon   A clip was placed to prevent bleeding secondary to anticoagulation  There is no of inadequate bowel prep and patient was recommended to have repeat in 6 months (June 2021)        Patient was admitted to the hospital to the ICU due to acute GI bleed from 5/2 - 5/5/21  Ke Acevedo states that she was taking NSAIDs due to migraines even though she knew she was not supposed to do this   She had EGD on 05/02/2021 due to bleeding while inpatient and this showed single 5 mm crater drowned benign-appearing ulcer in the stomach   This was treated   Patient's Lovenox was held during parts of hospitalization then restarted on discharge      IVC filter was removed in June 2021       Interval history:      Patient presents to the office for a routine follow-up visit  Patient's most recent CBC from March 2023 shows a hemoglobin of 11 5 with hematocrit of 35 9%  MCV 89  It shows stable platelet count of 985  Iron panel shows ferritin of 104 with iron saturation of 16%, TIBC 315, iron 51    Patient reports compliance with oral iron supplementation  Patient had an EGD and colonoscopy in December 2022  EGD showed a stricture at the GE junction, which was dilated  Prep for colonoscopy was suboptimal, however entire colon appeared normal     ROS: Review of Systems   Constitutional: Negative for appetite change, chills, fatigue, fever and unexpected weight change  HENT: Negative for facial swelling, sore throat and trouble swallowing  Eyes: Negative for visual disturbance  Respiratory: Negative for cough, shortness of breath and wheezing  Cardiovascular: Negative for chest pain, palpitations and leg swelling  Gastrointestinal: Negative for abdominal pain, constipation, diarrhea, nausea and vomiting  Genitourinary: Negative for difficulty urinating, dysuria, flank pain and hematuria  Musculoskeletal: Negative for arthralgias and back pain  Skin: Negative  Neurological: Negative for dizziness, seizures, weakness, light-headedness, numbness and headaches  Hematological: Negative for adenopathy  Does not bruise/bleed easily  Psychiatric/Behavioral: Negative for agitation, confusion and hallucinations         Past Medical History:   Diagnosis Date    Anemia     Anxiety     Balance disorder     Bipolar depression (HCC)     bipolar II, suicide    Depression     Disease of thyroid gland     Fatty liver     GERD (gastroesophageal reflux disease)     Heart murmur     History of DVT (deep vein thrombosis)     History of gastric bypass     2010    Lymphedema     bles- chronic    Migraines     Pelvic mass     Pulmonary embolism (HCC)     Rash     under abd fold    Seizures (HCC)     Use of cane as ambulatory aid     UTI (urinary tract infection)     7/24/21       Past Surgical History:   Procedure Laterality Date    APPENDECTOMY      Open    CHOLECYSTECTOMY      Laparoscopic    CYSTOSCOPY N/A 8/13/2021    Procedure: CYSTOSCOPY;  Surgeon: Kendra Collazo MD;  Location: [facility];  Service: Gynecology Oncology    GASTRIC BYPASS      was 205 date of surgery    IR DVT THROMBOLYSIS/THROMBECTOMY ILIAC/IVC WITH VENOGRAM  2020    IR IVC FILTER REMOVAL  2021    IR TPA LYSIS CHECK  2020    IVC FILTER INSERTION  2017    AL LAPAROSCOPY W TOTAL HYSTERECTOMY UTERUS 250 GM/< N/A 2022    Procedure: ROBOTIC TOTAL HYSTERECTOMY, CYSTOSCOPY;  Surgeon: Leward Hashimoto, MD;  [facility];  Service: Gynecology Oncology    AL LAPS ABD PRTM&OMENTUM DX W/WO SPEC BR/ [facility] N/A 2021    Procedure: LAPAROSCOPY DIAGNOSTIC;  Surgeon: Leward Hashimoto, MD;  [facility];  Service: Gynecology Oncology    AL LAPS FULG/EXC OVARY VISCERA/PERITONEAL SURFACE N/A 2021    Procedure: ROBOTIC RESECTION OF PELVIC MASS/ RIGHT SALPINGO-OOPHORECTOMY, LEFT SALPINGECTOMY;  Surgeon: Leward Hashimoto, MD;  [facility];  Service: Gynecology Oncology    STOMACH SURGERY      for morbid obesity    TUBAL LIGATION Bilateral        Social History     Socioeconomic History    Marital status: Single     Spouse name: None    Number of children: None    Years of education: None    Highest education level: None   Occupational History    Occupation:      Comment: [employer]   Tobacco Use    Smoking status: Former     Types: Cigarettes     Quit date:      Years since quittin 3    Smokeless tobacco: Never    Tobacco comments:     former quit in [address] [facility] per [employer]   Vaping Use    Vaping Use: Former    Substances: THC   Substance and Sexual Activity    Alcohol use: Yes     Comment: ocassional social    Drug use: Yes     Frequency: 7 0 times per week     Types: Marijuana     Comment: medical marijuana     Sexual activity: Not Currently   Other Topics Concern    None   Social History Narrative    Sleep 6 in 24 hours    Caffeine use; 2 cps coffee per day    Uses seatbelts             Social Determinants of Health     Financial Resource Strain: Not on file   Food Insecurity: Not on file   Transportation Needs: Not on file   Physical Activity: Not on file   Stress: Not on file   Social Connections: Not on file   Intimate Partner Violence: Not on file   Housing Stability: Not on file       Family History   Problem Relation Age of Onset    Other Mother         headache    Hypertension Mother    [place] Depression Mother     Arthritis Father     Other Father         H, pylori infection    Other Sister         esophageal reflux    Migraines Sister     No Known Problems Paternal Grandfather     Diabetes Paternal Aunt         Mellitus    Breast cancer Paternal Aunt     Diabetes Paternal Uncle         Mellitus    Liver cancer Paternal Uncle     Asthma Daughter     No Known Problems Maternal Grandmother     No Known Problems Maternal Grandfather     No Known Problems Paternal Grandmother     No Known Problems Brother     No Known Problems Sister     No Known Problems Sister     Asthma Daughter     No Known Problems Maternal Aunt        Allergies   Allergen Reactions    Morphine Seizures         Current Outpatient Medications:     atomoxetine (STRATTERA) 80 MG capsule, 80 mg, Disp: , Rfl:     buPROPion (WELLBUTRIN XL) 150 mg 24 hr tablet, Take 150 mg by mouth every morning, Disp: , Rfl:     busPIRone (BUSPAR) 10 mg tablet, TAKE 1 TABLET (10 MG) BY ORAL ROUTE 3 TIMES PER DAY, Disp: , Rfl:     Cholecalciferol (Vitamin D3) 50 MCG (2000 UT) capsule, Take 2,000 Units by mouth daily (Patient not taking: Reported on 4/26/2023), Disp: , Rfl:     ciclopirox (LOPROX) 0 77 % SUSP, APPLY ONCE A DAY TO RIGHT THUMBNAIL (Patient not taking: Reported on 4/26/2023), Disp: , Rfl:     cloNIDine (CATAPRES) 0 1 mg tablet, Take 0 1 mg by mouth 3 (three) times a day, Disp: , Rfl:     clotrimazole (LOTRIMIN) 1 % cream, Apply topically 2 (two) times a day (Patient not taking: Reported on 4/26/2023), Disp: 30 g, Rfl: 1    doxycycline (PERIOSTAT) 20 MG tablet, TAKE 1 TABLET BY MOUTH TWICE A DAY WITH FOOD (NOT DAIRY), Disp: , Rfl:     enoxaparin (LOVENOX) 40 mg/0 4 mL, Inject 0 4 mL (40 mg total) under the skin in the morning, Disp: 30 mL, Rfl: 3    ferrous sulfate 325 (65 Fe) mg tablet, Take 325 mg by mouth daily with breakfast, Disp: , Rfl:     folic acid (FOLVITE) 1 mg tablet, Take 1 mg by mouth daily, Disp: , Rfl:     gabapentin (NEURONTIN) 300 mg capsule, Take 300 mg by mouth 2 (two) times a day, Disp: , Rfl:     hydrOXYzine pamoate (VISTARIL) 25 mg capsule, Take 1 capsule by mouth Every 12 hours (Patient not taking: Reported on 4/26/2023), Disp: , Rfl:     levothyroxine 100 mcg tablet, Take 1 tablet (100 mcg total) by mouth daily in the early morning, Disp: 30 tablet, Rfl: 0    lithium carbonate (LITHOBID) 300 mg CR tablet, Take 300 mg by mouth 2 (two) times a day, Disp: , Rfl:     Multiple Vitamin (MULTI-VITAMIN DAILY PO), Multi Vitamin  DAILY (Patient not taking: Reported on 4/26/2023), Disp: , Rfl:     NON FORMULARY, Medical marijuana prn pain/ anxiety (Patient not taking: Reported on 4/26/2023), Disp: , Rfl:     oxybutynin (DITROPAN-XL) 10 MG 24 hr tablet, Take 10 mg by mouth daily, Disp: , Rfl:     oxyCODONE-acetaminophen (Percocet) 5-325 mg per tablet, Take 1 tablet by mouth every 6 (six) hours as needed for severe pain Max Daily Amount: 4 tablets (Patient not taking: Reported on 4/26/2023), Disp: 20 tablet, Rfl: 0    pantoprazole (PROTONIX) 40 mg tablet, Take 1 tablet (40 mg total) by mouth daily, Disp: 60 tablet, Rfl: 2    pramipexole (MIRAPEX) 0 5 mg tablet, Take 0 5 mg by mouth daily, Disp: , Rfl:     pramipexole (MIRAPEX) 1 mg tablet, Take 1 mg by mouth daily, Disp: , Rfl:     pyridoxine (VITAMIN B6) 100 mg tablet, Take 100 mg by mouth daily (Patient not taking: Reported on 4/26/2023), Disp: , Rfl:     QUEtiapine (SEROquel) 100 mg tablet, Take 100 mg by mouth daily at bedtime 100 mg t i d  and 400 mg at bedtime, Disp: , Rfl:     risperiDONE (RisperDAL) 1 mg tablet, "Take 1 mg by mouth daily (Patient not taking: Reported on 4/26/2023), Disp: , Rfl:     rOPINIRole (REQUIP) 1 mg tablet, Take 2 mg by mouth daily at bedtime  (Patient not taking: Reported on 4/26/2023), Disp: , Rfl:     rosuvastatin (CRESTOR) 10 MG tablet, Take 10 mg by mouth daily, Disp: , Rfl:     SUMAtriptan (IMITREX) 100 mg tablet, Take 100 mg by mouth as needed for migraine  (Patient not taking: Reported on 4/26/2023), Disp: , Rfl:     thiamine 100 MG tablet, Daily (Patient not taking: Reported on 4/26/2023), Disp: , Rfl:     topiramate (TOPAMAX) 100 mg tablet, Take 100 mg by mouth 2 (two) times a day, Disp: , Rfl:     venlafaxine (EFFEXOR-XR) 150 mg 24 hr capsule, Take 150 mg by mouth daily , Disp: , Rfl:       Physical Exam:  Ht 5' 2 5\" (1 588 m)   Wt 129 kg (285 lb)   LMP 10/04/2020   BMI 51 30 kg/m²     Physical Exam  Vitals reviewed  Constitutional:       General: She is not in acute distress  Appearance: She is well-developed  She is obese  HENT:      Head: Normocephalic and atraumatic  Mouth/Throat:      Mouth: Mucous membranes are moist       Pharynx: No oropharyngeal exudate  Eyes:      General: No scleral icterus  Conjunctiva/sclera: Conjunctivae normal    Cardiovascular:      Rate and Rhythm: Normal rate and regular rhythm  Heart sounds: Normal heart sounds  No murmur heard  Pulmonary:      Effort: Pulmonary effort is normal  No respiratory distress  Breath sounds: Normal breath sounds  Abdominal:      Palpations: Abdomen is soft  Tenderness: There is no abdominal tenderness  Musculoskeletal:         General: No tenderness  Normal range of motion  Cervical back: Normal range of motion and neck supple  No rigidity  Right lower leg: No edema  Left lower leg: No edema  Lymphadenopathy:      Cervical: No cervical adenopathy  Skin:     General: Skin is warm and dry  Capillary Refill: Capillary refill takes less than 2 seconds        " Coloration: Skin is not pale  Neurological:      General: No focal deficit present  Mental Status: She is alert and oriented to person, place, and time  Cranial Nerves: No cranial nerve deficit  Psychiatric:         Mood and Affect: Mood normal          Behavior: Behavior normal        Labs:  Lab Results   Component Value Date    WBC 11 40 (H) 03/14/2023    HGB 11 2 (L) 03/14/2023    HCT 36 7 03/14/2023    MCV 95 03/14/2023     03/14/2023     Lab Results   Component Value Date    K 4 0 06/01/2022     06/01/2022    CO2 25 06/01/2022    BUN 10 06/01/2022    CREATININE 0 71 06/01/2022    GLUF 97 06/01/2022    CALCIUM 9 0 06/01/2022    CORRECTEDCA 9 8 06/01/2022    AST 28 06/01/2022    ALT 29 06/01/2022    ALKPHOS 115 06/01/2022    EGFR 101 06/01/2022       Patient voiced understanding and agreement in the above discussion  Aware to contact our office with questions/symptoms in the interim  This note has been generated by voice recognition software system  Therefore, there may be spelling, grammar, and or syntax errors  Please contact if questions arise

## 2023-06-15 ENCOUNTER — TELEPHONE (OUTPATIENT)
Dept: HEMATOLOGY ONCOLOGY | Facility: CLINIC | Age: 48
End: 2023-06-15

## 2023-06-15 NOTE — TELEPHONE ENCOUNTER
Patient Call    Who are you speaking with? Physician Office    If it is not the patient, are they listed on an active communication consent form? N/A   What is the reason for this call? Ms Sana Davey would like to begin fpironolactone for acne  Dr Dennise Torres will manage her lithium  Do you have any reservations about the patient beginning the medication    Does this require a call back? yes   If a call back is required, please list best call back number [phone] Lincoln [place] NP  If a call back is required, advise that a message will be forwarded to their care team and someone will return their call as soon as possible  Did you relay this information to the patient?  yes

## 2023-06-15 NOTE — TELEPHONE ENCOUNTER
Returned call to Yury Delgado at dermatology  Left [facility] explaining that ok for patient to take Spironolactone from hematology standpoint

## 2023-06-16 ENCOUNTER — APPOINTMENT (OUTPATIENT)
Dept: LAB | Facility: HOSPITAL | Age: 48
End: 2023-06-16
Payer: MEDICARE

## 2023-06-16 DIAGNOSIS — D68.59 HYPERCOAGULABLE STATE (HCC): ICD-10-CM

## 2023-06-16 LAB
ALBUMIN SERPL BCP-MCNC: 3.8 G/DL (ref 3.5–5)
ALP SERPL-CCNC: 120 U/L (ref 34–104)
ALT SERPL W P-5'-P-CCNC: 50 U/L (ref 7–52)
ANION GAP SERPL CALCULATED.3IONS-SCNC: 6 MMOL/L (ref 4–13)
AST SERPL W P-5'-P-CCNC: 37 U/L (ref 13–39)
B BURGDOR IGG+IGM SER-ACNC: 0.2 AI
BASOPHILS # BLD AUTO: 0.05 THOUSANDS/ÂΜL (ref 0–0.1)
BASOPHILS NFR BLD AUTO: 1 % (ref 0–1)
BILIRUB SERPL-MCNC: 0.3 MG/DL (ref 0.2–1)
BUN SERPL-MCNC: 8 MG/DL (ref 5–25)
CALCIUM SERPL-MCNC: 9.3 MG/DL (ref 8.4–10.2)
CHLORIDE SERPL-SCNC: 106 MMOL/L (ref 96–108)
CK SERPL-CCNC: 100 U/L (ref 26–192)
CO2 SERPL-SCNC: 24 MMOL/L (ref 21–32)
CREAT SERPL-MCNC: 0.76 MG/DL (ref 0.6–1.3)
CRP SERPL QL: 6.7 MG/L
EOSINOPHIL # BLD AUTO: 0.37 THOUSAND/ÂΜL (ref 0–0.61)
EOSINOPHIL NFR BLD AUTO: 4 % (ref 0–6)
ERYTHROCYTE [DISTWIDTH] IN BLOOD BY AUTOMATED COUNT: 15.6 % (ref 11.6–15.1)
GFR SERPL CREATININE-BSD FRML MDRD: 92 ML/MIN/1.73SQ M
GLUCOSE SERPL-MCNC: 109 MG/DL (ref 65–140)
HCT VFR BLD AUTO: 41.5 % (ref 34.8–46.1)
HGB BLD-MCNC: 12.3 G/DL (ref 11.5–15.4)
IMM GRANULOCYTES # BLD AUTO: 0.04 THOUSAND/UL (ref 0–0.2)
IMM GRANULOCYTES NFR BLD AUTO: 0 % (ref 0–2)
LYMPHOCYTES # BLD AUTO: 1.95 THOUSANDS/ÂΜL (ref 0.6–4.47)
LYMPHOCYTES NFR BLD AUTO: 20 % (ref 14–44)
MCH RBC QN AUTO: 27.6 PG (ref 26.8–34.3)
MCHC RBC AUTO-ENTMCNC: 29.6 G/DL (ref 31.4–37.4)
MCV RBC AUTO: 93 FL (ref 82–98)
MONOCYTES # BLD AUTO: 0.64 THOUSAND/ÂΜL (ref 0.17–1.22)
MONOCYTES NFR BLD AUTO: 7 % (ref 4–12)
NEUTROPHILS # BLD AUTO: 6.8 THOUSANDS/ÂΜL (ref 1.85–7.62)
NEUTS SEG NFR BLD AUTO: 68 % (ref 43–75)
NRBC BLD AUTO-RTO: 0 /100 WBCS
PLATELET # BLD AUTO: 259 THOUSANDS/UL (ref 149–390)
PMV BLD AUTO: 12 FL (ref 8.9–12.7)
POTASSIUM SERPL-SCNC: 3.9 MMOL/L (ref 3.5–5.3)
PROT SERPL-MCNC: 8 G/DL (ref 6.4–8.4)
RBC # BLD AUTO: 4.45 MILLION/UL (ref 3.81–5.12)
SODIUM SERPL-SCNC: 136 MMOL/L (ref 135–147)
TSH SERPL DL<=0.05 MIU/L-ACNC: 2.72 UIU/ML (ref 0.45–4.5)
WBC # BLD AUTO: 9.85 THOUSAND/UL (ref 4.31–10.16)

## 2023-06-16 PROCEDURE — 85613 RUSSELL VIPER VENOM DILUTED: CPT

## 2023-06-16 PROCEDURE — 85598 HEXAGNAL PHOSPH PLTLT NEUTRL: CPT

## 2023-06-16 PROCEDURE — 36415 COLL VENOUS BLD VENIPUNCTURE: CPT

## 2023-06-16 PROCEDURE — 86618 LYME DISEASE ANTIBODY: CPT

## 2023-06-16 PROCEDURE — 86225 DNA ANTIBODY NATIVE: CPT

## 2023-06-16 PROCEDURE — 86146 BETA-2 GLYCOPROTEIN ANTIBODY: CPT

## 2023-06-16 PROCEDURE — 86039 ANTINUCLEAR ANTIBODIES (ANA): CPT

## 2023-06-16 PROCEDURE — 86235 NUCLEAR ANTIGEN ANTIBODY: CPT

## 2023-06-16 PROCEDURE — 86147 CARDIOLIPIN ANTIBODY EA IG: CPT

## 2023-06-16 PROCEDURE — 85732 THROMBOPLASTIN TIME PARTIAL: CPT

## 2023-06-16 PROCEDURE — 85025 COMPLETE CBC W/AUTO DIFF WBC: CPT

## 2023-06-16 PROCEDURE — 86200 CCP ANTIBODY: CPT

## 2023-06-16 PROCEDURE — 85670 THROMBIN TIME PLASMA: CPT

## 2023-06-16 PROCEDURE — 86430 RHEUMATOID FACTOR TEST QUAL: CPT

## 2023-06-16 PROCEDURE — 86140 C-REACTIVE PROTEIN: CPT

## 2023-06-16 PROCEDURE — 80053 COMPREHEN METABOLIC PANEL: CPT

## 2023-06-16 PROCEDURE — 84443 ASSAY THYROID STIM HORMONE: CPT

## 2023-06-16 PROCEDURE — 82550 ASSAY OF CK (CPK): CPT

## 2023-06-16 PROCEDURE — 86038 ANTINUCLEAR ANTIBODIES: CPT

## 2023-06-16 PROCEDURE — 85705 THROMBOPLASTIN INHIBITION: CPT

## 2023-06-18 LAB
ANA SER QL IA: POSITIVE
RHEUMATOID FACT SER QL LA: NEGATIVE

## 2023-06-19 LAB
ANA HOMOGEN SER QL IF: NORMAL
ANA HOMOGEN TITR SER: NORMAL {TITER}
DSDNA AB SER-ACNC: <4 IU/ML
ENA RNP AB SER IA-ACNC: NEGATIVE
ENA SM AB SER IA-ACNC: NEGATIVE
ENA SM+RNP IGG SER-ACNC: NEGATIVE
ENA SS-A AB SER IA-ACNC: POSITIVE
ENA SS-B AB SER IA-ACNC: NEGATIVE

## 2023-06-20 LAB
APTT HEX PL PPP: 5 SEC (ref 0–11)
APTT SCREEN TO CONFIRM RATIO: 1 RATIO (ref 0–1.34)
APTT-LA IMM 4:1 NP PPP: 43.6 SEC (ref 0–40.5)
B2 GLYCOPROT1 IGA SERPL IA-ACNC: 2.8
B2 GLYCOPROT1 IGG SERPL IA-ACNC: 1.5
B2 GLYCOPROT1 IGM SERPL IA-ACNC: <2.4
CARDIOLIPIN IGA SER IA-ACNC: 5.2
CARDIOLIPIN IGG SER IA-ACNC: 11
CARDIOLIPIN IGM SER IA-ACNC: 2.2
CCP AB SER IA-ACNC: 1.7
CONFIRM APTT/NORMAL: 35.9 SEC (ref 0–47.6)
LA PPP-IMP: ABNORMAL
SCREEN APTT: 48.1 SEC (ref 0–43.5)
SCREEN DRVVT: 42.8 SEC (ref 0–47)
THROMBIN TIME: 16 SEC (ref 0–23)

## 2023-09-08 ENCOUNTER — APPOINTMENT (OUTPATIENT)
Dept: LAB | Facility: HOSPITAL | Age: 48
End: 2023-09-08
Payer: MEDICARE

## 2023-09-08 DIAGNOSIS — L70.0 ACNE VULGARIS: ICD-10-CM

## 2023-09-08 DIAGNOSIS — Z79.899 ENCOUNTER FOR LONG-TERM (CURRENT) USE OF OTHER MEDICATIONS: ICD-10-CM

## 2023-09-08 LAB — POTASSIUM SERPL-SCNC: 3.8 MMOL/L (ref 3.5–5.3)

## 2023-09-08 PROCEDURE — 36415 COLL VENOUS BLD VENIPUNCTURE: CPT

## 2023-09-08 PROCEDURE — 84132 ASSAY OF SERUM POTASSIUM: CPT

## 2023-11-01 ENCOUNTER — TELEPHONE (OUTPATIENT)
Dept: HEMATOLOGY ONCOLOGY | Facility: MEDICAL CENTER | Age: 48
End: 2023-11-01

## 2023-11-06 ENCOUNTER — APPOINTMENT (OUTPATIENT)
Dept: LAB | Facility: HOSPITAL | Age: 48
End: 2023-11-06
Payer: MEDICARE

## 2023-11-06 DIAGNOSIS — K95.89 IRON DEFICIENCY ANEMIA FOLLOWING BARIATRIC SURGERY: ICD-10-CM

## 2023-11-06 DIAGNOSIS — Z86.718 HISTORY OF DVT (DEEP VEIN THROMBOSIS): ICD-10-CM

## 2023-11-06 DIAGNOSIS — D50.8 IRON DEFICIENCY ANEMIA FOLLOWING BARIATRIC SURGERY: ICD-10-CM

## 2023-11-06 LAB
ALBUMIN SERPL BCP-MCNC: 4 G/DL (ref 3.5–5)
ALP SERPL-CCNC: 119 U/L (ref 34–104)
ALT SERPL W P-5'-P-CCNC: 26 U/L (ref 7–52)
ANION GAP SERPL CALCULATED.3IONS-SCNC: 7 MMOL/L
AST SERPL W P-5'-P-CCNC: 16 U/L (ref 13–39)
BASOPHILS # BLD AUTO: 0.08 THOUSANDS/ÂΜL (ref 0–0.1)
BASOPHILS NFR BLD AUTO: 1 % (ref 0–1)
BILIRUB SERPL-MCNC: 0.23 MG/DL (ref 0.2–1)
BUN SERPL-MCNC: 13 MG/DL (ref 5–25)
CALCIUM SERPL-MCNC: 9.2 MG/DL (ref 8.4–10.2)
CHLORIDE SERPL-SCNC: 108 MMOL/L (ref 96–108)
CO2 SERPL-SCNC: 23 MMOL/L (ref 21–32)
CREAT SERPL-MCNC: 0.77 MG/DL (ref 0.6–1.3)
EOSINOPHIL # BLD AUTO: 0.14 THOUSAND/ÂΜL (ref 0–0.61)
EOSINOPHIL NFR BLD AUTO: 1 % (ref 0–6)
ERYTHROCYTE [DISTWIDTH] IN BLOOD BY AUTOMATED COUNT: 16.7 % (ref 11.6–15.1)
FERRITIN SERPL-MCNC: 8 NG/ML (ref 11–307)
GFR SERPL CREATININE-BSD FRML MDRD: 90 ML/MIN/1.73SQ M
GLUCOSE SERPL-MCNC: 102 MG/DL (ref 65–140)
HCT VFR BLD AUTO: 40.6 % (ref 34.8–46.1)
HGB BLD-MCNC: 12.4 G/DL (ref 11.5–15.4)
IMM GRANULOCYTES # BLD AUTO: 0.07 THOUSAND/UL (ref 0–0.2)
IMM GRANULOCYTES NFR BLD AUTO: 1 % (ref 0–2)
IRON SATN MFR SERPL: 10 % (ref 15–50)
IRON SERPL-MCNC: 45 UG/DL (ref 50–212)
LYMPHOCYTES # BLD AUTO: 2.13 THOUSANDS/ÂΜL (ref 0.6–4.47)
LYMPHOCYTES NFR BLD AUTO: 17 % (ref 14–44)
MCH RBC QN AUTO: 25.2 PG (ref 26.8–34.3)
MCHC RBC AUTO-ENTMCNC: 30.5 G/DL (ref 31.4–37.4)
MCV RBC AUTO: 83 FL (ref 82–98)
MONOCYTES # BLD AUTO: 1.11 THOUSAND/ÂΜL (ref 0.17–1.22)
MONOCYTES NFR BLD AUTO: 9 % (ref 4–12)
NEUTROPHILS # BLD AUTO: 8.9 THOUSANDS/ÂΜL (ref 1.85–7.62)
NEUTS SEG NFR BLD AUTO: 71 % (ref 43–75)
NRBC BLD AUTO-RTO: 0 /100 WBCS
PLATELET # BLD AUTO: 311 THOUSANDS/UL (ref 149–390)
PMV BLD AUTO: 11.5 FL (ref 8.9–12.7)
POTASSIUM SERPL-SCNC: 3.8 MMOL/L (ref 3.5–5.3)
PROT SERPL-MCNC: 8.1 G/DL (ref 6.4–8.4)
RBC # BLD AUTO: 4.92 MILLION/UL (ref 3.81–5.12)
SODIUM SERPL-SCNC: 138 MMOL/L (ref 135–147)
TIBC SERPL-MCNC: 438 UG/DL (ref 250–450)
UIBC SERPL-MCNC: 393 UG/DL (ref 155–355)
WBC # BLD AUTO: 12.43 THOUSAND/UL (ref 4.31–10.16)

## 2023-11-06 PROCEDURE — 36415 COLL VENOUS BLD VENIPUNCTURE: CPT

## 2023-11-06 PROCEDURE — 85025 COMPLETE CBC W/AUTO DIFF WBC: CPT

## 2023-11-06 PROCEDURE — 80053 COMPREHEN METABOLIC PANEL: CPT

## 2023-11-06 PROCEDURE — 83540 ASSAY OF IRON: CPT

## 2023-11-06 PROCEDURE — 83550 IRON BINDING TEST: CPT

## 2023-11-06 PROCEDURE — 82728 ASSAY OF FERRITIN: CPT

## 2023-11-07 ENCOUNTER — OFFICE VISIT (OUTPATIENT)
Dept: HEMATOLOGY ONCOLOGY | Facility: CLINIC | Age: 48
End: 2023-11-07
Payer: MEDICARE

## 2023-11-07 VITALS
WEIGHT: 293 LBS | HEART RATE: 91 BPM | RESPIRATION RATE: 16 BRPM | HEIGHT: 63 IN | TEMPERATURE: 98.2 F | SYSTOLIC BLOOD PRESSURE: 126 MMHG | OXYGEN SATURATION: 98 % | DIASTOLIC BLOOD PRESSURE: 70 MMHG | BODY MASS INDEX: 51.91 KG/M2

## 2023-11-07 DIAGNOSIS — Z98.84 STATUS POST BARIATRIC SURGERY: ICD-10-CM

## 2023-11-07 DIAGNOSIS — D68.59 HYPERCOAGULABLE STATE (HCC): ICD-10-CM

## 2023-11-07 DIAGNOSIS — Z86.711 HISTORY OF PULMONARY EMBOLISM: ICD-10-CM

## 2023-11-07 DIAGNOSIS — I82.403 RECURRENT ACUTE DEEP VEIN THROMBOSIS (DVT) OF BOTH LOWER EXTREMITIES (HCC): Primary | ICD-10-CM

## 2023-11-07 DIAGNOSIS — R79.0 LOW IRON STORES: ICD-10-CM

## 2023-11-07 DIAGNOSIS — K90.89 OTHER SPECIFIED INTESTINAL MALABSORPTION: ICD-10-CM

## 2023-11-07 DIAGNOSIS — Z86.718 HISTORY OF DVT (DEEP VEIN THROMBOSIS): ICD-10-CM

## 2023-11-07 DIAGNOSIS — K90.9 IRON MALABSORPTION: ICD-10-CM

## 2023-11-07 PROCEDURE — 99214 OFFICE O/P EST MOD 30 MIN: CPT | Performed by: INTERNAL MEDICINE

## 2023-11-07 RX ORDER — ENOXAPARIN SODIUM 100 MG/ML
40 INJECTION SUBCUTANEOUS DAILY
Qty: 30 ML | Refills: 5 | Status: SHIPPED | OUTPATIENT
Start: 2023-11-07

## 2023-11-07 NOTE — PROGRESS NOTES
HPI:   Follow-up visit for history of recurrent DVTs and also pulmonary embolism and GI blood loss anemia and anemia also due to bariatric surgery and malabsorption. Patient has been on Lovenox 40 mg subcutaneously daily to prevent DVT and at the same time not to increase the risk of GI blood loss. She had GI evaluation that showed an ulcer at the gastro jejunostomy site. No active bleeding at present. No bleeding or blood clot on Lovenox. Thrombosis panel had shown borderline high anticardiolipin IgM antibody. Repeat anticardiolipin antibody testing  showed IgG cardiolipin antibody at 11, just above normal and this time anticardiolipin IgM antibody was within normal range. Patient does not have anemia but that has been drop in ferritin. Patient had intravenous iron previously and I suggested intravenous Venofer because of problem while absorption of iron taken orally because of bariatric surgery previously. She has been taking 1 iron tablet every other day and she will try taking 1 iron tablet daily before subjecting herself to intravenous iron infusion. She feels tired.       Current Outpatient Medications:     atomoxetine (STRATTERA) 80 MG capsule, 80 mg, Disp: , Rfl:     buPROPion (WELLBUTRIN XL) 150 mg 24 hr tablet, Take 150 mg by mouth every morning, Disp: , Rfl:     busPIRone (BUSPAR) 10 mg tablet, TAKE 1 TABLET (10 MG) BY ORAL ROUTE 3 TIMES PER DAY, Disp: , Rfl:     Cholecalciferol (Vitamin D3) 50 MCG (2000 UT) capsule, Take 2,000 Units by mouth daily, Disp: , Rfl:     cloNIDine (CATAPRES) 0.1 mg tablet, Take 0.1 mg by mouth 3 (three) times a day, Disp: , Rfl:     clotrimazole (LOTRIMIN) 1 % cream, Apply topically 2 (two) times a day, Disp: 30 g, Rfl: 1    doxycycline (PERIOSTAT) 20 MG tablet, TAKE 1 TABLET BY MOUTH TWICE A DAY WITH FOOD (NOT DAIRY), Disp: , Rfl:     enoxaparin (LOVENOX) 40 mg/0.4 mL, Inject 0.4 mL (40 mg total) under the skin in the morning, Disp: 30 mL, Rfl: 3    ferrous sulfate 325 (65 Fe) mg tablet, Take 325 mg by mouth daily with breakfast, Disp: , Rfl:     folic acid (FOLVITE) 1 mg tablet, Take 1 mg by mouth daily, Disp: , Rfl:     gabapentin (NEURONTIN) 300 mg capsule, Take 300 mg by mouth 2 (two) times a day, Disp: , Rfl:     hydrOXYzine pamoate (VISTARIL) 25 mg capsule, Take 25 mg by mouth if needed, Disp: , Rfl:     levothyroxine 100 mcg tablet, Take 1 tablet (100 mcg total) by mouth daily in the early morning, Disp: 30 tablet, Rfl: 0    NON FORMULARY, Medical marijuana prn pain/ anxiety, Disp: , Rfl:     oxybutynin (DITROPAN-XL) 10 MG 24 hr tablet, Take 10 mg by mouth daily, Disp: , Rfl:     oxyCODONE-acetaminophen (Percocet) 5-325 mg per tablet, Take 1 tablet by mouth every 6 (six) hours as needed for severe pain Max Daily Amount: 4 tablets, Disp: 20 tablet, Rfl: 0    pantoprazole (PROTONIX) 40 mg tablet, Take 1 tablet (40 mg total) by mouth daily, Disp: 60 tablet, Rfl: 2    pramipexole (MIRAPEX) 0.5 mg tablet, Take 0.5 mg by mouth daily, Disp: , Rfl:     pramipexole (MIRAPEX) 1 mg tablet, Take 1 mg by mouth daily, Disp: , Rfl:     pyridoxine (VITAMIN B6) 100 mg tablet, Take 100 mg by mouth daily, Disp: , Rfl:     QUEtiapine (SEROquel) 100 mg tablet, Take 100 mg by mouth daily at bedtime 100 mg t.i.d. and 400 mg at bedtime, Disp: , Rfl:     rOPINIRole (REQUIP) 1 mg tablet, Take 2 mg by mouth daily at bedtime, Disp: , Rfl:     rosuvastatin (CRESTOR) 10 MG tablet, Take 10 mg by mouth daily, Disp: , Rfl:     SUMAtriptan (IMITREX) 100 mg tablet, Take 100 mg by mouth as needed for migraine, Disp: , Rfl:     thiamine 100 MG tablet, Daily, Disp: , Rfl:     topiramate (TOPAMAX) 100 mg tablet, Take 100 mg by mouth 2 (two) times a day, Disp: , Rfl:     venlafaxine (EFFEXOR-XR) 150 mg 24 hr capsule, Take 150 mg by mouth daily , Disp: , Rfl:     Allergies   Allergen Reactions    Morphine Seizures       Oncology History    No history exists.        ROS:  11/07/23 Reviewed 12 systems: See symptoms in HPI  Presently no other neurological, cardiac, pulmonary, GI and  symptoms other than mentioned in HPI. Other symptoms are in HPI. No fever, chills, active bleeding, bone pains, skin rash, weight loss , night sweats, arthritic symptoms,  weakness, numbness,  claudication and gait problem. No frequent infections. Not unusually sensitive to heat or cold. No swelling of the ankles. No swollen glands. Patient is anxious. /70 (BP Location: Left arm, Patient Position: Sitting, Cuff Size: Large)   Pulse 91   Temp 98.2 °F (36.8 °C) (Temporal)   Resp 16   Ht 5' 2.5" (1.588 m)   Wt (!) 150 kg (331 lb)   LMP 10/04/2020   SpO2 98%   BMI 59.58 kg/m²     Physical Exam:  Alert, oriented, not in distress, vitals are above, no icterus, no oral thrush, no palpable neck mass, clear lung fields, regular heart rate, abdomen  soft and non tender, no palpable abdominal mass, no ascites, no edema of ankles, no calf tenderness, no focal neurological deficit, no skin rash, no palpable lymphadenopathy in the neck and axillary areas, no clubbing. Patient is anxious. Performance status 1. IMAGING:  MAMMO SCREENING BILATERAL W 3D & CAD 6/16/23  Order: [id number]  Impression    IMPRESSION:   There is no mammographic evidence of malignancy. Routine follow-up mammogram in 1 year is recommended.      BI-RADS Category 1:   Negative       LABS:    Results for orders placed or performed in visit on 11/06/23   CBC and differential   Result Value Ref Range    WBC 12.43 (H) 4.31 - 10.16 Thousand/uL    RBC 4.92 3.81 - 5.12 Million/uL    Hemoglobin 12.4 11.5 - 15.4 g/dL    Hematocrit 40.6 34.8 - 46.1 %    MCV 83 82 - 98 fL    MCH 25.2 (L) 26.8 - 34.3 pg    MCHC 30.5 (L) 31.4 - 37.4 g/dL    RDW 16.7 (H) 11.6 - 15.1 %    MPV 11.5 8.9 - 12.7 fL    Platelets 106 231 - 834 Thousands/uL    nRBC 0 /100 WBCs    Neutrophils Relative 71 43 - 75 %    Immat GRANS % 1 0 - 2 %    Lymphocytes Relative 17 14 - 44 %    Monocytes Relative 9 4 - 12 %    Eosinophils Relative 1 0 - 6 %    Basophils Relative 1 0 - 1 %    Neutrophils Absolute 8.90 (H) 1.85 - 7.62 Thousands/µL    Immature Grans Absolute 0.07 0.00 - 0.20 Thousand/uL    Lymphocytes Absolute 2.13 0.60 - 4.47 Thousands/µL    Monocytes Absolute 1.11 0.17 - 1.22 Thousand/µL    Eosinophils Absolute 0.14 0.00 - 0.61 Thousand/µL    Basophils Absolute 0.08 0.00 - 0.10 Thousands/µL   Comprehensive metabolic panel   Result Value Ref Range    Sodium 138 135 - 147 mmol/L    Potassium 3.8 3.5 - 5.3 mmol/L    Chloride 108 96 - 108 mmol/L    CO2 23 21 - 32 mmol/L    ANION GAP 7 mmol/L    BUN 13 5 - 25 mg/dL    Creatinine 0.77 0.60 - 1.30 mg/dL    Glucose 102 65 - 140 mg/dL    Calcium 9.2 8.4 - 10.2 mg/dL    AST 16 13 - 39 U/L    ALT 26 7 - 52 U/L    Alkaline Phosphatase 119 (H) 34 - 104 U/L    Total Protein 8.1 6.4 - 8.4 g/dL    Albumin 4.0 3.5 - 5.0 g/dL    Total Bilirubin 0.23 0.20 - 1.00 mg/dL    eGFR 90 ml/min/1.73sq m   TIBC Panel (incl. Iron, TIBC, % Iron Saturation)   Result Value Ref Range    Iron Saturation 10 (L) 15 - 50 %    TIBC 438 250 - 450 ug/dL    Iron 45 (L) 50 - 212 ug/dL    UIBC 393 (H) 155 - 355 ug/dL   Ferritin   Result Value Ref Range    Ferritin 8 (L) 11 - 307 ng/mL     Labs, Imaging, & Other studies:   All pertinent labs and imaging studies were personally reviewed      Reviewed test results and discussed with patient. Assessment and plan: See diagnoses, orders and instructions below. Follow-up visit for history of recurrent DVTs and also pulmonary embolism and GI blood loss anemia and anemia also due to bariatric surgery and malabsorption. Patient has been on Lovenox 40 mg subcutaneously daily to prevent DVT and at the same time not to increase the risk of GI blood loss. She had GI evaluation that showed an ulcer at the gastro jejunostomy site. No active bleeding at present. No bleeding or blood clot on Lovenox.   Thrombosis panel had shown borderline high anticardiolipin IgM antibody. Repeat anticardiolipin antibody testing  showed IgG cardiolipin antibody at 11, just above normal and this time anticardiolipin IgM antibody was within normal range. Patient does not have anemia but that has been drop in ferritin. Patient had intravenous iron previously and I suggested intravenous Venofer because of problem while absorption of iron taken orally because of bariatric surgery previously. She has been taking 1 iron tablet every other day and she will try taking 1 iron tablet daily before subjecting herself to intravenous iron infusion. She feels tired. Physical examination and test results are as recorded and discussed in detail. She is being continued on prophylactic dose of Lovenox, 40 mg once daily subcutaneously. She wants to try oral iron daily and have counts and ferritin monitored and if not improved she will go for intravenous iron infusion. All discussed with patient in detail. Questions answered. Patient states she is up-to-date with her medical checkups. Goal is to prevent bleeding and blood clot. Goal is also to correct anemia and iron stores. Patient is capable of self-care. 1. History of DVT (deep vein thrombosis)    - enoxaparin (LOVENOX) 40 mg/0.4 mL; Inject 0.4 mL (40 mg total) under the skin in the morning  Dispense: 30 mL; Refill: 5    2. Hypercoagulable state ([address])    - CBC and differential; Standing  - Comprehensive metabolic panel; Standing  - CBC and differential  - Comprehensive metabolic panel    3. Recurrent acute deep vein thrombosis (DVT) of both lower extremities (HCC)    - CBC and differential; Standing  - Comprehensive metabolic panel; Standing  - CBC and differential  - Comprehensive metabolic panel    4. History of pulmonary embolism      5. Other specified intestinal malabsorption      6. Status post bariatric surgery    - Iron Panel (Includes Ferritin, Iron Sat%, Iron, and TIBC);  Standing  - Iron Panel (Includes Ferritin, Iron Sat%, Iron, and TIBC)    7. Iron malabsorption    - Iron Panel (Includes Ferritin, Iron Sat%, Iron, and TIBC); Standing  - Iron Panel (Includes Ferritin, Iron Sat%, Iron, and TIBC)    8. Low iron stores    - Iron Panel (Includes Ferritin, Iron Sat%, Iron, and TIBC); Standing  - Iron Panel (Includes Ferritin, Iron Sat%, Iron, and TIBC)           . Patient will continue to follow with  primary physician and other consultants. Patient  voiced understanding and agrees      Disclaimer: This document was prepared using a dictation device. If a word or phrase is confusing, or does not make sense, this is likely due to recognition error which was not discovered during the providers review. If you believe an error has occurred, please Contact me through [employer] service for irma? cation. Counseling / Coordination of Care   .   Provided counseling and support

## 2023-11-07 NOTE — PATIENT INSTRUCTIONS
Blood work every 3 months. Visit in 6 months. She will take iron tablet every day. If there is no improvement in ferritin she will have intravenous Venofer. She has the option of taking intravenous Venofer anytime and she will call if she decides to have it sooner. Renewed Lovenox.

## 2023-12-29 ENCOUNTER — HOSPITAL ENCOUNTER (OUTPATIENT)
Dept: RADIOLOGY | Facility: HOSPITAL | Age: 48
Discharge: HOME | End: 2023-12-29
Payer: MEDICARE

## 2023-12-29 DIAGNOSIS — M54.12 RADICULOPATHY, CERVICAL: ICD-10-CM

## 2023-12-29 PROCEDURE — 72050 X-RAY EXAM NECK SPINE 4/5VWS: CPT

## 2024-02-27 ENCOUNTER — TELEPHONE (OUTPATIENT)
Dept: HEMATOLOGY ONCOLOGY | Facility: CLINIC | Age: 49
End: 2024-02-27

## 2024-02-27 NOTE — TELEPHONE ENCOUNTER
Patient Call    Who are you speaking with? Physician Office    If it is not the patient, are they listed on an active communication consent form? Yes   What is the reason for this call? Need to hold lovenox for 24 hrs, prior to lumbar injection for radiofrequency ablasion on March 8   Does this require a call back? Yes   If a call back is required, please list [facility] call back number [phone]    If a call back is required, advise that a message will be forwarded to their care team and someone will return their call as soon as possible.   Did you relay this information to the patient? Yes

## 2024-02-27 NOTE — TELEPHONE ENCOUNTER
Left VM for office to advise that a clearance was completed in January  I asked that they contact the office if they need another form to be completed

## 2024-02-28 ENCOUNTER — TELEPHONE (OUTPATIENT)
Dept: HEMATOLOGY ONCOLOGY | Facility: CLINIC | Age: 49
End: 2024-02-28

## 2024-02-28 NOTE — TELEPHONE ENCOUNTER
Patient Call    Who are you speaking with? Dr. Yeager's office     If it is not the patient, are they listed on an active communication consent form? N/A   What is the reason for this call? Physicians office called back stating the clearance is no good, it needs to be within 30 days and her appointment is March 8th. They said we can fax over a new one with a correct date range    Fax: [phone]   Does this require a call back? Yes   If a call back is required, please list best call back number [phone]   If a call back is required, advise that a message will be forwarded to their care team and someone will return their call as soon as possible.   Did you relay this information to the patient? Yes

## 2024-05-01 ENCOUNTER — TELEPHONE (OUTPATIENT)
Dept: HEMATOLOGY ONCOLOGY | Facility: MEDICAL CENTER | Age: 49
End: 2024-05-01

## 2024-05-03 ENCOUNTER — APPOINTMENT (OUTPATIENT)
Dept: LAB | Facility: HOSPITAL | Age: 49
End: 2024-05-03
Payer: MEDICARE

## 2024-05-03 LAB
ALBUMIN SERPL BCP-MCNC: 3.9 G/DL (ref 3.5–5)
ALP SERPL-CCNC: 127 U/L (ref 34–104)
ALT SERPL W P-5'-P-CCNC: 27 U/L (ref 7–52)
ANION GAP SERPL CALCULATED.3IONS-SCNC: 6 MMOL/L (ref 4–13)
AST SERPL W P-5'-P-CCNC: 22 U/L (ref 13–39)
BASOPHILS # BLD AUTO: 0.06 THOUSANDS/ÂΜL (ref 0–0.1)
BASOPHILS NFR BLD AUTO: 1 % (ref 0–1)
BILIRUB SERPL-MCNC: 0.36 MG/DL (ref 0.2–1)
BUN SERPL-MCNC: 8 MG/DL (ref 5–25)
CALCIUM SERPL-MCNC: 9.6 MG/DL (ref 8.4–10.2)
CHLORIDE SERPL-SCNC: 107 MMOL/L (ref 96–108)
CO2 SERPL-SCNC: 23 MMOL/L (ref 21–32)
CREAT SERPL-MCNC: 0.83 MG/DL (ref 0.6–1.3)
EOSINOPHIL # BLD AUTO: 0.04 THOUSAND/ÂΜL (ref 0–0.61)
EOSINOPHIL NFR BLD AUTO: 1 % (ref 0–6)
ERYTHROCYTE [DISTWIDTH] IN BLOOD BY AUTOMATED COUNT: 18.6 % (ref 11.6–15.1)
FERRITIN SERPL-MCNC: 8 NG/ML (ref 11–307)
GFR SERPL CREATININE-BSD FRML MDRD: 82 ML/MIN/1.73SQ M
GLUCOSE P FAST SERPL-MCNC: 117 MG/DL (ref 65–99)
HCT VFR BLD AUTO: 38.5 % (ref 34.8–46.1)
HGB BLD-MCNC: 11.4 G/DL (ref 11.5–15.4)
IMM GRANULOCYTES # BLD AUTO: 0.02 THOUSAND/UL (ref 0–0.2)
IMM GRANULOCYTES NFR BLD AUTO: 0 % (ref 0–2)
IRON SATN MFR SERPL: 12 % (ref 15–50)
IRON SERPL-MCNC: 46 UG/DL (ref 50–212)
LYMPHOCYTES # BLD AUTO: 1.75 THOUSANDS/ÂΜL (ref 0.6–4.47)
LYMPHOCYTES NFR BLD AUTO: 24 % (ref 14–44)
MCH RBC QN AUTO: 24.3 PG (ref 26.8–34.3)
MCHC RBC AUTO-ENTMCNC: 29.6 G/DL (ref 31.4–37.4)
MCV RBC AUTO: 82 FL (ref 82–98)
MONOCYTES # BLD AUTO: 0.78 THOUSAND/ÂΜL (ref 0.17–1.22)
MONOCYTES NFR BLD AUTO: 11 % (ref 4–12)
NEUTROPHILS # BLD AUTO: 4.67 THOUSANDS/ÂΜL (ref 1.85–7.62)
NEUTS SEG NFR BLD AUTO: 63 % (ref 43–75)
NRBC BLD AUTO-RTO: 0 /100 WBCS
PLATELET # BLD AUTO: 296 THOUSANDS/UL (ref 149–390)
PMV BLD AUTO: 12.2 FL (ref 8.9–12.7)
POTASSIUM SERPL-SCNC: 4.1 MMOL/L (ref 3.5–5.3)
PROT SERPL-MCNC: 7.8 G/DL (ref 6.4–8.4)
RBC # BLD AUTO: 4.7 MILLION/UL (ref 3.81–5.12)
SODIUM SERPL-SCNC: 136 MMOL/L (ref 135–147)
TIBC SERPL-MCNC: 377 UG/DL (ref 250–450)
UIBC SERPL-MCNC: 331 UG/DL (ref 155–355)
WBC # BLD AUTO: 7.32 THOUSAND/UL (ref 4.31–10.16)

## 2024-05-06 ENCOUNTER — TELEPHONE (OUTPATIENT)
Dept: HEMATOLOGY ONCOLOGY | Facility: CLINIC | Age: 49
End: 2024-05-06

## 2024-05-06 NOTE — TELEPHONE ENCOUNTER
Patient Call    Who are you speaking with? Patient    If it is not the patient, are they listed on an active communication consent form? N/A   What is the reason for this call? Patient calling in regards to her appointment with Dr Hubbard tomorrow at 9:40am.  Patient would like to know if this appointment can be a virtual appointment.  Patient does not have transportation to the appointment.  Patient had her labs done for this appointment. Patient would like a call back to discuss.    If the appointment can be virtual, patient would like a phone call for the appointment. [phone]   Does this require a call back? Yes   If a call back is required, please list best call back number [phone]   If a call back is required, advise that a message will be forwarded to their care team and someone will return their call as soon as possible.   Did you relay this information to the patient? Yes

## 2024-05-07 ENCOUNTER — TELEPHONE (OUTPATIENT)
Dept: HEMATOLOGY ONCOLOGY | Facility: CLINIC | Age: 49
End: 2024-05-07

## 2024-05-07 ENCOUNTER — TELEMEDICINE (OUTPATIENT)
Dept: HEMATOLOGY ONCOLOGY | Facility: CLINIC | Age: 49
End: 2024-05-07
Payer: MEDICARE

## 2024-05-07 DIAGNOSIS — D50.8 IRON DEFICIENCY ANEMIA FOLLOWING BARIATRIC SURGERY: ICD-10-CM

## 2024-05-07 DIAGNOSIS — Z86.718 HISTORY OF DVT (DEEP VEIN THROMBOSIS): ICD-10-CM

## 2024-05-07 DIAGNOSIS — K95.89 IRON DEFICIENCY ANEMIA FOLLOWING BARIATRIC SURGERY: ICD-10-CM

## 2024-05-07 DIAGNOSIS — D50.8 OTHER IRON DEFICIENCY ANEMIA: Primary | ICD-10-CM

## 2024-05-07 PROCEDURE — 99213 OFFICE O/P EST LOW 20 MIN: CPT | Performed by: PHYSICIAN ASSISTANT

## 2024-05-07 RX ORDER — ENOXAPARIN SODIUM 100 MG/ML
40 INJECTION SUBCUTANEOUS DAILY
Qty: 30 ML | Refills: 5 | Status: SHIPPED | OUTPATIENT
Start: 2024-05-07

## 2024-05-07 RX ORDER — SODIUM CHLORIDE 9 MG/ML
20 INJECTION, SOLUTION INTRAVENOUS ONCE
OUTPATIENT
Start: 2024-05-21

## 2024-05-07 NOTE — TELEPHONE ENCOUNTER
Called patient to get her scheduled for her follow-up and inform her of labs as well as infusion. Patient agreed to her follow up appointment. Patient verbally understand that the Infusion will be reaching out to her to get scheduled.    Thank you

## 2024-05-07 NOTE — TELEPHONE ENCOUNTER
Patient Call    Who are you speaking with? Patient    If it is not the patient, are they listed on an active communication consent form? N/A   What is the reason for this call? Pt said she had visit with Karen so she doesn't need a call from Dr. Hubbard   Does this require a call back? N/A   If a call back is required, please list best call back number N/a   If a call back is required, advise that a message will be forwarded to their care team and someone will return their call as soon as possible.   Did you relay this information to the patient? N/A

## 2024-05-08 NOTE — PROGRESS NOTES
Virtual Regular Visit    Verification of patient location:    Patient is located at Home in the following state in which I hold an active license [place]      Assessment/Plan:    Problem List Items Addressed This Visit          Blood    Anemia - Primary    Relevant Orders    CBC and differential    Iron Panel (Includes Ferritin, Iron Sat%, Iron, and TIBC)    Iron deficiency anemia following bariatric surgery    Relevant Orders    CBC and differential    Iron Panel (Includes Ferritin, Iron Sat%, Iron, and TIBC)       Other    History of DVT (deep vein thrombosis)    Relevant Medications    enoxaparin (LOVENOX) 40 mg/0.4 mL            Reason for visit is No chief complaint on file.       Encounter provider Karen Nettles PA-C      Recent Visits  Date Type Provider Dept   05/07/24 Telephone Karen Nettles PA-C [facility] [place]   05/07/24 Telephone Karen Nettles PA-C [facility] [place]   05/07/24 Telemedicine Karen Nettles PA-C [facility]   Showing recent visits within past 7 days and meeting all other requirements  Future Appointments  No visits were found meeting these conditions.  Showing future appointments within next 150 days and meeting all other requirements       The patient was identified by name and date of birth. Mary Morrison was informed that this is a telemedicine visit and that the visit is being conducted through Telephone.  My office door was closed. No one else was in the room.  She acknowledged consent and understanding of privacy and security of the video platform. The patient has agreed to participate and understands they can discontinue the visit at any time.    Patient is aware this is a billable service.     Subjective  Mary Morrison is a 48 y.o. female presents for follow up     Mary Morrison is a 46 y.o. female presents for follow up for history of thrombosis, iron def anemia due to malabsorption from bariatric surgery  and history of GI blood loss.      Past medical history significant for hepatic steatosis, hypothyroidism, pulmonary embolism, IVC thrombosis, migraines, seizures, psychiatric disorder (bipolar disorder, SABINO), history of gastric bypass surgery ().     Patient presented to the emergency department on 2020 due to abdominal pain radiating into bilateral upper legs.     She had a history of bilateral lower extremity DVT and PE in 2017 for which she had an IVC filter placed in [place] in 2017. She was treated with Xarelto but developed a GI bleed in discontinued.  Per patient this was unprovoked. Patient admitted to being off of anticoagulation during this admission and denied a previous workup for clotting disorder in the past.     Family history significant for DVT and PE and her sister. Her sister  of PE. Of note her sister had lupus.  Paternal uncle had DVT as well.      A CT of the abdomen pelvis was completed on 2020.  This showed known IVC filter, diffuse thrombus extending from the IVC filter inferiorly into the iliac vasculature, extensive surrounding fat stranding with thrombophlebitis, small amount of thrombus extends superior to the filter.       A venous Doppler study of bilateral lower extremities were also completed on 2020 which was negative for DVT.     She underwent lysis of thrombus with IR on .     She returned to IR on 2020 for IR venogram.     Patient was on heparin during hospitalization.     Patient was seen by Dr. Jordan on 2020 during hospitalization.  Due to patient's history of gastric bypass surgery patient was recommended Lovenox 0.75 milligrams/kilogram subcu b.i.d..     She had partial thrombosis workup in the hospital.              Homocystine normal, 7.4              Protein S activity normal, 111              Protein C activity normal, 91.0              Factor 5 Leiden mutation negative              Prothrombin gene mutation negative               Anticardiolipin antibody IgA and IgG normal.  IgM 18 which is a indeterminate results              Lupus anticoagulant negative               Beta-2 glycoproteins negative      Patient was discharged on Lovenox 0.75 milligram/kilogram.  Discharge date was 08/09/2020.     She presented back to the emergency department on 08/14/2020 due to abdominal pain and right leg pain.     Patient was diagnosed with cellulitis of lower extremity, right, and received IV Ancef for 3 days in [place] on p.o. Keflex.     She had outpatient follow-up with vascular surgery on 08/21/2020.  Recommendation for her venous disease included conservative measures and medical management.  Patient was scheduled for follow-up in November 2020 with vascular surgery with a repeat IVC/iliac vein duplex and bilateral lower extremity DVT study.      At consultation patient was recommended to have EGD and colonoscopy secondary to weight loss.  She had an EGD and colonoscopy on 12/03/2020.  This showed  anastomotic ulcer on the jejunal side.  Colonoscopy showed 1 polyp measuring 5-9 mm in the sigmoid colon.  A clip was placed to prevent bleeding secondary to anticoagulation. There is no of inadequate bowel prep and patient was recommended to have repeat in 6 months (June 2021).      Patient was admitted to the hospital to the ICU due to acute GI bleed from 5/2 - 5/5/21.  She states that she was taking NSAIDs due to migraines even though she knew she was not supposed to do this.  She had EGD on 05/02/2021 due to bleeding while inpatient and this showed single 5 mm crater drowned benign-appearing ulcer in the stomach.  This was treated.  Patient's Lovenox was held during parts of hospitalization then restarted on discharge.     Patient is planned to have her IVC filter removed on 05/18/2021 however I note that this appears to have been canceled.  She really had CT venogram in March 2021 which showed no evidence of thrombus.  There was a incident right  ovarian cyst which patient's PCP at [facility] ordered an ultrasound and patient was referred to a gyn at [facility] as well.      Patient then admitted again on 6/18 - 6/23/21 due to MADHU and hypokalemia, both due to too much diuretic managed by PCP at [facility]     6/8/21 IVC filter was removed      Ferritin 141, continues on oral iron once per day      7/9/21   Potassium 3.2, PCP treating this      She still is taking 100 mg SQ Lovenox due to supply, still has 5 boxes left      7/21/21 - have EGD and colonoscopy    Past Medical History:   Diagnosis Date    Anemia     Anxiety     Balance disorder     Bipolar depression (HCC)     bipolar II, suicide    Depression     Disease of thyroid gland     Fatty liver     GERD (gastroesophageal reflux disease)     Heart murmur     History of DVT (deep vein thrombosis)     History of gastric bypass     2010    Lymphedema     bles- chronic    Migraines     Pelvic mass     Pulmonary embolism (HCC)     Rash     under abd fold    Seizures (HCC)     Use of cane as ambulatory aid     UTI (urinary tract infection)     7/24/21       Past Surgical History:   Procedure Laterality Date    APPENDECTOMY      Open    CHOLECYSTECTOMY      Laparoscopic    CYSTOSCOPY N/A 8/13/2021    Procedure: CYSTOSCOPY;  Surgeon: Randy Cummins MD;  Location: [facility];  Service: Gynecology Oncology    GASTRIC BYPASS      was 89 date of surgery    IR DVT THROMBOLYSIS/THROMBECTOMY ILIAC/IVC WITH VENOGRAM  8/4/2020    IR IVC FILTER REMOVAL  6/8/2021    IR TPA LYSIS CHECK  8/5/2020    IVC FILTER INSERTION  2017    TN LAPAROSCOPY W TOTAL HYSTERECTOMY UTERUS 250 GM/< N/A 6/23/2022    Procedure: ROBOTIC TOTAL HYSTERECTOMY, CYSTOSCOPY;  Surgeon: Randy Cummins MD;  Location: [facility];  Service: Gynecology Oncology    TN LAPS ABD PRTM&OMENTUM DX W/WO SPEC BR/WA SPX N/A 8/13/2021    Procedure: LAPAROSCOPY DIAGNOSTIC;  Surgeon: Randy Cummins MD;  Location[facility];   Service: Gynecology Oncology    HI LAPS FULG/EXC OVARY VISCERA/PERITONEAL SURFACE N/A 8/13/2021    Procedure: ROBOTIC RESECTION OF PELVIC MASS/ RIGHT SALPINGO-OOPHORECTOMY, LEFT SALPINGECTOMY;  Surgeon: Randy Cummins MD;  Location: [facility];  Service: Gynecology Oncology    STOMACH SURGERY      for morbid obesity    TUBAL LIGATION Bilateral 2010       Current Outpatient Medications   Medication Sig Dispense Refill    enoxaparin (LOVENOX) 40 mg/0.4 mL Inject 0.4 mL (40 mg total) under the skin in the morning 30 mL 5    atomoxetine (STRATTERA) 80 MG capsule 80 mg      buPROPion (WELLBUTRIN XL) 150 mg 24 hr tablet Take 150 mg by mouth every morning      busPIRone (BUSPAR) 10 mg tablet TAKE 1 TABLET (10 MG) BY ORAL ROUTE 3 TIMES PER DAY      Cholecalciferol (Vitamin D3) 50 MCG (2000 UT) capsule Take 2,000 Units by mouth daily      cloNIDine (CATAPRES) 0.1 mg tablet Take 0.1 mg by mouth 3 (three) times a day      clotrimazole (LOTRIMIN) 1 % cream Apply topically 2 (two) times a day 30 g 1    doxycycline (PERIOSTAT) 20 MG tablet TAKE 1 TABLET BY MOUTH TWICE A DAY WITH FOOD (NOT DAIRY)      ferrous sulfate 325 (65 Fe) mg tablet Take 325 mg by mouth daily with breakfast      folic acid (FOLVITE) 1 mg tablet Take 1 mg by mouth daily      gabapentin (NEURONTIN) 300 mg capsule Take 300 mg by mouth 2 (two) times a day      hydrOXYzine pamoate (VISTARIL) 25 mg capsule Take 25 mg by mouth if needed      levothyroxine 100 mcg tablet Take 1 tablet (100 mcg total) by mouth daily in the early morning 30 tablet 0    NON FORMULARY Medical marijuana prn pain/ anxiety      oxybutynin (DITROPAN-XL) 10 MG 24 hr tablet Take 10 mg by mouth daily      oxyCODONE-acetaminophen (Percocet) 5-325 mg per tablet Take 1 tablet by mouth every 6 (six) hours as needed for severe pain Max Daily Amount: 4 tablets 20 tablet 0    pantoprazole (PROTONIX) 40 mg tablet Take 1 tablet (40 mg total) by mouth daily 60 tablet 2    pramipexole (MIRAPEX) 0.5  mg tablet Take 0.5 mg by mouth daily      pramipexole (MIRAPEX) 1 mg tablet Take 1 mg by mouth daily      pyridoxine (VITAMIN B6) 100 mg tablet Take 100 mg by mouth daily      QUEtiapine (SEROquel) 100 mg tablet Take 100 mg by mouth daily at bedtime 100 mg t.i.d. and 400 mg at bedtime      rOPINIRole (REQUIP) 1 mg tablet Take 2 mg by mouth daily at bedtime      rosuvastatin (CRESTOR) 10 MG tablet Take 10 mg by mouth daily      SUMAtriptan (IMITREX) 100 mg tablet Take 100 mg by mouth as needed for migraine      thiamine 100 MG tablet Daily      topiramate (TOPAMAX) 100 mg tablet Take 100 mg by mouth 2 (two) times a day      venlafaxine (EFFEXOR-XR) 150 mg 24 hr capsule Take 150 mg by mouth daily        No current facility-administered medications for this visit.        Allergies   Allergen Reactions    Morphine Seizures     Review of Systems   Constitutional:  Positive for fatigue. Negative for activity change, appetite change, chills, fever and unexpected weight change.   Respiratory:  Negative for cough and shortness of breath.    Cardiovascular:  Negative for chest pain, palpitations and leg swelling.   Gastrointestinal:  Negative for abdominal pain, constipation, diarrhea, nausea and vomiting.   Genitourinary:  Negative for difficulty urinating, dysuria and hematuria.   Musculoskeletal:  Negative for arthralgias.   Skin: Negative.    Neurological:  Negative for dizziness, weakness, light-headedness, numbness and headaches.   Hematological: Negative.    Psychiatric/Behavioral: Negative.       Video Exam  There were no vitals filed for this visit.    1. History of DVT (deep vein thrombosis)  History of recurrent thromboses on lifelong anticoagulation.  She has developed thromboses on multiple oral anticoagulants therefore she is on Lovenox 40 mg subcu daily. Refills provided.     - enoxaparin (LOVENOX) 40 mg/0.4 mL; Inject 0.4 mL (40 mg total) under the skin in the morning  Dispense: 30 mL; Refill: 5    2. Other  iron deficiency anemia 3. Iron deficiency anemia following bariatric surgery  History of iron def anemia 2/2 malabsorption from bariatric surgery.   Ferritin 8, Hgb 11.4   Recommend Venofer 200 mg IV weekly x 6.  Follow up in 6 months with labs.     - CBC and differential; Future  - Iron Panel (Includes Ferritin, Iron Sat%, Iron, and TIBC); Future    Visit Time  Total Visit Duration: 7 min

## 2024-05-24 ENCOUNTER — HOSPITAL ENCOUNTER (OUTPATIENT)
Dept: INFUSION CENTER | Facility: CLINIC | Age: 49
End: 2024-05-24
Payer: MEDICARE

## 2024-05-24 VITALS
DIASTOLIC BLOOD PRESSURE: 82 MMHG | SYSTOLIC BLOOD PRESSURE: 121 MMHG | TEMPERATURE: 96.9 F | HEART RATE: 102 BPM | RESPIRATION RATE: 18 BRPM

## 2024-05-24 DIAGNOSIS — D50.8 OTHER IRON DEFICIENCY ANEMIA: ICD-10-CM

## 2024-05-24 DIAGNOSIS — D50.8 IRON DEFICIENCY ANEMIA FOLLOWING BARIATRIC SURGERY: Primary | ICD-10-CM

## 2024-05-24 DIAGNOSIS — K95.89 IRON DEFICIENCY ANEMIA FOLLOWING BARIATRIC SURGERY: Primary | ICD-10-CM

## 2024-05-24 PROCEDURE — 96365 THER/PROPH/DIAG IV INF INIT: CPT

## 2024-05-24 RX ORDER — SODIUM CHLORIDE 9 MG/ML
20 INJECTION, SOLUTION INTRAVENOUS ONCE
Status: COMPLETED | OUTPATIENT
Start: 2024-05-24 | End: 2024-05-24

## 2024-05-24 RX ORDER — SODIUM CHLORIDE 9 MG/ML
20 INJECTION, SOLUTION INTRAVENOUS ONCE
Status: CANCELLED | OUTPATIENT
Start: 2024-05-31

## 2024-05-24 RX ADMIN — SODIUM CHLORIDE 20 ML/HR: 0.9 INJECTION, SOLUTION INTRAVENOUS at 11:09

## 2024-05-24 RX ADMIN — IRON SUCROSE 200 MG: 20 INJECTION, SOLUTION INTRAVENOUS at 11:22

## 2024-05-24 NOTE — PROGRESS NOTES
Patient tolerated venofer infusion without incident. AVS printed and given to patient. Patient is aware of appointment on 5/31/24 at 10:30AM.

## 2024-05-24 NOTE — PLAN OF CARE
Problem: HEMATOLOGIC - ADULT  Goal: Maintains hematologic stability  Description: INTERVENTIONS  - Assess for signs and symptoms of bleeding or hemorrhage  - Monitor labs  - Administer supportive blood products/factors as ordered and appropriate  Outcome: Progressing

## 2024-05-28 ENCOUNTER — HOSPITAL ENCOUNTER (OUTPATIENT)
Dept: RADIOLOGY | Facility: HOSPITAL | Age: 49
Discharge: HOME | End: 2024-05-28
Payer: MEDICARE

## 2024-05-28 DIAGNOSIS — M25.562 ACUTE PAIN OF LEFT KNEE: ICD-10-CM

## 2024-05-28 PROCEDURE — 73564 X-RAY EXAM KNEE 4 OR MORE: CPT

## 2024-05-31 ENCOUNTER — HOSPITAL ENCOUNTER (OUTPATIENT)
Dept: INFUSION CENTER | Facility: CLINIC | Age: 49
End: 2024-05-31
Payer: MEDICARE

## 2024-05-31 VITALS
HEART RATE: 80 BPM | RESPIRATION RATE: 16 BRPM | SYSTOLIC BLOOD PRESSURE: 102 MMHG | TEMPERATURE: 97.9 F | DIASTOLIC BLOOD PRESSURE: 69 MMHG

## 2024-05-31 DIAGNOSIS — D50.8 IRON DEFICIENCY ANEMIA FOLLOWING BARIATRIC SURGERY: Primary | ICD-10-CM

## 2024-05-31 DIAGNOSIS — D50.8 OTHER IRON DEFICIENCY ANEMIA: ICD-10-CM

## 2024-05-31 DIAGNOSIS — K95.89 IRON DEFICIENCY ANEMIA FOLLOWING BARIATRIC SURGERY: Primary | ICD-10-CM

## 2024-05-31 PROCEDURE — 96365 THER/PROPH/DIAG IV INF INIT: CPT

## 2024-05-31 RX ORDER — SODIUM CHLORIDE 9 MG/ML
20 INJECTION, SOLUTION INTRAVENOUS ONCE
Status: CANCELLED | OUTPATIENT
Start: 2024-06-07

## 2024-05-31 RX ORDER — SODIUM CHLORIDE 9 MG/ML
20 INJECTION, SOLUTION INTRAVENOUS ONCE
Status: COMPLETED | OUTPATIENT
Start: 2024-05-31 | End: 2024-05-31

## 2024-05-31 RX ADMIN — SODIUM CHLORIDE 20 ML/HR: 0.9 INJECTION, SOLUTION INTRAVENOUS at 11:16

## 2024-05-31 RX ADMIN — IRON SUCROSE 200 MG: 20 INJECTION, SOLUTION INTRAVENOUS at 11:16

## 2024-05-31 NOTE — PROGRESS NOTES
Pt. Denied new symptoms or concerns today.  Pt. Verbalized a ligament tear in Lt. Knee; stated she twisted her knee. Currently walking with a cane. States mobility has improved as she was not able to walk 2 weeks ago.  Pt. Tolerated Venofer without adverse event. Future appointments reviewed. Pt. Will return 6/7/24 at 1000.

## 2024-05-31 NOTE — PLAN OF CARE
Problem: INFECTION - ADULT  Goal: Absence or prevention of progression during hospitalization  Description: INTERVENTIONS:  - Assess and monitor for signs and symptoms of infection  - Monitor lab/diagnostic results  - Monitor all insertion sites, i.e. indwelling lines, tubes, and drains  - Monitor endotracheal if appropriate and nasal secretions for changes in amount and color  - [place] appropriate cooling/warming therapies per order  - Administer medications as ordered  - Instruct and encourage patient and family to use good hand hygiene technique  - Identify and instruct in appropriate isolation precautions for identified infection/condition  Outcome: Progressing  Goal: Absence of fever/infection during neutropenic period  Description: INTERVENTIONS:  - Monitor WBC    Outcome: Progressing     Problem: Knowledge Deficit  Goal: Patient/family/caregiver demonstrates understanding of disease process, treatment plan, medications, and discharge instructions  Description: Complete learning assessment and assess knowledge base.  Interventions:  - Provide teaching at level of understanding  - Provide teaching via preferred learning methods  Outcome: Progressing

## 2024-06-07 ENCOUNTER — HOSPITAL ENCOUNTER (OUTPATIENT)
Dept: INFUSION CENTER | Facility: CLINIC | Age: 49
End: 2024-06-07
Payer: MEDICARE

## 2024-06-07 VITALS
TEMPERATURE: 99 F | RESPIRATION RATE: 22 BRPM | DIASTOLIC BLOOD PRESSURE: 88 MMHG | HEART RATE: 89 BPM | SYSTOLIC BLOOD PRESSURE: 138 MMHG

## 2024-06-07 DIAGNOSIS — K95.89 IRON DEFICIENCY ANEMIA FOLLOWING BARIATRIC SURGERY: Primary | ICD-10-CM

## 2024-06-07 DIAGNOSIS — D50.8 IRON DEFICIENCY ANEMIA FOLLOWING BARIATRIC SURGERY: Primary | ICD-10-CM

## 2024-06-07 DIAGNOSIS — D50.8 OTHER IRON DEFICIENCY ANEMIA: ICD-10-CM

## 2024-06-07 PROCEDURE — 96365 THER/PROPH/DIAG IV INF INIT: CPT

## 2024-06-07 RX ORDER — SODIUM CHLORIDE 9 MG/ML
20 INJECTION, SOLUTION INTRAVENOUS ONCE
Status: COMPLETED | OUTPATIENT
Start: 2024-06-07 | End: 2024-06-07

## 2024-06-07 RX ORDER — SODIUM CHLORIDE 9 MG/ML
20 INJECTION, SOLUTION INTRAVENOUS ONCE
OUTPATIENT
Start: 2024-06-14

## 2024-06-07 RX ADMIN — IRON SUCROSE 200 MG: 20 INJECTION, SOLUTION INTRAVENOUS at 10:33

## 2024-06-07 RX ADMIN — SODIUM CHLORIDE 20 ML/HR: 9 INJECTION, SOLUTION INTRAVENOUS at 10:33

## 2024-06-14 ENCOUNTER — HOSPITAL ENCOUNTER (OUTPATIENT)
Dept: INFUSION CENTER | Facility: CLINIC | Age: 49
End: 2024-06-14
Payer: MEDICARE

## 2024-06-14 DIAGNOSIS — D50.8 IRON DEFICIENCY ANEMIA FOLLOWING BARIATRIC SURGERY: Primary | ICD-10-CM

## 2024-06-14 DIAGNOSIS — K95.89 IRON DEFICIENCY ANEMIA FOLLOWING BARIATRIC SURGERY: Primary | ICD-10-CM

## 2024-06-14 DIAGNOSIS — D50.8 OTHER IRON DEFICIENCY ANEMIA: ICD-10-CM

## 2024-06-14 PROCEDURE — 96365 THER/PROPH/DIAG IV INF INIT: CPT

## 2024-06-14 RX ORDER — SODIUM CHLORIDE 9 MG/ML
20 INJECTION, SOLUTION INTRAVENOUS ONCE
Status: COMPLETED | OUTPATIENT
Start: 2024-06-14 | End: 2024-06-14

## 2024-06-14 RX ORDER — SODIUM CHLORIDE 9 MG/ML
20 INJECTION, SOLUTION INTRAVENOUS ONCE
Status: CANCELLED | OUTPATIENT
Start: 2024-06-21

## 2024-06-14 RX ADMIN — IRON SUCROSE 200 MG: 20 INJECTION, SOLUTION INTRAVENOUS at 10:42

## 2024-06-14 RX ADMIN — SODIUM CHLORIDE 20 ML/HR: 0.9 INJECTION, SOLUTION INTRAVENOUS at 10:41

## 2024-06-14 NOTE — PROGRESS NOTES
Mary Morrison  tolerated venofer treatment well with no complications.      Mary Morrison is aware of future appt on Friday Jun 21, 2024 10:00 AM     AVS declined by Mary Morrison.

## 2024-06-21 ENCOUNTER — HOSPITAL ENCOUNTER (OUTPATIENT)
Dept: INFUSION CENTER | Facility: CLINIC | Age: 49
End: 2024-06-21
Payer: MEDICARE

## 2024-06-21 VITALS
RESPIRATION RATE: 18 BRPM | HEART RATE: 85 BPM | SYSTOLIC BLOOD PRESSURE: 125 MMHG | DIASTOLIC BLOOD PRESSURE: 82 MMHG | TEMPERATURE: 97.2 F

## 2024-06-21 DIAGNOSIS — K95.89 IRON DEFICIENCY ANEMIA FOLLOWING BARIATRIC SURGERY: Primary | ICD-10-CM

## 2024-06-21 DIAGNOSIS — D50.8 IRON DEFICIENCY ANEMIA FOLLOWING BARIATRIC SURGERY: Primary | ICD-10-CM

## 2024-06-21 DIAGNOSIS — D50.8 OTHER IRON DEFICIENCY ANEMIA: ICD-10-CM

## 2024-06-21 PROCEDURE — 96365 THER/PROPH/DIAG IV INF INIT: CPT

## 2024-06-21 RX ORDER — SODIUM CHLORIDE 9 MG/ML
20 INJECTION, SOLUTION INTRAVENOUS ONCE
Status: COMPLETED | OUTPATIENT
Start: 2024-06-21 | End: 2024-06-21

## 2024-06-21 RX ORDER — SODIUM CHLORIDE 9 MG/ML
20 INJECTION, SOLUTION INTRAVENOUS ONCE
Status: CANCELLED | OUTPATIENT
Start: 2024-06-28

## 2024-06-21 RX ADMIN — IRON SUCROSE 200 MG: 20 INJECTION, SOLUTION INTRAVENOUS at 10:23

## 2024-06-21 RX ADMIN — SODIUM CHLORIDE 20 ML/HR: 0.9 INJECTION, SOLUTION INTRAVENOUS at 10:14

## 2024-06-28 ENCOUNTER — HOSPITAL ENCOUNTER (OUTPATIENT)
Dept: INFUSION CENTER | Facility: CLINIC | Age: 49
End: 2024-06-28
Payer: MEDICARE

## 2024-06-28 VITALS
TEMPERATURE: 97.9 F | HEART RATE: 83 BPM | RESPIRATION RATE: 18 BRPM | DIASTOLIC BLOOD PRESSURE: 79 MMHG | SYSTOLIC BLOOD PRESSURE: 117 MMHG

## 2024-06-28 DIAGNOSIS — K95.89 IRON DEFICIENCY ANEMIA FOLLOWING BARIATRIC SURGERY: Primary | ICD-10-CM

## 2024-06-28 DIAGNOSIS — D50.8 IRON DEFICIENCY ANEMIA FOLLOWING BARIATRIC SURGERY: Primary | ICD-10-CM

## 2024-06-28 DIAGNOSIS — D50.8 OTHER IRON DEFICIENCY ANEMIA: ICD-10-CM

## 2024-06-28 PROCEDURE — 96365 THER/PROPH/DIAG IV INF INIT: CPT

## 2024-06-28 PROCEDURE — 96366 THER/PROPH/DIAG IV INF ADDON: CPT

## 2024-06-28 RX ORDER — SODIUM CHLORIDE 9 MG/ML
20 INJECTION, SOLUTION INTRAVENOUS ONCE
Status: COMPLETED | OUTPATIENT
Start: 2024-06-28 | End: 2024-06-28

## 2024-06-28 RX ORDER — SODIUM CHLORIDE 9 MG/ML
20 INJECTION, SOLUTION INTRAVENOUS ONCE
Status: CANCELLED | OUTPATIENT
Start: 2024-06-28

## 2024-06-28 RX ADMIN — SODIUM CHLORIDE 20 ML/HR: 0.9 INJECTION, SOLUTION INTRAVENOUS at 09:40

## 2024-06-28 RX ADMIN — IRON SUCROSE 200 MG: 20 INJECTION, SOLUTION INTRAVENOUS at 09:40

## 2024-06-28 NOTE — PROGRESS NOTES
Pt presents for venofer. No new meds or concerns. Pt tolerated treatment without adverse reaction. Pt will follow up with the physician for future treatments if needed.  AVS declined.

## 2024-07-16 ENCOUNTER — TELEPHONE (OUTPATIENT)
Age: 49
End: 2024-07-16

## 2024-07-16 NOTE — TELEPHONE ENCOUNTER
Spoke with patient to make her aware of the following from Mark CABALLERO    Please hold for 24 hours prior to procedure.  Ask dental when she can restart; should be within 24-48 hours.     She verbalized understanding and agreed to plan

## 2024-07-16 NOTE — TELEPHONE ENCOUNTER
Call from Mary. She is having dental surgery on Friday, 7/19, and would like to know what she should do re: taking her lovenox.

## 2024-10-28 ENCOUNTER — APPOINTMENT (OUTPATIENT)
Dept: LAB | Facility: HOSPITAL | Age: 49
End: 2024-10-28
Payer: MEDICARE

## 2024-10-28 DIAGNOSIS — D50.8 OTHER IRON DEFICIENCY ANEMIA: ICD-10-CM

## 2024-10-28 DIAGNOSIS — D50.8 IRON DEFICIENCY ANEMIA FOLLOWING BARIATRIC SURGERY: ICD-10-CM

## 2024-10-28 DIAGNOSIS — K95.89 IRON DEFICIENCY ANEMIA FOLLOWING BARIATRIC SURGERY: ICD-10-CM

## 2024-10-28 LAB
BASOPHILS # BLD AUTO: 0.07 THOUSANDS/ΜL (ref 0–0.1)
BASOPHILS NFR BLD AUTO: 1 % (ref 0–1)
EOSINOPHIL # BLD AUTO: 0.12 THOUSAND/ΜL (ref 0–0.61)
EOSINOPHIL NFR BLD AUTO: 1 % (ref 0–6)
ERYTHROCYTE [DISTWIDTH] IN BLOOD BY AUTOMATED COUNT: 15.8 % (ref 11.6–15.1)
FERRITIN SERPL-MCNC: 43 NG/ML (ref 11–307)
HCT VFR BLD AUTO: 43.9 % (ref 34.8–46.1)
HGB BLD-MCNC: 13.6 G/DL (ref 11.5–15.4)
IMM GRANULOCYTES # BLD AUTO: 0.03 THOUSAND/UL (ref 0–0.2)
IMM GRANULOCYTES NFR BLD AUTO: 0 % (ref 0–2)
IRON SATN MFR SERPL: 49 % (ref 15–50)
IRON SERPL-MCNC: 98 UG/DL (ref 50–212)
LYMPHOCYTES # BLD AUTO: 2.03 THOUSANDS/ΜL (ref 0.6–4.47)
LYMPHOCYTES NFR BLD AUTO: 24 % (ref 14–44)
MCH RBC QN AUTO: 27.6 PG (ref 26.8–34.3)
MCHC RBC AUTO-ENTMCNC: 31 G/DL (ref 31.4–37.4)
MCV RBC AUTO: 89 FL (ref 82–98)
MONOCYTES # BLD AUTO: 0.85 THOUSAND/ΜL (ref 0.17–1.22)
MONOCYTES NFR BLD AUTO: 10 % (ref 4–12)
NEUTROPHILS # BLD AUTO: 5.27 THOUSANDS/ΜL (ref 1.85–7.62)
NEUTS SEG NFR BLD AUTO: 64 % (ref 43–75)
NRBC BLD AUTO-RTO: 0 /100 WBCS
PLATELET # BLD AUTO: 253 THOUSANDS/UL (ref 149–390)
PMV BLD AUTO: 11.1 FL (ref 8.9–12.7)
RBC # BLD AUTO: 4.93 MILLION/UL (ref 3.81–5.12)
TIBC SERPL-MCNC: 199 UG/DL (ref 250–450)
UIBC SERPL-MCNC: 101 UG/DL (ref 155–355)
WBC # BLD AUTO: 8.37 THOUSAND/UL (ref 4.31–10.16)

## 2024-10-28 PROCEDURE — 82728 ASSAY OF FERRITIN: CPT

## 2024-10-28 PROCEDURE — 83540 ASSAY OF IRON: CPT

## 2024-10-28 PROCEDURE — 36415 COLL VENOUS BLD VENIPUNCTURE: CPT

## 2024-10-28 PROCEDURE — 83550 IRON BINDING TEST: CPT

## 2024-10-28 PROCEDURE — 85025 COMPLETE CBC W/AUTO DIFF WBC: CPT

## 2024-10-31 ENCOUNTER — TELEPHONE (OUTPATIENT)
Age: 49
End: 2024-10-31

## 2024-11-13 ENCOUNTER — TELEPHONE (OUTPATIENT)
Dept: HEMATOLOGY ONCOLOGY | Facility: CLINIC | Age: 49
End: 2024-11-13

## 2024-11-13 ENCOUNTER — TELEMEDICINE (OUTPATIENT)
Dept: HEMATOLOGY ONCOLOGY | Facility: CLINIC | Age: 49
End: 2024-11-13
Payer: MEDICARE

## 2024-11-13 DIAGNOSIS — K95.89 IRON DEFICIENCY ANEMIA FOLLOWING BARIATRIC SURGERY: ICD-10-CM

## 2024-11-13 DIAGNOSIS — Z86.718 HISTORY OF DVT (DEEP VEIN THROMBOSIS): ICD-10-CM

## 2024-11-13 DIAGNOSIS — D50.8 OTHER IRON DEFICIENCY ANEMIA: Primary | ICD-10-CM

## 2024-11-13 DIAGNOSIS — D50.8 IRON DEFICIENCY ANEMIA FOLLOWING BARIATRIC SURGERY: ICD-10-CM

## 2024-11-13 PROCEDURE — 99213 OFFICE O/P EST LOW 20 MIN: CPT | Performed by: PHYSICIAN ASSISTANT

## 2024-11-13 RX ORDER — SODIUM CHLORIDE 9 MG/ML
20 INJECTION, SOLUTION INTRAVENOUS ONCE
Status: CANCELLED | OUTPATIENT
Start: 2024-11-22

## 2024-11-13 RX ORDER — ENOXAPARIN SODIUM 100 MG/ML
40 INJECTION SUBCUTANEOUS DAILY
Qty: 30 ML | Refills: 4 | Status: SHIPPED | OUTPATIENT
Start: 2024-11-13

## 2024-11-13 NOTE — PROGRESS NOTES
Virtual Regular Visit  Name: Mary Morrison      : 1975      MRN: [id number]  Encounter Provider: Karen Nettles PA-C  Encounter Date: 2024   Encounter department: [facility] [place]    Verification of patient location:    Patient is located at {[facility] Virtual Patient Location:[address]} in the following state in which I hold an active license     {[facility] virtual patient location:[address]}  Assessment & Plan      Encounter provider Karen Nettles PA-C    The patient was identified by name and date of birth. Mary Morrison was informed that this is a telemedicine visit and that the visit is being conducted through {AMB VIRTUAL VISIT MEDIUM:76260}.  {Telemedicine confidentiality :11995} {Telemedicine participants:32959}  She acknowledged consent and understanding of privacy and security of the video platform. The patient has agreed to participate and understands they can discontinue the visit at any time.    Patient is aware this is a billable service.     History of Present Illness   History of Present Illness {?Quick Links Encounters * My Last Note * Last Note in Specialty * Snapshot * Since Last Visit * History :52271}  Objective   {?Quick Links Trend Vitals * Enter New Vitals * Results Review * Timeline (Adult) * Labs * Imaging * Cardiology * Procedures * Lung Cancer Screening * Surgical eConsent :64647}  LMP 10/04/2020       Physical Exam    Visit Time  Total Visit Duration: ***

## 2024-11-13 NOTE — TELEPHONE ENCOUNTER
Spoke with pt regarding follow up to today's virtual appt.  Next year virtual appt is scheduled for Tuesday 11/18/25 @ 9AM.  Also advised pt that a note will be sent to the infusion center and they will call her to schedule those appts.

## 2024-11-13 NOTE — ASSESSMENT & PLAN NOTE
Orders:    enoxaparin (LOVENOX) 40 mg/0.4 mL; Inject 0.4 mL (40 mg total) under the skin in the morning

## 2024-11-13 NOTE — PROGRESS NOTES
Virtual Regular Visit  Name: Mary Morrison      : 1975      MRN: [id number]  Encounter Provider: Karen Nettles PA-C  Encounter Date: 2024   Encounter department: [facility]    Verification of patient location:    Patient is located at Home in the following state in which I hold an active license [place]    :  Assessment & Plan  Other iron deficiency anemia    Orders:    CBC and differential; Standing    Ferritin; Standing    Iron deficiency anemia following bariatric surgery    Orders:    CBC and differential; Standing    Ferritin; Standing    History of DVT (deep vein thrombosis)    Orders:    enoxaparin (LOVENOX) 40 mg/0.4 mL; Inject 0.4 mL (40 mg total) under the skin in the morning        Encounter provider Karen Nettles PA-C    The patient was identified by name and date of birth. Mary Morrison was informed that this is a telemedicine visit and that the visit is being conducted through the Epic Embedded platform. She agrees to proceed..  My office door was closed. No one else was in the room.  She acknowledged consent and understanding of privacy and security of the video platform. The patient has agreed to participate and understands they can discontinue the visit at any time.    Patient is aware this is a billable service.     History of Present Illness   History of Present Illness   Objective       Mary Morrison is a 49 y.o. female presents for follow up for history of thrombosis, iron def anemia due to malabsorption from bariatric surgery and history of GI blood loss.      Past medical history significant for hepatic steatosis, hypothyroidism, pulmonary embolism, IVC thrombosis, migraines, seizures, psychiatric disorder (bipolar disorder, SABINO), history of gastric bypass surgery ().     Patient presented to the emergency department on 2020 due to abdominal pain radiating into bilateral upper legs.     She had a history of  bilateral lower extremity DVT and PE in 2017 for which she had an IVC filter placed in [place] in 2017. She was treated with Xarelto but developed a GI bleed in discontinued.  Per patient this was unprovoked. Patient admitted to being off of anticoagulation during this admission and denied a previous workup for clotting disorder in the past.     Family history significant for DVT and PE and her sister. Her sister  of PE. Of note her sister had lupus.  Paternal uncle had DVT as well.      A CT of the abdomen pelvis was completed on 2020.  This showed known IVC filter, diffuse thrombus extending from the IVC filter inferiorly into the iliac vasculature, extensive surrounding fat stranding with thrombophlebitis, small amount of thrombus extends superior to the filter.       A venous Doppler study of bilateral lower extremities were also completed on 2020 which was negative for DVT.     She underwent lysis of thrombus with IR on .     She returned to IR on 2020 for IR venogram.     Patient was on heparin during hospitalization.     Patient was seen by Dr. Jordan on 2020 during hospitalization.  Due to patient's history of gastric bypass surgery patient was recommended Lovenox 0.75 milligrams/kilogram subcu b.i.d..     She had partial thrombosis workup in the hospital.              Homocystine normal, 7.4              Protein S activity normal, 111              Protein C activity normal, 91.0              Factor 5 Leiden mutation negative              Prothrombin gene mutation negative              Anticardiolipin antibody IgA and IgG normal.  IgM 18 which is a indeterminate results              Lupus anticoagulant negative               Beta-2 glycoproteins negative      Patient was discharged on Lovenox 0.75 milligram/kilogram.  Discharge date was 2020.     She presented back to the emergency department on 2020 due to abdominal pain and right leg pain.     Patient was  diagnosed with cellulitis of lower extremity, right, and received IV Ancef for 3 days in [place] on p.o. Keflex.     She had outpatient follow-up with vascular surgery on 08/21/2020.  Recommendation for her venous disease included conservative measures and medical management.  Patient was scheduled for follow-up in November 2020 with vascular surgery with a repeat IVC/iliac vein duplex and bilateral lower extremity DVT study.      At consultation patient was recommended to have EGD and colonoscopy secondary to weight loss.  She had an EGD and colonoscopy on 12/03/2020.  This showed  anastomotic ulcer on the jejunal side.  Colonoscopy showed 1 polyp measuring 5-9 mm in the sigmoid colon.  A clip was placed to prevent bleeding secondary to anticoagulation. There is no of inadequate bowel prep and patient was recommended to have repeat in 6 months (June 2021).      Patient was admitted to the hospital to the ICU due to acute GI bleed from 5/2 - 5/5/21.  She states that she was taking NSAIDs due to migraines even though she knew she was not supposed to do this.  She had EGD on 05/02/2021 due to bleeding while inpatient and this showed single 5 mm crater drowned benign-appearing ulcer in the stomach.  This was treated.  Patient's Lovenox was held during parts of hospitalization then restarted on discharge.     Patient is planned to have her IVC filter removed on 05/18/2021 however I note that this appears to have been canceled.  She really had CT venogram in March 2021 which showed no evidence of thrombus.  There was a incident right ovarian cyst which patient's PCP at [facility] ordered an ultrasound and patient was referred to a gyn at [facility] as well.      Patient then admitted again on 6/18 - 6/23/21 due to MADHU and hypokalemia, both due to too much diuretic managed by PCP at [facility]     6/8/21 IVC filter was removed      Ferritin 141, continues on oral iron once per day      7/9/21    Potassium 3.2, PCP treating this      She still is taking 100 mg SQ Lovenox due to supply, still has 5 boxes left      7/21/21 - have EGD and colonoscopy    Review of Systems   Constitutional:  Negative for activity change, appetite change, chills, fatigue and fever.   HENT:  Negative for mouth sores and nosebleeds.    Respiratory:  Negative for cough and shortness of breath.    Cardiovascular:  Negative for chest pain, palpitations and leg swelling.   Gastrointestinal:  Negative for abdominal pain, constipation, diarrhea, nausea and vomiting.   Genitourinary:  Negative for difficulty urinating, dysuria and hematuria.   Musculoskeletal:  Negative for arthralgias.   Skin: Negative.    Neurological:  Negative for dizziness, weakness, light-headedness, numbness and headaches.   Hematological: Negative.    Psychiatric/Behavioral: Negative.       LMP 10/04/2020     Physical Exam  Constitutional:       General: She is not in acute distress.     Appearance: She is well-developed. She is not ill-appearing.   HENT:      Head: Normocephalic and atraumatic.   Eyes:      General: No scleral icterus.  Pulmonary:      Effort: Pulmonary effort is normal. No respiratory distress.   Abdominal:      Palpations: Abdomen is soft.      Tenderness: There is no abdominal tenderness.   Skin:     Coloration: Skin is not pale.   Neurological:      Mental Status: She is alert and oriented to person, place, and time.   Psychiatric:         Mood and Affect: Mood normal.         Behavior: Behavior normal.       1. Other iron deficiency anemia (Primary) 2. Iron deficiency anemia following bariatric surgery  Venofer every 56 days for maintenance   Labs every 3 months   Follow up in 1 year     - CBC and differential; Standing  - Ferritin; Standing    3. History of DVT (deep vein thrombosis)  Hx of recurrent DVT   Failed oral anticoagulation   No clots on Lovenox, continue lifelong   Hold 24 hours prior to procedures/surgery     - enoxaparin  (LOVENOX) 40 mg/0.4 mL; Inject 0.4 mL (40 mg total) under the skin in the morning  Dispense: 30 mL; Refill: 4       Visit Time  Total Visit Duration: 10 min

## 2024-11-22 ENCOUNTER — HOSPITAL ENCOUNTER (OUTPATIENT)
Dept: INFUSION CENTER | Facility: CLINIC | Age: 49
End: 2024-11-22
Payer: MEDICARE

## 2024-11-22 VITALS
DIASTOLIC BLOOD PRESSURE: 76 MMHG | HEART RATE: 73 BPM | SYSTOLIC BLOOD PRESSURE: 115 MMHG | TEMPERATURE: 97.3 F | RESPIRATION RATE: 18 BRPM

## 2024-11-22 DIAGNOSIS — K95.89 IRON DEFICIENCY ANEMIA FOLLOWING BARIATRIC SURGERY: Primary | ICD-10-CM

## 2024-11-22 DIAGNOSIS — D50.8 IRON DEFICIENCY ANEMIA FOLLOWING BARIATRIC SURGERY: Primary | ICD-10-CM

## 2024-11-22 DIAGNOSIS — D50.8 OTHER IRON DEFICIENCY ANEMIA: ICD-10-CM

## 2024-11-22 PROCEDURE — 96365 THER/PROPH/DIAG IV INF INIT: CPT

## 2024-11-22 RX ORDER — SODIUM CHLORIDE 9 MG/ML
20 INJECTION, SOLUTION INTRAVENOUS ONCE
OUTPATIENT
Start: 2025-01-17

## 2024-11-22 RX ORDER — SODIUM CHLORIDE 9 MG/ML
20 INJECTION, SOLUTION INTRAVENOUS ONCE
Status: COMPLETED | OUTPATIENT
Start: 2024-11-22 | End: 2024-11-22

## 2024-11-22 RX ADMIN — IRON SUCROSE 200 MG: 20 INJECTION, SOLUTION INTRAVENOUS at 09:40

## 2024-11-22 RX ADMIN — SODIUM CHLORIDE 20 ML/HR: 9 INJECTION, SOLUTION INTRAVENOUS at 09:40

## 2024-11-22 NOTE — PROGRESS NOTES
Patient arrived to unit without complaint. Patient tolerated Venofer infusion without incident. AVS declined and patient aware of next appointment on 1/17/25 at 09:30. Patient left in stable condition.

## 2025-01-17 ENCOUNTER — HOSPITAL ENCOUNTER (OUTPATIENT)
Dept: INFUSION CENTER | Facility: CLINIC | Age: 50
End: 2025-01-17
Payer: MEDICARE

## 2025-01-17 VITALS
RESPIRATION RATE: 18 BRPM | TEMPERATURE: 96.9 F | DIASTOLIC BLOOD PRESSURE: 84 MMHG | HEART RATE: 87 BPM | SYSTOLIC BLOOD PRESSURE: 126 MMHG

## 2025-01-17 DIAGNOSIS — D50.8 OTHER IRON DEFICIENCY ANEMIA: ICD-10-CM

## 2025-01-17 DIAGNOSIS — K95.89 IRON DEFICIENCY ANEMIA FOLLOWING BARIATRIC SURGERY: Primary | ICD-10-CM

## 2025-01-17 DIAGNOSIS — D50.8 IRON DEFICIENCY ANEMIA FOLLOWING BARIATRIC SURGERY: Primary | ICD-10-CM

## 2025-01-17 PROCEDURE — 96365 THER/PROPH/DIAG IV INF INIT: CPT

## 2025-01-17 RX ORDER — SODIUM CHLORIDE 9 MG/ML
20 INJECTION, SOLUTION INTRAVENOUS ONCE
OUTPATIENT
Start: 2025-03-14

## 2025-01-17 RX ORDER — SODIUM CHLORIDE 9 MG/ML
20 INJECTION, SOLUTION INTRAVENOUS ONCE
Status: COMPLETED | OUTPATIENT
Start: 2025-01-17 | End: 2025-01-17

## 2025-01-17 RX ADMIN — SODIUM CHLORIDE 20 ML/HR: 0.9 INJECTION, SOLUTION INTRAVENOUS at 09:59

## 2025-01-17 RX ADMIN — IRON SUCROSE 200 MG: 20 INJECTION, SOLUTION INTRAVENOUS at 09:59

## 2025-01-17 NOTE — PROGRESS NOTES
Patient arrived to unit without complaint. Patient tolerated Venofer infusion without incident. AVS declined and patient aware of next appointment on 3/14/25 at 09:30. Patient left in stable condition.

## 2025-02-17 ENCOUNTER — APPOINTMENT (OUTPATIENT)
Dept: LAB | Facility: HOSPITAL | Age: 50
End: 2025-02-17
Payer: MEDICARE

## 2025-02-17 DIAGNOSIS — E03.9 MYXEDEMA HEART DISEASE: ICD-10-CM

## 2025-02-17 DIAGNOSIS — E78.5 HYPERLIPIDEMIA, UNSPECIFIED HYPERLIPIDEMIA TYPE: ICD-10-CM

## 2025-02-17 DIAGNOSIS — R79.9 ABNORMAL BLOOD CHEMISTRY: ICD-10-CM

## 2025-02-17 DIAGNOSIS — I89.0 OBLITERATION OF LYMPHATIC VESSEL: ICD-10-CM

## 2025-02-17 DIAGNOSIS — E55.9 VITAMIN D DEFICIENCY: ICD-10-CM

## 2025-02-17 DIAGNOSIS — E66.01 MORBID OBESITY (HCC): ICD-10-CM

## 2025-02-17 DIAGNOSIS — I51.9 MYXEDEMA HEART DISEASE: ICD-10-CM

## 2025-02-17 DIAGNOSIS — E87.6 HYPOPOTASSEMIA: ICD-10-CM

## 2025-02-17 LAB
25(OH)D3 SERPL-MCNC: 24.8 NG/ML (ref 30–100)
ALBUMIN SERPL BCG-MCNC: 4 G/DL (ref 3.5–5)
ALP SERPL-CCNC: 101 U/L (ref 34–104)
ALT SERPL W P-5'-P-CCNC: 18 U/L (ref 7–52)
ANION GAP SERPL CALCULATED.3IONS-SCNC: 6 MMOL/L (ref 4–13)
AST SERPL W P-5'-P-CCNC: 13 U/L (ref 13–39)
BASOPHILS # BLD AUTO: 0.06 THOUSANDS/ΜL (ref 0–0.1)
BASOPHILS NFR BLD AUTO: 1 % (ref 0–1)
BILIRUB SERPL-MCNC: 0.37 MG/DL (ref 0.2–1)
BUN SERPL-MCNC: 13 MG/DL (ref 5–25)
CALCIUM SERPL-MCNC: 9.3 MG/DL (ref 8.4–10.2)
CHLORIDE SERPL-SCNC: 108 MMOL/L (ref 96–108)
CHOLEST SERPL-MCNC: 138 MG/DL (ref ?–200)
CO2 SERPL-SCNC: 25 MMOL/L (ref 21–32)
CREAT SERPL-MCNC: 0.9 MG/DL (ref 0.6–1.3)
EOSINOPHIL # BLD AUTO: 0.18 THOUSAND/ΜL (ref 0–0.61)
EOSINOPHIL NFR BLD AUTO: 3 % (ref 0–6)
ERYTHROCYTE [DISTWIDTH] IN BLOOD BY AUTOMATED COUNT: 15.2 % (ref 11.6–15.1)
EST. AVERAGE GLUCOSE BLD GHB EST-MCNC: 140 MG/DL
GFR SERPL CREATININE-BSD FRML MDRD: 74 ML/MIN/1.73SQ M
GLUCOSE P FAST SERPL-MCNC: 127 MG/DL (ref 65–99)
HBA1C MFR BLD: 6.5 %
HCT VFR BLD AUTO: 43.6 % (ref 34.8–46.1)
HDLC SERPL-MCNC: 48 MG/DL
HGB BLD-MCNC: 13.6 G/DL (ref 11.5–15.4)
IMM GRANULOCYTES # BLD AUTO: 0.05 THOUSAND/UL (ref 0–0.2)
IMM GRANULOCYTES NFR BLD AUTO: 1 % (ref 0–2)
LDLC SERPL CALC-MCNC: 65 MG/DL (ref 0–100)
LYMPHOCYTES # BLD AUTO: 1.73 THOUSANDS/ΜL (ref 0.6–4.47)
LYMPHOCYTES NFR BLD AUTO: 24 % (ref 14–44)
MCH RBC QN AUTO: 27.6 PG (ref 26.8–34.3)
MCHC RBC AUTO-ENTMCNC: 31.2 G/DL (ref 31.4–37.4)
MCV RBC AUTO: 88 FL (ref 82–98)
MONOCYTES # BLD AUTO: 0.6 THOUSAND/ΜL (ref 0.17–1.22)
MONOCYTES NFR BLD AUTO: 8 % (ref 4–12)
NEUTROPHILS # BLD AUTO: 4.49 THOUSANDS/ΜL (ref 1.85–7.62)
NEUTS SEG NFR BLD AUTO: 63 % (ref 43–75)
NONHDLC SERPL-MCNC: 90 MG/DL
NRBC BLD AUTO-RTO: 0 /100 WBCS
PLATELET # BLD AUTO: 243 THOUSANDS/UL (ref 149–390)
PMV BLD AUTO: 11.1 FL (ref 8.9–12.7)
POTASSIUM SERPL-SCNC: 3.8 MMOL/L (ref 3.5–5.3)
PROT SERPL-MCNC: 7.8 G/DL (ref 6.4–8.4)
RBC # BLD AUTO: 4.93 MILLION/UL (ref 3.81–5.12)
SODIUM SERPL-SCNC: 139 MMOL/L (ref 135–147)
TRIGL SERPL-MCNC: 123 MG/DL (ref ?–150)
TSH SERPL DL<=0.05 MIU/L-ACNC: 2.67 UIU/ML (ref 0.45–4.5)
WBC # BLD AUTO: 7.11 THOUSAND/UL (ref 4.31–10.16)

## 2025-02-17 PROCEDURE — 85025 COMPLETE CBC W/AUTO DIFF WBC: CPT

## 2025-02-17 PROCEDURE — 80053 COMPREHEN METABOLIC PANEL: CPT

## 2025-02-17 PROCEDURE — 36415 COLL VENOUS BLD VENIPUNCTURE: CPT

## 2025-02-17 PROCEDURE — 82306 VITAMIN D 25 HYDROXY: CPT

## 2025-02-17 PROCEDURE — 80061 LIPID PANEL: CPT

## 2025-02-17 PROCEDURE — 84443 ASSAY THYROID STIM HORMONE: CPT

## 2025-02-17 PROCEDURE — 83036 HEMOGLOBIN GLYCOSYLATED A1C: CPT

## 2025-03-14 ENCOUNTER — HOSPITAL ENCOUNTER (OUTPATIENT)
Dept: INFUSION CENTER | Facility: CLINIC | Age: 50
End: 2025-03-14
Payer: MEDICARE

## 2025-03-14 VITALS
DIASTOLIC BLOOD PRESSURE: 85 MMHG | HEART RATE: 84 BPM | TEMPERATURE: 97 F | RESPIRATION RATE: 20 BRPM | SYSTOLIC BLOOD PRESSURE: 134 MMHG

## 2025-03-14 DIAGNOSIS — K95.89 IRON DEFICIENCY ANEMIA FOLLOWING BARIATRIC SURGERY: Primary | ICD-10-CM

## 2025-03-14 DIAGNOSIS — D50.8 OTHER IRON DEFICIENCY ANEMIA: ICD-10-CM

## 2025-03-14 DIAGNOSIS — D50.8 IRON DEFICIENCY ANEMIA FOLLOWING BARIATRIC SURGERY: Primary | ICD-10-CM

## 2025-03-14 PROCEDURE — 96365 THER/PROPH/DIAG IV INF INIT: CPT

## 2025-03-14 RX ORDER — SODIUM CHLORIDE 9 MG/ML
20 INJECTION, SOLUTION INTRAVENOUS ONCE
OUTPATIENT
Start: 2025-05-09

## 2025-03-14 RX ORDER — SODIUM CHLORIDE 9 MG/ML
20 INJECTION, SOLUTION INTRAVENOUS ONCE
Status: COMPLETED | OUTPATIENT
Start: 2025-03-14 | End: 2025-03-14

## 2025-03-14 RX ADMIN — IRON SUCROSE 200 MG: 20 INJECTION, SOLUTION INTRAVENOUS at 09:59

## 2025-03-14 RX ADMIN — SODIUM CHLORIDE 20 ML/HR: 0.9 INJECTION, SOLUTION INTRAVENOUS at 09:59

## 2025-03-14 NOTE — PROGRESS NOTES
Patient presents for Venofer infusion and is without complaints at this time. Patient tolerated infusion without incident. Patient reminded to have overdue labs done at outpatient. Patient verbalized understanding. Declines AVS. Aware of next appointment on 5/9 @ 930am..

## 2025-04-02 ENCOUNTER — APPOINTMENT (OUTPATIENT)
Dept: LAB | Facility: HOSPITAL | Age: 50
End: 2025-04-02
Payer: MEDICARE

## 2025-04-02 DIAGNOSIS — D50.8 IRON DEFICIENCY ANEMIA FOLLOWING BARIATRIC SURGERY: ICD-10-CM

## 2025-04-02 DIAGNOSIS — D50.8 OTHER IRON DEFICIENCY ANEMIA: ICD-10-CM

## 2025-04-02 DIAGNOSIS — K95.89 IRON DEFICIENCY ANEMIA FOLLOWING BARIATRIC SURGERY: ICD-10-CM

## 2025-04-02 LAB
BASOPHILS # BLD AUTO: 0.07 THOUSANDS/ÂΜL (ref 0–0.1)
BASOPHILS NFR BLD AUTO: 1 % (ref 0–1)
EOSINOPHIL # BLD AUTO: 0.17 THOUSAND/ÂΜL (ref 0–0.61)
EOSINOPHIL NFR BLD AUTO: 2 % (ref 0–6)
ERYTHROCYTE [DISTWIDTH] IN BLOOD BY AUTOMATED COUNT: 15.9 % (ref 11.6–15.1)
FERRITIN SERPL-MCNC: 98 NG/ML (ref 11–307)
HCT VFR BLD AUTO: 46.3 % (ref 34.8–46.1)
HGB BLD-MCNC: 14.4 G/DL (ref 11.5–15.4)
IMM GRANULOCYTES # BLD AUTO: 0.01 THOUSAND/UL (ref 0–0.2)
IMM GRANULOCYTES NFR BLD AUTO: 0 % (ref 0–2)
LYMPHOCYTES # BLD AUTO: 2.36 THOUSANDS/ÂΜL (ref 0.6–4.47)
LYMPHOCYTES NFR BLD AUTO: 29 % (ref 14–44)
MCH RBC QN AUTO: 28 PG (ref 26.8–34.3)
MCHC RBC AUTO-ENTMCNC: 31.1 G/DL (ref 31.4–37.4)
MCV RBC AUTO: 90 FL (ref 82–98)
MONOCYTES # BLD AUTO: 0.8 THOUSAND/ÂΜL (ref 0.17–1.22)
MONOCYTES NFR BLD AUTO: 10 % (ref 4–12)
NEUTROPHILS # BLD AUTO: 4.62 THOUSANDS/ÂΜL (ref 1.85–7.62)
NEUTS SEG NFR BLD AUTO: 58 % (ref 43–75)
NRBC BLD AUTO-RTO: 0 /100 WBCS
PLATELET # BLD AUTO: 262 THOUSANDS/UL (ref 149–390)
PMV BLD AUTO: 12 FL (ref 8.9–12.7)
RBC # BLD AUTO: 5.14 MILLION/UL (ref 3.81–5.12)
WBC # BLD AUTO: 8.03 THOUSAND/UL (ref 4.31–10.16)

## 2025-04-02 PROCEDURE — 82728 ASSAY OF FERRITIN: CPT

## 2025-04-02 PROCEDURE — 85025 COMPLETE CBC W/AUTO DIFF WBC: CPT

## 2025-04-02 PROCEDURE — 36415 COLL VENOUS BLD VENIPUNCTURE: CPT

## 2025-04-24 DIAGNOSIS — Z86.718 HISTORY OF DVT (DEEP VEIN THROMBOSIS): ICD-10-CM

## 2025-04-24 NOTE — TELEPHONE ENCOUNTER
Reason for call:   [x] Refill   [] Prior Auth  [] Other:     Office:   [] PCP/Provider -   [x] Specialty/Provider - Hem Onc    Medication: Enoxaparin 40 mg, inject 40 mL under the skin in the morning       Pharmacy: [facility]     Local Pharmacy   Does the patient have enough for 3 days?   [x] Yes   [] No - Send as HP to POD    Mail Away Pharmacy   Does the patient have enough for 10 days?   [] Yes   [] No - Send as HP to POD

## 2025-04-28 RX ORDER — ENOXAPARIN SODIUM 100 MG/ML
40 INJECTION SUBCUTANEOUS DAILY
Qty: 12 ML | Refills: 5 | Status: SHIPPED | OUTPATIENT
Start: 2025-04-28

## 2025-05-09 ENCOUNTER — HOSPITAL ENCOUNTER (OUTPATIENT)
Dept: INFUSION CENTER | Facility: CLINIC | Age: 50
End: 2025-05-09
Payer: MEDICARE

## 2025-05-09 VITALS
DIASTOLIC BLOOD PRESSURE: 80 MMHG | HEART RATE: 71 BPM | SYSTOLIC BLOOD PRESSURE: 113 MMHG | TEMPERATURE: 97.9 F | RESPIRATION RATE: 18 BRPM

## 2025-05-09 DIAGNOSIS — K95.89 IRON DEFICIENCY ANEMIA FOLLOWING BARIATRIC SURGERY: Primary | ICD-10-CM

## 2025-05-09 DIAGNOSIS — D50.8 IRON DEFICIENCY ANEMIA FOLLOWING BARIATRIC SURGERY: Primary | ICD-10-CM

## 2025-05-09 DIAGNOSIS — D50.8 OTHER IRON DEFICIENCY ANEMIA: ICD-10-CM

## 2025-05-09 PROCEDURE — 96365 THER/PROPH/DIAG IV INF INIT: CPT

## 2025-05-09 RX ORDER — SODIUM CHLORIDE 9 MG/ML
20 INJECTION, SOLUTION INTRAVENOUS ONCE
Status: COMPLETED | OUTPATIENT
Start: 2025-05-09 | End: 2025-05-09

## 2025-05-09 RX ORDER — SODIUM CHLORIDE 9 MG/ML
20 INJECTION, SOLUTION INTRAVENOUS ONCE
OUTPATIENT
Start: 2025-07-04

## 2025-05-09 RX ADMIN — IRON SUCROSE 200 MG: 20 INJECTION, SOLUTION INTRAVENOUS at 10:00

## 2025-05-09 RX ADMIN — SODIUM CHLORIDE 20 ML/HR: 0.9 INJECTION, SOLUTION INTRAVENOUS at 10:00

## 2025-05-09 NOTE — PROGRESS NOTES
Pt tolerated venofer infusion without incident.  Pt was provided with future appt on July 8 at 10:00 and was given appt card.

## 2025-06-09 ENCOUNTER — TELEPHONE (OUTPATIENT)
Age: 50
End: 2025-06-09

## 2025-06-09 NOTE — TELEPHONE ENCOUNTER
Patient called requesting for April labs to be faxed over to [facility]- per patient advised to put Attention: Bella WINTERS-VIC.     April labs have been faxed over per patients request.

## 2025-07-11 ENCOUNTER — HOSPITAL ENCOUNTER (OUTPATIENT)
Dept: INFUSION CENTER | Facility: CLINIC | Age: 50
End: 2025-07-11
Attending: INTERNAL MEDICINE
Payer: MEDICARE

## 2025-07-11 VITALS
RESPIRATION RATE: 18 BRPM | DIASTOLIC BLOOD PRESSURE: 68 MMHG | SYSTOLIC BLOOD PRESSURE: 101 MMHG | HEART RATE: 90 BPM | TEMPERATURE: 96.9 F

## 2025-07-11 DIAGNOSIS — D50.8 IRON DEFICIENCY ANEMIA FOLLOWING BARIATRIC SURGERY: Primary | ICD-10-CM

## 2025-07-11 DIAGNOSIS — D50.8 OTHER IRON DEFICIENCY ANEMIA: ICD-10-CM

## 2025-07-11 DIAGNOSIS — K95.89 IRON DEFICIENCY ANEMIA FOLLOWING BARIATRIC SURGERY: Primary | ICD-10-CM

## 2025-07-11 LAB
BASOPHILS # BLD AUTO: 0.04 THOUSANDS/ÂΜL (ref 0–0.1)
BASOPHILS NFR BLD AUTO: 1 % (ref 0–1)
EOSINOPHIL # BLD AUTO: 0.26 THOUSAND/ÂΜL (ref 0–0.61)
EOSINOPHIL NFR BLD AUTO: 4 % (ref 0–6)
ERYTHROCYTE [DISTWIDTH] IN BLOOD BY AUTOMATED COUNT: 18.6 % (ref 11.6–15.1)
FERRITIN SERPL-MCNC: 195 NG/ML (ref 30–307)
HCT VFR BLD AUTO: 40.7 % (ref 34.8–46.1)
HGB BLD-MCNC: 13.2 G/DL (ref 11.5–15.4)
IMM GRANULOCYTES # BLD AUTO: 0.02 THOUSAND/UL (ref 0–0.2)
IMM GRANULOCYTES NFR BLD AUTO: 0 % (ref 0–2)
LYMPHOCYTES # BLD AUTO: 1.87 THOUSANDS/ÂΜL (ref 0.6–4.47)
LYMPHOCYTES NFR BLD AUTO: 30 % (ref 14–44)
MCH RBC QN AUTO: 27.6 PG (ref 26.8–34.3)
MCHC RBC AUTO-ENTMCNC: 32.4 G/DL (ref 31.4–37.4)
MCV RBC AUTO: 85 FL (ref 82–98)
MONOCYTES # BLD AUTO: 0.47 THOUSAND/ÂΜL (ref 0.17–1.22)
MONOCYTES NFR BLD AUTO: 8 % (ref 4–12)
NEUTROPHILS # BLD AUTO: 3.64 THOUSANDS/ÂΜL (ref 1.85–7.62)
NEUTS SEG NFR BLD AUTO: 57 % (ref 43–75)
NRBC BLD AUTO-RTO: 0 /100 WBCS
PLATELET # BLD AUTO: 240 THOUSANDS/UL (ref 149–390)
PMV BLD AUTO: 12.5 FL (ref 8.9–12.7)
RBC # BLD AUTO: 4.78 MILLION/UL (ref 3.81–5.12)
WBC # BLD AUTO: 6.3 THOUSAND/UL (ref 4.31–10.16)

## 2025-07-11 PROCEDURE — 85025 COMPLETE CBC W/AUTO DIFF WBC: CPT

## 2025-07-11 PROCEDURE — 96365 THER/PROPH/DIAG IV INF INIT: CPT

## 2025-07-11 PROCEDURE — 82728 ASSAY OF FERRITIN: CPT

## 2025-07-11 RX ORDER — SODIUM CHLORIDE 9 MG/ML
20 INJECTION, SOLUTION INTRAVENOUS ONCE
Status: COMPLETED | OUTPATIENT
Start: 2025-07-11 | End: 2025-07-11

## 2025-07-11 RX ORDER — SODIUM CHLORIDE 9 MG/ML
20 INJECTION, SOLUTION INTRAVENOUS ONCE
OUTPATIENT
Start: 2025-08-29

## 2025-07-11 RX ADMIN — SODIUM CHLORIDE 20 ML/HR: 0.9 INJECTION, SOLUTION INTRAVENOUS at 10:27

## 2025-07-11 RX ADMIN — IRON SUCROSE 200 MG: 20 INJECTION, SOLUTION INTRAVENOUS at 10:29

## 2025-07-11 NOTE — PROGRESS NOTES
Patient arrived to unit without complaint. Patient tolerated Venofer infusion without incident. AVS declined and patient aware of next appointment on 9/5/25 at 09:30. Patient left in stable condition.

## 2025-07-23 ENCOUNTER — OFFICE VISIT (OUTPATIENT)
Dept: HEMATOLOGY ONCOLOGY | Facility: CLINIC | Age: 50
End: 2025-07-23
Payer: MEDICARE

## 2025-07-23 VITALS
HEART RATE: 82 BPM | BODY MASS INDEX: 51.91 KG/M2 | OXYGEN SATURATION: 97 % | TEMPERATURE: 96.9 F | SYSTOLIC BLOOD PRESSURE: 108 MMHG | DIASTOLIC BLOOD PRESSURE: 70 MMHG | HEIGHT: 63 IN | WEIGHT: 293 LBS

## 2025-07-23 DIAGNOSIS — E53.8 LOW SERUM VITAMIN B12: ICD-10-CM

## 2025-07-23 DIAGNOSIS — D50.8 IRON DEFICIENCY ANEMIA FOLLOWING BARIATRIC SURGERY: ICD-10-CM

## 2025-07-23 DIAGNOSIS — K95.89 IRON DEFICIENCY ANEMIA FOLLOWING BARIATRIC SURGERY: ICD-10-CM

## 2025-07-23 DIAGNOSIS — Z98.84 STATUS POST BARIATRIC SURGERY: Primary | ICD-10-CM

## 2025-07-23 DIAGNOSIS — Z86.718 HISTORY OF DVT (DEEP VEIN THROMBOSIS): ICD-10-CM

## 2025-07-23 PROCEDURE — G2211 COMPLEX E/M VISIT ADD ON: HCPCS | Performed by: PHYSICIAN ASSISTANT

## 2025-07-23 PROCEDURE — 99214 OFFICE O/P EST MOD 30 MIN: CPT | Performed by: PHYSICIAN ASSISTANT

## 2025-07-23 RX ORDER — QUETIAPINE FUMARATE 25 MG/1
TABLET, FILM COATED ORAL AS NEEDED
COMMUNITY
Start: 2025-07-09

## 2025-07-23 RX ORDER — SPIRONOLACTONE 50 MG/1
TABLET, FILM COATED ORAL
COMMUNITY
Start: 2025-07-01

## 2025-07-23 RX ORDER — PROPRANOLOL HYDROCHLORIDE 10 MG/1
TABLET ORAL
COMMUNITY
Start: 2025-07-20

## 2025-07-23 RX ORDER — VIBEGRON 75 MG/1
75 TABLET, FILM COATED ORAL
COMMUNITY
Start: 2025-06-04 | End: 2026-05-30

## 2025-07-23 RX ORDER — SERTRALINE HYDROCHLORIDE 100 MG/1
TABLET, FILM COATED ORAL
COMMUNITY
Start: 2025-07-13

## 2025-07-23 RX ORDER — TROSPIUM CHLORIDE 20 MG/1
20 TABLET, FILM COATED ORAL 2 TIMES DAILY
COMMUNITY
Start: 2025-06-04 | End: 2026-05-30

## 2025-07-23 RX ORDER — GABAPENTIN 600 MG/1
TABLET ORAL
COMMUNITY
Start: 2025-06-27

## 2025-07-23 RX ORDER — LEVOTHYROXINE SODIUM 125 UG/1
TABLET ORAL
COMMUNITY
Start: 2025-06-27

## 2025-07-23 RX ORDER — CYANOCOBALAMIN 1000 UG/ML
1000 INJECTION, SOLUTION INTRAMUSCULAR; SUBCUTANEOUS ONCE
Start: 2025-07-23 | End: 2025-07-23

## 2025-07-23 RX ORDER — ROSUVASTATIN CALCIUM 20 MG/1
TABLET, COATED ORAL
COMMUNITY
Start: 2025-06-27

## 2025-07-23 RX ORDER — BUSPIRONE HYDROCHLORIDE 30 MG/1
TABLET ORAL
COMMUNITY
Start: 2025-07-09

## 2025-07-23 RX ORDER — PALIPERIDONE PALMITATE 234 MG/1.5ML
INJECTION INTRAMUSCULAR
COMMUNITY
Start: 2025-07-07

## 2025-07-23 RX ORDER — TIRZEPATIDE 10 MG/.5ML
INJECTION, SOLUTION SUBCUTANEOUS
COMMUNITY
Start: 2025-07-07

## 2025-07-23 RX ORDER — MELOXICAM 7.5 MG/1
TABLET ORAL AS NEEDED
COMMUNITY

## 2025-07-23 RX ORDER — TRIFAROTENE 50 UG/G
CREAM TOPICAL
COMMUNITY

## 2025-07-23 RX ORDER — POTASSIUM CHLORIDE 750 MG/1
TABLET, EXTENDED RELEASE ORAL
COMMUNITY
Start: 2025-06-27

## 2025-07-23 RX ORDER — EPTINEZUMAB-JJMR 100 MG/ML
300 INJECTION INTRAVENOUS
COMMUNITY

## 2025-07-23 RX ORDER — QUETIAPINE FUMARATE 400 MG/1
TABLET, FILM COATED ORAL
COMMUNITY
Start: 2025-07-20

## 2025-07-23 RX ORDER — ATOMOXETINE 100 MG/1
40 CAPSULE ORAL
COMMUNITY
Start: 2025-06-27

## 2025-07-23 NOTE — PROGRESS NOTES
Name: Mary Morrison      : 1975      MRN: [id number]  Encounter Provider: Karen Nettles PA-C  Encounter Date: 2025   Encounter department: [facility] H[facility]ATOLOGY ONCOLOGY SPECIALISTS [facility]  :  Assessment & Plan  Status post bariatric surgery  See below       History of DVT (deep vein thrombosis)  Continue Lovenox 40 mg subcu daily for prevention of recurrent thrombosis, she is on Lovenox secondary to malabsorption of other oral anticoagulants       Iron deficiency anemia following bariatric surgery  Continue Venofer maintenance together month       Low serum vitamin B12  Added B12 1000 mcg to iron infusion as levels are on the lower end of normal         Assessment & Plan      Follow-up scheduled for  for her routine follow-up  No follow-ups on file.    History of Present Illness   Chief Complaint   Patient presents with    Follow-up     History of Present Illness    Pertinent Medical History     25: follow up for history of thrombosis, iron def anemia due to malabsorption from bariatric surgery and history of GI blood loss.      Past medical history significant for hepatic steatosis, hypothyroidism, pulmonary embolism, IVC thrombosis, migraines, seizures, psychiatric disorder (bipolar disorder, SBAINO), history of gastric bypass surgery ().     Patient presented to the emergency department on 2020 due to abdominal pain radiating into bilateral upper legs.     She had a history of bilateral lower extremity DVT and PE in 2017 for which she had an IVC filter placed in [place] in . She was treated with Xarelto but developed a GI bleed in discontinued.  Per patient this was unprovoked. Patient admitted to being off of anticoagulation during this admission and denied a previous workup for clotting disorder in the past.     Family history significant for DVT and PE and her sister. Her sister  of PE. Of note her sister had lupus.  Paternal uncle had DVT  as well.      A CT of the abdomen pelvis was completed on 08/03/2020.  This showed known IVC filter, diffuse thrombus extending from the IVC filter inferiorly into the iliac vasculature, extensive surrounding fat stranding with thrombophlebitis, small amount of thrombus extends superior to the filter.       A venous Doppler study of bilateral lower extremities were also completed on 08/04/2020 which was negative for DVT.     She underwent lysis of thrombus with IR on 08/04.     She returned to IR on 08/05/2020 for IR venogram.     Patient was on heparin during hospitalization.     Patient was seen by Dr. Jordan on 08/07/2020 during hospitalization.  Due to patient's history of gastric bypass surgery patient was recommended Lovenox 0.75 milligrams/kilogram subcu b.i.d..     She had partial thrombosis workup in the hospital.              Homocystine normal, 7.4              Protein S activity normal, 111              Protein C activity normal, 91.0              Factor 5 Leiden mutation negative              Prothrombin gene mutation negative              Anticardiolipin antibody IgA and IgG normal.  IgM 18 which is a indeterminate results              Lupus anticoagulant negative               Beta-2 glycoproteins negative      Patient was discharged on Lovenox 0.75 milligram/kilogram.  Discharge date was 08/09/2020.     She presented back to the emergency department on 08/14/2020 due to abdominal pain and right leg pain.     Patient was diagnosed with cellulitis of lower extremity, right, and received IV Ancef for 3 days in [place] on p.o. Keflex.     She had outpatient follow-up with vascular surgery on 08/21/2020.  Recommendation for her venous disease included conservative measures and medical management.  Patient was scheduled for follow-up in November 2020 with vascular surgery with a repeat IVC/iliac vein duplex and bilateral lower extremity DVT study.      At consultation patient was recommended to have EGD and  colonoscopy secondary to weight loss.  She had an EGD and colonoscopy on 12/03/2020.  This showed  anastomotic ulcer on the jejunal side.  Colonoscopy showed 1 polyp measuring 5-9 mm in the sigmoid colon.  A clip was placed to prevent bleeding secondary to anticoagulation. There is no of inadequate bowel prep and patient was recommended to have repeat in 6 months (June 2021).      Patient was admitted to the hospital to the ICU due to acute GI bleed from 5/2 - 5/5/21.  She states that she was taking NSAIDs due to migraines even though she knew she was not supposed to do this.  She had EGD on 05/02/2021 due to bleeding while inpatient and this showed single 5 mm crater drowned benign-appearing ulcer in the stomach.  This was treated.  Patient's Lovenox was held during parts of hospitalization then restarted on discharge.     Patient is planned to have her IVC filter removed on 05/18/2021 however I note that this appears to have been canceled.  She really had CT venogram in March 2021 which showed no evidence of thrombus.  There was a incident right ovarian cyst which patient's PCP at [facility] ordered an ultrasound and patient was referred to a gyn at [facility] as well.      Patient then admitted again on 6/18 - 6/23/21 due to MADHU and hypokalemia, both due to too much diuretic managed by PCP at [facility]     6/8/21 IVC filter was removed      Ferritin 141, continues on oral iron once per day      7/9/21   Potassium 3.2, PCP treating this      She still is taking 100 mg SQ Lovenox due to supply, still has 5 boxes left      7/21/21 - have EGD and colonoscopy    For hospital follow-up after being hospitalized in [place] for Salmonella poisoning.  She had significant electrolyte abnormalities and dehydration, she states that she was told she had a DVT during this time however records indicate she had a superficial thrombophlebitis of the cephalic vein secondary to IV site.  She continues  "on Lovenox 40 mg at this time       Review of Systems   Constitutional:  Negative for activity change, appetite change, chills, fatigue, fever and unexpected weight change.   HENT:  Negative for mouth sores and nosebleeds.    Respiratory:  Negative for cough and shortness of breath.    Cardiovascular:  Negative for chest pain, palpitations and leg swelling.   Gastrointestinal:  Negative for abdominal pain, blood in stool, constipation, diarrhea, nausea and vomiting.   Genitourinary:  Negative for difficulty urinating, dysuria and hematuria.   Musculoskeletal:  Negative for arthralgias.   Skin: Negative.    Neurological:  Negative for dizziness, weakness, light-headedness, numbness and headaches.   Hematological: Negative.    Psychiatric/Behavioral: Negative.             Objective   /70 (BP Location: Left arm, Patient Position: Sitting, Cuff Size: Large)   Pulse 82   Temp (!) 96.9 °F (36.1 °C) (Temporal)   Ht 5' 3\" (1.6 m)   Wt 133 kg (294 lb)   LMP 10/04/2020   SpO2 97%   BMI 52.08 kg/m²     Physical Exam  Vitals reviewed.   Constitutional:       General: She is not in acute distress.     Appearance: She is well-developed. She is obese. She is not ill-appearing.   HENT:      Head: Normocephalic and atraumatic.     Eyes:      General: No scleral icterus.     Conjunctiva/sclera: Conjunctivae normal.       Cardiovascular:      Rate and Rhythm: Normal rate and regular rhythm.      Heart sounds: Normal heart sounds. No murmur heard.  Pulmonary:      Effort: Pulmonary effort is normal. No respiratory distress.      Breath sounds: Normal breath sounds.   Abdominal:      Palpations: Abdomen is soft.      Tenderness: There is no abdominal tenderness.     Musculoskeletal:         General: No tenderness. Normal range of motion.      Cervical back: Normal range of motion and neck supple.      Right lower leg: No edema.      Left lower leg: No edema.   Lymphadenopathy:      Cervical: No cervical adenopathy. "     Skin:     General: Skin is warm and dry.     Neurological:      Mental Status: She is alert and oriented to person, place, and time.      Cranial Nerves: No cranial nerve deficit.     Psychiatric:         Mood and Affect: Mood normal.         Behavior: Behavior normal.       Physical Exam      Results    Labs: I have reviewed the following labs:  Results for orders placed or performed during the hospital encounter of 07/11/25   CBC and differential   Result Value Ref Range    WBC 6.30 4.31 - 10.16 Thousand/uL    RBC 4.78 3.81 - 5.12 Million/uL    Hemoglobin 13.2 11.5 - 15.4 g/dL    Hematocrit 40.7 34.8 - 46.1 %    MCV 85 82 - 98 fL    MCH 27.6 26.8 - 34.3 pg    MCHC 32.4 31.4 - 37.4 g/dL    RDW 18.6 (H) 11.6 - 15.1 %    MPV 12.5 8.9 - 12.7 fL    Platelets 240 149 - 390 Thousands/uL    nRBC 0 /100 WBCs    Segmented % 57 43 - 75 %    Immature Grans % 0 0 - 2 %    Lymphocytes % 30 14 - 44 %    Monocytes % 8 4 - 12 %    Eosinophils Relative 4 0 - 6 %    Basophils Relative 1 0 - 1 %    Absolute Neutrophils 3.64 1.85 - 7.62 Thousands/µL    Absolute Immature Grans 0.02 0.00 - 0.20 Thousand/uL    Absolute Lymphocytes 1.87 0.60 - 4.47 Thousands/µL    Absolute Monocytes 0.47 0.17 - 1.22 Thousand/µL    Eosinophils Absolute 0.26 0.00 - 0.61 Thousand/µL    Basophils Absolute 0.04 0.00 - 0.10 Thousands/µL   Result Value Ref Range    Ferritin 195 30 - 307 ng/mL

## 2025-07-23 NOTE — ASSESSMENT & PLAN NOTE
Continue Lovenox 40 mg subcu daily for prevention of recurrent thrombosis, she is on Lovenox secondary to malabsorption of other oral anticoagulants       
Continue Venofer maintenance together month       
Yes

## (undated) DEVICE — 3M™ STERI-DRAPE™ UNDER BUTTOCKS DRAPE WITH POUCH 1084: Brand: STERI-DRAPE™

## (undated) DEVICE — NEEDLE COUNTER LG W/RULER

## (undated) DEVICE — TRAY FOLEY 16FR URIMETER SILICONE SURESTEP

## (undated) DEVICE — GLOVE INDICATOR PI UNDERGLOVE SZ 7 BLUE

## (undated) DEVICE — 3000CC GUARDIAN II: Brand: GUARDIAN

## (undated) DEVICE — CHLORAPREP HI-LITE 26ML ORANGE

## (undated) DEVICE — KIT, BETHLEHEM ROBOTIC PROST: Brand: CARDINAL HEALTH

## (undated) DEVICE — PREMIUM DRY TRAY LF: Brand: MEDLINE INDUSTRIES, INC.

## (undated) DEVICE — INTENDED FOR TISSUE SEPARATION, AND OTHER PROCEDURES THAT REQUIRE A SHARP SURGICAL BLADE TO PUNCTURE OR CUT.: Brand: BARD-PARKER SAFETY BLADES SIZE 11, STERILE

## (undated) DEVICE — SYRINGE 10ML LL CONTROL TOP

## (undated) DEVICE — TROCAR: Brand: KII FIOS FIRST ENTRY

## (undated) DEVICE — SYRINGE 50ML LL

## (undated) DEVICE — 3M™ TEGADERM™ TRANSPARENT FILM DRESSING FRAME STYLE, 1626W, 4 IN X 4-3/4 IN (10 CM X 12 CM), 50/CT 4CT/CASE: Brand: 3M™ TEGADERM™

## (undated) DEVICE — DRAPE TOWEL: Brand: CONVERTORS

## (undated) DEVICE — EXIDINE 4 PCT

## (undated) DEVICE — COLUMN DRAPE

## (undated) DEVICE — TROCAR: Brand: KII SLEEVE

## (undated) DEVICE — ARM DRAPE

## (undated) DEVICE — GLOVE INDICATOR PI UNDERGLOVE SZ 8.5 BLUE

## (undated) DEVICE — MONOPOLAR CURVED SCISSORS: Brand: ENDOWRIST

## (undated) DEVICE — SUT VICRYL 0 UR-6 27 IN J603H

## (undated) DEVICE — GLOVE INDICATOR PI UNDERGLOVE SZ 9 BLUE

## (undated) DEVICE — DRAPE LAPAROTOMY W/POUCHES

## (undated) DEVICE — 3M™ STERI-STRIP™ REINFORCED ADHESIVE SKIN CLOSURES, R1547, 1/2 IN X 4 IN (12 MM X 100 MM), 6 STRIPS/ENVELOPE: Brand: 3M™ STERI-STRIP™

## (undated) DEVICE — NEEDLE 25G X 1 1/2

## (undated) DEVICE — ANTI-FOG SOLUTION WITH FOAM PAD: Brand: DEVON

## (undated) DEVICE — [HIGH FLOW INSUFFLATOR,  DO NOT USE IF PACKAGE IS DAMAGED,  KEEP DRY,  KEEP AWAY FROM SUNLIGHT,  PROTECT FROM HEAT AND RADIOACTIVE SOURCES.]: Brand: PNEUMOSURE

## (undated) DEVICE — TIP COVER ACCESSORY

## (undated) DEVICE — LUBRICANT INST ELECTROLUBE ANTISTK WO PAD

## (undated) DEVICE — LARGE NEEDLE DRIVER: Brand: ENDOWRIST

## (undated) DEVICE — SUT VICRYL 0 CT-1 36 IN J946H

## (undated) DEVICE — CANNULA SEAL

## (undated) DEVICE — OCCLUDER COLPO-PNEUMO

## (undated) DEVICE — Device: Brand: TISSUE RETRIEVAL SYSTEM

## (undated) DEVICE — UTERINE MANIPULATOR RUMI 5.1 X 6 CM

## (undated) DEVICE — VESSEL SEALER EXTEND: Brand: ENDOWRIST

## (undated) DEVICE — DRAPE EQUIPMENT RF WAND

## (undated) DEVICE — ADHESIVE SKIN HIGH VISCOSITY EXOFIN 1ML

## (undated) DEVICE — GLOVE PI ULTRA TOUCH SZ.8.5

## (undated) DEVICE — PROGRASP FORCEPS: Brand: ENDOWRIST

## (undated) DEVICE — SUT MONOCRYL 4-0 PS-2 27 IN Y426H

## (undated) DEVICE — MAYO STAND COVER: Brand: CONVERTORS

## (undated) DEVICE — ASTOUND STANDARD SURGICAL GOWN, XL: Brand: CONVERTORS

## (undated) DEVICE — BLUE HEAT SCOPE WARMER

## (undated) DEVICE — SCD SEQUENTIAL COMPRESSION COMFORT SLEEVE MEDIUM KNEE LENGTH: Brand: KENDALL SCD

## (undated) DEVICE — Device

## (undated) DEVICE — SUT MONOCRYL 4-0 SH 27 IN Y415H